# Patient Record
Sex: MALE | Race: WHITE | NOT HISPANIC OR LATINO | Employment: OTHER | URBAN - METROPOLITAN AREA
[De-identification: names, ages, dates, MRNs, and addresses within clinical notes are randomized per-mention and may not be internally consistent; named-entity substitution may affect disease eponyms.]

---

## 2018-05-03 ENCOUNTER — TRANSFERRED RECORDS (OUTPATIENT)
Dept: HEALTH INFORMATION MANAGEMENT | Facility: CLINIC | Age: 68
End: 2018-05-03

## 2018-05-04 ENCOUNTER — TRANSFERRED RECORDS (OUTPATIENT)
Dept: HEALTH INFORMATION MANAGEMENT | Facility: CLINIC | Age: 68
End: 2018-05-04

## 2018-05-16 ENCOUNTER — TRANSFERRED RECORDS (OUTPATIENT)
Dept: HEALTH INFORMATION MANAGEMENT | Facility: CLINIC | Age: 68
End: 2018-05-16

## 2018-08-13 ENCOUNTER — TRANSFERRED RECORDS (OUTPATIENT)
Dept: HEALTH INFORMATION MANAGEMENT | Facility: CLINIC | Age: 68
End: 2018-08-13

## 2018-08-16 ENCOUNTER — APPOINTMENT (RX ONLY)
Dept: URBAN - METROPOLITAN AREA CLINIC 126 | Facility: CLINIC | Age: 68
Setting detail: DERMATOLOGY
End: 2018-08-16

## 2018-08-16 DIAGNOSIS — L21.8 OTHER SEBORRHEIC DERMATITIS: ICD-10-CM

## 2018-08-16 DIAGNOSIS — K13.0 DISEASES OF LIPS: ICD-10-CM

## 2018-08-16 DIAGNOSIS — L80 VITILIGO: ICD-10-CM

## 2018-08-16 PROCEDURE — 99202 OFFICE O/P NEW SF 15 MIN: CPT

## 2018-08-16 PROCEDURE — ? TREATMENT REGIMEN

## 2018-08-16 PROCEDURE — ? PRODUCT LINE (OFFICE PRODUCTS)

## 2018-08-16 PROCEDURE — ? RECOMMENDATIONS

## 2018-08-16 PROCEDURE — ? COUNSELING

## 2018-08-16 PROCEDURE — ? PRESCRIPTION

## 2018-08-16 RX ORDER — KETOCONAZOLE 20 MG/G
CREAM TOPICAL
Qty: 1 | Refills: 1 | Status: ERX | COMMUNITY
Start: 2018-08-16

## 2018-08-16 RX ADMIN — KETOCONAZOLE: 20 CREAM TOPICAL at 00:00

## 2018-08-16 ASSESSMENT — LOCATION ZONE DERM
LOCATION ZONE: LIP
LOCATION ZONE: HAND
LOCATION ZONE: FACE
LOCATION ZONE: NECK

## 2018-08-16 ASSESSMENT — LOCATION SIMPLE DESCRIPTION DERM
LOCATION SIMPLE: LEFT HAND
LOCATION SIMPLE: LEFT ANTERIOR NECK
LOCATION SIMPLE: RIGHT CHEEK
LOCATION SIMPLE: LEFT CHEEK
LOCATION SIMPLE: RIGHT LIP
LOCATION SIMPLE: LEFT LIP
LOCATION SIMPLE: RIGHT HAND

## 2018-08-16 ASSESSMENT — LOCATION DETAILED DESCRIPTION DERM
LOCATION DETAILED: RIGHT ORAL COMMISSURE
LOCATION DETAILED: RIGHT INFERIOR MEDIAL BUCCAL CHEEK
LOCATION DETAILED: RIGHT INFERIOR LATERAL MALAR CHEEK
LOCATION DETAILED: LEFT INFERIOR VERMILION LIP
LOCATION DETAILED: LEFT MEDIAL MANDIBULAR CHEEK
LOCATION DETAILED: LEFT SUPERIOR ANTERIOR NECK
LOCATION DETAILED: LEFT ULNAR DORSAL HAND
LOCATION DETAILED: LEFT INFERIOR CENTRAL MALAR CHEEK
LOCATION DETAILED: RIGHT RADIAL DORSAL HAND

## 2018-08-16 NOTE — PROCEDURE: PRODUCT LINE (OFFICE PRODUCTS)
Product 35 Units: 0
Product 29 Price (In Dollars - Numeric Only, No Special Characters Or $): 0.00
Detail Level: Zone
Product 3 Units: 1
Name Of Product 1: Jerl Salt
Product 2 Application Directions: Apply to scar QD
Render Product Pricing In Note: No
Allow Plan To Count Towards E/M Coding: Yes
Name Of Product 3: EpiCeram L
Name Of Product 2: Silagen with SPF

## 2018-08-16 NOTE — HPI: RASH
How Severe Is Your Rash?: mild
Is This A New Presentation, Or A Follow-Up?: Rash
Additional History: Patient using Benadryl cream

## 2018-08-16 NOTE — PROCEDURE: RECOMMENDATIONS
Detail Level: Zone
Recommendation Preamble: The following recommendations were made during the visit: Extract Laser and Phototherapy

## 2018-08-16 NOTE — PROCEDURE: TREATMENT REGIMEN
Detail Level: Simple
Otc Regimen: Probiotic 25-50 billion ct.
Continue Regimen: Ketoconazole BID x 2 weeks PRN Flare

## 2018-08-30 ENCOUNTER — APPOINTMENT (RX ONLY)
Dept: URBAN - METROPOLITAN AREA CLINIC 126 | Facility: CLINIC | Age: 68
Setting detail: DERMATOLOGY
End: 2018-08-30

## 2018-08-30 DIAGNOSIS — L23.3 ALLERGIC CONTACT DERMATITIS DUE TO DRUGS IN CONTACT WITH SKIN: ICD-10-CM

## 2018-08-30 PROCEDURE — ? PRESCRIPTION

## 2018-08-30 PROCEDURE — ? DIAGNOSIS COMMENT

## 2018-08-30 PROCEDURE — 99213 OFFICE O/P EST LOW 20 MIN: CPT

## 2018-08-30 PROCEDURE — ? COUNSELING

## 2018-08-30 PROCEDURE — ? TREATMENT REGIMEN

## 2018-08-30 RX ORDER — ALCLOMETASONE DIPROPIONATE 0.5 MG/G
OINTMENT TOPICAL
Qty: 1 | Refills: 2 | Status: ERX | COMMUNITY
Start: 2018-08-30

## 2018-08-30 RX ADMIN — ALCLOMETASONE DIPROPIONATE: 0.5 OINTMENT TOPICAL at 00:00

## 2018-08-30 ASSESSMENT — LOCATION ZONE DERM: LOCATION ZONE: LIP

## 2018-08-30 ASSESSMENT — LOCATION DETAILED DESCRIPTION DERM
LOCATION DETAILED: LEFT SUPERIOR VERMILION LIP
LOCATION DETAILED: LEFT INFERIOR VERMILION LIP

## 2018-08-30 ASSESSMENT — LOCATION SIMPLE DESCRIPTION DERM: LOCATION SIMPLE: LEFT LIP

## 2018-10-03 ENCOUNTER — TRANSFERRED RECORDS (OUTPATIENT)
Dept: HEALTH INFORMATION MANAGEMENT | Facility: CLINIC | Age: 68
End: 2018-10-03

## 2018-10-26 ENCOUNTER — RX ONLY (OUTPATIENT)
Age: 68
Setting detail: RX ONLY
End: 2018-10-26

## 2018-10-26 RX ORDER — MUPIROCIN 20 MG/G
OINTMENT TOPICAL
Qty: 1 | Refills: 2 | Status: ERX | COMMUNITY
Start: 2018-10-26

## 2018-12-06 ENCOUNTER — APPOINTMENT (RX ONLY)
Dept: URBAN - METROPOLITAN AREA CLINIC 128 | Facility: CLINIC | Age: 68
Setting detail: DERMATOLOGY
End: 2018-12-06

## 2018-12-06 DIAGNOSIS — B02.9 ZOSTER WITHOUT COMPLICATIONS: ICD-10-CM

## 2018-12-06 PROCEDURE — 99213 OFFICE O/P EST LOW 20 MIN: CPT

## 2018-12-06 PROCEDURE — ? COUNSELING

## 2018-12-06 PROCEDURE — ? TREATMENT REGIMEN

## 2018-12-06 PROCEDURE — ? PRESCRIPTION

## 2018-12-06 RX ORDER — VALACYCLOVIR HYDROCHLORIDE 1 G/1
TABLET, FILM COATED ORAL
Qty: 21 | Refills: 0 | Status: ERX | COMMUNITY
Start: 2018-12-06

## 2018-12-06 RX ORDER — TRIAMCINOLONE ACETONIDE 0.1 %
OINTMENT (GRAM) TOPICAL
Qty: 1 | Refills: 0 | Status: ERX | COMMUNITY
Start: 2018-12-06

## 2018-12-06 RX ADMIN — VALACYCLOVIR HYDROCHLORIDE: 1 TABLET, FILM COATED ORAL at 16:15

## 2018-12-06 RX ADMIN — Medication: at 16:17

## 2018-12-06 ASSESSMENT — LOCATION ZONE DERM: LOCATION ZONE: ARM

## 2018-12-06 ASSESSMENT — LOCATION DETAILED DESCRIPTION DERM: LOCATION DETAILED: RIGHT PROXIMAL POSTERIOR UPPER ARM

## 2018-12-06 ASSESSMENT — LOCATION SIMPLE DESCRIPTION DERM: LOCATION SIMPLE: RIGHT UPPER ARM

## 2018-12-06 NOTE — PROCEDURE: TREATMENT REGIMEN
Initiate Treatment: Valtrex 1g TID x 7 days\\nTAC 0.1% ointment BID x 2 weeks PRN itch\\nAleve PRN pain (may call for gabapentin if necessary)
Plan: Recommended patient to have shingrix immunization completed after current zoster flare is healed
Detail Level: Simple

## 2019-01-02 ENCOUNTER — TRANSFERRED RECORDS (OUTPATIENT)
Dept: HEALTH INFORMATION MANAGEMENT | Facility: CLINIC | Age: 69
End: 2019-01-02

## 2019-04-16 ENCOUNTER — TRANSFERRED RECORDS (OUTPATIENT)
Dept: HEALTH INFORMATION MANAGEMENT | Facility: CLINIC | Age: 69
End: 2019-04-16

## 2019-09-20 ENCOUNTER — TRANSFERRED RECORDS (OUTPATIENT)
Dept: HEALTH INFORMATION MANAGEMENT | Facility: CLINIC | Age: 69
End: 2019-09-20

## 2019-10-03 ENCOUNTER — TRANSFERRED RECORDS (OUTPATIENT)
Dept: HEALTH INFORMATION MANAGEMENT | Facility: CLINIC | Age: 69
End: 2019-10-03

## 2019-10-21 ENCOUNTER — TRANSFERRED RECORDS (OUTPATIENT)
Dept: HEALTH INFORMATION MANAGEMENT | Facility: CLINIC | Age: 69
End: 2019-10-21

## 2019-11-12 ENCOUNTER — TRANSFERRED RECORDS (OUTPATIENT)
Dept: HEALTH INFORMATION MANAGEMENT | Facility: CLINIC | Age: 69
End: 2019-11-12

## 2019-11-20 ENCOUNTER — TRANSFERRED RECORDS (OUTPATIENT)
Dept: HEALTH INFORMATION MANAGEMENT | Facility: CLINIC | Age: 69
End: 2019-11-20

## 2020-08-10 ENCOUNTER — HOSPITAL ENCOUNTER (OUTPATIENT)
Facility: CLINIC | Age: 70
End: 2020-08-10
Attending: ORTHOPAEDIC SURGERY | Admitting: ORTHOPAEDIC SURGERY
Payer: MEDICARE

## 2020-08-10 DIAGNOSIS — Z11.59 ENCOUNTER FOR SCREENING FOR OTHER VIRAL DISEASES: Primary | ICD-10-CM

## 2020-08-12 RX ORDER — TRANEXAMIC ACID 650 MG/1
1950 TABLET ORAL ONCE
Status: CANCELLED | OUTPATIENT
Start: 2020-08-12 | End: 2020-08-12

## 2020-08-12 RX ORDER — CEFAZOLIN SODIUM 1 G/3ML
1 INJECTION, POWDER, FOR SOLUTION INTRAMUSCULAR; INTRAVENOUS SEE ADMIN INSTRUCTIONS
Status: CANCELLED | OUTPATIENT
Start: 2020-08-12

## 2020-08-12 RX ORDER — ACETAMINOPHEN 325 MG/1
975 TABLET ORAL ONCE
Status: CANCELLED | OUTPATIENT
Start: 2020-08-12 | End: 2020-08-12

## 2020-08-12 RX ORDER — CEFAZOLIN SODIUM 2 G/100ML
2 INJECTION, SOLUTION INTRAVENOUS
Status: CANCELLED | OUTPATIENT
Start: 2020-08-12

## 2020-10-23 ENCOUNTER — TRANSCRIBE ORDERS (OUTPATIENT)
Dept: OTHER | Age: 70
End: 2020-10-23

## 2020-10-23 DIAGNOSIS — C09.9 MALIGNANT NEOPLASM OF TONSIL (H): Primary | ICD-10-CM

## 2020-11-04 NOTE — TELEPHONE ENCOUNTER
FUTURE VISIT INFORMATION      FUTURE VISIT INFORMATION:    Date: 2020    Time: 1PM    Location: [facility]  REFERRAL INFORMATION:    Referring provider:  Kip Flores MD    Referring providers clinic:  [facility]     Reason for visit/diagnosis  Malignant neoplasm of tonsil     RECORDS REQUESTED FROM:       Clinic name Comments Records Status Imaging Status   [facility]   [address] [place] [address]    [phone]   10/23/2020 note from Kip Flores MD Care Everywhere    [facility] Urgent [facility]   2020 note from Xochitl Restrepo PA-C   Care Everywhere     [facility]    [address] [place] [address]    [phone]   10/16/2020 note from Live Treviño MD Care Everywhere    [facility]  10/14/2020 MR Neck   2020 MR Neck   2020 PET CT  Care Everywhere PACS   [facility]  [address] [place] [address]  Medical Records  Ph. [phone]  Fx. [phone]  Film room fx. [phone] 10/3/2018 PET CT   2018 CT Chest   2018 CT Neck   2019 MR Neck   2019 PET CT  2019 CT Chest     Imaging trackin   emailed LINCOLN 11/10    req  req     Received    Dr Live Humphries's office  [facility] [place], [place]. Phone- [phone]   Fax: [phone]    2020 summary   10/3/2019 - 2018 notes from Dr Live Humphries  emailed LINCOLN 11/10    req     Sent to scan     [facility]  [address], [address]  [place] [address]   Phone: [phone]  Fax: [phone]   *notes are hand written     2019, 10/30/2018, 2018  note  emailed LINCOLN 11/10    req 11/17    Received     [facility]  [address] [place] [address]  Ph. [phone]  Fx. [phone] 5/3/2018 tonsil biopsy (Path#:  [id number])    *trackin Report sent to scan 2020 10:25AM left patient a message to see if we can get ROIs to him via email to obtain his outside [place] records (ENT, pathology/biopsy report, images). Left patient my direct number for call back [phone] - Amay  11/10/2020 9:39AM emailed ROIs to patient - Amay   2020 10:09AM received signed ROIs, notified patient via email. SEnt a fax to facilities for recs - amay   2020 8:29AM received recs from [facility], [facility] reports [facility]   2020 3:18PM received [facility] recs, sent to scan. Sent a fax for path send out from [facility] - Amay     *waiting for path, all images and notes have been received - amay

## 2020-11-04 NOTE — TELEPHONE ENCOUNTER
ONCOLOGY INTAKE: Records Information      APPT INFORMATION:  Referring provider:  Dr. Flores   Referring provider s clinic:  [facility]  Reason for visit/diagnosis:  Malignant neoplasm of tonsil  Has patient been notified of appointment date and time?: Yes    RECORDS INFORMATION:  Were the records received with the referral (via [employer])? Yes    Has patient been seen for any external appt for this diagnosis? Yes    If yes, where? [facility]    Has patient had any imaging or procedures outside of [facility] for this condition? Yes      If Yes, where? [facility]    ADDITIONAL INFORMATION:  None

## 2020-11-08 ENCOUNTER — DOCUMENTATION ONLY (OUTPATIENT)
Dept: CARE COORDINATION | Facility: CLINIC | Age: 70
End: 2020-11-08

## 2020-11-10 NOTE — TELEPHONE ENCOUNTER
Called patient to notify that he will need to sign a few ROIs for his [place] records. Patient is ok with emailing forms. He does have a printer at home, and is aware that I will send him instructions on how to send the forms back to me. Verified email: [email or web address]. Notified patient that I have had issues with patient's receiving my email in the past, he will call me tomorrow if he does not see my email. Patent also wanted to relay to nurse, he has been experiencing left ear discomfort and jaw pain from after his dental surgery. He does wear hearing aids, but is not experiencing any hearing loss. Notified patient that I will relay his concerns to the RN.

## 2020-11-16 NOTE — TELEPHONE ENCOUNTER
Patient sent an email to me stating he dropped off his signed Release forms with the check in staff on the 4th floor Friday 11/13 around 3pm. Patient unsure who he dropped it off with. A message has been sent to the check in staff to see if they received it. Patient is aware that I will notify him once I have located the forms, in case the forms have been misplaced.     Amay

## 2020-11-18 ENCOUNTER — TRANSFERRED RECORDS (OUTPATIENT)
Dept: HEALTH INFORMATION MANAGEMENT | Facility: CLINIC | Age: 70
End: 2020-11-18

## 2020-11-18 NOTE — TELEPHONE ENCOUNTER
Imaging disc received from [facility] with MRI soft tissue neck done 11/20/19, PET-CT skull base through mid- thighs done 11/12/19, ct chest done 4/16/19, PET-CT skull base through mid-thighs done 10/3/18, ct chest done 8/13/18, PET-CT skull base to mid thigh done 5/17/18

## 2020-11-23 NOTE — TELEPHONE ENCOUNTER
Action 11/23/2020 3:15pm -neetu   Action Taken Path slides have been received from [facility].     Path #: [id number] Tonsil, Left.    Pathology Form has been filled out and dropped off to Path Lab along with Path slides (reports, etc).

## 2020-11-25 ENCOUNTER — OFFICE VISIT (OUTPATIENT)
Dept: OTOLARYNGOLOGY | Facility: CLINIC | Age: 70
End: 2020-11-25
Attending: OTOLARYNGOLOGY
Payer: MEDICARE

## 2020-11-25 ENCOUNTER — PRE VISIT (OUTPATIENT)
Dept: OTOLARYNGOLOGY | Facility: CLINIC | Age: 70
End: 2020-11-25

## 2020-11-25 VITALS
DIASTOLIC BLOOD PRESSURE: 87 MMHG | OXYGEN SATURATION: 100 % | TEMPERATURE: 98.3 F | WEIGHT: 185 LBS | HEIGHT: 70 IN | RESPIRATION RATE: 15 BRPM | SYSTOLIC BLOOD PRESSURE: 151 MMHG | HEART RATE: 67 BPM | BODY MASS INDEX: 26.48 KG/M2

## 2020-11-25 DIAGNOSIS — C09.9 MALIGNANT NEOPLASM OF TONSIL (H): ICD-10-CM

## 2020-11-25 LAB — COPATH REPORT: NORMAL

## 2020-11-25 PROCEDURE — 99203 OFFICE O/P NEW LOW 30 MIN: CPT | Performed by: OTOLARYNGOLOGY

## 2020-11-25 PROCEDURE — 88321 CONSLTJ&REPRT SLD PREP ELSWR: CPT | Performed by: PATHOLOGY

## 2020-11-25 PROCEDURE — 999N001032 HC STATISTIC REVIEW OUTSIDE SLIDES TC 88321: Performed by: OTOLARYNGOLOGY

## 2020-11-25 RX ORDER — LISINOPRIL 20 MG/1
20 TABLET ORAL 2 TIMES DAILY
COMMUNITY
Start: 2019-03-16 | End: 2021-04-19

## 2020-11-25 RX ORDER — CEVIMELINE HYDROCHLORIDE 30 MG/1
CAPSULE ORAL PRN
COMMUNITY
Start: 2020-11-19 | End: 2021-06-04

## 2020-11-25 RX ORDER — SILDENAFIL 100 MG/1
50-100 TABLET, FILM COATED ORAL
COMMUNITY
Start: 2020-05-01 | End: 2021-03-16

## 2020-11-25 RX ORDER — AMOXICILLIN 500 MG/1
CAPSULE ORAL
COMMUNITY
Start: 2020-09-01 | End: 2021-01-20

## 2020-11-25 RX ORDER — CARVEDILOL 12.5 MG/1
1 TABLET ORAL 2 TIMES DAILY WITH MEALS
COMMUNITY
Start: 2019-07-10

## 2020-11-25 RX ORDER — TRAZODONE HYDROCHLORIDE 50 MG/1
TABLET, FILM COATED ORAL AT BEDTIME
COMMUNITY
Start: 2020-09-13 | End: 2021-04-22

## 2020-11-25 RX ORDER — ATORVASTATIN CALCIUM 10 MG/1
1 TABLET, FILM COATED ORAL EVERY EVENING
COMMUNITY
Start: 2019-05-13

## 2020-11-25 RX ORDER — IBUPROFEN 600 MG/1
TABLET, FILM COATED ORAL
COMMUNITY
Start: 2020-02-17 | End: 2021-03-16

## 2020-11-25 SDOH — HEALTH STABILITY: MENTAL HEALTH: HOW MANY STANDARD DRINKS CONTAINING ALCOHOL DO YOU HAVE ON A TYPICAL DAY?: NOT ASKED

## 2020-11-25 SDOH — HEALTH STABILITY: MENTAL HEALTH: HOW OFTEN DO YOU HAVE A DRINK CONTAINING ALCOHOL?: 2-3 TIMES A WEEK

## 2020-11-25 SDOH — HEALTH STABILITY: MENTAL HEALTH: HOW OFTEN DO YOU HAVE 6 OR MORE DRINKS ON ONE OCCASION?: NOT ASKED

## 2020-11-25 ASSESSMENT — MIFFLIN-ST. JEOR: SCORE: 1610.4

## 2020-11-25 ASSESSMENT — PAIN SCALES - GENERAL: PAINLEVEL: NO PAIN (0)

## 2020-11-25 NOTE — NURSING NOTE
"Chief Complaint   Patient presents with     Consult     malgnant neoplasm of tonsil     Blood pressure (!) 151/87, pulse 67, temperature 98.3  F (36.8  C), resp. rate 15, height 1.778 m (5' 10\"), weight 83.9 kg (185 lb), SpO2 100 %.    Frank Moreau LPN    "

## 2020-11-30 NOTE — PROGRESS NOTES
Head & Neck Tumor Conference Note        Status: New  Staff: Dr. 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  He has been followed there was some followup imaging.  He has seen Dr. Flores as well.  Recently in seeing Dr. Flores, he had noted some numbness that followed a dental procedure on the left side.  He describes numbness as being on the left cheek and over the mandible and going to the midline in the distribution of the inferior alveolar and mental nerves.  He also has had a PET finding in his pterygoid muscle that was chalked up to inflammation.  He does have some otalgia in addition to the numbness.  He denies odynophagia.  He has not noticed any lumps or bumps in his neck.  He recently had an MRI that showed some enhancement on the trigeminal nerve and was sent here for further workup of this.     Pertinent PMH: No past medical history on file.     Smoking Hx:   Social History     Tobacco Use     Smoking status: Never Smoker     Smokeless tobacco: Never Used   Substance Use Topics     Alcohol use: Yes     Frequency: 2-3 times a week     Drug use: Not on file       Imaging:   MR Soft Tissue Neck w/wo contrast (11/22/19):  1. Linear signal abnormality and enhancement along the left pterygoid musculature correspenidng to area on recent PET scan dated 11/12/19. Findings nonspecific but may relate to myositis or inflammatory process given the location of findings. Follow up exmainiation may be helpful to document resolution of findings.  2. Nonspecific soft tissue signal intensity within the left lateral pharyngeal region contiguous with the medial margin of the left pterygoid musculature with effacement of normal  fat space. Findings may relate to post radiation changes. Cannot exclude subte residual or recurrent neoplasm/ Continued follow-up examination is recommending.   3. No evidence of cervical lymphadenopathy  4. Evidence of radiation changes of the neck as noted above  5. Mild heterogenous enhancement of the submandibular glands which may relate to sequela of radiation changes versus sialoadenitis     PET/CT: (11/12/19):  1. There has been interval development of a small focus of moderately intense activity within the area of soft tissue fullness in the left oropharynx. This is suspicious for locally recurrent malignancy. Correlation with MRI could be pursued if indicated.  2. Interval decreased in size of a small non-FDG-avid left cervical lymph node consistent with treated metastasis.  3. Interval resolution of mild uptake within an opacity in the left lung apex favoring a benign inflammatory process  4. Elsewhere, there is no definitive FDG PET evidence of malignancy from the skull base through mid-thighs with otherwise stable examination in comparison to the prior FDG PET CT scan dated 10/3/2018.      Tumor Board Recommendation:   Discussion: Reviewed imaging, imaging shows evidence of perineural spread with asymmetric enhancement in the foramen ovale with increase in size compared to previous, concerning for recurrence. Tumor board recommending IR-guided biopsy in the  space.    Plan:   - referral for IR-guided biopsy of concerning lesion    Mark Cook MD PGY-3  Otolaryngology- Head and Neck Surgery    Documentation / Disclaimer Cancer Tumor Board Note  Cancer tumor board recommendations do not override what is determined to be reasonable care and treatment, which is dependent on the circumstances of a patient's case; the patient's medical, social, and personal concerns; and the clinical judgment of the oncologist [physician].

## 2020-12-04 ENCOUNTER — TUMOR CONFERENCE (OUTPATIENT)
Dept: ONCOLOGY | Facility: CLINIC | Age: 70
End: 2020-12-04
Payer: MEDICARE

## 2020-12-04 DIAGNOSIS — C09.9 SQUAMOUS CELL CARCINOMA OF TONSIL (H): Primary | ICD-10-CM

## 2020-12-05 NOTE — PROGRESS NOTES
Called patient to review tumor board discussion and recommendations. Reviewed with patient that there is some concern at the left left  space and we would recommended proceeding with an image guided biopsy to further evaluate. Patient in agreement with this plan. We will review with IR and contact patient as soon as this is approved to schedule.     Called and left message for IR to review for approval.     Patient was encouraged to call with further questions or concerns.     Almaz Sorto, RN, BSN

## 2020-12-07 ENCOUNTER — PATIENT OUTREACH (OUTPATIENT)
Dept: OTOLARYNGOLOGY | Facility: CLINIC | Age: 70
End: 2020-12-07

## 2020-12-07 DIAGNOSIS — R22.0 HEAD MASS: Primary | ICD-10-CM

## 2020-12-07 DIAGNOSIS — C09.9 MALIGNANT NEOPLASM OF TONSIL (H): Primary | ICD-10-CM

## 2020-12-07 NOTE — PROGRESS NOTES
Called and left message for patient indicating that biopsy with IR has been approved. IR is requesting CT with contrast to evaluate prior to biopsy. Patient is scheduled for CT scan tomorrow 12/8 at 3:40pm. He will then come to clinic for Covid testing after his CT.     IR biopsy scheduled for Thursday 12/10 at 8:00am.     Left direct line for patient to return call.     Almaz Sorto, RN, BSN

## 2020-12-07 NOTE — PROGRESS NOTES
"Outpatient imaging procedure order placed:     IR Lymph Node Biopsy [[id number]]    Awaiting signature from: David Russell MD Status: Active   Mode: Ordering in Verbal with readback mode Communicated by: Almaz Sorto RN   Ordering user: Almaz Sorto RN 12/04/20 1906           Exam reason Please complete IR guided biopsy of left  space enhancement seen on recent PET and MRI scan.     Associated Diagnoses:  Squamous cell carcinoma of tonsil (H) [C09.9]  - Primary         Patient case discussed at Head & Neck Tumor Conference     Target lesion imaging: See imaging PET CT 9/8/20    Case request and imaging reviewed by neuroradiology, Dr. Bijan Santos. Approved for CT guided biopsy. Need CT with contrast prior to biopsy attempt.        Review of Epic entered chart data shows the patient is on no anticoagulation    Platelets = none available  INR = none available    COVID-19 PCR results = none recent    ===    I will place the IR procedure order and any pre-procedure orders necessary to prepare for patient visit.  My procedure order will trigger an Epic generated COVID-19 screening test pre-procedure if more than 5 days out, or else I will manually enter pre-procedure COVID-19 screening.  This test must be completed and resulted within 4 days of the procedure date.    *VERY IMPORTANT*  * Please note-very important *   The procedure requesting provider is asked to place desired specimen testing orders into the chart in a \"Signed & Held\" status.  Please follow the directions and the tip sheet link provided below.  These orders need to be entered and signed by a medical provider, not nurse, before the procedure can be scheduled.  Prompt attention to this matter will prevent unnecessary patient scheduling delays.    Referring provider must enter desired specimen testing orders prior to IR procedure scheduling.  Medical provider, not RN, please enter the orders by Orders Only Encounter, Orders for " "Admission, utilizing \"IR RAD Biopsy or Fluid Aspirate Specimens\" Order Set, Sign and Hold for Admission, Reason- Pre Procedure.    [email or web address]    Thank you,  MAGDIEL Mendez, PA-C  "

## 2020-12-08 ENCOUNTER — PATIENT OUTREACH (OUTPATIENT)
Dept: OTOLARYNGOLOGY | Facility: CLINIC | Age: 70
End: 2020-12-08

## 2020-12-08 ENCOUNTER — TELEPHONE (OUTPATIENT)
Dept: INTERVENTIONAL RADIOLOGY/VASCULAR | Facility: CLINIC | Age: 70
End: 2020-12-08

## 2020-12-08 RX ORDER — DEXTROSE MONOHYDRATE 25 G/50ML
25-50 INJECTION, SOLUTION INTRAVENOUS
Status: CANCELLED | OUTPATIENT
Start: 2020-12-08

## 2020-12-08 RX ORDER — NICOTINE POLACRILEX 4 MG
15-30 LOZENGE BUCCAL
Status: CANCELLED | OUTPATIENT
Start: 2020-12-08

## 2020-12-08 RX ORDER — LIDOCAINE 40 MG/G
CREAM TOPICAL
Status: CANCELLED | OUTPATIENT
Start: 2020-12-08

## 2020-12-08 RX ORDER — SODIUM CHLORIDE 9 MG/ML
INJECTION, SOLUTION INTRAVENOUS CONTINUOUS
Status: CANCELLED | OUTPATIENT
Start: 2020-12-08

## 2020-12-08 NOTE — PROGRESS NOTES
Received a call from patient indicating that he cannot plan for biopsy on Thursday as he has to work and he cannot get off work. He would also like to speak with his oncologist prior to moving forward. Patient requested that we hold on scheduling until he returns call with update that he would like to proceed. Reviewed with patient that there is some concern on scan and we would recommend biopsy as soon as he feels comfortable proceeding. Patient in agreement and he will return call to writer as soon as he has been able to speak with his oncologist and can plan to be out of work.     Writer will cancel CT scan and covid testing for Tuesday and will cancel IR biopsy for Thursday. We will await his return call to reschedule.     Almaz Sorto, RN, BSN

## 2020-12-09 ENCOUNTER — TELEPHONE (OUTPATIENT)
Dept: OTOLARYNGOLOGY | Facility: CLINIC | Age: 70
End: 2020-12-09

## 2020-12-09 NOTE — TELEPHONE ENCOUNTER
Health Call Center    Phone Message    May a detailed message be left on voicemail: yes     Reason for Call: Other: Jayda from [facility] called regarding the Pts results. She stated they referred the Pt over and were looking for the results from the tumor board to be sent to Dr. Flores. Please call them back at [phone] which is the nurse  line and you can leave a VM as well. Please advise, thank you!     Action Taken: Message routed to:  [facility] ENT    Travel Screening: Not Applicable

## 2020-12-10 NOTE — TELEPHONE ENCOUNTER
Returned call and left message for nurse indicating that tumor board notes will be faxed over. Left direct line and encouraged her to return call with any further questions or concerns.     Almaz Sorto, RN, BSN

## 2020-12-11 ENCOUNTER — PATIENT OUTREACH (OUTPATIENT)
Dept: OTOLARYNGOLOGY | Facility: CLINIC | Age: 70
End: 2020-12-11

## 2020-12-11 NOTE — PROGRESS NOTES
Received a call back from patient indicating that he would now like to proceed with IR guided biopsy after talking with Dr. Flores.    Reviewed the need for CT scan with contrast to evaluate prior to biopsy. Patient in agreement and is scheduled for CT neck with contrast on Tuesday at 4:45pm.     Writer will work with IR to arrange biopsy as soon as possible. We will call patient with date and time.     Patient encouraged to call with further questions or concerns.     Almaz Sorto, RN, BSN

## 2020-12-14 ENCOUNTER — TELEPHONE (OUTPATIENT)
Dept: OTOLARYNGOLOGY | Facility: CLINIC | Age: 70
End: 2020-12-14

## 2020-12-14 NOTE — TELEPHONE ENCOUNTER
Returned call to patient and left message with review that CT neck is scheduled tomorrow 12/15 at 4:45pm and we will continue to work with IR to schedule biopsy asap. We will return call with date and time for IR biopsy.   Left direct line for return call with further questions or concerns.     Almaz Sorto, RN, BSN

## 2020-12-14 NOTE — TELEPHONE ENCOUNTER
[facility] Call Center    Phone Message    May a detailed message be left on voicemail: yes     Reason for Call: Other:   Pt calling to speak to Almaz.      Pt will like to discuss the CT scan and biopsy.     Pt forgot his phone at home today and won't be done until 630pm. Ok to leave a message on his phone.     Please follow-up.     Action Taken: Other:  ent    Travel Screening: Not Applicable

## 2020-12-15 ENCOUNTER — ANCILLARY PROCEDURE (OUTPATIENT)
Dept: CT IMAGING | Facility: CLINIC | Age: 70
End: 2020-12-15
Attending: OTOLARYNGOLOGY
Payer: MEDICARE

## 2020-12-15 ENCOUNTER — PATIENT OUTREACH (OUTPATIENT)
Dept: OTOLARYNGOLOGY | Facility: CLINIC | Age: 70
End: 2020-12-15

## 2020-12-15 DIAGNOSIS — Z11.59 ENCOUNTER FOR SCREENING FOR OTHER VIRAL DISEASES: Primary | ICD-10-CM

## 2020-12-15 DIAGNOSIS — C09.9 MALIGNANT NEOPLASM OF TONSIL (H): ICD-10-CM

## 2020-12-15 LAB
CREAT BLD-MCNC: 0.8 MG/DL (ref 0.66–1.25)
GFR SERPL CREATININE-BSD FRML MDRD: >90 ML/MIN/{1.73_M2}

## 2020-12-15 PROCEDURE — 70491 CT SOFT TISSUE NECK W/DYE: CPT | Performed by: RADIOLOGY

## 2020-12-15 PROCEDURE — 36415 COLL VENOUS BLD VENIPUNCTURE: CPT | Performed by: PATHOLOGY

## 2020-12-15 PROCEDURE — 82565 ASSAY OF CREATININE: CPT | Performed by: PATHOLOGY

## 2020-12-15 RX ORDER — IOPAMIDOL 755 MG/ML
100 INJECTION, SOLUTION INTRAVASCULAR ONCE
Status: COMPLETED | OUTPATIENT
Start: 2020-12-15 | End: 2020-12-15

## 2020-12-15 RX ADMIN — IOPAMIDOL 100 ML: 755 INJECTION, SOLUTION INTRAVASCULAR at 17:03

## 2020-12-15 NOTE — PROGRESS NOTES
Called and spoke with patient regarding IR guided biopsy schedule. Patient will complete required CT scan today.     Patient is now scheduled for soonest IR guided biopsy which is on 12/28 at 8:00am. Reviewed prep instructions and the need for . Patient verbalized understanding.     Discussed the need for covid testing prior to biopsy. Patient wishes to do this with local [facility] clinic. Writer called and left message with oncology RN at [facility] to have her assist with arranging covid testing. Left direct line for return call.     Patient encouraged to call with further questions or concerns.     Almaz Sorto, RN, BSN

## 2020-12-15 NOTE — PROGRESS NOTES
Received return call from oncology nurse at Dr. Flores's office indicating that they will arrange for covid testing on the morning of 12/24 for patient's IR biopsy scheduled for 12/28.    Almaz Sorto, RN, BSN

## 2020-12-18 ENCOUNTER — PATIENT OUTREACH (OUTPATIENT)
Dept: OTOLARYNGOLOGY | Facility: CLINIC | Age: 70
End: 2020-12-18

## 2020-12-19 NOTE — PROGRESS NOTES
Called patient with the following CT scan results:    Impression:  Non-well marginated, soft tissue density centered in the left   space with involvement of the left cavernous sinus,  expansion of the foramen ovale and effacement of left pharyngeal fat  inferiorly, matching with the areas of FDG uptake on 9/8/2020 and  contrast enhancement on 10/14/2020.       Reviewed with patient that he will proceed with IR guided biopsy of this area seen as scheduled on 12/28. Patient was in agreement with plan. We will follow-up with results once completed. Patient encouraged to call with further questions or concerns.     Almaz Sorto, RN, BSN

## 2020-12-22 ENCOUNTER — TELEPHONE (OUTPATIENT)
Dept: INTERVENTIONAL RADIOLOGY/VASCULAR | Facility: CLINIC | Age: 70
End: 2020-12-22

## 2020-12-22 RX ORDER — LIDOCAINE 40 MG/G
CREAM TOPICAL
Status: CANCELLED | OUTPATIENT
Start: 2020-12-22

## 2020-12-22 RX ORDER — NICOTINE POLACRILEX 4 MG
15-30 LOZENGE BUCCAL
Status: CANCELLED | OUTPATIENT
Start: 2020-12-22

## 2020-12-22 RX ORDER — DEXTROSE MONOHYDRATE 25 G/50ML
25-50 INJECTION, SOLUTION INTRAVENOUS
Status: CANCELLED | OUTPATIENT
Start: 2020-12-22

## 2020-12-22 RX ORDER — SODIUM CHLORIDE 9 MG/ML
INJECTION, SOLUTION INTRAVENOUS CONTINUOUS
Status: CANCELLED | OUTPATIENT
Start: 2020-12-22

## 2020-12-31 ENCOUNTER — TELEPHONE (OUTPATIENT)
Dept: INTERVENTIONAL RADIOLOGY/VASCULAR | Facility: CLINIC | Age: 70
End: 2020-12-31

## 2021-01-07 ENCOUNTER — TELEPHONE (OUTPATIENT)
Dept: OTOLARYNGOLOGY | Facility: CLINIC | Age: 71
End: 2021-01-07

## 2021-01-07 NOTE — TELEPHONE ENCOUNTER
Returned call to patient to discuss his concerns regarding tomorrow's biopsy. Reviewed procedure with patient and answered all questions. Patient agreeable to proceed with biopsy as scheduled. Discussed with patient that writer will call with pathology results once completed which will take a few days. Patient verbalized understanding and was encouraged to call sooner with any further questions or concerns.     Almaz Sorto, HERLINDA, BSN

## 2021-01-07 NOTE — TELEPHONE ENCOUNTER
[facility] Call Center    Phone Message    May a detailed message be left on voicemail: yes     Reason for Call: Other: The pt is having a procedure 1.8.21. The procedure was explained to him, but that differs from another description of the procedure that was given him. He needs to clarify what is happening as he might cancel without this. Please call the pt today to discuss procedure. Thanks.     Action Taken: Message routed to:  [facility] ENT    Travel Screening: Not Applicable

## 2021-01-08 ENCOUNTER — HOSPITAL ENCOUNTER (OUTPATIENT)
Facility: CLINIC | Age: 71
Discharge: HOME | End: 2021-01-08
Attending: OTOLARYNGOLOGY | Admitting: OTOLARYNGOLOGY
Payer: MEDICARE

## 2021-01-08 ENCOUNTER — APPOINTMENT (OUTPATIENT)
Dept: INTERVENTIONAL RADIOLOGY/VASCULAR | Facility: CLINIC | Age: 71
End: 2021-01-08
Attending: PHYSICIAN ASSISTANT
Payer: MEDICARE

## 2021-01-08 ENCOUNTER — APPOINTMENT (OUTPATIENT)
Dept: MEDSURG UNIT | Facility: CLINIC | Age: 71
End: 2021-01-08
Attending: OTOLARYNGOLOGY
Payer: MEDICARE

## 2021-01-08 VITALS
SYSTOLIC BLOOD PRESSURE: 137 MMHG | TEMPERATURE: 98.5 F | HEART RATE: 65 BPM | OXYGEN SATURATION: 97 % | RESPIRATION RATE: 16 BRPM | BODY MASS INDEX: 25.83 KG/M2 | WEIGHT: 180 LBS | DIASTOLIC BLOOD PRESSURE: 83 MMHG

## 2021-01-08 DIAGNOSIS — R22.0 HEAD MASS: ICD-10-CM

## 2021-01-08 LAB
ERYTHROCYTE [DISTWIDTH] IN BLOOD BY AUTOMATED COUNT: 12.2 % (ref 10–15)
HCT VFR BLD AUTO: 38.9 % (ref 40–53)
HGB BLD-MCNC: 13.3 G/DL (ref 13.3–17.7)
INR PPP: 1.16 (ref 0.86–1.14)
MCH RBC QN AUTO: 33.1 PG (ref 26.5–33)
MCHC RBC AUTO-ENTMCNC: 34.2 G/DL (ref 31.5–36.5)
MCV RBC AUTO: 97 FL (ref 78–100)
PLATELET # BLD AUTO: 212 10E9/L (ref 150–450)
RBC # BLD AUTO: 4.02 10E12/L (ref 4.4–5.9)
WBC # BLD AUTO: 5.3 10E9/L (ref 4–11)

## 2021-01-08 PROCEDURE — 250N000009 HC RX 250

## 2021-01-08 PROCEDURE — 272N000505 HC NEEDLE CR5

## 2021-01-08 PROCEDURE — 88333 PATH CONSLTJ SURG CYTO XM 1: CPT | Mod: 26 | Performed by: PATHOLOGY

## 2021-01-08 PROCEDURE — 85610 PROTHROMBIN TIME: CPT | Mod: GZ | Performed by: RADIOLOGY

## 2021-01-08 PROCEDURE — 88360 TUMOR IMMUNOHISTOCHEM/MANUAL: CPT | Mod: TC | Performed by: OTOLARYNGOLOGY

## 2021-01-08 PROCEDURE — 88360 TUMOR IMMUNOHISTOCHEM/MANUAL: CPT | Mod: 26 | Performed by: PATHOLOGY

## 2021-01-08 PROCEDURE — 20206 BIOPSY MUSCLE PERQ NEEDLE: CPT

## 2021-01-08 PROCEDURE — 272N000155 HC KIT CR15

## 2021-01-08 PROCEDURE — 93010 ELECTROCARDIOGRAM REPORT: CPT | Mod: 59 | Performed by: INTERNAL MEDICINE

## 2021-01-08 PROCEDURE — 88307 TISSUE EXAM BY PATHOLOGIST: CPT | Mod: TC | Performed by: OTOLARYNGOLOGY

## 2021-01-08 PROCEDURE — 88342 IMHCHEM/IMCYTCHM 1ST ANTB: CPT | Mod: 26 | Performed by: PATHOLOGY

## 2021-01-08 PROCEDURE — 93005 ELECTROCARDIOGRAM TRACING: CPT

## 2021-01-08 PROCEDURE — 20206 BIOPSY MUSCLE PERQ NEEDLE: CPT | Mod: GC | Performed by: STUDENT IN AN ORGANIZED HEALTH CARE EDUCATION/TRAINING PROGRAM

## 2021-01-08 PROCEDURE — 85027 COMPLETE CBC AUTOMATED: CPT | Performed by: RADIOLOGY

## 2021-01-08 PROCEDURE — 20225 BONE BIOPSY TROCAR/NDL DEEP: CPT

## 2021-01-08 PROCEDURE — 99152 MOD SED SAME PHYS/QHP 5/>YRS: CPT | Mod: GC | Performed by: STUDENT IN AN ORGANIZED HEALTH CARE EDUCATION/TRAINING PROGRAM

## 2021-01-08 PROCEDURE — 88307 TISSUE EXAM BY PATHOLOGIST: CPT | Mod: 26 | Performed by: PATHOLOGY

## 2021-01-08 PROCEDURE — 88333 PATH CONSLTJ SURG CYTO XM 1: CPT | Mod: TC | Performed by: OTOLARYNGOLOGY

## 2021-01-08 PROCEDURE — 77012 CT SCAN FOR NEEDLE BIOPSY: CPT | Mod: 26 | Performed by: STUDENT IN AN ORGANIZED HEALTH CARE EDUCATION/TRAINING PROGRAM

## 2021-01-08 PROCEDURE — 999N000133 HC STATISTIC PP CARE STAGE 2

## 2021-01-08 PROCEDURE — 88342 IMHCHEM/IMCYTCHM 1ST ANTB: CPT | Mod: TC | Performed by: OTOLARYNGOLOGY

## 2021-01-08 PROCEDURE — 250N000011 HC RX IP 250 OP 636

## 2021-01-08 PROCEDURE — 99153 MOD SED SAME PHYS/QHP EA: CPT

## 2021-01-08 PROCEDURE — 99152 MOD SED SAME PHYS/QHP 5/>YRS: CPT

## 2021-01-08 RX ORDER — FLUMAZENIL 0.1 MG/ML
0.2 INJECTION, SOLUTION INTRAVENOUS
Status: DISCONTINUED | OUTPATIENT
Start: 2021-01-08 | End: 2021-01-08 | Stop reason: HOSPADM

## 2021-01-08 RX ORDER — NICOTINE POLACRILEX 4 MG
15-30 LOZENGE BUCCAL
Status: DISCONTINUED | OUTPATIENT
Start: 2021-01-08 | End: 2021-01-08 | Stop reason: HOSPADM

## 2021-01-08 RX ORDER — FLECAINIDE ACETATE 150 MG/1
150 TABLET ORAL 2 TIMES DAILY
COMMUNITY
End: 2021-06-21

## 2021-01-08 RX ORDER — NALOXONE HYDROCHLORIDE 0.4 MG/ML
0.2 INJECTION, SOLUTION INTRAMUSCULAR; INTRAVENOUS; SUBCUTANEOUS
Status: DISCONTINUED | OUTPATIENT
Start: 2021-01-08 | End: 2021-01-08 | Stop reason: HOSPADM

## 2021-01-08 RX ORDER — DEXTROSE MONOHYDRATE 25 G/50ML
25-50 INJECTION, SOLUTION INTRAVENOUS
Status: DISCONTINUED | OUTPATIENT
Start: 2021-01-08 | End: 2021-01-08 | Stop reason: HOSPADM

## 2021-01-08 RX ORDER — NALOXONE HYDROCHLORIDE 0.4 MG/ML
0.4 INJECTION, SOLUTION INTRAMUSCULAR; INTRAVENOUS; SUBCUTANEOUS
Status: DISCONTINUED | OUTPATIENT
Start: 2021-01-08 | End: 2021-01-08 | Stop reason: HOSPADM

## 2021-01-08 RX ORDER — SODIUM CHLORIDE 9 MG/ML
INJECTION, SOLUTION INTRAVENOUS CONTINUOUS
Status: DISCONTINUED | OUTPATIENT
Start: 2021-01-08 | End: 2021-01-08 | Stop reason: HOSPADM

## 2021-01-08 RX ORDER — LIDOCAINE 40 MG/G
CREAM TOPICAL
Status: DISCONTINUED | OUTPATIENT
Start: 2021-01-08 | End: 2021-01-08 | Stop reason: HOSPADM

## 2021-01-08 RX ORDER — FENTANYL CITRATE 50 UG/ML
25-50 INJECTION, SOLUTION INTRAMUSCULAR; INTRAVENOUS EVERY 5 MIN PRN
Status: DISCONTINUED | OUTPATIENT
Start: 2021-01-08 | End: 2021-01-08 | Stop reason: HOSPADM

## 2021-01-08 RX ADMIN — FENTANYL CITRATE 275 MCG: 50 INJECTION, SOLUTION INTRAMUSCULAR; INTRAVENOUS at 12:08

## 2021-01-08 RX ADMIN — MIDAZOLAM 5.5 MG: 1 INJECTION INTRAMUSCULAR; INTRAVENOUS at 12:08

## 2021-01-08 RX ADMIN — LIDOCAINE HYDROCHLORIDE 10 ML: 10 INJECTION, SOLUTION EPIDURAL; INFILTRATION; INTRACAUDAL; PERINEURAL at 12:08

## 2021-01-08 NOTE — PROGRESS NOTES
Patient Name: Fortino Moya  Medical Record Number: [id number]  Today's Date: 1/8/2021    Procedure: Image guided left mandibular space mass biopsy with sedation.  Proceduralist: MD Gus., MD Ap.    Procedure Start: 1020  Procedure end: 1205  Sedation medications administered: Fentanyl:275 mcg  Versed: 5.5mg    Report given to: 2A, RN  : n/a    Other Notes: Pt arrived to IR room CT 2 from . Consent reviewed. Pt denies any questions or concerns regarding procedure. Pt positioned supine and monitored per protocol. Pathology present and  7 cores total obtained. 4 from the first site and 3 from the second. Pt tolerated procedure without any noted complications. Pt transferred back to .    Guillermina Moscoso, HERLINDA

## 2021-01-08 NOTE — IP AVS SNAPSHOT
[facility] [address] [address]                                    After Visit Summary   1/8/2021    Fortino Moya    MRN: [id number]           After Visit Summary Signature Page    I have received my discharge instructions, and my questions have been answered. I have discussed any challenges I see with this plan with the nurse or doctor.    ..........................................................................................................................................  Patient/Patient Representative Signature      ..........................................................................................................................................  Patient Representative Print Name and Relationship to Patient    ..................................................               ................................................  Date                                   Time    ..........................................................................................................................................  Reviewed by Signature/Title    ...................................................              ..............................................  Date                                               Time          22EPIC Rev 08/18

## 2021-01-08 NOTE — IP AVS SNAPSHOT
MRN:[id number]                      After Visit Summary   1/8/2021    Fortino Moya    MRN: [id number]           Visit Information        Department      1/8/2021  7:23 AM [facility] [address] [place]          Review of your medicines      UNREVIEWED medicines. Ask your doctor about these medicines       Dose / Directions   amoxicillin 500 MG capsule  Commonly known as: AMOXIL      TAKE FOUR CAPSULES BY MOUTH ONE HOUR BEFORE APPOINTMENT  Refills: 0     Aspirin Buf(CaCarb-MgCarb-MgO) 81 MG Tabs      Dose: 81 mg  Take 81 mg by mouth  Refills: 0     atorvastatin 10 MG tablet  Commonly known as: LIPITOR      Dose: 10 mg  Take 10 mg by mouth  Refills: 0     carvedilol 12.5 MG tablet  Commonly known as: COREG      TAKE TWO TABLETS BY MOUTH EVERY MORNING AND ONE TABLET EVERY EVENING  Refills: 0     cevimeline 30 MG capsule  Commonly known as: EVOXAC      TAKE ONE CAPSULE BY MOUTH THREE TIMES DAILY  Refills: 0     cholecalciferol 25 MCG (1000 UT) Tabs      Dose: 1,000 Units  Take 1,000 Units by mouth  Refills: 0     flecainide 150 MG tablet  Commonly known as: TAMBOCOR      Dose: 150 mg  Take 150 mg by mouth every 12 hours as needed  Refills: 0     ibuprofen 600 MG tablet  Commonly known as: ADVIL/MOTRIN      TAKE 1 TABLET BY MOUTH THREE TIMES DAILY AS NEEDED FOR PAIN  Refills: 0     lisinopril 20 MG tablet  Commonly known as: ZESTRIL      Dose: 20 mg  Take 20 mg by mouth  Refills: 0     MULTIPLE VITAMIN PO      Dose: 1 tablet  Take 1 tablet by mouth  Refills: 0     sildenafil 100 MG tablet  Commonly known as: VIAGRA      Dose:  mg  Take  mg by mouth  Refills: 0     traZODone 50 MG tablet  Commonly known as: DESYREL      TAKE 1 TO 2 TABLETS BY MOUTH ONCE DAILY AT BEDTIME  Refills: 0              Protect others around you: Learn how to safely use, store and throw away your medicines at www.disposemymeds.org.       Follow-ups after your visit       Care Instructions       Further  instructions from your care team       [facility]    Interventional Radiology  Patient Instructions Following Biopsy    AFTER YOU GO HOME  ? If you were given sedation DO NOT drive or operate machinery at home or at work for at least 24 hours  ? DO relax and take it easy for 48 hours, no strenuous activity for 24 hours  ? DO drink plenty of fluids  ? DO resume your regular diet, unless otherwise instructed by your Primary Physician  ? Keep the dressing dry and in place for 24 hours.  ? DO NOT SMOKE FOR AT LEAST 24 HOURS, if you have been given any medications that were to help you relax or sedate you during your procedure  ? DO NOT drink alcoholic beverages the day of your procedure  ? DO NOT do any strenuous exercise or lifting (> 10 lbs) for at least 7 days following your procedure  ? DO NOT take a bath or shower for at least 12 hours following your procedure  ? Remove dressing after shower the next day. Replace with Band aid for 2 days.  Never leave a wet dressing in place.  ? DO NOT make any important or legal decisions for 24 hours following your procedure  ? There should be minimum drainage from the biopsy site    CALL THE PHYSICIAN IF:  ? You start bleeding from the procedure site.  If you do start to bleed from that site, lie down flat and hold pressure on the site for a minimum of 10 minutes.  Your physician will tell you if you need to return to the hospital  ? You develop nausea or vomiting  ? You have excessive swelling, redness, or tenderness at the site  ? You have drainage that looks like it is infected.  ? You experience severe pain  ? You develop hives or a rash or unexplained itching  ? You develop shortness of breath  ? You develop a temperature of 101 degrees F or greater      [facility] INTERVENTIONAL RADIOLOGY DEPARTMENT  Procedure Physician:         Dr. De Souza                            Date of procedure: January 8, 2021  Telephone Numbers: [phone] Monday-Friday 8:00 am  to 4:30 pm  [phone] After 4:30 pm Monday-Friday, Weekends & Holidays.   Ask for the Interventional Radiologist on call.  Someone is on call 24 hrs/day  [facility] toll free number: [phone] Monday-Friday 8:00 am to 4:30 pm  [facility] Emergency Dept: [phone]          Additional Information About Your Visit       [facility] Information    [employer] gives you secure access to your electronic health record. If you see a primary care provider, you can also send messages to your care team and make appointments. If you have questions, please call your primary care clinic.  If you do not have a primary care provider, please call [phone] and they will assist you.       Care EveryWhere ID    This is your Care EveryWhere ID. This could be used by other organizations to access your [facility] medical records  [id number]       Your Vitals Were  Most recent update: 1/8/2021  1:18 PM    Blood Pressure   136/83          Pulse   62          Temperature   98.5  F (36.9  C) (Oral)          Respirations   16          Weight   81.6 kg (180 lb)             Pulse Oximetry   96%    BMI (Body Mass Index)   25.83 kg/m           Primary Care Provider    None Specified      Equal Access to Services    ANASTASIA BOYLE AH: Hadii mague jimo Sokeyonnaali, waaxda luqadaha, qaybta kaalmada adeegyada, teo giron. So [facility] [phone].    ATENCIÓN: Si habla español, tiene a carrera disposición servicios gratuitos de asistencia lingüística. Llame al [phone].    We comply with applicable federal and state civil rights laws, including the [place] Human Rights Act. We do not discriminate on the basis of race, color, creed, Buddhism, national origin, marital status, age, disability, sex, sexual orientation, or gender identity.    If you would like an itemization of your charges they will now be available in [employer] 30 days after discharge. To access the itemized statements in [facility] go to billing/billing summary. From  there select view account. There will be multiple tabs showing an overview of your account, detail, payments, and communications. From the communications tab you can see your monthly statements, your itemized statements, and any billing letters generated for your account. If you do not have a [employer] account and need help getting access please contact [employer] support at [phone].  If you would prefer to have your itemized statements mailed please contact our automated itemized bill request line at [phone] option  2.       Thank you!    Thank you for choosing [facility] for your care. Our goal is always to provide you with excellent care. Hearing back from our patients is one way we can continue to improve our services. Please take a few minutes to complete the written survey that you may receive in the mail after you visit with us. Thank you!            Medication List      ASK your doctor about these medications          Morning Afternoon Evening Bedtime As Needed    amoxicillin 500 MG capsule  Also known as: AMOXIL  INSTRUCTIONS: TAKE FOUR CAPSULES BY MOUTH ONE HOUR BEFORE APPOINTMENT                     Aspirin Buf(CaCarb-MgCarb-MgO) 81 MG Tabs  INSTRUCTIONS: Take 81 mg by mouth                     atorvastatin 10 MG tablet  Also known as: LIPITOR  INSTRUCTIONS: Take 10 mg by mouth                     carvedilol 12.5 MG tablet  Also known as: COREG  INSTRUCTIONS: TAKE TWO TABLETS BY MOUTH EVERY MORNING AND ONE TABLET EVERY EVENING                     cevimeline 30 MG capsule  Also known as: EVOXAC  INSTRUCTIONS: TAKE ONE CAPSULE BY MOUTH THREE TIMES DAILY                     cholecalciferol 25 MCG (1000 UT) Tabs  INSTRUCTIONS: Take 1,000 Units by mouth                     flecainide 150 MG tablet  Also known as: TAMBOCOR  INSTRUCTIONS: Take 150 mg by mouth every 12 hours as needed                     ibuprofen 600 MG tablet  Also known as: ADVIL/MOTRIN  INSTRUCTIONS: TAKE 1 TABLET BY MOUTH THREE TIMES  DAILY AS NEEDED FOR PAIN                     lisinopril 20 MG tablet  Also known as: ZESTRIL  INSTRUCTIONS: Take 20 mg by mouth                     MULTIPLE VITAMIN PO  INSTRUCTIONS: Take 1 tablet by mouth                     sildenafil 100 MG tablet  Also known as: VIAGRA  INSTRUCTIONS: Take  mg by mouth                     traZODone 50 MG tablet  Also known as: DESYREL  INSTRUCTIONS: TAKE 1 TO 2 TABLETS BY MOUTH ONCE DAILY AT BEDTIME

## 2021-01-08 NOTE — PROGRESS NOTES
Pt discharged at this time post left  mass biopsy. Ambulates steady on feet, denies pain or dizziness when standing. Tolerates PO intake (food and fluids) well. PIV removed with catheter intact. Discharge teaching completed with all questions answered and copy to pt. Sites continue to appear clean, dry, intact, and soft.

## 2021-01-08 NOTE — PROGRESS NOTES
Pt to unit 2a for left  space mass biopsy. PIV in place, IVF infusing, labs pending, consent completed with all questions answered.

## 2021-01-08 NOTE — DISCHARGE INSTRUCTIONS
[facility]    Interventional Radiology  Patient Instructions Following Biopsy    AFTER YOU GO HOME  ? If you were given sedation DO NOT drive or operate machinery at home or at work for at least 24 hours  ? DO relax and take it easy for 48 hours, no strenuous activity for 24 hours  ? DO drink plenty of fluids  ? DO resume your regular diet, unless otherwise instructed by your Primary Physician  ? Keep the dressing dry and in place for 24 hours.  ? DO NOT SMOKE FOR AT LEAST 24 HOURS, if you have been given any medications that were to help you relax or sedate you during your procedure  ? DO NOT drink alcoholic beverages the day of your procedure  ? DO NOT do any strenuous exercise or lifting (> 10 lbs) for at least 7 days following your procedure  ? DO NOT take a bath or shower for at least 12 hours following your procedure  ? Remove dressing after shower the next day. Replace with Band aid for 2 days.  Never leave a wet dressing in place.  ? DO NOT make any important or legal decisions for 24 hours following your procedure  ? There should be minimum drainage from the biopsy site    CALL THE PHYSICIAN IF:  ? You start bleeding from the procedure site.  If you do start to bleed from that site, lie down flat and hold pressure on the site for a minimum of 10 minutes.  Your physician will tell you if you need to return to the hospital  ? You develop nausea or vomiting  ? You have excessive swelling, redness, or tenderness at the site  ? You have drainage that looks like it is infected.  ? You experience severe pain  ? You develop hives or a rash or unexplained itching  ? You develop shortness of breath  ? You develop a temperature of 101 degrees F or greater      [facility] INTERVENTIONAL RADIOLOGY DEPARTMENT  Procedure Physician:         Dr. De Souza                            Date of procedure: January 8, 2021  Telephone Numbers: [phone] Monday-Friday 8:00 am to 4:30 pm  [phone] After 4:30 pm  Monday-Friday, Weekends & Holidays.   Ask for the Interventional Radiologist on call.  Someone is on call 24 hrs/day  [facility] toll free number: [phone] Monday-Friday 8:00 am to 4:30 pm  [facility] Emergency Dept: [phone]

## 2021-01-08 NOTE — PRE-PROCEDURE
GENERAL PRE-PROCEDURE:   Procedure:  Image Guided Left  Space Mass Biopsy  Date/Time:  1/8/2021 8:32 AM    Written consent obtained?: Yes    Risks and benefits: Risks, benefits and alternatives were discussed    DC Plan: Appropriate discharge home plan in place for patients who are going home after procedure   Consent given by:  Patient  Patient states understanding of procedure being performed: Yes    Patient's understanding of procedure matches consent: Yes    Procedure consent matches procedure scheduled: Yes    Expected level of sedation:  Moderate  Appropriately NPO:  Yes  ASA Class:  Class 2- mild systemic disease, no acute problems, no functional limitations  Mallampati  :  Grade 2- soft palate, base of uvula, tonsillar pillars, and portion of posterior pharyngeal wall visible  Lungs:  Lungs clear with good breath sounds bilaterally  Heart:  Normal heart sounds and rate  History & Physical reviewed:  History and physical reviewed and no updates needed  Statement of review:  I have reviewed the lab findings, diagnostic data, medications, and the plan for sedation

## 2021-01-08 NOTE — PROCEDURES
[facility]     Procedure: CT Guided Left  Space Mass Biopsy    Date/Time: 1/8/2021 12:23 PM  Performed by: Rosalio De Souza MD  Authorized by: Rosalio De Souza MD     UNIVERSAL PROTOCOL   Site Marked: Yes  Prior Images Obtained and Reviewed:  Yes  Required items: Required blood products, implants, devices and special equipment available    Patient identity confirmed:  Verbally with patient, arm band, provided demographic data and hospital-assigned identification number  Patient was reevaluated immediately before administering moderate or deep sedation or anesthesia  Confirmation Checklist:  Patient's identity using two indicators, relevant allergies, procedure was appropriate and matched the consent or emergent situation and correct equipment/implants were available  Time out: Immediately prior to the procedure a time out was called    Universal Protocol: the Joint Commission Universal Protocol was followed    Preparation: Patient was prepped and draped in usual sterile fashion           ANESTHESIA    Anesthesia: Local infiltration  Local Anesthetic:  Lidocaine 1% without epinephrine  Anesthetic Total (mL):  5      SEDATION    Patient Sedated: Yes    Sedation Type:  Moderate (conscious) sedation  Sedation:  Fentanyl and midazolam  Vital signs: Vital signs monitored during sedation    See dictated procedure note for full details.  Findings: 4x 18 gauge Biopsy of left  space mass area near the pterygoid muscle. Additional 3x core 18 gauge biopsy of mass near foramen ovale    Specimens: core needle biopsy specimens sent for pathological analysis    Complications: None    Condition: Stable    PROCEDURE   Patient Tolerance:  Patient tolerated the procedure well with no immediate complications    Length of time physician/provider present for 1:1 monitoring during sedation: 105

## 2021-01-08 NOTE — PROGRESS NOTES
Returned from IR to 2A post procedure.  Denies pain.  Two puncture sites left cheek both C/D/I.  Provided with po food & fluids.

## 2021-01-11 ENCOUNTER — TELEPHONE (OUTPATIENT)
Dept: OTOLARYNGOLOGY | Facility: CLINIC | Age: 71
End: 2021-01-11

## 2021-01-11 NOTE — TELEPHONE ENCOUNTER
[facility] Call Center    Phone Message    May a detailed message be left on voicemail: yes     Reason for Call: Requesting Results   Name/type of test: Bx  Date of test: 01/08/21  Was test done at a location other than [facility]?: No    Pt has questions and wants call back from Almaz to go over the Bx results from Friday 01/08/21 that Dr Russell ordered - please return his call - Thanks      Action Taken: Message routed to:  [facility] ENT    Travel Screening: Not Applicable

## 2021-01-12 LAB — INTERPRETATION ECG - MUSE: NORMAL

## 2021-01-12 NOTE — TELEPHONE ENCOUNTER
Returned call to patient to review the following pathology results:    SPECIMEN(S):   Left  space     FINAL DIAGNOSIS:   LEFT  SPACE, BIOPSY:   - INVASIVE SQUAMOUS CELL CARCINOMA, minimally keratinizing; depth of   invasion cannot be assessed.   - Perineural invasion is observed.   - P16 immunostain is in progress and results will be provided separately.       Discussed with patient that the biopsy did show squamous cell carcinoma. Dr. Russell would like to review at tumor board on Friday to determine treatment recommendations and if radiation can be offered. Writer sent message to Dr. Rodgers to see if he could obtain prior radiation records for review. We will contact patient on Friday with recommendations and next steps. Patient verbalized understanding and was encouraged to call with further questions or concerns.       Almaz Sorto, RN, BSN

## 2021-01-15 ENCOUNTER — HEALTH MAINTENANCE LETTER (OUTPATIENT)
Age: 71
End: 2021-01-15

## 2021-01-15 ENCOUNTER — PRE VISIT (OUTPATIENT)
Dept: ONCOLOGY | Facility: CLINIC | Age: 71
End: 2021-01-15

## 2021-01-15 ENCOUNTER — TUMOR CONFERENCE (OUTPATIENT)
Dept: ONCOLOGY | Facility: CLINIC | Age: 71
End: 2021-01-15
Payer: COMMERCIAL

## 2021-01-15 DIAGNOSIS — C09.9 SQUAMOUS CELL CARCINOMA OF TONSIL (H): Primary | ICD-10-CM

## 2021-01-15 NOTE — PROGRESS NOTES
Called and spoke with patient regarding tumor board discussion and recommendations. Reviewed that recommendation is for him to meet with Dr. Rodgers to discuss possibility of radiation as well as medical oncology to discuss possibility of chemo. Patient in agreement with plan. Discussed the need for updated PET scan. Patient in agreement.     PET scan scheduled on Monday 1/18 at 12:30pm. Reviewed location, date, time and prep information with patient. He verbalized understanding.     He will meet with Dr. Rodgers on Wednesday 1/20 and with Dr. Shah on 2/2.     Patient was encouraged to call with further questions or concerns    Almaz Sorto, RN, BSN

## 2021-01-15 NOTE — TELEPHONE ENCOUNTER
ONCOLOGY INTAKE: Records Information      APPT INFORMATION:  Referring provider:  Dr. 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 no    Has patient been seen for any external appt for this diagnosis? no    If yes, where? n/a    Has patient had any imaging or procedures outside of [place] for this condition? no      If Yes, where? n/a    ADDITIONAL INFORMATION:  none

## 2021-01-15 NOTE — PROGRESS NOTES
Head & Neck Tumor Conference Note     Status: Established (last presented 12/4/2020)  Staff: Dr. 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  He has been followed there.  Recently in seeing Dr. Flores, he had noted some numbness that followed a dental procedure on the left side.  He describes numbness as being on the left cheek and over the mandible and going to the midline in the distribution of the inferior alveolar and mental nerves.  He also has had a PET finding in his pterygoid muscle that was chalked up to inflammation.  He does have some otalgia in addition to the numbness.  He denies odynophagia.  He has not noticed any lumps or bumps in his neck.  He recently had an MRI that showed some enhancement on the trigeminal nerve and was sent here for further workup of this.  Imaging was concerning for perineural spread with asymmetric enhancement in the foramen ovale with increase in size compared to prior.  He subsequently underwent an IR guided biopsy which confirmed invasive squamous cell carcinoma.    Pertinent PMH:   Past Medical History:   Diagnosis Date     Atrial fibrillation (H)      Hypercholesterolemia      Hypertension      Primary squamous cell carcinoma of tonsil (H)       Smoking Hx:   Social History     Tobacco Use     Smoking status: Never Smoker     Smokeless tobacco: Never Used   Substance Use Topics     Alcohol use: Yes     Frequency: 2-3 times a week     Drug use: Not on file     Imaging:   CT neck with contrast (12/15/2020):  Findings:   There is subtle, not-well marginated hyperdense soft tissue density,  in the region of previously visualized FDG avid and enhancing area  within the  left  space. There is partial effacement of the  left parapharyngeal fat by this soft tissue density. Expansion of the  left foramen ovale with adjacent bony erosion which appears to extend  to the left cavernous sinus.      Postradiation changes, including mild thickening of the aryepiglottic  folds and atrophy of the bilateral parotid and submandibular glands.     Evaluation of the mucosal space demonstrates no evident abnormality in  the nasopharynx, oropharynx, hypopharynx or the glottis. The tongue  base appears normal. The thyroid gland appears normal.     There is no evident cervical lymphadenopathy. The fascial spaces in  the neck are intact bilaterally. The major vascular structures in the  neck appear unremarkable.     Evaluation of the osseous structures demonstrate no worrisome lytic or  sclerotic lesion. Multilevel cervical spondylosis with multilevel  severe neural foraminal stenosis. No significant spinal canal stenosis  at any level. Mucosal thickening of the left maxillary sinus. Left  mastoid effusion.      The visualized lung apices are clear.                                                                Impression:  Non-well marginated, soft tissue density centered in the left   space with involvement of the left cavernous sinus,  expansion of the foramen ovale and effacement of left pharyngeal fat  inferiorly, matching with the areas of FDG uptake on 9/8/2020 and  contrast enhancement on 10/14/2020.    Pathology:   FINAL DIAGNOSIS (1/8/2021):   LEFT  SPACE, BIOPSY:   - INVASIVE SQUAMOUS CELL CARCINOMA, minimally keratinizing; depth of   invasion cannot be assessed.   - Perineural invasion is observed.   - P16 immunostain is in progress and results will be provided separately.     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 He  recently had an MRI that showed some enhancement on the trigeminal nerve and was sent here for further workup of this. IR-guided biopsy of the area on 1/8/2021 was consistent with invasive squamous cell carcinoma. On imaging review, there is focal disease along the nerve.    Plan: Will require updated staging PET/CT.  Recommend referral to Radiation Oncology for radiosurgery and Medical Oncology for possible chemotherapy.    Alida Davis MD PGY-3  Otolaryngology - Head and Neck Surgery  Please contact ENT with questions by dialing [phone] and entering job [phone] when prompted.    Documentation / Disclaimer Cancer Tumor Board Note  Cancer tumor board recommendations do not override what is determined to be reasonable care and treatment, which is dependent on the circumstances of a patient's case; the patient's medical, social, and personal concerns; and the clinical judgment of the oncologist [physician].

## 2021-01-18 ENCOUNTER — HOSPITAL ENCOUNTER (OUTPATIENT)
Dept: PET IMAGING | Facility: CLINIC | Age: 71
End: 2021-01-18
Attending: OTOLARYNGOLOGY
Payer: MEDICARE

## 2021-01-18 DIAGNOSIS — C09.9 SQUAMOUS CELL CARCINOMA OF TONSIL (H): ICD-10-CM

## 2021-01-18 PROCEDURE — 71260 CT THORAX DX C+: CPT | Mod: 26 | Performed by: RADIOLOGY

## 2021-01-18 PROCEDURE — 250N000011 HC RX IP 250 OP 636: Performed by: OTOLARYNGOLOGY

## 2021-01-18 PROCEDURE — 78813 PET IMAGE FULL BODY: CPT | Mod: 26 | Performed by: RADIOLOGY

## 2021-01-18 PROCEDURE — 70491 CT SOFT TISSUE NECK W/DYE: CPT

## 2021-01-18 PROCEDURE — 343N000001 HC RX 343: Performed by: OTOLARYNGOLOGY

## 2021-01-18 PROCEDURE — G1004 CDSM NDSC: HCPCS | Mod: GC | Performed by: RADIOLOGY

## 2021-01-18 PROCEDURE — 74177 CT ABD & PELVIS W/CONTRAST: CPT | Mod: 26 | Performed by: RADIOLOGY

## 2021-01-18 PROCEDURE — A9552 F18 FDG: HCPCS | Performed by: OTOLARYNGOLOGY

## 2021-01-18 PROCEDURE — 71260 CT THORAX DX C+: CPT | Mod: MG

## 2021-01-18 RX ORDER — IOPAMIDOL 755 MG/ML
50-135 INJECTION, SOLUTION INTRAVASCULAR ONCE
Status: COMPLETED | OUTPATIENT
Start: 2021-01-18 | End: 2021-01-18

## 2021-01-18 RX ADMIN — FLUDEOXYGLUCOSE F-18 13.82 MCI.: 500 INJECTION, SOLUTION INTRAVENOUS at 12:19

## 2021-01-18 RX ADMIN — IOPAMIDOL 111 ML: 755 INJECTION, SOLUTION INTRAVENOUS at 13:45

## 2021-01-18 NOTE — TELEPHONE ENCOUNTER
RECORDS STATUS - ALL OTHER DIAGNOSIS      RECORDS RECEIVED FROM: [facility]   DATE RECEIVED: 1/18/21   NOTES STATUS DETAILS   OFFICE NOTE from referring provider David Rollins MD in [facility] TUMOR CONF:  Via Tumor Conference: 1/15/21   OFFICE NOTE from medical oncologist [facility] 11/18/20   DISCHARGE SUMMARY from hospital [facility] 1/8/21   DISCHARGE REPORT from the ER NA    OPERATIVE REPORT     MEDICATION LIST     CLINICAL TRIAL TREATMENTS TO DATE     LABS     PATHOLOGY REPORTS [facility] 1/8/21: Surg Path  11/25/20: Path Consult    ANYTHING RELATED TO DIAGNOSIS     GENONOMIC TESTING     TYPE:     IMAGING (NEED IMAGES & REPORT)     CT SCANS PACS [facility]   MRI PACS [facility]   MAMMO     ULTRASOUND     PET PACS Epic

## 2021-01-19 NOTE — PROGRESS NOTES
Attending addendum:   I saw and examined the patient with the resident and agree with the documented plan of care.    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 He received a total dose of 70 Gy/35 fractions with his fields encompassing the left oropharynx and bilateral neck. Radiation was administered with concurrent weekly carboplatin which was reportedly selected over a cisplatin regimen due to the patient's concern for ototoxicity on this regimen.    Mr. Moya now presents with a local recurrence which is likely residual tumor located at the superior edge of his prior radiation field.  His repeat imaging performed earlier this week unfortunately demonstrates substantial disease progression with tumor involving the left infratemporal fossa extending to the cavernous sinus and tracking proximally along CN V back to the brainstem.    Remarkably, Mr. Moya is nearly asymptomatic from this fairly extensive tumor burden with the exception of a left CN V deficit as well as occasional left-sided lateral face/jaw pain. Given the extensive amount of disease progression between scans, I do not feel that he would be a suitable candidate for an ablative radiation therapy technique. I instead discussed 2 potential treatment options. The first option is treatment with concurrent chemoradiation therapy targeting the recurrent tumor and surrounding at-risk tissues. The second involves the use of induction chemotherapy followed by chemoradiation therapy. The goal of starting with up-front chemotherapy would be to potentially cytoreduce his tumor burden in the hopes of subsequently reducing the risk of severe radiation-induced toxicity with treatment of this region.    Mr. Moya is scheduled to see Dr. Shah in the near future for further evaluation and a discussion on  systemic therapy options. Once Dr. Shah has had an opportunity to evaluate Mr. Moya, he and I will arrive at a consensus opinion regarding proceeding with definitive treatment. In the interim, I will obtain a skull base MRI for radiotherapy planning purposes as well as a baseline audiogram since Mr. Moya remains very concerned about the impact of treatment on his hearing.    Sidnye Rodgers MD/PhD    Dept of Radiation Oncology  [facility]               Department of Radiation Oncology  [address] [address]  [phone]       Consultation Note    Name: Fortino Moya MRN: [id number]   : 1950   Date of Service: 2021 Referring: Dr. 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 Of note, he received concurrent weekly carboplatin with radiotherapy due to concern for ototoxicity with concurrent cisplatin. He then moved to the [facility] and established a care with Dr. Flores in otolaryngology at [facility] for longitudinal disease surveillance.     Mr. Moya's initial post-treatment PET/CT in 10/2018 revealed a complete response to therapy. Subsequent imaging in 2019 demonstrated new focal uptake within the left pterygoids which was initially felt to be inflammatory in nature. More recently, he presented in 2020 with the subacute onset of left jaw pain occurring approximately 3 weeks after he underwent dental extractions. A repeat PET and MRI demonstrated  increased uptake and enhancement involving V3 at the foramen ovale. He was treated conservatively with physical therapy and steroids. After his symptoms did not improve, however, he sought a second opinion from the [facility] and was seen by Dr. David Russell on 11/25/2020.    Mr. Moya's case was reviewed at our interdisciplinary tumor board on 12/4/2020 where review of his prior sequential imaging was concerning for progressive contrast enhancement and FDG uptake within the left pterygoids which was concerning for possible disease recurrence. He was referred to our interventional radiology service and underwent CT-guided biopsy on 1/8/2021. Pathology from this procedure (specimen: ) was positive for recurrent squamous cell carcinoma.    Mr. Moya's case was discussed again at our interdisciplinary tumor board on 1/15/2021 and he was subsequently referred to radiation oncology for consideration of reirradiation utilizing a stereotactic ablative radiation therapy technique.    On interview today, Mr. Moya reports ongoing left facial numbness over the last few months as well as occasional mild pain that is well controlled with OTC analgesics. He states that he is fairly active and works part-time. He is completely independent and lives by himself. He also endorsed residual mild dry mouth from his prior radiation treatment. He otherwise denies any recent fever, chills, headaches, weakness, urinary or bowel incontinence, weight or appetite changes.    Mr. Moya had a repeat PET/CT performed earlier this week in 1/18/2021. This unfortunately demonstrates substantial disease progression with tumor filling the infratemporal fossa on the left, extending into the left cavernous sinus and tracking along CN V back to the brainstem.    Past Medical History:  Past Medical History:   Diagnosis Date     Atrial fibrillation (H)      Hypercholesterolemia      Hypertension      Primary squamous cell  carcinoma of tonsil (H)        Past Surgical History:  Past Surgical History:   Procedure Laterality Date     JOINT REPLACEMENT       JOINT REPLACEMENT         Chemotherapy History:  Weekly carboplatin with radiotherapy    Radiation History:  70 Gy/35 fractions, completed 7/11/2018    Implanted Cardiac Devices: No    Medications:  Current Outpatient Medications   Medication     amoxicillin (AMOXIL) 500 MG capsule     Aspirin Buf,CaCarb-MgCarb-MgO, 81 MG TABS     atorvastatin (LIPITOR) 10 MG tablet     carvedilol (COREG) 12.5 MG tablet     cevimeline (EVOXAC) 30 MG capsule     cholecalciferol 25 MCG (1000 UT) TABS     flecainide (TAMBOCOR) 150 MG tablet     ibuprofen (ADVIL/MOTRIN) 600 MG tablet     lisinopril (ZESTRIL) 20 MG tablet     MULTIPLE VITAMIN PO     sildenafil (VIAGRA) 100 MG tablet     traZODone (DESYREL) 50 MG tablet     No current facility-administered medications for this visit.      Allergies:  NKDA    Social History:  Tobacco: Never  Alcohol: Yes  Single    Family History:  No known family history of cancer    Review of Systems   A 10-point review of systems was performed. Pertinent findings are noted in the HPI.    Physical Exam   ECOG Status: 0    Weight: 82.1 kg  BP: 163/92  P: 85    General: Healthy-appearing 70 year old gentleman seated comfortably in an examination chair in [facility]  HEENT: NC/AT. EOMI. No rhinorrhea or epistaxis. Moderately dry mucus membranes with tacky oral cavity secretions. Scattered telangiectasias of the oral cavity most prominently involving the left tonsillar fossa extending onto the soft palate superomedially and the left RMT and oral tongue anteromedially. Palpation of the FOM, gingiva, oral tongue and bilateral tongue base reveals no induration, nodularity or masses.  Neck: Moderate fibrosis of the bilateral neck (left > right). No palpable cervical adenopathy.  Pulmonary: No wheezing, stridor or respiratory distress.  Skin: Scattered telangiectasias of the left neck. No  cutaneous lesions of the head and neck.  Neuro: A/Ox3  Cranial Nerve Exam  I: Not tested  II: Not formally tested  III/IV/VI: PERRL. EOMI.   V: Diminished sensation to light touch in the left V1-V3 distributions  VII: No facial weakness or asymmetry.   VIII: Hearing is grossly intact and symmetric.  IX/X: Palate elevates symmetrically. Normal phonation.  XI: Strength is 5/5 in bilateral trapezius and SCM musculature.  XII: Tongue protrudes in the midline. No atrophy or fasciculations.     Flexible Fiberoptic Nasopharyngoscopy:  Consent for fiberoptic laryngoscopy was obtained and I confirmed correctness of procedure and identity of patient. Fiberoptic laryngoscopy was indicated due to pre-radiotherapy disease evaluation. The fiberoptic laryngoscope was passed through the left naris under endoscopic vision. The turbinates were normal. The inferior and middle meati were clear without purulence, masses, or polyps. The nasopharynx was clear. The Eustachian tubes were clear. The soft palate appeared normal with good mobility. Passage of the scope into the oropharynx revealed bland-appearing mucosa with no visible lesions. The vallecula was clear and the supraglottic structures were also normal in appearance. The cords were mobile bilaterally. The arytenoids were clear and there was no pooling of secretions within the hypopharynx. The scope was then removed and the procedure was terminated without incident.    Imaging/Path/Labs   Imaging: Reviewed    Path: Reviewed    Labs: Reviewed    Assessment/Plan    Mr. 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 We discussed with the patient that given his disease progression within a short period of time in addition to the loss proximity to surrounding critical structures particularly the brainstem, we prefer proceeding with 1 of 2 management options. The first option  is concurrent chemoradiation delivered over 6.5 weeks directed to the area of recurrence and surrounding high risk area of tumor spread.  We explained that SBRT is not an optimal option at this situation given to the close location to the brainstem with associated higher morbidity. The second option as starting with induction chemotherapy followed by consolidation radiation therapy based on the response. We prefer proceeding with the second option hoping to get some tumor shrinkage and thus lowering radiation dose goes to the sensitive structures. The patient is going to see Dr. Shah in medical oncology tentatively on 2/2/2021 for determination of systemic management. We will connect with Dr. Shah for determination of the final management plan. In addition we recommend a baseline audiology evaluation before starting has chemotherapy or radiation therapy given the concern of hearing impairment.    At end of discussion, the patient agreed with the plan and verbalized understanding.  We will communicate with Dr. Shah for determination of the final management plan.The patient had many questions during our conversation that were answered to his satisfaction and verbalized understanding.     The patient was seen and discussed with staff, Dr. Rodgers. Thank you for involving us in the care of this patient. Please feel free to contact us with questions or concerns at any time.    Rob Restrepo MD  PGY-4 Resident, Radiation Oncology  [facility]

## 2021-01-20 ENCOUNTER — OFFICE VISIT (OUTPATIENT)
Dept: RADIATION ONCOLOGY | Facility: CLINIC | Age: 71
End: 2021-01-20
Attending: OTOLARYNGOLOGY
Payer: MEDICARE

## 2021-01-20 VITALS
DIASTOLIC BLOOD PRESSURE: 92 MMHG | BODY MASS INDEX: 25.97 KG/M2 | WEIGHT: 181 LBS | SYSTOLIC BLOOD PRESSURE: 163 MMHG | HEART RATE: 85 BPM

## 2021-01-20 DIAGNOSIS — C09.9 TONSIL CANCER (H): Primary | ICD-10-CM

## 2021-01-20 PROCEDURE — 31231 NASAL ENDOSCOPY DX: CPT | Performed by: RADIOLOGY

## 2021-01-20 PROCEDURE — 99204 OFFICE O/P NEW MOD 45 MIN: CPT | Mod: 25 | Performed by: RADIOLOGY

## 2021-01-20 PROCEDURE — G0463 HOSPITAL OUTPT CLINIC VISIT: HCPCS | Mod: 25 | Performed by: RADIOLOGY

## 2021-01-20 PROCEDURE — 31575 DIAGNOSTIC LARYNGOSCOPY: CPT | Performed by: RADIOLOGY

## 2021-01-20 ASSESSMENT — ENCOUNTER SYMPTOMS
ABDOMINAL PAIN: 0
NAUSEA: 0
COUGH: 0
LOSS OF CONSCIOUSNESS: 0
FEVER: 0
EYE DISCHARGE: 0
DYSURIA: 0
ORTHOPNEA: 0
SENSORY CHANGE: 0
FLANK PAIN: 0
TREMORS: 0
EYE REDNESS: 0
MEMORY LOSS: 0
HEMATURIA: 0
NECK PAIN: 0
HALLUCINATIONS: 0
HEADACHES: 0
BACK PAIN: 0
SORE THROAT: 0
SHORTNESS OF BREATH: 0
DIARRHEA: 0
PND: 0
POLYDIPSIA: 0
BLOOD IN STOOL: 0
SPEECH CHANGE: 0
VOMITING: 0
BLURRED VISION: 0
FOCAL WEAKNESS: 0
WEIGHT LOSS: 0
DIAPHORESIS: 0
HEARTBURN: 0
STRIDOR: 0
PALPITATIONS: 0
WHEEZING: 0
DOUBLE VISION: 0
FREQUENCY: 0
FALLS: 0
WEAKNESS: 0
EYE PAIN: 0
MYALGIAS: 0
CLAUDICATION: 0
BRUISES/BLEEDS EASILY: 0
CONSTIPATION: 0
SEIZURES: 0
CHILLS: 0
TINGLING: 0
DIZZINESS: 0
SPUTUM PRODUCTION: 0
HEMOPTYSIS: 0
DEPRESSION: 0
INSOMNIA: 0
NERVOUS/ANXIOUS: 0
PHOTOPHOBIA: 0
SINUS PAIN: 0

## 2021-01-20 ASSESSMENT — LIFESTYLE VARIABLES: SUBSTANCE_ABUSE: 0

## 2021-01-20 NOTE — PROGRESS NOTES
HPI    INITIAL PATIENT ASSESSMENT    Diagnosis: Cancer    Prior radiation therapy: June 2018    Prior chemotherapy: See records    Prior hormonal therapy:No    Pain Eval:  Denies    Psychosocial  Living arrangements: Lives alone  Fall Risk: independent   referral needs: Not needed    Advanced Directive: No  Implantable Cardiac Device? No    Nurse face-to-face time: Level 5:  over 15 min face to face time  Review of Systems   Constitutional: Negative for chills, diaphoresis, fever, malaise/fatigue and weight loss.   HENT: Positive for hearing loss. Negative for congestion, ear discharge, ear pain, nosebleeds, sinus pain, sore throat and tinnitus.         Left eye   Eyes: Negative for blurred vision, double vision, photophobia, pain, discharge and redness.   Respiratory: Negative for cough, hemoptysis, sputum production, shortness of breath, wheezing and stridor.    Cardiovascular: Negative for chest pain, palpitations, orthopnea, claudication, leg swelling and PND.   Gastrointestinal: Negative for abdominal pain, blood in stool, constipation, diarrhea, heartburn, melena, nausea and vomiting.   Genitourinary: Negative for dysuria, flank pain, frequency, hematuria and urgency.   Musculoskeletal: Negative for back pain, falls, joint pain, myalgias and neck pain.   Skin: Negative for itching and rash.   Neurological: Negative for dizziness, tingling, tremors, sensory change, speech change, focal weakness, seizures, loss of consciousness, weakness and headaches.   Endo/Heme/Allergies: Negative for environmental allergies and polydipsia. Does not bruise/bleed easily.   Psychiatric/Behavioral: Negative for depression, hallucinations, memory loss, substance abuse and suicidal ideas. The patient is not nervous/anxious and does not have insomnia.

## 2021-01-20 NOTE — LETTER
1/20/2021         RE: Fortino Moya  [address]  [facility] [id number]      Attending addendum:   I saw and examined the patient with the resident and agree with the documented plan of care.    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 He received a total dose of 70 Gy/35 fractions with his fields encompassing the left oropharynx and bilateral neck. Radiation was administered with concurrent weekly carboplatin which was reportedly selected over a cisplatin regimen due to the patient's concern for ototoxicity on this regimen.    Mr. Moya now presents with a local recurrence which is likely residual tumor located at the superior edge of his prior radiation field.  His repeat imaging performed earlier this week unfortunately demonstrates substantial disease progression with tumor involving the left infratemporal fossa extending to the cavernous sinus and tracking proximally along CN V back to the brainstem.    Remarkably, Mr. Moya is nearly asymptomatic from this fairly extensive tumor burden with the exception of a left CN V deficit as well as occasional left-sided lateral face/jaw pain. Given the extensive amount of disease progression between scans, I do not feel that he would be a suitable candidate for an ablative radiation therapy technique. I instead discussed 2 potential treatment options. The first option is treatment with concurrent chemoradiation therapy targeting the recurrent tumor and surrounding at-risk tissues. The second involves the use of induction chemotherapy followed by chemoradiation therapy. The goal of starting with up-front chemotherapy would be to potentially cytoreduce his tumor burden in the hopes of subsequently reducing the risk of severe radiation-induced toxicity with treatment of this region.    Mr. Moya is  scheduled to see Dr. Shah in the near future for further evaluation and a discussion on systemic therapy options. Once Dr. Shah has had an opportunity to evaluate Mr. Moya, he and I will arrive at a consensus opinion regarding proceeding with definitive treatment. In the interim, I will obtain a skull base MRI for radiotherapy planning purposes as well as a baseline audiogram since Mr. Moya remains very concerned about the impact of treatment on his hearing.    Sidney Rodgers MD/PhD    Dept of Radiation Oncology  [facility]               Department of Radiation Oncology  [address] [address]  [phone]       Consultation Note    Name: Fortino Moya MRN: [id number]   : 1950   Date of Service: 2021 Referring: Dr. 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 Of note, he received concurrent weekly carboplatin with radiotherapy due to concern for ototoxicity with concurrent cisplatin. He then moved to the [facility] and established a care with Dr. Flores in otolaryngology at [facility] for longitudinal disease surveillance.     Mr. Moya's initial post-treatment PET/CT in 10/2018 revealed a complete response to therapy. Subsequent imaging in 2019 demonstrated new focal uptake within the left pterygoids which was initially felt to be inflammatory in nature. More recently, he presented in 2020 with the subacute onset of left jaw pain occurring  approximately 3 weeks after he underwent dental extractions. A repeat PET and MRI demonstrated increased uptake and enhancement involving V3 at the foramen ovale. He was treated conservatively with physical therapy and steroids. After his symptoms did not improve, however, he sought a second opinion from the [facility] and was seen by Dr. David Russell on 11/25/2020.    Mr. Moya's case was reviewed at our interdisciplinary tumor board on 12/4/2020 where review of his prior sequential imaging was concerning for progressive contrast enhancement and FDG uptake within the left pterygoids which was concerning for possible disease recurrence. He was referred to our interventional radiology service and underwent CT-guided biopsy on 1/8/2021. Pathology from this procedure (specimen: ) was positive for recurrent squamous cell carcinoma.    Mr. Moya's case was discussed again at our interdisciplinary tumor board on 1/15/2021 and he was subsequently referred to radiation oncology for consideration of reirradiation utilizing a stereotactic ablative radiation therapy technique.    On interview today, Mr. Moya reports ongoing left facial numbness over the last few months as well as occasional mild pain that is well controlled with OTC analgesics. He states that he is fairly active and works part-time. He is completely independent and lives by himself. He also endorsed residual mild dry mouth from his prior radiation treatment. He otherwise denies any recent fever, chills, headaches, weakness, urinary or bowel incontinence, weight or appetite changes.    Mr. Moya had a repeat PET/CT performed earlier this week in 1/18/2021. This unfortunately demonstrates substantial disease progression with tumor filling the infratemporal fossa on the left, extending into the left cavernous sinus and tracking along CN V back to the brainstem.    Past Medical History:  Past Medical History:   Diagnosis Date     Atrial  fibrillation (H)      Hypercholesterolemia      Hypertension      Primary squamous cell carcinoma of tonsil (H)        Past Surgical History:  Past Surgical History:   Procedure Laterality Date     JOINT REPLACEMENT       JOINT REPLACEMENT         Chemotherapy History:  Weekly carboplatin with radiotherapy    Radiation History:  70 Gy/35 fractions, completed 7/11/2018    Implanted Cardiac Devices: No    Medications:  Current Outpatient Medications   Medication     amoxicillin (AMOXIL) 500 MG capsule     Aspirin Buf,CaCarb-MgCarb-MgO, 81 MG TABS     atorvastatin (LIPITOR) 10 MG tablet     carvedilol (COREG) 12.5 MG tablet     cevimeline (EVOXAC) 30 MG capsule     cholecalciferol 25 MCG (1000 UT) TABS     flecainide (TAMBOCOR) 150 MG tablet     ibuprofen (ADVIL/MOTRIN) 600 MG tablet     lisinopril (ZESTRIL) 20 MG tablet     MULTIPLE VITAMIN PO     sildenafil (VIAGRA) 100 MG tablet     traZODone (DESYREL) 50 MG tablet     No current facility-administered medications for this visit.      Allergies:  NKDA    Social History:  Tobacco: Never  Alcohol: Yes  Single    Family History:  No known family history of cancer    Review of Systems   A 10-point review of systems was performed. Pertinent findings are noted in the HPI.    Physical Exam   ECOG Status: 0    Weight: 82.1 kg  BP: 163/92  P: 85    General: Healthy-appearing 70 year old gentleman seated comfortably in an examination chair in NAD  HEENT: NC/AT. EOMI. No rhinorrhea or epistaxis. Moderately dry mucus membranes with tacky oral cavity secretions. Scattered telangiectasias of the oral cavity most prominently involving the left tonsillar fossa extending onto the soft palate superomedially and the left RMT and oral tongue anteromedially. Palpation of the FOM, gingiva, oral tongue and bilateral tongue base reveals no induration, nodularity or masses.  Neck: Moderate fibrosis of the bilateral neck (left > right). No palpable cervical adenopathy.  Pulmonary: No  wheezing, stridor or respiratory distress.  Skin: Scattered telangiectasias of the left neck. No cutaneous lesions of the head and neck.  Neuro: A/Ox3  Cranial Nerve Exam  I: Not tested  II: Not formally tested  III/IV/VI: PERRL. EOMI.   V: Diminished sensation to light touch in the left V1-V3 distributions  VII: No facial weakness or asymmetry.   VIII: Hearing is grossly intact and symmetric.  IX/X: Palate elevates symmetrically. Normal phonation.  XI: Strength is 5/5 in bilateral trapezius and SCM musculature.  XII: Tongue protrudes in the midline. No atrophy or fasciculations.     Flexible Fiberoptic Nasopharyngoscopy:  Consent for fiberoptic laryngoscopy was obtained and I confirmed correctness of procedure and identity of patient. Fiberoptic laryngoscopy was indicated due to pre-radiotherapy disease evaluation. The fiberoptic laryngoscope was passed through the left naris under endoscopic vision. The turbinates were normal. The inferior and middle meati were clear without purulence, masses, or polyps. The nasopharynx was clear. The Eustachian tubes were clear. The soft palate appeared normal with good mobility. Passage of the scope into the oropharynx revealed bland-appearing mucosa with no visible lesions. The vallecula was clear and the supraglottic structures were also normal in appearance. The cords were mobile bilaterally. The arytenoids were clear and there was no pooling of secretions within the hypopharynx. The scope was then removed and the procedure was terminated without incident.    Imaging/Path/Labs   Imaging: Reviewed    Path: Reviewed    Labs: Reviewed    Assessment/Plan    Mr. 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 We discussed with the patient that given his disease progression within a short period of time in addition to the loss proximity to surrounding critical structures  particularly the brainstem, we prefer proceeding with 1 of 2 management options. The first option is concurrent chemoradiation delivered over 6.5 weeks directed to the area of recurrence and surrounding high risk area of tumor spread.  We explained that SBRT is not an optimal option at this situation given to the close location to the brainstem with associated higher morbidity. The second option as starting with induction chemotherapy followed by consolidation radiation therapy based on the response. We prefer proceeding with the second option hoping to get some tumor shrinkage and thus lowering radiation dose goes to the sensitive structures. The patient is going to see Dr. Shah in medical oncology tentatively on 2/2/2021 for determination of systemic management. We will connect with Dr. Shah for determination of the final management plan. In addition we recommend a baseline audiology evaluation before starting has chemotherapy or radiation therapy given the concern of hearing impairment.    At end of discussion, the patient agreed with the plan and verbalized understanding.  We will communicate with Dr. Shah for determination of the final management plan.The patient had many questions during our conversation that were answered to his satisfaction and verbalized understanding.     The patient was seen and discussed with staff, Dr. Rodgers. Thank you for involving us in the care of this patient. Please feel free to contact us with questions or concerns at any time.    Rob Restrepo MD  PGY-4 Resident, Radiation Oncology  [facility]            HPI    INITIAL PATIENT ASSESSMENT    Diagnosis: Cancer    Prior radiation therapy: June 2018    Prior chemotherapy: See records    Prior hormonal therapy:No    Pain Eval:  Denies    Psychosocial  Living arrangements: Lives alone  Fall Risk: independent   referral needs: Not needed    Advanced Directive: No  Implantable Cardiac  Device? No    Nurse face-to-face time: Level 5:  over 15 min face to face time  Review of Systems   Constitutional: Negative for chills, diaphoresis, fever, malaise/fatigue and weight loss.   HENT: Positive for hearing loss. Negative for congestion, ear discharge, ear pain, nosebleeds, sinus pain, sore throat and tinnitus.         Left eye   Eyes: Negative for blurred vision, double vision, photophobia, pain, discharge and redness.   Respiratory: Negative for cough, hemoptysis, sputum production, shortness of breath, wheezing and stridor.    Cardiovascular: Negative for chest pain, palpitations, orthopnea, claudication, leg swelling and PND.   Gastrointestinal: Negative for abdominal pain, blood in stool, constipation, diarrhea, heartburn, melena, nausea and vomiting.   Genitourinary: Negative for dysuria, flank pain, frequency, hematuria and urgency.   Musculoskeletal: Negative for back pain, falls, joint pain, myalgias and neck pain.   Skin: Negative for itching and rash.   Neurological: Negative for dizziness, tingling, tremors, sensory change, speech change, focal weakness, seizures, loss of consciousness, weakness and headaches.   Endo/Heme/Allergies: Negative for environmental allergies and polydipsia. Does not bruise/bleed easily.   Psychiatric/Behavioral: Negative for depression, hallucinations, memory loss, substance abuse and suicidal ideas. The patient is not nervous/anxious and does not have insomnia.          Sidney Rodgers MD

## 2021-01-21 NOTE — TELEPHONE ENCOUNTER
I spoke with Fortino & rescheduled his video visit with Dr Shah from 2/2/21 at 3pm to 1/22/21 at 1pm as requested via IB from [place].

## 2021-01-22 ENCOUNTER — VIRTUAL VISIT (OUTPATIENT)
Dept: ONCOLOGY | Facility: CLINIC | Age: 71
End: 2021-01-22
Attending: OTOLARYNGOLOGY
Payer: MEDICARE

## 2021-01-22 DIAGNOSIS — K12.31 MUCOSITIS DUE TO CHEMOTHERAPY: ICD-10-CM

## 2021-01-22 DIAGNOSIS — Z51.89 ENCOUNTER FOR OTHER SPECIFIED AFTERCARE: ICD-10-CM

## 2021-01-22 DIAGNOSIS — C09.9 SQUAMOUS CELL CARCINOMA OF TONSIL (H): Primary | ICD-10-CM

## 2021-01-22 PROCEDURE — 999N001193 HC VIDEO/TELEPHONE VISIT; NO CHARGE

## 2021-01-22 PROCEDURE — 99205 OFFICE O/P NEW HI 60 MIN: CPT | Mod: 95 | Performed by: INTERNAL MEDICINE

## 2021-01-22 PROCEDURE — 99417 PROLNG OP E/M EACH 15 MIN: CPT | Performed by: INTERNAL MEDICINE

## 2021-01-22 RX ORDER — HEPARIN SODIUM (PORCINE) LOCK FLUSH IV SOLN 100 UNIT/ML 100 UNIT/ML
5 SOLUTION INTRAVENOUS
Status: CANCELLED
Start: 2021-01-28

## 2021-01-22 RX ORDER — PALONOSETRON 0.05 MG/ML
0.25 INJECTION, SOLUTION INTRAVENOUS ONCE
Status: CANCELLED
Start: 2021-01-28

## 2021-01-22 RX ORDER — DIPHENHYDRAMINE HYDROCHLORIDE 50 MG/ML
50 INJECTION INTRAMUSCULAR; INTRAVENOUS
Status: CANCELLED
Start: 2021-01-28

## 2021-01-22 RX ORDER — MEPERIDINE HYDROCHLORIDE 25 MG/ML
25 INJECTION INTRAMUSCULAR; INTRAVENOUS; SUBCUTANEOUS EVERY 30 MIN PRN
Status: CANCELLED | OUTPATIENT
Start: 2021-01-28

## 2021-01-22 RX ORDER — METHYLPREDNISOLONE SODIUM SUCCINATE 125 MG/2ML
125 INJECTION, POWDER, LYOPHILIZED, FOR SOLUTION INTRAMUSCULAR; INTRAVENOUS
Status: CANCELLED
Start: 2021-01-28

## 2021-01-22 RX ORDER — LORAZEPAM 2 MG/ML
0.5 INJECTION INTRAMUSCULAR EVERY 4 HOURS PRN
Status: CANCELLED
Start: 2021-01-28

## 2021-01-22 RX ORDER — HEPARIN SODIUM,PORCINE 10 UNIT/ML
5 VIAL (ML) INTRAVENOUS
Status: CANCELLED
Start: 2021-01-28

## 2021-01-22 RX ORDER — EPINEPHRINE 1 MG/ML
0.3 INJECTION, SOLUTION INTRAMUSCULAR; SUBCUTANEOUS EVERY 5 MIN PRN
Status: CANCELLED | OUTPATIENT
Start: 2021-01-28

## 2021-01-22 RX ORDER — NALOXONE HYDROCHLORIDE 0.4 MG/ML
.1-.4 INJECTION, SOLUTION INTRAMUSCULAR; INTRAVENOUS; SUBCUTANEOUS
Status: CANCELLED | OUTPATIENT
Start: 2021-01-28

## 2021-01-22 RX ORDER — SODIUM CHLORIDE 9 MG/ML
1000 INJECTION, SOLUTION INTRAVENOUS CONTINUOUS PRN
Status: CANCELLED
Start: 2021-01-28

## 2021-01-22 RX ORDER — ALBUTEROL SULFATE 0.83 MG/ML
2.5 SOLUTION RESPIRATORY (INHALATION)
Status: CANCELLED | OUTPATIENT
Start: 2021-01-28

## 2021-01-22 RX ORDER — ALBUTEROL SULFATE 90 UG/1
1-2 AEROSOL, METERED RESPIRATORY (INHALATION)
Status: CANCELLED
Start: 2021-01-28

## 2021-01-22 NOTE — LETTER
"    1/22/2021         RE: Fortino Moya  [address]  [facility] [address]        Dear Colleague,    Thank you for referring your patient, Fortino Moya, to the [facility]. Please see a copy of my visit note below.    Fortino is a 70 year old who is being evaluated via a billable video visit.      How would you like to obtain your AVS? MyChart  If the video visit is dropped, the invitation should be resent by: Text to cell phone: [phone]  Will anyone else be joining your video visit? No      Vitals - Patient Reported  Weight (Patient Reported): 82.1 kg (181 lb)  Height (Patient Reported): 177.8 cm (5' 10\")  BMI (Based on Pt Reported Ht/Wt): 25.97  Pain Score: No Pain (0)    Lindsay TOBIN    Video Start Time: 105  Video-Visit Details    Type of service:  Video Visit    Video End Time: 2:20pm  Originating Location (pt. Location): Home    Distant Location (provider location):  [facility]     Platform used for Video Visit: [employer] / [employer]    REASON FOR VISIT:    CANCER STAGE: Cancer Staging  No matching staging information was found for the patient.      HISTORY OF PRESENT ILLNESS:    Fortino Moya is a 69 yo man with a history of L oropharynx P16+ squamous cell cancer that was first diagnosed in 2018 in [place], [place].  He initially was offered chemoradiation, but due to a prior history of L sided hearing loss, he was given low dose weekly carboplatin 1.5 AUC weekly with radiation.  His 3 month PET/CT after initiation was negative and he underwent surveillance after that.  He had a tough time with chemoradiation - he tolerated it with expected toxicity, however, there was a prolonged recovery and during that time, he got a divorce and he really feels that mentally, he was not right during the treatment and that it led to some poor decisions on his own part.  He relocated to [place] which is where he is from in Feb 2020 and has been followed since by  " Kamila and Dr. Flores at [facility].  In August of 2020, he  developed numbenss in chin and then moved up to his forehead.  He had a Pet/CT done in Sept 2020 that showed a hypermetabolic area near his L foramen ovale - he had had recent dental work in that area and it was thought that this might be scar or inflammation related to that procedure.  However, he was offered a 2nd opinion and came to [facility] and saw Dr. Russell in Nov 2020.  Images were reviewed at tumor boardon 12/4/20 - and it was felt that this was concerning enough to merit biopsy - so he was referred for IR-guided biopsy.   IR guided biopsy happened on 1/8/2021 and came back as invasive squamous cell carcinoma.      In spite of these developments, he has mostly felt well.  The only symptom he has is numbness in his chin that has spread up to his forehead.  Because of the numbness he finds it difficult to chew on the L side.  He is otherwise eating and drinking well. He has no pain.  He is fully active.  He has not lost any weight.  He has no trismus, no other constitutional symptoms.  He has longstanding hearing loss on the Left side, but normal hearing on the Right.  He also has some lingering tingling in his fingertips on the left - which he says is residual from carpal tunnel that he had about a year ago.  Otherwise he has normal bowel movement, no n/v.   He is very fit as he has been doing weight training regularly and exercising.      Prior treatment -   Concurrent chemoradiation with carboplatin 1.5 AUC weekly with 7000 cGy of radiation to the left neck.- completed  July 2018  MRI in [place] late 2019 -     [facility]med[facility]  A.fib - happened years ago - has not recurred - is currently on b-blocker and flecainide  HTN  Carpal Sara Gallagher  Lives alone in [place], [place] - sister and daughter live nearby.  He has a girlfriend who is a pharmacist.   He is a never smoker and occasionally drinks now 1-2 beers/wk.  Has used cocaine before distant history.   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     Allergies - nkda    FamHx - Brother had prostate cancer, Sister had a blood disorder        Review Of Systems  10-point review of systems were negative except as noted in HPI.        EXAM:  There were no vitals taken for this visit.  GEN: alert and oriented x 3, nad  Has a slight facial droop on the left when speaking, but very mild.  Speech is fluent and not slurred.         Current Outpatient Medications   Medication Sig Dispense Refill     Aspirin Buf,CaCarb-MgCarb-MgO, 81 MG TABS Take 81 mg by mouth       atorvastatin (LIPITOR) 10 MG tablet Take 10 mg by mouth       carvedilol (COREG) 12.5 MG tablet TAKE TWO TABLETS BY MOUTH EVERY MORNING AND ONE TABLET EVERY EVENING       cevimeline (EVOXAC) 30 MG capsule TAKE ONE CAPSULE BY MOUTH THREE TIMES DAILY       cholecalciferol 25 MCG (1000 UT) TABS Take 1,000 Units by mouth       flecainide (TAMBOCOR) 150 MG tablet Take 150 mg by mouth every 12 hours as needed       ibuprofen (ADVIL/MOTRIN) 600 MG tablet TAKE 1 TABLET BY MOUTH THREE TIMES DAILY AS NEEDED FOR PAIN       lisinopril (ZESTRIL) 20 MG tablet Take 20 mg by mouth       MULTIPLE VITAMIN PO Take 1 tablet by mouth       sildenafil (VIAGRA) 100 MG tablet Take  mg by mouth       traZODone (DESYREL) 50 MG tablet TAKE 1 TO 2 TABLETS BY MOUTH ONCE DAILY AT BEDTIME             Recent Labs   Lab Test 01/08/21  0830   WBC 5.3   HGB 13.3        @labrcent[na,potassium,chloride,co2,bun,cr@  No results for input(s): PROTTOTAL, ALBUMIN, BILITOTAL, AST, ALT, ALKPHOS in the last 98440 hours.      Recent Results (from the past 744 hour(s))   CT Bone Biopsy Deep    Narrative    Procedure: CT-guided Biopsy of left  space mass.    History: Progressively enlarging left  space mass, with 2  contiguous locations of FDG avidity along the left pterygoid muscle  and left foramen ovale. Prior history of tonsillar squamous cell  carcinoma. Progressive  paresthesia.    Radiologist(s): Maribell Weber and Ap.    Medications: Fentanyl 275 mcg IV, Versed 5.5 mg IV, 1% lidocaine 4 ml  local.    Conscious sedation was provided by a trained IR nurse under my direct  supervision.    Sedation time: 1 hour and 45 minutes.    Contrast: 75 cc of Isovue 300.    Dose: Total  mGycm.    COMPARISON: Prior MRI 10/14/2020, neck CT 12/15/2020 and PET/CT  9/8/2020    Nursing: Throughout the case the patient's vital signs were monitored  by nursing staff and remained stable.    Consent: Informed written consent was obtained from the patient prior  to performing the procedure. Risks, benefits, and alternatives were  discussed.    Procedure/Findings:  A contrast CT scan was performed for procedural planning purposes.  This was obtained in the arterial and venous phases in order to  identify the vascular structures and to plan the safest course to the  lesion.    CT was used to identify the left  space lesion using  anatomic landmarks and with comparison to the prior MRI dated  10/14/2020 and PET/CT 9/8/2020. Of note, there are 2 areas which  appear FDG avid on the prior PET 9/8/2020. The first area is located  within the pterygoid muscle adjacent to the pterygoid plates and there  is a secondary area near the foramen ovale.    The patient's left cheek area was prepped and draped in a usual  sterile fashion. Initially, an anterior approach was selected for the  left pterygoid component of the lesion. Approximately 2 cc of 1%  lidocaine was used for local anesthesia. An 18-gauge Temno coaxial  biopsy system was used to access the left pterygoid lesion. A total of  4 core specimens were obtained, which were submitted to pathology for  histopathologic evaluation. Pathology was on hand, however the  specimen did not touch off.    It was decided to take additional samples from the secondary component  near the foramen ovale. A more lateral approach was taken to this  part  of the lesion. Initially 1% lidocaine was used to anesthetize the  local area overlying the lesion. Under CT guidance. An 18-gauge Temno  coaxial biopsy system was used to access the lesion near foramen  ovale. A total of 3 core specimens were obtained, which were submitted  to pathology for histology evaluation. Samples obtained from this  region yielded some diagnostic material.    Hemostasis was achieved. The site was cleansed and dressed. Pressure  was held over the biopsy sites for approximately 30 seconds. The  procedure was well tolerated. A postprocedural CT demonstrated no  hemorrhage at the biopsy site. The patient was transferred back to the  observation unit in good condition.    Complications: None.      Impression    Impression: Technically successful CT-guided biopsy of the left   space lesion including a component within the left  pterygoid muscles and immediately inferior to left foramen ovale.  [Samples were taken from regions of interest which demonstrated  increased FDG activity near the left pterygoid muscle and left foramen  ovale.)    I, DWAYNE ELAM MD, attest that I was present in the procedure room  for the entire procedure.    I have personally reviewed the examination and initial interpretation  and I agree with the findings.    DWAYNE ELAM MD   PET Oncology Whole Body    Narrative    Combined Report of:    PET and CT on  1/18/2021 2:36 PM :    1. PET of the neck, chest, abdomen, and pelvis.  2. PET CT Fusion for Attenuation Correction and Anatomical  Localization:    3. Diagnostic CT scan of the chest, abdomen, and pelvis with  intravenous contrast for interpretation.  3. CT of the chest, abdomen and pelvis obtained for diagnostic  interpretation.  4. 3D MIP and PET-CT fused images were processed on an independent  workstation and archived to PACS and reviewed by a radiologist.    Technique:    1. PET: The patient received 13.82 mCi of F-18-FDG; the serum glucose  was  115 prior to administration, body weight was 81.6 kg. Images were  evaluated in the axial, sagittal, and coronal planes as well as the  rotational whole body MIP. Images were acquired from the Vertex to the  Feet.    UPTAKE WAS MEASURED AT 60 MINUTES.     BACKGROUND:  Liver SUV max= 3.40,   Aorta Blood SUV Max: 2.13.     2. CT: Volumetric acquisition for clinical interpretation of the  chest, abdomen, and pelvis acquired at 3 mm sections . The chest,  abdomen, and pelvis were evaluated at 5 mm sections in bone, soft  tissue, and lung windows.      The patient received 111 cc of Isovue 370 intravenously for the  examination.    --    3. 3D MIP and PET-CT fused images were processed on an independent  workstation and archived to PACS and reviewed by a radiologist.    INDICATION: history of tonsil cancer s/p chemo/rads now with new  disease at left  space with involvement of the left  cavernous sinus,. Please complete PET scan to evaluate; Squamous cell  carcinoma of tonsil (H); Metastatic squamous cell carcinoma (H)    ADDITIONAL INFORMATION OBTAINED FROM EMR: 70-year-old?man?status post  chemoradiotherapy for a T3N2b squamous cell carcinoma of the left  tonsil that was treated in 2018.    COMPARISON: Outside PET/CT 9/8/2020, 11/12/2019. CT neck 12/15/2020.    FINDINGS:     HEAD/NECK:  See dedicated neuroradiology report for the results of the high  resolution PET CT of the neck.     CHEST:  There is no suspicious FDG uptake in the chest.     Heart size is normal. No pericardial effusion. Moderate coronary  artery calcifications of the left anterior descending artery. Thoracic  aorta is normal in caliber and patent without significant  atherosclerotic calcifications. Conventional branching pattern of the  great vessels.    No mediastinal, hilar, or axillary lymphadenopathy. Esophagus is  normal.    Trachea and central airways are clear. Bibasilar dependent  subsegmental atelectasis. No focal airspace  opacity, pleural effusion,  pneumothorax. Scattered calcified granulomas. No suspicious or  enlarged pulmonary nodules.    ABDOMEN AND PELVIS:  There is no suspicious FDG uptake in the abdomen or pelvis.    No focal hepatic lesion. No intra or extrahepatic biliary duct  dilation. Gallbladder is normal. No calcified stone. Spleen size is  within normal limits. Normal homogenous enhancement of the pancreas.  The main pancreatic duct is not dilated.    Adrenal glands are normal. Symmetric renal enhancement. No  hydronephrosis, calcified stone, or contour deforming mass. Scattered  bilateral subcentimeter cortical hypodensities are too small to  characterize by CT. Bladder is decompressed. Prostate is enlarged.    No small or large bowel wall thickening or dilation. The appendix is  not well appreciated, however there are no significant inflammatory  changes in the right lower quadrant. No free intraperitoneal air. No  intra-abdominal pelvic free fluid. No portal venous gas.    Abdominal aorta and major branches are patent and normal in caliber  with moderate predominately aortoiliac atherosclerotic calcifications.  The main portal vein is patent.    No mesenteric, retroperitoneal, or pelvic lymphadenopathy.    LOWER EXTREMITIES:   No abnormal masses or hypermetabolic lesions.    BONES:   There are no suspicious lytic or blastic osseous lesions. Postsurgical  changes of right total hip and right knee arthroplasty. Mild FDG  uptake along the left hip likely secondary to degenerative changes.  Multilevel degenerative changes of the thoracolumbar spine including  moderate to severe disc space narrowing distal cervical and proximal  thoracic spine. Stable, stepwise grade 1 anterolistheses of L4 on L5  and L5 on S1. Lumbarization of S1 lumbar predominant facet  arthropathy. There is no abnormal FDG uptake in the skeleton. Soft  tissue calcification along the proximal anterior medial thigh.      Impression    IMPRESSION:   In  this patient with tonsillar cancer status post chemotherapy and  radiation with new disease in the left  space, not currently  on chemotherapy:  1.  No evidence for metastatic disease in the chest, abdomen, pelvis.  2.  See dedicated neuroradiology report for the results of the high  resolution PET CT of the neck.     I have personally reviewed the examination and initial interpretation  and I agree with the findings.    COLTEN AGGARWAL MD   CT Soft Tissue Neck w Contrast    Narrative    CT OF THE NECK WITH CONTRAST January 18, 2021 2:36 PM     HISTORY: Squamous cell carcinoma of tonsil (H). Metastatic squamous  cell carcinoma (H). Squamous cell carcinoma recurrence infratemporal  fossa with probable perineural spread along the left 5th cranial  nerve.    TECHNIQUE: Axial CT images of the neck were acquired after the  intravenous administration. Oral contrast:500ml Breeza, 111ml IV  contrast: Isovue 370, glucose 115, dose 13.82mCi FDG, inj RAC, Wt  81.6kg, Ht 177.8cm, uptake 60 mins nonionic iodinated contrast  material. Coronal reconstructions were created. Radiation dose for  this scan was reduced using automated exposure control, adjustment of  the mA and/or kV according to patient size, or iterative  reconstruction technique.    COMPARISON: Soft tissue neck CT 12/15/2020.    FINDINGS: There is abnormal hypermetabolic activity in the left  infratemporal fossa involving the medial and lateral pterygoid  musculature tracking cephalad along the expected course of the  mandibular division of the left 5th cranial nerve and extending  intracranially to involve the medial aspect of the left middle cranial  fossa and left side of the brainstem near the expected root entry zone  of the left 5th cranial nerve. There is no other hypermetabolic  activity in the neck.    There is no cervical lymphadenopathy. There are no fluid collections  or abscesses in the neck. The salivary glands are unremarkable. There  is no  evidence for mucosal space tumor. The thyroid gland is  unremarkable. There is no sinusitis or mastoiditis. The lung apices  are clear.      Impression    IMPRESSION:  1. Abnormal hypermetabolic activity in the left infratemporal fossa  involving the medial and lateral pterygoid musculature and the  mandibular branch of the left 5th cranial nerve. Abnormal  hypermetabolic activity tracks through the left foramen ovale into the  medial aspect of the left middle cranial fossa and posteriorly to the  root entry zone of the left 5th cranial nerve.  2. Otherwise, normal head and neck PET/CT.      MOISE LOGAN MD           Assessment/Plan  Recurrent head and neck squamous cell cancer - likely p16+ - 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  At tumor board we had previously discussed doing SBRT to the recurrent lesion, ohwever, on his most recent PET scan, there was concern that it had gotten too large to consider SBRT.  Thus, we discussed the possibility of using induction chemotherapy with the goal of shrinking it enough that we can consider some form of local therapy.  We did discuss that the goal of chemotherapy in this setting was not curative, but if is meant to make it possible to try curative intent treatment either with chemoradiation or SBRT or (hypothetically, even surgery).      We discussed using TPF chemotherapy.  This is a fairly intense chemotherapy regimen and we spent some time discussing toxicity.  In particular, ototoxicity, renal toxicity, neurotoxicity, and n/v with cisplatin, neuropathy with docetaxel, and mouth sores with 5FU.  Of course the combination is certain to cause myelosuppression.  We will use neulasta support to limit the olaf of neutrophils.  Moreover I explained to him that I typically use prophylactic antibiotics to prevent infection. We particularly discussed the potential for hearing loss - and though this still exists, we will have to monitor it closely -  we discussed the alternative of using carboplatin if needed, but given his good performance status, I would prefer to try to use cisplatin to potentially maximize response.      We discussed the need for a central line for 5FU infusions and given the timing of things, it will be faster for him to start with a PICC.  However, we discussed that we might place a PORT when able. He is going to hold his asa for now until we know when we can place a line.     I proposed that we do 2 cycles of TPF and then reimage. Then we can decide after this whether to go to chemoradiation, SBRT+ cetuximab or other depending on response.     I have also asked pathology to run a PDL-1 test on his most recent pathology in case we might need to utilize immunotherapy.        Plan  1. PICC line ASAP  2. JADYN visit on day 1 of TPF with weekly jadyn visits to monitor toxicity  3. Chemo teach from Jayda Schwartz  4. Follow-up in 6 weeks with repeat PET/CT    I spent 90 minutes on the day of the visit in reviewing the chart, outside records, reviewing films, the visit with the patient and documentation    Yohan Shah   of Medicine  Division of Hematology, Oncology, and Transplantation

## 2021-01-22 NOTE — PROGRESS NOTES
"Fortino is a 70 year old who is being evaluated via a billable video visit.      How would you like to obtain your AVS? MyChart  If the video visit is dropped, the invitation should be resent by: Text to cell phone: [phone]  Will anyone else be joining your video visit? No      Vitals - Patient Reported  Weight (Patient Reported): 82.1 kg (181 lb)  Height (Patient Reported): 177.8 cm (5' 10\")  BMI (Based on Pt Reported Ht/Wt): 25.97  Pain Score: No Pain (0)    Lindsay TOBIN    Video Start Time: 105  Video-Visit Details    Type of service:  Video Visit    Video End Time: 2:20pm  Originating Location (pt. Location): Home    Distant Location (provider location):  [facility]     Platform used for Video Visit: AmWell / Alexander    REASON FOR VISIT:    CANCER STAGE: Cancer Staging  No matching staging information was found for the patient.      HISTORY OF PRESENT ILLNESS:    Fortino Moya is a 71 yo man with a history of L oropharynx P16+ squamous cell cancer that was first diagnosed in 2018 in [place], [place].  He initially was offered chemoradiation, but due to a prior history of L sided hearing loss, he was given low dose weekly carboplatin 1.5 AUC weekly with radiation.  His 3 month PET/CT after initiation was negative and he underwent surveillance after that.  He had a tough time with chemoradiation - he tolerated it with expected toxicity, however, there was a prolonged recovery and during that time, he got a divorce and he really feels that mentally, he was not right during the treatment and that it led to some poor decisions on his own part.  He relocated to [place] which is where he is from in Feb 2020 and has been followed since by Dr. Treviño and Dr. Flores at [facility].  In August of 2020, he  developed numbenss in chin and then moved up to his forehead.  He had a Pet/CT done in Sept 2020 that showed a hypermetabolic area near his L foramen ovale - he had had recent dental " work in that area and it was thought that this might be scar or inflammation related to that procedure.  However, he was offered a 2nd opinion and came to [facility] and saw Dr. Russell in Nov 2020.  Images were reviewed at tumor boardon 12/4/20 - and it was felt that this was concerning enough to merit biopsy - so he was referred for IR-guided biopsy.   IR guided biopsy happened on 1/8/2021 and came back as invasive squamous cell carcinoma.      In spite of these developments, he has mostly felt well.  The only symptom he has is numbness in his chin that has spread up to his forehead.  Because of the numbness he finds it difficult to chew on the L side.  He is otherwise eating and drinking well. He has no pain.  He is fully active.  He has not lost any weight.  He has no trismus, no other constitutional symptoms.  He has longstanding hearing loss on the Left side, but normal hearing on the Right.  He also has some lingering tingling in his fingertips on the left - which he says is residual from carpal tunnel that he had about a year ago.  Otherwise he has normal bowel movement, no n/v.   He is very fit as he has been doing weight training regularly and exercising.      Prior treatment -   Concurrent chemoradiation with carboplatin 1.5 AUC weekly with 7000 cGy of radiation to the left neck.- completed  July 2018  MRI in [place] late 2019 -     [facility]  A.fib - happened years ago - has not recurred - is currently on b-blocker and flecainide  HTN  [facility]arpal Sara Gallagher  Lives alone in [place], [place] - sister and daughter live nearby.  He has a girlfriend who is a pharmacist.   He is a never smoker and occasionally drinks now 1-2 beers/wk.  Has used cocaine before distant history.  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     Allergies - nkda    FamHx - Brother had prostate cancer, Sister had a blood disorder        Review Of Systems  10-point review of systems were negative except as noted in HPI.        EXAM:  There were no vitals taken for  this visit.  GEN: alert and oriented x 3, nad  Has a slight facial droop on the left when speaking, but very mild.  Speech is fluent and not slurred.         Current Outpatient Medications   Medication Sig Dispense Refill     Aspirin Buf,CaCarb-MgCarb-MgO, 81 MG TABS Take 81 mg by mouth       atorvastatin (LIPITOR) 10 MG tablet Take 10 mg by mouth       carvedilol (COREG) 12.5 MG tablet TAKE TWO TABLETS BY MOUTH EVERY MORNING AND ONE TABLET EVERY EVENING       cevimeline (EVOXAC) 30 MG capsule TAKE ONE CAPSULE BY MOUTH THREE TIMES DAILY       cholecalciferol 25 MCG (1000 UT) TABS Take 1,000 Units by mouth       flecainide (TAMBOCOR) 150 MG tablet Take 150 mg by mouth every 12 hours as needed       ibuprofen (ADVIL/MOTRIN) 600 MG tablet TAKE 1 TABLET BY MOUTH THREE TIMES DAILY AS NEEDED FOR PAIN       lisinopril (ZESTRIL) 20 MG tablet Take 20 mg by mouth       MULTIPLE VITAMIN PO Take 1 tablet by mouth       sildenafil (VIAGRA) 100 MG tablet Take  mg by mouth       traZODone (DESYREL) 50 MG tablet TAKE 1 TO 2 TABLETS BY MOUTH ONCE DAILY AT BEDTIME             Recent Labs   Lab Test 01/08/21  0830   WBC 5.3   HGB 13.3        @labrcent[na,potassium,chloride,co2,bun,cr@  No results for input(s): PROTTOTAL, ALBUMIN, BILITOTAL, AST, ALT, ALKPHOS in the last 73068 hours.      Recent Results (from the past 744 hour(s))   CT Bone Biopsy Deep    Narrative    Procedure: CT-guided Biopsy of left  space mass.    History: Progressively enlarging left  space mass, with 2  contiguous locations of FDG avidity along the left pterygoid muscle  and left foramen ovale. Prior history of tonsillar squamous cell  carcinoma. Progressive paresthesia.    Radiologist(s): Maribell Weber and Ap.    Medications: Fentanyl 275 mcg IV, Versed 5.5 mg IV, 1% lidocaine 4 ml  local.    Conscious sedation was provided by a trained IR nurse under my direct  supervision.    Sedation time: 1 hour and 45  minutes.    Contrast: 75 cc of Isovue 300.    Dose: Total  mGycm.    COMPARISON: Prior MRI 10/14/2020, neck CT 12/15/2020 and PET/CT  9/8/2020    Nursing: Throughout the case the patient's vital signs were monitored  by nursing staff and remained stable.    Consent: Informed written consent was obtained from the patient prior  to performing the procedure. Risks, benefits, and alternatives were  discussed.    Procedure/Findings:  A contrast CT scan was performed for procedural planning purposes.  This was obtained in the arterial and venous phases in order to  identify the vascular structures and to plan the safest course to the  lesion.    CT was used to identify the left  space lesion using  anatomic landmarks and with comparison to the prior MRI dated  10/14/2020 and PET/CT 9/8/2020. Of note, there are 2 areas which  appear FDG avid on the prior PET 9/8/2020. The first area is located  within the pterygoid muscle adjacent to the pterygoid plates and there  is a secondary area near the foramen ovale.    The patient's left cheek area was prepped and draped in a usual  sterile fashion. Initially, an anterior approach was selected for the  left pterygoid component of the lesion. Approximately 2 cc of 1%  lidocaine was used for local anesthesia. An 18-gauge Temno coaxial  biopsy system was used to access the left pterygoid lesion. A total of  4 core specimens were obtained, which were submitted to pathology for  histopathologic evaluation. Pathology was on hand, however the  specimen did not touch off.    It was decided to take additional samples from the secondary component  near the foramen ovale. A more lateral approach was taken to this part  of the lesion. Initially 1% lidocaine was used to anesthetize the  local area overlying the lesion. Under CT guidance. An 18-gauge Temno  coaxial biopsy system was used to access the lesion near foramen  ovale. A total of 3 core specimens were obtained, which  were submitted  to pathology for histology evaluation. Samples obtained from this  region yielded some diagnostic material.    Hemostasis was achieved. The site was cleansed and dressed. Pressure  was held over the biopsy sites for approximately 30 seconds. The  procedure was well tolerated. A postprocedural CT demonstrated no  hemorrhage at the biopsy site. The patient was transferred back to the  observation unit in good condition.    Complications: None.      Impression    Impression: Technically successful CT-guided biopsy of the left   space lesion including a component within the left  pterygoid muscles and immediately inferior to left foramen ovale.  [Samples were taken from regions of interest which demonstrated  increased FDG activity near the left pterygoid muscle and left foramen  ovale.)    I, DWAYNE ELAM MD, attest that I was present in the procedure room  for the entire procedure.    I have personally reviewed the examination and initial interpretation  and I agree with the findings.    DWAYNE ELAM MD   PET Oncology Whole Body    Narrative    Combined Report of:    PET and CT on  1/18/2021 2:36 PM :    1. PET of the neck, chest, abdomen, and pelvis.  2. PET CT Fusion for Attenuation Correction and Anatomical  Localization:    3. Diagnostic CT scan of the chest, abdomen, and pelvis with  intravenous contrast for interpretation.  3. CT of the chest, abdomen and pelvis obtained for diagnostic  interpretation.  4. 3D MIP and PET-CT fused images were processed on an independent  workstation and archived to PACS and reviewed by a radiologist.    Technique:    1. PET: The patient received 13.82 mCi of F-18-FDG; the serum glucose  was 115 prior to administration, body weight was 81.6 kg. Images were  evaluated in the axial, sagittal, and coronal planes as well as the  rotational whole body MIP. Images were acquired from the Vertex to the  Feet.    UPTAKE WAS MEASURED AT 60 MINUTES.      BACKGROUND:  Liver SUV max= 3.40,   Aorta Blood SUV Max: 2.13.     2. CT: Volumetric acquisition for clinical interpretation of the  chest, abdomen, and pelvis acquired at 3 mm sections . The chest,  abdomen, and pelvis were evaluated at 5 mm sections in bone, soft  tissue, and lung windows.      The patient received 111 cc of Isovue 370 intravenously for the  examination.    --    3. 3D MIP and PET-CT fused images were processed on an independent  workstation and archived to PACS and reviewed by a radiologist.    INDICATION: history of tonsil cancer s/p chemo/rads now with new  disease at left  space with involvement of the left  cavernous sinus,. Please complete PET scan to evaluate; Squamous cell  carcinoma of tonsil (H); Metastatic squamous cell carcinoma (H)    ADDITIONAL INFORMATION OBTAINED FROM EMR: 70-year-old?man?status post  chemoradiotherapy for a T3N2b squamous cell carcinoma of the left  tonsil that was treated in 2018.    COMPARISON: Outside PET/CT 9/8/2020, 11/12/2019. CT neck 12/15/2020.    FINDINGS:     HEAD/NECK:  See dedicated neuroradiology report for the results of the high  resolution PET CT of the neck.     CHEST:  There is no suspicious FDG uptake in the chest.     Heart size is normal. No pericardial effusion. Moderate coronary  artery calcifications of the left anterior descending artery. Thoracic  aorta is normal in caliber and patent without significant  atherosclerotic calcifications. Conventional branching pattern of the  great vessels.    No mediastinal, hilar, or axillary lymphadenopathy. Esophagus is  normal.    Trachea and central airways are clear. Bibasilar dependent  subsegmental atelectasis. No focal airspace opacity, pleural effusion,  pneumothorax. Scattered calcified granulomas. No suspicious or  enlarged pulmonary nodules.    ABDOMEN AND PELVIS:  There is no suspicious FDG uptake in the abdomen or pelvis.    No focal hepatic lesion. No intra or extrahepatic  biliary duct  dilation. Gallbladder is normal. No calcified stone. Spleen size is  within normal limits. Normal homogenous enhancement of the pancreas.  The main pancreatic duct is not dilated.    Adrenal glands are normal. Symmetric renal enhancement. No  hydronephrosis, calcified stone, or contour deforming mass. Scattered  bilateral subcentimeter cortical hypodensities are too small to  characterize by CT. Bladder is decompressed. Prostate is enlarged.    No small or large bowel wall thickening or dilation. The appendix is  not well appreciated, however there are no significant inflammatory  changes in the right lower quadrant. No free intraperitoneal air. No  intra-abdominal pelvic free fluid. No portal venous gas.    Abdominal aorta and major branches are patent and normal in caliber  with moderate predominately aortoiliac atherosclerotic calcifications.  The main portal vein is patent.    No mesenteric, retroperitoneal, or pelvic lymphadenopathy.    LOWER EXTREMITIES:   No abnormal masses or hypermetabolic lesions.    BONES:   There are no suspicious lytic or blastic osseous lesions. Postsurgical  changes of right total hip and right knee arthroplasty. Mild FDG  uptake along the left hip likely secondary to degenerative changes.  Multilevel degenerative changes of the thoracolumbar spine including  moderate to severe disc space narrowing distal cervical and proximal  thoracic spine. Stable, stepwise grade 1 anterolistheses of L4 on L5  and L5 on S1. Lumbarization of S1 lumbar predominant facet  arthropathy. There is no abnormal FDG uptake in the skeleton. Soft  tissue calcification along the proximal anterior medial thigh.      Impression    IMPRESSION:   In this patient with tonsillar cancer status post chemotherapy and  radiation with new disease in the left  space, not currently  on chemotherapy:  1.  No evidence for metastatic disease in the chest, abdomen, pelvis.  2.  See dedicated  neuroradiology report for the results of the high  resolution PET CT of the neck.     I have personally reviewed the examination and initial interpretation  and I agree with the findings.    COLTEN AGGARWAL MD   CT Soft Tissue Neck w Contrast    Narrative    CT OF THE NECK WITH CONTRAST January 18, 2021 2:36 PM     HISTORY: Squamous cell carcinoma of tonsil (H). Metastatic squamous  cell carcinoma (H). Squamous cell carcinoma recurrence infratemporal  fossa with probable perineural spread along the left 5th cranial  nerve.    TECHNIQUE: Axial CT images of the neck were acquired after the  intravenous administration. Oral contrast:500ml Breeza, 111ml IV  contrast: Isovue 370, glucose 115, dose 13.82mCi FDG, inj RAC, Wt  81.6kg, Ht 177.8cm, uptake 60 mins nonionic iodinated contrast  material. Coronal reconstructions were created. Radiation dose for  this scan was reduced using automated exposure control, adjustment of  the mA and/or kV according to patient size, or iterative  reconstruction technique.    COMPARISON: Soft tissue neck CT 12/15/2020.    FINDINGS: There is abnormal hypermetabolic activity in the left  infratemporal fossa involving the medial and lateral pterygoid  musculature tracking cephalad along the expected course of the  mandibular division of the left 5th cranial nerve and extending  intracranially to involve the medial aspect of the left middle cranial  fossa and left side of the brainstem near the expected root entry zone  of the left 5th cranial nerve. There is no other hypermetabolic  activity in the neck.    There is no cervical lymphadenopathy. There are no fluid collections  or abscesses in the neck. The salivary glands are unremarkable. There  is no evidence for mucosal space tumor. The thyroid gland is  unremarkable. There is no sinusitis or mastoiditis. The lung apices  are clear.      Impression    IMPRESSION:  1. Abnormal hypermetabolic activity in the left infratemporal fossa  involving  the medial and lateral pterygoid musculature and the  mandibular branch of the left 5th cranial nerve. Abnormal  hypermetabolic activity tracks through the left foramen ovale into the  medial aspect of the left middle cranial fossa and posteriorly to the  root entry zone of the left 5th cranial nerve.  2. Otherwise, normal head and neck PET/CT.      MOISE LOGAN MD           Assessment/Plan  Recurrent head and neck squamous cell cancer - likely p16+ - 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  At tumor board we had previously discussed doing SBRT to the recurrent lesion, jimi, on his most recent PET scan, there was concern that it had gotten too large to consider SBRT.  Thus, we discussed the possibility of using induction chemotherapy with the goal of shrinking it enough that we can consider some form of local therapy.  We did discuss that the goal of chemotherapy in this setting was not curative, but if is meant to make it possible to try curative intent treatment either with chemoradiation or SBRT or (hypothetically, even surgery).      We discussed using TPF chemotherapy.  This is a fairly intense chemotherapy regimen and we spent some time discussing toxicity.  In particular, ototoxicity, renal toxicity, neurotoxicity, and n/v with cisplatin, neuropathy with docetaxel, and mouth sores with 5FU.  Of course the combination is certain to cause myelosuppression.  We will use neulasta support to limit the olaf of neutrophils.  Moreover I explained to him that I typically use prophylactic antibiotics to prevent infection. We particularly discussed the potential for hearing loss - and though this still exists, we will have to monitor it closely - we discussed the alternative of using carboplatin if needed, but given his good performance status, I would prefer to try to use cisplatin to potentially maximize response.      We discussed the need for a central line for 5FU infusions and given  the timing of things, it will be faster for him to start with a PICC.  However, we discussed that we might place a PORT when able. He is going to hold his asa for now until we know when we can place a line.     I proposed that we do 2 cycles of TPF and then reimage. Then we can decide after this whether to go to chemoradiation, SBRT+ cetuximab or other depending on response.     I have also asked pathology to run a PDL-1 test on his most recent pathology in case we might need to utilize immunotherapy.        Plan  1. PICC line ASAP  2. JADYN visit on day 1 of TPF with weekly jadyn visits to monitor toxicity  3. Chemo teach from Jayda Schwartz  4. Follow-up in 6 weeks with repeat PET/CT    I spent 90 minutes on the day of the visit in reviewing the chart, outside records, reviewing films, the visit with the patient and documentation    Yohan Shah   of Medicine  Division of Hematology, Oncology, and Transplantation

## 2021-01-25 ENCOUNTER — PREP FOR PROCEDURE (OUTPATIENT)
Dept: SURGERY | Facility: CLINIC | Age: 71
End: 2021-01-25

## 2021-01-25 DIAGNOSIS — C09.9 TONSILLAR CANCER (H): Primary | ICD-10-CM

## 2021-01-25 RX ORDER — CEFAZOLIN SODIUM 1 G/50ML
1 INJECTION, SOLUTION INTRAVENOUS SEE ADMIN INSTRUCTIONS
Status: CANCELLED | OUTPATIENT
Start: 2021-01-25

## 2021-01-25 RX ORDER — CEFAZOLIN SODIUM 2 G/50ML
2 SOLUTION INTRAVENOUS
Status: CANCELLED | OUTPATIENT
Start: 2021-01-25

## 2021-01-26 ENCOUNTER — HOSPITAL ENCOUNTER (OUTPATIENT)
Dept: VASCULAR ULTRASOUND | Facility: CLINIC | Age: 71
End: 2021-01-26
Attending: INTERNAL MEDICINE
Payer: MEDICARE

## 2021-01-26 ENCOUNTER — HOSPITAL ENCOUNTER (OUTPATIENT)
Dept: MRI IMAGING | Facility: CLINIC | Age: 71
End: 2021-01-26
Attending: RADIOLOGY
Payer: MEDICARE

## 2021-01-26 ENCOUNTER — HOSPITAL ENCOUNTER (OUTPATIENT)
Dept: GENERAL RADIOLOGY | Facility: CLINIC | Age: 71
End: 2021-01-26
Attending: INTERNAL MEDICINE
Payer: MEDICARE

## 2021-01-26 DIAGNOSIS — C09.9 TONSIL CANCER (H): ICD-10-CM

## 2021-01-26 DIAGNOSIS — C09.9 SQUAMOUS CELL CARCINOMA OF TONSIL (H): ICD-10-CM

## 2021-01-26 DIAGNOSIS — C09.9 SQUAMOUS CELL CARCINOMA OF TONSIL (H): Primary | ICD-10-CM

## 2021-01-26 DIAGNOSIS — Z51.89 ENCOUNTER FOR OTHER SPECIFIED AFTERCARE: ICD-10-CM

## 2021-01-26 PROCEDURE — 71045 X-RAY EXAM CHEST 1 VIEW: CPT | Mod: 26 | Performed by: RADIOLOGY

## 2021-01-26 PROCEDURE — 999N000065 XR CHEST 1 VW

## 2021-01-26 PROCEDURE — 70553 MRI BRAIN STEM W/O & W/DYE: CPT | Mod: 26 | Performed by: STUDENT IN AN ORGANIZED HEALTH CARE EDUCATION/TRAINING PROGRAM

## 2021-01-26 PROCEDURE — A9585 GADOBUTROL INJECTION: HCPCS | Performed by: RADIOLOGY

## 2021-01-26 PROCEDURE — G1004 CDSM NDSC: HCPCS | Performed by: STUDENT IN AN ORGANIZED HEALTH CARE EDUCATION/TRAINING PROGRAM

## 2021-01-26 PROCEDURE — 272N000473 HC KIT, VPS RHYTHM STYLET

## 2021-01-26 PROCEDURE — 255N000002 HC RX 255 OP 636: Performed by: RADIOLOGY

## 2021-01-26 PROCEDURE — 36569 INSJ PICC 5 YR+ W/O IMAGING: CPT

## 2021-01-26 PROCEDURE — 70553 MRI BRAIN STEM W/O & W/DYE: CPT | Mod: MG

## 2021-01-26 PROCEDURE — 272N000458 ZZ HC KIT, 5 FR DL BIOFLO OPEN ENDED PICC

## 2021-01-26 RX ORDER — NALOXONE HYDROCHLORIDE 0.4 MG/ML
.1-.4 INJECTION, SOLUTION INTRAMUSCULAR; INTRAVENOUS; SUBCUTANEOUS
Status: CANCELLED | OUTPATIENT
Start: 2021-02-18

## 2021-01-26 RX ORDER — MEPERIDINE HYDROCHLORIDE 25 MG/ML
25 INJECTION INTRAMUSCULAR; INTRAVENOUS; SUBCUTANEOUS EVERY 30 MIN PRN
Status: CANCELLED | OUTPATIENT
Start: 2021-02-18

## 2021-01-26 RX ORDER — HEPARIN SODIUM,PORCINE 10 UNIT/ML
5-10 VIAL (ML) INTRAVENOUS EVERY 24 HOURS
Status: DISCONTINUED | OUTPATIENT
Start: 2021-01-26 | End: 2021-01-27 | Stop reason: HOSPADM

## 2021-01-26 RX ORDER — HEPARIN SODIUM,PORCINE 10 UNIT/ML
5-10 VIAL (ML) INTRAVENOUS
Status: DISCONTINUED | OUTPATIENT
Start: 2021-01-26 | End: 2021-01-27 | Stop reason: HOSPADM

## 2021-01-26 RX ORDER — METHYLPREDNISOLONE SODIUM SUCCINATE 125 MG/2ML
125 INJECTION, POWDER, LYOPHILIZED, FOR SOLUTION INTRAMUSCULAR; INTRAVENOUS
Status: CANCELLED
Start: 2021-02-18

## 2021-01-26 RX ORDER — HEPARIN SODIUM,PORCINE 10 UNIT/ML
5 VIAL (ML) INTRAVENOUS
Status: CANCELLED
Start: 2021-02-18

## 2021-01-26 RX ORDER — HEPARIN SODIUM (PORCINE) LOCK FLUSH IV SOLN 100 UNIT/ML 100 UNIT/ML
5 SOLUTION INTRAVENOUS
Status: CANCELLED
Start: 2021-02-18

## 2021-01-26 RX ORDER — ALBUTEROL SULFATE 90 UG/1
1-2 AEROSOL, METERED RESPIRATORY (INHALATION)
Status: CANCELLED
Start: 2021-02-18

## 2021-01-26 RX ORDER — SODIUM CHLORIDE 9 MG/ML
1000 INJECTION, SOLUTION INTRAVENOUS CONTINUOUS PRN
Status: CANCELLED
Start: 2021-02-18

## 2021-01-26 RX ORDER — LORAZEPAM 2 MG/ML
0.5 INJECTION INTRAMUSCULAR EVERY 4 HOURS PRN
Status: CANCELLED
Start: 2021-02-18

## 2021-01-26 RX ORDER — ALBUTEROL SULFATE 0.83 MG/ML
2.5 SOLUTION RESPIRATORY (INHALATION)
Status: CANCELLED | OUTPATIENT
Start: 2021-02-18

## 2021-01-26 RX ORDER — LIDOCAINE 40 MG/G
CREAM TOPICAL
Status: DISCONTINUED | OUTPATIENT
Start: 2021-01-26 | End: 2021-01-27 | Stop reason: HOSPADM

## 2021-01-26 RX ORDER — DIPHENHYDRAMINE HYDROCHLORIDE 50 MG/ML
50 INJECTION INTRAMUSCULAR; INTRAVENOUS
Status: CANCELLED
Start: 2021-02-18

## 2021-01-26 RX ORDER — EPINEPHRINE 1 MG/ML
0.3 INJECTION, SOLUTION INTRAMUSCULAR; SUBCUTANEOUS EVERY 5 MIN PRN
Status: CANCELLED | OUTPATIENT
Start: 2021-02-18

## 2021-01-26 RX ORDER — GADOBUTROL 604.72 MG/ML
8 INJECTION INTRAVENOUS ONCE
Status: COMPLETED | OUTPATIENT
Start: 2021-01-26 | End: 2021-01-26

## 2021-01-26 RX ORDER — PALONOSETRON 0.05 MG/ML
0.25 INJECTION, SOLUTION INTRAVENOUS ONCE
Status: CANCELLED
Start: 2021-02-18

## 2021-01-26 RX ADMIN — GADOBUTROL 8 ML: 604.72 INJECTION INTRAVENOUS at 09:14

## 2021-01-26 NOTE — PROCEDURES
[facility]     Double Lumen PICC Placement    Date/Time: 1/26/2021 10:07 AM  Performed by: Crissy Quintana RN  Authorized by: Yohan Shah DO   Indications: vascular access    UNIVERSAL PROTOCOL   Site Marked: Yes  Prior Images Obtained and Reviewed:  Yes  Required items: Required blood products, implants, devices and special equipment available    Patient identity confirmed:  Verbally with patient and arm band  NA - No sedation, light sedation, or local anesthesia  Confirmation Checklist:  Patient's identity using two indicators, relevant allergies, procedure was appropriate and matched the consent or emergent situation and correct equipment/implants were available  Time out: Immediately prior to the procedure a time out was called    Universal Protocol: the Joint Commission Universal Protocol was followed    Preparation: Patient was prepped and draped in usual sterile fashion           ANESTHESIA    Anesthesia: Local infiltration  Local Anesthetic:  Lidocaine 1% without epinephrine  Anesthetic Total (mL):  3.5      SEDATION    Patient Sedated: No        Preparation: skin prepped with ChloraPrep  Skin prep agent: skin prep agent completely dried prior to procedure  Sterile barriers: maximum sterile barriers were used: cap, mask, sterile gown, sterile gloves, and large sterile sheet  Hand hygiene: hand hygiene performed prior to central venous catheter insertion  Type of line used: PICC and Power PICC  Catheter type: double lumen  Lumen type: non-valved  Catheter size: 5 Fr  Brand: Bard  Lot number: [id number]  Placement method: venipuncture, MST, ultrasound and tip confirmation system  Number of attempts: 1  Successful placement: yes  Orientation: left  Location: basilic vein (0.59 cm vein diameter)  Arm circumference: adults 10 cm  Extremity circumference: 33  Visible catheter length: 1  Total catheter length: 47  Dressing and securement: blood cleaned  with CHG, chlorhexidine disc applied, sterile dressing applied, statlock and site cleaned  Post procedure assessment: blood return through all ports, free fluid flow and placement verified by x-ray  PROCEDURE   Patient Tolerance:  Patient tolerated the procedure well with no immediate complications

## 2021-01-27 DIAGNOSIS — Z51.89 ENCOUNTER FOR OTHER SPECIFIED AFTERCARE: ICD-10-CM

## 2021-01-28 ENCOUNTER — INFUSION THERAPY VISIT (OUTPATIENT)
Dept: ONCOLOGY | Facility: CLINIC | Age: 71
End: 2021-01-28
Attending: INTERNAL MEDICINE
Payer: MEDICARE

## 2021-01-28 ENCOUNTER — APPOINTMENT (OUTPATIENT)
Dept: LAB | Facility: CLINIC | Age: 71
End: 2021-01-28
Attending: INTERNAL MEDICINE
Payer: MEDICARE

## 2021-01-28 ENCOUNTER — HOME INFUSION (PRE-WILLOW HOME INFUSION) (OUTPATIENT)
Dept: PHARMACY | Facility: CLINIC | Age: 71
End: 2021-01-28

## 2021-01-28 ENCOUNTER — MYC MEDICAL ADVICE (OUTPATIENT)
Dept: ONCOLOGY | Facility: CLINIC | Age: 71
End: 2021-01-28

## 2021-01-28 ENCOUNTER — DOCUMENTATION ONLY (OUTPATIENT)
Dept: ONCOLOGY | Facility: CLINIC | Age: 71
End: 2021-01-28

## 2021-01-28 VITALS
BODY MASS INDEX: 26.34 KG/M2 | HEART RATE: 70 BPM | HEIGHT: 70 IN | SYSTOLIC BLOOD PRESSURE: 151 MMHG | TEMPERATURE: 96.7 F | OXYGEN SATURATION: 99 % | RESPIRATION RATE: 18 BRPM | WEIGHT: 184 LBS | DIASTOLIC BLOOD PRESSURE: 88 MMHG

## 2021-01-28 DIAGNOSIS — Z51.89 ENCOUNTER FOR OTHER SPECIFIED AFTERCARE: ICD-10-CM

## 2021-01-28 DIAGNOSIS — C09.9 SQUAMOUS CELL CARCINOMA OF TONSIL (H): Primary | ICD-10-CM

## 2021-01-28 DIAGNOSIS — Z11.59 ENCOUNTER FOR SCREENING FOR OTHER VIRAL DISEASES: Primary | ICD-10-CM

## 2021-01-28 LAB
ALBUMIN SERPL-MCNC: 3.8 G/DL (ref 3.4–5)
ALP SERPL-CCNC: 64 U/L (ref 40–150)
ALT SERPL W P-5'-P-CCNC: 20 U/L (ref 0–70)
ANION GAP SERPL CALCULATED.3IONS-SCNC: 4 MMOL/L (ref 3–14)
AST SERPL W P-5'-P-CCNC: 11 U/L (ref 0–45)
BASOPHILS # BLD AUTO: 0 10E9/L (ref 0–0.2)
BASOPHILS NFR BLD AUTO: 0.3 %
BILIRUB SERPL-MCNC: 0.5 MG/DL (ref 0.2–1.3)
BUN SERPL-MCNC: 18 MG/DL (ref 7–30)
CALCIUM SERPL-MCNC: 8.8 MG/DL (ref 8.5–10.1)
CHLORIDE SERPL-SCNC: 104 MMOL/L (ref 94–109)
CO2 SERPL-SCNC: 30 MMOL/L (ref 20–32)
CREAT SERPL-MCNC: 0.76 MG/DL (ref 0.66–1.25)
DIFFERENTIAL METHOD BLD: ABNORMAL
EOSINOPHIL # BLD AUTO: 0.2 10E9/L (ref 0–0.7)
EOSINOPHIL NFR BLD AUTO: 2.9 %
ERYTHROCYTE [DISTWIDTH] IN BLOOD BY AUTOMATED COUNT: 12.2 % (ref 10–15)
GFR SERPL CREATININE-BSD FRML MDRD: >90 ML/MIN/{1.73_M2}
GLUCOSE SERPL-MCNC: 122 MG/DL (ref 70–99)
HCT VFR BLD AUTO: 39.8 % (ref 40–53)
HGB BLD-MCNC: 13.5 G/DL (ref 13.3–17.7)
IMM GRANULOCYTES # BLD: 0 10E9/L (ref 0–0.4)
IMM GRANULOCYTES NFR BLD: 0.3 %
LYMPHOCYTES # BLD AUTO: 0.4 10E9/L (ref 0.8–5.3)
LYMPHOCYTES NFR BLD AUTO: 5.6 %
MAGNESIUM SERPL-MCNC: 2.1 MG/DL (ref 1.6–2.3)
MCH RBC QN AUTO: 31.9 PG (ref 26.5–33)
MCHC RBC AUTO-ENTMCNC: 33.9 G/DL (ref 31.5–36.5)
MCV RBC AUTO: 94 FL (ref 78–100)
MONOCYTES # BLD AUTO: 0.8 10E9/L (ref 0–1.3)
MONOCYTES NFR BLD AUTO: 11 %
NEUTROPHILS # BLD AUTO: 5.4 10E9/L (ref 1.6–8.3)
NEUTROPHILS NFR BLD AUTO: 79.9 %
NRBC # BLD AUTO: 0 10*3/UL
NRBC BLD AUTO-RTO: 0 /100
PLATELET # BLD AUTO: 229 10E9/L (ref 150–450)
POTASSIUM SERPL-SCNC: 3.9 MMOL/L (ref 3.4–5.3)
PROT SERPL-MCNC: 6.6 G/DL (ref 6.8–8.8)
RBC # BLD AUTO: 4.23 10E12/L (ref 4.4–5.9)
SODIUM SERPL-SCNC: 138 MMOL/L (ref 133–144)
WBC # BLD AUTO: 6.8 10E9/L (ref 4–11)

## 2021-01-28 PROCEDURE — 258N000003 HC RX IP 258 OP 636: Performed by: INTERNAL MEDICINE

## 2021-01-28 PROCEDURE — 250N000012 HC RX MED GY IP 250 OP 636 PS 637: Performed by: NURSE PRACTITIONER

## 2021-01-28 PROCEDURE — 80053 COMPREHEN METABOLIC PANEL: CPT | Performed by: INTERNAL MEDICINE

## 2021-01-28 PROCEDURE — 83735 ASSAY OF MAGNESIUM: CPT | Performed by: INTERNAL MEDICINE

## 2021-01-28 PROCEDURE — 250N000011 HC RX IP 250 OP 636: Performed by: INTERNAL MEDICINE

## 2021-01-28 PROCEDURE — 96417 CHEMO IV INFUS EACH ADDL SEQ: CPT

## 2021-01-28 PROCEDURE — 99215 OFFICE O/P EST HI 40 MIN: CPT | Performed by: NURSE PRACTITIONER

## 2021-01-28 PROCEDURE — G0498 CHEMO EXTEND IV INFUS W/PUMP: HCPCS

## 2021-01-28 PROCEDURE — 96413 CHEMO IV INFUSION 1 HR: CPT

## 2021-01-28 PROCEDURE — 250N000011 HC RX IP 250 OP 636: Performed by: NURSE PRACTITIONER

## 2021-01-28 PROCEDURE — 96367 TX/PROPH/DG ADDL SEQ IV INF: CPT

## 2021-01-28 PROCEDURE — 85025 COMPLETE CBC W/AUTO DIFF WBC: CPT | Performed by: INTERNAL MEDICINE

## 2021-01-28 PROCEDURE — 96375 TX/PRO/DX INJ NEW DRUG ADDON: CPT

## 2021-01-28 PROCEDURE — 96361 HYDRATE IV INFUSION ADD-ON: CPT

## 2021-01-28 PROCEDURE — 96415 CHEMO IV INFUSION ADDL HR: CPT

## 2021-01-28 RX ORDER — DEXAMETHASONE 4 MG/1
8 TABLET ORAL ONCE
Status: COMPLETED | OUTPATIENT
Start: 2021-01-28 | End: 2021-01-28

## 2021-01-28 RX ORDER — DEXAMETHASONE 4 MG/1
8 TABLET ORAL 2 TIMES DAILY WITH MEALS
Qty: 12 TABLET | Refills: 3 | Status: SHIPPED | OUTPATIENT
Start: 2021-01-28 | End: 2021-02-17

## 2021-01-28 RX ORDER — LORAZEPAM 0.5 MG/1
0.5 TABLET ORAL EVERY 4 HOURS PRN
Qty: 30 TABLET | Refills: 3 | Status: SHIPPED | OUTPATIENT
Start: 2021-01-28 | End: 2021-03-15

## 2021-01-28 RX ORDER — PROCHLORPERAZINE MALEATE 10 MG
10 TABLET ORAL EVERY 6 HOURS PRN
Qty: 30 TABLET | Refills: 3 | Status: SHIPPED | OUTPATIENT
Start: 2021-01-28 | End: 2021-03-15

## 2021-01-28 RX ORDER — PALONOSETRON 0.05 MG/ML
0.25 INJECTION, SOLUTION INTRAVENOUS ONCE
Status: COMPLETED | OUTPATIENT
Start: 2021-01-28 | End: 2021-01-28

## 2021-01-28 RX ORDER — LEVOFLOXACIN 250 MG/1
250 TABLET, FILM COATED ORAL DAILY
Qty: 10 TABLET | Refills: 0 | Status: SHIPPED | OUTPATIENT
Start: 2021-02-02 | End: 2021-02-12

## 2021-01-28 RX ORDER — HEPARIN SODIUM,PORCINE 10 UNIT/ML
5 VIAL (ML) INTRAVENOUS ONCE
Status: COMPLETED | OUTPATIENT
Start: 2021-01-28 | End: 2021-01-28

## 2021-01-28 RX ADMIN — DOCETAXEL 150 MG: 20 INJECTION, SOLUTION, CONCENTRATE INTRAVENOUS at 09:39

## 2021-01-28 RX ADMIN — PALONOSETRON 0.25 MG: 0.05 INJECTION, SOLUTION INTRAVENOUS at 08:37

## 2021-01-28 RX ADMIN — SODIUM CHLORIDE, PRESERVATIVE FREE 5 ML: 5 INJECTION INTRAVENOUS at 07:14

## 2021-01-28 RX ADMIN — CISPLATIN 150 MG: 1 INJECTION, SOLUTION INTRAVENOUS at 10:59

## 2021-01-28 RX ADMIN — SODIUM CHLORIDE 1000 ML: 9 INJECTION, SOLUTION INTRAVENOUS at 08:00

## 2021-01-28 RX ADMIN — MAGNESIUM SULFATE HEPTAHYDRATE: 500 INJECTION, SOLUTION INTRAMUSCULAR; INTRAVENOUS at 10:59

## 2021-01-28 RX ADMIN — DEXAMETHASONE 8 MG: 4 TABLET ORAL at 09:14

## 2021-01-28 RX ADMIN — SODIUM CHLORIDE, PRESERVATIVE FREE 5 ML: 5 INJECTION INTRAVENOUS at 07:15

## 2021-01-28 RX ADMIN — DEXAMETHASONE SODIUM PHOSPHATE: 10 INJECTION, SOLUTION INTRAMUSCULAR; INTRAVENOUS at 08:42

## 2021-01-28 ASSESSMENT — PAIN SCALES - GENERAL: PAINLEVEL: MILD PAIN (3)

## 2021-01-28 ASSESSMENT — MIFFLIN-ST. JEOR: SCORE: 1605.87

## 2021-01-28 NOTE — PROGRESS NOTES
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  He initially was offered chemoradiation, but due to a prior history of L sided hearing loss, he was given low dose weekly carboplatin 1.5 AUC weekly with radiation.  His 3 month PET/CT after initiation was negative and he underwent surveillance after that.  He had a tough time with chemoradiation - he tolerated it with expected toxicity, however, there was a prolonged recovery and during that time, he got a divorce and he really feels that mentally, he was not right during the treatment and that it led to some poor decisions on his own part.  He relocated to [place] which is where he is from in Feb 2020 and has been followed since by Dr. Treviño and Dr. Flores at [facility].  In August of 2020, he  developed numbenss in chin and then moved up to his forehead.  He had a Pet/CT done in Sept 2020 that showed a hypermetabolic area near his L foramen ovale - he had had recent dental work in that area and it was thought that this might be scar or inflammation related to that procedure.  However, he was offered a 2nd opinion and came to [facility] and saw Dr. Russell in Nov 2020.  Images were reviewed at tumor boardon 12/4/20 - and it was felt that this was concerning enough to merit biopsy - so he was referred for IR-guided biopsy.   IR guided biopsy happened on 1/8/2021 and came back as invasive squamous cell carcinoma.      I am seeing him prior to initiation of TPF chemotherapy.     Interval history:   Numbness on face is the same but yesterday he notices a left facial droop. His left eye is watering as a result. He is still able to chew on the right side but no longer on the left--still eating and drinking normal amounts though. He denies trouble swallowing. No pain. No shortness of  "breath, cough, fever or chills. No chest pain or racing heart. He has baseline hearing loss on the L side. Bowel movements are normal. Has some residual tingling in left hand from some carpal tunnel, otherwise no neuropathy. He is wondering about exercising with his PICC line. Has some questions about nausea management and losing weight with treatment.    10 point review of systems otherwise negative    Current Outpatient Medications   Medication Sig Dispense Refill     Aspirin Buf,CaCarb-MgCarb-MgO, 81 MG TABS Take 81 mg by mouth       atorvastatin (LIPITOR) 10 MG tablet Take 10 mg by mouth       carvedilol (COREG) 12.5 MG tablet TAKE TWO TABLETS BY MOUTH EVERY MORNING AND ONE TABLET EVERY EVENING       cevimeline (EVOXAC) 30 MG capsule TAKE ONE CAPSULE BY MOUTH THREE TIMES DAILY       cholecalciferol 25 MCG (1000 UT) TABS Take 1,000 Units by mouth       flecainide (TAMBOCOR) 150 MG tablet Take 150 mg by mouth every 12 hours as needed       ibuprofen (ADVIL/MOTRIN) 600 MG tablet TAKE 1 TABLET BY MOUTH THREE TIMES DAILY AS NEEDED FOR PAIN       lisinopril (ZESTRIL) 20 MG tablet Take 20 mg by mouth       MULTIPLE VITAMIN PO Take 1 tablet by mouth       sildenafil (VIAGRA) 100 MG tablet Take  mg by mouth       traZODone (DESYREL) 50 MG tablet TAKE 1 TO 2 TABLETS BY MOUTH ONCE DAILY AT BEDTIME        No Known Allergies    Exam:    Blood pressure (!) 151/88, pulse 70, temperature 96.7  F (35.9  C), temperature source Tympanic, resp. rate 18, height 1.778 m (5' 10\"), weight 83.5 kg (184 lb), SpO2 99 %.  Wt Readings from Last 4 Encounters:   01/20/21 82.1 kg (181 lb)   01/08/21 81.6 kg (180 lb)   11/25/20 83.9 kg (185 lb)     General: No acute distress  HEENT: Sclera anicteric. Oral mucosa pink and moist.  No mucositis or thrush. Complete left sided facial paralysis noted. Left eye with increased tears and some erythema, no drainage or crusting.   Lymph: No lymphadenopathy in neck  Heart: Regular, rate, and " rhythm  Lungs: Clear to ascultation bilaterally  Abdomen: Positive bowel sounds. Soft, non-distended, non-tender. No organomegaly or mass.   Extremities: no lower extremity edema  Neuro: cranial 5 left paralysis as above.   Rash: none  Vascular access: PICC    Labs:    1/28/2021 07:04   Sodium 138   Potassium 3.9   Chloride 104   Carbon Dioxide 30   Urea Nitrogen 18   Creatinine 0.76   GFR Estimate >90   GFR Estimate If Black >90   Calcium 8.8   Anion Gap 4   Magnesium 2.1   Albumin 3.8   Protein Total 6.6 (L)   Bilirubin Total 0.5   Alkaline Phosphatase 64   ALT 20   AST 11   Glucose 122 (H)   WBC 6.8   Hemoglobin 13.5   Hematocrit 39.8 (L)   Platelet Count 229   RBC Count 4.23 (L)   MCV 94   MCH 31.9   MCHC 33.9   RDW 12.2   Diff Method Automated Method   % Neutrophils 79.9   % Lymphocytes 5.6   % Monocytes 11.0   % Eosinophils 2.9   % Basophils 0.3   % Immature Granulocytes 0.3   Nucleated RBCs 0   Absolute Neutrophil 5.4   Absolute Lymphocytes 0.4 (L)   Absolute Monocytes 0.8   Absolute Eosinophils 0.2   Absolute Basophils 0.0   Abs Immature Granulocytes 0.0   Absolute Nucleated RBC 0.0       Imaging:   MR SKULL BASE WO & W CONTRAST 1/26/2021 9:17 AM     History:  Patient with prior left tonsillar cancer, now recurrent  involving left infratemporal fossa, cavernous sinus and brainstem.  Scan for radiotherapy planning purposes. Please also obtain thin cut  T1+C and T2 FS images through the tumor.; Tonsil cancer (H).     Per EPIC: Previous history of a cT3 N1 M0 p16 positive squamous cell  carcinoma?of the?left tonsil treated with chemoradiation. Now presents  with a local recurrence which?is?likely residual tumor located at the  superior edge of his prior radiation field.      ICD-10: Tonsil cancer (H)     Comparison: PET CT from 1/18/2021 and neck CT from 12/15/2020, Neck  MRI from 10/14/2020.     Technique: Sagittal and axial T1-weighted and axial T2-weighted images  with fat saturation of the neck from the  skull base through the upper  mediastinum were obtained without intravenous contrast. Following  intravenous administration of gadolinium, axial and coronal  T1-weighted images with fat saturation of the neck were also obtained.  Additional 3-D T1 weighted postcontrast series were obtained for  radiation planning     Contrast: 8 mls Gadavist     Findings:   Enhancing mass, involving the left cavernous sinus, left medial and  lateral pterygoid muscles, upper aspect of the left nasopharynx, left  aspect of the clivus, protruding through the foramen ovale into the  Meckel's cave, extending all the way through the left cisternal 5th  cranial nerve into the root entry zone. The tumor also infiltrates the  left temporal lobe and there is associated mass effect and vasogenic  edema in the subcortical white matter.   T2 hyperintense signal and enhancement within the left  and  left temporal muscular without associated FDG activity on prior PET  CT, likely treatment-related or inflammatory secondary to underlying  tumor.      Postradiation changes of the neck, including the atrophy of the  bilateral submandibular glands, filling of the epiglottis and area  epiglottic folds.     Evaluation of the mucosal space demonstrates no evident abnormality in  the oropharynx, hypopharynx or the glottis. The tongue base appears  normal. The thyroid gland appears normal. Regarding the cervical lymph  nodes, no adenopathy by size criteria.     There is a right mandibular dental prosthesis which causes  heterogeneity in the magnetic field in the adjacent structures and  there is associated inhomogeneous fat suppression in the mandibular  bone. No corresponding T1 hypointense signal in the mandible.  Therefore finding is likely artifactual.     The major vascular structures in the neck appear unremarkable. Left  greater than right mastoid effusion. Mucosal thickening of the ethmoid  air cells and bilateral maxillary sinuses.                                                                       Impression: In this patient with history of recurrent left tonsillar  cancer status post chemoradiation;     there is extensive recurrent mass within the distribution of  extracranial left V3 mandibular nerve. Perineural invasion confirmed  with biopsy, has progressed, now infiltrates the Gasserian ganglion,  infiltrates the intracranial cisternal 5th cranial nerve all the way  back to nerve root entry zone. There is infiltration of the left  cavernous sinus, adjacent meninges and infiltration of the left  ventral temporal brain parenchyma. The left aspect of the clivus is  also infiltrated by the mass. T2 hyperintense heterogeneous areas in  the left  space is likely from a combination of  inflammation, treatment related change and denervation atrophy.    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 Dr. Shah previously discussed at tumor board doing SBRT to the recurrent lesion, however, on most recent PET scan, there was concern that it had gotten too large to consider SBRT.  Thus, it was discussed the possibility of using induction chemotherapy with the goal of shrinking it enough that we can consider some form of local therapy.  We did discuss that the goal of chemotherapy in this setting was not curative, but it is meant to make it possible to try curative intent treatment either with chemoradiation or SBRT or (hypothetically, even surgery).  -Plan for TPF chemotherapy. Again reviewed some anticipated side effects. He has yet to connect with Jayda Schwartz so she can reinforce things. Did not take PO dex yesterday so will give AM oral dose here, along with 12 mg I--d/w pharmacy. We will see him for weekly follow up--asked him to call us sooner with uncontrolled symptoms, questions, or concerns.   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  Reviewed recent MRI with Fortino. Discussed with Dr. Shah and he is aware of this acute change/worsening since his last visit, reviewed MRI results with him as well. Agrees prompt initiation of treatment is warranted. I am hopeful this will be reversible given its onset just yesterday but cautioned Fortino the timeline for improvement and resolution is unclear.   -L eye patch and lubricating drops to assist with eye dryness and irritation    Nutrition: he lost 25 lbs when he last had chemo rads so is eager to be proactive. He is agreeable to a meeting with michelle stoner RD. I asked he monitor his weight at home periodically    QT risk: levaquin prophy and flecainide have mod risk of QT prolongation. EKG pre-preatment earlier this month 438. Given levaquin is short term therapy will monitor with EKG prior to cycle 2.    Prep: 15 minutes  Visit: 30 minutes  Care Coordination, conversation with Dr. Shah and infusion nursing, and documentation: 30 minutes      Diana King CNP on 1/28/2021 at 12:34 PM

## 2021-01-28 NOTE — PROGRESS NOTES
Infusion Nursing Note:  Fortino Moya presents today for Cycle 1 Day 1 Taxotere, Cisplatin, and Fluorouracil 120 hour home pump.        Note:     Pt seen by Diana King NP while in infusion, see her note for assessment details. Fortino was previously treated in [place] with Carbo/radiation and has concerns about nausea/vomiting and keep his nutrition up. Pt feels his questions were answered and no further questions.     Patient new to oncology infusion room and is receiving Taxotere, Cisplatin and Fluorouracil home pump for the first time. Pt oriented to infusion room and call light. Chemotherapy teaching done by this RN, highlighting side effects of each and schedule. Discussed role of [facility] as patient will be going home with a continuous pump and that he will have to come back to [facility] for his disconnect (although covered) and Neulasta on-body because it is NOT covered by [facility] [place] [facility].        Per William Schwartz RN care coordinator; pt does not have home coverage for PICC line supplies or nursing care. The following was discussed with Dr. Shah and RN requested that Jayda enter a note with the order or to put directly into Day 6 treatment plan.     1. Discontinue PICC on 2/2/2021 when patient comes in for pump disconnect (and Neulasta on-body).   2. Pt will have port placed prior to cycle 2; Jayda arrange and follow up with Fortino for date.     Pt verbalized understanding of above information of plan. He also understands his take home medications- that Decadron is SCHEDULED. And PRN anti-emetics. Encouraged patient to call with any uncontrolled symptoms. Pt has triage number and Jayda Schwartz.     Delay in connect pump due to leaking fluid in chemo bag. Pump connected without issue.     Intravenous Access:  PICC.    Treatment Conditions:  Lab Results   Component Value Date    HGB 13.5 01/28/2021     Lab Results   Component Value Date    WBC 6.8 01/28/2021      Lab Results   Component Value Date    ANEU 5.4  "01/28/2021     Lab Results   Component Value Date     01/28/2021      Lab Results   Component Value Date     01/28/2021                   Lab Results   Component Value Date    POTASSIUM 3.9 01/28/2021           Lab Results   Component Value Date    MAG 2.1 01/28/2021            Lab Results   Component Value Date    CR 0.76 01/28/2021                   Lab Results   Component Value Date    RIYA 8.8 01/28/2021                Lab Results   Component Value Date    BILITOTAL 0.5 01/28/2021           Lab Results   Component Value Date    ALBUMIN 3.8 01/28/2021                    Lab Results   Component Value Date    ALT 20 01/28/2021           Lab Results   Component Value Date    AST 11 01/28/2021       Results reviewed, labs MET treatment parameters, ok to proceed with treatment.      Post Infusion Assessment:  Patient tolerated infusion without incident.  Blood return noted pre and post infusion.     Prior to discharge: Port is secured in place with tegaderm and flushed with 10cc NS with positive blood return noted.  Continuous home infusion CADD pump connected.    All connectors secured in place and clamps taped open.    Pump started, \"running\" noted on display (CADD): YES.  Pump Connection double checked with Tavia STOCK RN.  Patient instructed to call our clinic or [facility] with any questions or concerns at home.  Patient verbalized understanding.    Patient set up for pump disconnect at our clinic on 2/2 @ 1430.        Discharge Plan:   Copy of AVS reviewed with patient. Pt instructed to call care coordinator, triage (or MD on call if after hours/weekends) with chills/temp >=100.4, questions/concerns. Pt stated understanding of plan.   Prescription refills given for Decadron, Levaquin, compazine, and Lorazepam.   Discharge instructions reviewed with: Patient.  Copy of AVS reviewed with patient and/or family.  Patient will return 2/2 for next appointment.    Departure Mode: Ambulatory.    Elsy" JAYESH Spain RN

## 2021-01-28 NOTE — NURSING NOTE
"Chief Complaint   Patient presents with     Blood Draw     labs drawn via PICC line by RN in lab     BP (!) 151/88   Pulse 70   Temp 96.7  F (35.9  C) (Tympanic)   Resp 18   Ht 1.778 m (5' 10\")   Wt 83.5 kg (184 lb)   SpO2 99%   BMI 26.40 kg/m      Lines accessed. Labs drawn via red lumen. Red line flushed with normal saline and heparin, blue line heparin locked. Pt tolerated well. Checked into next appointment.    Sonia Richardson RN on 1/28/2021 at 7:13 AM  "

## 2021-01-28 NOTE — PATIENT INSTRUCTIONS
Please send a MY CHART message with the Fax number to [employer] that your paper work can be sent directly to. Thanks!    [facility] Triage and after hours / weekends / holidays:  [phone]    Please call the triage or after hours line if you experience a temperature greater than or equal to 100.5, shaking chills, have uncontrolled nausea, vomiting and/or diarrhea, dizziness, shortness of breath, chest pain, bleeding, unexplained bruising, or if you have any other new/concerning symptoms, questions or concerns.      If you are having any concerning symptoms or wish to speak to a provider before your next infusion visit, please call your care coordinator or triage to notify them so we can adequately serve you.     If you need a refill on a narcotic prescription or other medication, please call before your infusion appointment.

## 2021-01-28 NOTE — PROGRESS NOTES
FMLA paperwork received via patient dropoff from [employer]. Will be placed in provider folder for signature upon completion.     No fax number provided, [employer] message sent to follow up on fax number and what kind of leave is needed.      Simin Rankin [facility]

## 2021-01-28 NOTE — LETTER
1/28/2021         RE: Fortino Moya  [address]  [facility] [address]        Dear Colleague,    Thank you for referring your patient, Fortino Moya, to the [facility]. Please see a copy of my visit note below.    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  He initially was offered chemoradiation, but due to a prior history of L sided hearing loss, he was given low dose weekly carboplatin 1.5 AUC weekly with radiation.  His 3 month PET/CT after initiation was negative and he underwent surveillance after that.  He had a tough time with chemoradiation - he tolerated it with expected toxicity, however, there was a prolonged recovery and during that time, he got a divorce and he really feels that mentally, he was not right during the treatment and that it led to some poor decisions on his own part.  He relocated to [place] which is where he is from in Feb 2020 and has been followed since by Dr. Treviño and Dr. Flores at [facility].  In August of 2020, he  developed numbenss in chin and then moved up to his forehead.  He had a Pet/CT done in Sept 2020 that showed a hypermetabolic area near his L foramen ovale - he had had recent dental work in that area and it was thought that this might be scar or inflammation related to that procedure.  However, he was offered a 2nd opinion and came to [facility] and saw Dr. Russell in Nov 2020.  Images were reviewed at tumor boardon 12/4/20 - and it was felt that this was concerning enough to merit biopsy - so he was referred for IR-guided biopsy.   IR guided biopsy happened on 1/8/2021 and came back as invasive squamous cell carcinoma.      I am seeing him prior to initiation of TPF chemotherapy.     Interval history:   Numbness on face is the same but  "yesterday he notices a left facial droop. His left eye is watering as a result. He is still able to chew on the right side but no longer on the left--still eating and drinking normal amounts though. He denies trouble swallowing. No pain. No shortness of breath, cough, fever or chills. No chest pain or racing heart. He has baseline hearing loss on the L side. Bowel movements are normal. Has some residual tingling in left hand from some carpal tunnel, otherwise no neuropathy. He is wondering about exercising with his PICC line. Has some questions about nausea management and losing weight with treatment.    10 point review of systems otherwise negative    Current Outpatient Medications   Medication Sig Dispense Refill     Aspirin Buf,CaCarb-MgCarb-MgO, 81 MG TABS Take 81 mg by mouth       atorvastatin (LIPITOR) 10 MG tablet Take 10 mg by mouth       carvedilol (COREG) 12.5 MG tablet TAKE TWO TABLETS BY MOUTH EVERY MORNING AND ONE TABLET EVERY EVENING       cevimeline (EVOXAC) 30 MG capsule TAKE ONE CAPSULE BY MOUTH THREE TIMES DAILY       cholecalciferol 25 MCG (1000 UT) TABS Take 1,000 Units by mouth       flecainide (TAMBOCOR) 150 MG tablet Take 150 mg by mouth every 12 hours as needed       ibuprofen (ADVIL/MOTRIN) 600 MG tablet TAKE 1 TABLET BY MOUTH THREE TIMES DAILY AS NEEDED FOR PAIN       lisinopril (ZESTRIL) 20 MG tablet Take 20 mg by mouth       MULTIPLE VITAMIN PO Take 1 tablet by mouth       sildenafil (VIAGRA) 100 MG tablet Take  mg by mouth       traZODone (DESYREL) 50 MG tablet TAKE 1 TO 2 TABLETS BY MOUTH ONCE DAILY AT BEDTIME        No Known Allergies    Exam:    Blood pressure (!) 151/88, pulse 70, temperature 96.7  F (35.9  C), temperature source Tympanic, resp. rate 18, height 1.778 m (5' 10\"), weight 83.5 kg (184 lb), SpO2 99 %.  Wt Readings from Last 4 Encounters:   01/20/21 82.1 kg (181 lb)   01/08/21 81.6 kg (180 lb)   11/25/20 83.9 kg (185 lb)     General: No acute distress  HEENT: Sclera " anicteric. Oral mucosa pink and moist.  No mucositis or thrush. Complete left sided facial paralysis noted. Left eye with increased tears and some erythema, no drainage or crusting.   Lymph: No lymphadenopathy in neck  Heart: Regular, rate, and rhythm  Lungs: Clear to ascultation bilaterally  Abdomen: Positive bowel sounds. Soft, non-distended, non-tender. No organomegaly or mass.   Extremities: no lower extremity edema  Neuro: cranial 5 left paralysis as above.   Rash: none  Vascular access: PICC    Labs:    1/28/2021 07:04   Sodium 138   Potassium 3.9   Chloride 104   Carbon Dioxide 30   Urea Nitrogen 18   Creatinine 0.76   GFR Estimate >90   GFR Estimate If Black >90   Calcium 8.8   Anion Gap 4   Magnesium 2.1   Albumin 3.8   Protein Total 6.6 (L)   Bilirubin Total 0.5   Alkaline Phosphatase 64   ALT 20   AST 11   Glucose 122 (H)   WBC 6.8   Hemoglobin 13.5   Hematocrit 39.8 (L)   Platelet Count 229   RBC Count 4.23 (L)   MCV 94   MCH 31.9   MCHC 33.9   RDW 12.2   Diff Method Automated Method   % Neutrophils 79.9   % Lymphocytes 5.6   % Monocytes 11.0   % Eosinophils 2.9   % Basophils 0.3   % Immature Granulocytes 0.3   Nucleated RBCs 0   Absolute Neutrophil 5.4   Absolute Lymphocytes 0.4 (L)   Absolute Monocytes 0.8   Absolute Eosinophils 0.2   Absolute Basophils 0.0   Abs Immature Granulocytes 0.0   Absolute Nucleated RBC 0.0       Imaging:   MR SKULL BASE WO & W CONTRAST 1/26/2021 9:17 AM     History:  Patient with prior left tonsillar cancer, now recurrent  involving left infratemporal fossa, cavernous sinus and brainstem.  Scan for radiotherapy planning purposes. Please also obtain thin cut  T1+C and T2 FS images through the tumor.; Tonsil cancer (H).     Per EPIC: Previous history of a cT3 N1 M0 p16 positive squamous cell  carcinoma?of the?left tonsil treated with chemoradiation. Now presents  with a local recurrence which?is?likely residual tumor located at the  superior edge of his prior radiation field.       ICD-10: Tonsil cancer (H)     Comparison: PET CT from 1/18/2021 and neck CT from 12/15/2020, Neck  MRI from 10/14/2020.     Technique: Sagittal and axial T1-weighted and axial T2-weighted images  with fat saturation of the neck from the skull base through the upper  mediastinum were obtained without intravenous contrast. Following  intravenous administration of gadolinium, axial and coronal  T1-weighted images with fat saturation of the neck were also obtained.  Additional 3-D T1 weighted postcontrast series were obtained for  radiation planning     Contrast: 8 mls Gadavist     Findings:   Enhancing mass, involving the left cavernous sinus, left medial and  lateral pterygoid muscles, upper aspect of the left nasopharynx, left  aspect of the clivus, protruding through the foramen ovale into the  Meckel's cave, extending all the way through the left cisternal 5th  cranial nerve into the root entry zone. The tumor also infiltrates the  left temporal lobe and there is associated mass effect and vasogenic  edema in the subcortical white matter.   T2 hyperintense signal and enhancement within the left  and  left temporal muscular without associated FDG activity on prior PET  CT, likely treatment-related or inflammatory secondary to underlying  tumor.      Postradiation changes of the neck, including the atrophy of the  bilateral submandibular glands, filling of the epiglottis and area  epiglottic folds.     Evaluation of the mucosal space demonstrates no evident abnormality in  the oropharynx, hypopharynx or the glottis. The tongue base appears  normal. The thyroid gland appears normal. Regarding the cervical lymph  nodes, no adenopathy by size criteria.     There is a right mandibular dental prosthesis which causes  heterogeneity in the magnetic field in the adjacent structures and  there is associated inhomogeneous fat suppression in the mandibular  bone. No corresponding T1 hypointense signal in the  mandible.  Therefore finding is likely artifactual.     The major vascular structures in the neck appear unremarkable. Left  greater than right mastoid effusion. Mucosal thickening of the ethmoid  air cells and bilateral maxillary sinuses.                                                                      Impression: In this patient with history of recurrent left tonsillar  cancer status post chemoradiation;     there is extensive recurrent mass within the distribution of  extracranial left V3 mandibular nerve. Perineural invasion confirmed  with biopsy, has progressed, now infiltrates the Gasserian ganglion,  infiltrates the intracranial cisternal 5th cranial nerve all the way  back to nerve root entry zone. There is infiltration of the left  cavernous sinus, adjacent meninges and infiltration of the left  ventral temporal brain parenchyma. The left aspect of the clivus is  also infiltrated by the mass. T2 hyperintense heterogeneous areas in  the left  space is likely from a combination of  inflammation, treatment related change and denervation atrophy.    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 Dr. Shah previously discussed at tumor board doing SBRT to the recurrent lesion, however, on most recent PET scan, there was concern that it had gotten too large to consider SBRT.  Thus, it was discussed the possibility of using induction chemotherapy with the goal of shrinking it enough that we can consider some form of local therapy.  We did discuss that the goal of chemotherapy in this setting was not curative, but it is meant to make it possible to try curative intent treatment either with chemoradiation or SBRT or (hypothetically, even surgery).  -Plan for TPF chemotherapy. Again reviewed some anticipated side effects. He has yet to connect with Jayda Schwartz so she can reinforce things. Did not take PO dex yesterday so will give AM oral dose here, along with 12  mg I--d/w pharmacy. We will see him for weekly follow up--asked him to call us sooner with uncontrolled symptoms, questions, or concerns.   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 Reviewed recent MRI with Fortino. Discussed with Dr. Shah and he is aware of this acute change/worsening since his last visit, reviewed MRI results with him as well. Agrees prompt initiation of treatment is warranted. I am hopeful this will be reversible given its onset just yesterday but cautioned Fortino the timeline for improvement and resolution is unclear.   -L eye patch and lubricating drops to assist with eye dryness and irritation    Nutrition: he lost 25 lbs when he last had chemo rads so is eager to be proactive. He is agreeable to a meeting with michelle stoner RD. I asked he monitor his weight at home periodically    QT risk: levaquin prophy and flecainide have mod risk of QT prolongation. EKG pre-preatment earlier this month 438. Given levaquin is short term therapy will monitor with EKG prior to cycle 2.    Prep: 15 minutes  Visit: 30 minutes  Care Coordination, conversation with Dr. Shah and infusion nursing, and documentation: 30 minutes    Again, thank you for allowing me to participate in the care of your patient.        Sincerely,        Diana King, CNP

## 2021-01-29 ENCOUNTER — HOSPITAL ENCOUNTER (OUTPATIENT)
Facility: CLINIC | Age: 71
End: 2021-01-29
Attending: THORACIC SURGERY (CARDIOTHORACIC VASCULAR SURGERY) | Admitting: THORACIC SURGERY (CARDIOTHORACIC VASCULAR SURGERY)
Payer: MEDICARE

## 2021-01-29 DIAGNOSIS — C09.9 TONSILLAR CANCER (H): ICD-10-CM

## 2021-01-29 NOTE — PROGRESS NOTES
This is a recent snapshot of the patient's [facility] medical record.  For current drug dose and complete information and questions, call [phone] or In Basket pool, [facility] home infusion ([id number])  CSN Number:  [id number]

## 2021-02-01 DIAGNOSIS — C09.9 TONSILLAR CANCER (H): Primary | ICD-10-CM

## 2021-02-01 DIAGNOSIS — Z11.59 ENCOUNTER FOR SCREENING FOR OTHER VIRAL DISEASES: Primary | ICD-10-CM

## 2021-02-01 RX ORDER — FLUMAZENIL 0.1 MG/ML
0.2 INJECTION, SOLUTION INTRAVENOUS
Status: CANCELLED | OUTPATIENT
Start: 2021-02-01

## 2021-02-01 NOTE — PROGRESS NOTES
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  He initially was offered chemoradiation, but due to a prior history of L sided hearing loss, he was given low dose weekly carboplatin 1.5 AUC weekly with radiation.  His 3 month PET/CT after initiation was negative and he underwent surveillance after that.  He had a tough time with chemoradiation - he tolerated it with expected toxicity, however, there was a prolonged recovery and during that time, he got a divorce and he really feels that mentally, he was not right during the treatment and that it led to some poor decisions on his own part.  He relocated to [place] which is where he is from in Feb 2020 and has been followed since by Dr. Treviño and Dr. Flores at [facility].  In August of 2020, he  developed numbenss in chin and then moved up to his forehead.  He had a Pet/CT done in Sept 2020 that showed a hypermetabolic area near his L foramen ovale - he had had recent dental work in that area and it was thought that this might be scar or inflammation related to that procedure.  However, he was offered a 2nd opinion and came to [facility] and saw Dr. Russell in Nov 2020.  Images were reviewed at tumor boardon 12/4/20 - and it was felt that this was concerning enough to merit biopsy - so he was referred for IR-guided biopsy.   IR guided biopsy happened on 1/8/2021 and came back as invasive squamous cell carcinoma.      Started TPF chemotherapy on 1/28/21.     Interval history: Fortino is coming in for evaluation of a Left buttock/upper posterior thigh abscess. He first noticed it about a month ago. Was not bothersome until it started to become a little tender a few days ago. No fevers/chills. There has been no drainage.     Feeling okay so far on TPF. Had neulasta  on-pro placed today. Felt really good on steroids and then crashed for a few days. Energy is slowly coming back.     Facial droop is the same. He is drinking more smoothies as eating is becoming more difficult from numbness and mouth droop on L. Swallowing is 3/10 for difficulty. He is having a rare intermittent cough with swallowing.     Current Outpatient Medications   Medication Sig Dispense Refill     Aspirin Buf,CaCarb-MgCarb-MgO, 81 MG TABS Take 81 mg by mouth       atorvastatin (LIPITOR) 10 MG tablet Take 10 mg by mouth       carvedilol (COREG) 12.5 MG tablet TAKE TWO TABLETS BY MOUTH EVERY MORNING AND ONE TABLET EVERY EVENING       cevimeline (EVOXAC) 30 MG capsule TAKE ONE CAPSULE BY MOUTH THREE TIMES DAILY       cholecalciferol 25 MCG (1000 UT) TABS Take 1,000 Units by mouth       dexamethasone (DECADRON) 4 MG tablet Take 2 tablets (8 mg) by mouth 2 times daily (with meals) Start the evening prior to Docetaxel, continue morning of Docetaxel, and continue for 4 additional doses. 12 tablet 3     flecainide (TAMBOCOR) 150 MG tablet Take 150 mg by mouth every 12 hours as needed       ibuprofen (ADVIL/MOTRIN) 600 MG tablet TAKE 1 TABLET BY MOUTH THREE TIMES DAILY AS NEEDED FOR PAIN       levofloxacin (LEVAQUIN) 250 MG tablet Take 1 tablet (250 mg) by mouth daily for 10 days 10 tablet 0     lisinopril (ZESTRIL) 20 MG tablet Take 20 mg by mouth       LORazepam (ATIVAN) 0.5 MG tablet Take 1 tablet (0.5 mg) by mouth every 4 hours as needed (Anxiety, Nausea/Vomiting or Sleep) 30 tablet 3     MULTIPLE VITAMIN PO Take 1 tablet by mouth       prochlorperazine (COMPAZINE) 10 MG tablet Take 1 tablet (10 mg) by mouth every 6 hours as needed (Nausea/Vomiting) 30 tablet 3     sildenafil (VIAGRA) 100 MG tablet Take  mg by mouth       traZODone (DESYREL) 50 MG tablet TAKE 1 TO 2 TABLETS BY MOUTH ONCE DAILY AT BEDTIME       magic mouthwash (ENTER INGREDIENTS IN COMMENTS) suspension Swish and spit every 4 hours as needed  "(Patient not taking: Reported on 2/2/2021) 240 mL 0      No Known Allergies    Exam:    Blood pressure 133/89, pulse 101, temperature 98.7  F (37.1  C), temperature source Oral, resp. rate 16, height 1.778 m (5' 10\"), SpO2 96 %.  Wt Readings from Last 4 Encounters:   01/28/21 83.5 kg (184 lb)   01/20/21 82.1 kg (181 lb)   01/08/21 81.6 kg (180 lb)   11/25/20 83.9 kg (185 lb)     General: No acute distress  HEENT: Sclera anicteric. Oral mucosa pink and moist.  No mucositis or thrush. Complete left sided facial paralysis noted. Left eye with increased tears and some marked erythema with significant L inferior eyelid drooping.   Skin: Violaceous boil with central fluctuance without warmth or extending erythema or induration about quarter sized. No expressible drainage.         Labs:    2/2/2021 15:10   WBC 4.3   Hemoglobin 14.5   Hematocrit 41.5   Platelet Count 150   RBC Count 4.49   MCV 92   MCH 32.3   MCHC 34.9   RDW 11.8   Diff Method Automated Method   % Neutrophils 86.7   % Lymphocytes 4.7   % Monocytes 1.6   % Eosinophils 1.4   % Basophils 0.7   % Immature Granulocytes 4.9   Nucleated RBCs 0   Absolute Neutrophil 3.7   Absolute Lymphocytes 0.2 (L)   Absolute Monocytes 0.1   Absolute Eosinophils 0.1   Absolute Basophils 0.0   Abs Immature Granulocytes 0.2   Absolute Nucleated RBC 0.0       Impression/plan:     Inflamed cyst: No concerns for systemic illness or spreading skin infection but with his upcoming pancytopenia with TPF will start him on Augmentin BID x 7 days and have him use warm compresses a couple of times a day to see if this will open. Call if develops fevers, worsening tenderness, or spreading erythema. Will put referral in for Derm Surgery for an excision in the future. May need a steroid injection to calm down inflammation. He can hold Levaquin while on Augmentin. Then start levaquin 250 mg for 3 days to complete 10 day course total.     CBC okay today. Okay for port placement tomorrow.     Left " CNIV paralysis: He is having more difficulty with eating and swallowing. Will set up a swallow study to evaluate for aspiration.     Unless there are interval concerns, he will follow-up with Diana King on Friday.     22 minutes spent on the date of the encounter doing chart review, review of test results, patient visit, documentation and discussion with other provider(s)       Nia Bautista PA-C

## 2021-02-02 ENCOUNTER — INFUSION THERAPY VISIT (OUTPATIENT)
Dept: ONCOLOGY | Facility: CLINIC | Age: 71
End: 2021-02-02
Attending: INTERNAL MEDICINE
Payer: MEDICARE

## 2021-02-02 ENCOUNTER — TELEPHONE (OUTPATIENT)
Dept: INTERVENTIONAL RADIOLOGY/VASCULAR | Facility: CLINIC | Age: 71
End: 2021-02-02

## 2021-02-02 ENCOUNTER — ONCOLOGY VISIT (OUTPATIENT)
Dept: ONCOLOGY | Facility: CLINIC | Age: 71
End: 2021-02-02
Attending: PHYSICIAN ASSISTANT
Payer: MEDICARE

## 2021-02-02 VITALS
RESPIRATION RATE: 16 BRPM | TEMPERATURE: 98.7 F | OXYGEN SATURATION: 96 % | BODY MASS INDEX: 26.4 KG/M2 | HEART RATE: 101 BPM | SYSTOLIC BLOOD PRESSURE: 133 MMHG | DIASTOLIC BLOOD PRESSURE: 89 MMHG | HEIGHT: 70 IN

## 2021-02-02 VITALS
SYSTOLIC BLOOD PRESSURE: 133 MMHG | TEMPERATURE: 98.7 F | OXYGEN SATURATION: 96 % | HEART RATE: 101 BPM | RESPIRATION RATE: 16 BRPM | DIASTOLIC BLOOD PRESSURE: 89 MMHG

## 2021-02-02 DIAGNOSIS — C09.9 SQUAMOUS CELL CARCINOMA OF TONSIL (H): ICD-10-CM

## 2021-02-02 DIAGNOSIS — C09.9 TONSILLAR CANCER (H): ICD-10-CM

## 2021-02-02 DIAGNOSIS — L72.3 SEBACEOUS CYST: ICD-10-CM

## 2021-02-02 DIAGNOSIS — G50.9: ICD-10-CM

## 2021-02-02 DIAGNOSIS — L02.31 LEFT BUTTOCK ABSCESS: Primary | ICD-10-CM

## 2021-02-02 DIAGNOSIS — Z51.89 ENCOUNTER FOR OTHER SPECIFIED AFTERCARE: Primary | ICD-10-CM

## 2021-02-02 LAB
BASOPHILS # BLD AUTO: 0 10E9/L (ref 0–0.2)
BASOPHILS NFR BLD AUTO: 0.7 %
DIFFERENTIAL METHOD BLD: ABNORMAL
EOSINOPHIL # BLD AUTO: 0.1 10E9/L (ref 0–0.7)
EOSINOPHIL NFR BLD AUTO: 1.4 %
ERYTHROCYTE [DISTWIDTH] IN BLOOD BY AUTOMATED COUNT: 11.8 % (ref 10–15)
HCT VFR BLD AUTO: 41.5 % (ref 40–53)
HGB BLD-MCNC: 14.5 G/DL (ref 13.3–17.7)
IMM GRANULOCYTES # BLD: 0.2 10E9/L (ref 0–0.4)
IMM GRANULOCYTES NFR BLD: 4.9 %
LYMPHOCYTES # BLD AUTO: 0.2 10E9/L (ref 0.8–5.3)
LYMPHOCYTES NFR BLD AUTO: 4.7 %
MCH RBC QN AUTO: 32.3 PG (ref 26.5–33)
MCHC RBC AUTO-ENTMCNC: 34.9 G/DL (ref 31.5–36.5)
MCV RBC AUTO: 92 FL (ref 78–100)
MONOCYTES # BLD AUTO: 0.1 10E9/L (ref 0–1.3)
MONOCYTES NFR BLD AUTO: 1.6 %
NEUTROPHILS # BLD AUTO: 3.7 10E9/L (ref 1.6–8.3)
NEUTROPHILS NFR BLD AUTO: 86.7 %
NRBC # BLD AUTO: 0 10*3/UL
NRBC BLD AUTO-RTO: 0 /100
PLATELET # BLD AUTO: 150 10E9/L (ref 150–450)
RBC # BLD AUTO: 4.49 10E12/L (ref 4.4–5.9)
WBC # BLD AUTO: 4.3 10E9/L (ref 4–11)

## 2021-02-02 PROCEDURE — G0463 HOSPITAL OUTPT CLINIC VISIT: HCPCS | Mod: 25

## 2021-02-02 PROCEDURE — G0463 HOSPITAL OUTPT CLINIC VISIT: HCPCS

## 2021-02-02 PROCEDURE — 85025 COMPLETE CBC W/AUTO DIFF WBC: CPT | Performed by: INTERNAL MEDICINE

## 2021-02-02 PROCEDURE — 250N000011 HC RX IP 250 OP 636: Performed by: INTERNAL MEDICINE

## 2021-02-02 PROCEDURE — 99214 OFFICE O/P EST MOD 30 MIN: CPT | Performed by: PHYSICIAN ASSISTANT

## 2021-02-02 PROCEDURE — 96372 THER/PROPH/DIAG INJ SC/IM: CPT | Performed by: INTERNAL MEDICINE

## 2021-02-02 PROCEDURE — 96377 APPLICATON ON-BODY INJECTOR: CPT | Performed by: INTERNAL MEDICINE

## 2021-02-02 RX ADMIN — PEGFILGRASTIM 6 MG: KIT SUBCUTANEOUS at 15:21

## 2021-02-02 ASSESSMENT — PAIN SCALES - GENERAL
PAINLEVEL: MILD PAIN (3)
PAINLEVEL: MILD PAIN (3)

## 2021-02-02 NOTE — TELEPHONE ENCOUNTER
Pt was called for pre-procedure/covid. Pt had covid 12/24/20 and is within the 90 day of no retest.  Pt had a message left stating to arrive at 0630 for a 9 am port placement.  Pt is to be NPO 4 hrs prior to arrival and to have a ride lined up to either drop off and  or they can now stay with pt till done.  If any questions please call [phone].

## 2021-02-02 NOTE — PATIENT INSTRUCTIONS
We applied the Neulasta Onpro On-body injector today at about 3:30pm. The injection will start 27 hours after application, at 6:30pm tomorrow. The Neulasta Onpro On-body injection takes 45 minutes once it begins, and should have a green flashing light throughout. If it flashes red, call triage or the on-call number on your discharge paperwork. The injection will be done at about 7:30pm tomorrow. You can then remove the device and dispose of it. Keep electronics 4 inches away from Onpro injector. Avoid showering 4 hours prior to injection. Refer to the written instructions given to you if you have any questions or call triage/on-call number.    Contact Numbers    [facility] [place]: [phone]  [facility] [place] and after hours / weekends / holidays: [phone]      Please call the triage or after hours line if you experience a temperature greater than or equal to 100.4, shaking chills, have uncontrolled nausea, vomiting and/or diarrhea, dizziness, shortness of breath, chest pain, bleeding, unexplained bruising, or if you have any other new/concerning symptoms, questions or concerns.      If you are having any concerning symptoms or wish to speak to a provider before your next infusion visit, please call your care coordinator or triage to notify them so we can adequately serve you.     If you need a refill on a narcotic prescription or other medication, please call before your infusion appointment.       February 2021 Sunday Monday Tuesday Wednesday Thursday Friday Saturday        1     2    P ONC INFUSION 60   2:30 PM   (60 min.)   [facility] ON[facility]OGY INFUSION   [facility]    [facility] RETURN   3:15 PM   (50 min.)   Nia Bautista PA-C   [facility] 3    IR CHEST PORT PLACEMENT >5 YRS   8:00 AM   (90 min.)   SHIR2   [facility] Interventional Radiology 4    LAB CENTRAL   8:30 AM   (15 min.)   [facility] FAST TRACK LAB   [facility]  5    TELEPHONE VISIT NEW  10:00 AM   (60 min.)   Ramila Pennington RD   [facility]    VIDEO VISIT RETURN  11:25 AM   (50 min.)   Diana King CNP   [facility] 6       7     8     9     10    LAB CENTRAL   2:15 PM   (15 min.)    FAST TRACK LAB   [facility] 11     12    INSERTION, VASCULAR ACCESS PORT  12:50 PM   Marco Lipscomb MD   [facility] OR    VIDEO VISIT RETURN   1:05 PM   (50 min.)   Diana King CNP   [facility] 13       14     15     16    LAB CENTRAL   1:15 PM   (15 min.)    FAST TRACK LAB   [facility] 17    VIDEO VISIT RETURN  12:55 PM   (50 min.)   Diana King CNP   [facility] 18    LEVEL 4   9:00 AM   (240 min.)    INFUSION CHAIR 14   [facility] 19     20  Happy Birthday!    LEVEL 1  11:00 AM   (60 min.)    INFUSION CHAIR 8   [facility]   21     22     23     24     25     26     27       28                                               March 2021 Sunday Monday Tuesday Wednesday Thursday Friday Saturday        1     2     3     4     5     6       7     8     9     10     11     12     13       14     15     16     17     18     19     20       21     22     23     24     25     26     27       28     29     30     31                                 No results found for this or any previous visit (from the past 24 hour(s)).

## 2021-02-02 NOTE — NURSING NOTE
"Oncology Rooming Note    February 2, 2021 3:38 PM   Fortino Moya is a 70 year old male who presents for:    Chief Complaint   Patient presents with     Oncology Clinic Visit     TONSILLAR CANCER      Initial Vitals: /89   Pulse 101   Temp 98.7  F (37.1  C) (Oral)   Resp 16   Ht 1.778 m (5' 10\")   SpO2 96%   BMI 26.40 kg/m   Estimated body mass index is 26.4 kg/m  as calculated from the following:    Height as of this encounter: 1.778 m (5' 10\").    Weight as of 1/28/21: 83.5 kg (184 lb). Body surface area is 2.03 meters squared.  Mild Pain (3) Comment: Data Unavailable   No LMP for male patient.  Allergies reviewed: Yes  Medications reviewed: Yes    Medications: Medication refills not needed today.  Pharmacy name entered into [employer]:    [id number] - [place], [place] - [id number] [place] [address] PHARMAC[address] [place] [address]    Clinical concerns: Patient would like you to look at his abscess on his left buttox. He noticed it about a month ago however it has been getting worse in the last week.   Nia Bautista was notified.      Guillermina Obrien            "

## 2021-02-02 NOTE — PROGRESS NOTES
FMLA forms filled out and put in providers folder for review and signature.      Lamar Rodriguez CMA ([facility])

## 2021-02-02 NOTE — LETTER
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  He initially was offered chemoradiation, but due to a prior history of L sided hearing loss, he was given low dose weekly carboplatin 1.5 AUC weekly with radiation.  His 3 month PET/CT after initiation was negative and he underwent surveillance after that.  He had a tough time with chemoradiation - he tolerated it with expected toxicity, however, there was a prolonged recovery and during that time, he got a divorce and he really feels that mentally, he was not right during the treatment and that it led to some poor decisions on his own part.  He relocated to [place] which is where he is from in Feb 2020 and has been followed since by Dr. Treviño and Dr. Flores at [facility].  In August of 2020, he  developed numbenss in chin and then moved up to his forehead.  He had a Pet/CT done in Sept 2020 that showed a hypermetabolic area near his L foramen ovale - he had had recent dental work in that area and it was thought that this might be scar or inflammation related to that procedure.  However, he was offered a 2nd opinion and came to [facility] and saw Dr. Russell in Nov 2020.  Images were reviewed at tumor boardon 12/4/20 - and it was felt that this was concerning enough to merit biopsy - so he was referred for IR-guided biopsy.   IR guided biopsy happened on 1/8/2021 and came back as invasive squamous cell carcinoma.      Started TPF chemotherapy on 1/28/21.     Interval history: Fortino is coming in for evaluation of a Left buttock/upper posterior thigh abscess. He first noticed it about a month ago. Was not bothersome until it started to become a little tender a few days ago. No  fevers/chills. There has been no drainage.     Feeling okay so far on TPF. Had neulasta on-pro placed today. Felt really good on steroids and then crashed for a few days. Energy is slowly coming back.     Facial droop is the same. He is drinking more smoothies as eating is becoming more difficult from numbness and mouth droop on L. Swallowing is 3/10 for difficulty. He is having a rare intermittent cough with swallowing.     Current Outpatient Medications   Medication Sig Dispense Refill     Aspirin Buf,CaCarb-MgCarb-MgO, 81 MG TABS Take 81 mg by mouth       atorvastatin (LIPITOR) 10 MG tablet Take 10 mg by mouth       carvedilol (COREG) 12.5 MG tablet TAKE TWO TABLETS BY MOUTH EVERY MORNING AND ONE TABLET EVERY EVENING       cevimeline (EVOXAC) 30 MG capsule TAKE ONE CAPSULE BY MOUTH THREE TIMES DAILY       cholecalciferol 25 MCG (1000 UT) TABS Take 1,000 Units by mouth       dexamethasone (DECADRON) 4 MG tablet Take 2 tablets (8 mg) by mouth 2 times daily (with meals) Start the evening prior to Docetaxel, continue morning of Docetaxel, and continue for 4 additional doses. 12 tablet 3     flecainide (TAMBOCOR) 150 MG tablet Take 150 mg by mouth every 12 hours as needed       ibuprofen (ADVIL/MOTRIN) 600 MG tablet TAKE 1 TABLET BY MOUTH THREE TIMES DAILY AS NEEDED FOR PAIN       levofloxacin (LEVAQUIN) 250 MG tablet Take 1 tablet (250 mg) by mouth daily for 10 days 10 tablet 0     lisinopril (ZESTRIL) 20 MG tablet Take 20 mg by mouth       LORazepam (ATIVAN) 0.5 MG tablet Take 1 tablet (0.5 mg) by mouth every 4 hours as needed (Anxiety, Nausea/Vomiting or Sleep) 30 tablet 3     MULTIPLE VITAMIN PO Take 1 tablet by mouth       prochlorperazine (COMPAZINE) 10 MG tablet Take 1 tablet (10 mg) by mouth every 6 hours as needed (Nausea/Vomiting) 30 tablet 3     sildenafil (VIAGRA) 100 MG tablet Take  mg by mouth       traZODone (DESYREL) 50 MG tablet TAKE 1 TO 2 TABLETS BY MOUTH ONCE DAILY AT BEDTIME       magic  "mouthwash (ENTER INGREDIENTS IN COMMENTS) suspension Swish and spit every 4 hours as needed (Patient not taking: Reported on 2/2/2021) 240 mL 0      No Known Allergies    Exam:    Blood pressure 133/89, pulse 101, temperature 98.7  F (37.1  C), temperature source Oral, resp. rate 16, height 1.778 m (5' 10\"), SpO2 96 %.  Wt Readings from Last 4 Encounters:   01/28/21 83.5 kg (184 lb)   01/20/21 82.1 kg (181 lb)   01/08/21 81.6 kg (180 lb)   11/25/20 83.9 kg (185 lb)     General: No acute distress  HEENT: Sclera anicteric. Oral mucosa pink and moist.  No mucositis or thrush. Complete left sided facial paralysis noted. Left eye with increased tears and some marked erythema with significant L inferior eyelid drooping.   Skin: Violaceous boil with central fluctuance without warmth or extending erythema or induration about quarter sized. No expressible drainage.         Labs:    2/2/2021 15:10   WBC 4.3   Hemoglobin 14.5   Hematocrit 41.5   Platelet Count 150   RBC Count 4.49   MCV 92   MCH 32.3   MCHC 34.9   RDW 11.8   Diff Method Automated Method   % Neutrophils 86.7   % Lymphocytes 4.7   % Monocytes 1.6   % Eosinophils 1.4   % Basophils 0.7   % Immature Granulocytes 4.9   Nucleated RBCs 0   Absolute Neutrophil 3.7   Absolute Lymphocytes 0.2 (L)   Absolute Monocytes 0.1   Absolute Eosinophils 0.1   Absolute Basophils 0.0   Abs Immature Granulocytes 0.2   Absolute Nucleated RBC 0.0       Impression/plan:     Inflamed cyst: No concerns for systemic illness or spreading skin infection but with his upcoming pancytopenia with TPF will start him on Augmentin BID x 7 days and have him use warm compresses a couple of times a day to see if this will open. Call if develops fevers, worsening tenderness, or spreading erythema. Will put referral in for Derm Surgery for an excision in the future. May need a steroid injection to calm down inflammation. He can hold Levaquin while on Augmentin. Then start levaquin 250 mg for 3 days to " complete 10 day course total.     CBC okay today. Okay for port placement tomorrow.     Left CNIV paralysis: He is having more difficulty with eating and swallowing. Will set up a swallow study to evaluate for aspiration.     Unless there are interval concerns, he will follow-up with Diana King on Friday.     22 minutes spent on the date of the encounter doing chart review, review of test results, patient visit, documentation and discussion with other provider(s)       MYRNA Wong PA-C

## 2021-02-03 ENCOUNTER — HOSPITAL ENCOUNTER (OUTPATIENT)
Facility: CLINIC | Age: 71
Discharge: HOME | End: 2021-02-03
Attending: RADIOLOGY | Admitting: RADIOLOGY
Payer: MEDICARE

## 2021-02-03 ENCOUNTER — APPOINTMENT (OUTPATIENT)
Dept: INTERVENTIONAL RADIOLOGY/VASCULAR | Facility: CLINIC | Age: 71
End: 2021-02-03
Attending: NURSE PRACTITIONER
Payer: MEDICARE

## 2021-02-03 VITALS
HEART RATE: 79 BPM | DIASTOLIC BLOOD PRESSURE: 80 MMHG | OXYGEN SATURATION: 95 % | TEMPERATURE: 97.9 F | RESPIRATION RATE: 16 BRPM | SYSTOLIC BLOOD PRESSURE: 127 MMHG

## 2021-02-03 DIAGNOSIS — C09.9 SQUAMOUS CELL CARCINOMA OF TONSIL (H): ICD-10-CM

## 2021-02-03 PROCEDURE — 36561 INSERT TUNNELED CV CATH: CPT

## 2021-02-03 PROCEDURE — 999N000163 HC STATISTIC SIMPLE TUBE INSERTION/CHARGE, PORT, CATH, FISTULOGRAM

## 2021-02-03 PROCEDURE — 250N000011 HC RX IP 250 OP 636: Performed by: PHYSICIAN ASSISTANT

## 2021-02-03 PROCEDURE — 250N000011 HC RX IP 250 OP 636: Performed by: RADIOLOGY

## 2021-02-03 PROCEDURE — C1769 GUIDE WIRE: HCPCS

## 2021-02-03 PROCEDURE — 272N000196 HC ACCESSORY CR5

## 2021-02-03 PROCEDURE — 250N000009 HC RX 250: Performed by: PHYSICIAN ASSISTANT

## 2021-02-03 PROCEDURE — 99152 MOD SED SAME PHYS/QHP 5/>YRS: CPT

## 2021-02-03 PROCEDURE — 99153 MOD SED SAME PHYS/QHP EA: CPT

## 2021-02-03 PROCEDURE — 272N000602 HC WOUND GLUE CR1

## 2021-02-03 PROCEDURE — C1788 PORT, INDWELLING, IMP: HCPCS

## 2021-02-03 RX ORDER — DEXTROSE MONOHYDRATE 25 G/50ML
25-50 INJECTION, SOLUTION INTRAVENOUS
Status: DISCONTINUED | OUTPATIENT
Start: 2021-02-03 | End: 2021-02-03

## 2021-02-03 RX ORDER — LIDOCAINE 40 MG/G
CREAM TOPICAL
Status: DISCONTINUED | OUTPATIENT
Start: 2021-02-03 | End: 2021-02-03 | Stop reason: HOSPADM

## 2021-02-03 RX ORDER — HEPARIN SODIUM (PORCINE) LOCK FLUSH IV SOLN 100 UNIT/ML 100 UNIT/ML
500 SOLUTION INTRAVENOUS ONCE
Status: COMPLETED | OUTPATIENT
Start: 2021-02-03 | End: 2021-02-03

## 2021-02-03 RX ORDER — NICOTINE POLACRILEX 4 MG
15-30 LOZENGE BUCCAL
Status: DISCONTINUED | OUTPATIENT
Start: 2021-02-03 | End: 2021-02-03 | Stop reason: HOSPADM

## 2021-02-03 RX ORDER — NICOTINE POLACRILEX 4 MG
15-30 LOZENGE BUCCAL
Status: DISCONTINUED | OUTPATIENT
Start: 2021-02-03 | End: 2021-02-03

## 2021-02-03 RX ORDER — FENTANYL CITRATE 50 UG/ML
25-50 INJECTION, SOLUTION INTRAMUSCULAR; INTRAVENOUS EVERY 5 MIN PRN
Status: DISCONTINUED | OUTPATIENT
Start: 2021-02-03 | End: 2021-02-03 | Stop reason: HOSPADM

## 2021-02-03 RX ORDER — NALOXONE HYDROCHLORIDE 0.4 MG/ML
0.4 INJECTION, SOLUTION INTRAMUSCULAR; INTRAVENOUS; SUBCUTANEOUS
Status: DISCONTINUED | OUTPATIENT
Start: 2021-02-03 | End: 2021-02-03 | Stop reason: HOSPADM

## 2021-02-03 RX ORDER — CEFAZOLIN SODIUM 2 G/100ML
2 INJECTION, SOLUTION INTRAVENOUS
Status: COMPLETED | OUTPATIENT
Start: 2021-02-03 | End: 2021-02-03

## 2021-02-03 RX ORDER — DEXTROSE MONOHYDRATE 25 G/50ML
25-50 INJECTION, SOLUTION INTRAVENOUS
Status: DISCONTINUED | OUTPATIENT
Start: 2021-02-03 | End: 2021-02-03 | Stop reason: HOSPADM

## 2021-02-03 RX ORDER — NALOXONE HYDROCHLORIDE 0.4 MG/ML
0.2 INJECTION, SOLUTION INTRAMUSCULAR; INTRAVENOUS; SUBCUTANEOUS
Status: DISCONTINUED | OUTPATIENT
Start: 2021-02-03 | End: 2021-02-03 | Stop reason: HOSPADM

## 2021-02-03 RX ADMIN — HEPARIN SODIUM (PORCINE) LOCK FLUSH IV SOLN 100 UNIT/ML 500 UNITS: 100 SOLUTION at 10:01

## 2021-02-03 RX ADMIN — CEFAZOLIN SODIUM 2 G: 2 INJECTION, SOLUTION INTRAVENOUS at 09:19

## 2021-02-03 RX ADMIN — FENTANYL CITRATE 50 MCG: 50 INJECTION, SOLUTION INTRAMUSCULAR; INTRAVENOUS at 09:53

## 2021-02-03 RX ADMIN — FENTANYL CITRATE 50 MCG: 50 INJECTION, SOLUTION INTRAMUSCULAR; INTRAVENOUS at 09:47

## 2021-02-03 RX ADMIN — LIDOCAINE HYDROCHLORIDE 20 ML: 10 INJECTION, SOLUTION INFILTRATION; PERINEURAL at 10:00

## 2021-02-03 NOTE — PROGRESS NOTES
Fortino is a 70 year old who is being evaluated via a billable video visit.      How would you like to obtain your AVS? MyChart  If the video visit is dropped, the invitation should be resent by: Send to e-mail at: [email or web address]  Will anyone else be joining your video visit? No        Video-Visit Details    Type of service:  Video Visit    Video Start Time: 11:45 AM    Video End Time:12:38 PM    Originating Location (pt. 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  He initially was offered chemoradiation, but due to a prior history of L sided hearing loss, he was given low dose weekly carboplatin 1.5 AUC weekly with radiation.  His 3 month PET/CT after initiation was negative and he underwent surveillance after that.  He had a tough time with chemoradiation - he tolerated it with expected toxicity, however, there was a prolonged recovery and during that time, he got a divorce and he really feels that mentally, he was not right during the treatment and that it led to some poor decisions on his own part.  He relocated to [place] which is where he is from in Feb 2020 and has been followed since by Dr. Treviño and Dr. Flores at [facility].  In August of 2020, he  developed numbenss in chin and then moved up to his forehead.  He had a Pet/CT done in Sept 2020 that showed a hypermetabolic area near his L foramen ovale - he had had recent dental work in that area and it was thought that this might be scar or inflammation related to that procedure.  However, he was offered a 2nd opinion and came to [facility] and saw Dr. Russell in Nov 2020.   Images were reviewed at tumor boardon 12/4/20 - and it was felt that this was concerning enough to merit biopsy - so he was referred for IR-guided biopsy.   IR guided biopsy happened on 1/8/2021 and came back as invasive squamous cell carcinoma.      I am seeing him one week after initiation of TPF chemotherapy for routine follow up    Interval history:   The mucositis and pain in throat started about 4-5 days ago, so much so that he is having trouble eating. His home scale says he is down 10 pounds but he does not think this is accurate. He is able to make high calorie protein shakes and drink them. He met with dietician michelle prior to my visit. He has not been doing the salt and soda swishes much. The MMW helps a bit for the discomfort. He has some difficulty with swallowing, in that it is hard to pass some things. He denies coughing after. Confirms the main thing limiting PO solid intake is discomfort. No nausea or vomiting. No shortness of breath. Energy level actually remains good. No fever or chills. The cyst we looked at earlier this week is no worse. Still struggling with secretions/eye watering on left side of face. Port placement went fine. He is happy to have the PICC out. No reports of change in hearing or neuropathy    10 point review of systems otherwise negative    No current outpatient medications on file.      No Known Allergies    Exam:    There were no vitals taken for this visit.  Wt Readings from Last 4 Encounters:   01/28/21 83.5 kg (184 lb)   01/20/21 82.1 kg (181 lb)   01/08/21 81.6 kg (180 lb)   11/25/20 83.9 kg (185 lb)     Video physical exam  General: Patient appears well in no acute distress. L facial droop, pre-existing  Skin: No visualized rash or lesions on visualized skin  Eyes: EOMI, no erythema, sclera icterus or discharge noted  Resp: Appears to be breathing comfortably without accessory muscle usage, speaking in full sentences, no cough  MSK: Appears to have normal range of  motion based on visualized movements  Neurologic: No apparent tremors, facial movements symmetric  Psych: affect good, alert and oriented    The rest of a comprehensive physical examination is deferred due to PHE (public health emergency) video restrictions      Labs:    2/4/2021 08:37   Sodium 134   Potassium 3.9   Chloride 101   Carbon Dioxide 30   Urea Nitrogen 20   Creatinine 0.74   GFR Estimate >90   GFR Estimate If Black >90   Calcium 8.8   Anion Gap 3   Albumin 3.1 (L)   Protein Total 6.3 (L)   Bilirubin Total 0.8   Alkaline Phosphatase 59   ALT 25   AST 15   Glucose 131 (H)   WBC 2.0 (L)   Hemoglobin 14.1   Hematocrit 40.9   Platelet Count 134 (L)   RBC Count 4.46   MCV 92   MCH 31.6   MCHC 34.5   RDW 11.9   Diff Method Manual Differential   % Neutrophils 73.0   % Lymphocytes 18.0   % Monocytes 6.0   % Eosinophils 2.0   % Basophils 1.0   Absolute Neutrophil 1.5 (L)   Absolute Lymphocytes 0.4 (L)   Absolute Monocytes 0.1   Absolute Eosinophils 0.0   Absolute Basophils 0.0   RBC Morphology Consistent with reported results   Platelet Estimate Automated count confirmed.  Platelet morphology is normal.       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 Dr. Shah previously discussed at tumor board doing SBRT to the recurrent lesion, however, on most recent PET scan, there was concern that it had gotten too large to consider SBRT.  Thus, it was discussed the possibility of using induction chemotherapy with the goal of shrinking it enough that we can consider some form of local therapy.  We did discuss that the goal of chemotherapy in this setting was not curative, but it is meant to make it possible to try curative intent treatment either with chemoradiation or SBRT or (hypothetically, even surgery).  -Began induction with TPF chemotherapy 1/28. Tolerating ex mucositis, see below. No fever/chills.   -IVF this weekend and f/u with me Monday to see how weekend  went     Mucositis: onset 4-5 days ago. Continue MMW and start s/s swishes 4-6x per day. He will give healios a try too.   -oxycodone liquid 5 mg q4 hours prn, time 30 min prior to anticipated PO intake  -tylenol 1000 mg q6 hours prn, recommended he schedule this if tolerated until mucositis improves.     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 Previously discussed with Dr. Shah reviewed MRI results with him. Fortino wonders the chance the paralysis will improve, I again reiterated that while we are hopeful this will be reversible to some degree given its onset just prior to treatment initiation and 16+shanti path increasing likelihood of response, we ultimately do not know the timeline or extent it will improve.   -encouraged him to tape eye shut around the house and at night to avoid irritation. Have previously recommended eye patch too.     Nutrition: challenging with mucositis. Recently met with michelle. Monitor weight    Dysphagia: swallow study scheduled for later this month, will see about moving up.     QT risk: levaquin prophy and flecainide have mod risk of QT prolongation. EKG pre-preatment earlier this month 438. Given levaquin is short term therapy will monitor with EKG prior to cycle 2.    Inflamed cyst: evaluated earlier this week by Nia Bautista and started on Augmentin BID x7 days in anticipation of olaf. No worse today per his report. Derm surgery referral placed for future excision.     70 minutes spent on the date of the encounter doing chart review, interpretation of tests, patient visit, documentation and discussion with other provider(s)

## 2021-02-03 NOTE — DISCHARGE INSTRUCTIONS
Port Insertion Discharge Instructions     After you go home:      Have an adult stay with you for the first 6 hours    You may resume your normal diet       For 24 hours - due to the sedation you received:    Relax and take it easy    Do NOT make any important or legal decisions    Do NOT drive or operate machines at home or at work    Do NOT drink alcohol    Care of Puncture Sites:      Keep the dressings on your sites clean & dry for 3 days. Change it only if it gets wet or dirty.    You may shower after the dressing comes off in 3 days    Do not remove the small white strips of tape, if present. Allow them to fall off on their own.     You may cover the wound with a bandaid after the dressing is removed if needed for comfort      Activity       Avoid heavy lifting (greater than 10 pounds) or the overuse of your shoulder for 3 days    Bleeding:      If you start bleeding from the incision sites in your chest or neck - or have swelling in your neck, sit down and press on the site for 5-10 minutes.     If bleeding has not stopped after 10 minutes, call your provider.        Call 911 right away if you have heavy bleeding or bleeding that does not stop.      Medicines:      You may resume all medications    Resume your Platelet Inhibitors and Aspirin tomorrow at your regular dose    For minor pain, you may take Acetaminophen (Tylenol) or Ibuprofen (Advil)    Call the provider who ordered this procedure if:      You have swelling in your neck or over your port site    The incision area is red, swollen, hot or tender    You have chills or a fever greater than 101 F (38 C)    Any questions or concerns    Call  911 or go to the Emergency Room if you have:      Severe chest pain or trouble breathing    Bleeding that you cannot control    Additional Information:      Your port may be accessed right away.     You will need to have your port flushed every 30 days or after each use.      If you have questions call:           [facility] Radiology Dept @ [phone]      The provider who performed your procedure was _________________.

## 2021-02-03 NOTE — IR NOTE
Interventional Radiology Intra-procedural Nursing Note    Patient Name: Fortino Moya  Medical Record Number: [id number]  Today's Date: February 3, 2021    Start Time: 0950  End of procedure time: 1009  Procedure: Port Placement  Report given to: BHARATHI Montelongo RN  Time pt departs:  1014    Other Notes: Pt came to IR suite 2 on a cart.  Pt was moved over to the table and was placed in supine position.  Pt was draped and prepped with chlorhexidine soap.  Pt was given fentanyl for comfort.      MEDICATIONS: Last Dose 0953    Fentanyl 100 mcg  Lidocaine 20 ml's  Heparin 500 units    Placido Erickson RN

## 2021-02-03 NOTE — PROCEDURES
[facility]    Procedure: Port and catheter placement    Date/Time: 2/3/2021 10:33 AM  Performed by: Reza Gao MD  Authorized by: Reza Gao MD     UNIVERSAL PROTOCOL   Site Marked: NA  Prior Images Obtained and Reviewed:  Yes  Required items: Required blood products, implants, devices and special equipment available    Patient identity confirmed:  Verbally with patient, arm band, provided demographic data and hospital-assigned identification number  Patient was reevaluated immediately before administering moderate or deep sedation or anesthesia  Confirmation Checklist:  Patient's identity using two indicators, relevant allergies, procedure was appropriate and matched the consent or emergent situation and correct equipment/implants were available  Time out: Immediately prior to the procedure a time out was called    Universal Protocol: the Joint Commission Universal Protocol was followed    Preparation: Patient was prepped and draped in usual sterile fashion    ESBL (mL):  7         ANESTHESIA    Anesthesia: Local infiltration  Local Anesthetic:  Lidocaine 1% without epinephrine      SEDATION    Patient Sedated: Yes    Sedation Type:  Moderate (conscious) sedation  Vital signs: Vital signs monitored during sedation    See dictated procedure note for full details.  Findings: Right Internal Jugular port and catheter placement with tip at the RA/SVC junction.  Heparinized and ready for use.  No complications.  Tolerated well.    Specimens: none    Complications: None    Condition: Stable    PROCEDURE   Patient Tolerance:  Patient tolerated the procedure well with no immediate complications    Length of time physician/provider present for 1:1 monitoring during sedation: 25

## 2021-02-03 NOTE — TELEPHONE ENCOUNTER
[facility] paperwork completed, checked for accuracy, signed and faxed to [employer] @ [phone]. A copy was made, sent to scanning and original mailed to patient at home address.    Successful transmission verified in Right Fax.      Lamar Rodriguez CMA

## 2021-02-03 NOTE — PRE-PROCEDURE
GENERAL PRE-PROCEDURE:   Procedure:  Port a catheter placement with intravenous Fentanyl  Date/Time:  2/3/2021 8:50 AM    Written consent obtained?: Yes    Risks and benefits: Risks, benefits and alternatives were discussed    Consent given by:  Patient  Patient states understanding of procedure being performed: Yes    Patient's understanding of procedure matches consent: Yes    Procedure consent matches procedure scheduled: Yes    Expected level of sedation:  Moderate  Appropriately NPO:  Yes  ASA Class:  Class 3- Severe systemic disease, definite functional limitations  Mallampati  :  Grade 4- soft palate obscured by base of tongue (Left face droop )  Lungs:  Lungs clear with good breath sounds bilaterally  Heart:  Normal heart sounds and rate  History & Physical reviewed:  History and physical reviewed and no updates needed  Statement of review:  I have reviewed the lab findings, diagnostic data, medications, and the plan for sedation

## 2021-02-03 NOTE — PROGRESS NOTES
Care Suites Discharge Nursing Note    Patient Information  Name: Fortino Moya  Age: 70 year old    Discharge Education:  Discharge instructions reviewed: Yes  Additional education/resources provided: power port card and booklet  Patient/patient representative verbalizes understanding: Yes  Patient discharging on new medications: No  Medication education completed: N/A    Discharge Plans:   Discharge location: home  Discharge ride contacted: Yes  Approximate discharge time: 1120      Discharge Criteria:  Discharge criteria met and vital signs stable: Yes    Patient Belongs:  Patient belongings returned to patient: Yes    William Ryan RN

## 2021-02-03 NOTE — PROGRESS NOTES
Care Suites Admission Nursing Note    Patient Information  Name: Fortino Moya  Age: 70 year old  Reason for admission: port insertion  Care Suites arrival time: 0800    Visitor Information  Name:   Informed of visitor restrictions: N/A  1 visitor allowed per patient   Visitor must screen negative for COVID symptoms   Visitor must wear a mask  Waiting rooms closed to visitors    Patient Admission/Assessment   Pre-procedure assessment complete: Yes  If abnormal assessment/labs, provider notified: N/A  NPO: Yes  Medications held per instructions/orders: Yes  Consent: obtained  If applicable, pregnancy test status: deferred  Patient oriented to room: Yes  Education/questions answered: Yes  Plan/other: proceed as planned, discharge instructions reviewed    Discharge Planning  Discharge name/phone number: sundeep --  Overnight post sedation caregiver: sister  Discharge location: home    William Ryan RN

## 2021-02-03 NOTE — PROGRESS NOTES
Care Suites Post Procedure Note    Patient Information  Name: Fortino Moya  Age: 70 year old    Post Procedure  Time patient returned to Care Suites: 1015  Concerns/abnormal assessment: none at this time  If abnormal assessment, provider notified: N/A  Plan/Other: post care as ordered pt given applesauce and apple juice christophe Ryan RN

## 2021-02-03 NOTE — PROGRESS NOTES
PATIENT/VISITOR WELLNESS SCREENING    Step 1 Patient Screening    1. In the last month, have you been in contact with someone who was confirmed or suspected to have Coronavirus/COVID-19? No    2. Do you have the following symptoms?  Fever/Chills? No   Cough? No   Shortness of breath? No   New loss of taste or smell? No  Sore throat? No  Muscle or body aches? No  Headaches? No  Fatigue? No  Vomiting or diarrhea? No    Step 2 Visitor Screening    1. Name of Visitor (1 visitor per patient):     2. In the last month, have you been in contact with someone who was confirmed or suspected to have Coronavirus/COVID-19?     3. Do you have the following symptoms?  Fever/Chills?    Cough?    Shortness of breath?    Skin rash?    Loss of taste or smell?   Sore throat?   Runny or stuffy nose?   Muscle or body aches?   Headaches?   Fatigue?   Vomiting or diarrhea?     If the visitor has positive symptoms, notify supervisor/manger  Per policy, the visitor will need to leave the facility     Step 3 Refer to logic grid below for actions    NO SYMPTOM(S)    ACTIONS:  1. Standard rooming process  2. Provider to assess per normal protocol  3. Implement precautions as needed and per guidelines     POSITIVE SYMPTOM(S)  If positive for ANY of the following symptoms: fever, cough, shortness of breath, rash    ACTION:  1. Continue to have the patient wear a mask   2. Room patient as soon as possible  3. Don appropriate PPE when entering room  4. Provider evaluation

## 2021-02-04 ENCOUNTER — HOSPITAL ENCOUNTER (OUTPATIENT)
Facility: CLINIC | Age: 71
Setting detail: SPECIMEN
Discharge: HOME | End: 2021-02-04
Attending: INTERNAL MEDICINE | Admitting: INTERNAL MEDICINE
Payer: MEDICARE

## 2021-02-04 ENCOUNTER — OFFICE VISIT (OUTPATIENT)
Dept: INFUSION THERAPY | Facility: CLINIC | Age: 71
End: 2021-02-04
Attending: INTERNAL MEDICINE
Payer: MEDICARE

## 2021-02-04 DIAGNOSIS — C09.9 SQUAMOUS CELL CARCINOMA OF TONSIL (H): ICD-10-CM

## 2021-02-04 DIAGNOSIS — Z51.89 ENCOUNTER FOR OTHER SPECIFIED AFTERCARE: ICD-10-CM

## 2021-02-04 DIAGNOSIS — C09.9 TONSILLAR CANCER (H): Primary | ICD-10-CM

## 2021-02-04 LAB
ALBUMIN SERPL-MCNC: 3.1 G/DL (ref 3.4–5)
ALP SERPL-CCNC: 59 U/L (ref 40–150)
ALT SERPL W P-5'-P-CCNC: 25 U/L (ref 0–70)
ANION GAP SERPL CALCULATED.3IONS-SCNC: 3 MMOL/L (ref 3–14)
AST SERPL W P-5'-P-CCNC: 15 U/L (ref 0–45)
BASOPHILS # BLD AUTO: 0 10E9/L (ref 0–0.2)
BASOPHILS NFR BLD AUTO: 1 %
BILIRUB SERPL-MCNC: 0.8 MG/DL (ref 0.2–1.3)
BUN SERPL-MCNC: 20 MG/DL (ref 7–30)
CALCIUM SERPL-MCNC: 8.8 MG/DL (ref 8.5–10.1)
CHLORIDE SERPL-SCNC: 101 MMOL/L (ref 94–109)
CO2 SERPL-SCNC: 30 MMOL/L (ref 20–32)
CREAT SERPL-MCNC: 0.74 MG/DL (ref 0.66–1.25)
DIFFERENTIAL METHOD BLD: ABNORMAL
EOSINOPHIL # BLD AUTO: 0 10E9/L (ref 0–0.7)
EOSINOPHIL NFR BLD AUTO: 2 %
ERYTHROCYTE [DISTWIDTH] IN BLOOD BY AUTOMATED COUNT: 11.9 % (ref 10–15)
GFR SERPL CREATININE-BSD FRML MDRD: >90 ML/MIN/{1.73_M2}
GLUCOSE SERPL-MCNC: 131 MG/DL (ref 70–99)
HCT VFR BLD AUTO: 40.9 % (ref 40–53)
HGB BLD-MCNC: 14.1 G/DL (ref 13.3–17.7)
LYMPHOCYTES # BLD AUTO: 0.4 10E9/L (ref 0.8–5.3)
LYMPHOCYTES NFR BLD AUTO: 18 %
MCH RBC QN AUTO: 31.6 PG (ref 26.5–33)
MCHC RBC AUTO-ENTMCNC: 34.5 G/DL (ref 31.5–36.5)
MCV RBC AUTO: 92 FL (ref 78–100)
MONOCYTES # BLD AUTO: 0.1 10E9/L (ref 0–1.3)
MONOCYTES NFR BLD AUTO: 6 %
NEUTROPHILS # BLD AUTO: 1.5 10E9/L (ref 1.6–8.3)
NEUTROPHILS NFR BLD AUTO: 73 %
PLATELET # BLD AUTO: 134 10E9/L (ref 150–450)
PLATELET # BLD EST: ABNORMAL 10*3/UL
POTASSIUM SERPL-SCNC: 3.9 MMOL/L (ref 3.4–5.3)
PROT SERPL-MCNC: 6.3 G/DL (ref 6.8–8.8)
RBC # BLD AUTO: 4.46 10E12/L (ref 4.4–5.9)
RBC MORPH BLD: ABNORMAL
SODIUM SERPL-SCNC: 134 MMOL/L (ref 133–144)
WBC # BLD AUTO: 2 10E9/L (ref 4–11)

## 2021-02-04 PROCEDURE — 36415 COLL VENOUS BLD VENIPUNCTURE: CPT

## 2021-02-04 PROCEDURE — 85025 COMPLETE CBC W/AUTO DIFF WBC: CPT | Performed by: INTERNAL MEDICINE

## 2021-02-04 PROCEDURE — 80053 COMPREHEN METABOLIC PANEL: CPT | Performed by: INTERNAL MEDICINE

## 2021-02-04 RX ORDER — HEPARIN SODIUM (PORCINE) LOCK FLUSH IV SOLN 100 UNIT/ML 100 UNIT/ML
5 SOLUTION INTRAVENOUS
Status: DISCONTINUED | OUTPATIENT
Start: 2021-02-04 | End: 2021-02-04 | Stop reason: HOSPADM

## 2021-02-04 RX ORDER — HEPARIN SODIUM (PORCINE) LOCK FLUSH IV SOLN 100 UNIT/ML 100 UNIT/ML
5 SOLUTION INTRAVENOUS
Status: CANCELLED | OUTPATIENT
Start: 2021-02-04

## 2021-02-04 RX ORDER — HEPARIN SODIUM,PORCINE 10 UNIT/ML
5 VIAL (ML) INTRAVENOUS
Status: CANCELLED | OUTPATIENT
Start: 2021-02-04

## 2021-02-04 NOTE — LETTER
2/4/2021         RE: Fortino Moya  [address]  [facility] [address]        Dear Colleague,    Thank you for referring your patient, Fortino Moya, to the [facility]. Please see a copy of my visit note below.    Nursing Note:  Fortino Moya presents today for lab draw.    Patient seen by provider today: No   present during visit today: Not Applicable.    Note: Patient c/o of mouth sores-using swish and s'sllow-he will call Dr. Shah's office today to report that these sores are limiting his fluid and food intake..    Intravenous Access: Peripheral lab draw today to allow port site to heal. Port site intact/covered with tegaderm and gauze from port placement yesterday.  Lab draw site right AC, Needle type BF, Gauge 21.  Labs drawn without difficulty.    Discharge Plan:   Patient was sent to home.     Jesus Alex RN              Again, thank you for allowing me to participate in the care of your patient.        Sincerely,         [facility]

## 2021-02-04 NOTE — PROGRESS NOTES
Nursing Note:  Fortino Moya presents today for lab draw.    Patient seen by provider today: No   present during visit today: Not Applicable.    Note: Patient c/o of mouth sores-using swish and s'sllow-he will call Dr. Shah's office today to report that these sores are limiting his fluid and food intake..    Intravenous Access: Peripheral lab draw today to allow port site to heal. Port site intact/covered with tegaderm and gauze from port placement yesterday.  Lab draw site right AC, Needle type BF, Gauge 21.  Labs drawn without difficulty.    Discharge Plan:   Patient was sent to home.     Jesus Alex RN

## 2021-02-05 ENCOUNTER — VIRTUAL VISIT (OUTPATIENT)
Dept: ONCOLOGY | Facility: CLINIC | Age: 71
End: 2021-02-05
Attending: INTERNAL MEDICINE
Payer: MEDICARE

## 2021-02-05 ENCOUNTER — VIRTUAL VISIT (OUTPATIENT)
Dept: ONCOLOGY | Facility: CLINIC | Age: 71
End: 2021-02-05
Attending: NURSE PRACTITIONER
Payer: MEDICARE

## 2021-02-05 DIAGNOSIS — C09.9 SQUAMOUS CELL CARCINOMA OF TONSIL (H): Primary | ICD-10-CM

## 2021-02-05 DIAGNOSIS — L02.31 LEFT BUTTOCK ABSCESS: ICD-10-CM

## 2021-02-05 PROCEDURE — 99215 OFFICE O/P EST HI 40 MIN: CPT | Mod: 95 | Performed by: NURSE PRACTITIONER

## 2021-02-05 PROCEDURE — 999N001193 HC VIDEO/TELEPHONE VISIT; NO CHARGE

## 2021-02-05 PROCEDURE — 99417 PROLNG OP E/M EACH 15 MIN: CPT | Mod: 95 | Performed by: NURSE PRACTITIONER

## 2021-02-05 PROCEDURE — 97802 MEDICAL NUTRITION INDIV IN: CPT | Mod: TEL | Performed by: DIETITIAN, REGISTERED

## 2021-02-05 RX ORDER — OXYCODONE HCL 5 MG/5 ML
5 SOLUTION, ORAL ORAL EVERY 4 HOURS PRN
Qty: 500 ML | Refills: 0 | Status: SHIPPED | OUTPATIENT
Start: 2021-02-05 | End: 2021-02-25

## 2021-02-05 NOTE — LETTER
2/5/2021         RE: Fortino Moya  [address]  [facility] [address]        Dear Colleague,    Thank you for referring your patient, Fortino Moya, to the [facility]. Please see a copy of my visit note below.    Fortino is a 70 year old who is being evaluated via a billable video visit.      How would you like to obtain your AVS? MyChart  If the video visit is dropped, the invitation should be resent by: Send to e-mail at: [email or web address]  Will anyone else be joining your video visit? No        Video-Visit Details    Type of service:  Video Visit    Video Start Time: 11:45 AM    Video End Time:12:38 PM    Originating Location (pt. 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  He initially was offered chemoradiation, but due to a prior history of L sided hearing loss, he was given low dose weekly carboplatin 1.5 AUC weekly with radiation.  His 3 month PET/CT after initiation was negative and he underwent surveillance after that.  He had a tough time with chemoradiation - he tolerated it with expected toxicity, however, there was a prolonged recovery and during that time, he got a divorce and he really feels that mentally, he was not right during the treatment and that it led to some poor decisions on his own part.  He relocated to [place] which is where he is from in Feb 2020 and has been followed since by Dr. Treviño and Dr. Flores at [facility].  In August of 2020, he  developed numbenss in chin and then moved up to his forehead.  He had a Pet/CT done in Sept 2020 that showed a hypermetabolic  area near his L foramen ovale - he had had recent dental work in that area and it was thought that this might be scar or inflammation related to that procedure.  However, he was offered a 2nd opinion and came to [facility] and saw Dr. Russell in Nov 2020.  Images were reviewed at tumor boardon 12/4/20 - and it was felt that this was concerning enough to merit biopsy - so he was referred for IR-guided biopsy.   IR guided biopsy happened on 1/8/2021 and came back as invasive squamous cell carcinoma.      I am seeing him one week after initiation of TPF chemotherapy for routine follow up    Interval history:   The mucositis and pain in throat started about 4-5 days ago, so much so that he is having trouble eating. His home scale says he is down 10 pounds but he does not think this is accurate. He is able to make high calorie protein shakes and drink them. He met with dietician michelle prior to my visit. He has not been doing the salt and soda swishes much. The MMW helps a bit for the discomfort. He has some difficulty with swallowing, in that it is hard to pass some things. He denies coughing after. Confirms the main thing limiting PO solid intake is discomfort. No nausea or vomiting. No shortness of breath. Energy level actually remains good. No fever or chills. The cyst we looked at earlier this week is no worse. Still struggling with secretions/eye watering on left side of face. Port placement went fine. He is happy to have the PICC out. No reports of change in hearing or neuropathy    10 point review of systems otherwise negative    No current outpatient medications on file.      No Known Allergies    Exam:    There were no vitals taken for this visit.  Wt Readings from Last 4 Encounters:   01/28/21 83.5 kg (184 lb)   01/20/21 82.1 kg (181 lb)   01/08/21 81.6 kg (180 lb)   11/25/20 83.9 kg (185 lb)     Video physical exam  General: Patient appears well in no acute distress. L facial droop, pre-existing  Skin: No  visualized rash or lesions on visualized skin  Eyes: EOMI, no erythema, sclera icterus or discharge noted  Resp: Appears to be breathing comfortably without accessory muscle usage, speaking in full sentences, no cough  MSK: Appears to have normal range of motion based on visualized movements  Neurologic: No apparent tremors, facial movements symmetric  Psych: affect good, alert and oriented    The rest of a comprehensive physical examination is deferred due to PHE (public health emergency) video restrictions      Labs:    2/4/2021 08:37   Sodium 134   Potassium 3.9   Chloride 101   Carbon Dioxide 30   Urea Nitrogen 20   Creatinine 0.74   GFR Estimate >90   GFR Estimate If Black >90   Calcium 8.8   Anion Gap 3   Albumin 3.1 (L)   Protein Total 6.3 (L)   Bilirubin Total 0.8   Alkaline Phosphatase 59   ALT 25   AST 15   Glucose 131 (H)   WBC 2.0 (L)   Hemoglobin 14.1   Hematocrit 40.9   Platelet Count 134 (L)   RBC Count 4.46   MCV 92   MCH 31.6   MCHC 34.5   RDW 11.9   Diff Method Manual Differential   % Neutrophils 73.0   % Lymphocytes 18.0   % Monocytes 6.0   % Eosinophils 2.0   % Basophils 1.0   Absolute Neutrophil 1.5 (L)   Absolute Lymphocytes 0.4 (L)   Absolute Monocytes 0.1   Absolute Eosinophils 0.0   Absolute Basophils 0.0   RBC Morphology Consistent with reported results   Platelet Estimate Automated count confirmed.  Platelet morphology is normal.       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 Dr. Shah previously discussed at tumor board doing SBRT to the recurrent lesion, however, on most recent PET scan, there was concern that it had gotten too large to consider SBRT.  Thus, it was discussed the possibility of using induction chemotherapy with the goal of shrinking it enough that we can consider some form of local therapy.  We did discuss that the goal of chemotherapy in this setting was not curative, but it is meant to make it possible to try  curative intent treatment either with chemoradiation or SBRT or (hypothetically, even surgery).  -Began induction with TPF chemotherapy 1/28. Tolerating ex mucositis, see below. No fever/chills.   -IVF this weekend and f/u with me Monday to see how weekend went     Mucositis: onset 4-5 days ago. Continue MMW and start s/s swishes 4-6x per day. He will give healios a try too.   -oxycodone liquid 5 mg q4 hours prn, time 30 min prior to anticipated PO intake  -tylenol 1000 mg q6 hours prn, recommended he schedule this if tolerated until mucositis improves.     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 Previously discussed with Dr. Shah reviewed MRI results with him. Fortino wonders the chance the paralysis will improve, I again reiterated that while we are hopeful this will be reversible to some degree given its onset just prior to treatment initiation and 16+shanti path increasing likelihood of response, we ultimately do not know the timeline or extent it will improve.   -encouraged him to tape eye shut around the house and at night to avoid irritation. Have previously recommended eye patch too.     Nutrition: challenging with mucositis. Recently met with michelle. Monitor weight    Dysphagia: swallow study scheduled for later this month, will see about moving up.     QT risk: levaquin prophy and flecainide have mod risk of QT prolongation. EKG pre-preatment earlier this month 438. Given levaquin is short term therapy will monitor with EKG prior to cycle 2.    Inflamed cyst: evaluated earlier this week by Nia Bautista and started on Augmentin BID x7 days in anticipation of olaf. No worse today per his report. Derm surgery referral placed for future excision.     70 minutes spent on the date of the encounter doing chart review, interpretation of tests, patient visit, documentation and discussion with other provider(s)       Again, thank you for allowing me to participate in the care of your patient.         Sincerely,        Diana King, CNP

## 2021-02-05 NOTE — LETTER
"    2/5/2021         RE: Fortino Moya  [address]  [facility] [address]        Dear Colleague,    Thank you for referring your patient, Fortino Moya, to the [facility]. Please see a copy of my visit note below.    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 >2500kcal, >100g protein/day   2. >8 cups non-caffeine containing beverages/day   Future/Additional Recommendations: EN via G tube if PO intake is sustainable     NUTRITION HISTORY  Factors affecting nutrition intake include: mucositis, swallowing difficulties and difficulty chewing  Current diet: pureed, shakes  Current appetite/intake: marie Freitas has been relying mostly on nutrition shakes to meet 100% of his nutrition needs.    -He eats pureed foods such as yogurt, pudding and some soups.    -He has increasing mouth sores which has made drinking shakes more difficult, recently.   -He is concerned about his mouth getting worse and not being able to take his shakes.   -Each shake consists of almost 1700kcal which he takes 2/day.   (Prerna protein powder, 3 scoops at 1650 anastacia, plus whole milk, fruit, ice cream and veggies)  -He drinks water but is also concerned about not getting enough water as swallowing becomes more challenging.    -Per discussion, he appears receptive to a feeding tube if needed.       ANTHROPOMETRICS  Height: 70\"  Weight: 184 lbs/83kg  BMI: 26  Weight Status:  Overweight BMI 25-29.9  IBW: 166 lb (110%)  Weight History: stable between 180-185 lbs  Wt Readings from Last 7 Encounters:   01/28/21 83.5 kg (184 lb)   01/20/21 82.1 kg (181 lb)   01/08/21 81.6 kg (180 lb)   11/25/20 83.9 kg (185 lb)     Dosing Weight: " 88kg    Medications/vitamins/minerals/herbals:   Reviewed    Labs:   Labs reviewed    NUTRITION FOCUSED PHYSICAL ASSESSMENT FOR DIAGNOSING MALNUTRITION:  Not observed  Consult for education only      ASSESSED NUTRITION NEEDS:  Estimated Energy Needs: 0509-3235 kcals (30-35 Kcal/Kg)  Justification: increased needs with cancer treatment  Estimated Protein Needs: 100 grams protein (1.2 g pro/Kg)  Justification: maintenance  Estimated Fluid Needs: 8483-5189  mL   Justification: 1ml/kcal    NUTRITION DIAGNOSIS:  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     INTERVENTIONS  Provided written & verbal education:   - Advised pt to aim for at least >2500kcal and >100g protein daily.   - Reviewed common barriers to eating with cancer treatment.  Discussed ways to cope with swallowing difficulties. Discussed potential need for feeding tube if PO intake becomes more difficult and weight trends downward.  - Encouraged to continue taking high anastacia/high protein mckinley shakes.     Implementation  Collaboration and Referral of Nutrition care with CNP regarding increased difficulty chewing/swallowing.    Goals  1.  Aim for 5-6 small frequent meals  2.  Aim for >2500kcal and 100g protein/day  3. Weight maintenance     Follow-Up Plans: Pt has RD contact information for questions.      MONITORING AND EVALUATION:  -Food/beverage intake  -Weight trends    Ramila Worrell, CHANDU, LD

## 2021-02-05 NOTE — PROGRESS NOTES
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 >2500kcal, >100g protein/day   2. >8 cups non-caffeine containing beverages/day   Future/Additional Recommendations: EN via G tube if PO intake is sustainable     NUTRITION HISTORY  Factors affecting nutrition intake include: mucositis, swallowing difficulties and difficulty chewing  Current diet: pureed, shakes  Current appetite/intake: fair      Fortino has been relying mostly on nutrition shakes to meet 100% of his nutrition needs.    -He eats pureed foods such as yogurt, pudding and some soups.    -He has increasing mouth sores which has made drinking shakes more difficult, recently.   -He is concerned about his mouth getting worse and not being able to take his shakes.   -Each shake consists of almost 1700kcal which he takes 2/day.   (Prerna protein powder, 3 scoops at 1650 anastacia, plus whole milk, fruit, ice cream and veggies)  -He drinks water but is also concerned about not getting enough water as swallowing becomes more challenging.    -Per discussion, he appears receptive to a feeding tube if needed.       ANTHROPOMETRICS  Height: 70\"  Weight: 184 lbs/83kg  BMI: 26  Weight Status:  Overweight BMI 25-29.9  IBW: 166 lb (110%)  Weight History: stable between 180-185 lbs  Wt Readings from Last 7 Encounters:   01/28/21 83.5 kg (184 lb)   01/20/21 82.1 kg (181 lb)   01/08/21 81.6 kg (180 lb)   11/25/20 83.9 kg (185 lb)     Dosing Weight: 88kg    Medications/vitamins/minerals/herbals:   Reviewed    Labs:   Labs reviewed    NUTRITION FOCUSED PHYSICAL ASSESSMENT FOR DIAGNOSING MALNUTRITION:  Not observed  Consult for education only      ASSESSED NUTRITION NEEDS:  Estimated Energy Needs: 8769-5942 kcals " (30-35 Kcal/Kg)  Justification: increased needs with cancer treatment  Estimated Protein Needs: 100 grams protein (1.2 g pro/Kg)  Justification: maintenance  Estimated Fluid Needs: 0098-0882  mL   Justification: 1ml/kcal    NUTRITION DIAGNOSIS:  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     INTERVENTIONS  Provided written & verbal education:   - Advised pt to aim for at least >2500kcal and >100g protein daily.   - Reviewed common barriers to eating with cancer treatment.  Discussed ways to cope with swallowing difficulties. Discussed potential need for feeding tube if PO intake becomes more difficult and weight trends downward.  - Encouraged to continue taking high anastacia/high protein mckinley shakes.     Implementation  Collaboration and Referral of Nutrition care with CNP regarding increased difficulty chewing/swallowing.    Goals  1.  Aim for 5-6 small frequent meals  2.  Aim for >2500kcal and 100g protein/day  3. Weight maintenance     Follow-Up Plans: Pt has RD contact information for questions.      MONITORING AND EVALUATION:  -Food/beverage intake  -Weight trends    Ramila Worrell RD, LD

## 2021-02-06 ENCOUNTER — INFUSION THERAPY VISIT (OUTPATIENT)
Dept: ONCOLOGY | Facility: CLINIC | Age: 71
End: 2021-02-06
Attending: NURSE PRACTITIONER
Payer: MEDICARE

## 2021-02-06 VITALS
BODY MASS INDEX: 25.11 KG/M2 | SYSTOLIC BLOOD PRESSURE: 109 MMHG | DIASTOLIC BLOOD PRESSURE: 68 MMHG | OXYGEN SATURATION: 95 % | WEIGHT: 175 LBS | HEART RATE: 84 BPM | TEMPERATURE: 98.1 F | RESPIRATION RATE: 16 BRPM

## 2021-02-06 DIAGNOSIS — C09.9 TONSILLAR CANCER (H): Primary | ICD-10-CM

## 2021-02-06 DIAGNOSIS — C09.9 SQUAMOUS CELL CARCINOMA OF TONSIL (H): ICD-10-CM

## 2021-02-06 PROCEDURE — 250N000011 HC RX IP 250 OP 636: Performed by: NURSE PRACTITIONER

## 2021-02-06 PROCEDURE — 258N000003 HC RX IP 258 OP 636: Performed by: NURSE PRACTITIONER

## 2021-02-06 PROCEDURE — 96360 HYDRATION IV INFUSION INIT: CPT

## 2021-02-06 RX ORDER — HEPARIN SODIUM (PORCINE) LOCK FLUSH IV SOLN 100 UNIT/ML 100 UNIT/ML
500 SOLUTION INTRAVENOUS ONCE
Status: COMPLETED | OUTPATIENT
Start: 2021-02-06 | End: 2021-02-06

## 2021-02-06 RX ADMIN — Medication 500 UNITS: at 09:35

## 2021-02-06 RX ADMIN — SODIUM CHLORIDE 1000 ML: 9 INJECTION, SOLUTION INTRAVENOUS at 08:18

## 2021-02-06 NOTE — PATIENT INSTRUCTIONS
[facility] Triage and after hours / weekends / holidays:  [phone]    Please call the triage or after hours line if you experience a temperature greater than or equal to 100.5, shaking chills, have uncontrolled nausea, vomiting and/or diarrhea, dizziness, shortness of breath, chest pain, bleeding, unexplained bruising, or if you have any other new/concerning symptoms, questions or concerns.      If you are having any concerning symptoms or wish to speak to a provider before your next infusion visit, please call your care coordinator or triage to notify them so we can adequately serve you.     If you need a refill on a narcotic prescription or other medication, please call before your infusion appointment.                 February 2021 Sunday Monday Tuesday Wednesday Thursday Friday Saturday        1     2    [facility] ONC INFUSION 60   2:30 PM   (60 min.)   [facility] ONCOL[facility]Y INFUSION   [facility]    [facility] RETURN   3:15 PM   (50 min.)   Nia Bautista PA-C   [facility] 3    Admission   7:43 AM   Reza Gao MD   [facility]   (Discharge: 2/3/2021)    IR CHEST PORT PLACEMENT >5 YRS   8:00 AM   (90 min.)   [id number]   [facility] Interventional Radiology 4    LAB CENTRAL   8:30 AM   (15 min.)   [facility] TRACK LAB   [facility]    Outpatient Visit  10:00 AM   [facility] Laboratory 5    TELEPHONE VISIT NEW  10:00 AM   (60 min.)   Ramila Pennington RD   [facility]    VIDEO VISIT RETURN  11:25 AM   (50 min.)   Diana King CNP   [facility] 6    [facility] ONC INFUSION 120   8:00 AM   (120 min.)   [facility] ONCOLOGY INFUSION   [facility]   7    [facility] ONC INFUSION 120   9:00 AM   (120 min.)   [facility] ONCOLOGY INFUSION   [facility] 8    VIDEO VISIT RETURN   1:25 PM    (50 min.)   Diana King CNP   [facility] 9     10    LAB CENTRAL   2:15 PM   (15 min.)    FAST TRACK LAB   [facility] [place] 11    VIDEO SWALLOW STUDY   1:00 PM   (60 min.)   Heydi Christina SLP   [facility]    XR VIDEO SWALLOW W SLP OR OT   1:00 PM   (30 min.)   [id number]   [facility] [place] 12    VIDEO VISIT RETURN   1:05 PM   (50 min.)   Diana King CNP   [facility] 13       14     15     16    LAB CENTRAL   1:15 PM   (15 min.)    FAST TRACK LAB   [facility] 17    VIDEO VISIT RETURN  12:55 PM   (50 min.)   Diana King CNP   [facility] 18    LEVEL 4   9:00 AM   (240 min.)    INFUSION CHAIR 14   [facility] 19     20  Happy Birthday!    LEVEL 1  11:00 AM   (60 min.)    INFUSION CHAIR 8   [facility]   21     22     23     24     25     26     27       28                                               March 2021 Sunday Monday Tuesday Wednesday Thursday Friday Saturday        1     2     3     4     5     6       7     8     9     10     11     12     13       14     15     16     17     18     19     20       21     22     23     24     25     26     27       28     29     30     31                                    Lab Results:  No results found for this or any previous visit (from the past 12 hour(s)).

## 2021-02-06 NOTE — PROGRESS NOTES
Infusion Nursing Note:  Fortino Moya presents today for IVFs.    Patient seen by provider today: No   present during visit today: Not Applicable.    Note: Patient presents to infusion feeling ok. Patient states throat discomfort 3/10 with hx of decreased oral/nutritional intake. Patient states he maybe drinks 2-3 cups of water a day with attempts to maintain adequate nutrition. No nausea/vomiting. bp upon arrival was 97/62 with patient stating he took all bp meds per med list this morning and denies s/s of low bp such as dizziness/lightheadedness. Post IVF's, bp 109/68. Give patients history of weight loss/dehydration, education given for patient to touch base with provider whom ordered bp medications to evaluate dosage based upon variable bp trends. Patient is aware to seek medical attention of dizziness/lightheadedness develop at home. Patient states left eye (see photo) has been pink/red for the last couple weeks with no vision changes. Patients tip of the tongue has white coating without discomfort (see photo). Patient unaware of when it started. Patient states hes started using Magic Mouth Wash. IB message sent to Dr. Shah and Diana King CNP to look a photos to determine as needed interventions. Overall, patient denies acute complaints or concerns not addressed during visit with Diana King CNP yesterday 2/5/2021. Patient denies s/s of infection such as fever, increased sore throat from baseline, chest pain, cough, shortness of breath, or changes in taste/smell.    Patient did meet criteria for an asymptomatic covid-19 PCR test in infusion today. Patient declined the covid-19 test.    Intravenous Access:  Implanted Port.    Treatment Conditions:  Not Applicable.      Post Infusion Assessment:  Patient tolerated infusion without incident.  Blood return noted pre and post infusion.  Site patent and intact, free from redness, edema or discomfort.  No evidence of extravasations.  Access discontinued  per protocol.       Discharge Plan:   Patient declined prescription refills.  Discharge instructions reviewed with: Patient.  Patient and/or family verbalized understanding of discharge instructions and all questions answered.  AVS to patient via [employer].  Patient will return 2/7 for next appointment.   Patient discharged in stable condition accompanied by: self.  Departure Mode: Ambulatory.  Face to Face time: 0 minutes.    Blaze Bran RN

## 2021-02-07 ENCOUNTER — INFUSION THERAPY VISIT (OUTPATIENT)
Dept: ONCOLOGY | Facility: CLINIC | Age: 71
End: 2021-02-07
Attending: NURSE PRACTITIONER
Payer: MEDICARE

## 2021-02-07 VITALS
OXYGEN SATURATION: 98 % | HEART RATE: 80 BPM | SYSTOLIC BLOOD PRESSURE: 134 MMHG | RESPIRATION RATE: 16 BRPM | DIASTOLIC BLOOD PRESSURE: 76 MMHG | TEMPERATURE: 98.1 F

## 2021-02-07 DIAGNOSIS — C09.9 SQUAMOUS CELL CARCINOMA OF TONSIL (H): ICD-10-CM

## 2021-02-07 DIAGNOSIS — K12.31 MUCOSITIS DUE TO CHEMOTHERAPY: ICD-10-CM

## 2021-02-07 DIAGNOSIS — C09.9 TONSILLAR CANCER (H): Primary | ICD-10-CM

## 2021-02-07 LAB
ANION GAP SERPL CALCULATED.3IONS-SCNC: 5 MMOL/L (ref 3–14)
BUN SERPL-MCNC: 25 MG/DL (ref 7–30)
CALCIUM SERPL-MCNC: 8.7 MG/DL (ref 8.5–10.1)
CHLORIDE SERPL-SCNC: 105 MMOL/L (ref 94–109)
CO2 SERPL-SCNC: 29 MMOL/L (ref 20–32)
CREAT SERPL-MCNC: 0.75 MG/DL (ref 0.66–1.25)
GFR SERPL CREATININE-BSD FRML MDRD: >90 ML/MIN/{1.73_M2}
GLUCOSE SERPL-MCNC: 186 MG/DL (ref 70–99)
POTASSIUM SERPL-SCNC: 4.2 MMOL/L (ref 3.4–5.3)
SODIUM SERPL-SCNC: 139 MMOL/L (ref 133–144)

## 2021-02-07 PROCEDURE — 250N000011 HC RX IP 250 OP 636: Performed by: INTERNAL MEDICINE

## 2021-02-07 PROCEDURE — 80048 BASIC METABOLIC PNL TOTAL CA: CPT | Performed by: INTERNAL MEDICINE

## 2021-02-07 PROCEDURE — 258N000003 HC RX IP 258 OP 636: Performed by: INTERNAL MEDICINE

## 2021-02-07 PROCEDURE — 96360 HYDRATION IV INFUSION INIT: CPT

## 2021-02-07 PROCEDURE — 258N000003 HC RX IP 258 OP 636: Performed by: NURSE PRACTITIONER

## 2021-02-07 RX ORDER — HEPARIN SODIUM (PORCINE) LOCK FLUSH IV SOLN 100 UNIT/ML 100 UNIT/ML
5 SOLUTION INTRAVENOUS
Status: CANCELLED | OUTPATIENT
Start: 2021-02-07

## 2021-02-07 RX ORDER — HEPARIN SODIUM,PORCINE 10 UNIT/ML
5 VIAL (ML) INTRAVENOUS
Status: CANCELLED | OUTPATIENT
Start: 2021-02-07

## 2021-02-07 RX ORDER — NYSTATIN 100000/ML
500000 SUSPENSION, ORAL (FINAL DOSE FORM) ORAL 4 TIMES DAILY
Qty: 400 ML | Refills: 1 | Status: SHIPPED | OUTPATIENT
Start: 2021-02-07 | End: 2021-02-07

## 2021-02-07 RX ORDER — NYSTATIN 100000/ML
500000 SUSPENSION, ORAL (FINAL DOSE FORM) ORAL 4 TIMES DAILY
Qty: 400 ML | Refills: 1 | Status: SHIPPED | OUTPATIENT
Start: 2021-02-07 | End: 2021-05-20

## 2021-02-07 RX ORDER — HEPARIN SODIUM (PORCINE) LOCK FLUSH IV SOLN 100 UNIT/ML 100 UNIT/ML
5 SOLUTION INTRAVENOUS
Status: DISCONTINUED | OUTPATIENT
Start: 2021-02-07 | End: 2021-02-07 | Stop reason: HOSPADM

## 2021-02-07 RX ADMIN — SODIUM CHLORIDE 1000 ML: 9 INJECTION, SOLUTION INTRAVENOUS at 09:34

## 2021-02-07 RX ADMIN — SODIUM CHLORIDE 1000 ML: 9 INJECTION, SOLUTION INTRAVENOUS at 10:47

## 2021-02-07 RX ADMIN — Medication 5 ML: at 12:04

## 2021-02-07 NOTE — PATIENT INSTRUCTIONS
[facility] Triage and after hours / weekends / holidays:  [phone]    Please call the triage or after hours line if you experience a temperature greater than or equal to 100.5, shaking chills, have uncontrolled nausea, vomiting and/or diarrhea, dizziness, shortness of breath, chest pain, bleeding, unexplained bruising, or if you have any other new/concerning symptoms, questions or concerns.      If you are having any concerning symptoms or wish to speak to a provider before your next infusion visit, please call your care coordinator or triage to notify them so we can adequately serve you.     If you need a refill on a narcotic prescription or other medication, please call before your infusion appointment.                 February 2021 Sunday Monday Tuesday Wednesday Thursday Friday Saturday        1     2    UMP ONC INFUSION 60   2:30 PM   (60 min.)   [facility] ONCOLOGY INFUSION   [facility]    UMP RETURN   3:15 PM   (50 min.)   Nia Bautista PA-C   [facility] 3    Admission   7:43 AM   Reza Gao MD   [facility]   (Discharge: 2/3/2021)    IR CHEST PORT PLACEMENT >5 YRS   8:00 AM   (90 min.)   SHIR2   [facility] Interventional Radiology 4    LAB CENTRAL   8:30 AM   (15 min.)   SH FAST TRACK LAB   [facility]    Outpatient Visit  10:00 AM   [facility] Laboratory 5    TELEPHONE VISIT NEW  10:00 AM   (60 min.)   Ramila Pennington RD   [facility]    VIDEO VISIT RETURN  11:25 AM   (50 min.)   Diana King CNP   [facility] 6    UMP ONC INFUSION 120   8:00 AM   (120 min.)   UC ONCOLOGY INFUSION   [facility]   7    UMP ONC INFUSION 120   9:00 AM   (120 min.)   UC ONCOLOGY INFUSION   [facility] 8    VIDEO VISIT RETURN   1:25 PM    (50 min.)   Diana King CNP   [facility] 9     10    LAB CENTRAL   2:15 PM   (15 min.)   SH FAST TRACK LAB   [facility] 11    VIDEO SWALLOW STUDY   1:00 PM   (60 min.)   Heydi Christina SLP   [facility] [place]    XR VIDEO SWALLOW W SLP OR OT   1:00 PM   (30 min.)   [id number]   [facility] [place] 12    VIDEO VISIT RETURN   1:05 PM   (50 min.)   Diana King CNP   [facility] 13       14     15     16    LAB CENTRAL   1:15 PM   (15 min.)   SH FAST TRACK LAB   [facility] 17    VIDEO VISIT RETURN  12:55 PM   (50 min.)   Diana King CNP   [facility] 18    LEVEL 4   9:00 AM   (240 min.)    INFUSION CHAIR 14   [facility] 19     20  Happy Birthday!    LEVEL 1  11:00 AM   (60 min.)    INFUSION CHAIR 8   [facility]   21     22     23     24     25     26     27       28                                               March 2021 Sunday Monday Tuesday Wednesday Thursday Friday Saturday        1     2     3     4     5     6       7     8     9     10     11     12     13       14     15     16     17     18     19     20       21     22     23     24     25     26     27       28     29     30     31                                    Lab Results:  Recent Results (from the past 12 hour(s))   Basic metabolic panel    Collection Time: 02/07/21  9:37 AM   Result Value Ref Range    Sodium 139 133 - 144 mmol/L    Potassium 4.2 3.4 - 5.3 mmol/L    Chloride 105 94 - 109 mmol/L    Carbon Dioxide 29 20 - 32 mmol/L    Anion Gap 5 3 - 14 mmol/L    Glucose 186 (H) 70 - 99 mg/dL    Urea Nitrogen 25 7 - 30 mg/dL    Creatinine 0.75 0.66 - 1.25 mg/dL    GFR Estimate >90 >60 mL/min/[1.73_m2]    GFR Estimate If Black >90 >60 mL/min/[1.73_m2]    Calcium 8.7 8.5 - 10.1 mg/dL

## 2021-02-07 NOTE — PROGRESS NOTES
Infusion Nursing Note:  Fortino Moya presents today for IVFs.    Patient seen by provider today: No   present during visit today: Not Applicable.    Note: Patient presents to infusion with worsening throat discomfort (no coughing or choking noted) that has caused minimal oral intake since yesterday. Patient stated yesterday that he was able to consume 2-3 cups of water/day, however today he hasn't consumed any food or water do to discomfort. Tip of tongue continues to have white patches (see photos from note on 2/6/2020) and Magic Mouth Wash not effective. PRN Oxycodone taken with some relief. Patient denies dizziness/lightheadedness with decreased oral intake. Patient otherwise denies acute complaints or concerns. Specifically, patient denies s/s of infection such as fever, shortness of breath, cough, chest pain, or changes in taste/smell. Patient  did meet criteria for an asymptomatic covid-19 PCR test in infusion today. Patient declined the covid-19 test. Patient verbalizes understanding to review blood pressure trends, hx of dehydration, and hx of weight loss with provider whom prescribes bp meds to see if adjustments need to be made.    Dr. Francisco notified of increased throat discomfort/poor oral/nutritional intake   TORB. 2/7/2021. 0948. Dr. Francisco. Blaze Bran RN. Ok to give additional 1L of NS. Nystatin ordered for home. Draw BMP.    Intravenous Access:  Implanted Port.    Treatment Conditions:    Results for FORTINO MOYA (MRN [id number]) as of 2/7/2021 11:01   Ref. Range 2/7/2021 09:37   Sodium Latest Ref Range: 133 - 144 mmol/L 139   Potassium Latest Ref Range: 3.4 - 5.3 mmol/L 4.2   Chloride Latest Ref Range: 94 - 109 mmol/L 105   Carbon Dioxide Latest Ref Range: 20 - 32 mmol/L 29   Urea Nitrogen Latest Ref Range: 7 - 30 mg/dL 25   Creatinine Latest Ref Range: 0.66 - 1.25 mg/dL 0.75   GFR Estimate Latest Ref Range: >60 mL/min/1.73_m2 >90   GFR Estimate If Black Latest Ref Range: >60  mL/min/1.73_m2 >90   Calcium Latest Ref Range: 8.5 - 10.1 mg/dL 8.7   Anion Gap Latest Ref Range: 3 - 14 mmol/L 5   Glucose Latest Ref Range: 70 - 99 mg/dL 186 (H)       Results reviewed, labs MET treatment parameters, ok to proceed with treatment.      Post Infusion Assessment:  Patient tolerated infusion without incident.  Blood return noted pre and post infusion.  Site patent and intact, free from redness, edema or discomfort.  No evidence of extravasations.  Access discontinued per protocol.       Discharge Plan:   Patient declined prescription refills.  Discharge instructions reviewed with: Patient.  Patient and/or family verbalized understanding of discharge instructions and all questions answered.  AVS to patient via [employer].  Patient will return 2/8 for next appointment.   Patient discharged in stable condition accompanied by: self.  Departure Mode: Ambulatory.  Face to Face time: 0 minutes.    Blaze Bran RN

## 2021-02-08 ENCOUNTER — VIRTUAL VISIT (OUTPATIENT)
Dept: ONCOLOGY | Facility: CLINIC | Age: 71
End: 2021-02-08
Attending: INTERNAL MEDICINE
Payer: MEDICARE

## 2021-02-08 DIAGNOSIS — C09.9 SQUAMOUS CELL CARCINOMA OF TONSIL (H): Primary | ICD-10-CM

## 2021-02-08 PROCEDURE — 999N001193 HC VIDEO/TELEPHONE VISIT; NO CHARGE

## 2021-02-08 PROCEDURE — 99215 OFFICE O/P EST HI 40 MIN: CPT | Mod: 95 | Performed by: NURSE PRACTITIONER

## 2021-02-08 RX ORDER — LIDOCAINE HYDROCHLORIDE 20 MG/ML
SOLUTION OROPHARYNGEAL PRN
COMMUNITY
Start: 2021-02-03 | End: 2021-05-20

## 2021-02-08 RX ORDER — ERYTHROMYCIN 5 MG/G
0.5 OINTMENT OPHTHALMIC AT BEDTIME
Qty: 1 G | Refills: 0 | Status: SHIPPED | OUTPATIENT
Start: 2021-02-08 | End: 2021-05-25

## 2021-02-08 NOTE — LETTER
"2/8/2021      RE: Fortino Moya  [address]  [facility] [address]       Fortino is a 70 year old who is being evaluated via a billable video visit.      How would you like to obtain your AVS? MyChart  If the video visit is dropped, the invitation should be resent by: Text to cell phone: [phone]  Will anyone else be joining your video visit? No    Vitals - Patient Reported  Weight (Patient Reported): 78.9 kg (174 lb)  Height (Patient Reported): 177.8 cm (5' 10\")  BMI (Based on Pt Reported Ht/Wt): 24.97  Pain Score: Moderate Pain (4)  Pain Loc: Other - see comment(LEFT SIDE OF FACE)    Video-Visit Details    Type of service:  Video Visit    Video Start Time: 1:37 PM    Video End Time:2:30 PM    Originating Location (pt. 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  He initially was offered chemoradiation, but due to a prior history of L sided hearing loss, he was given low dose weekly carboplatin 1.5 AUC weekly with radiation.  His 3 month PET/CT after initiation was negative and he underwent surveillance after that.  He had a tough time with chemoradiation - he tolerated it with expected toxicity, however, there was a prolonged recovery and during that time, he got a divorce and he really feels that mentally, he was not right during the treatment and that it led to some poor decisions on his own part.  He relocated to [place] which is where he is from in Feb 2020 and has been followed since by Dr. Treviño and Dr. Flores at [facility].  In August of 2020, he  developed numbenss in chin and then moved up to his " forehead.  He had a Pet/CT done in Sept 2020 that showed a hypermetabolic area near his L foramen ovale - he had had recent dental work in that area and it was thought that this might be scar or inflammation related to that procedure.  However, he was offered a 2nd opinion and came to [facility] and saw Dr. Russell in Nov 2020.  Images were reviewed at tumor boardon 12/4/20 - and it was felt that this was concerning enough to merit biopsy - so he was referred for IR-guided biopsy.   IR guided biopsy happened on 1/8/2021 and came back as invasive squamous cell carcinoma.      I am seeing him in follow up after initiation of TPF chemotherapy     Interval history:   His mouth discomfort is about the same as when we chatted Friday but oxycodone has helped with intake. He finds himself grazing more than eating meals, so takes the oxycodone regularly when due to help with this. He has been able to eat some soft foods like eggs. Weight at home is 173. Has not been consistently doing s/s swishes. Using MMW prn. He does not feel like swallowing itself is challenging. No cough after eating. Denies sob/cp/f/c. He does not think the white area noted in infusion Saturday is thrush so he did not  nystatin.     Bowels are moving despite oxycodone. No nausea or vomiting.     Does have some left eye irritation and redness, notes some drainage. He has not tried taping his eye shut yet or an eye patch. No change in hearing or neuropathy. Did not have any bone discomfort after neulasta    No dizziness or LH at home. Did not necessarily feel like the IVF were super helpful over the weekend, but is in favor of getting them while he has appointments here.     10 point review of systems otherwise negative    Current Outpatient Medications   Medication Sig Dispense Refill     acetaminophen (TYLENOL) 32 mg/mL liquid Take 31.25 mLs (1,000 mg) by mouth every 6 hours as needed for pain 473 mL 0     amoxicillin-clavulanate (AUGMENTIN) 875-125  MG tablet Take 1 tablet by mouth 2 times daily for 7 days 14 tablet 0     Aspirin Buf,CaCarb-MgCarb-MgO, 81 MG TABS Take 81 mg by mouth       atorvastatin (LIPITOR) 10 MG tablet Take 10 mg by mouth       carvedilol (COREG) 12.5 MG tablet TAKE TWO TABLETS BY MOUTH EVERY MORNING AND ONE TABLET EVERY EVENING       cevimeline (EVOXAC) 30 MG capsule TAKE ONE CAPSULE BY MOUTH THREE TIMES DAILY       cholecalciferol 25 MCG (1000 UT) TABS Take 1,000 Units by mouth       dexamethasone (DECADRON) 4 MG tablet Take 2 tablets (8 mg) by mouth 2 times daily (with meals) Start the evening prior to Docetaxel, continue morning of Docetaxel, and continue for 4 additional doses. 12 tablet 3     flecainide (TAMBOCOR) 150 MG tablet Take 150 mg by mouth every 12 hours as needed       ibuprofen (ADVIL/MOTRIN) 600 MG tablet TAKE 1 TABLET BY MOUTH THREE TIMES DAILY AS NEEDED FOR PAIN       levofloxacin (LEVAQUIN) 250 MG tablet Take 1 tablet (250 mg) by mouth daily for 10 days 10 tablet 0     lisinopril (ZESTRIL) 20 MG tablet Take 20 mg by mouth       LORazepam (ATIVAN) 0.5 MG tablet Take 1 tablet (0.5 mg) by mouth every 4 hours as needed (Anxiety, Nausea/Vomiting or Sleep) 30 tablet 3     magic mouthwash (ENTER INGREDIENTS IN COMMENTS) suspension Swish and spit every 4 hours as needed 240 mL 0     MULTIPLE VITAMIN PO Take 1 tablet by mouth       nystatin (MYCOSTATIN) 531748 UNIT/ML suspension Take 5 mLs (500,000 Units) by mouth 4 times daily Swish and swallow 4 times a day 400 mL 1     oxyCODONE (ROXICODONE) 5 MG/5ML solution Take 5 mLs (5 mg) by mouth every 4 hours as needed for severe pain 500 mL 0     prochlorperazine (COMPAZINE) 10 MG tablet Take 1 tablet (10 mg) by mouth every 6 hours as needed (Nausea/Vomiting) 30 tablet 3     sildenafil (VIAGRA) 100 MG tablet Take  mg by mouth       traZODone (DESYREL) 50 MG tablet TAKE 1 TO 2 TABLETS BY MOUTH ONCE DAILY AT BEDTIME        No Known Allergies    Exam:    There were no vitals taken  for this visit.  Wt Readings from Last 4 Encounters:   02/06/21 79.4 kg (175 lb)   01/28/21 83.5 kg (184 lb)   01/20/21 82.1 kg (181 lb)   01/08/21 81.6 kg (180 lb)     Video physical exam  General: Patient appears well in no acute distress. L facial droop, pre-existing  Skin: No visualized rash or lesions on visualized skin  Eyes: EOMI, no erythema, sclera icterus or discharge noted  Resp: Appears to be breathing comfortably without accessory muscle usage, speaking in full sentences, no cough  MSK: Appears to have normal range of motion based on visualized movements  Neurologic: No apparent tremors, facial movements symmetric  Psych: affect good, alert and oriented    The rest of a comprehensive physical examination is deferred due to PHE (public health emergency) video restrictions    Labs:    2/7/2021 09:37   Sodium 139   Potassium 4.2   Chloride 105   Carbon Dioxide 29   Urea Nitrogen 25   Creatinine 0.75   GFR Estimate >90   GFR Estimate If Black >90   Calcium 8.7   Anion Gap 5   Glucose 186 (H)       Imaging: n/a    Impression/plan:   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 Dr. Shah previously discussed at tumor board doing SBRT to the recurrent lesion, however, on most recent PET scan, there was concern that it had gotten too large to consider SBRT.  Thus, it was discussed the possibility of using induction chemotherapy with the goal of shrinking it enough that we can consider some form of local therapy.  We did discuss that the goal of chemotherapy in this setting was not curative, but it is meant to make it possible to try curative intent treatment either with chemoradiation or SBRT or (hypothetically, even surgery).  -Began induction with TPF chemotherapy 1/28. Tolerating ex mucositis, see below. No fever/chills.   -IVF this week while he has other appts  In clinic and f/u with me Friday    Mucositis: 2/2 5FU. Continue MMW prn and s/s swishes 4-6x per day. Again discussed  importance of this. He will look into healios as he did not do this over the weekend. Will talk with Dr. Shah about 5FU dose reduction with c2.  -Continue oxycodone liquid 5 mg q4 hours prn   -tylenol 1000 mg q6 hours prn, recommended he schedule this if tolerated until mucositis improves.   -I believe the white area on his tongue noted in infusion this weekend is healing oral mucosa, okay for him to not  nystatin.     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 Previously discussed with Dr. Shah reviewed MRI results with him. timeline or extent of improvement unclear.  -Again encouraged him to tape eye shut around the house and at night to avoid irritation. Have previously recommended eye patch too. Continue artificial tears frequently as well.   -given conjunctiva is red and he reports some drainage, will pro phylactically give him some erythromycin ointment and I have requested an opthalmology appt in the next week.     Nutrition: challenging with mucositis. oxycodone helping with intake. Recently met with michelle. Monitor weight    Dysphagia: swallow study scheduled for Thursday    QT risk: levaquin prophy and flecainide have mod risk of QT prolongation. EKG pre-preatment earlier this month 438. Given levaquin is short term therapy will monitor with EKG prior to cycle 2.    Inflamed cyst: evaluated earlier this week by Nia Bautista and started on Augmentin BID x7 days in anticipation of olaf. Derm surgery referral placed for future excision.     60 minutes spent on the date of the encounter doing chart review, review of test results, interpretation of tests, documentation and discussion with other provider(s)       Diana King CNP on 2/8/2021 at 3:07 PM          Diana King CNP

## 2021-02-08 NOTE — PROGRESS NOTES
"Fortino is a 70 year old who is being evaluated via a billable video visit.      How would you like to obtain your AVS? MyChart  If the video visit is dropped, the invitation should be resent by: Text to cell phone: [phone]  Will anyone else be joining your video visit? No    Vitals - Patient Reported  Weight (Patient Reported): 78.9 kg (174 lb)  Height (Patient Reported): 177.8 cm (5' 10\")  BMI (Based on Pt Reported Ht/Wt): 24.97  Pain Score: Moderate Pain (4)  Pain Loc: Other - see comment(LEFT SIDE OF FACE)    Video-Visit Details    Type of service:  Video Visit    Video Start Time: 1:37 PM    Video End Time:2:30 PM    Originating Location (pt. 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  He initially was offered chemoradiation, but due to a prior history of L sided hearing loss, he was given low dose weekly carboplatin 1.5 AUC weekly with radiation.  His 3 month PET/CT after initiation was negative and he underwent surveillance after that.  He had a tough time with chemoradiation - he tolerated it with expected toxicity, however, there was a prolonged recovery and during that time, he got a divorce and he really feels that mentally, he was not right during the treatment and that it led to some poor decisions on his own part.  He relocated to [place] which is where he is from in Feb 2020 and has been followed since by Dr. Treviño and Dr. Flores at [facility].  In August of 2020, he  developed numbenss in chin and then moved up to his forehead.  He had a Pet/CT done in Sept 2020 that showed a hypermetabolic area " near his L foramen ovale - he had had recent dental work in that area and it was thought that this might be scar or inflammation related to that procedure.  However, he was offered a 2nd opinion and came to [facility] and saw Dr. Russell in Nov 2020.  Images were reviewed at tumor boardon 12/4/20 - and it was felt that this was concerning enough to merit biopsy - so he was referred for IR-guided biopsy.   IR guided biopsy happened on 1/8/2021 and came back as invasive squamous cell carcinoma.      I am seeing him in follow up after initiation of TPF chemotherapy     Interval history:   His mouth discomfort is about the same as when we chatted Friday but oxycodone has helped with intake. He finds himself grazing more than eating meals, so takes the oxycodone regularly when due to help with this. He has been able to eat some soft foods like eggs. Weight at home is 173. Has not been consistently doing s/s swishes. Using MMW prn. He does not feel like swallowing itself is challenging. No cough after eating. Denies sob/cp/f/c. He does not think the white area noted in infusion Saturday is thrush so he did not  nystatin.     Bowels are moving despite oxycodone. No nausea or vomiting.     Does have some left eye irritation and redness, notes some drainage. He has not tried taping his eye shut yet or an eye patch. No change in hearing or neuropathy. Did not have any bone discomfort after neulasta    No dizziness or LH at home. Did not necessarily feel like the IVF were super helpful over the weekend, but is in favor of getting them while he has appointments here.     10 point review of systems otherwise negative    Current Outpatient Medications   Medication Sig Dispense Refill     acetaminophen (TYLENOL) 32 mg/mL liquid Take 31.25 mLs (1,000 mg) by mouth every 6 hours as needed for pain 473 mL 0     amoxicillin-clavulanate (AUGMENTIN) 875-125 MG tablet Take 1 tablet by mouth 2 times daily for 7 days 14 tablet 0      Aspirin Buf,CaCarb-MgCarb-MgO, 81 MG TABS Take 81 mg by mouth       atorvastatin (LIPITOR) 10 MG tablet Take 10 mg by mouth       carvedilol (COREG) 12.5 MG tablet TAKE TWO TABLETS BY MOUTH EVERY MORNING AND ONE TABLET EVERY EVENING       cevimeline (EVOXAC) 30 MG capsule TAKE ONE CAPSULE BY MOUTH THREE TIMES DAILY       cholecalciferol 25 MCG (1000 UT) TABS Take 1,000 Units by mouth       dexamethasone (DECADRON) 4 MG tablet Take 2 tablets (8 mg) by mouth 2 times daily (with meals) Start the evening prior to Docetaxel, continue morning of Docetaxel, and continue for 4 additional doses. 12 tablet 3     flecainide (TAMBOCOR) 150 MG tablet Take 150 mg by mouth every 12 hours as needed       ibuprofen (ADVIL/MOTRIN) 600 MG tablet TAKE 1 TABLET BY MOUTH THREE TIMES DAILY AS NEEDED FOR PAIN       levofloxacin (LEVAQUIN) 250 MG tablet Take 1 tablet (250 mg) by mouth daily for 10 days 10 tablet 0     lisinopril (ZESTRIL) 20 MG tablet Take 20 mg by mouth       LORazepam (ATIVAN) 0.5 MG tablet Take 1 tablet (0.5 mg) by mouth every 4 hours as needed (Anxiety, Nausea/Vomiting or Sleep) 30 tablet 3     magic mouthwash (ENTER INGREDIENTS IN COMMENTS) suspension Swish and spit every 4 hours as needed 240 mL 0     MULTIPLE VITAMIN PO Take 1 tablet by mouth       nystatin (MYCOSTATIN) 675601 UNIT/ML suspension Take 5 mLs (500,000 Units) by mouth 4 times daily Swish and swallow 4 times a day 400 mL 1     oxyCODONE (ROXICODONE) 5 MG/5ML solution Take 5 mLs (5 mg) by mouth every 4 hours as needed for severe pain 500 mL 0     prochlorperazine (COMPAZINE) 10 MG tablet Take 1 tablet (10 mg) by mouth every 6 hours as needed (Nausea/Vomiting) 30 tablet 3     sildenafil (VIAGRA) 100 MG tablet Take  mg by mouth       traZODone (DESYREL) 50 MG tablet TAKE 1 TO 2 TABLETS BY MOUTH ONCE DAILY AT BEDTIME        No Known Allergies    Exam:    There were no vitals taken for this visit.  Wt Readings from Last 4 Encounters:   02/06/21 79.4 kg  (175 lb)   01/28/21 83.5 kg (184 lb)   01/20/21 82.1 kg (181 lb)   01/08/21 81.6 kg (180 lb)     Video physical exam  General: Patient appears well in no acute distress. L facial droop, pre-existing  Skin: No visualized rash or lesions on visualized skin  Eyes: EOMI, no erythema, sclera icterus or discharge noted  Resp: Appears to be breathing comfortably without accessory muscle usage, speaking in full sentences, no cough  MSK: Appears to have normal range of motion based on visualized movements  Neurologic: No apparent tremors, facial movements symmetric  Psych: affect good, alert and oriented    The rest of a comprehensive physical examination is deferred due to PHE (public health emergency) video restrictions    Labs:    2/7/2021 09:37   Sodium 139   Potassium 4.2   Chloride 105   Carbon Dioxide 29   Urea Nitrogen 25   Creatinine 0.75   GFR Estimate >90   GFR Estimate If Black >90   Calcium 8.7   Anion Gap 5   Glucose 186 (H)       Imaging: n/a    Impression/plan:   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 Dr. Shah previously discussed at tumor board doing SBRT to the recurrent lesion, however, on most recent PET scan, there was concern that it had gotten too large to consider SBRT.  Thus, it was discussed the possibility of using induction chemotherapy with the goal of shrinking it enough that we can consider some form of local therapy.  We did discuss that the goal of chemotherapy in this setting was not curative, but it is meant to make it possible to try curative intent treatment either with chemoradiation or SBRT or (hypothetically, even surgery).  -Began induction with TPF chemotherapy 1/28. Tolerating ex mucositis, see below. No fever/chills.   -IVF this week while he has other appts  In clinic and f/u with me Friday    Mucositis: 2/2 5FU. Continue MMW prn and s/s swishes 4-6x per day. Again discussed importance of this. He will look into healios as he did not do this over the  weekend. Will talk with Dr. Shah about 5FU dose reduction with c2.  -Continue oxycodone liquid 5 mg q4 hours prn   -tylenol 1000 mg q6 hours prn, recommended he schedule this if tolerated until mucositis improves.   -I believe the white area on his tongue noted in infusion this weekend is healing oral mucosa, okay for him to not  nystatin.     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 Previously discussed with Dr. Shah reviewed MRI results with him. timeline or extent of improvement unclear.  -Again encouraged him to tape eye shut around the house and at night to avoid irritation. Have previously recommended eye patch too. Continue artificial tears frequently as well.   -given conjunctiva is red and he reports some drainage, will pro phylactically give him some erythromycin ointment and I have requested an opthalmology appt in the next week.     Nutrition: challenging with mucositis. oxycodone helping with intake. Recently met with michelle. Monitor weight    Dysphagia: swallow study scheduled for Thursday    QT risk: levaquin prophy and flecainide have mod risk of QT prolongation. EKG pre-preatment earlier this month 438. Given levaquin is short term therapy will monitor with EKG prior to cycle 2.    Inflamed cyst: evaluated earlier this week by Nia Bautista and started on Augmentin BID x7 days in anticipation of olaf. Derm surgery referral placed for future excision.     60 minutes spent on the date of the encounter doing chart review, review of test results, interpretation of tests, documentation and discussion with other provider(s)       Diana King CNP on 2/8/2021 at 3:07 PM

## 2021-02-09 ENCOUNTER — TELEPHONE (OUTPATIENT)
Dept: INFUSION THERAPY | Facility: CLINIC | Age: 71
End: 2021-02-09

## 2021-02-09 ENCOUNTER — TELEPHONE (OUTPATIENT)
Dept: CALL CENTER | Age: 71
End: 2021-02-09

## 2021-02-09 NOTE — TELEPHONE ENCOUNTER
Spoke to pt at 1700    Lid droop with new redness  Does not feel eye closing all the way    Scheduled exam with Dr. patiño tomorrow AM at 0900    Pt aware of date/time/location at [facility]    Reviewed may use frequent preservative free artificial tears during day and lubricating eye ointment at night until seen    Live Arthur RN 5:13 PM 02/09/21          [facility] Call Center    Phone Message    May a detailed message be left on voicemail: yes     Reason for Call: Appointment Intake    Referring Provider Name: Diana King, TONI in  ONCOLOGY ADULT  Diagnosis and/or Symptoms: Squamous cell carcinoma of tonsil    cranial nerve 5 tumor involvement. drooping eye managment.  Please call Pt to schedule with correct Physician  Thank you,      Action Taken: Message routed to:  [facility] ([facility] eye    Travel Screening: Not Applicable

## 2021-02-09 NOTE — TELEPHONE ENCOUNTER
Spoke to patient and he is aware of port labs and IVF at [facility] on 2/10 at 2:30pm.  He is also scheduled for IVF on Thursday 2/11 at [facility]. All per staff message from [facility].

## 2021-02-10 ENCOUNTER — OFFICE VISIT (OUTPATIENT)
Dept: OPHTHALMOLOGY | Facility: CLINIC | Age: 71
End: 2021-02-10
Payer: MEDICARE

## 2021-02-10 ENCOUNTER — PRE VISIT (OUTPATIENT)
Dept: OPHTHALMOLOGY | Facility: CLINIC | Age: 71
End: 2021-02-10

## 2021-02-10 ENCOUNTER — INFUSION THERAPY VISIT (OUTPATIENT)
Dept: INFUSION THERAPY | Facility: CLINIC | Age: 71
End: 2021-02-10
Attending: NURSE PRACTITIONER
Payer: MEDICARE

## 2021-02-10 ENCOUNTER — OFFICE VISIT (OUTPATIENT)
Dept: OPHTHALMOLOGY | Facility: CLINIC | Age: 71
End: 2021-02-10
Attending: OPHTHALMOLOGY
Payer: MEDICARE

## 2021-02-10 ENCOUNTER — HOSPITAL ENCOUNTER (OUTPATIENT)
Facility: CLINIC | Age: 71
Setting detail: SPECIMEN
Discharge: HOME | End: 2021-02-10
Attending: NURSE PRACTITIONER | Admitting: INTERNAL MEDICINE
Payer: MEDICARE

## 2021-02-10 VITALS
SYSTOLIC BLOOD PRESSURE: 158 MMHG | TEMPERATURE: 98.1 F | HEART RATE: 80 BPM | WEIGHT: 177 LBS | DIASTOLIC BLOOD PRESSURE: 86 MMHG | RESPIRATION RATE: 16 BRPM | BODY MASS INDEX: 25.4 KG/M2 | OXYGEN SATURATION: 94 %

## 2021-02-10 DIAGNOSIS — H02.235 PARALYTIC LAGOPHTHALMOS OF LEFT LOWER EYELID: ICD-10-CM

## 2021-02-10 DIAGNOSIS — C09.9 TONSILLAR CANCER (H): ICD-10-CM

## 2021-02-10 DIAGNOSIS — C09.9 SQUAMOUS CELL CARCINOMA OF TONSIL (H): ICD-10-CM

## 2021-02-10 DIAGNOSIS — G50.9: Primary | ICD-10-CM

## 2021-02-10 DIAGNOSIS — H02.239 PARALYTIC LAGOPHTHALMOS: Primary | ICD-10-CM

## 2021-02-10 DIAGNOSIS — G51.0 FACIAL PALSY: ICD-10-CM

## 2021-02-10 DIAGNOSIS — H16.232 NEUROTROPHIC KERATOPATHY OF LEFT EYE: ICD-10-CM

## 2021-02-10 DIAGNOSIS — H16.212 EXPOSURE KERATOPATHY, LEFT: Primary | ICD-10-CM

## 2021-02-10 LAB
ALBUMIN SERPL-MCNC: 3 G/DL (ref 3.4–5)
ALP SERPL-CCNC: 117 U/L (ref 40–150)
ALT SERPL W P-5'-P-CCNC: 28 U/L (ref 0–70)
ANION GAP SERPL CALCULATED.3IONS-SCNC: 5 MMOL/L (ref 3–14)
AST SERPL W P-5'-P-CCNC: 23 U/L (ref 0–45)
BASOPHILS # BLD AUTO: 0 10E9/L (ref 0–0.2)
BASOPHILS NFR BLD AUTO: 0 %
BILIRUB SERPL-MCNC: 0.4 MG/DL (ref 0.2–1.3)
BUN SERPL-MCNC: 16 MG/DL (ref 7–30)
CALCIUM SERPL-MCNC: 8.6 MG/DL (ref 8.5–10.1)
CHLORIDE SERPL-SCNC: 105 MMOL/L (ref 94–109)
CO2 SERPL-SCNC: 28 MMOL/L (ref 20–32)
CREAT SERPL-MCNC: 0.68 MG/DL (ref 0.66–1.25)
DIFFERENTIAL METHOD BLD: ABNORMAL
EOSINOPHIL # BLD AUTO: 0.3 10E9/L (ref 0–0.7)
EOSINOPHIL NFR BLD AUTO: 2 %
ERYTHROCYTE [DISTWIDTH] IN BLOOD BY AUTOMATED COUNT: 12.1 % (ref 10–15)
GFR SERPL CREATININE-BSD FRML MDRD: >90 ML/MIN/{1.73_M2}
GLUCOSE SERPL-MCNC: 120 MG/DL (ref 70–99)
HCT VFR BLD AUTO: 35.7 % (ref 40–53)
HGB BLD-MCNC: 12.4 G/DL (ref 13.3–17.7)
LYMPHOCYTES # BLD AUTO: 0.9 10E9/L (ref 0.8–5.3)
LYMPHOCYTES NFR BLD AUTO: 5 %
MCH RBC QN AUTO: 32 PG (ref 26.5–33)
MCHC RBC AUTO-ENTMCNC: 34.7 G/DL (ref 31.5–36.5)
MCV RBC AUTO: 92 FL (ref 78–100)
METAMYELOCYTES # BLD: 0.5 10E9/L
METAMYELOCYTES NFR BLD MANUAL: 3 %
MONOCYTES # BLD AUTO: 1 10E9/L (ref 0–1.3)
MONOCYTES NFR BLD AUTO: 6 %
NEUTROPHILS # BLD AUTO: 14.6 10E9/L (ref 1.6–8.3)
NEUTROPHILS NFR BLD AUTO: 84 %
NRBC # BLD AUTO: 0.2 10*3/UL
NRBC BLD AUTO-RTO: 1 /100
PLATELET # BLD AUTO: 204 10E9/L (ref 150–450)
PLATELET # BLD EST: ABNORMAL 10*3/UL
POTASSIUM SERPL-SCNC: 3.8 MMOL/L (ref 3.4–5.3)
PROT SERPL-MCNC: 6.1 G/DL (ref 6.8–8.8)
RBC # BLD AUTO: 3.87 10E12/L (ref 4.4–5.9)
RBC MORPH BLD: ABNORMAL
SODIUM SERPL-SCNC: 138 MMOL/L (ref 133–144)
WBC # BLD AUTO: 17.4 10E9/L (ref 4–11)

## 2021-02-10 PROCEDURE — 99214 OFFICE O/P EST MOD 30 MIN: CPT | Mod: 57 | Performed by: OPHTHALMOLOGY

## 2021-02-10 PROCEDURE — 80053 COMPREHEN METABOLIC PANEL: CPT | Performed by: INTERNAL MEDICINE

## 2021-02-10 PROCEDURE — 258N000003 HC RX IP 258 OP 636: Performed by: NURSE PRACTITIONER

## 2021-02-10 PROCEDURE — 99207 PR DROP WITH A PROCEDURE: CPT | Performed by: OPHTHALMOLOGY

## 2021-02-10 PROCEDURE — G0463 HOSPITAL OUTPT CLINIC VISIT: HCPCS

## 2021-02-10 PROCEDURE — 92285 EXTERNAL OCULAR PHOTOGRAPHY: CPT | Performed by: OPHTHALMOLOGY

## 2021-02-10 PROCEDURE — 67882 REVISION OF EYELID: CPT | Mod: LT | Performed by: OPHTHALMOLOGY

## 2021-02-10 PROCEDURE — 85025 COMPLETE CBC W/AUTO DIFF WBC: CPT | Performed by: INTERNAL MEDICINE

## 2021-02-10 PROCEDURE — 250N000011 HC RX IP 250 OP 636: Performed by: INTERNAL MEDICINE

## 2021-02-10 RX ORDER — HEPARIN SODIUM,PORCINE 10 UNIT/ML
5 VIAL (ML) INTRAVENOUS
Status: CANCELLED | OUTPATIENT
Start: 2021-02-10

## 2021-02-10 RX ORDER — ERYTHROMYCIN 5 MG/G
OINTMENT OPHTHALMIC ONCE
Status: COMPLETED | OUTPATIENT
Start: 2021-02-10 | End: 2021-02-10

## 2021-02-10 RX ORDER — OFLOXACIN 3 MG/ML
1 SOLUTION/ DROPS OPHTHALMIC 2 TIMES DAILY
Qty: 5 ML | Refills: 0 | Status: SHIPPED | OUTPATIENT
Start: 2021-02-10 | End: 2021-04-01

## 2021-02-10 RX ORDER — CARBOXYMETHYLCELLULOSE SODIUM 10 MG/ML
1 GEL OPHTHALMIC 4 TIMES DAILY
Qty: 30 EACH | Refills: 11 | Status: SHIPPED | OUTPATIENT
Start: 2021-02-10 | End: 2021-08-18

## 2021-02-10 RX ORDER — HEPARIN SODIUM (PORCINE) LOCK FLUSH IV SOLN 100 UNIT/ML 100 UNIT/ML
5 SOLUTION INTRAVENOUS
Status: DISCONTINUED | OUTPATIENT
Start: 2021-02-10 | End: 2021-02-10 | Stop reason: HOSPADM

## 2021-02-10 RX ORDER — HEPARIN SODIUM (PORCINE) LOCK FLUSH IV SOLN 100 UNIT/ML 100 UNIT/ML
5 SOLUTION INTRAVENOUS
Status: CANCELLED | OUTPATIENT
Start: 2021-02-10

## 2021-02-10 RX ADMIN — SODIUM CHLORIDE 1000 ML: 9 INJECTION, SOLUTION INTRAVENOUS at 14:55

## 2021-02-10 RX ADMIN — ERYTHROMYCIN: 5 OINTMENT OPHTHALMIC at 12:42

## 2021-02-10 RX ADMIN — Medication 5 ML: at 15:59

## 2021-02-10 ASSESSMENT — SLIT LAMP EXAM - LIDS: COMMENTS: LID RETRACTION: LOWER LID

## 2021-02-10 ASSESSMENT — VISUAL ACUITY
METHOD: SNELLEN - LINEAR
OS_SC: 20/300
OS_PH_SC: 20/250
OD_SC: 20/25

## 2021-02-10 ASSESSMENT — CONF VISUAL FIELD
OS_NORMAL: 1
OD_NORMAL: 1
METHOD: COUNTING FINGERS

## 2021-02-10 ASSESSMENT — EXTERNAL EXAM - LEFT EYE: OS_EXAM: LEFT FACIAL PALSY

## 2021-02-10 ASSESSMENT — EXTERNAL EXAM - RIGHT EYE
OD_EXAM: NORMAL
OD_EXAM: NORMAL

## 2021-02-10 ASSESSMENT — CUP TO DISC RATIO: OD_RATIO: 0.65

## 2021-02-10 NOTE — PROGRESS NOTES
Infusion Nursing Note:  Fortino Moya presents today for labs and IVF.    Patient seen by provider today: No   present during visit today: Not Applicable.    Note: N/A.  Patient did not meet criteria for an asymptomatic covid-19 PCR test in infusion today.     Intravenous Access:  Labs drawn without difficulty.  Implanted Port.    Treatment Conditions:  Not Applicable.      Post Infusion Assessment:  Patient tolerated infusion without incident.  Blood return noted pre and post infusion.  Site patent and intact, free from redness, edema or discomfort.  No evidence of extravasations.  Access discontinued per protocol.       Discharge Plan:   AVS to patient via MYCHART.  Patient will return 2/11 ([facility]) for next appointment.   Patient discharged in stable condition accompanied by: self.  Departure Mode: Ambulatory.    Buck Ho RN

## 2021-02-10 NOTE — PROGRESS NOTES
Referred by Dr. Bonilla for permanent tarsorrhaphy left eye.    PMH: FLOWER drooping and LE redness for about last two to three weeks. Pt referred to determine if condition is possibly related to Squamous cell carcinoma of tonsil. Pt states is not receiving radiation in the area, but is going through chemo treatment Pt states an aching pain associated with current eye symptoms. Pt concerned about infection.          Assessment & Plan     Fortino Moya is a 70 year old male with the following diagnoses:   1. Paralytic lagophthalmos    2. Neurotrophic keratopathy of left eye    3. Squamous cell carcinoma of tonsil (H)    4. Facial palsy       PLAN:  Left lateral tarsorrhaphy - 50% performed in clinic without complication  Patient will follow up with Dr. Bonilla for cornea  Return to clinic - oculoplastics as needed           Attending Physician Attestation:  Complete documentation of historical and exam elements from today's encounter can be found in the full encounter summary report (not reduplicated in this progress note).  I personally obtained the chief complaint(s) and history of present illness.  I confirmed and edited as necessary the review of systems, past medical/surgical history, family history, social history, and examination findings as documented by others; and I examined the patient myself.  I personally reviewed the relevant tests, images, and reports as documented above.  I formulated and edited as necessary the assessment and plan and discussed the findings and management plan with the patient and family.      -Hira Pendleton MD  1:05 PM 2/10/2021    Today with Fortino Moya, I reviewed the indications, risks, benefits, and alternatives of the proposed surgical procedure including, but not limited to, failure obtain the desired result  and need for additional surgery, bleeding, infection, loss of vision, loss of the eye, and the remote possibility of permanent damage to any organ system or death with the  use of anesthesia.  I provided multiple opportunities for the questions, answered all questions to the best of my ability, and confirmed that my answers and my discussion were understood.     - Hira Pendleton MD 1:06 PM 2/10/2021

## 2021-02-10 NOTE — NURSING NOTE
Chief Complaints and History of Present Illnesses   Patient presents with     Red Eye Left Eye     Chief Complaint(s) and History of Present Illness(es)     Red Eye Left Eye     Characteristics: blood shot    Associated symptoms: eye pain, discharge, tearing, irritation, blurred vision, lid swelling and photophobia.  Negative for itching, foreign body sensation, burning, rash and dryness    Pain scale: 2/10    Frequency: constantly    Timing: throughout the day    Duration: 2 weeks    Course: gradually worsening              Comments     Chief Complaints and History of Present Illnesses   Patient presents with     Red Eye Left Eye     Chief Complaint(s) and History of Present Illness(es)     Red Eye Left Eye     Characteristics: blood shot    Associated symptoms: eye pain, discharge, tearing, irritation, blurred vision, lid swelling and photophobia.  Negative for itching, foreign body sensation, burning, rash and dryness    Pain scale: 2/10    Frequency: constantly    Timing: throughout the day    Duration: 2 weeks    Course: gradually worsening              Comments     FLOWER drooping and LE redness for about last two to three weeks. Pt referred to determine if condition is possibly related to Squamous cell carcinoma of tonsil. Pt states is not receiving radiation in the area, but is going through chemo treatment Pt states an aching pain associated with current eye symptoms. Pt concerned about infection. Two days ago was prescribed EES ilya to use four times a day to LE, [facility]  Takes one 81 mg aspirin daily; no other blood thinners.  Rare use of a bifocal. States can at times read without glasses. Pt was a CTL wearer before LASIK 20 years ago.  Last eye exam about a year ago. States negative for cataracts, glaucoma, trauma, infections. Denies amblyopia, strabismus. States as far as he knows, he's correctable to 20/20 BE.   States no known remarkable family ocular diseases.  Brother with  DM.  Mahsa York, COT COT 9:15 AM 02/10/2021

## 2021-02-10 NOTE — LETTER
2/10/2021       RE: Fortino Moya  [address]  [place] [address]     Dear Colleague,    Thank you for referring your patient, Fortino Moya, to the [facility] at [facility]. Please see a copy of my visit note below.    - reviewed notes and images from outside provider and the rest of the care team.    CC: exposure keratopathy OS    HPI:  Fortino Moya is a 70 year old male with history of L oropharyngeal squamous cell cancer (first diagnosed 2018) and recent biopsy-diagnosed invasive squamous cell carcinoma (began induction chemotherapy 1/28/21) who presents as a referral for     Of note, patient has a history of L oropharynx P16+ squamous cell cancer first diagnosed in 2018. The patient underwent low dose weekly carboplatin 1.5 AUC with radiation. His PET/CT 2 months after initiation and underwent surveillance after that. In 08/2020, he developed numbness in chin and then forehead. He underwent PET/CT and IR-guided biopsy (1/2021), which demonstrated invasive squamous cell carcinoma.    Per oncology, patient has left-sided paralysis. The patient first noticed left eye redness and lower lid drooping three weeks ago. He notes that these symptoms have progressed during this time. He notes associated watery discharge. He was seen by oncology and started on erythromycin ilya yesterday then recommended to follow up with ophthalmology for further management. He has also been using AT BID during this time. He denies eye irritation, pain, purulent discharge, and flashes/floaters. He notes baseline left eye vision when not using erythromycin ointment.    POH: refractive error (intermittent glasses use)  Surgery: LASIK (25-30 years ago)  GTTS: AT, erythromycin ilya    PMH:  -L oropharyngeal squamous cell cancer (first diagnosed 2018), recently diagnosed invasive squamous cell cancer (on chemotherapy)  -HTN    FH:  -No AMD, glaucoma    CT soft tissue neck  (1/18/21):  1. Abnormal hypermetabolic activity in the left infratemporal fossa  involving the medial and lateral pterygoid musculature and the  mandibular branch of the left 5th cranial nerve. Abnormal  hypermetabolic activity tracks through the left foramen ovale into the  medial aspect of the left middle cranial fossa and posteriorly to the  root entry zone of the left 5th cranial nerve.  2. Otherwise, normal head and neck PET/CT.    PET oncology  (1/18/21):  In this patient with tonsillar cancer status post chemotherapy and  radiation with new disease in the left  space, not currently  on chemotherapy:  1.  No evidence for metastatic disease in the chest, abdomen, pelvis.  2.  See dedicated neuroradiology report for the results of the high  resolution PET CT of the neck.     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 New disease in the left  space s/p PET/CT and IR-guided biopsy consistent with invasive squamous cell carcinoma s/p induction chemotherapy (1/28/21).  - Exam notable for significant paralytic lagophthalmos with complete scleral show, exposure keratopathy, and large neurotrophic corneal ulcer  - Patient will require aggressive lubrication with antibiotic coverage and permanent tarsorrhaphy lateral 50% for improved corneal protection  - Obtain slit lamp photos OS to monitor  - Start ofloxacin BID, left eye  - Start celluvisc 6x daily, left eye  - Arranged follow up in oculoplastics clinic at the [facility] today at 12 pm for permanent tarso, lateral 50%    RTC: follow up in cornea clinic in 3-4 weeks.    Narda Mancini MD  Ophthalmology Resident, PGY-2    Attending Physician Attestation:   Complete documentation of historical and exam elements from today's encounter can be found in the full encounter summary report (not reduplicated in this progress note).  I personally obtained the chief complaint(s) and history of present illness.  I confirmed and edited as necessary the review of systems, past medical/surgical history, family history, social history, and examination findings as documented by others; and I examined the patient myself.  I personally reviewed the relevant tests, images, and reports as documented above.  I formulated and edited as necessary the assessment and plan and discussed the findings and management plan with the patient and family. I personally reviewed the ophthalmic test(s) associated with this encounter, agree with the interpretation(s) as documented by the resident/fellow, and have edited the corresponding report(s) as necessary. - Ranjit Bonilla MD    I personally spent great than 40min with the patient, of which >50% of the time was spent face to face with the patient, counseling and coordinating care with the patient. We discussed the complexity of his diagnosis, the need for further information prior to proceeding with yet another surgery, and the unknown prognosis for the patient at this time. Coordinated care with oculoplastics.    Ranjit Bonilla MD

## 2021-02-10 NOTE — TELEPHONE ENCOUNTER
FUTURE VISIT INFORMATION      FUTURE VISIT INFORMATION:    Date: 2.10.21    Time: 12:30 PM     [place] [facility]  REFERRAL INFORMATION:    Referring provider:  Dr Ranjit Bonilla    Referring providers clinic:  [facility]    Reason for visit/diagnosis: 50% permanent tarso    RECORDS REQUESTED FROM:       Clinic name Comments Records Status Imaging Status   [facility] 2.10.21 Dr Ranjit Bonilla Internal

## 2021-02-10 NOTE — LETTER
2/10/2021         RE:  :  MRN: Fortino Moya  1950  [id number]     Dear Dr. Bonilla,    Thank you for asking me to see your patient, Fortino Moya, for an oculoplastic   consultation.  My assessment and plan are below.  For further details, please see my attached clinic note.      Assessment & Plan     Fortino Moya is a 70 year old male with the following diagnoses:   1. Paralytic lagophthalmos    2. Neurotrophic keratopathy of left eye    3. Squamous cell carcinoma of tonsil (H)    4. Facial palsy       PLAN:  Left lateral tarsorrhaphy - 50% performed in clinic without complication  Patient will follow up with Dr. Bonilla for cornea  Return to clinic - oculoplastics as needed         Again, thank you for allowing me to participate in the care of your patient.      Sincerely,    Hira Pendleton MD  Department of Ophthalmology and Visual Neurosciences  [facility]    CC: Ranjit Bonilla MD  [address]  [place] [id number]  Via In Basket     [facility] [place] [facility]  [address] [facility] [address] [address] [place] [address]  Via Fax: [phone]     Diana King, CNP  [address]  [facility] [address]  Via In Basket

## 2021-02-10 NOTE — PROGRESS NOTES
- reviewed notes and images from outside provider and the rest of the care team.    CC: exposure keratopathy OS    HPI:  Fortino Moya is a 70 year old male with history of L oropharyngeal squamous cell cancer (first diagnosed 2018) and recent biopsy-diagnosed invasive squamous cell carcinoma (began induction chemotherapy 1/28/21) who presents as a referral for     Of note, patient has a history of L oropharynx P16+ squamous cell cancer first diagnosed in 2018. The patient underwent low dose weekly carboplatin 1.5 AUC with radiation. His PET/CT 2 months after initiation and underwent surveillance after that. In 08/2020, he developed numbness in chin and then forehead. He underwent PET/CT and IR-guided biopsy (1/2021), which demonstrated invasive squamous cell carcinoma.    Per oncology, patient has left-sided paralysis. The patient first noticed left eye redness and lower lid drooping three weeks ago. He notes that these symptoms have progressed during this time. He notes associated watery discharge. He was seen by oncology and started on erythromycin ilya yesterday then recommended to follow up with ophthalmology for further management. He has also been using AT BID during this time. He denies eye irritation, pain, purulent discharge, and flashes/floaters. He notes baseline left eye vision when not using erythromycin ointment.    POH: refractive error (intermittent glasses use)  Surgery: LASIK (25-30 years ago)  GTTS: AT, erythromycin ilya    PMH:  -L oropharyngeal squamous cell cancer (first diagnosed 2018), recently diagnosed invasive squamous cell cancer (on chemotherapy)  -HTN    FH:  -No AMD, glaucoma    CT soft tissue neck (1/18/21):  1. Abnormal hypermetabolic activity in the left infratemporal fossa  involving the medial and lateral pterygoid musculature and the  mandibular branch of the left 5th cranial nerve. Abnormal  hypermetabolic activity tracks through the left foramen ovale into the  medial aspect of  the left middle cranial fossa and posteriorly to the  root entry zone of the left 5th cranial nerve.  2. Otherwise, normal head and neck PET/CT.    PET oncology  (1/18/21):  In this patient with tonsillar cancer status post chemotherapy and  radiation with new disease in the left  space, not currently  on chemotherapy:  1.  No evidence for metastatic disease in the chest, abdomen, pelvis.  2.  See dedicated neuroradiology report for the results of the high  resolution PET CT of the neck.     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 New disease in the left  space s/p PET/CT and IR-guided biopsy consistent with invasive squamous cell carcinoma s/p induction chemotherapy (1/28/21).  - Exam notable for significant paralytic lagophthalmos with complete scleral show, exposure keratopathy, and large neurotrophic corneal ulcer  - Patient will require aggressive lubrication with antibiotic coverage and permanent tarsorrhaphy lateral 50% for improved corneal protection  - Obtain slit lamp photos OS to monitor  - Start ofloxacin BID, left eye  - Start celluvisc 6x daily, left eye  - [place] [facility] [place] permanent tarso, lateral 50%    RTC: follow up in cornea clinic in 3-4 weeks.    Narda Mancini MD  Ophthalmology Resident, PGY-2    Attending Physician Attestation:  Complete documentation of historical and exam elements from today's encounter can be found in the full encounter summary report (not reduplicated in this progress note).  I personally obtained the chief complaint(s) and history of present illness.  I confirmed and edited as necessary the review  of systems, past medical/surgical history, family history, social history, and examination findings as documented by others; and I examined the patient myself.  I personally reviewed the relevant tests, images, and reports as documented above.  I formulated and edited as necessary the assessment and plan and discussed the findings and management plan with the patient and family. I personally reviewed the ophthalmic test(s) associated with this encounter, agree with the interpretation(s) as documented by the resident/fellow, and have edited the corresponding report(s) as necessary. - Ranjit Bonilla MD    I personally spent great than 40min with the patient, of which >50% of the time was spent face to face with the patient, counseling and coordinating care with the patient. We discussed the complexity of his diagnosis, the need for further information prior to proceeding with yet another surgery, and the unknown prognosis for the patient at this time. Coordinated care with oculoplastics.    Ranjit Bonilla MD     Mother

## 2021-02-11 ENCOUNTER — THERAPY VISIT (OUTPATIENT)
Dept: SPEECH THERAPY | Facility: CLINIC | Age: 71
End: 2021-02-11
Payer: MEDICARE

## 2021-02-11 ENCOUNTER — INFUSION THERAPY VISIT (OUTPATIENT)
Dept: ONCOLOGY | Facility: CLINIC | Age: 71
End: 2021-02-11
Attending: INTERNAL MEDICINE
Payer: MEDICARE

## 2021-02-11 ENCOUNTER — ANCILLARY PROCEDURE (OUTPATIENT)
Dept: GENERAL RADIOLOGY | Facility: CLINIC | Age: 71
End: 2021-02-11
Attending: PHYSICIAN ASSISTANT
Payer: MEDICARE

## 2021-02-11 VITALS — DIASTOLIC BLOOD PRESSURE: 87 MMHG | SYSTOLIC BLOOD PRESSURE: 150 MMHG

## 2021-02-11 DIAGNOSIS — C09.9 TONSILLAR CANCER (H): Primary | ICD-10-CM

## 2021-02-11 DIAGNOSIS — G50.9: ICD-10-CM

## 2021-02-11 DIAGNOSIS — C09.9 SQUAMOUS CELL CARCINOMA OF TONSIL (H): ICD-10-CM

## 2021-02-11 PROCEDURE — 258N000003 HC RX IP 258 OP 636: Performed by: NURSE PRACTITIONER

## 2021-02-11 PROCEDURE — 92526 ORAL FUNCTION THERAPY: CPT | Mod: GN | Performed by: SPEECH-LANGUAGE PATHOLOGIST

## 2021-02-11 PROCEDURE — 92611 MOTION FLUOROSCOPY/SWALLOW: CPT | Mod: GN | Performed by: SPEECH-LANGUAGE PATHOLOGIST

## 2021-02-11 PROCEDURE — 96360 HYDRATION IV INFUSION INIT: CPT

## 2021-02-11 PROCEDURE — 92610 EVALUATE SWALLOWING FUNCTION: CPT | Mod: GN | Performed by: SPEECH-LANGUAGE PATHOLOGIST

## 2021-02-11 PROCEDURE — 74230 X-RAY XM SWLNG FUNCJ C+: CPT | Mod: GC | Performed by: RADIOLOGY

## 2021-02-11 PROCEDURE — 250N000011 HC RX IP 250 OP 636: Performed by: INTERNAL MEDICINE

## 2021-02-11 RX ORDER — HEPARIN SODIUM (PORCINE) LOCK FLUSH IV SOLN 100 UNIT/ML 100 UNIT/ML
5 SOLUTION INTRAVENOUS
Status: CANCELLED | OUTPATIENT
Start: 2021-02-11

## 2021-02-11 RX ORDER — AMOXICILLIN AND CLAVULANATE POTASSIUM 600; 42.9 MG/5ML; MG/5ML
875 POWDER, FOR SUSPENSION ORAL 2 TIMES DAILY
Qty: 102.2 ML | Refills: 0 | Status: SHIPPED | OUTPATIENT
Start: 2021-02-11 | End: 2021-02-12 | Stop reason: DRUGHIGH

## 2021-02-11 RX ORDER — BARIUM SULFATE 400 MG/ML
25 SUSPENSION ORAL ONCE
Status: COMPLETED | OUTPATIENT
Start: 2021-02-11 | End: 2021-02-11

## 2021-02-11 RX ORDER — HEPARIN SODIUM,PORCINE 10 UNIT/ML
5 VIAL (ML) INTRAVENOUS
Status: CANCELLED | OUTPATIENT
Start: 2021-02-11

## 2021-02-11 RX ORDER — HEPARIN SODIUM (PORCINE) LOCK FLUSH IV SOLN 100 UNIT/ML 100 UNIT/ML
5 SOLUTION INTRAVENOUS
Status: DISCONTINUED | OUTPATIENT
Start: 2021-02-11 | End: 2021-02-11 | Stop reason: HOSPADM

## 2021-02-11 RX ADMIN — SODIUM CHLORIDE 2000 ML: 9 INJECTION, SOLUTION INTRAVENOUS at 15:00

## 2021-02-11 RX ADMIN — BARIUM SULFATE 25 ML: 400 SUSPENSION ORAL at 13:26

## 2021-02-11 RX ADMIN — SODIUM CHLORIDE, PRESERVATIVE FREE 5 ML: 5 INJECTION INTRAVENOUS at 16:35

## 2021-02-11 ASSESSMENT — PAIN SCALES - GENERAL: PAINLEVEL: MODERATE PAIN (5)

## 2021-02-11 NOTE — PROGRESS NOTES
Infusion Nursing Note:  Fortino Moya presents today for IV fluids.        Note: Fortino says he is feeling well today.  He continues have decreased intake.  Denies lightheadedness or dizziness  Throat pain is rated at 5/10 today and says he gets relief from his oxycodone.  Blood Pressure slightly elevated 150/80.  Only 1L of NS was given and Diana King aware via inbasket.      Was seen by ophthalmology yesterday.  Referred there by our team due to swelling and eye irritation.  A procedure was performed on the L eye and he will follow up with cornea clinic in 3-4 weeks.  A picture was taken of eye at today's visit so Diana can refer to it tomorrow when she sees pt.      Intravenous Access:  Implanted Port.        Post Infusion Assessment:  Patient tolerated infusion without incident.       Discharge Plan:   Copy of AVS reviewed with patient and/or family.  Patient has a virtual visit with Diana King tomorrow  Face to Face time: 0.    Valerie Drummond RN

## 2021-02-11 NOTE — DISCHARGE INSTRUCTIONS
Videofluoroscopic Swallow Study Results    Problem Identified: Aspiration noted on all consistencies which was minimal to mild and reduced with cued cough. There is residual throughout the pharynx (throat) which moves into the airway between swallows. Reflexive throat clearing noted throughout the swallow study however this was not effective in clearing all the of penetration/aspiration.     Diet Recommended: Nectar thick liquids, smooth purees (Dysphagia diet level 2). You will benefit from enteral support via either NG or PEG tube. We discussed the difference between the nasogastric tube and the PEG (through the skin in the belly) tube. You have an appointment tomorrow to have the NG placed. Please attend this appointment.     Swallowing Suggestions:  Sit upright at 90 degrees  Eat slowly  Chew carefully  Do not talk while chewing or swallowing  Small bites/sips  Smaller, more frequent meals  Alternate liquids and solids  Remain upright for 3 hours after intake    Additional information provided:    Pharyngeal Exercises  List of foods appropriate for Level 1 Dysphagia Diet    Follow up: Follow up in 2-3 weeks for ongoing swallowing therapy and evaluation.  will contact you to get this scheduled. We will try to get it schduled in conjunction with another appointment here at the [facility] and surgery center. Please contact me with any questions or concerns following this swallow study.     Heydi Christina MS, CCC-SLP  Speech-Language Pathology  [facility]  Department of Otolaryngology/D&T - 4th floor  Phone: [phone]  Email: [email or web address]

## 2021-02-12 ENCOUNTER — HOSPITAL ENCOUNTER (OUTPATIENT)
Dept: GENERAL RADIOLOGY | Facility: CLINIC | Age: 71
End: 2021-02-12
Attending: NURSE PRACTITIONER
Payer: MEDICARE

## 2021-02-12 ENCOUNTER — VIRTUAL VISIT (OUTPATIENT)
Dept: ONCOLOGY | Facility: CLINIC | Age: 71
End: 2021-02-12
Attending: INTERNAL MEDICINE
Payer: MEDICARE

## 2021-02-12 ENCOUNTER — HOME INFUSION (PRE-WILLOW HOME INFUSION) (OUTPATIENT)
Dept: PHARMACY | Facility: CLINIC | Age: 71
End: 2021-02-12

## 2021-02-12 ENCOUNTER — TELEPHONE (OUTPATIENT)
Dept: ONCOLOGY | Facility: CLINIC | Age: 71
End: 2021-02-12

## 2021-02-12 ENCOUNTER — PATIENT OUTREACH (OUTPATIENT)
Dept: ONCOLOGY | Facility: CLINIC | Age: 71
End: 2021-02-12

## 2021-02-12 DIAGNOSIS — C09.9 SQUAMOUS CELL CARCINOMA OF TONSIL (H): Primary | ICD-10-CM

## 2021-02-12 DIAGNOSIS — C09.9 TONSILLAR CANCER (H): ICD-10-CM

## 2021-02-12 PROCEDURE — 99207 XR FEEDING TUBE PLACEMENT: CPT | Mod: GC | Performed by: RADIOLOGY

## 2021-02-12 PROCEDURE — 99215 OFFICE O/P EST HI 40 MIN: CPT | Mod: 95 | Performed by: NURSE PRACTITIONER

## 2021-02-12 PROCEDURE — 44500 INTRO GASTROINTESTINAL TUBE: CPT

## 2021-02-12 PROCEDURE — 250N000009 HC RX 250: Performed by: RADIOLOGY

## 2021-02-12 PROCEDURE — 999N001193 HC VIDEO/TELEPHONE VISIT; NO CHARGE

## 2021-02-12 PROCEDURE — 74340 X-RAY GUIDE FOR GI TUBE: CPT

## 2021-02-12 RX ORDER — LACTOSE-REDUCED FOOD/FIBER 0.07 G-1.5
1 LIQUID (ML) ORAL
Qty: 100 CAN | Refills: 11 | COMMUNITY
Start: 2021-02-12 | End: 2021-06-30

## 2021-02-12 RX ORDER — LIDOCAINE HYDROCHLORIDE 20 MG/ML
15 SOLUTION OROPHARYNGEAL ONCE
Status: COMPLETED | OUTPATIENT
Start: 2021-02-12 | End: 2021-02-12

## 2021-02-12 RX ORDER — AMOXICILLIN AND CLAVULANATE POTASSIUM 400; 57 MG/5ML; MG/5ML
875 POWDER, FOR SUSPENSION ORAL 2 TIMES DAILY
Qty: 152.6 ML | Refills: 0 | Status: SHIPPED | OUTPATIENT
Start: 2021-02-12 | End: 2021-02-19

## 2021-02-12 RX ADMIN — LIDOCAINE HYDROCHLORIDE 15 ML: 20 SOLUTION ORAL; TOPICAL at 10:10

## 2021-02-12 NOTE — LETTER
"    2/12/2021         RE: Fortino Moya  [address]  [place] [facility] [address]        Dear Colleague,    Thank you for referring your patient, Fortino Moya, to the [facility]. Please see a copy of my visit note below.      Fortino is a 70 year old who is being evaluated via a billable video visit.      How would you like to obtain your AVS? MyChart  If the video visit is dropped, the invitation should be resent by: Send to e-mail at: [email or web address]  Will anyone else be joining your video visit? No      Vitals - Patient Reported  Weight (Patient Reported): 78.5 kg (173 lb)  Height (Patient Reported): 177.8 cm (5' 10\")  BMI (Based on Pt Reported Ht/Wt): 24.82  Pain Score: Moderate Pain (4)  Pain Loc: (PATIENT REPORTS PAIN IN THROAT)      I have reviewed and updated patient's allergy and medication list.    Concerns: NONE  Refills: NONE      Gauri Lui CMA      Video-Visit Details    Type of service:  Video Visit    Video Start Time: 1:20 PM    Video End Time:1:50 PM    Originating Location (pt. Location): Home    Distant Location (provider location):  [facility]     Platform used for Video Visit: [employer]      February 12, 2021    Reason for Visit: follow up recurrent L oropharynx 16+shanti SCC    Oncology HPI:   Fortino Moya is a 71 yo man with a history of L oropharynx P16+ squamous cell cancer that was first diagnosed in 2018 in [place], [place].  He initially was offered chemoradiation, but due to a prior history of L sided hearing loss, he was given low dose weekly carboplatin 1.5 AUC weekly with radiation.  His 3 month PET/CT after initiation was negative and he underwent surveillance after that.  He had a tough time with chemoradiation - he tolerated it with expected toxicity, however, there was a prolonged recovery and during that time, he got a divorce and he really feels that mentally, he was not right during the treatment and that it led to some poor " "decisions on his own part.  He relocated to [place] which is where he is from in Feb 2020 and has been followed since by Dr. Treviño and Dr. Flores at [facility].  In August of 2020, he  developed numbenss in chin and then moved up to his forehead.  He had a Pet/CT done in Sept 2020 that showed a hypermetabolic area near his L foramen ovale - he had had recent dental work in that area and it was thought that this might be scar or inflammation related to that procedure.  However, he was offered a 2nd opinion and came to [facility] and saw Dr. Russell in Nov 2020.  Images were reviewed at tumor boardon 12/4/20 - and it was felt that this was concerning enough to merit biopsy - so he was referred for IR-guided biopsy.   IR guided biopsy happened on 1/8/2021 and came back as invasive squamous cell carcinoma.      I am seeing him in follow up after initiation of TPF chemotherapy 1/28    Interval history:   Fortino has had a busy week. He got his NG feeding tube placed this morning but they had some trouble during the procedure with placement. He had an eye procedure done earlier this week, he is putting the eye drops in as they recommended. It is inflamed but no drainage. Has some pain in throat but more worried about the challenges with swallowing. Taking oxycodone sparingly, every other day or so. Lips are dry and cracking for which he is applying moisturizer. Overall his mouth is a bit better than last week. Weighs 173 lbs. Denies dizziness or s/s dehydration    Has some congestion that has been present for 1-2 weeks. Denies shortness of breath and fatigue. Has occasional cough when his mouth is dry like a \"tickle\" but denies coughing after he swallows. No change in hearing or neuropathy. No fever or chills at home.     10 point review of systems otherwise negative    Current Outpatient Medications   Medication Sig Dispense Refill     amoxicillin-clavulanate (AUGMENTIN-ES) 600-42.9 MG/5ML suspension Take 7.3 mLs (875 " mg) by mouth 2 times daily for 14 doses 102.2 mL 0     acetaminophen (TYLENOL) 32 mg/mL liquid Take 31.25 mLs (1,000 mg) by mouth every 6 hours as needed for pain 473 mL 0     Aspirin Buf,CaCarb-MgCarb-MgO, 81 MG TABS Take 81 mg by mouth       atorvastatin (LIPITOR) 10 MG tablet Take 10 mg by mouth       carboxymethylcellulose PF (REFRESH LIQUIGEL) 1 % ophthalmic gel Place 1 drop Into the left eye 4 times daily 30 each 11     carvedilol (COREG) 12.5 MG tablet TAKE TWO TABLETS BY MOUTH EVERY MORNING AND ONE TABLET EVERY EVENING       cevimeline (EVOXAC) 30 MG capsule TAKE ONE CAPSULE BY MOUTH THREE TIMES DAILY       cholecalciferol 25 MCG (1000 UT) TABS Take 1,000 Units by mouth       dexamethasone (DECADRON) 4 MG tablet Take 2 tablets (8 mg) by mouth 2 times daily (with meals) Start the evening prior to Docetaxel, continue morning of Docetaxel, and continue for 4 additional doses. 12 tablet 3     erythromycin (ROMYCIN) 5 MG/GM ophthalmic ointment Place 0.5 inches Into the left eye At Bedtime Instill ~1 cm ribbon into affected eye(s) 4 times daily for 7 days 1 g 0     flecainide (TAMBOCOR) 150 MG tablet Take 150 mg by mouth every 12 hours as needed       ibuprofen (ADVIL/MOTRIN) 600 MG tablet TAKE 1 TABLET BY MOUTH THREE TIMES DAILY AS NEEDED FOR PAIN       levofloxacin (LEVAQUIN) 250 MG tablet Take 1 tablet (250 mg) by mouth daily for 10 days 10 tablet 0     lidocaine (XYLOCAINE) 2 % solution        lisinopril (ZESTRIL) 20 MG tablet Take 20 mg by mouth       LORazepam (ATIVAN) 0.5 MG tablet Take 1 tablet (0.5 mg) by mouth every 4 hours as needed (Anxiety, Nausea/Vomiting or Sleep) 30 tablet 3     magic mouthwash (ENTER INGREDIENTS IN COMMENTS) suspension Swish and spit every 4 hours as needed 240 mL 0     MULTIPLE VITAMIN PO Take 1 tablet by mouth       nystatin (MYCOSTATIN) 692500 UNIT/ML suspension Take 5 mLs (500,000 Units) by mouth 4 times daily Swish and swallow 4 times a day 400 mL 1     ofloxacin (OCUFLOX)  0.3 % ophthalmic solution Place 1 drop Into the left eye 2 times daily 5 mL 0     oxyCODONE (ROXICODONE) 5 MG/5ML solution Take 5 mLs (5 mg) by mouth every 4 hours as needed for severe pain 500 mL 0     prochlorperazine (COMPAZINE) 10 MG tablet Take 1 tablet (10 mg) by mouth every 6 hours as needed (Nausea/Vomiting) 30 tablet 3     sildenafil (VIAGRA) 100 MG tablet Take  mg by mouth       traZODone (DESYREL) 50 MG tablet TAKE 1 TO 2 TABLETS BY MOUTH ONCE DAILY AT BEDTIME        No Known Allergies    Exam:    There were no vitals taken for this visit.  Wt Readings from Last 4 Encounters:   02/10/21 80.3 kg (177 lb)   02/06/21 79.4 kg (175 lb)   01/28/21 83.5 kg (184 lb)   01/20/21 82.1 kg (181 lb)     Video physical exam  General: Patient appears well in no acute distress. L facial droop, pre-existing  Skin: No visualized rash or lesions on visualized skin.   Eyes: EOMI, L eye edematous, but no clear drainage from my video assessment  Resp: Appears to be breathing comfortably without accessory muscle usage, speaking in full sentences, no cough  MSK: Appears to have normal range of motion based on visualized movements  Neurologic: No apparent tremors, facial movements symmetric  Psych: affect good, alert and oriented    The rest of a comprehensive physical examination is deferred due to PHE (public health emergency) video restrictions.     Labs:    2/10/2021 14:45   Sodium 138   Potassium 3.8   Chloride 105   Carbon Dioxide 28   Urea Nitrogen 16   Creatinine 0.68   GFR Estimate >90   GFR Estimate If Black >90   Calcium 8.6   Anion Gap 5   Albumin 3.0 (L)   Protein Total 6.1 (L)   Bilirubin Total 0.4   Alkaline Phosphatase 117   ALT 28   AST 23   Glucose 120 (H)   WBC 17.4 (H)   Hemoglobin 12.4 (L)   Hematocrit 35.7 (L)   Platelet Count 204   RBC Count 3.87 (L)   MCV 92   MCH 32.0   MCHC 34.7   RDW 12.1   Diff Method Manual Differential   % Neutrophils 84.0   % Lymphocytes 5.0   % Monocytes 6.0   % Eosinophils  2.0   % Basophils 0.0   % Metamyelocytes 3.0   Nucleated RBCs 1 (H)   Absolute Neutrophil 14.6 (H)   Absolute Lymphocytes 0.9   Absolute Monocytes 1.0   Absolute Eosinophils 0.3   Absolute Basophils 0.0   Absolute Metamyelocytes 0.5 (H)   Absolute Nucleated RBC 0.2   RBC Morphology Consistent with reported results   Platelet Estimate Automated count confirmed.  Platelet morphology is normal.       Imaging:   FINDINGS: The feeding tube was advanced under fluoroscopic guidance  with final position of tip in the stomach. Despite multiple attempts,  the feeding tube was unable to be advanced to postpyloric position. A  small amount of barium was injected to demonstrate placement within  the stomach. The tube was flushed with sterile water and secured via  bandage dressing. There were no complications of the procedure.                                                                      IMPRESSION: Difficult placement of feeding tube with tip terminating  in the stomach.     ----------------------------------------------------------------------------------------------    Examination:  Modified Barium Swallow Study with Speech Pathology,  2/11/2021     Comparison: PET CT 1/18/2020     History: Squamous cell carcinoma, difficulty swallowing with concern  for aspiration.     Fluoroscopy time: 2.1 minutes.     Findings: Under fluoroscopic guidance, the patient was given orally  administered barium of varying consistencies in the presence of the  speech pathology service.      Residual penetration with eventual aspiration seen with all  consistencies of barium. Large post swallow residual is noted with  barium coated cracker and pudding. AP images demonstrate asymmetric  swallow with left-sided dysfunction.                                                                       Impression:   Laryngeal penetration with eventual aspiration is seen with all  consistencies of barium. Please see the speech pathologist report  for  further details.       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 Dr. Shah previously discussed at tumor board doing SBRT to the recurrent lesion, however, on most recent PET scan, there was concern that it had gotten too large to consider SBRT.  Thus, it was discussed the possibility of using induction chemotherapy with the goal of shrinking it enough that we can consider some form of local therapy.  We did discuss that the goal of chemotherapy in this setting was not curative, but it is meant to make it possible to try curative intent treatment either with chemoradiation or SBRT or (hypothetically, even surgery).  -Began induction with TPF chemotherapy 1/28. Tolerating poorly with mucositis and dysphasia, see below.   -has follow up with me next week for evaluation for cycle 2    Dysphagia:  Evaluated by speech 2/11 with clear aspiration. NG feeding tube urgently placed this morning. Working with jodie [employer] to get supplies delivered and Ramila SCHOFIELD to start some education. Speech recommending PEG but jaron is not interested right now, would rather see how things go with NG. I explained this is not a long term solution and we likely will need to revisit this.     Leukocytosis: could be coming down from neulasta, also could be infectious source with sinus congestion and possible asp pna. Monitor for fever. Augmentin BID x7 days.     Sinus congestion: started 1-2 weeks ago per jaron. Given compromised state, treating with Augmentin BID x7 days as above.     Nutrition: challenging with mucositis, now with severe dysphagia. initating TF asap as above.     Mucositis: 2/2 5FU. Continue MMW and oxycodone prn and s/s swishes 4-6x per day. Again discussed importance of this to keep mouth clean. Getting a bit better per his report.     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 Previously discussed with Dr. Shah reviewed MRI results with him. timeline or extent of improvement  unclear.    Paralytic lagophthalmos with complete scleral show  -recently evaluated by optho 2/10, Left lateral tarsorrhaphy. Given eye drops which I recommended he continue. Monitor for improvement of edema.     QT risk: levaquin prophy and flecainide have mod risk of QT prolongation. EKG pre-preatment earlier this month 438. Given levaquin is short term therapy will monitor with EKG prior to cycle 2.    Inflamed cyst: evaluated last week by Nia Bautista. Derm surgery referral placed for future excision.       60 minutes spent on the date of the encounter doing chart review, review of outside records, review of test results, interpretation of tests, patient visit, documentation and discussion with other provider(s)       Diana King CNP on 2/12/2021 at 2:55 PM        Again, thank you for allowing me to participate in the care of your patient.        Sincerely,        Diana King CNP

## 2021-02-12 NOTE — PROGRESS NOTES
"  Fortino is a 70 year old who is being evaluated via a billable video visit.      How would you like to obtain your AVS? MyChart  If the video visit is dropped, the invitation should be resent by: Send to e-mail at: [email or web address]  Will anyone else be joining your video visit? No      Vitals - Patient Reported  Weight (Patient Reported): 78.5 kg (173 lb)  Height (Patient Reported): 177.8 cm (5' 10\")  BMI (Based on Pt Reported Ht/Wt): 24.82  Pain Score: Moderate Pain (4)  Pain Loc: (PATIENT REPORTS PAIN IN THROAT)      I have reviewed and updated patient's allergy and medication list.    Concerns: NONE  Refills: NONE      Gauri Lui CMA      Video-Visit Details    Type of service:  Video Visit    Video Start Time: 1:20 PM    Video End Time:1:50 PM    Originating Location (pt. 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  He initially was offered chemoradiation, but due to a prior history of L sided hearing loss, he was given low dose weekly carboplatin 1.5 AUC weekly with radiation.  His 3 month PET/CT after initiation was negative and he underwent surveillance after that.  He had a tough time with chemoradiation - he tolerated it with expected toxicity, however, there was a prolonged recovery and during that time, he got a divorce and he really feels that mentally, he was not right during the treatment and that it led to some poor decisions on his own part.  He relocated to [place] which is where he is from in Feb 2020 and has been followed since by Dr. Treviño and Dr. Flores at [facility].  In August of 2020, he  developed numbenss in chin and then moved up to his forehead. " " He had a Pet/CT done in Sept 2020 that showed a hypermetabolic area near his L foramen ovale - he had had recent dental work in that area and it was thought that this might be scar or inflammation related to that procedure.  However, he was offered a 2nd opinion and came to [facility] and saw Dr. Russell in Nov 2020.  Images were reviewed at tumor boardon 12/4/20 - and it was felt that this was concerning enough to merit biopsy - so he was referred for IR-guided biopsy.   IR guided biopsy happened on 1/8/2021 and came back as invasive squamous cell carcinoma.      I am seeing him in follow up after initiation of TPF chemotherapy 1/28    Interval history:   Fortino has had a busy week. He got his NG feeding tube placed this morning but they had some trouble during the procedure with placement. He had an eye procedure done earlier this week, he is putting the eye drops in as they recommended. It is inflamed but no drainage. Has some pain in throat but more worried about the challenges with swallowing. Taking oxycodone sparingly, every other day or so. Lips are dry and cracking for which he is applying moisturizer. Overall his mouth is a bit better than last week. Weighs 173 lbs. Denies dizziness or s/s dehydration    Has some congestion that has been present for 1-2 weeks. Denies shortness of breath and fatigue. Has occasional cough when his mouth is dry like a \"tickle\" but denies coughing after he swallows. No change in hearing or neuropathy. No fever or chills at home.     10 point review of systems otherwise negative    Current Outpatient Medications   Medication Sig Dispense Refill     amoxicillin-clavulanate (AUGMENTIN-ES) 600-42.9 MG/5ML suspension Take 7.3 mLs (875 mg) by mouth 2 times daily for 14 doses 102.2 mL 0     acetaminophen (TYLENOL) 32 mg/mL liquid Take 31.25 mLs (1,000 mg) by mouth every 6 hours as needed for pain 473 mL 0     Aspirin Buf,CaCarb-MgCarb-MgO, 81 MG TABS Take 81 mg by mouth       atorvastatin " (LIPITOR) 10 MG tablet Take 10 mg by mouth       carboxymethylcellulose PF (REFRESH LIQUIGEL) 1 % ophthalmic gel Place 1 drop Into the left eye 4 times daily 30 each 11     carvedilol (COREG) 12.5 MG tablet TAKE TWO TABLETS BY MOUTH EVERY MORNING AND ONE TABLET EVERY EVENING       cevimeline (EVOXAC) 30 MG capsule TAKE ONE CAPSULE BY MOUTH THREE TIMES DAILY       cholecalciferol 25 MCG (1000 UT) TABS Take 1,000 Units by mouth       dexamethasone (DECADRON) 4 MG tablet Take 2 tablets (8 mg) by mouth 2 times daily (with meals) Start the evening prior to Docetaxel, continue morning of Docetaxel, and continue for 4 additional doses. 12 tablet 3     erythromycin (ROMYCIN) 5 MG/GM ophthalmic ointment Place 0.5 inches Into the left eye At Bedtime Instill ~1 cm ribbon into affected eye(s) 4 times daily for 7 days 1 g 0     flecainide (TAMBOCOR) 150 MG tablet Take 150 mg by mouth every 12 hours as needed       ibuprofen (ADVIL/MOTRIN) 600 MG tablet TAKE 1 TABLET BY MOUTH THREE TIMES DAILY AS NEEDED FOR PAIN       levofloxacin (LEVAQUIN) 250 MG tablet Take 1 tablet (250 mg) by mouth daily for 10 days 10 tablet 0     lidocaine (XYLOCAINE) 2 % solution        lisinopril (ZESTRIL) 20 MG tablet Take 20 mg by mouth       LORazepam (ATIVAN) 0.5 MG tablet Take 1 tablet (0.5 mg) by mouth every 4 hours as needed (Anxiety, Nausea/Vomiting or Sleep) 30 tablet 3     magic mouthwash (ENTER INGREDIENTS IN COMMENTS) suspension Swish and spit every 4 hours as needed 240 mL 0     MULTIPLE VITAMIN PO Take 1 tablet by mouth       nystatin (MYCOSTATIN) 734979 UNIT/ML suspension Take 5 mLs (500,000 Units) by mouth 4 times daily Swish and swallow 4 times a day 400 mL 1     ofloxacin (OCUFLOX) 0.3 % ophthalmic solution Place 1 drop Into the left eye 2 times daily 5 mL 0     oxyCODONE (ROXICODONE) 5 MG/5ML solution Take 5 mLs (5 mg) by mouth every 4 hours as needed for severe pain 500 mL 0     prochlorperazine (COMPAZINE) 10 MG tablet Take 1 tablet  (10 mg) by mouth every 6 hours as needed (Nausea/Vomiting) 30 tablet 3     sildenafil (VIAGRA) 100 MG tablet Take  mg by mouth       traZODone (DESYREL) 50 MG tablet TAKE 1 TO 2 TABLETS BY MOUTH ONCE DAILY AT BEDTIME        No Known Allergies    Exam:    There were no vitals taken for this visit.  Wt Readings from Last 4 Encounters:   02/10/21 80.3 kg (177 lb)   02/06/21 79.4 kg (175 lb)   01/28/21 83.5 kg (184 lb)   01/20/21 82.1 kg (181 lb)     Video physical exam  General: Patient appears well in no acute distress. L facial droop, pre-existing  Skin: No visualized rash or lesions on visualized skin.   Eyes: EOMI, L eye edematous, but no clear drainage from my video assessment  Resp: Appears to be breathing comfortably without accessory muscle usage, speaking in full sentences, no cough  MSK: Appears to have normal range of motion based on visualized movements  Neurologic: No apparent tremors, facial movements symmetric  Psych: affect good, alert and oriented    The rest of a comprehensive physical examination is deferred due to PHE (public health emergency) video restrictions.     Labs:    2/10/2021 14:45   Sodium 138   Potassium 3.8   Chloride 105   Carbon Dioxide 28   Urea Nitrogen 16   Creatinine 0.68   GFR Estimate >90   GFR Estimate If Black >90   Calcium 8.6   Anion Gap 5   Albumin 3.0 (L)   Protein Total 6.1 (L)   Bilirubin Total 0.4   Alkaline Phosphatase 117   ALT 28   AST 23   Glucose 120 (H)   WBC 17.4 (H)   Hemoglobin 12.4 (L)   Hematocrit 35.7 (L)   Platelet Count 204   RBC Count 3.87 (L)   MCV 92   MCH 32.0   MCHC 34.7   RDW 12.1   Diff Method Manual Differential   % Neutrophils 84.0   % Lymphocytes 5.0   % Monocytes 6.0   % Eosinophils 2.0   % Basophils 0.0   % Metamyelocytes 3.0   Nucleated RBCs 1 (H)   Absolute Neutrophil 14.6 (H)   Absolute Lymphocytes 0.9   Absolute Monocytes 1.0   Absolute Eosinophils 0.3   Absolute Basophils 0.0   Absolute Metamyelocytes 0.5 (H)   Absolute Nucleated RBC  0.2   RBC Morphology Consistent with reported results   Platelet Estimate Automated count confirmed.  Platelet morphology is normal.       Imaging:   FINDINGS: The feeding tube was advanced under fluoroscopic guidance  with final position of tip in the stomach. Despite multiple attempts,  the feeding tube was unable to be advanced to postpyloric position. A  small amount of barium was injected to demonstrate placement within  the stomach. The tube was flushed with sterile water and secured via  bandage dressing. There were no complications of the procedure.                                                                      IMPRESSION: Difficult placement of feeding tube with tip terminating  in the stomach.     ----------------------------------------------------------------------------------------------    Examination:  Modified Barium Swallow Study with Speech Pathology,  2/11/2021     Comparison: PET CT 1/18/2020     History: Squamous cell carcinoma, difficulty swallowing with concern  for aspiration.     Fluoroscopy time: 2.1 minutes.     Findings: Under fluoroscopic guidance, the patient was given orally  administered barium of varying consistencies in the presence of the  speech pathology service.      Residual penetration with eventual aspiration seen with all  consistencies of barium. Large post swallow residual is noted with  barium coated cracker and pudding. AP images demonstrate asymmetric  swallow with left-sided dysfunction.                                                                       Impression:   Laryngeal penetration with eventual aspiration is seen with all  consistencies of barium. Please see the speech pathologist report for  further details.       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 Dr. Shah previously discussed at tumor board doing SBRT to the recurrent lesion, however, on most recent PET scan, there was concern that it had gotten too  large to consider SBRT.  Thus, it was discussed the possibility of using induction chemotherapy with the goal of shrinking it enough that we can consider some form of local therapy.  We did discuss that the goal of chemotherapy in this setting was not curative, but it is meant to make it possible to try curative intent treatment either with chemoradiation or SBRT or (hypothetically, even surgery).  -Began induction with TPF chemotherapy 1/28. Tolerating poorly with mucositis and dysphasia, see below.   -has follow up with me next week for evaluation for cycle 2    Dysphagia:  Evaluated by speech 2/11 with clear aspiration. NG feeding tube urgently placed this morning. Working with jodie [employer] to get supplies delivered and Ramila SCHOFIELD to start some education. Speech recommending PEG but jaron is not interested right now, would rather see how things go with NG. I explained this is not a long term solution and we likely will need to revisit this.     Leukocytosis: could be coming down from neulasta, also could be infectious source with sinus congestion and possible asp pna. Monitor for fever. Augmentin BID x7 days.     Sinus congestion: started 1-2 weeks ago per jaron. Given compromised state, treating with Augmentin BID x7 days as above.     Nutrition: challenging with mucositis, now with severe dysphagia. initating TF asap as above.     Mucositis: 2/2 5FU. Continue MMW and oxycodone prn and s/s swishes 4-6x per day. Again discussed importance of this to keep mouth clean. Getting a bit better per his report.     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 Previously discussed with Dr. Shah reviewed MRI results with him. timeline or extent of improvement unclear.    Paralytic lagophthalmos with complete scleral show  -recently evaluated by optho 2/10, Left lateral tarsorrhaphy. Given eye drops which I recommended he continue. Monitor for improvement of edema.     QT risk: levaquin prophy and flecainide have mod risk  of QT prolongation. EKG pre-preatment earlier this month 438. Given levaquin is short term therapy will monitor with EKG prior to cycle 2.    Inflamed cyst: evaluated last week by Nia Bautista. Derm surgery referral placed for future excision.       60 minutes spent on the date of the encounter doing chart review, review of outside records, review of test results, interpretation of tests, patient visit, documentation and discussion with other provider(s)       Diana King CNP on 2/12/2021 at 2:55 PM

## 2021-02-12 NOTE — Clinical Note
February 12, 2021       TO: Fortino Moya  [address]  [facility] [address]       DearMr.Griffin,    We are writing to inform you of your test results.    {p results letter list:[id number]}    No results found from the In Basket message.    ***

## 2021-02-12 NOTE — TELEPHONE ENCOUNTER
Nutrition services:     Received message from Diana HAY CNP regarding Fortino's nutrition and urgent placement of feeding tube.      RD called Fortino today.  He tells me that eating has become much more difficult and he fears he won't be able to drink his nutrition shakes anymore.  He shake intake has declined ~50 %, taking only 25% of his estimated nutrition needs at this time.      Due to inability to meet nutrition needs via PO intake, recommend the following EN support:      RECOMMENDATIONS FOR MD/PROVIDER TO ORDER (already ordered by RNCC)  Enteral Nutrition - referral sent to [facility]  Formula: Isosource 1.5 anastacia  Volume: 7 cartons/day (1750mL, 1330ml free water)  Provisions:  2625kcal (33kcal/kg), 119g protein (1.4g/kg), 308g CHO, 26g fiber      Suggested Tube feeding schedule via gravity feedings  Day 1: 1 carton formula TID spread 3-4 hours apart   Day 2: 1 1/2 cartons formula TID spread 3-4 hours apart   Day 3: 2 cartons formula TID spread 3-4 hours apart    Day 4: 2 cartons formula TID spread 3-4 hours apart; plus 1 carton add'l prn    Flush with 30-60mL water before and after each feeding  Flush with additional 120 mL water QID to meet 100% hydration      Fortino has not received FT education.  RD will call Fortino once he receives his EN supplies to help walk him through the feeding process.  RD sent tube feeding transition insturciton sheet to Fortino's phone and secure email.     Follow-up/monitoring:   --EN intake/tolerance  --Hydration/food intake    Ramila Worrell RD, [facility] [place]  [phone]

## 2021-02-13 ENCOUNTER — HOME INFUSION (PRE-WILLOW HOME INFUSION) (OUTPATIENT)
Dept: PHARMACY | Facility: CLINIC | Age: 71
End: 2021-02-13

## 2021-02-14 ENCOUNTER — HOME INFUSION (PRE-WILLOW HOME INFUSION) (OUTPATIENT)
Dept: PHARMACY | Facility: CLINIC | Age: 71
End: 2021-02-14

## 2021-02-15 NOTE — PROGRESS NOTES
This is a recent snapshot of the patient's [facility] medical record.  For current drug dose and complete information and questions, call [phone] or In Basket pool, [facility] home infusion ([id number])  CSN Number:  [id number]

## 2021-02-15 NOTE — PROGRESS NOTES
OUTPATIENT SWALLOW  EVALUATION  PLAN OF TREATMENT FOR OUTPATIENT REHABILITATION  (COMPLETE FOR INITIAL CLAIMS ONLY)  Patient's Last Name, First Name, M.I.  YOB: 1950  Fortino Moya     Provider's Name   MIGUEL Rodney   Medical Record No.  [id number]     Start of Care Date:  02/11/21   Onset Date:  01/08/21   Type:     ___PT   ____OT  ___X_SLP Medical Diagnosis:   SCC      Treatment Diagnosis:  Moderately severe oropharyngeal dysphagia Visits from SOC:  1     _________________________________________________________________________________  Plan of Treatment/Functional Goals:  Planned Therapy Interventions: Dysphagia Treatment  Dysphagia treatment: Oropharyngeal exercise training, Modified diet education, Instruction of safe swallow strategies, Compensatory strategies for swallowing            Goals   1. Goal Identifier: Diet       Goal Description: 1. Pt will tolerate Dysphagia diet level 1 with nectar thickened liquids while adhering to safe swallow precautions independently 100% of the time.        Target Date: 05/13/21           2. Goal Identifier: Exericses       Goal Description: 2. Pt will improve ROM and strength of oral mechanism by completing 10 repetitions of 5/5 exercises 3 times daily with minimal written or verbal cues.        Target Date: 05/13/21               Predicted Duration of Therapy Intervention (days/wks): 2-3x/month    MIGUEL Rodney       I CERTIFY THE NEED FOR THESE SERVICES FURNISHED UNDER        THIS PLAN OF TREATMENT AND WHILE UNDER MY CARE     (Physician attestation of this document indicates review and certification of the therapy plan).                               Referring Physician: Nia Bautista PA-C    Initial Assessment        See Epic Evaluation Start Of Care Date: 02/11/21

## 2021-02-15 NOTE — PROGRESS NOTES
This is a recent snapshot of the patient's [facility] medical record.  For current drug dose and complete information and questions, call [phone] or In Basket pool, fv home infusion ([id number])  CSN Number:  [id number]

## 2021-02-15 NOTE — PROGRESS NOTES
Speech-Language Pathology Department   EVALUATION  [facility]  Video Swallow Study      02/11/21 1300   General Information   Type Of Visit Initial   Start Of Care Date 02/11/21   Referring Physician Nia Bautista PA-C   Orders Evaluate And Treat   Orders Comment Videofluoroscopic Swallow Study   Medical Diagnosis SCC oropharynx   Onset Of Illness/injury Or Date Of Surgery 01/08/21   Precautions/limitations No Known Precautions/limitations   Hearing WFL   Pertinent History of Current Problem/OT: Additional Occupational Profile Info Fortino Moya is a 70-year-old male with PMH significant for a 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 After biopsy, Dr Russell diagnosed Pt with a 1/11/21 with a recurrence at the margin of his prior radiation field in the oropharynx. The growth of this recurrent tumor involves CN-V, paralyzing L side of Pt's face. Effects include a watery L eyelid, decreased sensation of chin, and drooping at L side of mouth. Induction chemo is current course of treatment. He presents today for a baseline swallow evaluation. Endorses difficulty eating thin liquids and solids but compensates by making shakes. Endorses weight loss. Denies pneumonia.   Respiratory Status Room air   Prior Level Of Function Swallowing   Prior Level Of Function Comment Modified diet - puree   Patient Role/employment History Retired   General Observations Alert, conversive, friendly   Patient/family Goals Maintain swallow during Tx   Clinical Swallow Evaluation   Oral Musculature anomalies present   Dentition present and adequate   Mucosal Quality dry   Mandibular Strength and Mobility impaired   Oral Labial Strength and Mobility impaired retraction;impaired pursing;impaired seal;impaired coordination  (Left facial nerve paralysis)   Lingual Strength and Mobility impaired coordination   Buccal Strength and  Mobility impaired   Laryngeal Function Cough;Swallow;Voicing initiated   Additional evaluation(s) completed today Yes   Rationale for completing additional evaluation Video Swallow to further assess pharyngeal phase   VFSS Eval: Radiology   Radiologist Resident   Views Taken left lateral;A/P   Physical Location of Procedure [facility] Imaging room 2   VFSS Eval: Thin Liquid Texture Trial   Mode of Presentation, Thin Liquid cup;self-fed   Order of Presentation Series 2, 5 (after cookie presentation same loop)    Preparatory Phase WFL   Oral Phase, Thin Liquid WFL   Pharyngeal Phase, Thin Liquid Residue in valleculae;Residue in pyriform sinus   Rosenbek's Penetration Aspiration Scale: Thin Liquid Trial Results 7 - contrast passes glottis, visible subglottic residue remains despite patient's response (aspiration)   Diagnostic Statement Pt demonstrates deep penetration to the level of the vocal cords during the swallow with pharyngeal residue noted after the completed swallow. Multiple swallows needed to clear the majority of the bolus from the pharynx. Trace aspiration noted after the swallow from residue which was mostly cleared with a spontaneous throat clear.   VFSS Eval: Nectar Thick Liquid Texture Trial   Mode of Presentation, Nectar cup;self-fed   Order of Presentation Series 1, 6, 8 (A/P)   Preparatory Phase WFL   Oral Phase, Nectar WFL   Pharyngeal Phase, Nectar Pharyngeal wall coating;Residue in pyriform sinus;Residue in valleculae   Rosenbek's Penetration Aspiration Scale: Nectar-Thick Liquid Trial Results 8 - contrast passes glottis, visible subglottic residue remains, absent patient response (aspiration)   Diagnostic Statement Pt demonstrates decreased tongue base retraction, residue noted throughout the pharynx. There is deep penetration into the laryngeal vestibule which clears somewhat during the swallow but the residue in the pharynx with subsequent penetration. He demonstrates throat clear  in response to aspiration/penetration.    VFSS Eval: Honey Thick Liquid Texture Trial   Mode of Presentation, Honey self-fed;spoon   Order of Presentation Series 3   Preparatory Phase WFL   Oral Phase, Honey WFL   Pharyngeal Phase, Honey Pharyngeal wall coating;Residue in valleculae;Residue in pyriform sinus   Rosenbek's Penetration Aspiration Scale: Honey Trial Results 8 - contrast passes glottis, visible subglottic residue remains, absent patient response (aspiration)   Diagnostic Statement Trace aspiration noted on honey thickened liquid trial due to residual int eh pharynx along with deep penetration of material between swallows. He demonstrates throat clear in response to trace aspiration whic his successful in clearing however further evidence of penetration and risk for aspiration evident.    VFSS Eval: Puree Solid Texture Trial   Mode of Presentation, Puree self-fed;spoon   Order of Presentation Series 4, 7 (A/P)   Preparatory Phase WFL   Oral Phase, Puree WFL   Pharyngeal Phase, Puree Residue in valleculae;Residue in pyriform sinus   Rosenbek's Penetration Aspiration Scale: Puree Food Trial Results 6 - contrast passes glottis, no subglottic residue remains (aspiration)   Diagnostic Statement Deep penetration noted on puree consistency trials. Pt demonstrates residual thourhgout the pharynx and is noted to have penetration between images from residual. He is at high risk for aspiration on pudding consistency.    VFSS Eval: Solid Food Texture Trial   Mode of Presentation, Solid self-fed   Order of Presentation Series 5 (with liquid wash)    Preparatory Phase WFL   Oral Phase, Solid WFL   Pharyngeal Phase, Solid Residue in valleculae;Residue in pyriform sinus   Rosenbek's Penetration Aspiration Scale: Solid Food Trial Results 1 - no aspiration, contrast does not enter airway   Diagnostic Statement Pt does not demonstrate aspiration or penetration of solid consistency however he is at high risk for aspiration  due to pharyngeal residue after the swallow and deep penetration of material noted upon initiation of the imagina series.    Swallow Compensations   Swallow Compensations Alternate viscosity of consistencies;Reduce amounts;Multiple swallow   Results Aspiration   Educational Assessment   Barriers to Learning No barriers   Esophageal Phase of Swallow   Patient reports or presents with symptoms of esophageal dysphagia No   General Therapy Interventions   Planned Therapy Interventions Dysphagia Treatment   Dysphagia treatment Oropharyngeal exercise training;Modified diet education;Instruction of safe swallow strategies;Compensatory strategies for swallowing   Swallow Eval: Clinical Impressions   Skilled Criteria for Therapy Intervention Skilled criteria met.  Treatment indicated.   Dysphagia Outcome Severity Scale (LEROY) Level 2 - LEROY   Treatment Diagnosis Moderately severe oropharyngeal dysphagia   Diet texture recommendations Dysphagia diet level 1;Nectar thick liquids   Recommended Feeding/Eating Techniques alternate between small bites and sips of food/liquid;hard swallow w/ each bite or sip;maintain upright posture during/after eating for 30 mins;small sips/bites  (enteral support to boost nutrition/hydration)   Rehab Potential fair, will monitor progress closely   Predicted Duration of Therapy Intervention (days/wks) 2-3x/month   Anticipated Discharge Disposition home w/ outpatient services   Risks and Benefits of Treatment have been explained. Yes   Patient, family and/or staff in agreement with Plan of Care Yes   Clinical Impression Comments Fortino Moya demonstrates moderately severe oropharyngeal dysphagia as characterized by trace aspiration noted throughout study after the swallow from residual in the valleculae, pyriform sinus and along the posterior pharyngeal wall. Penetration into the laryngeal vestibule noted during various consistencies which sometimes partially clears with the force of the swallow. Pt  does also throat clear intermittently due to penetration and/or aspiration. He can clear some of the aspirate with cued cough. Pt noted to have truncated epiglottis which does not invert and decreased hyolaryngeal excursion demonstrated. Decreased tongue base retraction noted, it does not approximate with the posterior pharyngeal wall on any swallows during the evaluation. Adequate velar elevation without nasal reflux noted on any consistency presented. Recommend he continue Dysphagia diet level 1 with nectar thickened liquids. Sit upright for all po intake and encourage small bites/sips. He will alternate bites and sips to minimize pharyngeal residue. He also will benefit from enteral support as his dysphagia is increasing and he is beginning chemotherapy. Encouraged Fortino to continue swallowing some foods and liquids to minimize long term impact of radiation fibrosis on his swallow musculature during his treatment. He is also is at risk for pneumonia due to aspiration which was discussed with pt. He will benefit from speech pathology services to maximize safe swallow function.    Total Session Time   SLP Eval: oral/pharyngeal swallow function, clinical minutes (93434) 10   SLP Eval: VideoFluoroscopic Swallow function Minutes (10436) 25   Total Evaluation Time 35     Direct supervision provided during all patient contact and documentation process by Heydi Christina MS, CCC-SLP for Robert ALANIZ       Thank you for the referral of Fortino Moya. If you have any questions about this report, please contact me using the information below.      Heydi Christina MS, CCC-SLP  Speech-Language Pathology  [facility]  Department of Otolaryngology/D&T - 4th floor  Pager: [phone]  Phone: [phone]  Email: [email or web address]

## 2021-02-16 ENCOUNTER — OFFICE VISIT (OUTPATIENT)
Dept: INFUSION THERAPY | Facility: CLINIC | Age: 71
End: 2021-02-16
Attending: NURSE PRACTITIONER
Payer: MEDICARE

## 2021-02-16 ENCOUNTER — TELEPHONE (OUTPATIENT)
Dept: ONCOLOGY | Facility: CLINIC | Age: 71
End: 2021-02-16

## 2021-02-16 ENCOUNTER — HOSPITAL ENCOUNTER (OUTPATIENT)
Facility: CLINIC | Age: 71
Setting detail: SPECIMEN
Discharge: HOME | End: 2021-02-16
Attending: NURSE PRACTITIONER | Admitting: INTERNAL MEDICINE
Payer: MEDICARE

## 2021-02-16 DIAGNOSIS — Z51.89 ENCOUNTER FOR OTHER SPECIFIED AFTERCARE: Primary | ICD-10-CM

## 2021-02-16 DIAGNOSIS — C09.9 SQUAMOUS CELL CARCINOMA OF TONSIL (H): ICD-10-CM

## 2021-02-16 LAB
ALBUMIN SERPL-MCNC: 3.2 G/DL (ref 3.4–5)
ALP SERPL-CCNC: 92 U/L (ref 40–150)
ALT SERPL W P-5'-P-CCNC: 33 U/L (ref 0–70)
ANION GAP SERPL CALCULATED.3IONS-SCNC: 2 MMOL/L (ref 3–14)
AST SERPL W P-5'-P-CCNC: 18 U/L (ref 0–45)
BASOPHILS # BLD AUTO: 0.1 10E9/L (ref 0–0.2)
BASOPHILS NFR BLD AUTO: 1.1 %
BILIRUB SERPL-MCNC: 0.2 MG/DL (ref 0.2–1.3)
BUN SERPL-MCNC: 18 MG/DL (ref 7–30)
CALCIUM SERPL-MCNC: 8.9 MG/DL (ref 8.5–10.1)
CHLORIDE SERPL-SCNC: 102 MMOL/L (ref 94–109)
CO2 SERPL-SCNC: 34 MMOL/L (ref 20–32)
CREAT SERPL-MCNC: 0.77 MG/DL (ref 0.66–1.25)
DIFFERENTIAL METHOD BLD: ABNORMAL
EOSINOPHIL # BLD AUTO: 0.2 10E9/L (ref 0–0.7)
EOSINOPHIL NFR BLD AUTO: 2.1 %
ERYTHROCYTE [DISTWIDTH] IN BLOOD BY AUTOMATED COUNT: 11.9 % (ref 10–15)
GFR SERPL CREATININE-BSD FRML MDRD: >90 ML/MIN/{1.73_M2}
GLUCOSE SERPL-MCNC: 98 MG/DL (ref 70–99)
HCT VFR BLD AUTO: 38.2 % (ref 40–53)
HGB BLD-MCNC: 13 G/DL (ref 13.3–17.7)
IMM GRANULOCYTES # BLD: 0.2 10E9/L (ref 0–0.4)
IMM GRANULOCYTES NFR BLD: 1.6 %
LYMPHOCYTES # BLD AUTO: 0.7 10E9/L (ref 0.8–5.3)
LYMPHOCYTES NFR BLD AUTO: 7.3 %
MAGNESIUM SERPL-MCNC: 2.3 MG/DL (ref 1.6–2.3)
MCH RBC QN AUTO: 31.6 PG (ref 26.5–33)
MCHC RBC AUTO-ENTMCNC: 34 G/DL (ref 31.5–36.5)
MCV RBC AUTO: 93 FL (ref 78–100)
MONOCYTES # BLD AUTO: 0.7 10E9/L (ref 0–1.3)
MONOCYTES NFR BLD AUTO: 6.9 %
NEUTROPHILS # BLD AUTO: 8.2 10E9/L (ref 1.6–8.3)
NEUTROPHILS NFR BLD AUTO: 81 %
NRBC # BLD AUTO: 0 10*3/UL
NRBC BLD AUTO-RTO: 0 /100
PLATELET # BLD AUTO: 410 10E9/L (ref 150–450)
POTASSIUM SERPL-SCNC: 4.9 MMOL/L (ref 3.4–5.3)
PROT SERPL-MCNC: 6.5 G/DL (ref 6.8–8.8)
RBC # BLD AUTO: 4.12 10E12/L (ref 4.4–5.9)
SODIUM SERPL-SCNC: 138 MMOL/L (ref 133–144)
WBC # BLD AUTO: 10.1 10E9/L (ref 4–11)

## 2021-02-16 PROCEDURE — 80053 COMPREHEN METABOLIC PANEL: CPT | Performed by: INTERNAL MEDICINE

## 2021-02-16 PROCEDURE — 83735 ASSAY OF MAGNESIUM: CPT | Performed by: INTERNAL MEDICINE

## 2021-02-16 PROCEDURE — 36415 COLL VENOUS BLD VENIPUNCTURE: CPT

## 2021-02-16 PROCEDURE — 85025 COMPLETE CBC W/AUTO DIFF WBC: CPT | Performed by: INTERNAL MEDICINE

## 2021-02-16 NOTE — TELEPHONE ENCOUNTER
Nutrition Services:     Called Fortino today to follow-up on EN intake and tolerance.   He did not answer and did not have VM available.    RD sent text message to check in following phone call.   Fortino responded back the following information:   I am ingesting 7 cartons of formula daily, very well.   I am taking 20 + 2 ounce syringes (1200mL water).   I am also eating  eating bites of soft foods, yogurt and egg salad.        EN provisions:   Formula: Isosource 1.5 anastacia  Volume: 7 cartons/day (1750mL, 1330ml free water)  Provisions:  2625kcal (32kcal/kg), 119g protein (1.4g/kg), 308g CHO, 26g fiber  Free water: 2530ml/day (via PEG tube, water flushes and free water in formula).     This regimen meets 100% of nutrition and hydration needs.   Advised Fortino to reach out to RD with further nutrition questions or concerns.     Ramila Worrell RD, [facility]  [phone]
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
## 2021-02-16 NOTE — LETTER
2/16/2021         RE: Fortino Moya  [address]  [place] [address]        Dear Colleague,    Thank you for referring your patient, Fortino Moya, to the [facility]. Please see a copy of my visit note below.    Infusion Nursing Note:  Fortino Moya presents today for port labs (drawn peripherally per patient request).    Patient seen by provider today: No   present during visit today: Not Applicable.    Note: N/A.    Intravenous Access:  Lab draw site right AC, Needle type butterfly, Gauge 23.  Labs drawn without difficulty.    Treatment Conditions:  Not Applicable. Labs pending at time of discharge.      Post Infusion Assessment:  Site patent and intact, free from redness, edema or discomfort.  No evidence of extravasations.  Access discontinued per protocol.       Discharge Plan:   Patient discharged in stable condition accompanied by: self.  Departure Mode: Ambulatory.    Darlyn Shore RN            Again, thank you for allowing me to participate in the care of your patient.        Sincerely,         [facility]

## 2021-02-17 ENCOUNTER — VIRTUAL VISIT (OUTPATIENT)
Dept: ONCOLOGY | Facility: CLINIC | Age: 71
End: 2021-02-17
Attending: INTERNAL MEDICINE
Payer: MEDICARE

## 2021-02-17 DIAGNOSIS — Z51.89 ENCOUNTER FOR OTHER SPECIFIED AFTERCARE: ICD-10-CM

## 2021-02-17 DIAGNOSIS — C09.9 SQUAMOUS CELL CARCINOMA OF TONSIL (H): ICD-10-CM

## 2021-02-17 PROCEDURE — 99215 OFFICE O/P EST HI 40 MIN: CPT | Mod: 95 | Performed by: NURSE PRACTITIONER

## 2021-02-17 PROCEDURE — 999N001193 HC VIDEO/TELEPHONE VISIT; NO CHARGE

## 2021-02-17 RX ORDER — DEXAMETHASONE 4 MG/1
8 TABLET ORAL 2 TIMES DAILY WITH MEALS
Qty: 12 TABLET | Refills: 3 | Status: SHIPPED | OUTPATIENT
Start: 2021-02-17 | End: 2021-02-18

## 2021-02-17 NOTE — LETTER
"    2/17/2021         RE: Fortino Moya  [address]  [place] [address]        Dear Colleague,    Thank you for referring your patient, Fortino Moya, to the [facility]. Please see a copy of my visit note below.    Fortino is a 70 year old who is being evaluated via a billable video visit.      How would you like to obtain your AVS? MyChart  If the video visit is dropped, the invitation should be resent by: Send to e-mail at: [email or web address]  Will anyone else be joining your video visit? No     Patient needs refills for Amoxicillin.    Vitals - Patient Reported  Weight (Patient Reported): 78 kg (172 lb)  Height (Patient Reported): 177.8 cm (5' 10\")  BMI (Based on Pt Reported Ht/Wt): 24.68  Pain Score: No Pain (0)    Lindsay TOBIN         Video-Visit Details    Type of service:  Video Visit    Video Start Time: 1:15      Video End Time:1:40    Originating Location (pt. Location): Home    Distant Location (provider location):  [facility]     Platform used for Video Visit: [employer]      February 17, 2021    Reason for Visit: follow up recurrent L oropharynx 16+shanti SCC    Oncology HPI:   Fortino Moya is a 71 yo man with a history of L oropharynx P16+ squamous cell cancer that was first diagnosed in 2018 in [place], [place].  He initially was offered chemoradiation, but due to a prior history of L sided hearing loss, he was given low dose weekly carboplatin 1.5 AUC weekly with radiation.  His 3 month PET/CT after initiation was negative and he underwent surveillance after that.  He had a tough time with chemoradiation - he tolerated it with expected toxicity, however, there was a prolonged recovery and during that time, he got a divorce and he really feels that mentally, he was not right during the treatment and that it led to some poor decisions on his own part.  He relocated to [place] which is where he is from in Feb 2020 and has been followed since by  " Kamila and Dr. Flores at [facility].  In August of 2020, he  developed numbenss in chin and then moved up to his forehead.  He had a Pet/CT done in Sept 2020 that showed a hypermetabolic area near his L foramen ovale - he had had recent dental work in that area and it was thought that this might be scar or inflammation related to that procedure.  However, he was offered a 2nd opinion and came to [facility] and saw Dr. Russell in Nov 2020.  Images were reviewed at tumor boardon 12/4/20 - and it was felt that this was concerning enough to merit biopsy - so he was referred for IR-guided biopsy.   IR guided biopsy happened on 1/8/2021 and came back as invasive squamous cell carcinoma.      I am seeing him in follow up after initiation of TPF chemotherapy 1/28, for evaluation of cycle 2    Interval history:   -Eye is less swollen  -Feels like he is able to move his mouth more   -Still not eating a lot PO, taking 7 cartons daily through his tube and tolerating well. It is easy for him to administer and he is not feeling full or nauseas. No reflux either. Weight has stabilized now  -Swallowing is overall easier/more comfortable with the oxycodone  -No fever or chills at home. The congestion has improved since last week.  -Absolutely no shortness of breath or dyspnea when walking around or at rest, but harder to take deep breath with tube in his nose.   -Lips are healed. He is doing an alcohol free mouth rinse    10 point review of systems otherwise negative    Current Outpatient Medications   Medication Sig Dispense Refill     acetaminophen (TYLENOL) 32 mg/mL liquid Take 31.25 mLs (1,000 mg) by mouth every 6 hours as needed for pain 473 mL 0     amoxicillin-clavulanate (AUGMENTIN) 400-57 MG/5ML suspension Take 10.9 mLs (875 mg) by mouth 2 times daily for 7 days 152.6 mL 0     Aspirin Buf,CaCarb-MgCarb-MgO, 81 MG TABS Take 81 mg by mouth       atorvastatin (LIPITOR) 10 MG tablet Take 10 mg by mouth        carboxymethylcellulose PF (REFRESH LIQUIGEL) 1 % ophthalmic gel Place 1 drop Into the left eye 4 times daily 30 each 11     carvedilol (COREG) 12.5 MG tablet TAKE TWO TABLETS BY MOUTH EVERY MORNING AND ONE TABLET EVERY EVENING       cevimeline (EVOXAC) 30 MG capsule TAKE ONE CAPSULE BY MOUTH THREE TIMES DAILY       cholecalciferol 25 MCG (1000 UT) TABS Take 1,000 Units by mouth       dexamethasone (DECADRON) 4 MG tablet Take 2 tablets (8 mg) by mouth 2 times daily (with meals) Start the evening prior to Docetaxel, continue morning of Docetaxel, and continue for 4 additional doses. 12 tablet 3     erythromycin (ROMYCIN) 5 MG/GM ophthalmic ointment Place 0.5 inches Into the left eye At Bedtime Instill ~1 cm ribbon into affected eye(s) 4 times daily for 7 days 1 g 0     flecainide (TAMBOCOR) 150 MG tablet Take 150 mg by mouth every 12 hours as needed       ibuprofen (ADVIL/MOTRIN) 600 MG tablet TAKE 1 TABLET BY MOUTH THREE TIMES DAILY AS NEEDED FOR PAIN       lidocaine (XYLOCAINE) 2 % solution        lisinopril (ZESTRIL) 20 MG tablet Take 20 mg by mouth       LORazepam (ATIVAN) 0.5 MG tablet Take 1 tablet (0.5 mg) by mouth every 4 hours as needed (Anxiety, Nausea/Vomiting or Sleep) 30 tablet 3     magic mouthwash (ENTER INGREDIENTS IN COMMENTS) suspension Swish and spit every 4 hours as needed 240 mL 0     MULTIPLE VITAMIN PO Take 1 tablet by mouth       Nutritional Supplements (ISOSOURCE 1.5 RIYA) LIQD Take 1 Can by mouth 7 times daily 100 Can 11     nystatin (MYCOSTATIN) 159819 UNIT/ML suspension Take 5 mLs (500,000 Units) by mouth 4 times daily Swish and swallow 4 times a day 400 mL 1     ofloxacin (OCUFLOX) 0.3 % ophthalmic solution Place 1 drop Into the left eye 2 times daily 5 mL 0     oxyCODONE (ROXICODONE) 5 MG/5ML solution Take 5 mLs (5 mg) by mouth every 4 hours as needed for severe pain 500 mL 0     prochlorperazine (COMPAZINE) 10 MG tablet Take 1 tablet (10 mg) by mouth every 6 hours as needed  (Nausea/Vomiting) 30 tablet 3     sildenafil (VIAGRA) 100 MG tablet Take  mg by mouth       traZODone (DESYREL) 50 MG tablet TAKE 1 TO 2 TABLETS BY MOUTH ONCE DAILY AT BEDTIME        No Known Allergies    Exam:    There were no vitals taken for this visit.  Wt Readings from Last 4 Encounters:   02/10/21 80.3 kg (177 lb)   02/06/21 79.4 kg (175 lb)   01/28/21 83.5 kg (184 lb)   01/20/21 82.1 kg (181 lb)     Video physical exam  General: Patient appears well in no acute distress. L facial droop, pre-existing  Skin: No visualized rash or lesions on visualized skin.   Eyes: EOMI, L eye edematous, but improved from last visit  Resp: Appears to be breathing comfortably without accessory muscle usage, speaking in full sentences, no cough  MSK: Appears to have normal range of motion based on visualized movements  Neurologic: No apparent tremors, facial movements symmetric  Psych: affect good, alert and oriented    The rest of a comprehensive physical examination is deferred due to PHE (public health emergency) video restrictions.     Labs:    2/16/2021 13:23   Sodium 138   Potassium 4.9   Chloride 102   Carbon Dioxide 34 (H)   Urea Nitrogen 18   Creatinine 0.77   GFR Estimate >90   GFR Estimate If Black >90   Calcium 8.9   Anion Gap 2 (L)   Magnesium 2.3   Albumin 3.2 (L)   Protein Total 6.5 (L)   Bilirubin Total 0.2   Alkaline Phosphatase 92   ALT 33   AST 18   Glucose 98   WBC 10.1   Hemoglobin 13.0 (L)   Hematocrit 38.2 (L)   Platelet Count 410   RBC Count 4.12 (L)   MCV 93   MCH 31.6   MCHC 34.0   RDW 11.9   Diff Method Automated Method   % Neutrophils 81.0   % Lymphocytes 7.3   % Monocytes 6.9   % Eosinophils 2.1   % Basophils 1.1   % Immature Granulocytes 1.6   Nucleated RBCs 0   Absolute Neutrophil 8.2   Absolute Lymphocytes 0.7 (L)   Absolute Monocytes 0.7   Absolute Eosinophils 0.2   Absolute Basophils 0.1   Abs Immature Granulocytes 0.2   Absolute Nucleated RBC 0.0       Imaging: n/a    Impression/plan:   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 Dr. Shah previously discussed at tumor board doing SBRT to the recurrent lesion, however, on most recent PET scan, there was concern that it had gotten too large to consider SBRT.  Thus, it was discussed the possibility of using induction chemotherapy with the goal of shrinking it enough that we can consider some form of local therapy.  We did discuss that the goal of chemotherapy in this setting was not curative, but it is meant to make it possible to try curative intent treatment either with chemoradiation or SBRT or (hypothetically, even surgery).  -Began induction with TPF chemotherapy 1/28. Tolerated with mucositis, dysphasia, and possible sinus infection treated with augmentin x7 days. Given he has substantially recovered since I saw him just last week and his labs are acceptable, it is reasonable to proceed with cycle 2 with full dose. Main toxicity of cycle 1 was mucositis thus we discussed anticipation and optimal management for this.   -I will check in with him again next week and then he has follow up with Dr. Shah the following week, PET prior to asssess response.     Dysphagia:  Evaluated by speech 2/11 with clear aspiration. Has some coughing after PO intake. NG feeding tube urgently placed last week. He is taking 7 cans daily, getting ~2500 cals this way. Speech recommending PEG but jaron is not interested right now, would rather see how things go with NG. I explained this is not a long term solution and we likely will need to revisit this.     Leukocytosis: normalized. No f/c.    Sinus congestion: Improved with Augmentin BID x7 days as above.     Nutrition: now with NG tube for support.      Mucositis: 2/2 5FU. Discussed with Dr. Shah re: dose reduction, but concluded we would prefer to stay at full dose, especially now that he has nutritional support going into C2. Asked jaron be diligent in s/s rinses, MMW, and tylenol + oxycodone.      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 Previously discussed with Dr. Shah reviewed MRI results with him. timeline or extent of improvement unclear.     Paralytic lagophthalmos with complete scleral show  -recently evaluated by optho 2/10, Left lateral tarsorrhaphy. Given eye drops which I recommended he continue. Edema a bit better today.     QT risk: levaquin prophy and flecainide have mod risk of QT prolongation. EKG pre-preatment earlier this month 438. Did not end up being on levaquin after cycle 1 as he was promptly switched over to Augmentin, thus can forgo EKG prior to cycle 2. Supported by absence of cardiac symptoms or concerns.     Inflamed cyst: evaluated previously by Nia Bautista. Derm surgery referral placed for future excision.       60 minutes spent on the date of the encounter doing chart review, review of test results, interpretation of tests, patient visit, documentation and discussion with other provider(s)        10 point review of systems otherwise negative           Again, thank you for allowing me to participate in the care of your patient.        Sincerely,        Diana King, CNP

## 2021-02-17 NOTE — PROGRESS NOTES
"Fortino is a 70 year old who is being evaluated via a billable video visit.      How would you like to obtain your AVS? MyChart  If the video visit is dropped, the invitation should be resent by: Send to e-mail at: [email or web address]  Will anyone else be joining your video visit? No     Patient needs refills for Amoxicillin.    Vitals - Patient Reported  Weight (Patient Reported): 78 kg (172 lb)  Height (Patient Reported): 177.8 cm (5' 10\")  BMI (Based on Pt Reported Ht/Wt): 24.68  Pain Score: No Pain (0)    Lindsay TOBIN         Video-Visit Details    Type of service:  Video Visit    Video Start Time: 1:15      Video End Time:1:40    Originating Location (pt. 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  He initially was offered chemoradiation, but due to a prior history of L sided hearing loss, he was given low dose weekly carboplatin 1.5 AUC weekly with radiation.  His 3 month PET/CT after initiation was negative and he underwent surveillance after that.  He had a tough time with chemoradiation - he tolerated it with expected toxicity, however, there was a prolonged recovery and during that time, he got a divorce and he really feels that mentally, he was not right during the treatment and that it led to some poor decisions on his own part.  He relocated to [place] which is where he is from in Feb 2020 and has been followed since by Dr. Treviño and Dr. Flores at [facility].  In August of 2020, he  developed numbenss in chin and then moved up to his forehead.  He had a Pet/CT done in Sept 2020 that showed a hypermetabolic area near his L foramen ovale - he had had recent dental " work in that area and it was thought that this might be scar or inflammation related to that procedure.  However, he was offered a 2nd opinion and came to [facility] and saw Dr. Russell in Nov 2020.  Images were reviewed at tumor boardon 12/4/20 - and it was felt that this was concerning enough to merit biopsy - so he was referred for IR-guided biopsy.   IR guided biopsy happened on 1/8/2021 and came back as invasive squamous cell carcinoma.      I am seeing him in follow up after initiation of TPF chemotherapy 1/28, for evaluation of cycle 2    Interval history:   -Eye is less swollen  -Feels like he is able to move his mouth more   -Still not eating a lot PO, taking 7 cartons daily through his tube and tolerating well. It is easy for him to administer and he is not feeling full or nauseas. No reflux either. Weight has stabilized now  -Swallowing is overall easier/more comfortable with the oxycodone  -No fever or chills at home. The congestion has improved since last week.  -Absolutely no shortness of breath or dyspnea when walking around or at rest, but harder to take deep breath with tube in his nose.   -Lips are healed. He is doing an alcohol free mouth rinse    10 point review of systems otherwise negative    Current Outpatient Medications   Medication Sig Dispense Refill     acetaminophen (TYLENOL) 32 mg/mL liquid Take 31.25 mLs (1,000 mg) by mouth every 6 hours as needed for pain 473 mL 0     amoxicillin-clavulanate (AUGMENTIN) 400-57 MG/5ML suspension Take 10.9 mLs (875 mg) by mouth 2 times daily for 7 days 152.6 mL 0     Aspirin Buf,CaCarb-MgCarb-MgO, 81 MG TABS Take 81 mg by mouth       atorvastatin (LIPITOR) 10 MG tablet Take 10 mg by mouth       carboxymethylcellulose PF (REFRESH LIQUIGEL) 1 % ophthalmic gel Place 1 drop Into the left eye 4 times daily 30 each 11     carvedilol (COREG) 12.5 MG tablet TAKE TWO TABLETS BY MOUTH EVERY MORNING AND ONE TABLET EVERY EVENING       cevimeline (EVOXAC) 30 MG capsule  TAKE ONE CAPSULE BY MOUTH THREE TIMES DAILY       cholecalciferol 25 MCG (1000 UT) TABS Take 1,000 Units by mouth       dexamethasone (DECADRON) 4 MG tablet Take 2 tablets (8 mg) by mouth 2 times daily (with meals) Start the evening prior to Docetaxel, continue morning of Docetaxel, and continue for 4 additional doses. 12 tablet 3     erythromycin (ROMYCIN) 5 MG/GM ophthalmic ointment Place 0.5 inches Into the left eye At Bedtime Instill ~1 cm ribbon into affected eye(s) 4 times daily for 7 days 1 g 0     flecainide (TAMBOCOR) 150 MG tablet Take 150 mg by mouth every 12 hours as needed       ibuprofen (ADVIL/MOTRIN) 600 MG tablet TAKE 1 TABLET BY MOUTH THREE TIMES DAILY AS NEEDED FOR PAIN       lidocaine (XYLOCAINE) 2 % solution        lisinopril (ZESTRIL) 20 MG tablet Take 20 mg by mouth       LORazepam (ATIVAN) 0.5 MG tablet Take 1 tablet (0.5 mg) by mouth every 4 hours as needed (Anxiety, Nausea/Vomiting or Sleep) 30 tablet 3     magic mouthwash (ENTER INGREDIENTS IN COMMENTS) suspension Swish and spit every 4 hours as needed 240 mL 0     MULTIPLE VITAMIN PO Take 1 tablet by mouth       Nutritional Supplements (ISOSOURCE 1.5 RIYA) LIQD Take 1 Can by mouth 7 times daily 100 Can 11     nystatin (MYCOSTATIN) 008904 UNIT/ML suspension Take 5 mLs (500,000 Units) by mouth 4 times daily Swish and swallow 4 times a day 400 mL 1     ofloxacin (OCUFLOX) 0.3 % ophthalmic solution Place 1 drop Into the left eye 2 times daily 5 mL 0     oxyCODONE (ROXICODONE) 5 MG/5ML solution Take 5 mLs (5 mg) by mouth every 4 hours as needed for severe pain 500 mL 0     prochlorperazine (COMPAZINE) 10 MG tablet Take 1 tablet (10 mg) by mouth every 6 hours as needed (Nausea/Vomiting) 30 tablet 3     sildenafil (VIAGRA) 100 MG tablet Take  mg by mouth       traZODone (DESYREL) 50 MG tablet TAKE 1 TO 2 TABLETS BY MOUTH ONCE DAILY AT BEDTIME        No Known Allergies    Exam:    There were no vitals taken for this visit.  Wt Readings from  Last 4 Encounters:   02/10/21 80.3 kg (177 lb)   02/06/21 79.4 kg (175 lb)   01/28/21 83.5 kg (184 lb)   01/20/21 82.1 kg (181 lb)     Video physical exam  General: Patient appears well in no acute distress. L facial droop, pre-existing  Skin: No visualized rash or lesions on visualized skin.   Eyes: EOMI, L eye edematous, but improved from last visit  Resp: Appears to be breathing comfortably without accessory muscle usage, speaking in full sentences, no cough  MSK: Appears to have normal range of motion based on visualized movements  Neurologic: No apparent tremors, facial movements symmetric  Psych: affect good, alert and oriented    The rest of a comprehensive physical examination is deferred due to PHE (public health emergency) video restrictions.     Labs:    2/16/2021 13:23   Sodium 138   Potassium 4.9   Chloride 102   Carbon Dioxide 34 (H)   Urea Nitrogen 18   Creatinine 0.77   GFR Estimate >90   GFR Estimate If Black >90   Calcium 8.9   Anion Gap 2 (L)   Magnesium 2.3   Albumin 3.2 (L)   Protein Total 6.5 (L)   Bilirubin Total 0.2   Alkaline Phosphatase 92   ALT 33   AST 18   Glucose 98   WBC 10.1   Hemoglobin 13.0 (L)   Hematocrit 38.2 (L)   Platelet Count 410   RBC Count 4.12 (L)   MCV 93   MCH 31.6   MCHC 34.0   RDW 11.9   Diff Method Automated Method   % Neutrophils 81.0   % Lymphocytes 7.3   % Monocytes 6.9   % Eosinophils 2.1   % Basophils 1.1   % Immature Granulocytes 1.6   Nucleated RBCs 0   Absolute Neutrophil 8.2   Absolute Lymphocytes 0.7 (L)   Absolute Monocytes 0.7   Absolute Eosinophils 0.2   Absolute Basophils 0.1   Abs Immature Granulocytes 0.2   Absolute Nucleated RBC 0.0       Imaging: n/a    Impression/plan:   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 Dr. Shah previously discussed at tumor board doing SBRT to the recurrent lesion, however, on most recent PET scan, there was concern that it had gotten too large to consider SBRT.  Thus, it was discussed the  possibility of using induction chemotherapy with the goal of shrinking it enough that we can consider some form of local therapy.  We did discuss that the goal of chemotherapy in this setting was not curative, but it is meant to make it possible to try curative intent treatment either with chemoradiation or SBRT or (hypothetically, even surgery).  -Began induction with TPF chemotherapy 1/28. Tolerated with mucositis, dysphasia, and possible sinus infection treated with augmentin x7 days. Given he has substantially recovered since I saw him just last week and his labs are acceptable, it is reasonable to proceed with cycle 2 with full dose. Main toxicity of cycle 1 was mucositis thus we discussed anticipation and optimal management for this.   -I will check in with him again next week and then he has follow up with Dr. Shah the following week, PET prior to asssess response.     Dysphagia:  Evaluated by speech 2/11 with clear aspiration. Has some coughing after PO intake. NG feeding tube urgently placed last week. He is taking 7 cans daily, getting ~2500 cals this way. Speech recommending PEG but jaron is not interested right now, would rather see how things go with NG. I explained this is not a long term solution and we likely will need to revisit this.     Leukocytosis: normalized. No f/c.    Sinus congestion: Improved with Augmentin BID x7 days as above.     Nutrition: now with NG tube for support.      Mucositis: 2/2 5FU. Discussed with Dr. Shah re: dose reduction, but concluded we would prefer to stay at full dose, especially now that he has nutritional support going into C2. Asked jaron be diligent in s/s rinses, MMW, and tylenol + oxycodone.     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 Previously discussed with Dr. Shah reviewed MRI results with him. timeline or extent of improvement unclear.     Paralytic lagophthalmos with complete scleral show  -recently evaluated by optho 2/10, Left lateral  tarsorrhaphy. Given eye drops which I recommended he continue. Edema a bit better today.     QT risk: levaquin prophy and flecainide have mod risk of QT prolongation. EKG pre-preatment earlier this month 438. Did not end up being on levaquin after cycle 1 as he was promptly switched over to Augmentin, thus can forgo EKG prior to cycle 2. Supported by absence of cardiac symptoms or concerns.     Inflamed cyst: evaluated previously by Nia Bautista. Derm surgery referral placed for future excision.       60 minutes spent on the date of the encounter doing chart review, review of test results, interpretation of tests, patient visit, documentation and discussion with other provider(s)        10 point review of systems otherwise negative

## 2021-02-18 ENCOUNTER — NURSE TRIAGE (OUTPATIENT)
Dept: NURSING | Facility: CLINIC | Age: 71
End: 2021-02-18

## 2021-02-18 ENCOUNTER — INFUSION THERAPY VISIT (OUTPATIENT)
Dept: INFUSION THERAPY | Facility: CLINIC | Age: 71
End: 2021-02-18
Attending: INTERNAL MEDICINE
Payer: MEDICARE

## 2021-02-18 VITALS
HEART RATE: 78 BPM | SYSTOLIC BLOOD PRESSURE: 128 MMHG | RESPIRATION RATE: 16 BRPM | DIASTOLIC BLOOD PRESSURE: 72 MMHG | OXYGEN SATURATION: 94 % | TEMPERATURE: 97.8 F | WEIGHT: 172 LBS | BODY MASS INDEX: 24.68 KG/M2

## 2021-02-18 DIAGNOSIS — Z51.89 ENCOUNTER FOR OTHER SPECIFIED AFTERCARE: Primary | ICD-10-CM

## 2021-02-18 DIAGNOSIS — C09.9 SQUAMOUS CELL CARCINOMA OF TONSIL (H): ICD-10-CM

## 2021-02-18 PROCEDURE — G0498 CHEMO EXTEND IV INFUS W/PUMP: HCPCS

## 2021-02-18 PROCEDURE — 96375 TX/PRO/DX INJ NEW DRUG ADDON: CPT

## 2021-02-18 PROCEDURE — 96366 THER/PROPH/DIAG IV INF ADDON: CPT

## 2021-02-18 PROCEDURE — 96413 CHEMO IV INFUSION 1 HR: CPT

## 2021-02-18 PROCEDURE — 96367 TX/PROPH/DG ADDL SEQ IV INF: CPT

## 2021-02-18 PROCEDURE — 96417 CHEMO IV INFUS EACH ADDL SEQ: CPT

## 2021-02-18 PROCEDURE — 250N000011 HC RX IP 250 OP 636: Performed by: INTERNAL MEDICINE

## 2021-02-18 PROCEDURE — 258N000003 HC RX IP 258 OP 636: Performed by: INTERNAL MEDICINE

## 2021-02-18 PROCEDURE — 96415 CHEMO IV INFUSION ADDL HR: CPT

## 2021-02-18 RX ORDER — DEXAMETHASONE 4 MG/1
8 TABLET ORAL 2 TIMES DAILY WITH MEALS
Qty: 12 TABLET | Refills: 3 | Status: SHIPPED | OUTPATIENT
Start: 2021-02-18 | End: 2021-03-15

## 2021-02-18 RX ORDER — PALONOSETRON 0.05 MG/ML
0.25 INJECTION, SOLUTION INTRAVENOUS ONCE
Status: COMPLETED | OUTPATIENT
Start: 2021-02-18 | End: 2021-02-18

## 2021-02-18 RX ORDER — LEVOFLOXACIN 25 MG/ML
250 SOLUTION ORAL DAILY
Qty: 100 ML | Refills: 0 | Status: SHIPPED | OUTPATIENT
Start: 2021-02-23 | End: 2021-03-05

## 2021-02-18 RX ORDER — DEXAMETHASONE 4 MG/1
8 TABLET ORAL ONCE
Status: DISCONTINUED | OUTPATIENT
Start: 2021-02-18 | End: 2021-02-18 | Stop reason: HOSPADM

## 2021-02-18 RX ADMIN — SODIUM CHLORIDE 1000 ML: 9 INJECTION, SOLUTION INTRAVENOUS at 10:45

## 2021-02-18 RX ADMIN — PALONOSETRON 0.25 MG: 0.05 INJECTION, SOLUTION INTRAVENOUS at 11:39

## 2021-02-18 RX ADMIN — FOSAPREPITANT: 150 INJECTION, POWDER, LYOPHILIZED, FOR SOLUTION INTRAVENOUS at 11:42

## 2021-02-18 RX ADMIN — POTASSIUM CHLORIDE: 2 INJECTION, SOLUTION, CONCENTRATE INTRAVENOUS at 13:24

## 2021-02-18 RX ADMIN — CISPLATIN 150 MG: 1 INJECTION INTRAVENOUS at 13:13

## 2021-02-18 RX ADMIN — DOCETAXEL 160 MG: 20 INJECTION, SOLUTION, CONCENTRATE INTRAVENOUS at 12:03

## 2021-02-18 ASSESSMENT — PAIN SCALES - GENERAL: PAINLEVEL: NO PAIN (0)

## 2021-02-18 NOTE — PROGRESS NOTES
"Infusion Nursing Note:  Fortino Moya presents today for Taxotere, cisplatin, 5 fu.    Patient seen by provider today: No   present during visit today: Not Applicable.    Note: N/A.  Patient did meet criteria for an asymptomatic covid-19 PCR test in infusion today. Patient declined the covid-19 test.    Intravenous Access:  Implanted Port.    Treatment Conditions:  Lab Results   Component Value Date    HGB 13.0 02/16/2021     Lab Results   Component Value Date    WBC 10.1 02/16/2021      Lab Results   Component Value Date    ANEU 8.2 02/16/2021     Lab Results   Component Value Date     02/16/2021      Results reviewed, labs MET treatment parameters, ok to proceed with treatment.    Prior to discharge: Port is secured in place with tegaderm and flushed with 10cc NS with positive blood return noted.  Continuous home infusion CADD pump connected.    All connectors secured in place and clamps taped open.    Pump started, \"running\" noted on display (CADD): YES.  Pump Connection double checked with HERLINDA Arredondo.  Patient instructed to call our clinic or [facility] with any questions or concerns at home.  Patient verbalized understanding.    Patient set up for pump disconnect at our clinic on 2/23/21 @ 1600 .              Post Infusion Assessment:  Patient tolerated infusion without incident.  Blood return noted pre and post infusion.       Discharge Plan:   Prescription refills given for levaquin and dexamethasone with .  Patient and/or family verbalized understanding of discharge instructions and all questions answered.  AVS to patient via LevlrT.  Patient will return 2/23/21 for next appointment.   Patient discharged in stable condition accompanied by: self.  Departure Mode: Ambulatory.    Alberta Lewis RN                        "

## 2021-02-18 NOTE — PATIENT INSTRUCTIONS
Take dexamethasone 8mg (two 4mg tabs) starting this evening, 2/18, and 8mg tomorrow morning. Continue for 5 doses total.       Take Levaquin 10mls (250mg),  by mouth or tube, for 10 days starting on 2/23/21.    Call clinic @ [phone] if any difficulties in taking either of these medications.

## 2021-02-19 NOTE — TELEPHONE ENCOUNTER
"\"I just left the infusion clinic at [facility] and this pump isn't working. I can't hear it but the light is green. I had this pump a few weeks ago and I could hear it going off.\"  Denies triage  Denies going in  Requesting on call MD be aged and be page directly to him  Paged on call Dr. Hannah through page op at 7 pm to call pt  Alvina Velazquez RN [facility]      "

## 2021-02-19 NOTE — TELEPHONE ENCOUNTER
"Per Dr. Hannah I am not the correct MD.\"  Paged again through page op at 7:29 pm to call pt back at [phone]  Per page op, Dr. Wall ([facility] ) is on call  Alvina Velazquez RN [facility]      "

## 2021-02-22 NOTE — PROGRESS NOTES
Fortino is a 71 year old who is being evaluated via a billable video visit.      How would you like to obtain your AVS? MyChart  If the video visit is dropped, the invitation should be resent by: Text to cell phone: [phone]  Will anyone else be joining your video visit? No        Video-Visit Details    Type of service:  Video Visit    Video Start Time: 9:10 AM    Video End Time:9:30 AM    Originating Location (pt. Location): Home    Distant Location (provider location):  [facility]     Platform used for Video Visit: [employer]     *Pt needs a refill on Oxycodone*    Amy Cespedes MA      February 25, 2021    Reason for Visit: follow up recurrent L oropharynx 16+shanti SCC    Oncology HPI:   Fortino Moya is a 69 yo man with a history of L oropharynx P16+ squamous cell cancer that was first diagnosed in 2018 in [place], [place].  He initially was offered chemoradiation, but due to a prior history of L sided hearing loss, he was given low dose weekly carboplatin 1.5 AUC weekly with radiation.  His 3 month PET/CT after initiation was negative and he underwent surveillance after that.  He had a tough time with chemoradiation - he tolerated it with expected toxicity, however, there was a prolonged recovery and during that time, he got a divorce and he really feels that mentally, he was not right during the treatment and that it led to some poor decisions on his own part.  He relocated to [place] which is where he is from in Feb 2020 and has been followed since by Dr. Treviño and Dr. Flores at [facility].  In August of 2020, he  developed numbenss in chin and then moved up to his forehead.  He had a Pet/CT done in Sept 2020 that showed a hypermetabolic area near his L foramen ovale - he had had recent dental work in that area and it was thought that this might be scar or inflammation related to that procedure.  However, he was offered a 2nd opinion and came to [facility] and saw Dr. Russell in Nov  2020.  Images were reviewed at tumor boardon 12/4/20 - and it was felt that this was concerning enough to merit biopsy - so he was referred for IR-guided biopsy.   IR guided biopsy happened on 1/8/2021 and came back as invasive squamous cell carcinoma.      I am seeing him in follow up after initiation of TPF chemotherapy he is s/p cycle 2    Interval history:   -taking oxycodone twice per day. Having trouble swallowing meds, putting it with yogurt.   -took MOM last night and was able to have a BM this morning. Does not like that oxy makes him constipated  -using MMW a couple times per day but ran out   -doing salt rinses 3-4x per day  -spits out secretions as needed, they feel thicker right when he wakes up in the morning  -breathing is okay. Denies dyspnea or chest pain  -has a cough while he is talking but otherwise denies throughout the day  -no fever or chills  -mouth is sensitive, he is careful with oral care as it bleeds  -tube feedings are going--7 cartons per day plus water for hydration  -not nauseas or having reflux.   -energy level is okay     10 point review of systems otherwise negative    Current Outpatient Medications   Medication Sig Dispense Refill     acetaminophen (TYLENOL) 32 mg/mL liquid Take 31.25 mLs (1,000 mg) by mouth every 6 hours as needed for pain 473 mL 0     Aspirin Buf,CaCarb-MgCarb-MgO, 81 MG TABS Take 81 mg by mouth       atorvastatin (LIPITOR) 10 MG tablet Take 10 mg by mouth       carboxymethylcellulose PF (REFRESH LIQUIGEL) 1 % ophthalmic gel Place 1 drop Into the left eye 4 times daily 30 each 11     carvedilol (COREG) 12.5 MG tablet TAKE TWO TABLETS BY MOUTH EVERY MORNING AND ONE TABLET EVERY EVENING       cevimeline (EVOXAC) 30 MG capsule TAKE ONE CAPSULE BY MOUTH THREE TIMES DAILY       cholecalciferol 25 MCG (1000 UT) TABS Take 1,000 Units by mouth       dexamethasone (DECADRON) 4 MG tablet Take 2 tablets (8 mg) by mouth 2 times daily (with meals) Start the evening prior to  Docetaxel, continue morning of Docetaxel, and continue for 4 additional doses. 12 tablet 3     erythromycin (ROMYCIN) 5 MG/GM ophthalmic ointment Place 0.5 inches Into the left eye At Bedtime Instill ~1 cm ribbon into affected eye(s) 4 times daily for 7 days 1 g 0     flecainide (TAMBOCOR) 150 MG tablet Take 150 mg by mouth every 12 hours as needed       ibuprofen (ADVIL/MOTRIN) 600 MG tablet TAKE 1 TABLET BY MOUTH THREE TIMES DAILY AS NEEDED FOR PAIN       [START ON 2/23/2021] levofloxacin (LEVAQUIN) 25 MG/ML solution Take 10 mLs (250 mg) by mouth daily for 10 days 100 mL 0     lidocaine (XYLOCAINE) 2 % solution        lisinopril (ZESTRIL) 20 MG tablet Take 20 mg by mouth       LORazepam (ATIVAN) 0.5 MG tablet Take 1 tablet (0.5 mg) by mouth every 4 hours as needed (Anxiety, Nausea/Vomiting or Sleep) 30 tablet 3     magic mouthwash (ENTER INGREDIENTS IN COMMENTS) suspension Swish and spit every 4 hours as needed 240 mL 0     MULTIPLE VITAMIN PO Take 1 tablet by mouth       Nutritional Supplements (ISOSOURCE 1.5 RIYA) LIQD Take 1 Can by mouth 7 times daily 100 Can 11     nystatin (MYCOSTATIN) 057377 UNIT/ML suspension Take 5 mLs (500,000 Units) by mouth 4 times daily Swish and swallow 4 times a day 400 mL 1     ofloxacin (OCUFLOX) 0.3 % ophthalmic solution Place 1 drop Into the left eye 2 times daily 5 mL 0     oxyCODONE (ROXICODONE) 5 MG/5ML solution Take 5 mLs (5 mg) by mouth every 4 hours as needed for severe pain 500 mL 0     prochlorperazine (COMPAZINE) 10 MG tablet Take 1 tablet (10 mg) by mouth every 6 hours as needed (Nausea/Vomiting) 30 tablet 3     sildenafil (VIAGRA) 100 MG tablet Take  mg by mouth       traZODone (DESYREL) 50 MG tablet TAKE 1 TO 2 TABLETS BY MOUTH ONCE DAILY AT BEDTIME        No Known Allergies    Exam:    There were no vitals taken for this visit.  Wt Readings from Last 4 Encounters:   02/18/21 78 kg (172 lb)   02/10/21 80.3 kg (177 lb)   02/06/21 79.4 kg (175 lb)   01/28/21 83.5  kg (184 lb)     Video physical exam  General: Patient appears well in no acute distress. L facial droop, pre-existing. Seems less today   Skin: No visualized rash or lesions on visualized skin.   Eyes: EOMI, L eye edematous, but improved from last visit  Resp: Appears to be breathing comfortably without accessory muscle usage, speaking in full sentences, no cough  MSK: Appears to have normal range of motion based on visualized movements  Neurologic: No apparent tremors, facial movements symmetric  Psych: affect good, alert and oriented    The rest of a comprehensive physical examination is deferred due to PHE (public health emergency) video restrictions.     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 Dr. Shah previously discussed at tumor board doing SBRT to the recurrent lesion, however, on most recent PET scan, there was concern that it had gotten too large to consider SBRT.  Thus, it was discussed the possibility of using induction chemotherapy with the goal of shrinking it enough that we can consider some form of local therapy.  We did discuss that the goal of chemotherapy in this setting was not curative, but it is meant to make it possible to try curative intent treatment either with chemoradiation or SBRT or (hypothetically, even surgery).  -Began induction with TPF chemotherapy 1/28. Tolerated with mucositis, dysphasia, and possible sinus infection treated with augmentin x7 days. Now s/p cycle 2 2/18 with full dosing.   -follow up with Dr. Shah the following week, PET prior to asssess response.     Mucositis:  Unable to assess on video but sounds like grade 2-3. Taking oxycodone BID, discussed he can increase this to every 4 hour as needed, or alternatively increase dose from 5 to 7.5 or 10 with each administration. He will need to watch his bowels in response.   --continue MMW, oxycodone, tylenol, and s/s rinses prn.  Anticipate this will improve in next week now that 5FU is disconnected    Dysphagia:  Evaluated by speech 2/11 with clear aspiration. Has some coughing after PO intake. NG feeding tube urgently after cycle 1. He is taking 7 cans daily, getting ~2500 cals this way. Speech recommending PEG but jaron was not interested initially, wanting to see how things go with NG. We revisited this again today when he asked me when the NG will come out. I offered to get the ball rolling with a PEG but he would like to wait until restaging next week.     Nutrition: now with NG tube for support.      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 Previously discussed with Dr. Shah reviewed MRI results with him. timeline or extent of improvement unclear.     Paralytic lagophthalmos with complete scleral show  -recently evaluated by optho 2/10, Left lateral tarsorrhaphy. Given eye drops which I recommended he continue. Edema a bit better today.     QT risk: levaquin prophy and flecainide have mod risk of QT prolongation. EKG pre-preatment earlier this month 438. Did not end up being on levaquin after cycle 1 as he was promptly switched over to Augmentin, thus can forgo EKG prior to cycle 2. Supported by absence of cardiac symptoms or concerns.     Inflamed cyst: evaluated previously by Nia Bautista. Derm surgery referral placed for future excision.     50 minutes spent on the date of the encounter doing chart review, interpretation of tests, patient visit, documentation and discussion with other provider(s)

## 2021-02-23 ENCOUNTER — INFUSION THERAPY VISIT (OUTPATIENT)
Dept: INFUSION THERAPY | Facility: CLINIC | Age: 71
End: 2021-02-23
Attending: INTERNAL MEDICINE
Payer: MEDICARE

## 2021-02-23 VITALS
TEMPERATURE: 97.9 F | HEART RATE: 80 BPM | RESPIRATION RATE: 18 BRPM | DIASTOLIC BLOOD PRESSURE: 73 MMHG | OXYGEN SATURATION: 96 % | SYSTOLIC BLOOD PRESSURE: 120 MMHG

## 2021-02-23 DIAGNOSIS — C09.9 SQUAMOUS CELL CARCINOMA OF TONSIL (H): ICD-10-CM

## 2021-02-23 DIAGNOSIS — C09.9 TONSILLAR CANCER (H): ICD-10-CM

## 2021-02-23 DIAGNOSIS — Z51.89 ENCOUNTER FOR OTHER SPECIFIED AFTERCARE: Primary | ICD-10-CM

## 2021-02-23 PROCEDURE — 250N000011 HC RX IP 250 OP 636: Performed by: INTERNAL MEDICINE

## 2021-02-23 PROCEDURE — 96372 THER/PROPH/DIAG INJ SC/IM: CPT | Performed by: INTERNAL MEDICINE

## 2021-02-23 RX ORDER — HEPARIN SODIUM,PORCINE 10 UNIT/ML
5 VIAL (ML) INTRAVENOUS
Status: CANCELLED | OUTPATIENT
Start: 2021-02-23

## 2021-02-23 RX ORDER — HEPARIN SODIUM (PORCINE) LOCK FLUSH IV SOLN 100 UNIT/ML 100 UNIT/ML
5 SOLUTION INTRAVENOUS
Status: DISCONTINUED | OUTPATIENT
Start: 2021-02-23 | End: 2021-02-23 | Stop reason: HOSPADM

## 2021-02-23 RX ORDER — HEPARIN SODIUM (PORCINE) LOCK FLUSH IV SOLN 100 UNIT/ML 100 UNIT/ML
5 SOLUTION INTRAVENOUS
Status: CANCELLED | OUTPATIENT
Start: 2021-02-23

## 2021-02-23 RX ADMIN — PEGFILGRASTIM 6 MG: KIT SUBCUTANEOUS at 16:02

## 2021-02-23 RX ADMIN — Medication 5 ML: at 15:58

## 2021-02-23 NOTE — PROGRESS NOTES
Infusion Nursing Note:  Fortino Moya presents today for pump d/c, OnPro Neulasta.    Patient seen by provider today: No   present during visit today: Not Applicable.    Note: Patient arrived to clinic with 0.2 ml of his Fluorouracil pump remaning.  Positive blood return obtained from port at time of disconnect. Patient reports fatigue and increased sore mouth and lips.  Currently rates oral pain at a 6/10.  This RN encouraged patient to use magic mouthwash as needed along with Oxycodone for pain control.     ONPRO  Was placed on patient's: right side of abdomen.    Was placed at 1600 PM    ONPRO injector device Lot number: [id number]    Patient education included: what patient can expect after application, what colored lights mean on the device, when to remove device, when and where to call with questions or issues, all patients questions answered and that Neulasta administration will occur at 1900 on 2/24/21.    Patient tolerated administration well.       Intravenous Access:  Implanted Port.    Treatment Conditions:  Not Applicable.      Post Infusion Assessment:  Patient tolerated infusion without incident.  Blood return noted pre and post infusion.  Site patent and intact, free from redness, edema or discomfort.  No evidence of extravasations.  Access discontinued per protocol.       Discharge Plan:   Discharge instructions reviewed with: Patient.  Patient and/or family verbalized understanding of discharge instructions and all questions answered.  AVS to patient via [place].  Patient will return for a virtual visit with mame King on 2/25/21 for next appointment.   Patient discharged in stable condition accompanied by: self.  Departure Mode: Ambulatory.    Shasha Bernardo RN

## 2021-02-24 ENCOUNTER — HOME INFUSION (PRE-WILLOW HOME INFUSION) (OUTPATIENT)
Dept: PHARMACY | Facility: CLINIC | Age: 71
End: 2021-02-24

## 2021-02-25 ENCOUNTER — VIRTUAL VISIT (OUTPATIENT)
Dept: ONCOLOGY | Facility: CLINIC | Age: 71
End: 2021-02-25
Attending: NURSE PRACTITIONER
Payer: MEDICARE

## 2021-02-25 DIAGNOSIS — C09.9 SQUAMOUS CELL CARCINOMA OF TONSIL (H): ICD-10-CM

## 2021-02-25 PROCEDURE — 999N001193 HC VIDEO/TELEPHONE VISIT; NO CHARGE

## 2021-02-25 PROCEDURE — 99215 OFFICE O/P EST HI 40 MIN: CPT | Mod: 95 | Performed by: NURSE PRACTITIONER

## 2021-02-25 RX ORDER — OXYCODONE HCL 5 MG/5 ML
5-10 SOLUTION, ORAL ORAL EVERY 4 HOURS PRN
Qty: 500 ML | Refills: 0 | Status: SHIPPED | OUTPATIENT
Start: 2021-02-25 | End: 2021-03-22

## 2021-02-25 NOTE — LETTER
2/25/2021         RE: Fortino Moya  [address]  [facility] [address]        Dear Colleague,    Thank you for referring your patient, Fortino Moya, to the [facility]. Please see a copy of my visit note below.    Fortino is a 71 year old who is being evaluated via a billable video visit.      How would you like to obtain your AVS? MyChart  If the video visit is dropped, the invitation should be resent by: Text to cell phone: [phone]  Will anyone else be joining your video visit? No        Video-Visit Details    Type of service:  Video Visit    Video Start Time: 9:10 AM    Video End Time:9:30 AM    Originating Location (pt. 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  He initially was offered chemoradiation, but due to a prior history of L sided hearing loss, he was given low dose weekly carboplatin 1.5 AUC weekly with radiation.  His 3 month PET/CT after initiation was negative and he underwent surveillance after that.  He had a tough time with chemoradiation - he tolerated it with expected toxicity, however, there was a prolonged recovery and during that time, he got a divorce and he really feels that mentally, he was not right during the treatment and that it led to some poor decisions on his own part.  He relocated to [place] which is where he is from in Feb 2020 and has been followed since by Dr. Treviño and Dr. Flores at [facility].  In August of 2020, he  developed numbenss in chin and then moved up to his forehead.  He had a Pet/CT done in Sept 2020 that showed a  hypermetabolic area near his L foramen ovale - he had had recent dental work in that area and it was thought that this might be scar or inflammation related to that procedure.  However, he was offered a 2nd opinion and came to [facility] and saw Dr. Russell in Nov 2020.  Images were reviewed at tumor boardon 12/4/20 - and it was felt that this was concerning enough to merit biopsy - so he was referred for IR-guided biopsy.   IR guided biopsy happened on 1/8/2021 and came back as invasive squamous cell carcinoma.      I am seeing him in follow up after initiation of TPF chemotherapy he is s/p cycle 2    Interval history:   -taking oxycodone twice per day. Having trouble swallowing meds, putting it with yogurt.   -took MOM last night and was able to have a BM this morning. Does not like that oxy makes him constipated  -using MMW a couple times per day but ran out   -doing salt rinses 3-4x per day  -spits out secretions as needed, they feel thicker right when he wakes up in the morning  -breathing is okay. Denies dyspnea or chest pain  -has a cough while he is talking but otherwise denies throughout the day  -no fever or chills  -mouth is sensitive, he is careful with oral care as it bleeds  -tube feedings are going--7 cartons per day plus water for hydration  -not nauseas or having reflux.   -energy level is okay     10 point review of systems otherwise negative    Current Outpatient Medications   Medication Sig Dispense Refill     acetaminophen (TYLENOL) 32 mg/mL liquid Take 31.25 mLs (1,000 mg) by mouth every 6 hours as needed for pain 473 mL 0     Aspirin Buf,CaCarb-MgCarb-MgO, 81 MG TABS Take 81 mg by mouth       atorvastatin (LIPITOR) 10 MG tablet Take 10 mg by mouth       carboxymethylcellulose PF (REFRESH LIQUIGEL) 1 % ophthalmic gel Place 1 drop Into the left eye 4 times daily 30 each 11     carvedilol (COREG) 12.5 MG tablet TAKE TWO TABLETS BY MOUTH EVERY MORNING AND ONE TABLET EVERY EVENING       cevimeline  (EVOXAC) 30 MG capsule TAKE ONE CAPSULE BY MOUTH THREE TIMES DAILY       cholecalciferol 25 MCG (1000 UT) TABS Take 1,000 Units by mouth       dexamethasone (DECADRON) 4 MG tablet Take 2 tablets (8 mg) by mouth 2 times daily (with meals) Start the evening prior to Docetaxel, continue morning of Docetaxel, and continue for 4 additional doses. 12 tablet 3     erythromycin (ROMYCIN) 5 MG/GM ophthalmic ointment Place 0.5 inches Into the left eye At Bedtime Instill ~1 cm ribbon into affected eye(s) 4 times daily for 7 days 1 g 0     flecainide (TAMBOCOR) 150 MG tablet Take 150 mg by mouth every 12 hours as needed       ibuprofen (ADVIL/MOTRIN) 600 MG tablet TAKE 1 TABLET BY MOUTH THREE TIMES DAILY AS NEEDED FOR PAIN       [START ON 2/23/2021] levofloxacin (LEVAQUIN) 25 MG/ML solution Take 10 mLs (250 mg) by mouth daily for 10 days 100 mL 0     lidocaine (XYLOCAINE) 2 % solution        lisinopril (ZESTRIL) 20 MG tablet Take 20 mg by mouth       LORazepam (ATIVAN) 0.5 MG tablet Take 1 tablet (0.5 mg) by mouth every 4 hours as needed (Anxiety, Nausea/Vomiting or Sleep) 30 tablet 3     magic mouthwash (ENTER INGREDIENTS IN COMMENTS) suspension Swish and spit every 4 hours as needed 240 mL 0     MULTIPLE VITAMIN PO Take 1 tablet by mouth       Nutritional Supplements (ISOSOURCE 1.5 RIYA) LIQD Take 1 Can by mouth 7 times daily 100 Can 11     nystatin (MYCOSTATIN) 671410 UNIT/ML suspension Take 5 mLs (500,000 Units) by mouth 4 times daily Swish and swallow 4 times a day 400 mL 1     ofloxacin (OCUFLOX) 0.3 % ophthalmic solution Place 1 drop Into the left eye 2 times daily 5 mL 0     oxyCODONE (ROXICODONE) 5 MG/5ML solution Take 5 mLs (5 mg) by mouth every 4 hours as needed for severe pain 500 mL 0     prochlorperazine (COMPAZINE) 10 MG tablet Take 1 tablet (10 mg) by mouth every 6 hours as needed (Nausea/Vomiting) 30 tablet 3     sildenafil (VIAGRA) 100 MG tablet Take  mg by mouth       traZODone (DESYREL) 50 MG tablet  TAKE 1 TO 2 TABLETS BY MOUTH ONCE DAILY AT BEDTIME        No Known Allergies    Exam:    There were no vitals taken for this visit.  Wt Readings from Last 4 Encounters:   02/18/21 78 kg (172 lb)   02/10/21 80.3 kg (177 lb)   02/06/21 79.4 kg (175 lb)   01/28/21 83.5 kg (184 lb)     Video physical exam  General: Patient appears well in no acute distress. L facial droop, pre-existing. Seems less today   Skin: No visualized rash or lesions on visualized skin.   Eyes: EOMI, L eye edematous, but improved from last visit  Resp: Appears to be breathing comfortably without accessory muscle usage, speaking in full sentences, no cough  MSK: Appears to have normal range of motion based on visualized movements  Neurologic: No apparent tremors, facial movements symmetric  Psych: affect good, alert and oriented    The rest of a comprehensive physical examination is deferred due to PHE (public health emergency) video restrictions.     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 Dr. Shah previously discussed at tumor board doing SBRT to the recurrent lesion, however, on most recent PET scan, there was concern that it had gotten too large to consider SBRT.  Thus, it was discussed the possibility of using induction chemotherapy with the goal of shrinking it enough that we can consider some form of local therapy.  We did discuss that the goal of chemotherapy in this setting was not curative, but it is meant to make it possible to try curative intent treatment either with chemoradiation or SBRT or (hypothetically, even surgery).  -Began induction with TPF chemotherapy 1/28. Tolerated with mucositis, dysphasia, and possible sinus infection treated with augmentin x7 days. Now s/p cycle 2 2/18 with full dosing.   -follow up with Dr. Shah the following week, PET prior to asssess response.     Mucositis:  Unable to assess on video but sounds like grade  2-3. Taking oxycodone BID, discussed he can increase this to every 4 hour as needed, or alternatively increase dose from 5 to 7.5 or 10 with each administration. He will need to watch his bowels in response.   --continue MMW, oxycodone, tylenol, and s/s rinses prn. Anticipate this will improve in next week now that 5FU is disconnected    Dysphagia:  Evaluated by speech 2/11 with clear aspiration. Has some coughing after PO intake. NG feeding tube urgently after cycle 1. He is taking 7 cans daily, getting ~2500 cals this way. Speech recommending PEG but jaron was not interested initially, wanting to see how things go with NG. We revisited this again today when he asked me when the NG will come out. I offered to get the ball rolling with a PEG but he would like to wait until restaging next week.     Nutrition: now with NG tube for support.      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 Previously discussed with Dr. Shah reviewed MRI results with him. timeline or extent of improvement unclear.     Paralytic lagophthalmos with complete scleral show  -recently evaluated by optho 2/10, Left lateral tarsorrhaphy. Given eye drops which I recommended he continue. Edema a bit better today.     QT risk: levaquin prophy and flecainide have mod risk of QT prolongation. EKG pre-preatment earlier this month 438. Did not end up being on levaquin after cycle 1 as he was promptly switched over to Augmentin, thus can forgo EKG prior to cycle 2. Supported by absence of cardiac symptoms or concerns.     Inflamed cyst: evaluated previously by Nia Bautista. Derm surgery referral placed for future excision.     50 minutes spent on the date of the encounter doing chart review, interpretation of tests, patient visit, documentation and discussion with other provider(s)       Again, thank you for allowing me to participate in the care of your patient.        Sincerely,        Diana King, CNP

## 2021-02-25 NOTE — PROGRESS NOTES
Worked with [facility] to establish tube feeding services for patient.  Patient is set up, orders placed and patient will receive shipment of supplies today, 2/12.  Patient very pleased.    Jayda Schwartz MBA, MSN, RN, ONC  RN Care Coordinator  [facility]

## 2021-02-25 NOTE — PROGRESS NOTES
This is a recent snapshot of the patient's [facility] medical record.  For current drug dose and complete information and questions, call [phone] or In [facility] pool, fv home infusion ([id number])  CSN Number:  [id number]

## 2021-02-26 ENCOUNTER — HOME INFUSION (PRE-WILLOW HOME INFUSION) (OUTPATIENT)
Dept: PHARMACY | Facility: CLINIC | Age: 71
End: 2021-02-26

## 2021-02-26 ENCOUNTER — NURSE TRIAGE (OUTPATIENT)
Dept: NURSING | Facility: CLINIC | Age: 71
End: 2021-02-26

## 2021-02-27 ENCOUNTER — HOSPITAL ENCOUNTER (EMERGENCY)
Facility: CLINIC | Age: 71
Discharge: HOME | End: 2021-02-27
Attending: EMERGENCY MEDICINE | Admitting: EMERGENCY MEDICINE
Payer: MEDICARE

## 2021-02-27 ENCOUNTER — APPOINTMENT (OUTPATIENT)
Dept: GENERAL RADIOLOGY | Facility: CLINIC | Age: 71
End: 2021-02-27
Attending: EMERGENCY MEDICINE
Payer: MEDICARE

## 2021-02-27 ENCOUNTER — TELEPHONE (OUTPATIENT)
Dept: ONCOLOGY | Facility: CLINIC | Age: 71
End: 2021-02-27

## 2021-02-27 ENCOUNTER — HOME INFUSION (PRE-WILLOW HOME INFUSION) (OUTPATIENT)
Dept: PHARMACY | Facility: CLINIC | Age: 71
End: 2021-02-27

## 2021-02-27 VITALS
HEART RATE: 70 BPM | WEIGHT: 168 LBS | HEIGHT: 70 IN | DIASTOLIC BLOOD PRESSURE: 77 MMHG | TEMPERATURE: 98.8 F | BODY MASS INDEX: 24.05 KG/M2 | RESPIRATION RATE: 16 BRPM | OXYGEN SATURATION: 98 % | SYSTOLIC BLOOD PRESSURE: 115 MMHG

## 2021-02-27 DIAGNOSIS — Z46.59 ENCOUNTER FOR FEEDING TUBE PLACEMENT: ICD-10-CM

## 2021-02-27 PROCEDURE — 999N000065 XR ABDOMEN 1 VW

## 2021-02-27 PROCEDURE — 99284 EMERGENCY DEPT VISIT MOD MDM: CPT

## 2021-02-27 ASSESSMENT — MIFFLIN-ST. JEOR: SCORE: 1528.29

## 2021-02-27 NOTE — TELEPHONE ENCOUNTER
"Patient calling reporting the end of his NG tube is broken. States he gets feeds, fluids, and his medication through the NG tube. Denies any symptoms. Patient does not want to go to the emergency department due to the cost of hospital bill. Asking if he can go to the infusion center tomorrow to get the NG placed. Per guideline, informed patient RN will page on call provider for second level triage.     Paged on call  Provider Dr. Josiah Wall for [facility] via answering service to call RN directly at [phone].    Dr. Wall called RN back and advised patient to be seen at the emergency department to have the NG replaced.     RN called patient and advised patient to be seen to have the NG placed. Patient states he will go the emergency department tomorrow morning.     Harshad Israel RN  [facility] Nurse Advisors       Additional Information    Negative: Shock suspected (e.g., cold/pale/clammy skin, too weak to stand, low BP, rapid pulse)    Negative: [1] Shortness of breath AND [2] new onset    Negative: Bluish (or gray) lips or face now    Negative: Sounds like a life-threatening emergency to the triager    Negative: SEVERE abdominal pain    Negative: [1] Vomiting AND [2] contains red blood or black (\"coffee ground\") material  (Exception: few red streaks in vomit that only happened once)    Negative: [1] Vomiting AND [2] contains bile (green color)    Negative: Tube contains red blood or black (\"coffee ground\") material  (Exception: pink tinge of tube fluid occurs once and clears immediately with irrigation.)    Negative: G-tube, J-tube, or GJ-tube came completely out of site in abdominal wall    Negative: Difficulty breathing    Negative: Dehydration suspected (e.g., no urine > 12 hours, very dry mouth, lightheaded)    Negative: Patient sounds very sick or weak to the triager    Negative: Vomiting > 4 times in the past 4 hours    Negative: Diarrhea > 4 times in the past 4 hours    Negative: " [1] G-tube is clogged AND [2] irrigation has been attempted without success    [1] G-tube is broken or cracked AND [2] is not usable    Protocols used: FEEDING TUBE SYMPTOMS AND [id number]

## 2021-02-27 NOTE — CONFIDENTIAL NOTE
I received a page from the nurse regarding this patient. His NJ tube came out and per nurse he is having oral nutrition. He refused nurse advice to go to ED.   Called to patient personally. Advised to get NG tube placed back and discussed the risks of not having the procedure - like aspiration, dehydration, etc.   Patient changed his mind and agreed to go to [facility] ED for NG tube placement.     Kavita Morin MD  Hem/Onc fellow

## 2021-02-28 ENCOUNTER — HOME INFUSION (PRE-WILLOW HOME INFUSION) (OUTPATIENT)
Dept: PHARMACY | Facility: CLINIC | Age: 71
End: 2021-02-28

## 2021-02-28 ASSESSMENT — ENCOUNTER SYMPTOMS
FATIGUE: 0
FEVER: 0
TROUBLE SWALLOWING: 0
VOMITING: 0
NAUSEA: 0
SHORTNESS OF BREATH: 0

## 2021-02-28 NOTE — ED TRIAGE NOTES
Pt had NG tube in place for feedings d/t tonsilar cancer. Tube broke approx. 24 hours ago. Advised by oncology to come to ED to have to replaced.

## 2021-02-28 NOTE — ED PROVIDER NOTES
History   Chief Complaint:  Feeding Tube Problem     The history is provided by the patient.      Fortino Moya is a 71 year old male with history of tonsil cancer who presents with feeding tube problem.  The patient recently had an NG tube placed as he is no longer able to effectively eat and drink by mouth due to his cancer.  He is undergoing work-up and evaluation to determine the next course of treatment and to see whether or not he needs a permanent G-tube.  However he reports that last night the hub of his prior NG tube cracked and broke and he was no longer able to use it therefore they pulled it out at home yesterday.  He called his clinic and they recommended he present to the emergency department for replacement of his NG tube.  He states that he otherwise feels well and denies any other changes in his underlying state of health.  He denies any fevers, abdominal pain, nausea, vomiting or any bleeding.  He states that the NG tube was initially placed approximately 1 week ago.  The patient denies vomiting, blood in stool, or any changes since it was placed. He does explain that he is occasionally constipated due to his oxycodone use.     Review of Systems   Constitutional: Negative for fatigue and fever.   HENT: Negative for trouble swallowing.    Respiratory: Negative for shortness of breath.    Gastrointestinal: Negative for nausea and vomiting.   Skin: Negative for rash.   All other systems reviewed and are negative.    Allergies:  No Known Allergies    Medications:  Aspirin 81 MG   Lipitor  Coreg  Evoxac  Decadron  Romycin  Tambocor  Levaquin  Zestril  Ativan  Mycostatin  Ocuflox  Roxicodone  Compazine  Viagra  Desyrel    Past Medical History:    Atrial fibrillation  Hypercholesteremia  Hypertension  Primary squamous cell carcinoma of tonsil  Tonsillar cancer    Past Surgical History:    IR chest port placement >6 yrs of age  Joint replacement (x2)     Social History:  PCP: [facility], [facility]  "[place]  Presents alone  Currently undergoing chemotherapy     Physical Exam     Patient Vitals for the past 24 hrs:   BP Temp Temp src Pulse Resp SpO2 Height Weight   02/27/21 2324 115/77 98.8  F (37.1  C) Oral 70 16 98 % -- --   02/27/21 2032 -- -- -- -- -- -- 1.778 m (5' 10\") 76.2 kg (168 lb)   02/27/21 2030 133/82 98.7  F (37.1  C) Oral 64 16 97 % -- --   02/27/21 1815 114/67 98.1  F (36.7  C) Temporal 82 14 96 % -- --       Physical Exam  General: Well appearing, nontoxic.  Resting comfortably  Head:  Scalp, and head appear normal  Eyes:  Pupils are equal, round    Conjunctivae non-injected and sclerae white  ENT:    The external nose is normal    Pinnae are normal  Neck:  Normal range of motion    There is no rigidity noted    Trachea is in the midline  CV:  Regular rate and rhythm     Normal S1/S2, no S3/S4    No murmur or rub. Radial pulses 2+ bilaterally.  Resp:  Lungs are clear and equal bilaterally  There is no tachypnea    No increased work of breathing    No rales, wheezing, or rhonchi  GI:  Abdomen is soft, no rigidity or guarding    No distension, or mass    No tenderness or rebound tenderness   MS:  Normal muscular tone  Skin:  No rash or acute skin lesions noted  Neuro: Awake and alert  Speech is normal and fluent  Moves all extremities spontaneously  Psych:  Normal affect. Appropriate interactions.     Emergency Department Course     Imaging:  Xray Abdomen, 1 View:  An enteric tube has been placed which is looped over the mid epigastrium. Question small amount of kinking of the tube at the rightward aspect of the L1 inferior endplate. Recommend correlation with tube function. Gas-filled loops of   predominantly large bowel. Severe arthritic change of the lumbar spine.   As per radiology.    Emergency Department Course:    Reviewed:  I reviewed nursing notes, vitals, past medical history and care everywhere    Assessments:  2027 I obtained history and examined the patient as noted above.   2210 I " rechecked the patient and explained findings.     Disposition:  The patient was discharged to home.     Impression & Plan     Medical Decision Making:  Fortino Moya is a 71 year old male with tonsillar cancer who presents for replacement of his NG tube after his tube broke at home yesterday.  On my evaluation he is well-appearing, hemodynamically stable and afebrile.  His abdominal exam is benign without evidence of peritonitis or acute surgical emergency.  He denies any other concerns or complaints at this time.  An NG tube was obtained and able to be placed uneventfully in the emergency department.  Follow-up abdominal radiographs revealed the tube looping in the stomach with a small amount of kinking of the tube.  Following the radiograph the tube was withdrawn approximately 5 cm and the tube was able to be easily freely flushed without difficulty.  The tube was secured at the nose.  The patient was provided with syringe to be able to use the tube at home.  The patient reports that he understands how to care for and use the tube and denies any further questions or concerns at this time.  I stressed the importance of close outpatient follow-up with his oncologist, ENT physician and primary care physician.  The patient was agreeable to plan of care.  Close return precautions were provided and he was discharged in stable and improved condition.    Diagnosis:    ICD-10-CM    1. Encounter for feeding tube placement  Z46.59      Scribe Disclosure:  Aleshia WHITMORE, am serving as a scribe at 10:10 PM on 2/27/2021 to document services personally performed by Felix Bonilla MD based on my observations and the provider's statements to me.            Felix Bonilla MD  02/28/21 [id number]

## 2021-03-01 ENCOUNTER — HOSPITAL ENCOUNTER (OUTPATIENT)
Dept: PET IMAGING | Facility: CLINIC | Age: 71
End: 2021-03-01
Attending: INTERNAL MEDICINE
Payer: MEDICARE

## 2021-03-01 DIAGNOSIS — C09.9 SQUAMOUS CELL CARCINOMA OF TONSIL (H): ICD-10-CM

## 2021-03-01 PROCEDURE — 250N000011 HC RX IP 250 OP 636: Performed by: INTERNAL MEDICINE

## 2021-03-01 PROCEDURE — 78816 PET IMAGE W/CT FULL BODY: CPT | Mod: 26

## 2021-03-01 PROCEDURE — 343N000001 HC RX 343: Performed by: INTERNAL MEDICINE

## 2021-03-01 PROCEDURE — 71260 CT THORAX DX C+: CPT | Mod: TC

## 2021-03-01 PROCEDURE — 70491 CT SOFT TISSUE NECK W/DYE: CPT

## 2021-03-01 PROCEDURE — A9552 F18 FDG: HCPCS | Performed by: INTERNAL MEDICINE

## 2021-03-01 PROCEDURE — G1004 CDSM NDSC: HCPCS | Mod: GC

## 2021-03-01 PROCEDURE — 70491 CT SOFT TISSUE NECK W/DYE: CPT | Mod: 26 | Performed by: RADIOLOGY

## 2021-03-01 PROCEDURE — 78816 PET IMAGE W/CT FULL BODY: CPT | Mod: PS,MG

## 2021-03-01 RX ORDER — HEPARIN SODIUM (PORCINE) LOCK FLUSH IV SOLN 100 UNIT/ML 100 UNIT/ML
500 SOLUTION INTRAVENOUS ONCE
Status: COMPLETED | OUTPATIENT
Start: 2021-03-01 | End: 2021-03-01

## 2021-03-01 RX ORDER — IOPAMIDOL 755 MG/ML
10-135 INJECTION, SOLUTION INTRAVASCULAR ONCE
Status: COMPLETED | OUTPATIENT
Start: 2021-03-01 | End: 2021-03-01

## 2021-03-01 RX ADMIN — IOPAMIDOL 105 ML: 755 INJECTION, SOLUTION INTRAVENOUS at 08:59

## 2021-03-01 RX ADMIN — Medication 500 UNITS: at 09:00

## 2021-03-01 RX ADMIN — FLUDEOXYGLUCOSE F-18 14.4 MCI.: 500 INJECTION, SOLUTION INTRAVENOUS at 08:07

## 2021-03-01 NOTE — PROGRESS NOTES
This is a recent snapshot of the patient's [facility] medical record.  For current drug dose and complete information and questions, call [phone] or In [facility] pool, fv home infusion ([id number])  CSN Number:  [id number]

## 2021-03-01 NOTE — PROGRESS NOTES
This is a recent snapshot of the patient's [facility] medical record.  For current drug dose and complete information and questions, call [phone] or In [facility] pool, fv home infusion ([id number])  CSN Number:  [id number]

## 2021-03-01 NOTE — PROGRESS NOTES
This is a recent snapshot of the patient's [facility] medical record.  For current drug dose and complete information and questions, call [phone] or In Basket pool, fv home infusion ([id number])  CSN Number:  [id number]

## 2021-03-02 ENCOUNTER — VIRTUAL VISIT (OUTPATIENT)
Dept: ONCOLOGY | Facility: CLINIC | Age: 71
End: 2021-03-02
Attending: INTERNAL MEDICINE
Payer: MEDICARE

## 2021-03-02 DIAGNOSIS — C09.9 SQUAMOUS CELL CARCINOMA OF TONSIL (H): Primary | ICD-10-CM

## 2021-03-02 PROCEDURE — 99214 OFFICE O/P EST MOD 30 MIN: CPT | Mod: 95 | Performed by: INTERNAL MEDICINE

## 2021-03-02 NOTE — LETTER
"    3/2/2021         RE: Fortino Moya  [address]  [facility] [address]        Dear Colleague,    Thank you for referring your patient, Fortino Moya, to the [facility]. Please see a copy of my visit note below.    Fortino is a 71 year old who is being evaluated via a billable video visit.      How would you like to obtain your AVS? MyChart     If the video visit is dropped, the invitation should be resent by: Send to e-mail at: [email or web address]     Will anyone else be joining your video visit? No          Vitals - Patient Reported  Weight (Patient Reported): 76.2 kg (168 lb)  Height (Patient Reported): 177.8 cm (5' 10\")  BMI (Based on Pt Reported Ht/Wt): 24.11  Pain Score: Severe Pain (6)  Pain Loc: (mouth)    Adair Harris LPN    Video Start Time: 3:53 PM  Video-Visit Details    Type of service:  Video Visit    Video End Time:4:13 PM    Originating Location (pt. Location): Home    Distant Location (provider location):  [facility]     Platform used for Video Visit: Webs     REASON FOR VISIT:    CANCER STAGE: Cancer Staging  No matching staging information was found for the patient.      HISTORY OF PRESENT ILLNESS:  Fortino Moya is a 69 yo man with a history of L oropharynx P16+ squamous cell cancer that was first diagnosed in 2018 in [place], [place].  He initially was offered chemoradiation, but due to a prior history of L sided hearing loss, he was given low dose weekly carboplatin 1.5 AUC weekly with radiation.  His 3 month PET/CT after initiation was negative and he underwent surveillance after that.  He had a tough time with chemoradiation - he tolerated it with expected toxicity, however, there was a prolonged recovery and during that time, he got a divorce and he really feels that mentally, he was not right during the treatment and that it led to some poor decisions on his own part.  He relocated to [place] which is where he is from in Feb 2020 " and has been followed since by Dr. Treviño and Dr. Flores at [facility].  In August of 2020, he  developed numbenss in chin and then moved up to his forehead.  He had a Pet/CT done in Sept 2020 that showed a hypermetabolic area near his L foramen ovale - he had had recent dental work in that area and it was thought that this might be scar or inflammation related to that procedure.  However, he was offered a 2nd opinion and came to [facility] and saw Dr. Russell in Nov 2020.  Images were reviewed at tumor boardon 12/4/20 - and it was felt that this was concerning enough to merit biopsy - so he was referred for IR-guided biopsy.   IR guided biopsy happened on 1/8/2021 and came back as invasive squamous cell carcinoma.    He was going to have SBRT, however, repeat imaging showed marked disease progression in the L  space and we decided to initiate induction chemotherapy to try to shrink the lesion to get to SBRT.     He initiated TPF on 1/28/21 and cycle 2 on 2/18/21     I am seeing Fortino by video visit.  He has had a rough time of the chemotherapy with oral mucositis.  He has lost about 12 lbs weight since we started chemotehrapy.  He is currently using the tube for 100% of his nutrition.  He had a swallow eval in early part of Feb that showed evidence of aspiration.  Outside of this, he is having pain with the mucositis and is taking oxycodone 2-3 times per day.  This is probably his worst problem with the chemotherapy.  He has noted that it is worst with the 2nd cycle of chemo than with the first and has not abated since the 2nd cycle started.      He does feel the pain medicine is helping.  He is able to still be active and independent with his activities. He did get outside today as the weather is nice and that is going well. He is not having any shortness of breath.  He sometimes has some constipation, but is able to manage with MOM. He otherwise is not reatlly having a lot of complaints.     Review Of  Systems  10-point review of systems were negative except as noted in HPI.        EXAM:  There were no vitals taken for this visit.  GEN: alert and oriented x 3, nad, appears thinner than my prior visit with him  HEENT: perrla, eomi, sclera anicteric, scleral redness on his L eye, facial droop present but appears perhaps less pronounced than prior.   NEURO: CN 2-12 intact, MS 5/5 b/l    Current Outpatient Medications   Medication Sig Dispense Refill     acetaminophen (TYLENOL) 32 mg/mL liquid Take 31.25 mLs (1,000 mg) by mouth every 6 hours as needed for pain 473 mL 0     Aspirin Buf,CaCarb-MgCarb-MgO, 81 MG TABS Take 81 mg by mouth       atorvastatin (LIPITOR) 10 MG tablet Take 10 mg by mouth       carboxymethylcellulose PF (REFRESH LIQUIGEL) 1 % ophthalmic gel Place 1 drop Into the left eye 4 times daily 30 each 11     carvedilol (COREG) 12.5 MG tablet TAKE TWO TABLETS BY MOUTH EVERY MORNING AND ONE TABLET EVERY EVENING       cevimeline (EVOXAC) 30 MG capsule TAKE ONE CAPSULE BY MOUTH THREE TIMES DAILY       cholecalciferol 25 MCG (1000 UT) TABS Take 1,000 Units by mouth       dexamethasone (DECADRON) 4 MG tablet Take 2 tablets (8 mg) by mouth 2 times daily (with meals) Start the evening prior to Docetaxel, continue morning of Docetaxel, and continue for 4 additional doses. 12 tablet 3     erythromycin (ROMYCIN) 5 MG/GM ophthalmic ointment Place 0.5 inches Into the left eye At Bedtime Instill ~1 cm ribbon into affected eye(s) 4 times daily for 7 days 1 g 0     flecainide (TAMBOCOR) 150 MG tablet Take 150 mg by mouth every 12 hours as needed       ibuprofen (ADVIL/MOTRIN) 600 MG tablet TAKE 1 TABLET BY MOUTH THREE TIMES DAILY AS NEEDED FOR PAIN       levofloxacin (LEVAQUIN) 25 MG/ML solution Take 10 mLs (250 mg) by mouth daily for 10 days 100 mL 0     lidocaine (XYLOCAINE) 2 % solution        lisinopril (ZESTRIL) 20 MG tablet Take 20 mg by mouth       LORazepam (ATIVAN) 0.5 MG tablet Take 1 tablet (0.5 mg) by mouth  every 4 hours as needed (Anxiety, Nausea/Vomiting or Sleep) 30 tablet 3     magic mouthwash (ENTER INGREDIENTS IN COMMENTS) suspension Swish and spit every 4 hours as needed 240 mL 0     magic mouthwash (ENTER INGREDIENTS IN COMMENTS) suspension Take q 4 hours as needed for mouth pain 240 mL 1     MULTIPLE VITAMIN PO Take 1 tablet by mouth       Nutritional Supplements (ISOSOURCE 1.5 RIYA) LIQD Take 1 Can by mouth 7 times daily 100 Can 11     nystatin (MYCOSTATIN) 162487 UNIT/ML suspension Take 5 mLs (500,000 Units) by mouth 4 times daily Swish and swallow 4 times a day 400 mL 1     ofloxacin (OCUFLOX) 0.3 % ophthalmic solution Place 1 drop Into the left eye 2 times daily 5 mL 0     oxyCODONE (ROXICODONE) 5 MG/5ML solution Take 5-10 mLs (5-10 mg) by mouth every 4 hours as needed for severe pain 500 mL 0     prochlorperazine (COMPAZINE) 10 MG tablet Take 1 tablet (10 mg) by mouth every 6 hours as needed (Nausea/Vomiting) 30 tablet 3     sildenafil (VIAGRA) 100 MG tablet Take  mg by mouth       traZODone (DESYREL) 50 MG tablet TAKE 1 TO 2 TABLETS BY MOUTH ONCE DAILY AT BEDTIME             Recent Labs   Lab Test 02/16/21  1323   WBC 10.1   HGB 13.0*        @labrcent[na,potassium,chloride,co2,bun,cr@  Recent Labs   Lab Test 02/16/21  1323   PROTTOTAL 6.5*   ALBUMIN 3.2*   BILITOTAL 0.2   AST 18   ALT 33   ALKPHOS 92         Recent Results (from the past 744 hour(s))   IR Chest Port Placement > 5 Yrs of Age    Narrative    PORT PLACEMENT 2/3/2021 10:12 AM    INDICATION:  Chronic intravenous access required for drug infusion.  70-year-old patient with squamous cell carcinoma of the tonsil.    LOCATION:  Right internal jugular vein    PROCEDURE: Ultrasound was used to localize and document patency of the  internal jugular vein.  A permanent image of the vein was recorded.   Local anesthesia was administered to the skin over the vein and a 4 mm  incision was made.  Ultrasound was used to place a 6F peel-away  sheath  in the vein using standard technique.      Maximal Sterile Barrier Technique Utilized: Cap AND mask AND sterile  gown AND sterile gloves AND sterile full body drape AND hand hygiene  AND skin preparation 2% chlorhexidine for cutaneous antisepsis (or  acceptable alternative antiseptics).  Sterile Ultrasound Technique  Utilized ?Sterile gel AND sterile probe covers.    Lidocaine was then administered in the subcutaneous tissue over the  clavicle to a point about 5 cm below the midclavicle.  A transverse 2  cm long incision was made at this point.  Blunt dissection was carried  out to create a small subcutaneous pocket cephalad to the incision.   The 6F port catheter was brought through the tract between the two  incisions and trimmed to appropriate length.  The catheter was  attached to the port and the port was brought into the pocket.  The  pocket was flushed with antibiotic solution.      The catheter was then inserted into the superior vena cava through the  jugular sheath.  It was adjusted so that it lay well with no kinking.  The port was flushed with heparinized solution.  The port incision was  closed with interrupted 3-0 Vicryl and Dermabond adhesive.  The neck  incision was closed with adhesive.  Dressings were applied.      Complications:  None    Sedation:       A moderate level of sedation was achieved with 100 mcg  IV fentanyl.  Vital signs and sedation monitored by our nursing staff under my  supervision.      Sedation time:  25 minutes    Fluoroscopy time:  .3 minutes   Air Kerma: 0.83 mGy.  Contrast given:  None  Local anesthetic:  20 mL of 1% lidocaine    FINDINGS:  Fluoroscopic guidance with a permanent image confirmed the  port catheter tip location is at the right atrial/SVC junction,  confirmed with single spot fluoroscopic image.      Impression    IMPRESSION: Successful placement of right internal jugular Bard power  port and catheter. The port is ready for immediate use.    JIMENA  MD ANTHONY   XR Video Swallow with SLP or OT - Order with Speech Therapy Referral    Narrative    Examination:  Modified Barium Swallow Study with Speech Pathology,  2/11/2021    Comparison: PET CT 1/18/2020    History: Squamous cell carcinoma, difficulty swallowing with concern  for aspiration.    Fluoroscopy time: 2.1 minutes.    Findings: Under fluoroscopic guidance, the patient was given orally  administered barium of varying consistencies in the presence of the  speech pathology service.     Residual penetration with eventual aspiration seen with all  consistencies of barium. Large post swallow residual is noted with  barium coated cracker and pudding. AP images demonstrate asymmetric  swallow with left-sided dysfunction.       Impression    Impression:   Laryngeal penetration with eventual aspiration is seen with all  consistencies of barium. Please see the speech pathologist report for  further details.      I have personally reviewed the examination and initial interpretation  and I agree with the findings.    ENMANUEL GARRETT MD   XR Feeding Tube Placement    Addendum: 2/12/2021    I, ENMANUEL GARRETT MD, attest that I was immediately available to  provide guidance and assistance during the entirety of the procedure.    ENMANUEL GARRETT MD      Narrative    FEEDING TUBE PLACEMENT 2/12/2021 11:09 AM    INDICATION: Nutritional needs    COMPARISON: None.    FLUOROSCOPY TIME: 15.7.    FINDINGS: The feeding tube was advanced under fluoroscopic guidance  with final position of tip in the stomach. Despite multiple attempts,  the feeding tube was unable to be advanced to postpyloric position. A  small amount of barium was injected to demonstrate placement within  the stomach. The tube was flushed with sterile water and secured via  bandage dressing. There were no complications of the procedure.      Impression    IMPRESSION: Difficult placement of feeding tube with tip terminating  in the  stomach.    Findings were communicated with the referring provider, Diana King CNP at 11:40 AM 2/12/2021. Consideration of Nutrition consultation for  tube feeds and possible GI consult for percutaneous gastrostomy tube  placement given patient's need for long-term nutritional support were  discussed.    I have personally reviewed the examination and initial interpretation  and I agree with the findings.    ENMANUEL GARRETT MD   Abdomen XR 1 vw    Narrative    EXAM: XR ABDOMEN 1 VW  LOCATION: [facility]  DATE/TIME: 2/27/2021 10:37 PM    INDICATION: NG tube placement  COMPARISON: Fluoroscopic images from 02/12/2021      Impression    IMPRESSION: An enteric tube has been placed which is looped over the mid epigastrium. Question small amount of kinking of the tube at the rightward aspect of the L1 inferior endplate. Recommend correlation with tube function. Gas-filled loops of   predominantly large bowel. Severe arthritic change of the lumbar spine.   PET Oncology Whole Body    Narrative    Combined Report of:    PET and CT on  3/1/2021 10:19 AM :    1. PET of the neck, chest, abdomen, and pelvis.  2. PET CT Fusion for Attenuation Correction and Anatomical  Localization:    3. Diagnostic CT scan of the chest, abdomen, and pelvis with  intravenous contrast for interpretation.  3. CT of the chest, abdomen and pelvis obtained for diagnostic  interpretation.  4. 3D MIP and PET-CT fused images were processed on an independent  workstation and archived to PACS and reviewed by a radiologist.    Technique:    1. PET: The patient received 14.4 mCi of F-18-FDG; the serum glucose  was 125 prior to administration, body weight was 78 kg. Images were  evaluated in the axial, sagittal, and coronal planes as well as the  rotational whole body MIP. Images were acquired from the Vertex to the  Feet.    UPTAKE WAS MEASURED AT 60 MINUTES.     BACKGROUND:  Liver SUV max= 3.54,   Aorta Blood SUV Max: 2.07.     2. CT:  Volumetric acquisition for clinical interpretation of the  chest, abdomen, and pelvis acquired at 3 mm sections . The chest,  abdomen, and pelvis were evaluated at 5 mm sections in bone, soft  tissue, and lung windows.      The patient received 105 cc of Isovue 370 intravenously for the  examination.    --    3. 3D MIP and PET-CT fused images were processed on an independent  workstation and archived to PACS and reviewed by a radiologist.    INDICATION: Squamous cell carcinoma of tonsil (H)    ADDITIONAL INFORMATION OBTAINED FROM EMR: none    COMPARISON: 1/18/2021, outside PET/CT 9/8/2020.    FINDINGS:     HEAD/NECK:  See dedicated neuroradiology report for the results of the high  resolution PET CT of the neck.      CHEST:  Right Port-A-Cath terminating in the low SVC. Feeding tube coiled  within the stomach.4 There is no suspicious FDG uptake in the chest.     There are no pathologically enlarged mediastinal, hilar or axillary  lymph nodes.  Scattered sub-6 mm pulmonary nodules, not significant changed in size  or distribution from previous, for example of the lateral right upper  lobe (series 11 image 67).   Moderate to severe calcification of the  coronary arteries.    There is no significant pericardial or pleural effusions.    ABDOMEN AND PELVIS:  There is no suspicious FDG uptake in the abdomen or pelvis.    There are no suspicious hepatic lesions. There is no splenomegaly or  evidence for splenic or pancreatic mass lesion.  There are no suspicious adrenal mass lesions or opaque gallbladder  calculi. Scattered subcentimeter renal cortical fluid attenuation foci  too small to characterize but statistically representing renal  cortical cysts. No hydronephrosis..    There is no evidence for diverticulitis, bowel obstruction or free  fluid.  Atheromatous changes of the infrarenal aorta and iliac  arteries.    LOWER EXTREMITIES:   No abnormal masses or hypermetabolic lesions.    BONES:   Diffuse increased bony FDG  uptake predominantly at the axial skeleton  and proximal long bones consistent with Neulasta. There is no abnormal  FDG uptake in the skeleton. Surgical changes of right total hip  arthroplasty. Diffuse degenerative changes lower lumbar spine.  Unchanged anterolisthesis of L4 on L5 and L5 on S1. Soft tissue fluid  collection of the left gluteal subcutaneous tissues with max SUV of  4.6, measuring 1.9 x 2.1 cm (series 5 image 457).        Impression    IMPRESSION: In this patient with known squamous cell carcinoma of the  tonsil status post chemotherapy and radiation:    1. No evidence of metastatic disease of the chest, abdomen, and lower  pelvis.  2. Diffuse increased FDG uptake of the axial skeleton and proximal  long bones, most consistent with Neulasta use.  3. Subcutaneous fluid collection with focal FDG uptake of the left  gluteal soft tissues, recommend direct visualization as this could be  iatrogenic or represent sebaceous cysts, and/or abscess.   4.See dedicated neuroradiology report for the results of the high  resolution PET CT of the neck.           I have personally reviewed the examination and initial interpretation  and I agree with the findings.    JON UGARTE MD   CT Chest/Abdomen/Pelvis w Contrast    Narrative    Combined Report of:    PET and CT on  3/1/2021 10:19 AM :    1. PET of the neck, chest, abdomen, and pelvis.  2. PET CT Fusion for Attenuation Correction and Anatomical  Localization:    3. Diagnostic CT scan of the chest, abdomen, and pelvis with  intravenous contrast for interpretation.  3. CT of the chest, abdomen and pelvis obtained for diagnostic  interpretation.  4. 3D MIP and PET-CT fused images were processed on an independent  workstation and archived to PACS and reviewed by a radiologist.    Technique:    1. PET: The patient received 14.4 mCi of F-18-FDG; the serum glucose  was 125 prior to administration, body weight was 78 kg. Images were  evaluated in the axial, sagittal,  and coronal planes as well as the  rotational whole body MIP. Images were acquired from the Vertex to the  Feet.    UPTAKE WAS MEASURED AT 60 MINUTES.     BACKGROUND:  Liver SUV max= 3.54,   Aorta Blood SUV Max: 2.07.     2. CT: Volumetric acquisition for clinical interpretation of the  chest, abdomen, and pelvis acquired at 3 mm sections . The chest,  abdomen, and pelvis were evaluated at 5 mm sections in bone, soft  tissue, and lung windows.      The patient received 105 cc of Isovue 370 intravenously for the  examination.    --    3. 3D MIP and PET-CT fused images were processed on an independent  workstation and archived to PACS and reviewed by a radiologist.    INDICATION: Squamous cell carcinoma of tonsil (H)    ADDITIONAL INFORMATION OBTAINED FROM EMR: none    COMPARISON: 1/18/2021, outside PET/CT 9/8/2020.    FINDINGS:     HEAD/NECK:  See dedicated neuroradiology report for the results of the high  resolution PET CT of the neck.      CHEST:  Right Port-A-Cath terminating in the low SVC. Feeding tube coiled  within the stomach.4 There is no suspicious FDG uptake in the chest.     There are no pathologically enlarged mediastinal, hilar or axillary  lymph nodes.  Scattered sub-6 mm pulmonary nodules, not significant changed in size  or distribution from previous, for example of the lateral right upper  lobe (series 11 image 67).   Moderate to severe calcification of the  coronary arteries.    There is no significant pericardial or pleural effusions.    ABDOMEN AND PELVIS:  There is no suspicious FDG uptake in the abdomen or pelvis.    There are no suspicious hepatic lesions. There is no splenomegaly or  evidence for splenic or pancreatic mass lesion.  There are no suspicious adrenal mass lesions or opaque gallbladder  calculi. Scattered subcentimeter renal cortical fluid attenuation foci  too small to characterize but statistically representing renal  cortical cysts. No hydronephrosis..    There is no evidence  for diverticulitis, bowel obstruction or free  fluid.  Atheromatous changes of the infrarenal aorta and iliac  arteries.    LOWER EXTREMITIES:   No abnormal masses or hypermetabolic lesions.    BONES:   Diffuse increased bony FDG uptake predominantly at the axial skeleton  and proximal long bones consistent with Neulasta. There is no abnormal  FDG uptake in the skeleton. Surgical changes of right total hip  arthroplasty. Diffuse degenerative changes lower lumbar spine.  Unchanged anterolisthesis of L4 on L5 and L5 on S1. Soft tissue fluid  collection of the left gluteal subcutaneous tissues with max SUV of  4.6, measuring 1.9 x 2.1 cm (series 5 image 457).        Impression    IMPRESSION: In this patient with known squamous cell carcinoma of the  tonsil status post chemotherapy and radiation:    1. No evidence of metastatic disease of the chest, abdomen, and lower  pelvis.  2. Diffuse increased FDG uptake of the axial skeleton and proximal  long bones, most consistent with Neulasta use.  3. Subcutaneous fluid collection with focal FDG uptake of the left  gluteal soft tissues, recommend direct visualization as this could be  iatrogenic or represent sebaceous cysts, and/or abscess.   4.See dedicated neuroradiology report for the results of the high  resolution PET CT of the neck.           I have personally reviewed the examination and initial interpretation  and I agree with the findings.    JON UGARTE MD   CT Soft Tissue Neck w Contrast    Narrative    PET CT fusion examination 3/1/2021 10:20 AM  1. Neck CT with contrast  2. PET study of the neck  3. PET CT fusion study of the neck    History:  Squamous cell carcinoma of the tonsils..    Additional information obtained from EMR: 69 yo man with a history of  L oropharynx P16+ squamous cell cancer that was first diagnosed in  2018. He was given low dose weekly carboplatin 1.5 AUC weekly with  radiation. ?His 3 month PET/CT after initiation was negative and  he  underwent surveillance after that. In August of 2020, he ?developed  numbness in chin and then moved up to his forehead.??He had a Pet/CT  done in Sept 2020 that showed a hypermetabolic area near his L foramen  ovale suspicious for perineural spread.?He was referred for IR-guided  biopsy. ??IR guided biopsy happened on 1/8/2021 and came back as  invasive squamous cell carcinoma.?Started TPF chemotherapy on  1/28/21.?    Comparison: 1/18/2021, CT soft tissue neck 12/15/2020, outside PET/CT  5/17/2018.    Technique: Please refer to the accompanying whole body PET-CT for  report of the dose and whole body PET-CT findings.  Regarding the neck, axial images were obtained after nonionic  intravenous contrast administration, with sagittal and coronal  reconstructions performed. Neck CT images were reviewed in bone, soft  tissue, and lung windows, with review of the fused PET-CT images as  well in multiple planes.    Findings:     There is interval resolution of previously seen FDG avid mass in the  left  space and extending along the V3 into the middle  cranial fossa and cerebellopontine angle cistern. There is no residual  suspicious FDG avidity in this area.    There is new mild mucosal FDG uptake (max SUV 4.3) along the right  tongue base, right lateral oropharyngeal wall and extending to the  right pyriform sinus. On CT there is very thin mucosal enhancement.    There is new diffuse FDG metabolism of the marrow due to ongoing  chemotherapy treatment. No destructive lytic or erosive bone lesions  are seen on CT.     Vascular structures are patent. Thyroid gland is normal. Submandibular  glands are atrophic. Parotid glands are atrophic. There is thickening  of the epiglottis likely from prior radiation treatment.     On CT there is residual non FDG avid fullness along the pterygoid  muscles and there is persistent erosion of the skull base along the  foramen ovale there was perineural tumor spread was  however no  associated hypermetabolism.     No FDG avid lymphadenopathy.      Impression    Impression:   1. Interval resolution of hypermetabolism seen along the perineural  spread in the left  space extending to middle cranial fossa  representing positive response to chemotherapy. Residual soft tissue  fullness in this area without associated FDG uptake.     3. New diffuse FDG metabolism along the marrow including calvarium,  skull base and visualized upper cervical spine representing benign  postchemotherapy related changes.    3. Mild FDG metabolism along the right tongue base, right lateral  oropharyngeal wall and extending to right pyriform sinus with  associated mild thin mucosal enhancement. Findings can seen with  mucositis. Correlation with direct physical examination is  recommended.     4. No cervical lymphadenopathy.     5. Please refer to the whole body PET CT performed as a separate  report, for the findings of the remainder of the body.    I have personally reviewed the examination and initial interpretation  and I agree with the findings.    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  He had a local recurrence in his L  space.  He has now had 2 cycles of TPF chemotherapy.  PET scan done yesterday shows resolution of the hypermetabolic activity suggesting excellent response to chemotehrapy.  The plan going forward is to proceed with hypofractionated radiation therapy to the involved area.  I did discuss with him that I think the radiotherapy there will be better tolerated than his prior radiation therapy, but I did not discuss details with him.  He will need to discuss with Dr. Rodgers.    We will discuss his case at ENT tumor board and go from there.  Depending on the radiation plan I may or may not do more chemotherapy.      Mucositis - this is likely related to 5FU - he is managing with salt and  soda washes/MMW and oxycodone 2-3 times per day.  This helps with the pain. However, he is currently feeding tube dependent for nutrition - all meds and food are going through the tube.  I suspect in the coming week this will improve substantially.      Dysphagia - He did have evidence of deep penetration of liquids on VFSS a few weeks ago.  I suspect that the mucositis is making this worse and it may improve upon healing of this.  However, given that he has lost 12 lbs since intiation of treatment, I think he would benefit from a PEG tube especially given that he will still be getting ongoing treatment. He will need to continue to work on dysphagia with speech pathology.     I would like him to follow up with Diana Anaya in 2 weeks time to monitor his recovery from chemotherapy toxicity.   I spent 30 minutes with the patient.  >50% of the time was spent in counseling and coordination of care.    Yohan Shah   of Medicine  Division of Hematology, Oncology, and Transplantation

## 2021-03-02 NOTE — PROGRESS NOTES
"Fortino is a 71 year old who is being evaluated via a billable video visit.      How would you like to obtain your AVS? MyChart     If the video visit is dropped, the invitation should be resent by: Send to e-mail at: [email or web address]     Will anyone else be joining your video visit? No          Vitals - Patient Reported  Weight (Patient Reported): 76.2 kg (168 lb)  Height (Patient Reported): 177.8 cm (5' 10\")  BMI (Based on Pt Reported Ht/Wt): 24.11  Pain Score: Severe Pain (6)  Pain Loc: (mouth)    Adair Montana Sullivan LPN    Video Start Time: 3:53 PM  Video-Visit Details    Type of service:  Video Visit    Video End Time:4:13 PM    Originating Location (pt. Location): Home    Distant Location (provider location):  [facility]     Platform used for Video Visit: Apptimate     REASON FOR VISIT:    CANCER STAGE: Cancer Staging  No matching staging information was found for the patient.      HISTORY OF PRESENT ILLNESS:  Fortino Moya is a 69 yo man with a history of L oropharynx P16+ squamous cell cancer that was first diagnosed in 2018 in [place], [place].  He initially was offered chemoradiation, but due to a prior history of L sided hearing loss, he was given low dose weekly carboplatin 1.5 AUC weekly with radiation.  His 3 month PET/CT after initiation was negative and he underwent surveillance after that.  He had a tough time with chemoradiation - he tolerated it with expected toxicity, however, there was a prolonged recovery and during that time, he got a divorce and he really feels that mentally, he was not right during the treatment and that it led to some poor decisions on his own part.  He relocated to [place] which is where he is from in Feb 2020 and has been followed since by Dr. Treviño and Dr. Flores at [facility].  In August of 2020, he  developed numbenss in chin and then moved up to his forehead.  He had a Pet/CT done in Sept 2020 that showed a hypermetabolic area near his L foramen " jonah - he had had recent dental work in that area and it was thought that this might be scar or inflammation related to that procedure.  However, he was offered a 2nd opinion and came to [facility] and saw Dr. Russell in Nov 2020.  Images were reviewed at tumor boardon 12/4/20 - and it was felt that this was concerning enough to merit biopsy - so he was referred for IR-guided biopsy.   IR guided biopsy happened on 1/8/2021 and came back as invasive squamous cell carcinoma.    He was going to have SBRT, however, repeat imaging showed marked disease progression in the L  space and we decided to initiate induction chemotherapy to try to shrink the lesion to get to SBRT.     He initiated TPF on 1/28/21 and cycle 2 on 2/18/21     I am seeing Fortino by video visit.  He has had a rough time of the chemotherapy with oral mucositis.  He has lost about 12 lbs weight since we started chemotehrapy.  He is currently using the tube for 100% of his nutrition.  He had a swallow eval in early part of Feb that showed evidence of aspiration.  Outside of this, he is having pain with the mucositis and is taking oxycodone 2-3 times per day.  This is probably his worst problem with the chemotherapy.  He has noted that it is worst with the 2nd cycle of chemo than with the first and has not abated since the 2nd cycle started.      He does feel the pain medicine is helping.  He is able to still be active and independent with his activities. He did get outside today as the weather is nice and that is going well. He is not having any shortness of breath.  He sometimes has some constipation, but is able to manage with MOM. He otherwise is not reatlly having a lot of complaints.     Review Of Systems  10-point review of systems were negative except as noted in HPI.        EXAM:  There were no vitals taken for this visit.  GEN: alert and oriented x 3, nad, appears thinner than my prior visit with him  HEENT: perrla, eomi, sclera anicteric,  scleral redness on his L eye, facial droop present but appears perhaps less pronounced than prior.   NEURO: CN 2-12 intact, MS 5/5 b/l    Current Outpatient Medications   Medication Sig Dispense Refill     acetaminophen (TYLENOL) 32 mg/mL liquid Take 31.25 mLs (1,000 mg) by mouth every 6 hours as needed for pain 473 mL 0     Aspirin Buf,CaCarb-MgCarb-MgO, 81 MG TABS Take 81 mg by mouth       atorvastatin (LIPITOR) 10 MG tablet Take 10 mg by mouth       carboxymethylcellulose PF (REFRESH LIQUIGEL) 1 % ophthalmic gel Place 1 drop Into the left eye 4 times daily 30 each 11     carvedilol (COREG) 12.5 MG tablet TAKE TWO TABLETS BY MOUTH EVERY MORNING AND ONE TABLET EVERY EVENING       cevimeline (EVOXAC) 30 MG capsule TAKE ONE CAPSULE BY MOUTH THREE TIMES DAILY       cholecalciferol 25 MCG (1000 UT) TABS Take 1,000 Units by mouth       dexamethasone (DECADRON) 4 MG tablet Take 2 tablets (8 mg) by mouth 2 times daily (with meals) Start the evening prior to Docetaxel, continue morning of Docetaxel, and continue for 4 additional doses. 12 tablet 3     erythromycin (ROMYCIN) 5 MG/GM ophthalmic ointment Place 0.5 inches Into the left eye At Bedtime Instill ~1 cm ribbon into affected eye(s) 4 times daily for 7 days 1 g 0     flecainide (TAMBOCOR) 150 MG tablet Take 150 mg by mouth every 12 hours as needed       ibuprofen (ADVIL/MOTRIN) 600 MG tablet TAKE 1 TABLET BY MOUTH THREE TIMES DAILY AS NEEDED FOR PAIN       levofloxacin (LEVAQUIN) 25 MG/ML solution Take 10 mLs (250 mg) by mouth daily for 10 days 100 mL 0     lidocaine (XYLOCAINE) 2 % solution        lisinopril (ZESTRIL) 20 MG tablet Take 20 mg by mouth       LORazepam (ATIVAN) 0.5 MG tablet Take 1 tablet (0.5 mg) by mouth every 4 hours as needed (Anxiety, Nausea/Vomiting or Sleep) 30 tablet 3     magic mouthwash (ENTER INGREDIENTS IN COMMENTS) suspension Swish and spit every 4 hours as needed 240 mL 0     magic mouthwash (ENTER INGREDIENTS IN COMMENTS) suspension Take  q 4 hours as needed for mouth pain 240 mL 1     MULTIPLE VITAMIN PO Take 1 tablet by mouth       Nutritional Supplements (ISOSOURCE 1.5 RIYA) LIQD Take 1 Can by mouth 7 times daily 100 Can 11     nystatin (MYCOSTATIN) 509248 UNIT/ML suspension Take 5 mLs (500,000 Units) by mouth 4 times daily Swish and swallow 4 times a day 400 mL 1     ofloxacin (OCUFLOX) 0.3 % ophthalmic solution Place 1 drop Into the left eye 2 times daily 5 mL 0     oxyCODONE (ROXICODONE) 5 MG/5ML solution Take 5-10 mLs (5-10 mg) by mouth every 4 hours as needed for severe pain 500 mL 0     prochlorperazine (COMPAZINE) 10 MG tablet Take 1 tablet (10 mg) by mouth every 6 hours as needed (Nausea/Vomiting) 30 tablet 3     sildenafil (VIAGRA) 100 MG tablet Take  mg by mouth       traZODone (DESYREL) 50 MG tablet TAKE 1 TO 2 TABLETS BY MOUTH ONCE DAILY AT BEDTIME             Recent Labs   Lab Test 02/16/21  1323   WBC 10.1   HGB 13.0*        @labrcent[na,potassium,chloride,co2,bun,cr@  Recent Labs   Lab Test 02/16/21  1323   PROTTOTAL 6.5*   ALBUMIN 3.2*   BILITOTAL 0.2   AST 18   ALT 33   ALKPHOS 92         Recent Results (from the past 744 hour(s))   IR Chest Port Placement > 5 Yrs of Age    Narrative    PORT PLACEMENT 2/3/2021 10:12 AM    INDICATION:  Chronic intravenous access required for drug infusion.  70-year-old patient with squamous cell carcinoma of the tonsil.    LOCATION:  Right internal jugular vein    PROCEDURE: Ultrasound was used to localize and document patency of the  internal jugular vein.  A permanent image of the vein was recorded.   Local anesthesia was administered to the skin over the vein and a 4 mm  incision was made.  Ultrasound was used to place a 6F peel-away sheath  in the vein using standard technique.      Maximal Sterile Barrier Technique Utilized: Cap AND mask AND sterile  gown AND sterile gloves AND sterile full body drape AND hand hygiene  AND skin preparation 2% chlorhexidine for cutaneous antisepsis  (or  acceptable alternative antiseptics).  Sterile Ultrasound Technique  Utilized ?Sterile gel AND sterile probe covers.    Lidocaine was then administered in the subcutaneous tissue over the  clavicle to a point about 5 cm below the midclavicle.  A transverse 2  cm long incision was made at this point.  Blunt dissection was carried  out to create a small subcutaneous pocket cephalad to the incision.   The 6F port catheter was brought through the tract between the two  incisions and trimmed to appropriate length.  The catheter was  attached to the port and the port was brought into the pocket.  The  pocket was flushed with antibiotic solution.      The catheter was then inserted into the superior vena cava through the  jugular sheath.  It was adjusted so that it lay well with no kinking.  The port was flushed with heparinized solution.  The port incision was  closed with interrupted 3-0 Vicryl and Dermabond adhesive.  The neck  incision was closed with adhesive.  Dressings were applied.      Complications:  None    Sedation:       A moderate level of sedation was achieved with 100 mcg  IV fentanyl.  Vital signs and sedation monitored by our nursing staff under my  supervision.      Sedation time:  25 minutes    Fluoroscopy time:  .3 minutes   Air Kerma: 0.83 mGy.  Contrast given:  None  Local anesthetic:  20 mL of 1% lidocaine    FINDINGS:  Fluoroscopic guidance with a permanent image confirmed the  port catheter tip location is at the right atrial/SVC junction,  confirmed with single spot fluoroscopic image.      Impression    IMPRESSION: Successful placement of right internal jugular Bard power  port and catheter. The port is ready for immediate use.    JIMENA CRUZ MD   XR Video Swallow with SLP or OT - Order with Speech Therapy Referral    Narrative    Examination:  Modified Barium Swallow Study with Speech Pathology,  2/11/2021    Comparison: PET CT 1/18/2020    History: Squamous cell carcinoma, difficulty  swallowing with concern  for aspiration.    Fluoroscopy time: 2.1 minutes.    Findings: Under fluoroscopic guidance, the patient was given orally  administered barium of varying consistencies in the presence of the  speech pathology service.     Residual penetration with eventual aspiration seen with all  consistencies of barium. Large post swallow residual is noted with  barium coated cracker and pudding. AP images demonstrate asymmetric  swallow with left-sided dysfunction.       Impression    Impression:   Laryngeal penetration with eventual aspiration is seen with all  consistencies of barium. Please see the speech pathologist report for  further details.      I have personally reviewed the examination and initial interpretation  and I agree with the findings.    ENMANUEL GARRETT MD   XR Feeding Tube Placement    Addendum: 2/12/2021    I, ENMANUEL GARRETT MD, attest that I was immediately available to  provide guidance and assistance during the entirety of the procedure.    ENMANUEL GARRETT MD      Narrative    FEEDING TUBE PLACEMENT 2/12/2021 11:09 AM    INDICATION: Nutritional needs    COMPARISON: None.    FLUOROSCOPY TIME: 15.7.    FINDINGS: The feeding tube was advanced under fluoroscopic guidance  with final position of tip in the stomach. Despite multiple attempts,  the feeding tube was unable to be advanced to postpyloric position. A  small amount of barium was injected to demonstrate placement within  the stomach. The tube was flushed with sterile water and secured via  bandage dressing. There were no complications of the procedure.      Impression    IMPRESSION: Difficult placement of feeding tube with tip terminating  in the stomach.    Findings were communicated with the referring provider, Diana King CNP at 11:40 AM 2/12/2021. Consideration of Nutrition consultation for  tube feeds and possible GI consult for percutaneous gastrostomy tube  placement given patient's need for long-term  nutritional support were  discussed.    I have personally reviewed the examination and initial interpretation  and I agree with the findings.    ENMANUEL GARRETT MD   Abdomen XR 1 vw    Narrative    EXAM: XR ABDOMEN 1 VW  LOCATION: [facility]  DATE/TIME: 2/27/2021 10:37 PM    INDICATION: NG tube placement  COMPARISON: Fluoroscopic images from 02/12/2021      Impression    IMPRESSION: An enteric tube has been placed which is looped over the mid epigastrium. Question small amount of kinking of the tube at the rightward aspect of the L1 inferior endplate. Recommend correlation with tube function. Gas-filled loops of   predominantly large bowel. Severe arthritic change of the lumbar spine.   PET Oncology Whole Body    Narrative    Combined Report of:    PET and CT on  3/1/2021 10:19 AM :    1. PET of the neck, chest, abdomen, and pelvis.  2. PET CT Fusion for Attenuation Correction and Anatomical  Localization:    3. Diagnostic CT scan of the chest, abdomen, and pelvis with  intravenous contrast for interpretation.  3. CT of the chest, abdomen and pelvis obtained for diagnostic  interpretation.  4. 3D MIP and PET-CT fused images were processed on an independent  workstation and archived to PACS and reviewed by a radiologist.    Technique:    1. PET: The patient received 14.4 mCi of F-18-FDG; the serum glucose  was 125 prior to administration, body weight was 78 kg. Images were  evaluated in the axial, sagittal, and coronal planes as well as the  rotational whole body MIP. Images were acquired from the Vertex to the  Feet.    UPTAKE WAS MEASURED AT 60 MINUTES.     BACKGROUND:  Liver SUV max= 3.54,   Aorta Blood SUV Max: 2.07.     2. CT: Volumetric acquisition for clinical interpretation of the  chest, abdomen, and pelvis acquired at 3 mm sections . The chest,  abdomen, and pelvis were evaluated at 5 mm sections in bone, soft  tissue, and lung windows.      The patient received 105 cc of Isovue 370  intravenously for the  examination.    --    3. 3D MIP and PET-CT fused images were processed on an independent  workstation and archived to PACS and reviewed by a radiologist.    INDICATION: Squamous cell carcinoma of tonsil (H)    ADDITIONAL INFORMATION OBTAINED FROM EMR: none    COMPARISON: 1/18/2021, outside PET/CT 9/8/2020.    FINDINGS:     HEAD/NECK:  See dedicated neuroradiology report for the results of the high  resolution PET CT of the neck.      CHEST:  Right Port-A-Cath terminating in the low SVC. Feeding tube coiled  within the stomach.4 There is no suspicious FDG uptake in the chest.     There are no pathologically enlarged mediastinal, hilar or axillary  lymph nodes.  Scattered sub-6 mm pulmonary nodules, not significant changed in size  or distribution from previous, for example of the lateral right upper  lobe (series 11 image 67).   Moderate to severe calcification of the  coronary arteries.    There is no significant pericardial or pleural effusions.    ABDOMEN AND PELVIS:  There is no suspicious FDG uptake in the abdomen or pelvis.    There are no suspicious hepatic lesions. There is no splenomegaly or  evidence for splenic or pancreatic mass lesion.  There are no suspicious adrenal mass lesions or opaque gallbladder  calculi. Scattered subcentimeter renal cortical fluid attenuation foci  too small to characterize but statistically representing renal  cortical cysts. No hydronephrosis..    There is no evidence for diverticulitis, bowel obstruction or free  fluid.  Atheromatous changes of the infrarenal aorta and iliac  arteries.    LOWER EXTREMITIES:   No abnormal masses or hypermetabolic lesions.    BONES:   Diffuse increased bony FDG uptake predominantly at the axial skeleton  and proximal long bones consistent with Neulasta. There is no abnormal  FDG uptake in the skeleton. Surgical changes of right total hip  arthroplasty. Diffuse degenerative changes lower lumbar spine.  Unchanged  anterolisthesis of L4 on L5 and L5 on S1. Soft tissue fluid  collection of the left gluteal subcutaneous tissues with max SUV of  4.6, measuring 1.9 x 2.1 cm (series 5 image 457).        Impression    IMPRESSION: In this patient with known squamous cell carcinoma of the  tonsil status post chemotherapy and radiation:    1. No evidence of metastatic disease of the chest, abdomen, and lower  pelvis.  2. Diffuse increased FDG uptake of the axial skeleton and proximal  long bones, most consistent with Neulasta use.  3. Subcutaneous fluid collection with focal FDG uptake of the left  gluteal soft tissues, recommend direct visualization as this could be  iatrogenic or represent sebaceous cysts, and/or abscess.   4.See dedicated neuroradiology report for the results of the high  resolution PET CT of the neck.           I have personally reviewed the examination and initial interpretation  and I agree with the findings.    JON UGARTE MD   CT Chest/Abdomen/Pelvis w Contrast    Narrative    Combined Report of:    PET and CT on  3/1/2021 10:19 AM :    1. PET of the neck, chest, abdomen, and pelvis.  2. PET CT Fusion for Attenuation Correction and Anatomical  Localization:    3. Diagnostic CT scan of the chest, abdomen, and pelvis with  intravenous contrast for interpretation.  3. CT of the chest, abdomen and pelvis obtained for diagnostic  interpretation.  4. 3D MIP and PET-CT fused images were processed on an independent  workstation and archived to PACS and reviewed by a radiologist.    Technique:    1. PET: The patient received 14.4 mCi of F-18-FDG; the serum glucose  was 125 prior to administration, body weight was 78 kg. Images were  evaluated in the axial, sagittal, and coronal planes as well as the  rotational whole body MIP. Images were acquired from the Vertex to the  Feet.    UPTAKE WAS MEASURED AT 60 MINUTES.     BACKGROUND:  Liver SUV max= 3.54,   Aorta Blood SUV Max: 2.07.     2. CT: Volumetric acquisition  for clinical interpretation of the  chest, abdomen, and pelvis acquired at 3 mm sections . The chest,  abdomen, and pelvis were evaluated at 5 mm sections in bone, soft  tissue, and lung windows.      The patient received 105 cc of Isovue 370 intravenously for the  examination.    --    3. 3D MIP and PET-CT fused images were processed on an independent  workstation and archived to PACS and reviewed by a radiologist.    INDICATION: Squamous cell carcinoma of tonsil (H)    ADDITIONAL INFORMATION OBTAINED FROM EMR: none    COMPARISON: 1/18/2021, outside PET/CT 9/8/2020.    FINDINGS:     HEAD/NECK:  See dedicated neuroradiology report for the results of the high  resolution PET CT of the neck.      CHEST:  Right Port-A-Cath terminating in the low SVC. Feeding tube coiled  within the stomach.4 There is no suspicious FDG uptake in the chest.     There are no pathologically enlarged mediastinal, hilar or axillary  lymph nodes.  Scattered sub-6 mm pulmonary nodules, not significant changed in size  or distribution from previous, for example of the lateral right upper  lobe (series 11 image 67).   Moderate to severe calcification of the  coronary arteries.    There is no significant pericardial or pleural effusions.    ABDOMEN AND PELVIS:  There is no suspicious FDG uptake in the abdomen or pelvis.    There are no suspicious hepatic lesions. There is no splenomegaly or  evidence for splenic or pancreatic mass lesion.  There are no suspicious adrenal mass lesions or opaque gallbladder  calculi. Scattered subcentimeter renal cortical fluid attenuation foci  too small to characterize but statistically representing renal  cortical cysts. No hydronephrosis..    There is no evidence for diverticulitis, bowel obstruction or free  fluid.  Atheromatous changes of the infrarenal aorta and iliac  arteries.    LOWER EXTREMITIES:   No abnormal masses or hypermetabolic lesions.    BONES:   Diffuse increased bony FDG uptake predominantly  at the axial skeleton  and proximal long bones consistent with Neulasta. There is no abnormal  FDG uptake in the skeleton. Surgical changes of right total hip  arthroplasty. Diffuse degenerative changes lower lumbar spine.  Unchanged anterolisthesis of L4 on L5 and L5 on S1. Soft tissue fluid  collection of the left gluteal subcutaneous tissues with max SUV of  4.6, measuring 1.9 x 2.1 cm (series 5 image 457).        Impression    IMPRESSION: In this patient with known squamous cell carcinoma of the  tonsil status post chemotherapy and radiation:    1. No evidence of metastatic disease of the chest, abdomen, and lower  pelvis.  2. Diffuse increased FDG uptake of the axial skeleton and proximal  long bones, most consistent with Neulasta use.  3. Subcutaneous fluid collection with focal FDG uptake of the left  gluteal soft tissues, recommend direct visualization as this could be  iatrogenic or represent sebaceous cysts, and/or abscess.   4.See dedicated neuroradiology report for the results of the high  resolution PET CT of the neck.           I have personally reviewed the examination and initial interpretation  and I agree with the findings.    JON UGARTE MD   CT Soft Tissue Neck w Contrast    Narrative    PET CT fusion examination 3/1/2021 10:20 AM  1. Neck CT with contrast  2. PET study of the neck  3. PET CT fusion study of the neck    History:  Squamous cell carcinoma of the tonsils..    Additional information obtained from EMR: 71 yo man with a history of  L oropharynx P16+ squamous cell cancer that was first diagnosed in  2018. He was given low dose weekly carboplatin 1.5 AUC weekly with  radiation. ?His 3 month PET/CT after initiation was negative and he  underwent surveillance after that. In August of 2020, he ?developed  numbness in chin and then moved up to his forehead.??He had a Pet/CT  done in Sept 2020 that showed a hypermetabolic area near his L foramen  ovale suspicious for perineural spread.?He  was referred for IR-guided  biopsy. ??IR guided biopsy happened on 1/8/2021 and came back as  invasive squamous cell carcinoma.?Started TPF chemotherapy on  1/28/21.?    Comparison: 1/18/2021, CT soft tissue neck 12/15/2020, outside PET/CT  5/17/2018.    Technique: Please refer to the accompanying whole body PET-CT for  report of the dose and whole body PET-CT findings.  Regarding the neck, axial images were obtained after nonionic  intravenous contrast administration, with sagittal and coronal  reconstructions performed. Neck CT images were reviewed in bone, soft  tissue, and lung windows, with review of the fused PET-CT images as  well in multiple planes.    Findings:     There is interval resolution of previously seen FDG avid mass in the  left  space and extending along the V3 into the middle  cranial fossa and cerebellopontine angle cistern. There is no residual  suspicious FDG avidity in this area.    There is new mild mucosal FDG uptake (max SUV 4.3) along the right  tongue base, right lateral oropharyngeal wall and extending to the  right pyriform sinus. On CT there is very thin mucosal enhancement.    There is new diffuse FDG metabolism of the marrow due to ongoing  chemotherapy treatment. No destructive lytic or erosive bone lesions  are seen on CT.     Vascular structures are patent. Thyroid gland is normal. Submandibular  glands are atrophic. Parotid glands are atrophic. There is thickening  of the epiglottis likely from prior radiation treatment.     On CT there is residual non FDG avid fullness along the pterygoid  muscles and there is persistent erosion of the skull base along the  foramen ovale there was perineural tumor spread was however no  associated hypermetabolism.     No FDG avid lymphadenopathy.      Impression    Impression:   1. Interval resolution of hypermetabolism seen along the perineural  spread in the left  space extending to middle cranial fossa  representing  positive response to chemotherapy. Residual soft tissue  fullness in this area without associated FDG uptake.     3. New diffuse FDG metabolism along the marrow including calvarium,  skull base and visualized upper cervical spine representing benign  postchemotherapy related changes.    3. Mild FDG metabolism along the right tongue base, right lateral  oropharyngeal wall and extending to right pyriform sinus with  associated mild thin mucosal enhancement. Findings can seen with  mucositis. Correlation with direct physical examination is  recommended.     4. No cervical lymphadenopathy.     5. Please refer to the whole body PET CT performed as a separate  report, for the findings of the remainder of the body.    I have personally reviewed the examination and initial interpretation  and I agree with the findings.    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  He had a local recurrence in his L  space.  He has now had 2 cycles of TPF chemotherapy.  PET scan done yesterday shows resolution of the hypermetabolic activity suggesting excellent response to chemotehrapy.  The plan going forward is to proceed with hypofractionated radiation therapy to the involved area.  I did discuss with him that I think the radiotherapy there will be better tolerated than his prior radiation therapy, but I did not discuss details with him.  He will need to discuss with Dr. Rodgers.    We will discuss his case at ENT tumor board and go from there.  Depending on the radiation plan I may or may not do more chemotherapy.      Mucositis - this is likely related to 5FU - he is managing with salt and soda washes/MMW and oxycodone 2-3 times per day.  This helps with the pain. However, he is currently feeding tube dependent for nutrition - all meds and food are going through the tube.  I suspect in the coming week this will improve substantially.       Dysphagia - He did have evidence of deep penetration of liquids on VFSS a few weeks ago.  I suspect that the mucositis is making this worse and it may improve upon healing of this.  However, given that he has lost 12 lbs since intiation of treatment, I think he would benefit from a PEG tube especially given that he will still be getting ongoing treatment. He will need to continue to work on dysphagia with speech pathology.     I would like him to follow up with Diana Anaya in 2 weeks time to monitor his recovery from chemotherapy toxicity.   I spent 30 minutes with the patient.  >50% of the time was spent in counseling and coordination of care.    Yohan Shah   of Medicine  Division of Hematology, Oncology, and Transplantation

## 2021-03-03 DIAGNOSIS — C09.9 TONSILLAR CANCER (H): Primary | ICD-10-CM

## 2021-03-04 DIAGNOSIS — Z11.59 ENCOUNTER FOR SCREENING FOR OTHER VIRAL DISEASES: ICD-10-CM

## 2021-03-05 ENCOUNTER — TUMOR CONFERENCE (OUTPATIENT)
Dept: ONCOLOGY | Facility: CLINIC | Age: 71
End: 2021-03-05
Payer: MEDICARE

## 2021-03-05 ENCOUNTER — TELEPHONE (OUTPATIENT)
Dept: INTERVENTIONAL RADIOLOGY/VASCULAR | Facility: CLINIC | Age: 71
End: 2021-03-05

## 2021-03-05 DIAGNOSIS — R13.10 DYSPHAGIA, UNSPECIFIED TYPE: ICD-10-CM

## 2021-03-05 DIAGNOSIS — C09.9 MALIGNANT NEOPLASM OF TONSIL (H): Primary | ICD-10-CM

## 2021-03-05 LAB — COPATH REPORT: NORMAL

## 2021-03-05 NOTE — PROGRESS NOTES
Head & Neck Tumor Conference Note        Status: Established   Staff: Dr. Russell     Tumor Site: Left tonsil   Tumor Pathology: SCCa  Tumor Stage: T3N2b   Tumor Treatment:   - Definitive chemoradiation (completed 2019)   - Induction chemo for residual disease (1/28-2/18)     Reason for Review: Review imaging, path, and POC    Brief History: Fortino Moya is a 69 yo man with a history of L oropharynx P16+ squamous cell cancer that was first diagnosed in 2018 in [place], [place].  He initially was offered chemoradiation, but due to a prior history of L sided hearing loss, he was given low dose weekly carboplatin 1.5 AUC weekly with radiation.  His 3 month PET/CT after initiation was negative and he underwent surveillance after that.  He had a tough time with chemoradiation - he tolerated it with expected toxicity, however, there was a prolonged recovery and during that time, he got a divorce and he really feels that mentally, he was not right during the treatment and that it led to some poor decisions on his own part.  He relocated to [place] which is where he is from in Feb 2020 and has been followed since by Dr. Treviño and Dr. Flores at [facility].  In August of 2020, he  developed numbenss in chin and then moved up to his forehead.  He had a Pet/CT done in Sept 2020 that showed a hypermetabolic area near his L foramen ovale - he had had recent dental work in that area and it was thought that this might be scar or inflammation related to that procedure.  However, he was offered a 2nd opinion and came to [facility] and saw Dr. Russell in Nov 2020.  Images were reviewed at tumor boardon 12/4/20 - and it was felt that this was concerning enough to merit biopsy - so he was referred for IR-guided biopsy.   IR guided biopsy happened on 1/8/2021 and came back as invasive squamous cell carcinoma. Repeat imaging demonstrated marked disease progression in left  space and was initiated on induction chemotherapy prior  to SBRT. He is being presented today for review of restaging imaging following induction chemotherapy.        Pertinent PMH:   Past Medical History:   Diagnosis Date     Atrial fibrillation (H)      Hypercholesterolemia      Hypertension      Primary squamous cell carcinoma of tonsil (H)         Smoking Hx:   Social History     Tobacco Use     Smoking status: Never Smoker     Smokeless tobacco: Never Used   Substance Use Topics     Alcohol use: Never     Frequency: 2-3 times a week     Drug use: Never     Imaging:   3/1/21 PET/CT   Impression:   1. Interval resolution of hypermetabolism seen along the perineural  spread in the left  space extending to middle cranial fossa  representing positive response to chemotherapy. Residual soft tissue  fullness in this area without associated FDG uptake.      3. New diffuse FDG metabolism along the marrow including calvarium,  skull base and visualized upper cervical spine representing benign  postchemotherapy related changes.     3. Mild FDG metabolism along the right tongue base, right lateral  oropharyngeal wall and extending to right pyriform sinus with  associated mild thin mucosal enhancement. Findings can seen with  mucositis. Correlation with direct physical examination is  recommended.      4. No cervical lymphadenopathy.      5. Please refer to the whole body PET CT performed as a separate  report, for the findings of the remainder of the body.    IMPRESSION: In this patient with known squamous cell carcinoma of the  tonsil status post chemotherapy and radiation:     1. No evidence of metastatic disease of the chest, abdomen, and lower  pelvis.  2. Diffuse increased FDG uptake of the axial skeleton and proximal  long bones, most consistent with Neulasta use.  3. Subcutaneous fluid collection with focal FDG uptake of the left  gluteal soft tissues, recommend direct visualization as this could be  iatrogenic or represent sebaceous cysts, and/or abscess.    4.See dedicated neuroradiology report for the results of the high  resolution PET CT of the neck.     Pathology:   Pathology 1/8/21  ORIGINAL REPORT:     SPECIMEN(S):   Left  space     FINAL DIAGNOSIS:   LEFT  SPACE, BIOPSY:   - INVASIVE SQUAMOUS CELL CARCINOMA, minimally keratinizing; depth of   invasion cannot be assessed.   - Perineural invasion is observed.   - P16 immunostain is in progress and results will be provided separately.    Tumor Board Recommendation:   Discussion: The patients recent imaging was reviewed at tumor conference today. Earlier imaging demonstrates tumor extending through the foramen ovale to Meckels cave and along the 5th cranial nerve with uptake along the nerve. New imaging following induction chemotherapy with much less uptake indicating a very favorable response. After review a recommendation was made to move forward with chemorads. The patient will follow up with with Radiation oncology and Oncology.     Jostin Hester MD PGY-3  Otolaryngology- Head and Neck Surgery    Documentation / Disclaimer Cancer Tumor Board Note  Cancer tumor board recommendations do not override what is determined to be reasonable care and treatment, which is dependent on the circumstances of a patient's case; the patient's medical, social, and personal concerns; and the clinical judgment of the oncologist [physician].

## 2021-03-07 ENCOUNTER — APPOINTMENT (OUTPATIENT)
Dept: GENERAL RADIOLOGY | Facility: CLINIC | Age: 71
End: 2021-03-07
Attending: EMERGENCY MEDICINE
Payer: MEDICARE

## 2021-03-07 ENCOUNTER — HOSPITAL ENCOUNTER (EMERGENCY)
Facility: CLINIC | Age: 71
Discharge: HOME | End: 2021-03-07
Attending: EMERGENCY MEDICINE | Admitting: EMERGENCY MEDICINE
Payer: MEDICARE

## 2021-03-07 VITALS
OXYGEN SATURATION: 98 % | DIASTOLIC BLOOD PRESSURE: 86 MMHG | WEIGHT: 170 LBS | RESPIRATION RATE: 16 BRPM | BODY MASS INDEX: 24.34 KG/M2 | HEART RATE: 68 BPM | HEIGHT: 70 IN | SYSTOLIC BLOOD PRESSURE: 133 MMHG | TEMPERATURE: 98.4 F

## 2021-03-07 DIAGNOSIS — Z01.89 ENCOUNTER FOR IMAGING STUDY TO CONFIRM NASOGASTRIC (NG) TUBE PLACEMENT: ICD-10-CM

## 2021-03-07 PROCEDURE — 999N000065 XR ABDOMEN 1 VW

## 2021-03-07 PROCEDURE — 99283 EMERGENCY DEPT VISIT LOW MDM: CPT

## 2021-03-07 ASSESSMENT — MIFFLIN-ST. JEOR: SCORE: 1537.36

## 2021-03-07 NOTE — ED PROVIDER NOTES
"  History   Chief Complaint:  Feeding tube placement       The history is provided by the patient.      Fortino Moya is a 71 year old male with history of atrial fibrillation, tonsillar cancer, and hypertension who presents for evaluation of his feeding tube placement. The patients feeding tube slipped out last night so he put it back in. He presents today for imaging to ensure the feeding tube is back in the right place. He has surgery scheduled for g-tube placement on 3/10/21.     Review of Systems   All other systems reviewed and are negative.      Allergies:  No Known Allergies    Medications:  Acetaminophen   Aspirin 81 MG    Atorvastatin   Carvedilol   Cevimeline   Cholecalciferol   Dexamethasone   Flecainide   Ibuprofen   Lisinopril   Lorazepam   Nystatin   Oxycodone   Prochlorperazine   Sildenafil   Trazodone     Past Medical History:     Atrial fibrillation  Hypercholesterolemia  Hypertension  Primary squamous cell carcinoma of tonsil     Past Surgical History:    IR chest port placement  Joint replacement, right hip  Total knee arthoplasty     Family History:    Diabetes  Heart disease   Hyperlipidemia  Hypertension  Lung cancer  Prostate cancer    Social History:  Presents unaccompanied to the ED  PCP: [facility], [facility] [place]     Physical Exam     Patient Vitals for the past 24 hrs:   BP Temp Temp src Pulse Resp SpO2 Height Weight   03/07/21 0748 133/86 98.4  F (36.9  C) Oral 68 16 98 % 1.778 m (5' 10\") 77.1 kg (170 lb)       Physical Exam    Eye:  Pupils are equal, round, and reactive.  Extraocular movements intact.    ENT:  No rhinorrhea.  Moist mucus membranes.  Normal tongue and tonsil.  NG tube noted.     Cardiac:  Regular rate and rhythm.  No murmurs, gallops, or rubs.    Pulmonary:  Clear to auscultation bilaterally.  No wheezes, rales, or rhonchi.    Abdomen:  Positive bowel sounds.  Abdomen is soft and non-distended, without focal tenderness.    Musculoskeletal:  Normal movement of " all extremities without evidence for deficit.    Skin:  Warm and dry without rashes.    Neurologic:  Non-focal exam without asymmetric weakness or numbness.     Psychiatric:  Normal affect with appropriate interaction with examiner.     Emergency Department Course     Imaging:  XR Abdomen 1 view:  Endotracheal tube has been pulled back slightly, with tip   in the proximal stomach. There is a moderate amount of stool   throughout the colon. No convincing radiographic evidence for   small-bowel obstruction. A central venous catheter tip is noted within   the right atrium. Degenerative changes in the lumbar spine, right   sacroiliac joint, and left hip. Right hip arthroplasty, as per radiology.      Procedures  None    Emergency Department Course:    Reviewed:  I reviewed nursing notes, vitals, past medical history and care everywhere    Assessments:  0754 I obtained history and examined the patient as noted above.   0843 I rechecked the patient and explained findings.     Disposition:  The patient was discharged to home.       Impression & Plan     Medical Decision Making:  This unfortunate 71-year-old man presents to us requesting an x-ray for confirmed placement of his NG tube.  The patient notes that he caught it on his hand last night and believes that several inches of it came out.  He simply pushed the tube back down into place and feels that the length is back to normal.  However, he was afraid to use it, concerned that the tip could now be in his long or not be in the appropriate placement.  He otherwise denies any other complaints or pain at this juncture.    On my exam, he appears clinically well.  His vital signs are reassuring.  His abdomen is soft and benign.  X-ray shows appropriate placement of the NG tube in the proximal stomach.  He is scheduled to undergo G-tube placement in 3 days.  I see no limitations for him to use his NG tube at this time.  He was reassured by this and does not he can return to  us for any other emergent concerns.      Covid-19  Fortino Moya was evaluated during a global COVID-19 pandemic, which necessitated consideration that the patient might be at risk for infection with the SARS-CoV-2 virus that causes COVID-19.   Applicable protocols for evaluation were followed during the patient's care.   COVID-19 was considered as part of the patient's evaluation. The plan for testing is:  the patient was referred for outpatient testing.    Diagnosis:    ICD-10-CM    1. Encounter for imaging study to confirm nasogastric (NG) tube placement  Z01.89        Scribe Disclosure:  Lesly WHITMORE, am serving as a scribe at 7:50 AM on 3/7/2021 to document services personally performed by Trierweiler, Chad A, MD based on my observations and the provider's statements to me.      Trierweiler, Chad A, MD  03/07/21 0922

## 2021-03-07 NOTE — ED TRIAGE NOTES
Pt wants x-ray - pt concern with placement of his feeding tube - denies any SOB or any pain - denies any N/V

## 2021-03-08 ENCOUNTER — HOME INFUSION (PRE-WILLOW HOME INFUSION) (OUTPATIENT)
Dept: PHARMACY | Facility: CLINIC | Age: 71
End: 2021-03-08

## 2021-03-08 ENCOUNTER — DOCUMENTATION ONLY (OUTPATIENT)
Dept: ONCOLOGY | Facility: CLINIC | Age: 71
End: 2021-03-08

## 2021-03-08 NOTE — PROGRESS NOTES
Nutrition Services:     Received message from [facility] CHANDU, Cali Shoen: 'Fortino Moya [id number] spoke with our coordinator this morning c/o still feeling hungry with 7 cartons of Isosource 1.5 + his protein drink. Wt is 169 lbs. He was ?ing a higher calorie/protein formula. I m going to send him Nutren 2.0 to try, 6 cartons per day. He asked that I let you know'      Writer scheduled to follow-up with Fortino on 3/22.   --formula tolerance  --weight trends    Ramila Worrell RD, LD  [facility]  [phone]

## 2021-03-09 ENCOUNTER — HOSPITAL ENCOUNTER (OUTPATIENT)
Dept: EDUCATION SERVICES | Facility: CLINIC | Age: 71
End: 2021-03-09
Payer: MEDICARE

## 2021-03-09 NOTE — PROGRESS NOTES
This is a recent snapshot of the patient's [facility] medical record.  For current drug dose and complete information and questions, call [phone] or In Basket pool, [facility] ([id number])  CSN Number:  [id number]

## 2021-03-09 NOTE — CONSULTS
Met with Fortino for G Tube education. Fortino currently has a NG tube and has been doing bolus TF at home. We was able to teach back and demonstrate how to clean around his tube and flush the tube. He has been giving himself medications and bolus TF down his NG tube and feels comfortable being able to do that with his g tube now.      Literature given: Handwashing and Skin Care, Caring for Your Tube at Home.

## 2021-03-10 ENCOUNTER — APPOINTMENT (OUTPATIENT)
Dept: INTERVENTIONAL RADIOLOGY/VASCULAR | Facility: CLINIC | Age: 71
End: 2021-03-10
Attending: INTERNAL MEDICINE
Payer: MEDICARE

## 2021-03-10 ENCOUNTER — HOSPITAL ENCOUNTER (OUTPATIENT)
Facility: CLINIC | Age: 71
Discharge: HOME | End: 2021-03-10
Attending: INTERNAL MEDICINE | Admitting: RADIOLOGY
Payer: MEDICARE

## 2021-03-10 ENCOUNTER — APPOINTMENT (OUTPATIENT)
Dept: MEDSURG UNIT | Facility: CLINIC | Age: 71
End: 2021-03-10
Attending: INTERNAL MEDICINE
Payer: MEDICARE

## 2021-03-10 VITALS
DIASTOLIC BLOOD PRESSURE: 67 MMHG | HEIGHT: 70 IN | BODY MASS INDEX: 24.34 KG/M2 | HEART RATE: 63 BPM | WEIGHT: 170 LBS | TEMPERATURE: 97.8 F | OXYGEN SATURATION: 97 % | SYSTOLIC BLOOD PRESSURE: 104 MMHG | RESPIRATION RATE: 16 BRPM

## 2021-03-10 DIAGNOSIS — C09.9 TONSILLAR CANCER (H): ICD-10-CM

## 2021-03-10 LAB — B-HCG FREE SERPL-ACNC: 1.2 [IU]/L (ref 0.86–1.14)

## 2021-03-10 PROCEDURE — 999N000134 HC STATISTIC PP CARE STAGE 3

## 2021-03-10 PROCEDURE — 258N000003 HC RX IP 258 OP 636: Performed by: PHYSICIAN ASSISTANT

## 2021-03-10 PROCEDURE — 99152 MOD SED SAME PHYS/QHP 5/>YRS: CPT

## 2021-03-10 PROCEDURE — 250N000011 HC RX IP 250 OP 636: Performed by: STUDENT IN AN ORGANIZED HEALTH CARE EDUCATION/TRAINING PROGRAM

## 2021-03-10 PROCEDURE — 99152 MOD SED SAME PHYS/QHP 5/>YRS: CPT | Mod: GC | Performed by: RADIOLOGY

## 2021-03-10 PROCEDURE — 255N000002 HC RX 255 OP 636: Performed by: INTERNAL MEDICINE

## 2021-03-10 PROCEDURE — 85610 PROTHROMBIN TIME: CPT

## 2021-03-10 PROCEDURE — 272N000151 HC KIT CR11

## 2021-03-10 PROCEDURE — 49440 PLACE GASTROSTOMY TUBE PERC: CPT

## 2021-03-10 PROCEDURE — 250N000011 HC RX IP 250 OP 636: Performed by: RADIOLOGY

## 2021-03-10 PROCEDURE — 250N000011 HC RX IP 250 OP 636: Performed by: PHYSICIAN ASSISTANT

## 2021-03-10 PROCEDURE — C1769 GUIDE WIRE: HCPCS

## 2021-03-10 PROCEDURE — 272N000588 ZZ HC TUBE GASTRO CR5

## 2021-03-10 PROCEDURE — 49440 PLACE GASTROSTOMY TUBE PERC: CPT | Mod: GC | Performed by: RADIOLOGY

## 2021-03-10 PROCEDURE — 250N000013 HC RX MED GY IP 250 OP 250 PS 637: Performed by: STUDENT IN AN ORGANIZED HEALTH CARE EDUCATION/TRAINING PROGRAM

## 2021-03-10 PROCEDURE — 250N000009 HC RX 250: Performed by: STUDENT IN AN ORGANIZED HEALTH CARE EDUCATION/TRAINING PROGRAM

## 2021-03-10 RX ORDER — IODIXANOL 320 MG/ML
100 INJECTION, SOLUTION INTRAVASCULAR ONCE
Status: COMPLETED | OUTPATIENT
Start: 2021-03-10 | End: 2021-03-10

## 2021-03-10 RX ORDER — FENTANYL CITRATE 50 UG/ML
25-50 INJECTION, SOLUTION INTRAMUSCULAR; INTRAVENOUS EVERY 5 MIN PRN
Status: DISCONTINUED | OUTPATIENT
Start: 2021-03-10 | End: 2021-03-10 | Stop reason: HOSPADM

## 2021-03-10 RX ORDER — CEFAZOLIN SODIUM 2 G/100ML
2 INJECTION, SOLUTION INTRAVENOUS
Status: COMPLETED | OUTPATIENT
Start: 2021-03-10 | End: 2021-03-10

## 2021-03-10 RX ORDER — NALOXONE HYDROCHLORIDE 0.4 MG/ML
0.4 INJECTION, SOLUTION INTRAMUSCULAR; INTRAVENOUS; SUBCUTANEOUS
Status: DISCONTINUED | OUTPATIENT
Start: 2021-03-10 | End: 2021-03-10 | Stop reason: HOSPADM

## 2021-03-10 RX ORDER — SODIUM CHLORIDE 9 MG/ML
INJECTION, SOLUTION INTRAVENOUS CONTINUOUS
Status: DISCONTINUED | OUTPATIENT
Start: 2021-03-10 | End: 2021-03-10 | Stop reason: HOSPADM

## 2021-03-10 RX ORDER — HEPARIN SODIUM (PORCINE) LOCK FLUSH IV SOLN 100 UNIT/ML 100 UNIT/ML
500 SOLUTION INTRAVENOUS ONCE
Status: COMPLETED | OUTPATIENT
Start: 2021-03-10 | End: 2021-03-10

## 2021-03-10 RX ORDER — NALOXONE HYDROCHLORIDE 0.4 MG/ML
0.2 INJECTION, SOLUTION INTRAMUSCULAR; INTRAVENOUS; SUBCUTANEOUS
Status: DISCONTINUED | OUTPATIENT
Start: 2021-03-10 | End: 2021-03-10 | Stop reason: HOSPADM

## 2021-03-10 RX ORDER — FLUMAZENIL 0.1 MG/ML
0.2 INJECTION, SOLUTION INTRAVENOUS
Status: DISCONTINUED | OUTPATIENT
Start: 2021-03-10 | End: 2021-03-10 | Stop reason: HOSPADM

## 2021-03-10 RX ORDER — LIDOCAINE HYDROCHLORIDE 20 MG/ML
JELLY TOPICAL ONCE
Status: COMPLETED | OUTPATIENT
Start: 2021-03-10 | End: 2021-03-10

## 2021-03-10 RX ORDER — LIDOCAINE 40 MG/G
CREAM TOPICAL
Status: DISCONTINUED | OUTPATIENT
Start: 2021-03-10 | End: 2021-03-10 | Stop reason: HOSPADM

## 2021-03-10 RX ADMIN — FENTANYL CITRATE 25 MCG: 50 INJECTION, SOLUTION INTRAMUSCULAR; INTRAVENOUS at 09:30

## 2021-03-10 RX ADMIN — LIDOCAINE HYDROCHLORIDE: 20 JELLY TOPICAL at 09:22

## 2021-03-10 RX ADMIN — GLUCAGON HYDROCHLORIDE 1 MG: KIT at 09:16

## 2021-03-10 RX ADMIN — Medication 500 UNITS: at 13:49

## 2021-03-10 RX ADMIN — MIDAZOLAM 0.5 MG: 1 INJECTION INTRAMUSCULAR; INTRAVENOUS at 09:19

## 2021-03-10 RX ADMIN — MIDAZOLAM 0.5 MG: 1 INJECTION INTRAMUSCULAR; INTRAVENOUS at 09:30

## 2021-03-10 RX ADMIN — FENTANYL CITRATE 25 MCG: 50 INJECTION, SOLUTION INTRAMUSCULAR; INTRAVENOUS at 09:25

## 2021-03-10 RX ADMIN — IODIXANOL 20 ML: 320 INJECTION, SOLUTION INTRAVASCULAR at 09:36

## 2021-03-10 RX ADMIN — SODIUM CHLORIDE: 9 INJECTION, SOLUTION INTRAVENOUS at 07:32

## 2021-03-10 RX ADMIN — ACETAMINOPHEN 650 MG: 325 SOLUTION ORAL at 12:17

## 2021-03-10 RX ADMIN — MIDAZOLAM 0.5 MG: 1 INJECTION INTRAMUSCULAR; INTRAVENOUS at 09:11

## 2021-03-10 RX ADMIN — FENTANYL CITRATE 25 MCG: 50 INJECTION, SOLUTION INTRAMUSCULAR; INTRAVENOUS at 09:11

## 2021-03-10 RX ADMIN — MIDAZOLAM 0.5 MG: 1 INJECTION INTRAMUSCULAR; INTRAVENOUS at 09:25

## 2021-03-10 RX ADMIN — LIDOCAINE HYDROCHLORIDE 10 ML: 10 INJECTION, SOLUTION EPIDURAL; INFILTRATION; INTRACAUDAL; PERINEURAL at 09:23

## 2021-03-10 RX ADMIN — FENTANYL CITRATE 25 MCG: 50 INJECTION, SOLUTION INTRAMUSCULAR; INTRAVENOUS at 09:19

## 2021-03-10 RX ADMIN — CEFAZOLIN SODIUM 2 G: 2 INJECTION, SOLUTION INTRAVENOUS at 07:32

## 2021-03-10 ASSESSMENT — MIFFLIN-ST. JEOR: SCORE: 1537.36

## 2021-03-10 NOTE — IP AVS SNAPSHOT
MRN:[id number]                      After Visit Summary   3/10/2021    Fortino Moya    MRN: [id number]           Visit Information        Department      3/10/2021  6:33 AM [facility] [address] [place]          Review of your medicines      UNREVIEWED medicines. 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, left      Dose: 1 drop  Place 1 drop Into the left eye 4 times daily  Quantity: 30 each  Refills: 11     carvedilol 12.5 MG tablet  Commonly known as: COREG      TAKE TWO TABLETS BY MOUTH EVERY MORNING AND ONE TABLET EVERY EVENING  Refills: 0     cevimeline 30 MG capsule  Commonly known as: EVOXAC      TAKE ONE CAPSULE BY MOUTH THREE TIMES DAILY  Refills: 0     cholecalciferol 25 MCG (1000 UT) Tabs      Dose: 1,000 Units  Take 1,000 Units by mouth  Refills: 0     dexamethasone 4 MG tablet  Commonly known as: DECADRON  Used for: Encounter for other specified aftercare, Squamous cell carcinoma of tonsil (H)      Dose: 8 mg  Take 2 tablets (8 mg) by mouth 2 times daily (with meals) Start the evening prior to Docetaxel, continue morning of Docetaxel, and continue for 4 additional doses.  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)      Dose: 0.5 inch  Place 0.5 inches Into the left eye At Bedtime Instill ~1 cm ribbon into  affected eye(s) 4 times daily for 7 days  Quantity: 1 g  Refills: 0     flecainide 150 MG tablet  Commonly known as: TAMBOCOR      Dose: 150 mg  Take 150 mg by mouth every 12 hours as needed  Refills: 0     ibuprofen 600 MG tablet  Commonly known as: ADVIL/MOTRIN      TAKE 1 TABLET BY MOUTH THREE TIMES DAILY AS NEEDED FOR PAIN  Refills: 0     lidocaine 2 % solution  Commonly known as: XYLOCAINE      Refills: 0     lisinopril 20 MG tablet  Commonly known as: ZESTRIL      Dose: 20 mg  Take 20 mg by mouth  Refills: 0     LORazepam 0.5 MG tablet  Commonly known as: ATIVAN  Used for: Encounter for other specified aftercare, Squamous cell carcinoma of tonsil (H)      Dose: 0.5 mg  Take 1 tablet (0.5 mg) by mouth every 4 hours as needed (Anxiety, Nausea/Vomiting or Sleep)  Quantity: 30 tablet  Refills: 3     magic mouthwash suspension  Commonly known as: ENTER INGREDIENTS IN COMMENTS  Used for: Squamous cell carcinoma of tonsil (H)      Swish and spit every 4 hours as needed  Quantity: 240 mL  Refills: 0     magic mouthwash suspension  Commonly known as: ENTER INGREDIENTS IN COMMENTS  Used for: Squamous cell carcinoma of tonsil (H)      Take q 4 hours as needed for mouth pain  Quantity: 240 mL  Refills: 1     MULTIPLE VITAMIN PO      Dose: 1 tablet  Take 1 tablet by mouth  Refills: 0     nystatin 128588 UNIT/ML suspension  Commonly known as: MYCOSTATIN  Used for: Squamous cell carcinoma of tonsil (H), Metastatic squamous cell carcinoma (H), Mucositis due to chemotherapy      Dose: 500,000 Units  Take 5 mLs (500,000 Units) by mouth 4 times daily Swish and swallow 4 times a day  Quantity: 400 mL  Refills: 1     ofloxacin 0.3 % ophthalmic solution  Commonly known as: OCUFLOX  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, left      Dose: 1 drop  Place 1 drop Into the left eye 2 times daily  Quantity: 5 mL  Refills: 0     oxyCODONE 5 MG/5ML solution  Commonly known as: ROXICODONE  Used for: Squamous cell carcinoma of tonsil (H)      Dose:  5-10 mg  Take 5-10 mLs (5-10 mg) by mouth every 4 hours as needed for severe pain  Quantity: 500 mL  Refills: 0     prochlorperazine 10 MG tablet  Commonly known as: COMPAZINE  Used for: Encounter for other specified aftercare, Squamous cell carcinoma of tonsil (H)      Dose: 10 mg  Take 1 tablet (10 mg) by mouth every 6 hours as needed (Nausea/Vomiting)  Quantity: 30 tablet  Refills: 3     sildenafil 100 MG tablet  Commonly known as: VIAGRA      Dose:  mg  Take  mg by mouth  Refills: 0     traZODone 50 MG tablet  Commonly known as: DESYREL      TAKE 1 TO 2 TABLETS BY MOUTH ONCE DAILY AT BEDTIME  Refills: 0        CONTINUE these medicines which have NOT CHANGED       Dose / Directions   Isosource 1.5 Griffin Liqd      Dose: 1 Can  Take 1 Can by mouth 7 times daily  Quantity: 100 Can  Refills: 11              Protect others around you: Learn how to safely use, store and throw away your medicines at www.disposemymeds.org.       Follow-ups after your visit       Your next 10 appointments already scheduled    Mar 11, 2021  8:30 AM  Return Visit with Sidney Rodgers MD  [facility] ([facility] ) [address] [facility] [address] [phone]      Mar 11, 2021  9:00 AM  Ct Sim with Sidney Rodgers MD  [facility] ([facility] ) [address]  [facility] [address] [phone]      Mar 17, 2021  1:10 PM  (Arrive by 12:55 PM)  Video Visit with Diana King CNP  [facility] ([facility] ) [address] [id number] [phone]   Please Note: This is a virtual visit; there is no need to come to the facility.       Mar 22, 2021  8:00 AM  Telephone Visit with Ramila Machado  CHANDU Worrell  [facility] ([facility] ) [address] [address] [phone]   Please Note: This is a virtual visit; there is no need to come to the facility.       May 11, 2021  1:30 PM  (Arrive by 1:15 PM)  Video Visit with Yohan Shah DO  [facility] ([facility] ) [address] [place] [address] [phone]   Please Note: This is a virtual visit; there is no need to come to the facility.          Care Instructions       After Care Instructions     Discharge Instructions      If questions or problems arise regarding tube function (e.g. leaking, dislodges, etc.) Contact Interventional Radiology department 24 hours a day.     For procedures that were done at the [facility]   8:00-4:30 PM Monday through Friday    Contact:[phone]    For afterhours and weekends call the [facility] main phone line   [phone] and ask for the [facility] IR on call physician number.    If DIRECTED by the RADIOLOGIST, related to specific problems with the tube functioning,  go to the Emergency Department.         Discharge Instructions      Patient to make a follow up appointment in IR clinic in 10-14 days for the removal of the retention sutures at the G or GJ tube site. Phone number is [phone] if needed.           Further instructions from your care team       Interventional Radiology  Patient Discharge Instructions  Post Tube Placement:  Gastrostomy    For some patients, the tube puts food and medicine into the body. For others, it drains fluid from the stomach. Your doctor will decide how long you will have the tube.    A disc or balloon inside the body will hold the tube in place. The balloon is filled with water.  You may notice one or two  anchors  (buttons, stitches, T-tacks, or T-fasteners) around your tube/site. These hold  the stomach to the inside wall of the belly.    Self-care the first few days  Do not eat or drink for the first four hours. (You may take medicine with small sips of water.)  Stick to liquids for the first day and then advance your diet as tolerated, if you are able to take in oral foods.  If you are starting tube feedings, this should have been prearranged with your clinic.    Check your tube site often. Call your doctor if your side effects of pain, redness or bleeding get worse. It is normal to have some drainage (fluid leaking) around the tube.    Limit heavy activity (lifting, straining or exercise).     If you received IV medicine to make you sleepy (sedation): You may feel drowsy, forgetful and unsteady.     No driving until the day after surgery. No alcohol for 24 hours.    Activity   You may resume your normal activities as tolerated, however, most patients have site pain for about a week following placement.  Minimal use of your abdominal muscles will decrease the pain.  Keep the tube secure at all times (you may tape it to your skin or use the Flexi-Trak device) and avoid tugging on it.  Bathing  You may shower 24 hours after the tube is placed.  Remove the dressing before your shower, reapply afterwards.  DO NOT submerge in water of any kind for 3 weeks after placement.  Once the site is healed, which is around 3 weeks, for most patients, you may go in bathtubs, spas and swimming pools where the water is clean or chlorinated.  Be sure the caps are tight.  Your doctor may ask you to cover the tube site with a plastic bandage when swimming.    Do not swim in lakes, oceans or non-chlorinated public charles for the duration of your tube being in place.  When swimming or any activity where the tube could be inadvertently pulled, wearing a t-shirt or one piece bathing suit (for women) decreases the risk.  Basic tube care  Always wash your hands before touching the tube.    Do not use ointment or hydrogen  peroxide near the tube. You may use a thin layer of skin balm, like Desitin around the site after it is clean and dry.     After 3 weeks:  If you have a G-tube:  - Once each day, pull gently on the tube.  It should move in and out slightly (about   to    inch). You may see a slight rounding of the skin as you pull up. If the tube is too tight to move in and out, call your nurse or doctor.  - We may ask you to gently rotate the tube. If so, roll it between your thumb and forefinger.  This breaks up any flaps of tissue that have formed around the tube inside the stomach.    Changing the bandage  Wear a bandage until the site has healed and there is no leaking fluid. Change the bandage at least once a day and each time it gets wet or soiled.  1. Gather these supplies:  - Plastic bag  - Gauze or split gauze (4×4 or 2×2)  - Paper tape (1 or 2 inches wide)  2. Clean your work area with household  and water (or rubbing alcohol). Wash your hands.  3. Remove the old bandage. Place it in the bag.  4. Wash your hands again. Clean the tube site.  5. Replace the bandage.   If you have a disk: Slide split gauze under and around the tube (or use two pieces of gauze).  Tape the gauze in place.   If you don t have a disk: Fold one piece of gauze in half, placing it below the tube site.  Fold another piece of gauze in half, placing it over the tube site. Tape the gauze in place.  6. Secure the tube to the skin. If you don t have a disk, be sure the tube is at a 90-degree angle.   First, place a piece of a tape across the skin. Then, use a second piece of tape to secure the tube over the first piece.  7 Do not attach the tube to clothing, except during feedings.    Flushing Your Tube  Flush the tube with water (clean, drinkable tap water is fine) before and after each feeding, as well as between medications if you choose to use your tube to instill medications.    If you are not using your tube, it needs to be flushed with  20-30 cc of clean tap water twice daily.    Follow-up  Contact the Interventional Radiology Clinic to schedule your suture removal two weeks after tube placement at [phone]  Routine tube changes are recommended every 6-9 months.  After 6 weeks most tubes can be changed to a skin level,  button  type device.  Tube removals are performed in our clinic    The following questions should be first directed to the doctor that referred you for the feeding tube  Which feeding formula to use?  How long will the tube be needed?  Calorie requirement for your specific problem?  How much water you need to give through your tube daily?  Nausea, vomiting, diarrhea, or weight loss problems.    When to call for help  Call your doctor if you have    A fever that is:  - over 101 F (38.3 C) if taken under the tongue  - over 100 F (37.8 C) if taken under your arm.    Vomiting (throwing up)    Diarrhea (loose, watery stools)    Constipation (hard, dry stools) for more than 24 hours  Call your care coordinator or Interventional Radiology if you have:    Bleeding, drainage or swelling at the tube site.    A painful, bright red rash    Severe pain or pain that does not improve with medicine.    A plugged tube and you cannot flush it.    A tube that has come out.    Fluid leaking around the tube.    Any questions or problems with your tube.  If you need help after business hours or on a holiday, go to Emergency unless you can reach home care or your care coordinator.  Phone numbers  ? [facility]:    [place]: [phone]  * After 4pm and on weekends, call the hospital  at [phone] and ask to have the on call Interventional Radiologist paged.      Additional Information About Your Visit       [employer] Information    [employer] gives you secure access to your electronic health record. If you see a primary care provider, you can also send messages to your care team and make appointments. If you have  "questions, please call your primary care clinic.  If you do not have a primary care provider, please call [phone] and they will assist you.       Care EveryWhere ID    This is your Care EveryWhere ID. This could be used by other organizations to access your [place] medical records  [id number]       Your Vitals Were  Most recent update: 3/10/2021  9:59 AM    Blood Pressure   90/68 (BP Location: Right arm)          Pulse   63          Temperature   97.5  F (36.4  C) (Oral)          Respirations   16          Height   1.778 m (5' 10\")             Weight   77.1 kg (170 lb)    Pulse Oximetry   95%    BMI (Body Mass Index)   24.39 kg/m           Primary Care Provider Office Phone # Fax #    [facility] [phone]      Equal Access to Services    ANASTASIA BOYLE : Hadii aad ku hadasho Soomaali, waaxda luqadaha, qaybta kaalmada adeegyada, waxay helenain hayjuno bond . So [facility] [phone].    ATENCIÓN: Si habla español, tiene a carrera disposición servicios gratuitos de asistencia lingüística. Wagner al [phone].    We comply with applicable federal and state civil rights laws, including the [place] Human Rights Act. We do not discriminate on the basis of race, color, creed, Sikh, national origin, marital status, age, disability, sex, sexual orientation, or gender identity.    If you would like an itemization of your charges they will now be available in [address] 30 days after discharge. To access the itemized statements in [address] go to billing/billing summary. From there select view account. There will be multiple tabs showing an overview of your account, detail, payments, and communications. From the communications tab you can see your monthly statements, your itemized statements, and any billing letters generated for your account. If you do not have a [address] account and need help getting access please contact [address] support at [phone].  If you would " prefer to have your itemized statements mailed please contact our automated itemized bill request line at [phone] option  2.       Thank you!    Thank you for choosing [facility] for your care. Our goal is always to provide you with excellent care. Hearing back from our patients is one way we can continue to improve our services. Please take a few minutes to complete the written survey that you may receive in the mail after you visit with us. Thank you!            Medication List      Medications          Morning Afternoon Evening Bedtime As Needed    Isosource 1.5 Griffin Liqd  INSTRUCTIONS: Take 1 Can by mouth 7 times daily                       ASK your doctor about these medications          Morning Afternoon Evening Bedtime As Needed    acetaminophen 32 mg/mL liquid  Also known as: TYLENOL  INSTRUCTIONS: Take 31.25 mLs (1,000 mg) by mouth every 6 hours as needed for pain                     Aspirin Buf(CaCarb-MgCarb-MgO) 81 MG Tabs  INSTRUCTIONS: Take 81 mg by mouth                     atorvastatin 10 MG tablet  Also known as: LIPITOR  INSTRUCTIONS: Take 10 mg by mouth                     carboxymethylcellulose PF 1 % ophthalmic gel  Also known as: REFRESH LIQUIGEL  INSTRUCTIONS: Place 1 drop Into the left eye 4 times daily                     carvedilol 12.5 MG tablet  Also known as: COREG  INSTRUCTIONS: TAKE TWO TABLETS BY MOUTH EVERY MORNING AND ONE TABLET EVERY EVENING                     cevimeline 30 MG capsule  Also known as: EVOXAC  INSTRUCTIONS: TAKE ONE CAPSULE BY MOUTH THREE TIMES DAILY                     cholecalciferol 25 MCG (1000 UT) Tabs  INSTRUCTIONS: Take 1,000 Units by mouth                     dexamethasone 4 MG tablet  Also known as: DECADRON  INSTRUCTIONS: Take 2 tablets (8 mg) by mouth 2 times daily (with meals) Start the evening prior to Docetaxel, continue morning of Docetaxel, and continue for 4 additional doses.                     erythromycin 5 MG/GM ophthalmic ointment  Also known  as: ROMYCIN  INSTRUCTIONS: Place 0.5 inches Into the left eye At Bedtime Instill ~1 cm ribbon into affected eye(s) 4 times daily for 7 days                     flecainide 150 MG tablet  Also known as: TAMBOCOR  INSTRUCTIONS: Take 150 mg by mouth every 12 hours as needed                     ibuprofen 600 MG tablet  Also known as: ADVIL/MOTRIN  INSTRUCTIONS: TAKE 1 TABLET BY MOUTH THREE TIMES DAILY AS NEEDED FOR PAIN                     lidocaine 2 % solution  Also known as: XYLOCAINE  LAST TAKEN: Ask your nurse or doctor                     lisinopril 20 MG tablet  Also known as: ZESTRIL  INSTRUCTIONS: Take 20 mg by mouth                     LORazepam 0.5 MG tablet  Also known as: ATIVAN  INSTRUCTIONS: Take 1 tablet (0.5 mg) by mouth every 4 hours as needed (Anxiety, Nausea/Vomiting or Sleep)                     magic mouthwash suspension  Also known as: ENTER INGREDIENTS IN COMMENTS  INSTRUCTIONS: Swish and spit every 4 hours as needed                     magic mouthwash suspension  Also known as: ENTER INGREDIENTS IN COMMENTS  INSTRUCTIONS: Take q 4 hours as needed for mouth pain                     MULTIPLE VITAMIN PO  INSTRUCTIONS: Take 1 tablet by mouth                     nystatin 237620 UNIT/ML suspension  Also known as: MYCOSTATIN  INSTRUCTIONS: Take 5 mLs (500,000 Units) by mouth 4 times daily Swish and swallow 4 times a day                     ofloxacin 0.3 % ophthalmic solution  Also known as: OCUFLOX  INSTRUCTIONS: Place 1 drop Into the left eye 2 times daily                     oxyCODONE 5 MG/5ML solution  Also known as: ROXICODONE  INSTRUCTIONS: Take 5-10 mLs (5-10 mg) by mouth every 4 hours as needed for severe pain                     prochlorperazine 10 MG tablet  Also known as: COMPAZINE  INSTRUCTIONS: Take 1 tablet (10 mg) by mouth every 6 hours as needed (Nausea/Vomiting)                     sildenafil 100 MG tablet  Also known as: VIAGRA  INSTRUCTIONS: Take  mg by mouth                      traZODone 50 MG tablet  Also known as: DESYREL  INSTRUCTIONS: TAKE 1 TO 2 TABLETS BY MOUTH ONCE DAILY AT BEDTIME

## 2021-03-10 NOTE — IP AVS SNAPSHOT
[facility] [address] [address]                                    After Visit Summary   3/10/2021    Fortino Moya    MRN: [id number]           After Visit Summary Signature Page    I have received my discharge instructions, and my questions have been answered. I have discussed any challenges I see with this plan with the nurse or doctor.    ..........................................................................................................................................  Patient/Patient Representative Signature      ..........................................................................................................................................  Patient Representative Print Name and Relationship to Patient    ..................................................               ................................................  Date                                   Time    ..........................................................................................................................................  Reviewed by Signature/Title    ...................................................              ..............................................  Date                                               Time          22EPIC Rev 08/18

## 2021-03-10 NOTE — DISCHARGE INSTRUCTIONS
Interventional Radiology  Patient Discharge Instructions  Post Tube Placement:  Gastrostomy    For some patients, the tube puts food and medicine into the body. For others, it drains fluid from the stomach. Your doctor will decide how long you will have the tube.    A disc or balloon inside the body will hold the tube in place. The balloon is filled with water.  You may notice one or two  anchors  (buttons, stitches, T-tacks, or T-fasteners) around your tube/site. These hold the stomach to the inside wall of the belly.    Self-care the first few days  Do not eat or drink for the first four hours. (You may take medicine with small sips of water.)  Stick to liquids for the first day and then advance your diet as tolerated, if you are able to take in oral foods.  If you are starting tube feedings, this should have been prearranged with your clinic.    Check your tube site often. Call your doctor if your side effects of pain, redness or bleeding get worse. It is normal to have some drainage (fluid leaking) around the tube.    Limit heavy activity (lifting, straining or exercise).     If you received IV medicine to make you sleepy (sedation): You may feel drowsy, forgetful and unsteady.     No driving until the day after surgery. No alcohol for 24 hours.    Activity   You may resume your normal activities as tolerated, however, most patients have site pain for about a week following placement.  Minimal use of your abdominal muscles will decrease the pain.  Keep the tube secure at all times (you may tape it to your skin or use the Flexi-Trak device) and avoid tugging on it.  Bathing  You may shower 24 hours after the tube is placed.  Remove the dressing before your shower, reapply afterwards.  DO NOT submerge in water of any kind for 3 weeks after placement.  Once the site is healed, which is around 3 weeks, for most patients, you may go in bathtubs, spas and swimming pools where the water is clean or chlorinated.  Be  sure the caps are tight.  Your doctor may ask you to cover the tube site with a plastic bandage when swimming.    Do not swim in lakes, oceans or non-chlorinated public charles for the duration of your tube being in place.  When swimming or any activity where the tube could be inadvertently pulled, wearing a t-shirt or one piece bathing suit (for women) decreases the risk.  Basic tube care  Always wash your hands before touching the tube.    Do not use ointment or hydrogen peroxide near the tube. You may use a thin layer of skin balm, like Desitin around the site after it is clean and dry.     After 3 weeks:  If you have a G-tube:  - Once each day, pull gently on the tube.  It should move in and out slightly (about   to    inch). You may see a slight rounding of the skin as you pull up. If the tube is too tight to move in and out, call your nurse or doctor.  - We may ask you to gently rotate the tube. If so, roll it between your thumb and forefinger.  This breaks up any flaps of tissue that have formed around the tube inside the stomach.    Changing the bandage  Wear a bandage until the site has healed and there is no leaking fluid. Change the bandage at least once a day and each time it gets wet or soiled.  1. Gather these supplies:  - Plastic bag  - Gauze or split gauze (4×4 or 2×2)  - Paper tape (1 or 2 inches wide)  2. Clean your work area with household  and water (or rubbing alcohol). Wash your hands.  3. Remove the old bandage. Place it in the bag.  4. Wash your hands again. Clean the tube site.  5. Replace the bandage.   If you have a disk: Slide split gauze under and around the tube (or use two pieces of gauze).  Tape the gauze in place.   If you don t have a disk: Fold one piece of gauze in half, placing it below the tube site.  Fold another piece of gauze in half, placing it over the tube site. Tape the gauze in place.  6. Secure the tube to the skin. If you don t have a disk, be sure the tube is at  a 90-degree angle.   First, place a piece of a tape across the skin. Then, use a second piece of tape to secure the tube over the first piece.  7 Do not attach the tube to clothing, except during feedings.    Flushing Your Tube  Flush the tube with water (clean, drinkable tap water is fine) before and after each feeding, as well as between medications if you choose to use your tube to instill medications.    If you are not using your tube, it needs to be flushed with 20-30 cc of clean tap water twice daily.    Follow-up  Contact the Interventional Radiology Clinic to schedule your suture removal two weeks after tube placement at [phone]  Routine tube changes are recommended every 6-9 months.  After 6 weeks most tubes can be changed to a skin level,  button  type device.  Tube removals are performed in our clinic    The following questions should be first directed to the doctor that referred you for the feeding tube  Which feeding formula to use?  How long will the tube be needed?  Calorie requirement for your specific problem?  How much water you need to give through your tube daily?  Nausea, vomiting, diarrhea, or weight loss problems.    When to call for help  Call your doctor if you have    A fever that is:  - over 101 F (38.3 C) if taken under the tongue  - over 100 F (37.8 C) if taken under your arm.    Vomiting (throwing up)    Diarrhea (loose, watery stools)    Constipation (hard, dry stools) for more than 24 hours  Call your care coordinator or Interventional Radiology if you have:    Bleeding, drainage or swelling at the tube site.    A painful, bright red rash    Severe pain or pain that does not improve with medicine.    A plugged tube and you cannot flush it.    A tube that has come out.    Fluid leaking around the tube.    Any questions or problems with your tube.  If you need help after business hours or on a holiday, go to Emergency unless you can reach home care or your care coordinator.  Phone  numbers  ? [facility]:    [place]: [phone]  * After 4pm and on weekends, call the hospital  at [phone] and ask to have the on call Interventional Radiologist paged.

## 2021-03-10 NOTE — PROGRESS NOTES
Pt has returned to unit 2a s/p G tube insertion. Pt alert, denies pain. LUQ site remains CDI, soft, flat, non tender. Tube to gravity drainage bag, with scant amount of clear, brownish output. Pt to remain NPO for 4 hours, pt aware.

## 2021-03-10 NOTE — IR NOTE
Patient Name: Fortino Moya  Medical Record Number: [id number]  Today's Date: 3/10/2021    Procedure: Gastrostomy tube placement  Proceduralist: Dr. Dilip Guevara    Procedure Start: 0912  Procedure end: 0933  Sedation medications administered: 100 mcg fentanyl and 2 mg versed    Report given to: Lizzy PATE 2A  : MARCO    Other Notes: Pt arrived to IR room 4 from . Consent reviewed. Pt denies any questions or concerns regarding procedure. Pt positioned supine and monitored per protocol. Pt tolerated procedure without any noted complications. Pt transferred back to .

## 2021-03-10 NOTE — PROGRESS NOTES
Pt has tolerated recovery. LUQ site remains CDI, soft, flat. Pt c/o soreness at the site which was decreased with warm pack and tylenol. Gtube was to gravity for 4 hours (clamped for approx 40 minutes after tylenol administration, warm water flush given before and after med, pt tolerated well). Discharge instructions reviewed with pt, pt verbalizes understanding. Pt also went to a PLC class yesterday. A few days worth of supplies given to pt until he gets his on Friday. Pt ambulated in noguera with steady gait-denies dizziness/lightheadedness. Pt urinated without complaints of difficulty. Port de accessed.   1357 Pt's sister has arrived to provide ride, pt transported to vehicle via wheelchair by NST.

## 2021-03-10 NOTE — PROGRESS NOTES
Pt arrives to 2a for G tube placement. H&P is up to date. Consent needs to be signed. Port accessed. Pt reports his sister, Pat, will be coming post procedure to provide ride. Pt reports he lives alone, but can arrange for an adult to stay with him tonight.

## 2021-03-10 NOTE — PRE-PROCEDURE
GENERAL PRE-PROCEDURE:   Procedure:  G Tube placement  Date/Time:  3/10/2021 8:43 AM    Written consent obtained?: Yes    Risks and benefits: Risks, benefits and alternatives were discussed    Consent given by:  Patient  Patient states understanding of procedure being performed: Yes    Patient's understanding of procedure matches consent: Yes    Procedure consent matches procedure scheduled: Yes    Expected level of sedation:  Moderate  Appropriately NPO:  Yes  ASA Class:  Class 2- mild systemic disease, no acute problems, no functional limitations  Mallampati  :  Grade 2- soft palate, base of uvula, tonsillar pillars, and portion of posterior pharyngeal wall visible  Lungs:  Lungs clear with good breath sounds bilaterally  Heart:  Normal heart sounds and rate  History & Physical reviewed:  History and physical reviewed and no updates needed  Statement of review:  I have reviewed the lab findings, diagnostic data, medications, and the plan for sedation

## 2021-03-10 NOTE — PROCEDURES
[facility]    Procedure: G Tube placement    Date/Time: 3/10/2021 9:50 AM  Performed by: Alphonse Cherry MD  Authorized by: Alphonse Cherry MD   IR Fellow Physician: Alphonse Cherry    UNIVERSAL PROTOCOL   Site Marked: NA  Prior Images Obtained and Reviewed:  Yes  Required items: Required blood products, implants, devices and special equipment available    Patient identity confirmed:  Verbally with patient, arm band, provided demographic data and hospital-assigned identification number  Patient was reevaluated immediately before administering moderate or deep sedation or anesthesia  Confirmation Checklist:  Patient's identity using two indicators, relevant allergies, procedure was appropriate and matched the consent or emergent situation and correct equipment/implants were available  Time out: Immediately prior to the procedure a time out was called    Universal Protocol: the Joint Commission Universal Protocol was followed    Preparation: Patient was prepped and draped in usual sterile fashion           ANESTHESIA    Anesthesia: Local infiltration  Local Anesthetic:  Lidocaine 1% without epinephrine  Anesthetic Total (mL):  10      SEDATION    Patient Sedated: Yes    Sedation Type:  Moderate (conscious) sedation  Vital signs: Vital signs monitored during sedation    See dictated procedure note for full details.  Findings: 18F G tube placed.     Tube in good position.       Specimens: none    Complications: None    Condition: Stable    Plan: - Bed rest for 2 hrs    PROCEDURE   Patient Tolerance:  Patient tolerated the procedure well with no immediate complications    Length of time physician/provider present for 1:1 monitoring during sedation: 25

## 2021-03-11 ENCOUNTER — APPOINTMENT (OUTPATIENT)
Dept: RADIATION ONCOLOGY | Facility: CLINIC | Age: 71
End: 2021-03-11
Attending: RADIOLOGY
Payer: MEDICARE

## 2021-03-11 DIAGNOSIS — C09.9 SQUAMOUS CELL CARCINOMA OF TONSIL (H): Primary | ICD-10-CM

## 2021-03-11 PROCEDURE — 77334 RADIATION TREATMENT AID(S): CPT | Mod: 26 | Performed by: RADIOLOGY

## 2021-03-11 PROCEDURE — 99213 OFFICE O/P EST LOW 20 MIN: CPT | Mod: 25 | Performed by: RADIOLOGY

## 2021-03-11 PROCEDURE — 77334 RADIATION TREATMENT AID(S): CPT | Performed by: RADIOLOGY

## 2021-03-11 NOTE — PROGRESS NOTES
Radiation Therapy Patient Education    Person involved with teaching: Patient    Patient educational needs for self management of treatment-related side effects assessment completed.  [facility] Patient Ed tab contains Patient Learning Assessment    Education Materials Given  Radiation Therapy for Head and Neck    Educational Topics Discussed  Side effects expected, Pain management, Skin care, Nutrition and weight loss and When to call MD/RN    Response To Teaching  Verbalizes understanding    GYN Only  Vaginal Dilator-given and educated: N/A    Referrals sent: Dental, Speech and Swallowing and Nutrition    Chemotherapy?  No

## 2021-03-11 NOTE — PROGRESS NOTES
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 He presented with recurrent versus residual disease located at the superior aspect of his prior radiation treatment field with involvement of the pterygoids, skull base, cavernous sinus and cranial nerve V tract extending to the brainstem.    Mr. Moya underwent 2 cycles of induction chemotherapy with TPF from 2021 - 2021.  He had a fair amount of deconditioning with induction therapy however restaging imaging demonstrated a near-CR with some residual non-FDG avid fullness along the left pterygoid musculature. He returns to radiation oncology clinic today for a further discussion regarding the utility of salvage head and neck radiotherapy in the treatment of his disease.    PHYSICAL EXAM:  General: Moderately fatigued-appearing 71-year-old gentleman seated comfortably in an examination chair in no acute distress  HEENT: NC/AT.  Significant injection of the left conjunctiva and sclera.  Moderately dry mucous membranes.  Sequelae from previous radiation therapy with pale mucosa and scattered telangiectasias involving the left tonsillar fossa, left soft palate, left RMT and left posterior buccal mucosa.  No visible evidence of tumor within the oral cavity or visualized oropharynx.  Neck: No palpable cervical adenopathy bilaterally  Pulmonary: No wheezing, stridor or respiratory distress  Skin: Normal color and turgor  Neuro: A/O x3.  Normal gait.  Cranial Nerve Exam  I:  Not tested  II: Not formally tested  III/IV/VI: PERRL. EOMI.   V: Absent sensation to light touch in the left V1 distribution and significantly decreased sensation within the left V2 and V3 distributions.  VII: Moderate weakness with asymmetry at rest and incomplete closure of the left eye (House-Brackman IV)  VIII: Hearing is subjectively reduced on the left  IX/X: Palate elevates symmetrically. Normal phonation.  XI: Strength is 5/5 in bilateral trapezius and SCM musculature.  XII: Tongue protrudes in the midline. No atrophy or fasciculations.     LABS AND IMAGING:  3/1/2021 PET/CT:  Resolution of previous FDG-uptake within the left skull base with some residual soft tissue fullness involving the superior aspect of the pterygoids. No cervical adenopathy or distant metastatic disease.    IMPRESSION:   Mr. 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 He developed recurrent disease at the superior aspect of his prior radiation treatment field and has undergone 2 cycles of induction TPF with an excellent response on imaging.    PLAN:   I had a discussion with Mr. Moya in which I reviewed the potential treatment options at our disposal. In short, given he has had an excellent tumor response to induction chemotherapy, I discussed now proceeding with skull base re-irradiation for local disease control purposes.    My tentative plan is to treat the initial tumor extent to a dose of 66 Gy in 33 fractions using a highly conformal VMAT-based approach. I have also discussed the use of concurrent chemotherapy with Dr. Shah for radiosensitization purposes and will defer to him for further recommendations on this matter. I once again reviewed with Mr. Moya the risk of potentially severe treatment-related toxicity due to overlap from his prior radiation treatment fields. We will attempt to mitigate this by limiting the  amount of overlap outside of the target tissues to as much degree as much as possible.    At the conclusion of our visit, Mr. Moya indicated that he was amenable to proceeding with therapy and signed informed consent to that effect.  He then underwent a CT-simulation for radiotherapy planning purposes and I will have him return to clinic on Monday, 3/22/2021, for initiation of therapy.    In the interim, I will place referrals to our SLP team and oncology nutritionist for cares during treatment. He has previously seen the dentist prior to therapy although I do not anticipate a large dose to the oral mucosa or salivary structures with this current radiation treatment plan. 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    Sidney Rodgers MD/PhD    Department of Radiation Oncology  [facility]

## 2021-03-11 NOTE — LETTER
3/11/2021         RE: Fortino Moya  [address]  [facility] [address]      Radiation Therapy Patient Education    Person involved with teaching: Patient    Patient educational needs for self management of treatment-related side effects assessment completed.  [facility] Patient Ed tab contains Patient Learning Assessment    Education Materials Given  Radiation Therapy for Head and Neck    Educational Topics Discussed  Side effects expected, Pain management, Skin care, Nutrition and weight loss and When to call MD/RN    Response To Teaching  Verbalizes understanding    GYN Only  Vaginal Dilator-given and educated: N/A    Referrals sent: Dental, Speech and Swallowing and Nutrition    Chemotherapy?  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 He presented with recurrent versus residual disease located at the superior aspect of his prior radiation treatment field with involvement of the pterygoids, skull base, cavernous sinus and cranial nerve V tract extending to the brainstem.    Mr. Moya underwent 2 cycles of induction chemotherapy with TPF from 2021 - 2021.  He had a fair amount of deconditioning with induction therapy however restaging imaging demonstrated a near-CR with some residual non-FDG avid fullness along the left pterygoid musculature. He returns to radiation oncology clinic today for a further discussion regarding the utility of salvage head and neck radiotherapy in the treatment of his  disease.    PHYSICAL EXAM:  General: Moderately fatigued-appearing 71-year-old gentleman seated comfortably in an examination chair in no acute distress  HEENT: NC/AT.  Significant injection of the left conjunctiva and sclera.  Moderately dry mucous membranes.  Sequelae from previous radiation therapy with pale mucosa and scattered telangiectasias involving the left tonsillar fossa, left soft palate, left RMT and left posterior buccal mucosa.  No visible evidence of tumor within the oral cavity or visualized oropharynx.  Neck: No palpable cervical adenopathy bilaterally  Pulmonary: No wheezing, stridor or respiratory distress  Skin: Normal color and turgor  Neuro: A/O x3.  Normal gait.  Cranial Nerve Exam  I: Not tested  II: Not formally tested  III/IV/VI: PERRL. EOMI.   V: Absent sensation to light touch in the left V1 distribution and significantly decreased sensation within the left V2 and V3 distributions.  VII: Moderate weakness with asymmetry at rest and incomplete closure of the left eye (House-Brackman IV)  VIII: Hearing is subjectively reduced on the left  IX/X: Palate elevates symmetrically. Normal phonation.  XI: Strength is 5/5 in bilateral trapezius and SCM musculature.  XII: Tongue protrudes in the midline. No atrophy or fasciculations.     LABS AND IMAGING:  3/1/2021 PET/CT:  Resolution of previous FDG-uptake within the left skull base with some residual soft tissue fullness involving the superior aspect of the pterygoids. No cervical adenopathy or distant metastatic disease.    IMPRESSION:   Mr. 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 He developed recurrent disease at the superior aspect of his prior radiation treatment field and has undergone 2 cycles of induction TPF with an excellent response on imaging.    PLAN:   I had a discussion with Mr. Moya in which I reviewed the potential  treatment options at our disposal. In short, given he has had an excellent tumor response to induction chemotherapy, I discussed now proceeding with skull base re-irradiation for local disease control purposes.    My tentative plan is to treat the initial tumor extent to a dose of 66 Gy in 33 fractions using a highly conformal VMAT-based approach. I have also discussed the use of concurrent chemotherapy with Dr. Shah for radiosensitization purposes and will defer to him for further recommendations on this matter. I once again reviewed with Mr. Moya the risk of potentially severe treatment-related toxicity due to overlap from his prior radiation treatment fields. We will attempt to mitigate this by limiting the amount of overlap outside of the target tissues to as much degree as much as possible.    At the conclusion of our visit, Mr. Moya indicated that he was amenable to proceeding with therapy and signed informed consent to that effect.  He then underwent a CT-simulation for radiotherapy planning purposes and I will have him return to clinic on Monday, 3/22/2021, for initiation of therapy.    In the interim, I will place referrals to our SLP team and oncology nutritionist for cares during treatment. He has previously seen the dentist prior to therapy although I do not anticipate a large dose to the oral mucosa or salivary structures with this current radiation treatment plan. 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    Sidney Rodgers MD/PhD    Department of Radiation Oncology  [facility]

## 2021-03-12 ENCOUNTER — TELEPHONE (OUTPATIENT)
Dept: OPHTHALMOLOGY | Facility: CLINIC | Age: 71
End: 2021-03-12

## 2021-03-12 NOTE — TELEPHONE ENCOUNTER
[facility] Call Center    Phone Message    May a detailed message be left on voicemail: yes     Reason for Call: Symptoms or Concerns     If patient has red-flag symptoms, warm transfer to triage line    Current symptom or concern: Pt states Left eye is still red and bloodshot.    Symptoms have been present for:  4 week(s)    Has patient previously been seen for this? Yes    By: Dr. Bonilla    Date: 2/10/21    Are there any new or worsening symptoms? No      Action Taken: Message routed to:  [facility]    Travel Screening: Not Applicable

## 2021-03-15 DIAGNOSIS — C09.9 SQUAMOUS CELL CARCINOMA OF TONSIL (H): ICD-10-CM

## 2021-03-15 DIAGNOSIS — Z51.89 ENCOUNTER FOR OTHER SPECIFIED AFTERCARE: Primary | ICD-10-CM

## 2021-03-15 RX ORDER — PALONOSETRON 0.05 MG/ML
0.25 INJECTION, SOLUTION INTRAVENOUS ONCE
Status: CANCELLED
Start: 2021-04-26

## 2021-03-15 RX ORDER — EPINEPHRINE 1 MG/ML
0.3 INJECTION, SOLUTION INTRAMUSCULAR; SUBCUTANEOUS EVERY 5 MIN PRN
Status: CANCELLED | OUTPATIENT
Start: 2021-03-24

## 2021-03-15 RX ORDER — HEPARIN SODIUM (PORCINE) LOCK FLUSH IV SOLN 100 UNIT/ML 100 UNIT/ML
5 SOLUTION INTRAVENOUS ONCE
Status: CANCELLED | OUTPATIENT
Start: 2021-04-06 | End: 2021-04-05

## 2021-03-15 RX ORDER — METHYLPREDNISOLONE SODIUM SUCCINATE 125 MG/2ML
125 INJECTION, POWDER, LYOPHILIZED, FOR SOLUTION INTRAMUSCULAR; INTRAVENOUS
Status: CANCELLED
Start: 2021-04-26

## 2021-03-15 RX ORDER — ALBUTEROL SULFATE 0.83 MG/ML
2.5 SOLUTION RESPIRATORY (INHALATION)
Status: CANCELLED | OUTPATIENT
Start: 2021-04-06

## 2021-03-15 RX ORDER — NALOXONE HYDROCHLORIDE 0.4 MG/ML
.1-.4 INJECTION, SOLUTION INTRAMUSCULAR; INTRAVENOUS; SUBCUTANEOUS
Status: CANCELLED | OUTPATIENT
Start: 2021-04-26

## 2021-03-15 RX ORDER — SODIUM CHLORIDE 9 MG/ML
1000 INJECTION, SOLUTION INTRAVENOUS CONTINUOUS PRN
Status: CANCELLED
Start: 2021-04-06

## 2021-03-15 RX ORDER — EPINEPHRINE 1 MG/ML
0.3 INJECTION, SOLUTION INTRAMUSCULAR; SUBCUTANEOUS EVERY 5 MIN PRN
Status: CANCELLED | OUTPATIENT
Start: 2021-04-20

## 2021-03-15 RX ORDER — HEPARIN SODIUM,PORCINE 10 UNIT/ML
5 VIAL (ML) INTRAVENOUS
Status: CANCELLED | OUTPATIENT
Start: 2021-03-24

## 2021-03-15 RX ORDER — SODIUM CHLORIDE 9 MG/ML
1000 INJECTION, SOLUTION INTRAVENOUS CONTINUOUS PRN
Status: CANCELLED
Start: 2021-04-20

## 2021-03-15 RX ORDER — HEPARIN SODIUM,PORCINE 10 UNIT/ML
5 VIAL (ML) INTRAVENOUS
Status: CANCELLED | OUTPATIENT
Start: 2021-04-26

## 2021-03-15 RX ORDER — DIPHENHYDRAMINE HYDROCHLORIDE 50 MG/ML
50 INJECTION INTRAMUSCULAR; INTRAVENOUS
Status: CANCELLED
Start: 2021-04-06

## 2021-03-15 RX ORDER — HEPARIN SODIUM,PORCINE 10 UNIT/ML
5 VIAL (ML) INTRAVENOUS
Status: CANCELLED | OUTPATIENT
Start: 2021-04-13

## 2021-03-15 RX ORDER — SODIUM CHLORIDE 9 MG/ML
1000 INJECTION, SOLUTION INTRAVENOUS CONTINUOUS PRN
Status: CANCELLED
Start: 2021-04-13

## 2021-03-15 RX ORDER — LORAZEPAM 2 MG/ML
0.5 INJECTION INTRAMUSCULAR EVERY 4 HOURS PRN
Status: CANCELLED
Start: 2021-04-26

## 2021-03-15 RX ORDER — SODIUM CHLORIDE 9 MG/ML
1000 INJECTION, SOLUTION INTRAVENOUS CONTINUOUS PRN
Status: CANCELLED
Start: 2021-04-26

## 2021-03-15 RX ORDER — ALBUTEROL SULFATE 90 UG/1
1-2 AEROSOL, METERED RESPIRATORY (INHALATION)
Status: CANCELLED
Start: 2021-04-06

## 2021-03-15 RX ORDER — ALBUTEROL SULFATE 0.83 MG/ML
2.5 SOLUTION RESPIRATORY (INHALATION)
Status: CANCELLED | OUTPATIENT
Start: 2021-03-24

## 2021-03-15 RX ORDER — EPINEPHRINE 1 MG/ML
0.3 INJECTION, SOLUTION INTRAMUSCULAR; SUBCUTANEOUS EVERY 5 MIN PRN
Status: CANCELLED | OUTPATIENT
Start: 2021-04-13

## 2021-03-15 RX ORDER — SODIUM CHLORIDE 9 MG/ML
1000 INJECTION, SOLUTION INTRAVENOUS CONTINUOUS PRN
Status: CANCELLED
Start: 2021-03-30

## 2021-03-15 RX ORDER — DIPHENHYDRAMINE HYDROCHLORIDE 50 MG/ML
50 INJECTION INTRAMUSCULAR; INTRAVENOUS
Status: CANCELLED
Start: 2021-04-13

## 2021-03-15 RX ORDER — NALOXONE HYDROCHLORIDE 0.4 MG/ML
.1-.4 INJECTION, SOLUTION INTRAMUSCULAR; INTRAVENOUS; SUBCUTANEOUS
Status: CANCELLED | OUTPATIENT
Start: 2021-04-20

## 2021-03-15 RX ORDER — NALOXONE HYDROCHLORIDE 0.4 MG/ML
.1-.4 INJECTION, SOLUTION INTRAMUSCULAR; INTRAVENOUS; SUBCUTANEOUS
Status: CANCELLED | OUTPATIENT
Start: 2021-04-13

## 2021-03-15 RX ORDER — DIPHENHYDRAMINE HYDROCHLORIDE 50 MG/ML
50 INJECTION INTRAMUSCULAR; INTRAVENOUS
Status: CANCELLED
Start: 2021-04-26

## 2021-03-15 RX ORDER — HEPARIN SODIUM,PORCINE 10 UNIT/ML
5 VIAL (ML) INTRAVENOUS
Status: CANCELLED | OUTPATIENT
Start: 2021-04-20

## 2021-03-15 RX ORDER — DIPHENHYDRAMINE HYDROCHLORIDE 50 MG/ML
50 INJECTION INTRAMUSCULAR; INTRAVENOUS
Status: CANCELLED
Start: 2021-04-20

## 2021-03-15 RX ORDER — MEPERIDINE HYDROCHLORIDE 25 MG/ML
25 INJECTION INTRAMUSCULAR; INTRAVENOUS; SUBCUTANEOUS EVERY 30 MIN PRN
Status: CANCELLED | OUTPATIENT
Start: 2021-03-24

## 2021-03-15 RX ORDER — MEPERIDINE HYDROCHLORIDE 25 MG/ML
25 INJECTION INTRAMUSCULAR; INTRAVENOUS; SUBCUTANEOUS EVERY 30 MIN PRN
Status: CANCELLED | OUTPATIENT
Start: 2021-03-30

## 2021-03-15 RX ORDER — HEPARIN SODIUM (PORCINE) LOCK FLUSH IV SOLN 100 UNIT/ML 100 UNIT/ML
5 SOLUTION INTRAVENOUS ONCE
Status: CANCELLED | OUTPATIENT
Start: 2021-04-13 | End: 2021-04-12

## 2021-03-15 RX ORDER — EPINEPHRINE 1 MG/ML
0.3 INJECTION, SOLUTION INTRAMUSCULAR; SUBCUTANEOUS EVERY 5 MIN PRN
Status: CANCELLED | OUTPATIENT
Start: 2021-04-06

## 2021-03-15 RX ORDER — MEPERIDINE HYDROCHLORIDE 25 MG/ML
25 INJECTION INTRAMUSCULAR; INTRAVENOUS; SUBCUTANEOUS EVERY 30 MIN PRN
Status: CANCELLED | OUTPATIENT
Start: 2021-04-13

## 2021-03-15 RX ORDER — MEPERIDINE HYDROCHLORIDE 25 MG/ML
25 INJECTION INTRAMUSCULAR; INTRAVENOUS; SUBCUTANEOUS EVERY 30 MIN PRN
Status: CANCELLED | OUTPATIENT
Start: 2021-04-06

## 2021-03-15 RX ORDER — NALOXONE HYDROCHLORIDE 0.4 MG/ML
.1-.4 INJECTION, SOLUTION INTRAMUSCULAR; INTRAVENOUS; SUBCUTANEOUS
Status: CANCELLED | OUTPATIENT
Start: 2021-03-24

## 2021-03-15 RX ORDER — ALBUTEROL SULFATE 0.83 MG/ML
2.5 SOLUTION RESPIRATORY (INHALATION)
Status: CANCELLED | OUTPATIENT
Start: 2021-04-20

## 2021-03-15 RX ORDER — PALONOSETRON 0.05 MG/ML
0.25 INJECTION, SOLUTION INTRAVENOUS ONCE
Status: CANCELLED
Start: 2021-04-13

## 2021-03-15 RX ORDER — HEPARIN SODIUM (PORCINE) LOCK FLUSH IV SOLN 100 UNIT/ML 100 UNIT/ML
5 SOLUTION INTRAVENOUS ONCE
Status: CANCELLED | OUTPATIENT
Start: 2021-04-26 | End: 2021-04-26

## 2021-03-15 RX ORDER — HEPARIN SODIUM (PORCINE) LOCK FLUSH IV SOLN 100 UNIT/ML 100 UNIT/ML
5 SOLUTION INTRAVENOUS ONCE
Status: CANCELLED | OUTPATIENT
Start: 2021-04-20 | End: 2021-04-19

## 2021-03-15 RX ORDER — ALBUTEROL SULFATE 0.83 MG/ML
2.5 SOLUTION RESPIRATORY (INHALATION)
Status: CANCELLED | OUTPATIENT
Start: 2021-04-13

## 2021-03-15 RX ORDER — LORAZEPAM 2 MG/ML
0.5 INJECTION INTRAMUSCULAR EVERY 4 HOURS PRN
Status: CANCELLED
Start: 2021-04-13

## 2021-03-15 RX ORDER — HEPARIN SODIUM,PORCINE 10 UNIT/ML
5 VIAL (ML) INTRAVENOUS
Status: CANCELLED | OUTPATIENT
Start: 2021-03-30

## 2021-03-15 RX ORDER — DIPHENHYDRAMINE HYDROCHLORIDE 50 MG/ML
50 INJECTION INTRAMUSCULAR; INTRAVENOUS
Status: CANCELLED
Start: 2021-03-24

## 2021-03-15 RX ORDER — DIPHENHYDRAMINE HYDROCHLORIDE 50 MG/ML
50 INJECTION INTRAMUSCULAR; INTRAVENOUS
Status: CANCELLED
Start: 2021-03-30

## 2021-03-15 RX ORDER — ALBUTEROL SULFATE 90 UG/1
1-2 AEROSOL, METERED RESPIRATORY (INHALATION)
Status: CANCELLED
Start: 2021-04-20

## 2021-03-15 RX ORDER — ALBUTEROL SULFATE 90 UG/1
1-2 AEROSOL, METERED RESPIRATORY (INHALATION)
Status: CANCELLED
Start: 2021-04-13

## 2021-03-15 RX ORDER — SODIUM CHLORIDE 9 MG/ML
1000 INJECTION, SOLUTION INTRAVENOUS CONTINUOUS PRN
Status: CANCELLED
Start: 2021-03-24

## 2021-03-15 RX ORDER — ALBUTEROL SULFATE 90 UG/1
1-2 AEROSOL, METERED RESPIRATORY (INHALATION)
Status: CANCELLED
Start: 2021-04-26

## 2021-03-15 RX ORDER — LORAZEPAM 2 MG/ML
0.5 INJECTION INTRAMUSCULAR EVERY 4 HOURS PRN
Status: CANCELLED
Start: 2021-04-20

## 2021-03-15 RX ORDER — HEPARIN SODIUM (PORCINE) LOCK FLUSH IV SOLN 100 UNIT/ML 100 UNIT/ML
5 SOLUTION INTRAVENOUS ONCE
Status: CANCELLED | OUTPATIENT
Start: 2021-03-24 | End: 2021-03-22

## 2021-03-15 RX ORDER — EPINEPHRINE 1 MG/ML
0.3 INJECTION, SOLUTION INTRAMUSCULAR; SUBCUTANEOUS EVERY 5 MIN PRN
Status: CANCELLED | OUTPATIENT
Start: 2021-04-26

## 2021-03-15 RX ORDER — PALONOSETRON 0.05 MG/ML
0.25 INJECTION, SOLUTION INTRAVENOUS ONCE
Status: CANCELLED
Start: 2021-03-24

## 2021-03-15 RX ORDER — NALOXONE HYDROCHLORIDE 0.4 MG/ML
.1-.4 INJECTION, SOLUTION INTRAMUSCULAR; INTRAVENOUS; SUBCUTANEOUS
Status: CANCELLED | OUTPATIENT
Start: 2021-03-30

## 2021-03-15 RX ORDER — LORAZEPAM 2 MG/ML
0.5 INJECTION INTRAMUSCULAR EVERY 4 HOURS PRN
Status: CANCELLED
Start: 2021-03-24

## 2021-03-15 RX ORDER — METHYLPREDNISOLONE SODIUM SUCCINATE 125 MG/2ML
125 INJECTION, POWDER, LYOPHILIZED, FOR SOLUTION INTRAMUSCULAR; INTRAVENOUS
Status: CANCELLED
Start: 2021-03-24

## 2021-03-15 RX ORDER — METHYLPREDNISOLONE SODIUM SUCCINATE 125 MG/2ML
125 INJECTION, POWDER, LYOPHILIZED, FOR SOLUTION INTRAMUSCULAR; INTRAVENOUS
Status: CANCELLED
Start: 2021-04-13

## 2021-03-15 RX ORDER — LORAZEPAM 2 MG/ML
0.5 INJECTION INTRAMUSCULAR EVERY 4 HOURS PRN
Status: CANCELLED
Start: 2021-03-30

## 2021-03-15 RX ORDER — LORAZEPAM 2 MG/ML
0.5 INJECTION INTRAMUSCULAR EVERY 4 HOURS PRN
Status: CANCELLED
Start: 2021-04-06

## 2021-03-15 RX ORDER — EPINEPHRINE 1 MG/ML
0.3 INJECTION, SOLUTION INTRAMUSCULAR; SUBCUTANEOUS EVERY 5 MIN PRN
Status: CANCELLED | OUTPATIENT
Start: 2021-03-30

## 2021-03-15 RX ORDER — HEPARIN SODIUM (PORCINE) LOCK FLUSH IV SOLN 100 UNIT/ML 100 UNIT/ML
5 SOLUTION INTRAVENOUS ONCE
Status: CANCELLED | OUTPATIENT
Start: 2021-03-30 | End: 2021-03-29

## 2021-03-15 RX ORDER — ALBUTEROL SULFATE 0.83 MG/ML
2.5 SOLUTION RESPIRATORY (INHALATION)
Status: CANCELLED | OUTPATIENT
Start: 2021-03-30

## 2021-03-15 RX ORDER — PALONOSETRON 0.05 MG/ML
0.25 INJECTION, SOLUTION INTRAVENOUS ONCE
Status: CANCELLED
Start: 2021-04-06

## 2021-03-15 RX ORDER — PALONOSETRON 0.05 MG/ML
0.25 INJECTION, SOLUTION INTRAVENOUS ONCE
Status: CANCELLED
Start: 2021-04-20

## 2021-03-15 RX ORDER — HEPARIN SODIUM,PORCINE 10 UNIT/ML
5 VIAL (ML) INTRAVENOUS
Status: CANCELLED | OUTPATIENT
Start: 2021-04-06

## 2021-03-15 RX ORDER — METHYLPREDNISOLONE SODIUM SUCCINATE 125 MG/2ML
125 INJECTION, POWDER, LYOPHILIZED, FOR SOLUTION INTRAMUSCULAR; INTRAVENOUS
Status: CANCELLED
Start: 2021-03-30

## 2021-03-15 RX ORDER — PALONOSETRON 0.05 MG/ML
0.25 INJECTION, SOLUTION INTRAVENOUS ONCE
Status: CANCELLED
Start: 2021-03-30

## 2021-03-15 RX ORDER — MEPERIDINE HYDROCHLORIDE 25 MG/ML
25 INJECTION INTRAMUSCULAR; INTRAVENOUS; SUBCUTANEOUS EVERY 30 MIN PRN
Status: CANCELLED | OUTPATIENT
Start: 2021-04-20

## 2021-03-15 RX ORDER — ALBUTEROL SULFATE 90 UG/1
1-2 AEROSOL, METERED RESPIRATORY (INHALATION)
Status: CANCELLED
Start: 2021-03-30

## 2021-03-15 RX ORDER — NALOXONE HYDROCHLORIDE 0.4 MG/ML
.1-.4 INJECTION, SOLUTION INTRAMUSCULAR; INTRAVENOUS; SUBCUTANEOUS
Status: CANCELLED | OUTPATIENT
Start: 2021-04-06

## 2021-03-15 RX ORDER — MEPERIDINE HYDROCHLORIDE 25 MG/ML
25 INJECTION INTRAMUSCULAR; INTRAVENOUS; SUBCUTANEOUS EVERY 30 MIN PRN
Status: CANCELLED | OUTPATIENT
Start: 2021-04-26

## 2021-03-15 RX ORDER — METHYLPREDNISOLONE SODIUM SUCCINATE 125 MG/2ML
125 INJECTION, POWDER, LYOPHILIZED, FOR SOLUTION INTRAMUSCULAR; INTRAVENOUS
Status: CANCELLED
Start: 2021-04-06

## 2021-03-15 RX ORDER — METHYLPREDNISOLONE SODIUM SUCCINATE 125 MG/2ML
125 INJECTION, POWDER, LYOPHILIZED, FOR SOLUTION INTRAMUSCULAR; INTRAVENOUS
Status: CANCELLED
Start: 2021-04-20

## 2021-03-15 RX ORDER — ALBUTEROL SULFATE 90 UG/1
1-2 AEROSOL, METERED RESPIRATORY (INHALATION)
Status: CANCELLED
Start: 2021-03-24

## 2021-03-15 RX ORDER — ALBUTEROL SULFATE 0.83 MG/ML
2.5 SOLUTION RESPIRATORY (INHALATION)
Status: CANCELLED | OUTPATIENT
Start: 2021-04-26

## 2021-03-15 NOTE — TELEPHONE ENCOUNTER
[facility] Call Center    Phone Message    May a detailed message be left on voicemail: yes     Reason for Call: Symptoms or Concerns     If patient has red-flag symptoms, warm transfer to triage line    Current symptom or concern: Pt states his L eye is bloodshot- says he was told to be seen if he had problems with this eye. Pt called last week, states he has not been called back. Pt wanted to talk with someone before scheduling. Please call Pt to discuss. Thanks!    Symptoms have been present for:  1 week(s)    Has patient previously been seen for this? No    By : N/A    Date: N/A    Are there any new or worsening symptoms? Yes: New bloodshot symptom      Action Taken: Message routed to:  [facility]    Travel Screening: Not Applicable

## 2021-03-15 NOTE — TELEPHONE ENCOUNTER
I spoke to the patient and the facilitator had called him and scheduled for this week.  The patient is happy with this plan.

## 2021-03-16 ENCOUNTER — VIRTUAL VISIT (OUTPATIENT)
Dept: ONCOLOGY | Facility: CLINIC | Age: 71
End: 2021-03-16
Attending: INTERNAL MEDICINE
Payer: MEDICARE

## 2021-03-16 DIAGNOSIS — C09.9 TONSILLAR CANCER (H): Primary | ICD-10-CM

## 2021-03-16 PROCEDURE — 99214 OFFICE O/P EST MOD 30 MIN: CPT | Mod: 95 | Performed by: INTERNAL MEDICINE

## 2021-03-16 NOTE — LETTER
"    3/16/2021         RE: Fortino Moya  [address]  [facility] [address]        Dear Colleague,    Thank you for referring your patient, Fortino Moya, to the [facility]. Please see a copy of my visit note below.    Fortino is a 71 year old who is being evaluated via a billable video visit.      How would you like to obtain your AVS? MyChart  If the video visit is dropped, the invitation should be resent by: Text to cell phone: [phone]  Will anyone else be joining your video visit? No     Vitals - Patient Reported  Weight (Patient Reported): 77.1 kg (170 lb)  Height (Patient Reported): 177.8 cm (5' 10\")  BMI (Based on Pt Reported Ht/Wt): 24.39  Pain Score: No Pain (0)    Guillermina Obrien CMA March 16, 2021  1:14 PM         Video-Visit Details    Type of service:  Video Visit    Video Call Duration:30 minutes    Originating Location (pt. Location): Home    Distant Location (provider location):  [facility]     Platform used for Video Visit: Dajie      3/16/21    REASON FOR VISIT: Recurrent H&N SCC      HISTORY OF PRESENT ILLNESS:  Fortino Moya is a 69 yo man with a history of L oropharynx P16+ squamous cell cancer that was first diagnosed in 2018 in [place], [place].  He initially was offered chemoradiation, but due to a prior history of L sided hearing loss, he was given low dose weekly carboplatin 1.5 AUC weekly with radiation.  His 3 month PET/CT after initiation was negative and he underwent surveillance after that.  He had a tough time with chemoradiation - he tolerated it with expected toxicity, however, there was a prolonged recovery and during that time, he got a divorce and he really feels that mentally, he was not right during the treatment and that it led to some poor decisions on his own part.  He relocated to [place] which is where he is from in Feb 2020 and has been followed since by Dr. Treviño and Dr. Flores at [facility].  In " August of 2020, he  developed numbenss in chin and then moved up to his forehead.  He had a 0Pet/CT done in Sep000t 2020 that showed a hypermetabolic area near his L foramen ovale - he had had recent dental work in that area and it was thought that this might be scar or inflammation related to that procedure.  However, he was offered a 2nd opinion and came to [facility] and saw Dr. Russell in Nov 2020.  Images were reviewed at tumor boardon 12/4/20 - and it was felt that this was concerning enough to merit biopsy - so he was referred for IR-guided biopsy.   IR guided biopsy happened on 1/8/2021 and came back as invasive squamous cell carcinoma.    He was going to have SBRT, however, repeat imaging showed marked disease progression in the L  space and we decided to initiate induction chemotherapy to try to shrink the lesion to get to SBRT.     He initiated TPF on 1/28/21 and cycle 2 on 2/18/21. He had a fair amount of deconditioning with induction therapy however restaging imaging (PET/CT CAP/neck 3/1/21) demonstrated a resolution of previous FDG-uptake within the left skull base with some residual soft tissue fullness involving the superior aspect of the pterygoids. No cervical adenopathy or distant metastatic disease.   He has seen Dr. Rodgers on 3/11/21 and plan was to proceed with the XRT(66 Gy in 33 fractions using a highly conformal VMAT-based approach).     Interval follow up:  He is evaluated by video visit. He was seen by IR anf gastrostomy tube was placed recently. He still endorses residual effects from the induction chemotherapy, like hair and weight loss.  On the other side he endorses that he started to eat better, for example ate a hamburger couple of days ago.  He denies any shortness of breath, chest pain, change in abdominal or bowel habits.  He admits that he is a little bit out of shape and working on that.      Review Of Systems  10-point review of systems were negative except as noted in  HPI.      EXAM:  Physical exam is not performed due to COVID pandemic.  GEN: alert and oriented x 3, NAD  HEENT: perrla, eomi, sclera anicteric, scleral redness on his L eye, chronic facial droop  NEURO: no other gross neurologic damages  EXT: Full ROM is preserved in all 4 extremities.    Current Outpatient Medications   Medication Sig Dispense Refill     acetaminophen (TYLENOL) 32 mg/mL liquid Take 31.25 mLs (1,000 mg) by mouth every 6 hours as needed for pain 473 mL 0     Aspirin Buf,CaCarb-MgCarb-MgO, 81 MG TABS Take 81 mg by mouth       atorvastatin (LIPITOR) 10 MG tablet Take 10 mg by mouth       carboxymethylcellulose PF (REFRESH LIQUIGEL) 1 % ophthalmic gel Place 1 drop Into the left eye 4 times daily 30 each 11     carvedilol (COREG) 12.5 MG tablet TAKE TWO TABLETS BY MOUTH EVERY MORNING AND ONE TABLET EVERY EVENING       cevimeline (EVOXAC) 30 MG capsule TAKE ONE CAPSULE BY MOUTH THREE TIMES DAILY       cholecalciferol 25 MCG (1000 UT) TABS Take 1,000 Units by mouth       erythromycin (ROMYCIN) 5 MG/GM ophthalmic ointment Place 0.5 inches Into the left eye At Bedtime Instill ~1 cm ribbon into affected eye(s) 4 times daily for 7 days 1 g 0     flecainide (TAMBOCOR) 150 MG tablet Take 150 mg by mouth every 12 hours as needed       ibuprofen (ADVIL/MOTRIN) 600 MG tablet TAKE 1 TABLET BY MOUTH THREE TIMES DAILY AS NEEDED FOR PAIN       lidocaine (XYLOCAINE) 2 % solution        lisinopril (ZESTRIL) 20 MG tablet Take 20 mg by mouth       magic mouthwash (ENTER INGREDIENTS IN COMMENTS) suspension Swish and spit every 4 hours as needed 240 mL 0     magic mouthwash (ENTER INGREDIENTS IN COMMENTS) suspension Take q 4 hours as needed for mouth pain 240 mL 1     MULTIPLE VITAMIN PO Take 1 tablet by mouth       Nutritional Supplements (ISOSOURCE 1.5 RIYA) LIQD Take 1 Can by mouth 7 times daily 100 Can 11     nystatin (MYCOSTATIN) 245733 UNIT/ML suspension Take 5 mLs (500,000 Units) by mouth 4 times daily Swish and  swallow 4 times a day 400 mL 1     ofloxacin (OCUFLOX) 0.3 % ophthalmic solution Place 1 drop Into the left eye 2 times daily 5 mL 0     oxyCODONE (ROXICODONE) 5 MG/5ML solution Take 5-10 mLs (5-10 mg) by mouth every 4 hours as needed for severe pain 500 mL 0     sildenafil (VIAGRA) 100 MG tablet Take  mg by mouth       traZODone (DESYREL) 50 MG tablet TAKE 1 TO 2 TABLETS BY MOUTH ONCE DAILY AT BEDTIME       LABS/IMAGING:  Blood work performed in February has been reviewed  3/1/2021 PET/CT:  Resolution of previous FDG-uptake within the left skull base with some residual soft tissue fullness involving the superior aspect of the pterygoids. No cervical adenopathy or distant metastatic disease.    Assessment/Plan  Recurrent squamous cell ca of the head and neck - he has had prior chemoradiation.  He had a local recurrence in his L  space.  He has now had 2 cycles of TPF chemotherapy.  PET scan showed near CR with resolution of  the hypermetabolic activity suggesting excellent response to chemotehrapy.  He was discussed at ENT tumor board and plan was to proceed with CRT. He was seen by Dr. Rodgers already and scheduled to start XRT with 66 Gy in 33 fractions on 3/22/2021. Simulation has been already done.     Since he has had recurrent disease and tolerated induction chemotherapy with TPF well, we will plan to add chemotherapy concurrently to his radiation.  Similar approach was discussed in  study, however we will use low-dose cisplatin instead of weekly carboplatin.  He has tolerated TPF  well despite of the left ear hearing device, and recurrence place him on higher risk, that is why more aggressive approach is warranted.  We will start weekly low-dose cisplatin 40 mg/metered square with the first dose given on the day of initiation of XRT.  We will plan for 7 doses total followed by restaging scan 12 weeks after completion of concurrent chemotherapy with radiation.    Dysphagia/mucositis  Had  lost around 12 lbs. Now is post gastrostomy. Continue to work with SLP, next appointment on 3/22/21.       Plan:  1. Concurrent CRT with weekly cisplatin low-dose starting 3/22  2. Mid level provider visit prior to each cycle   3. RTC to Dr. Shah after completion of CRT    Discussed with Dr. Pablo Morin MD  Hem/Onc fellow    I participated in virtual visit with Dr. Morin and agree with his note.  Plan for concurrent chemotherapy with re-irradiation. We will use weekly cisplatin with RT.  Will closely monitor electrolytes and labs and symptoms.  Will prioritize radiation treatments over chemotherapy.  Discussed potential toxicity and strategies for mitigating it. Pt is agreeable. Plan to initiate on 3/22    Yohan Shah  Associate Professor of Medicine  Division of Hematology, Oncology, and Transplantation

## 2021-03-16 NOTE — PROGRESS NOTES
"Fortino is a 71 year old who is being evaluated via a billable video visit.      How would you like to obtain your AVS? MyChart  If the video visit is dropped, the invitation should be resent by: Text to cell phone: [phone]  Will anyone else be joining your video visit? No     Vitals - Patient Reported  Weight (Patient Reported): 77.1 kg (170 lb)  Height (Patient Reported): 177.8 cm (5' 10\")  BMI (Based on Pt Reported Ht/Wt): 24.39  Pain Score: No Pain (0)    Guillermina Obrien CMA March 16, 2021  1:14 PM         Video-Visit Details    Type of service:  Video Visit    Video Call Duration:30 minutes    Originating Location (pt. Location): Home    Distant Location (provider location):  [facility]     Platform used for Video Visit: MommyCoach      3/16/21    REASON FOR VISIT: Recurrent H&N SCC      HISTORY OF PRESENT ILLNESS:  Fortino Moya is a 69 yo man with a history of L oropharynx P16+ squamous cell cancer that was first diagnosed in 2018 in [place], [place].  He initially was offered chemoradiation, but due to a prior history of L sided hearing loss, he was given low dose weekly carboplatin 1.5 AUC weekly with radiation.  His 3 month PET/CT after initiation was negative and he underwent surveillance after that.  He had a tough time with chemoradiation - he tolerated it with expected toxicity, however, there was a prolonged recovery and during that time, he got a divorce and he really feels that mentally, he was not right during the treatment and that it led to some poor decisions on his own part.  He relocated to [place] which is where he is from in Feb 2020 and has been followed since by Dr. Treviño and Dr. Flores at [facility].  In August of 2020, he  developed numbenss in chin and then moved up to his forehead.  He had a 0Pet/CT done in Sep000t 2020 that showed a hypermetabolic area near his L foramen ovale - he had had recent dental work in that area and it was thought that this " might be scar or inflammation related to that procedure.  However, he was offered a 2nd opinion and came to [facility] and saw Dr. Russell in Nov 2020.  Images were reviewed at tumor boardon 12/4/20 - and it was felt that this was concerning enough to merit biopsy - so he was referred for IR-guided biopsy.   IR guided biopsy happened on 1/8/2021 and came back as invasive squamous cell carcinoma.    He was going to have SBRT, however, repeat imaging showed marked disease progression in the L  space and we decided to initiate induction chemotherapy to try to shrink the lesion to get to SBRT.     He initiated TPF on 1/28/21 and cycle 2 on 2/18/21. He had a fair amount of deconditioning with induction therapy however restaging imaging (PET/CT CAP/neck 3/1/21) demonstrated a resolution of previous FDG-uptake within the left skull base with some residual soft tissue fullness involving the superior aspect of the pterygoids. No cervical adenopathy or distant metastatic disease.   He has seen Dr. Rodgers on 3/11/21 and plan was to proceed with the XRT(66 Gy in 33 fractions using a highly conformal VMAT-based approach).     Interval follow up:  He is evaluated by video visit. He was seen by IR anf gastrostomy tube was placed recently. He still endorses residual effects from the induction chemotherapy, like hair and weight loss.  On the other side he endorses that he started to eat better, for example ate a hamburger couple of days ago.  He denies any shortness of breath, chest pain, change in abdominal or bowel habits.  He admits that he is a little bit out of shape and working on that.      Review Of Systems  10-point review of systems were negative except as noted in HPI.      EXAM:  Physical exam is not performed due to COVID pandemic.  GEN: alert and oriented x 3, NAD  HEENT: perrla, eomi, sclera anicteric, scleral redness on his L eye, chronic facial droop  NEURO: no other gross neurologic damages  EXT: Full ROM is  preserved in all 4 extremities.    Current Outpatient Medications   Medication Sig Dispense Refill     acetaminophen (TYLENOL) 32 mg/mL liquid Take 31.25 mLs (1,000 mg) by mouth every 6 hours as needed for pain 473 mL 0     Aspirin Buf,CaCarb-MgCarb-MgO, 81 MG TABS Take 81 mg by mouth       atorvastatin (LIPITOR) 10 MG tablet Take 10 mg by mouth       carboxymethylcellulose PF (REFRESH LIQUIGEL) 1 % ophthalmic gel Place 1 drop Into the left eye 4 times daily 30 each 11     carvedilol (COREG) 12.5 MG tablet TAKE TWO TABLETS BY MOUTH EVERY MORNING AND ONE TABLET EVERY EVENING       cevimeline (EVOXAC) 30 MG capsule TAKE ONE CAPSULE BY MOUTH THREE TIMES DAILY       cholecalciferol 25 MCG (1000 UT) TABS Take 1,000 Units by mouth       erythromycin (ROMYCIN) 5 MG/GM ophthalmic ointment Place 0.5 inches Into the left eye At Bedtime Instill ~1 cm ribbon into affected eye(s) 4 times daily for 7 days 1 g 0     flecainide (TAMBOCOR) 150 MG tablet Take 150 mg by mouth every 12 hours as needed       ibuprofen (ADVIL/MOTRIN) 600 MG tablet TAKE 1 TABLET BY MOUTH THREE TIMES DAILY AS NEEDED FOR PAIN       lidocaine (XYLOCAINE) 2 % solution        lisinopril (ZESTRIL) 20 MG tablet Take 20 mg by mouth       magic mouthwash (ENTER INGREDIENTS IN COMMENTS) suspension Swish and spit every 4 hours as needed 240 mL 0     magic mouthwash (ENTER INGREDIENTS IN COMMENTS) suspension Take q 4 hours as needed for mouth pain 240 mL 1     MULTIPLE VITAMIN PO Take 1 tablet by mouth       Nutritional Supplements (ISOSOURCE 1.5 RIYA) LIQD Take 1 Can by mouth 7 times daily 100 Can 11     nystatin (MYCOSTATIN) 504099 UNIT/ML suspension Take 5 mLs (500,000 Units) by mouth 4 times daily Swish and swallow 4 times a day 400 mL 1     ofloxacin (OCUFLOX) 0.3 % ophthalmic solution Place 1 drop Into the left eye 2 times daily 5 mL 0     oxyCODONE (ROXICODONE) 5 MG/5ML solution Take 5-10 mLs (5-10 mg) by mouth every 4 hours as needed for severe pain 500 mL 0      sildenafil (VIAGRA) 100 MG tablet Take  mg by mouth       traZODone (DESYREL) 50 MG tablet TAKE 1 TO 2 TABLETS BY MOUTH ONCE DAILY AT BEDTIME       LABS/IMAGING:  Blood work performed in February has been reviewed  3/1/2021 PET/CT:  Resolution of previous FDG-uptake within the left skull base with some residual soft tissue fullness involving the superior aspect of the pterygoids. No cervical adenopathy or distant metastatic disease.    Assessment/Plan  Recurrent squamous cell ca of the head and neck - he has had prior chemoradiation.  He had a local recurrence in his L  space.  He has now had 2 cycles of TPF chemotherapy.  PET scan showed near CR with resolution of  the hypermetabolic activity suggesting excellent response to chemotehrapy.  He was discussed at ENT tumor board and plan was to proceed with CRT. He was seen by Dr. Rodgers already and scheduled to start XRT with 66 Gy in 33 fractions on 3/22/2021. Simulation has been already done.     Since he has had recurrent disease and tolerated induction chemotherapy with TPF well, we will plan to add chemotherapy concurrently to his radiation.  Similar approach was discussed in  study, however we will use low-dose cisplatin instead of weekly carboplatin.  He has tolerated TPF  well despite of the left ear hearing device, and recurrence place him on higher risk, that is why more aggressive approach is warranted.  We will start weekly low-dose cisplatin 40 mg/metered square with the first dose given on the day of initiation of XRT.  We will plan for 7 doses total followed by restaging scan 12 weeks after completion of concurrent chemotherapy with radiation.    Dysphagia/mucositis  Had lost around 12 lbs. Now is post gastrostomy. Continue to work with SLP, next appointment on 3/22/21.       Plan:  1. Concurrent CRT with weekly cisplatin low-dose starting 3/22  2. Mid level provider visit prior to each cycle   3. RTC to Dr. Shah after  completion of CRT    Discussed with Dr. Pablo Morin MD  Hem/Onc fellow    I participated in virtual visit with Dr. Morin and agree with his note.  Plan for concurrent chemotherapy with re-irradiation. We will use weekly cisplatin with RT.  Will closely monitor electrolytes and labs and symptoms.  Will prioritize radiation treatments over chemotherapy.  Discussed potential toxicity and strategies for mitigating it. Pt is agreeable. Plan to initiate on 3/22    Yohan Shah  Associate Professor of Medicine  Division of Hematology, Oncology, and Transplantation

## 2021-03-19 ENCOUNTER — OFFICE VISIT (OUTPATIENT)
Dept: OPHTHALMOLOGY | Facility: CLINIC | Age: 71
End: 2021-03-19
Attending: OPHTHALMOLOGY
Payer: MEDICARE

## 2021-03-19 ENCOUNTER — TELEPHONE (OUTPATIENT)
Dept: RADIATION ONCOLOGY | Facility: CLINIC | Age: 71
End: 2021-03-19

## 2021-03-19 DIAGNOSIS — H02.239 PARALYTIC LAGOPHTHALMOS, UNSPECIFIED LATERALITY: Primary | ICD-10-CM

## 2021-03-19 DIAGNOSIS — C09.9 SQUAMOUS CELL CARCINOMA OF TONSIL (H): ICD-10-CM

## 2021-03-19 DIAGNOSIS — H16.232 NEUROTROPHIC KERATOPATHY OF LEFT EYE: ICD-10-CM

## 2021-03-19 PROCEDURE — 99214 OFFICE O/P EST MOD 30 MIN: CPT | Mod: GC | Performed by: OPHTHALMOLOGY

## 2021-03-19 PROCEDURE — G0463 HOSPITAL OUTPT CLINIC VISIT: HCPCS

## 2021-03-19 RX ORDER — ERYTHROMYCIN 5 MG/G
OINTMENT OPHTHALMIC
Qty: 3.5 G | Refills: 6 | Status: SHIPPED | OUTPATIENT
Start: 2021-03-19 | End: 2021-04-01

## 2021-03-19 ASSESSMENT — TONOMETRY
OD_IOP_MMHG: 10
OS_IOP_MMHG: 14
IOP_METHOD: ICARE

## 2021-03-19 ASSESSMENT — EXTERNAL EXAM - RIGHT EYE: OD_EXAM: NORMAL

## 2021-03-19 ASSESSMENT — VISUAL ACUITY
METHOD: SNELLEN - LINEAR
OS_PH_SC: 20/500
OS_SC: 20/800
OD_SC: 20/20
OD_SC+: -1

## 2021-03-19 ASSESSMENT — CONF VISUAL FIELD
OD_NORMAL: 1
OS_INFERIOR_NASAL_RESTRICTION: 3
METHOD: COUNTING FINGERS

## 2021-03-19 ASSESSMENT — EXTERNAL EXAM - LEFT EYE: OS_EXAM: LEFT FACIAL PALSY

## 2021-03-19 NOTE — PROGRESS NOTES
- reviewed notes and images from outside provider and the rest of the care team.    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 The patient underwent low dose weekly carboplatin 1.5 AUC with radiation. His PET/CT 2 months after initiation and underwent surveillance after that. In 08/2020, he developed numbness in chin and then forehead. He underwent PET/CT and IR-guided biopsy (1/2021), which demonstrated invasive squamous cell carcinoma.    Per oncology, patient has left-sided paralysis. The patient first noticed left eye redness and lower lid drooping three weeks ago. He notes that these symptoms have progressed during this time. He notes associated watery discharge. He was seen by oncology and started on erythromycin ilya yesterday then recommended to follow up with ophthalmology for further management. He has also been using AT BID during this time. He denies eye irritation, pain, purulent discharge, and flashes/floaters. He notes baseline left eye vision when not using erythromycin ointment.    Interval HPI: Undergoing chemo and the cancer is visibly gone. He will start radiation and additional low dose chemo in coming weeks. He is unsure how the eye is doing. He has been using PFAT's. Vision stable.    POH: refractive error (intermittent glasses use)  Surgery: LASIK (25-30 years ago)- monovision  GTTS: AT, erythromycin ilya    PMH:  -L oropharyngeal squamous cell cancer (first diagnosed 2018), recently diagnosed invasive squamous cell cancer (on chemotherapy)  -HTN    FH:  -No AMD, glaucoma    CT soft tissue neck (1/18/21):  1. Abnormal hypermetabolic activity in the left  infratemporal fossa  involving the medial and lateral pterygoid musculature and the  mandibular branch of the left 5th cranial nerve. Abnormal  hypermetabolic activity tracks through the left foramen ovale into the  medial aspect of the left middle cranial fossa and posteriorly to the  root entry zone of the left 5th cranial nerve.  2. Otherwise, normal head and neck PET/CT.    PET oncology  (1/18/21):  In this patient with tonsillar cancer status post chemotherapy and  radiation with new disease in the left  space, not currently  on chemotherapy:  1.  No evidence for metastatic disease in the chest, abdomen, pelvis.  2.  See dedicated neuroradiology report for the results of the high  resolution PET CT of the neck.     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 New disease in the left  space s/p PET/CT and IR-guided biopsy consistent with invasive squamous cell carcinoma s/p induction chemotherapy (1/28/21).  - Exam notable for significant paralytic lagophthalmos with complete scleral show, exposure keratopathy, and large neurotrophic corneal ulcer  - s/p permanent tarsorrhaphy lateral 50% (2/10/21)- Dr. Pendleton    Plan:  - Begin Emycin q3h and at bedtime  - Refer back to oculoplastics to consider medial permanent tarsorhaphy OS, 3 mm   - Cont ofloxacin BID, left eye  - Option  PFAT's 4-6x/day prn  - Begin shield at bedtime    RTC: 4 weeks, sooner prn.  See plastics 1st available (within 1 to 2 weeks) for left eye nasal tarso.    Elisabeth Moraes MD  Cornea & External Disease  Fellow    Attending Physician Attestation:  Complete documentation of historical and exam elements from today's encounter can be found in the full encounter summary report (not reduplicated in this progress note).  I personally obtained the chief complaint(s) and history of present illness.  I confirmed and edited as necessary the review of systems, past medical/surgical history, family history, social history, and examination findings as documented by others; and I examined the patient myself.  I personally reviewed the relevant ophthalmic tests, images, and reports as documented above (if applicable). I agree with the interpretation(s) as documented by the resident and have edited the corresponding report(s) as necessary. I formulated and edited as necessary the assessment and plan and discussed the findings and management plan with the patient and family. Mert Barney MD

## 2021-03-19 NOTE — NURSING NOTE
"Chief Complaints and History of Present Illnesses   Patient presents with     Follow Up     Chief Complaint(s) and History of Present Illness(es)     Follow Up     Laterality: left eye    Course: stable    Associated symptoms: redness, dryness and tearing.  Negative for eye pain, photophobia, flashes, floaters and foreign body sensation    Treatments tried: artificial tears and ointment    Response to treatment: mild improvement    Pain scale: 0/10              Comments     Pt states vision is stable since last visit.  FLOWER is drooping.      PFAT's PRN  Ocuflox LE 2x day  Erythromycin ilya \"sometimes\"    ANA Stuart March 19, 2021 10:29 AM                    "

## 2021-03-19 NOTE — TELEPHONE ENCOUNTER
Left message for patient to cancel chemotherapy and radiation appointments on Monday and he will instead begin on Wednesday per Dr Rodgers.

## 2021-03-19 NOTE — TELEPHONE ENCOUNTER
FUTURE VISIT INFORMATION      FUTURE VISIT INFORMATION:    Date: 3.23.21    Time: 11:30 AM    Location: [facility]  REFERRAL INFORMATION:    Referring provider:  Dr Mert Barney    Referring providers clinic:  [facility]    Reason for visit/diagnosis: Nasal Tarso LE    RECORDS REQUESTED FROM:       Clinic name Comments Records Status Imaging Status   [facility] 3.19.21 Dr Barney  2.10.21 Dr Hira Pendleton  2.10.21 Dr Ranjit Bonilla Internal    [facility] 3.16.21 Dr Yohan Shah  More records in Epic and scanned if needed Internal

## 2021-03-21 NOTE — PROGRESS NOTES
Oncology/Hematology Visit Note  Mar 22, 2021    Reason for Visit: Follow up of recurrent head and neck SCC     History of Present Illness: Fortino Moya is a 71 year old male with PMH atrial fibrillation, hypercholesterolemia, HTN with recurrent head and neck cancer. He has a history of L oropharynx P16+ squamous cell cancer that was first diagnosed in 2018 in [place], [place].  He initially was offered chemoradiation, but due to a prior history of L sided hearing loss, he was given low dose weekly carboplatin 1.5 AUC weekly with radiation.  His 3 month PET/CT after initiation was negative and he underwent surveillance after that. He relocated to [place] which is where he is from in Feb 2020 and has been followed since by Dr. Treviño and Dr. Flores at [facility].      In August of 2020, he  developed numbenss in chin and then moved up to his forehead.  He had a Pet/CT done in Sept 2020 that showed a hypermetabolic area near his L foramen ovale - he had had recent dental work in that area and it was thought that this might be scar or inflammation related to that procedure.      However, he was offered a 2nd opinion and came to [facility] and saw Dr. Russell in Nov 2020.  Images were reviewed at tumor boardon 12/4/20 - and it was felt that this was concerning enough to merit biopsy - so he was referred for IR-guided biopsy.   IR guided biopsy happened on 1/8/2021 and came back as invasive squamous cell carcinoma.    He was going to have SBRT, however, repeat imaging showed marked disease progression in the L  space and we decided to initiate induction chemotherapy to try to shrink the lesion to get to SBRT.      He initiated TPF on 1/28/21 and cycle 2 on 2/18/21. He had a fair amount of deconditioning with induction therapy however restaging imaging (PET/CT CAP/neck 3/1/21) demonstrated a resolution of previous FDG-uptake within the left skull base with some residual soft tissue fullness involving the superior  aspect of the pterygoids. No cervical adenopathy or distant metastatic disease.     He was recommend chemotherapy with concurrent re-irradiation with weekly cisplatin. He was seen today prior to starting.     Interval History:  Mr. Moya was seen today for oncology follow-up. He is overall feeling well. He still has slight pain and trouble swallowing, taking Oxycodone once per day and has swallow study today. He is doing most of his nutrition and hydration via PEG and has been gaining weight. He has erythema and drainage with his left eye but is otherwise not bothered by it. He denies fevers, headaches, dizziness, chest pain, SOB, cough, nausea, vomiting, abdominal pain, bowel concerns other than mild constipation, urinary issues, edema, bleeding issues, neuropathy, rashes. He had no changes to his left hearing issues with prior cisplatin. He is in good spirits today.    Current Outpatient Medications   Medication Sig Dispense Refill     Aspirin Buf,CaCarb-MgCarb-MgO, 81 MG TABS Take 81 mg by mouth       atorvastatin (LIPITOR) 10 MG tablet Take 10 mg by mouth       carboxymethylcellulose PF (REFRESH LIQUIGEL) 1 % ophthalmic gel Place 1 drop Into the left eye 4 times daily 30 each 11     carvedilol (COREG) 12.5 MG tablet TAKE TWO TABLETS BY MOUTH EVERY MORNING AND ONE TABLET EVERY EVENING       cevimeline (EVOXAC) 30 MG capsule TAKE ONE CAPSULE BY MOUTH THREE TIMES DAILY       cholecalciferol 25 MCG (1000 UT) TABS Take 1,000 Units by mouth       dexamethasone (DECADRON) 4 MG tablet Take 2 tablets (8 mg) by mouth daily for 3 days. Start day after cisplatin (Day 2 and 9). If no N/V with first cisplatin doses, may stop dex. 12 tablet 1     erythromycin (ROMYCIN) 5 MG/GM ophthalmic ointment Apply every 3 hours while awake and at bedtime to the left eye. 3.5 g 6     erythromycin (ROMYCIN) 5 MG/GM ophthalmic ointment Place 0.5 inches Into the left eye At Bedtime Instill ~1 cm ribbon into affected eye(s) 4 times daily for  "7 days 1 g 0     flecainide (TAMBOCOR) 150 MG tablet Take 150 mg by mouth every 12 hours as needed       lidocaine (XYLOCAINE) 2 % solution        lisinopril (ZESTRIL) 20 MG tablet Take 20 mg by mouth       LORazepam (ATIVAN) 0.5 MG tablet Take 1 tablet (0.5 mg) by mouth every 4 hours as needed (Anxiety, Nausea/Vomiting or Sleep) 30 tablet 1     magic mouthwash (ENTER INGREDIENTS IN COMMENTS) suspension Swish and spit every 4 hours as needed 240 mL 0     magic mouthwash (ENTER INGREDIENTS IN COMMENTS) suspension Take q 4 hours as needed for mouth pain (Patient not taking: Reported on 3/16/2021) 240 mL 1     Nutritional Supplements (ISOSOURCE 1.5 RIYA) LIQD Take 1 Can by mouth 7 times daily 100 Can 11     nystatin (MYCOSTATIN) 588117 UNIT/ML suspension Take 5 mLs (500,000 Units) by mouth 4 times daily Swish and swallow 4 times a day 400 mL 1     ofloxacin (OCUFLOX) 0.3 % ophthalmic solution Place 1 drop Into the left eye 2 times daily 5 mL 0     oxyCODONE (ROXICODONE) 5 MG/5ML solution Take 5-10 mLs (5-10 mg) by mouth every 4 hours as needed for severe pain 500 mL 0     prochlorperazine (COMPAZINE) 10 MG tablet Take 1 tablet (10 mg) by mouth every 6 hours as needed (Nausea/Vomiting) 30 tablet 1     traZODone (DESYREL) 50 MG tablet TAKE 1 TO 2 TABLETS BY MOUTH ONCE DAILY AT BEDTIME       Physical Examination:  /73   Pulse 64   Temp 96.4  F (35.8  C) (Oral)   Resp 16   Ht 1.778 m (5' 10\")   Wt 79.7 kg (175 lb 12.8 oz)   SpO2 97%   BMI 25.22 kg/m    Wt Readings from Last 10 Encounters:   03/10/21 77.1 kg (170 lb)   03/07/21 77.1 kg (170 lb)   02/27/21 76.2 kg (168 lb)   02/18/21 78 kg (172 lb)   02/10/21 80.3 kg (177 lb)   02/06/21 79.4 kg (175 lb)   01/28/21 83.5 kg (184 lb)   01/20/21 82.1 kg (181 lb)   01/08/21 81.6 kg (180 lb)   11/25/20 83.9 kg (185 lb)     Constitutional: Well-appearing male in no acute distress.  Eyes: Erythema and drainage to left eye.   ENT: Oral mucosa is moist without lesions or " thrush.   Lymphatic: Neck is supple without cervical or supraclavicular lymphadenopathy, firmness throughout left neck.   Cardiovascular: Regular rate and rhythm. No murmurs, gallops, or rubs. No peripheral edema.  Respiratory: Clear to auscultation bilaterally. No wheezes or crackles.  Gastrointestinal: Bowel sounds present. Abdomen soft, non-tender. No palpable hepatosplenomegaly or masses. PEG in place with no surrounding erythema or tenderness.   Neurologic: Cranial nerves II through XII are grossly intact.  Skin: No rashes, petechiae, or bruising noted on exposed skin.    Laboratory Data:  Results for ELSI IQBAL (MRN [id number]) as of 3/22/2021 09:19   3/22/2021 07:38   Sodium 142   Potassium 4.0   Chloride 107   Carbon Dioxide 30   Urea Nitrogen 19   Creatinine 0.63 (L)   GFR Estimate >90   GFR Estimate If Black >90   Calcium 8.5   Anion Gap 4   Magnesium 2.2   Albumin 3.3 (L)   Protein Total 6.2 (L)   Bilirubin Total 0.3   Alkaline Phosphatase 68   ALT 18   AST 12   Glucose 72   WBC 4.4   Hemoglobin 11.5 (L)   Hematocrit 34.8 (L)   Platelet Count 216   RBC Count 3.50 (L)   MCV 99   MCH 32.9   MCHC 33.0   RDW 15.6 (H)   Diff Method Automated Method   % Neutrophils 70.8   % Lymphocytes 7.8   % Monocytes 16.6   % Eosinophils 3.9   % Basophils 0.7   % Immature Granulocytes 0.2   Nucleated RBCs 0   Absolute Neutrophil 3.1   Absolute Lymphocytes 0.3 (L)   Absolute Monocytes 0.7   Absolute Eosinophils 0.2   Absolute Basophils 0.0   Abs Immature Granulocytes 0.0   Absolute Nucleated RBC 0.0       Assessment and Plan:  1. Onc  Recurrent SCC of head and neck, prior chemoradiation, now with local recurrence in L  space. S/p 2 cycles of TPF induction chemotherapy with near CR on PET. Discussion at ENT tumor board with plan to do radiation with weekly cisplatin, starting Wednesday this week.     Discussed toxicities of weekly cisplatin including nausea/vomiting, renal issues, ototoxicity, myelosuppression,  neuropathy. He had no nausea with induction chemo so low concern with nausea and does not need oral dex, has PRN compazine at home. He understands to closely monitor hearing, reassured no issues with induction chemo. Baseline labs WNL other than slight anemia likely 2/2 prior treatment.     Will see him weekly on treatment.     2. ENT  Dysphagia: Seeing speech with video swallow today, continue swallowing exercises.    Cancer related pain: Continue Oxycodone PRN, refilled. Discussed purpose of oxycodone should be for pain only as he mentions it helps him relax.    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 FEN  Nutrition: Continue PEG, 8 cartons per day, tolerating well and weight improving. Continue oral intake as tolerated    Hydration: Doing well, creat WNL. Discussed importance of hydration during treatment     40 minutes spent on the date of the encounter doing chart review, review of test results, interpretation of tests, patient visit and documentation     Torsten Polanco PA-C  [facility]  [address] [place] [address] [phone]

## 2021-03-22 ENCOUNTER — HOSPITAL ENCOUNTER (OUTPATIENT)
Dept: SPEECH THERAPY | Facility: CLINIC | Age: 71
End: 2021-03-22
Attending: RADIOLOGY
Payer: MEDICARE

## 2021-03-22 ENCOUNTER — APPOINTMENT (OUTPATIENT)
Dept: LAB | Facility: CLINIC | Age: 71
End: 2021-03-22
Attending: INTERNAL MEDICINE
Payer: MEDICARE

## 2021-03-22 ENCOUNTER — HOSPITAL ENCOUNTER (OUTPATIENT)
Dept: GENERAL RADIOLOGY | Facility: CLINIC | Age: 71
End: 2021-03-22
Attending: RADIOLOGY
Payer: MEDICARE

## 2021-03-22 ENCOUNTER — ONCOLOGY VISIT (OUTPATIENT)
Dept: ONCOLOGY | Facility: CLINIC | Age: 71
End: 2021-03-22
Attending: INTERNAL MEDICINE
Payer: MEDICARE

## 2021-03-22 VITALS
WEIGHT: 175.8 LBS | TEMPERATURE: 96.4 F | OXYGEN SATURATION: 97 % | HEIGHT: 70 IN | HEART RATE: 64 BPM | BODY MASS INDEX: 25.17 KG/M2 | SYSTOLIC BLOOD PRESSURE: 127 MMHG | DIASTOLIC BLOOD PRESSURE: 73 MMHG | RESPIRATION RATE: 16 BRPM

## 2021-03-22 DIAGNOSIS — C09.9 TONSILLAR CANCER (H): ICD-10-CM

## 2021-03-22 DIAGNOSIS — C09.9 SQUAMOUS CELL CARCINOMA OF TONSIL (H): Primary | ICD-10-CM

## 2021-03-22 DIAGNOSIS — R13.10 DYSPHAGIA: ICD-10-CM

## 2021-03-22 DIAGNOSIS — C09.9 SQUAMOUS CELL CARCINOMA OF TONSIL (H): ICD-10-CM

## 2021-03-22 PROCEDURE — 99215 OFFICE O/P EST HI 40 MIN: CPT | Performed by: PHYSICIAN ASSISTANT

## 2021-03-22 PROCEDURE — G0463 HOSPITAL OUTPT CLINIC VISIT: HCPCS

## 2021-03-22 PROCEDURE — 92611 MOTION FLUOROSCOPY/SWALLOW: CPT | Mod: GN | Performed by: SPEECH-LANGUAGE PATHOLOGIST

## 2021-03-22 PROCEDURE — 92610 EVALUATE SWALLOWING FUNCTION: CPT | Mod: GN | Performed by: SPEECH-LANGUAGE PATHOLOGIST

## 2021-03-22 PROCEDURE — 92526 ORAL FUNCTION THERAPY: CPT | Mod: GN | Performed by: SPEECH-LANGUAGE PATHOLOGIST

## 2021-03-22 PROCEDURE — 36591 DRAW BLOOD OFF VENOUS DEVICE: CPT

## 2021-03-22 PROCEDURE — 250N000011 HC RX IP 250 OP 636: Performed by: PHYSICIAN ASSISTANT

## 2021-03-22 PROCEDURE — 74230 X-RAY XM SWLNG FUNCJ C+: CPT

## 2021-03-22 RX ORDER — OXYCODONE HCL 5 MG/5 ML
5 SOLUTION, ORAL ORAL EVERY 4 HOURS PRN
Qty: 500 ML | Refills: 0 | Status: SHIPPED | OUTPATIENT
Start: 2021-03-22 | End: 2021-04-05

## 2021-03-22 RX ORDER — HEPARIN SODIUM (PORCINE) LOCK FLUSH IV SOLN 100 UNIT/ML 100 UNIT/ML
5 SOLUTION INTRAVENOUS DAILY PRN
Status: DISCONTINUED | OUTPATIENT
Start: 2021-03-22 | End: 2021-03-22 | Stop reason: HOSPADM

## 2021-03-22 RX ORDER — BARIUM SULFATE 400 MG/ML
SUSPENSION ORAL ONCE
Status: COMPLETED | OUTPATIENT
Start: 2021-03-22 | End: 2021-03-22

## 2021-03-22 RX ADMIN — BARIUM SULFATE 20 ML: 400 SUSPENSION ORAL at 11:50

## 2021-03-22 RX ADMIN — Medication 5 ML: at 07:34

## 2021-03-22 ASSESSMENT — PAIN SCALES - GENERAL: PAINLEVEL: NO PAIN (0)

## 2021-03-22 ASSESSMENT — MIFFLIN-ST. JEOR: SCORE: 1563.67

## 2021-03-22 NOTE — NURSING NOTE
Chief Complaint   Patient presents with     Oncology Clinic Visit     SQUAMOUS CELL CARCINOMA OF TONSIL      Port Draw     Labs drawn via port by RN in lab. VS taken.      Labs drawn via Port accessed using 20g gripper needle. Line flushed and Heparin locked. Vital signs taken. Checked into next appointment.       Diana Moise RN

## 2021-03-22 NOTE — PROGRESS NOTES
03/22/21 1224   General Information   Type Of Visit Initial  (Re-Evaluation; New Episode of Care )   Start Of Care Date 03/22/21   Referring Physician Dr. Rodgers   Medical Diagnosis Dysphagia   Onset Of Illness/injury Or Date Of Surgery 02/11/21   Respiratory Status Room air   Patient/family Goals To decrease difficulty swallowing and coughing with po intake.   General Information Comments  Fortino Moya is a 71 year old male with PMH atrial fibrillation, hypercholesterolemia, HTN with recurrent head and neck cancer. He has a history of L oropharynx P16+ squamous cell cancer that was first diagnosed in 2018 in [place], [place].  He initially was offered chemoradiation, but due to a prior history of L sided hearing loss, he was given low dose weekly carboplatin 1.5 AUC weekly with radiation.  His 3 month PET/CT after initiation was negative and he underwent surveillance after that. He relocated to [place] which is where he is from in Feb 2020 and has been followed since by Dr. Treviño and Dr. Flores at [facility].    IR guided biopsy happened on 1/8/2021 and came back as invasive squamous cell carcinoma.      Pt states further chemo and RT to start this week.    Pt reports taking G tube feedings and oral intake with solids and thin liquids as well as some thickened shakes/smoothies.  No respiratory difficulty reported, but pt does cough at times.  VFSS completed 2/11/21 with penetration/trace aspiration on residue, short epiglottis with decreased inversion, DDL1 and nectar recommended.  Pt has no saliva due to CA Tx and uses liquids often to clear solids.  Pt's face is numb on left, some numbness of tongue.  Pt chews on the right side.  Pt reports po comes out his nose at times.   Fall Risk Screen   Fall screen comments See radiology staff documentation.   Abuse Screen (yes response referral indicated)   Feels Unsafe at Home or Work/School no   Feels Threatened by Someone no   Does Anyone Try to Keep You From  Having Contact with Others or Doing Things Outside Your Home? no   Physical Signs of Abuse Present no   Clinical Swallow Evaluation   Oral Musculature   (Decreased labial ROM on L, pull of tongue to L, min velar elevation)   Dentition present and adequate   Velar Elevation impaired   Laryngeal Function   (Intact)   Additional evaluation(s) completed today   (Clinical: interview, oral motor exam, thin water trials)   Rationale for completing additional evaluation VFSS to assess current pharyngeal phase deficits, aspiration risk   Clinical Swallow Eval: Thin Liquid Texture Trial   Mode of Presentation, Thin Liquids cup   Volume of Liquid or Food Presented sips x 2   Oral Phase of Swallow WFL   Pharyngeal Phase of Swallow reduction in laryngeal movement;repeated swallows   Diagnostic Statement slight throat clear x 1, nasal/velar sound   VFSS Eval: Radiology   Radiologist Dr. Gudino   Views Taken left lateral, AP   Physical Location of Procedure [facility]   VFSS Eval: Thin Liquid Texture Trial   Mode of Presentation, Thin Liquid cup   Order of Presentation 3, 4, 5, 8, 9, 11   Preparatory Phase WFL   Oral Phase, Thin Liquid WFL   Pharyngeal Phase, Thin Liquid   (see below)   Rosenbek's Penetration Aspiration Scale: Thin Liquid Trial Results 3 - contrast remains above the vocal cords, visible residue remains (penetration)   Diagnostic Statement Decreased epiglottic inversion, short epiglottis, pharyngeal swelling, decreased base of tongue movement and pharyngeal peristalsis, decreased velar seal/closure, tight UES, mild-mod residue at times, mild to min residue after multiple swallows, min to mod depth slight penetration with residual at times, cued swallow-cough increased slight penetration x 1 trial, bolus up to posterior nasal cavity at times   VFSS Eval: Nectar Thick Liquid Texture Trial   Mode of Presentation, Nectar cup   Order of Presentation 1, 2    Preparatory Phase WFL   Oral Phase, Nectar WFL   Pharyngeal Phase,  Nectar   (see below)   Rosenbek's Penetration Aspiration Scale: Nectar-Thick Liquid Trial Results 1 - no aspiration, contrast does not enter airway  (? min flash penetration vs under epiglottis)   Diagnostic Statement Decreased epiglottic inversion, short epiglottis, pharyngeal swelling, decreased base of tongue movement and pharyngeal peristalsis, decreased velar seal/closure, tight UES, mild residue after multiple swallows   VFSS Eval: Puree Solid Texture Trial   Mode of Presentation, Puree spoon   Order of Presentation 6, 12, 13   Preparatory Phase WFL   Oral Phase, Puree WFL   Pharyngeal Phase, Puree   (see below)   Rosenbek's Penetration Aspiration Scale: Puree Food Trial Results 1 - no aspiration, contrast does not enter airway   Diagnostic Statement Decreased epiglottic inversion, short epiglottis, pharyngeal swelling, decreased base of tongue movement and pharyngeal peristalsis, decreased velar seal/closure, tight UES, mod-severe residue, 2nd swallow trial 6 cleared residue with increased epiglottic inversion, residue remained after trials 12 and 13 in AP view; Residue lower on right, head turn left did not affect amount of residue after swallow   VFSS Eval: Semisolid Texture Trial   Mode of Presentation, Semisolid spoon   Order of Presentation 7   Preparatory Phase WFL   Oral Phase, Semisolid WFL   Pharyngeal Phase, Semisolid   (see below)   Rosenbek's Penetration Aspiration Scale: Semisolid Food Trial Results 1 - no aspiration, contrast does not enter airway   Diagnostic Statement Decreased epiglottic inversion, short epiglottis, pharyngeal swelling, decreased base of tongue movement and pharyngeal peristalsis, decreased velar seal/closure, tight UES, mod-severe residue, min past UES after 4-5 swallows, alternating to thin x 2 with multiple swallows clears to mild-mod residue, min-mod penetration with thin trials   VFSS Eval: Solid Food Texture Trial   Mode of Presentation, Solid spoon   Order of  Presentation 10   Preparatory Phase WFL   Oral Phase, Solid WFL   Pharyngeal Phase, Solid   (see below)   Rosenbek's Penetration Aspiration Scale: Solid Food Trial Results 1 - no aspiration, contrast does not enter airway   Diagnostic Statement Decreased epiglottic inversion, short epiglottis, pharyngeal swelling, decreased base of tongue movement and pharyngeal peristalsis, decreased velar seal/closure, tight UES, mod-severe residue, min past UES after 4 swallows, alternating to thin x 1 with multiple swallows clears to mild-mod residue, min penetration with thin trials   Swallow Compensations   Swallow Compensations Reduce amounts;Multiple swallow;Alternate viscosity of consistencies   Educational Assessment   Barriers to Learning No barriers   Preferred Learning Style Listening;Pictures/video;Reading   Swallow Eval: Clinical Impressions   Skilled Criteria for Therapy Intervention Skilled criteria met.  Treatment indicated.   Functional Assessment Scale (FAS) 2   Dysphagia Outcome Severity Scale (LEROY) Level 2 - LEROY   Treatment Diagnosis Moderate-severe oral-pharyngeal dysphagia   Diet texture recommendations Thin liquids  (Regular to DDL3 textures)   Recommended Feeding/Eating Techniques small sips/bites;alternate between small bites and sips of food/liquid;hard swallow w/ each bite or sip  (multiple swallows, throat clear/cough to clear penetration as needed)   Rehab Potential good, to achieve stated therapy goals   Therapy Frequency   (5 sessions/8 weeks)   Predicted Duration of Therapy Intervention (days/wks) 8 weeks   Risks and Benefits of Treatment have been explained. Yes   Patient, family and/or staff in agreement with Plan of Care Yes   Clinical Impression Comments Moderate-severe oral-pharyngeal dysphagia was found per today's study.  Current swallow function and airway protection are slightly improved from level found during VFSS on 2/11/21.  Current deficits include decreased epiglottic inversion,  short epiglottis, pharyngeal swelling, decreased base of tongue movement and pharyngeal peristalsis, decreased velar seal/closure, and  tight UES.  Deficits result in mild-mod to mod-severe residue, min to mod depth slight penetration with residual at times with thin liquids, and bolus up to posterior nasal cavity at times with thin liquids.  Multple swallows and alternating to thin liquids were needed to clear pharyngeal residue.  Piecemeal swallows were noted.  Recommend soft regular to DDL3 textures, thin liquids, and careful use of safe swallow strategies as listed above.  Recommend smaller meals at a sitting, use of smoothies/shakes for ease of swallowing, and continued use of G tube feedings to insure nutrition/hydration.  Educated pt on diet textures, swallow strategies, po modifications if increased dysphagia noted as RT continues, and exercises to complete.  Recommend OP clinical swallow Tx as well to continue education and montior po tolerance.  Will notify OP clinic of recommendations.   Swallow Goal 1   Goal Identifier STG 1   Goal Description 1.  Pt will tolerate soft solids and thin liquids without increased signs of aspiration and complete 10 reps x 5 oral-pharyngeal exericses given min-no cues.   Target Date 05/20/21   Total Session Time   SLP Eval: oral/pharyngeal swallow function, clinical minutes (13908) 15   SLP Eval: VideoFluoroscopic Swallow function Minutes (89779) 15   Total Evaluation Time 30

## 2021-03-22 NOTE — LETTER
3/22/2021         RE: Fortino Moya  [address]  [facility] [address]      Oncology/Hematology Visit Note  Mar 22, 2021    Reason for Visit: Follow up of recurrent head and neck SCC     History of Present Illness: Fortino Moya is a 71 year old male with PMH atrial fibrillation, hypercholesterolemia, HTN with recurrent head and neck cancer. He has a history of L oropharynx P16+ squamous cell cancer that was first diagnosed in 2018 in [place], [place].  He initially was offered chemoradiation, but due to a prior history of L sided hearing loss, he was given low dose weekly carboplatin 1.5 AUC weekly with radiation.  His 3 month PET/CT after initiation was negative and he underwent surveillance after that. He relocated to [place] which is where he is from in Feb 2020 and has been followed since by Dr. Treviño and Dr. Flores at [facility].      In August of 2020, he  developed numbenss in chin and then moved up to his forehead.  He had a Pet/CT done in Sept 2020 that showed a hypermetabolic area near his L foramen ovale - he had had recent dental work in that area and it was thought that this might be scar or inflammation related to that procedure.      However, he was offered a 2nd opinion and came to [facility] and saw Dr. Russlel in Nov 2020.  Images were reviewed at tumor boardon 12/4/20 - and it was felt that this was concerning enough to merit biopsy - so he was referred for IR-guided biopsy.   IR guided biopsy happened on 1/8/2021 and came back as invasive squamous cell carcinoma.    He was going to have SBRT, however, repeat imaging showed marked disease progression in the L  space and we decided to initiate induction chemotherapy to try to shrink the lesion to get to SBRT.      He initiated TPF on 1/28/21 and cycle 2 on 2/18/21. He had a fair amount of deconditioning with induction therapy however restaging imaging (PET/CT CAP/neck 3/1/21) demonstrated a resolution of previous FDG-uptake  within the left skull base with some residual soft tissue fullness involving the superior aspect of the pterygoids. No cervical adenopathy or distant metastatic disease.     He was recommend chemotherapy with concurrent re-irradiation with weekly cisplatin. He was seen today prior to starting.     Interval History:  Mr. Moya was seen today for oncology follow-up. He is overall feeling well. He still has slight pain and trouble swallowing, taking Oxycodone once per day and has swallow study today. He is doing most of his nutrition and hydration via PEG and has been gaining weight. He has erythema and drainage with his left eye but is otherwise not bothered by it. He denies fevers, headaches, dizziness, chest pain, SOB, cough, nausea, vomiting, abdominal pain, bowel concerns other than mild constipation, urinary issues, edema, bleeding issues, neuropathy, rashes. He had no changes to his left hearing issues with prior cisplatin. He is in good spirits today.    Current Outpatient Medications   Medication Sig Dispense Refill     Aspirin Buf,CaCarb-MgCarb-MgO, 81 MG TABS Take 81 mg by mouth       atorvastatin (LIPITOR) 10 MG tablet Take 10 mg by mouth       carboxymethylcellulose PF (REFRESH LIQUIGEL) 1 % ophthalmic gel Place 1 drop Into the left eye 4 times daily 30 each 11     carvedilol (COREG) 12.5 MG tablet TAKE TWO TABLETS BY MOUTH EVERY MORNING AND ONE TABLET EVERY EVENING       cevimeline (EVOXAC) 30 MG capsule TAKE ONE CAPSULE BY MOUTH THREE TIMES DAILY       cholecalciferol 25 MCG (1000 UT) TABS Take 1,000 Units by mouth       dexamethasone (DECADRON) 4 MG tablet Take 2 tablets (8 mg) by mouth daily for 3 days. Start day after cisplatin (Day 2 and 9). If no N/V with first cisplatin doses, may stop dex. 12 tablet 1     erythromycin (ROMYCIN) 5 MG/GM ophthalmic ointment Apply every 3 hours while awake and at bedtime to the left eye. 3.5 g 6     erythromycin (ROMYCIN) 5 MG/GM ophthalmic ointment Place 0.5 inches  "Into the left eye At Bedtime Instill ~1 cm ribbon into affected eye(s) 4 times daily for 7 days 1 g 0     flecainide (TAMBOCOR) 150 MG tablet Take 150 mg by mouth every 12 hours as needed       lidocaine (XYLOCAINE) 2 % solution        lisinopril (ZESTRIL) 20 MG tablet Take 20 mg by mouth       LORazepam (ATIVAN) 0.5 MG tablet Take 1 tablet (0.5 mg) by mouth every 4 hours as needed (Anxiety, Nausea/Vomiting or Sleep) 30 tablet 1     magic mouthwash (ENTER INGREDIENTS IN COMMENTS) suspension Swish and spit every 4 hours as needed 240 mL 0     magic mouthwash (ENTER INGREDIENTS IN COMMENTS) suspension Take q 4 hours as needed for mouth pain (Patient not taking: Reported on 3/16/2021) 240 mL 1     Nutritional Supplements (ISOSOURCE 1.5 RIYA) LIQD Take 1 Can by mouth 7 times daily 100 Can 11     nystatin (MYCOSTATIN) 799939 UNIT/ML suspension Take 5 mLs (500,000 Units) by mouth 4 times daily Swish and swallow 4 times a day 400 mL 1     ofloxacin (OCUFLOX) 0.3 % ophthalmic solution Place 1 drop Into the left eye 2 times daily 5 mL 0     oxyCODONE (ROXICODONE) 5 MG/5ML solution Take 5-10 mLs (5-10 mg) by mouth every 4 hours as needed for severe pain 500 mL 0     prochlorperazine (COMPAZINE) 10 MG tablet Take 1 tablet (10 mg) by mouth every 6 hours as needed (Nausea/Vomiting) 30 tablet 1     traZODone (DESYREL) 50 MG tablet TAKE 1 TO 2 TABLETS BY MOUTH ONCE DAILY AT BEDTIME       Physical Examination:  /73   Pulse 64   Temp 96.4  F (35.8  C) (Oral)   Resp 16   Ht 1.778 m (5' 10\")   Wt 79.7 kg (175 lb 12.8 oz)   SpO2 97%   BMI 25.22 kg/m    Wt Readings from Last 10 Encounters:   03/10/21 77.1 kg (170 lb)   03/07/21 77.1 kg (170 lb)   02/27/21 76.2 kg (168 lb)   02/18/21 78 kg (172 lb)   02/10/21 80.3 kg (177 lb)   02/06/21 79.4 kg (175 lb)   01/28/21 83.5 kg (184 lb)   01/20/21 82.1 kg (181 lb)   01/08/21 81.6 kg (180 lb)   11/25/20 83.9 kg (185 lb)     Constitutional: Well-appearing male in no acute " distress.  Eyes: Erythema and drainage to left eye.   ENT: Oral mucosa is moist without lesions or thrush.   Lymphatic: Neck is supple without cervical or supraclavicular lymphadenopathy, firmness throughout left neck.   Cardiovascular: Regular rate and rhythm. No murmurs, gallops, or rubs. No peripheral edema.  Respiratory: Clear to auscultation bilaterally. No wheezes or crackles.  Gastrointestinal: Bowel sounds present. Abdomen soft, non-tender. No palpable hepatosplenomegaly or masses. PEG in place with no surrounding erythema or tenderness.   Neurologic: Cranial nerves II through XII are grossly intact.  Skin: No rashes, petechiae, or bruising noted on exposed skin.    Laboratory Data:  Results for ELSI IQBAL (MRN [id number]) as of 3/22/2021 09:19   3/22/2021 07:38   Sodium 142   Potassium 4.0   Chloride 107   Carbon Dioxide 30   Urea Nitrogen 19   Creatinine 0.63 (L)   GFR Estimate >90   GFR Estimate If Black >90   Calcium 8.5   Anion Gap 4   Magnesium 2.2   Albumin 3.3 (L)   Protein Total 6.2 (L)   Bilirubin Total 0.3   Alkaline Phosphatase 68   ALT 18   AST 12   Glucose 72   WBC 4.4   Hemoglobin 11.5 (L)   Hematocrit 34.8 (L)   Platelet Count 216   RBC Count 3.50 (L)   MCV 99   MCH 32.9   MCHC 33.0   RDW 15.6 (H)   Diff Method Automated Method   % Neutrophils 70.8   % Lymphocytes 7.8   % Monocytes 16.6   % Eosinophils 3.9   % Basophils 0.7   % Immature Granulocytes 0.2   Nucleated RBCs 0   Absolute Neutrophil 3.1   Absolute Lymphocytes 0.3 (L)   Absolute Monocytes 0.7   Absolute Eosinophils 0.2   Absolute Basophils 0.0   Abs Immature Granulocytes 0.0   Absolute Nucleated RBC 0.0       Assessment and Plan:  1. Onc  Recurrent SCC of head and neck, prior chemoradiation, now with local recurrence in L  space. S/p 2 cycles of TPF induction chemotherapy with near CR on PET. Discussion at ENT tumor board with plan to do radiation with weekly cisplatin, starting Wednesday this week.     Discussed  toxicities of weekly cisplatin including nausea/vomiting, renal issues, ototoxicity, myelosuppression, neuropathy. He had no nausea with induction chemo so low concern with nausea and does not need oral dex, has PRN compazine at home. He understands to closely monitor hearing, reassured no issues with induction chemo. Baseline labs WNL other than slight anemia likely 2/2 prior treatment.     Will see him weekly on treatment.     2. ENT  Dysphagia: Seeing speech with video swallow today, continue swallowing exercises.    Cancer related pain: Continue Oxycodone PRN, refilled. Discussed purpose of oxycodone should be for pain only as he mentions it helps him relax.    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 FEN  Nutrition: Continue PEG, 8 cartons per day, tolerating well and weight improving. Continue oral intake as tolerated    Hydration: Doing well, creat WNL. Discussed importance of hydration during treatment     40 minutes spent on the date of the encounter doing chart review, review of test results, interpretation of tests, patient visit and documentation     Torsten Polanco PA-C  [facility]  [address], [place] [id number] [phone]          MENDY Chowdhury

## 2021-03-22 NOTE — PROGRESS NOTES
[facility]          OUTPATIENT SWALLOW  EVALUATION  PLAN OF TREATMENT FOR OUTPATIENT REHABILITATION  (COMPLETE FOR INITIAL CLAIMS ONLY)  Patient's Last Name, First Name, M.I.  YOB: 1950  Fortino Moya     Provider's Name   [facility]   Medical Record No.  [id number]     Start of Care Date:  03/22/21  End of Care Date: 5/20/21   Onset Date:  02/11/21   Type:     ___PT   ____OT  ___X_SLP Medical Diagnosis:   Dysphagia     Treatment Diagnosis:  Moderate-severe oral-pharyngeal dysphagia Visits from SOC:  1     _________________________________________________________________________________  Plan of Treatment/Functional Goals:                           Goals   1. Goal Identifier: STG 1       Goal Description: 1.  Pt will tolerate soft solids and thin liquids without increased signs of aspiration and complete 10 reps x 5 oral-pharyngeal exericses given min-no cues.       Target Date: 05/20/21                       Therapy Frequency: (5 sessions/8 weeks)  Predicted Duration of Therapy Intervention (days/wks): 8 weeks    Snehal Granados, SLP       I CERTIFY THE NEED FOR THESE SERVICES FURNISHED UNDER        THIS PLAN OF TREATMENT AND WHILE UNDER MY CARE     (Physician co-signature of this document indicates review and certification of the therapy plan).                               Referring Physician: Dr. Rodgers    Initial Assessment        See Epic Evaluation Start Of Care Date: 03/22/21

## 2021-03-22 NOTE — LETTER
Date:October 22, 2021      Provider requested that no letter be sent. Do not send.       [facility]

## 2021-03-22 NOTE — NURSING NOTE
"Oncology Rooming Note    March 22, 2021 7:44 AM   Fortino Moya is a 71 year old male who presents for:    Chief Complaint   Patient presents with     Oncology Clinic Visit     SQUAMOUS CELL CARCINOMA OF TONSIL      Port Draw     Labs drawn via port by RN in lab. VS taken.      Initial Vitals: /73   Pulse 64   Temp 96.4  F (35.8  C) (Oral)   Resp 16   Ht 1.778 m (5' 10\")   Wt 79.7 kg (175 lb 12.8 oz)   SpO2 97%   BMI 25.22 kg/m   Estimated body mass index is 25.22 kg/m  as calculated from the following:    Height as of this encounter: 1.778 m (5' 10\").    Weight as of this encounter: 79.7 kg (175 lb 12.8 oz). Body surface area is 1.98 meters squared.  No Pain (0) Comment: Data Unavailable   No LMP for male patient.  Allergies reviewed: Yes  Medications reviewed: Yes    Medications: MEDICATION REFILLS NEEDED TODAY. Provider was notified. Patient needs refills on Oxycodone.     Pharmacy name entered into [email or web address]:    [id number] - [place], [place] - [address] [facility] [address] [place], [place] - [address] [place] [address] PHARMACY [place], [place] - [address] PHARMACY [place], [place] - [facility] [phone]    Clinical concerns: No new concerns today  Torsten Polanco was notified.      Guillermina Obrien            "

## 2021-03-23 ENCOUNTER — OFFICE VISIT (OUTPATIENT)
Dept: OPHTHALMOLOGY | Facility: CLINIC | Age: 71
End: 2021-03-23
Payer: MEDICARE

## 2021-03-23 ENCOUNTER — PRE VISIT (OUTPATIENT)
Dept: OPHTHALMOLOGY | Facility: CLINIC | Age: 71
End: 2021-03-23

## 2021-03-23 DIAGNOSIS — H02.239 PARALYTIC LAGOPHTHALMOS, UNSPECIFIED LATERALITY: Primary | ICD-10-CM

## 2021-03-23 DIAGNOSIS — Z11.59 ENCOUNTER FOR SCREENING FOR OTHER VIRAL DISEASES: ICD-10-CM

## 2021-03-23 PROCEDURE — 99024 POSTOP FOLLOW-UP VISIT: CPT | Mod: GC | Performed by: OPHTHALMOLOGY

## 2021-03-23 ASSESSMENT — VISUAL ACUITY
METHOD: SNELLEN - LINEAR
OD_SC+: -1
OD_SC: 20/20
OS_SC: CF@4FT

## 2021-03-23 ASSESSMENT — TONOMETRY
OD_IOP_MMHG: 8
IOP_METHOD: ICARE
OS_IOP_MMHG: 14

## 2021-03-23 ASSESSMENT — CONF VISUAL FIELD
METHOD: COUNTING FINGERS
OS_INFERIOR_TEMPORAL_RESTRICTION: 3

## 2021-03-23 ASSESSMENT — EXTERNAL EXAM - LEFT EYE: OS_EXAM: LEFT FACIAL PALSY

## 2021-03-23 ASSESSMENT — EXTERNAL EXAM - RIGHT EYE: OD_EXAM: NORMAL

## 2021-03-23 NOTE — NURSING NOTE
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  Denies any pain, irritation, discharge, or tearing.  Ocular meds: Erythromycin ilya 4-5x/day LE & AT gel PRN LE  Ran out of Ofloxacin yesterday    Yeny Mccann OT 11:27 AM March 23, 2021

## 2021-03-23 NOTE — PROGRESS NOTES
Chief Complaint(s) and History of Present Illness(es)     Consult For     Laterality: left eye    Quality: blurred vision    Course: stable    Associated symptoms: Negative for eye pain, discharge, tearing and   dryness    Pain scale: 0/10    Comments: Paralytic lagophthalmos with complete scleral show - possible   tarso              Comments     Pt denies any significant vision changes LE since last visit.  Denies any pain, irritation, discharge, or tearing.  Ocular meds: Erythromycin ilya 4-5x/day LE & AT gel PRN LE  Ran out of Ofloxacin yesterday    Yeny Mccann OT 11:27 AM March 23, 2021                   Assessment & Plan     Fortino Moya is a 71 year old male with the following diagnoses:   Encounter Diagnosis   Name Primary?     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  Discussed medial tarsorrhaphy vs upper lid gold weight.  Patient prefers upper lid gold weight.  Sidney Vo MD  Oculoplastic fellow    Plan:  - left upper eyelid weight 1.6 grams, left lower eyelid ectropion repair.  Will ask Dr. Rodgers, and Dr. Shah if ok to proceed from chemo/rads stand point. If unable to proceed will need temporary tarso until we can proceed.    Discussed may require some form of retraction repair with alloderm for the lower eyelid, but that would be at a future date.    Attending Physician Attestation: Complete documentation of historical and exam elements from today's encounter can be found in the full encounter summary report (not reduplicated in this progress note). I personally obtained the chief complaint(s) and history of present illness. I confirmed and edited as necessary the review of systems, past medical/surgical history, family history, social history, and examination findings as documented by others; and I examined the  patient myself. I personally reviewed the relevant tests, images, and reports as documented above. I formulated and edited as necessary the assessment and plan and discussed the findings and management plan with the patient.  -Vivien Yoon MD

## 2021-03-24 ENCOUNTER — INFUSION THERAPY VISIT (OUTPATIENT)
Dept: ONCOLOGY | Facility: CLINIC | Age: 71
End: 2021-03-24
Attending: INTERNAL MEDICINE
Payer: MEDICARE

## 2021-03-24 ENCOUNTER — APPOINTMENT (OUTPATIENT)
Dept: RADIATION ONCOLOGY | Facility: CLINIC | Age: 71
End: 2021-03-24
Attending: RADIOLOGY
Payer: MEDICARE

## 2021-03-24 ENCOUNTER — TELEPHONE (OUTPATIENT)
Dept: OPHTHALMOLOGY | Facility: CLINIC | Age: 71
End: 2021-03-24

## 2021-03-24 ENCOUNTER — HOME INFUSION (PRE-WILLOW HOME INFUSION) (OUTPATIENT)
Dept: PHARMACY | Facility: CLINIC | Age: 71
End: 2021-03-24

## 2021-03-24 VITALS
RESPIRATION RATE: 18 BRPM | DIASTOLIC BLOOD PRESSURE: 74 MMHG | OXYGEN SATURATION: 96 % | HEART RATE: 63 BPM | TEMPERATURE: 98.2 F | SYSTOLIC BLOOD PRESSURE: 122 MMHG

## 2021-03-24 DIAGNOSIS — Z51.89 ENCOUNTER FOR OTHER SPECIFIED AFTERCARE: ICD-10-CM

## 2021-03-24 DIAGNOSIS — C09.9 SQUAMOUS CELL CARCINOMA OF TONSIL (H): Primary | ICD-10-CM

## 2021-03-24 PROCEDURE — 77332 RADIATION TREATMENT AID(S): CPT | Mod: 26 | Performed by: RADIOLOGY

## 2021-03-24 PROCEDURE — 96375 TX/PRO/DX INJ NEW DRUG ADDON: CPT

## 2021-03-24 PROCEDURE — 96413 CHEMO IV INFUSION 1 HR: CPT

## 2021-03-24 PROCEDURE — 96361 HYDRATE IV INFUSION ADD-ON: CPT

## 2021-03-24 PROCEDURE — 96367 TX/PROPH/DG ADDL SEQ IV INF: CPT

## 2021-03-24 PROCEDURE — 250N000011 HC RX IP 250 OP 636: Performed by: INTERNAL MEDICINE

## 2021-03-24 PROCEDURE — 77386 HC IMRT TREATMENT DELIVERY, COMPLEX: CPT | Performed by: RADIOLOGY

## 2021-03-24 PROCEDURE — 77332 RADIATION TREATMENT AID(S): CPT | Performed by: RADIOLOGY

## 2021-03-24 PROCEDURE — 77014 PR CT GUIDE FOR PLACEMENT RADIATION THERAPY FIELDS: CPT | Mod: 26 | Performed by: RADIOLOGY

## 2021-03-24 PROCEDURE — 258N000003 HC RX IP 258 OP 636: Performed by: INTERNAL MEDICINE

## 2021-03-24 PROCEDURE — 97802 MEDICAL NUTRITION INDIV IN: CPT | Mod: TEL,GZ | Performed by: DIETITIAN, REGISTERED

## 2021-03-24 RX ORDER — HEPARIN SODIUM (PORCINE) LOCK FLUSH IV SOLN 100 UNIT/ML 100 UNIT/ML
5 SOLUTION INTRAVENOUS ONCE
Status: COMPLETED | OUTPATIENT
Start: 2021-03-24 | End: 2021-03-24

## 2021-03-24 RX ORDER — PALONOSETRON 0.05 MG/ML
0.25 INJECTION, SOLUTION INTRAVENOUS ONCE
Status: COMPLETED | OUTPATIENT
Start: 2021-03-24 | End: 2021-03-24

## 2021-03-24 RX ADMIN — Medication 5 ML: at 12:14

## 2021-03-24 RX ADMIN — PALONOSETRON 0.25 MG: 0.05 INJECTION, SOLUTION INTRAVENOUS at 10:16

## 2021-03-24 RX ADMIN — CISPLATIN 80 MG: 1 INJECTION, SOLUTION INTRAVENOUS at 10:53

## 2021-03-24 RX ADMIN — FOSAPREPITANT: 150 INJECTION, POWDER, LYOPHILIZED, FOR SOLUTION INTRAVENOUS at 10:25

## 2021-03-24 RX ADMIN — SODIUM CHLORIDE 1000 ML: 9 INJECTION, SOLUTION INTRAVENOUS at 09:57

## 2021-03-24 NOTE — PROGRESS NOTES
Infusion Nursing Note:  Fortino Moya presents today for Cycle 1 Day 1 Cisplatin.    Patient seen by provider: Yes: MENDY Chowdhury on 3/22/21   present during visit today: Not Applicable.    Note: Fortino presents to infusion today feeling well. He denies any changes or concerns since his visit with MENDY Chowdhury. He was assessed per opthalmology yesterday for his facial droop/vision changes. Per provider's visit note, Dr. Yoon spoke with Dr. Rodgers and Dr. Shah regarding a procedure scheduled for Fortino next week and proceeding with treatment this week. Dr. Shah paged.     3/24/21 0918 TORB Dr. Shah/Simin Lipscomb, HERLINDA  -Provider spoke with Dr. Velazquez yeseterday regarding eye procedure next week and plan of care. OK to proceed with chemotherapy today. Do not need to repeat labs, ok to use labs from 3/22.      Intravenous Access:  Implanted Port.    Treatment Conditions:  Lab Results   Component Value Date    HGB 11.5 03/22/2021     Lab Results   Component Value Date    WBC 4.4 03/22/2021      Lab Results   Component Value Date    ANEU 3.1 03/22/2021     Lab Results   Component Value Date     03/22/2021      Lab Results   Component Value Date     03/22/2021                   Lab Results   Component Value Date    POTASSIUM 4.0 03/22/2021           Lab Results   Component Value Date    MAG 2.2 03/22/2021            Lab Results   Component Value Date    CR 0.63 03/22/2021                   Lab Results   Component Value Date    RIYA 8.5 03/22/2021                Lab Results   Component Value Date    BILITOTAL 0.3 03/22/2021           Lab Results   Component Value Date    ALBUMIN 3.3 03/22/2021                    Lab Results   Component Value Date    ALT 18 03/22/2021           Lab Results   Component Value Date    AST 12 03/22/2021       Results reviewed, labs MET treatment parameters, ok to proceed with treatment.      Post Infusion Assessment:  Patient tolerated  infusion without incident.  Blood return noted pre and post infusion.  Site patent and intact, free from redness, edema or discomfort.  No evidence of extravasations.  Access discontinued per protocol.       Discharge Plan:   Patient declined prescription refills.  Discharge instructions reviewed with: Patient.  Patient and/or family verbalized understanding of discharge instructions and all questions answered.  AVS to patient via [employer].  Patient will return 3/30/31 for next appointment.   Patient discharged in stable condition accompanied by: self.  Departure Mode: Ambulatory.    Simin Lipscomb RN

## 2021-03-24 NOTE — LETTER
"    3/24/2021         RE: Fortino Moya  [address]  [facility] [address]        Dear Colleague,    Thank you for referring your patient, Fortino Moya, to the [facility]. Please see a copy of my visit note below.    The patient has been notified of the following:      \"We have found that certain health care needs can be provided without the need for a face to face visit.  This service lets us provide the care you need with a phone conversation.       I will have full access to your [place] medical record during this entire phone call.   I will be taking notes for your medical record.      Since this is like an office visit, we will bill your insurance company for this service.       There are potential benefits and risks of telephone visits (e.g. limits to patient confidentiality) that differ from in-person visits.?  Confidentiality still applies for telephone services, and nobody will record the visit.  It is important to be in a quiet, private space that is free of distractions (including cell phone or other devices) during the visit.??      If during the course of the call I believe a telephone visit is not appropriate, you will not be charged for this service\"     Consent has been obtained for this service by care team member: Yes     CLINICAL NUTRITION SERVICES - REASSESSMENT NOTE   EVALUATION OF PREVIOUS PLAN OF CARE:   Referring Physician: Diana King   Time spent with patient: 15 minutes.    Current diet: soft foods  Current appetite/intake: bites only  PEG Tube: dependent  Chemotherapy: Starting Cisplatin today  Radiation: impending; potentially starting in one week      Monitoring from previous assessment:   --EN intake/tolerance - great tolerance - switched from Isosource 1.5 anastacia to Nutren 2.0 anastacia due to ongonig weight loss with >7-8 cartons of Isosource.    Current regimen:  Formula: Nutren 2.0 anastacia  Volume: 6-8 cartons/day  MOA: gravity bag bolus  Provisions: " >3000kcal, >120g protein/day     --Hydration/food intake - aiming for >10-12 cups water/electrolyte fluids/day  Previous Goals:   1. EN to meet 100 % of est nutr needs - goal met/ongoing   Evaluation: Met     CURRENT NUTRITION DIAGNOSIS   Inadequate oral intake related to related to dysphagia as evidenced by pt dependent upon EN via PEG tube to meet 100% of estimated nutrition needs.    INTERVENTIONS   --EN Composition, EN Schedule, Feeding Tube Flush - reviewed current regimen - will remain the same as tolerance is great. Encouraged fluids >12 cups water/electrolytef fluids/day with Cisplatin.  --General/healthful diet and Medical Food Supplement - reviewed soft, calorie dense foods.   Goals   1. EN to meet 100% of est nutrition needs, PO for pleasure and maintain swallow function    Follow up/Monitoring: prn - followed by [facility]  Food intake, Enteral Nutrition intake and Weight    Ramila Worrell RD, LD  [facility]  [phone]

## 2021-03-24 NOTE — PROGRESS NOTES
RADIATION ONCOLOGY WEEKLY ON TREATMENT VISIT   Encounter Date: 2021    Patient Name: Fortino Moya  MRN: [id number]  : 1950     Disease and Stage: rcT4 N0 M0 p16-positive squamous cell carcinoma of the left tonsil  Treatment Site: Left skull base  Current Dose/Planned Total Dose: 400 / 6600 cGy  Daily Fraction Size: 200 cGy/day, 5 times/week  Concurrent Chemotherapy: Yes  Drug and Frequency: Cisplatin (40 mg/m  weekly)    Treatment Summary:    3/24/2021: Initiation of chemoradiotherapy. Cycle 1, day 1 of weekly cisplatin.    3/25/2021: Weekly RT visit. Current dose of 400 cGy. Tolerating treatment well.    ED visits/Hospitalizations:  None    Missed Treatments:  None    Subjective: Mr. Moya presents to clinic today for his weekly on-treatment visit. He has tolerated the first two fractions of radiotherapy well without any acute treatment-induced toxicities. He describes minimal pain which he rates as a 2/10 and is not particularly bothersome to him. He continues to be PEG dependent for his caloric needs, taking 6-7 cans daily in addition to small amounts PO.     ROS:   Constitutional  Pain (0-10): 2 (mouth), 2 (throat), 2 (skin)  Fatigue: Mild    CNS  Headaches: None    ENT  Mucositis: None    Cardiopulmonary  Dyspnea: None    GI  Nausea/vomiting: None    Nutrition  PEG: Yes  Diet: 6-7 cans daily via PEG. Small amounts of pureed/liquids by mouth.    Integumentary  Dermatitis: None    Objective:   Current weight: 79.9 kg    BP: 131/74 (sitting), 104/73 (standing)  Pulse: 77 (sitting), 89 (standing)    General: Mildly cachectic-appearing 71 year old gentleman seated comfortably in an examination chair in [facility]  HEENT: EOMI. No rhinorrhea or epistaxis. Stable left facial weakness with incomplete eye closure on the left. Dry mucus membranes. Scattered telangiectasias most prominently involving the left posterior buccal mucosa, left tonsillar fossa and left lateral soft palate. No thrush.  Pulmonary: No  wheezing, stridor or respiratory distress  Skin: Scattered telangiectasias and moderate mixed hyper- and hypopigmentation involving the bilateral neck (left > right)    Treatment-related toxicities (CTCAE v5.0):  None    Assessment:    Mr. 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 He was treated with induction TPF x2 cycles with a good partial response to therapy and is now undergoing curative intent treatment with reirradiation and weekly cisplatin. He is tolerating treatment reasonably well although has mild orthostatic hypotension likely from inadequate fluid intake.     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:    Continue chemoradiotherapy    Pain management:    No symptoms requiring medical management    Fluids/Electrolytes/Nutrition:    Recommended increased fluid intake    Follow-up with oncology nutritionist in the next 2-3 days for dietary eval and recommendations    Mucositis:    Start salt/soda rinses at least 5-6 times daily    Dermatitis:    Continue BID/TID Aquaphor application to the lips    Start daily moisturizer use to the left face    Mosaiq chart and setup information reviewed  MVCT images reviewed    Medication list reviewed    Sidney Rodgers MD/PhD    Dept of Radiation Oncology  [facility]

## 2021-03-24 NOTE — TELEPHONE ENCOUNTER
Called patient to schedule procedure with Dr. Yoon, there was no answer. Left message with my direct line [phone].

## 2021-03-24 NOTE — PROGRESS NOTES
"The patient has been notified of the following:      \"We have found that certain health care needs can be provided without the need for a face to face visit.  This service lets us provide the care you need with a phone conversation.       I will have full access to your [facility] medical record during this entire phone call.   I will be taking notes for your medical record.      Since this is like an office visit, we will bill your insurance company for this service.       There are potential benefits and risks of telephone visits (e.g. limits to patient confidentiality) that differ from in-person visits.?  Confidentiality still applies for telephone services, and nobody will record the visit.  It is important to be in a quiet, private space that is free of distractions (including cell phone or other devices) during the visit.??      If during the course of the call I believe a telephone visit is not appropriate, you will not be charged for this service\"     Consent has been obtained for this service by care team member: Yes     CLINICAL NUTRITION SERVICES - REASSESSMENT NOTE   EVALUATION OF PREVIOUS PLAN OF CARE:   Referring Physician: Diana King   Time spent with patient: 15 minutes.    Current diet: soft foods  Current appetite/intake: bites only  PEG Tube: dependent  Chemotherapy: Starting Cisplatin today  Radiation: impending; potentially starting in one week      Monitoring from previous assessment:   --EN intake/tolerance - great tolerance - switched from Isosource 1.5 anastacia to Nutren 2.0 anastacia due to ongonig weight loss with >7-8 cartons of Isosource.    Current regimen:  Formula: Nutren 2.0 anastacia  Volume: 6-8 cartons/day  MOA: gravity bag bolus  Provisions: >3000kcal, >120g protein/day     --Hydration/food intake - aiming for >10-12 cups water/electrolyte fluids/day  Previous Goals:   1. EN to meet 100 % of est nutr needs - goal met/ongoing   Evaluation: Met     CURRENT NUTRITION DIAGNOSIS   Inadequate oral " intake related to related to dysphagia as evidenced by pt dependent upon EN via PEG tube to meet 100% of estimated nutrition needs.    INTERVENTIONS   --EN Composition, EN Schedule, Feeding Tube Flush - reviewed current regimen - will remain the same as tolerance is great. Encouraged fluids >12 cups water/electrolytef fluids/day with Cisplatin.  --General/healthful diet and Medical Food Supplement - reviewed soft, calorie dense foods.   Goals   1. EN to meet 100% of est nutrition needs, PO for pleasure and maintain swallow function    Follow up/Monitoring: prn - followed by [facility]  Food intake, Enteral Nutrition intake and Weight    Ramila Worrell RD, LD  [facility]  [phone]

## 2021-03-25 ENCOUNTER — TELEPHONE (OUTPATIENT)
Dept: ONCOLOGY | Facility: CLINIC | Age: 71
End: 2021-03-25

## 2021-03-25 ENCOUNTER — OFFICE VISIT (OUTPATIENT)
Dept: RADIATION ONCOLOGY | Facility: CLINIC | Age: 71
End: 2021-03-25
Attending: RADIOLOGY
Payer: MEDICARE

## 2021-03-25 VITALS
BODY MASS INDEX: 25.27 KG/M2 | WEIGHT: 176.1 LBS | SYSTOLIC BLOOD PRESSURE: 131 MMHG | HEART RATE: 77 BPM | DIASTOLIC BLOOD PRESSURE: 74 MMHG

## 2021-03-25 DIAGNOSIS — C09.9 SQUAMOUS CELL CARCINOMA OF TONSIL (H): Primary | ICD-10-CM

## 2021-03-25 PROCEDURE — 77386 HC IMRT TREATMENT DELIVERY, COMPLEX: CPT | Performed by: RADIOLOGY

## 2021-03-25 NOTE — LETTER
February 8, 2021       TO: Fortino Moya  [address]  [facility] [address]       DearMr.Griffin,    We are writing to inform you of your test results.    {p results letter list:[id number]}    No results found from the In Basket message.    ***       DISCHARGE

## 2021-03-25 NOTE — PROGRESS NOTES
This is a recent snapshot of the patient's [facility] medical record.  For current drug dose and complete information and questions, call [phone] or In [facility] pool, fv home infusion ([id number])  CSN Number:  [id number]

## 2021-03-26 ENCOUNTER — APPOINTMENT (OUTPATIENT)
Dept: RADIATION ONCOLOGY | Facility: CLINIC | Age: 71
End: 2021-03-26
Attending: RADIOLOGY
Payer: MEDICARE

## 2021-03-26 PROCEDURE — 77386 HC IMRT TREATMENT DELIVERY, COMPLEX: CPT | Performed by: RADIOLOGY

## 2021-03-26 PROCEDURE — 77014 PR CT GUIDE FOR PLACEMENT RADIATION THERAPY FIELDS: CPT | Mod: 26 | Performed by: RADIOLOGY

## 2021-03-26 NOTE — TELEPHONE ENCOUNTER
Patient could not get into his PCP for a H&P or covid test.    Patient has been scheduled with PAC for his H&P.    Patient is scheduled on 4/1 at [facility] for his covid test.

## 2021-03-26 NOTE — TELEPHONE ENCOUNTER
Nutrition Services:    Received message from Almaz Penaloza, radiation RNCC requesting RD to reach out to Fortino to review fluid needs.      RD spoke with Fortino 3/24 for follow-up on EN intake/tolerance and fluids.      RD called Fortino today to review hydration needs via PO/PEG tube.   Advised Fortino to aim for at least 10 cups water and 2-3 cups of electrolyte fluids daily for total of ~3 L/day.     Discussed that electrolyte fluids can go in feeding tube as long as tube is flushed with water before and after infusing electrolytes.      Suggested measuring out 3L of fluids in the morning, using as PO and via tube flush throughout the day until fluids are consumed.  This will meet 100% of hydration needs during CRT.     Advised Fortino to return call to RD if he has further questions.  RD contact number provided.     Ramila Worrell RD, [facility] [place]  [phone]

## 2021-03-26 NOTE — TELEPHONE ENCOUNTER
Spoke with patient to schedule surgery with Dr. Yoon    Surgery was scheduled on 4/2 at [facility] OR  Patient will have H&P at [facility]     Patient is aware a COVID-19 test is needed before their procedure. The test should be with-in 4 days of their procedure.   Test Details: Date 3/29 Location [facility]    Post-Op visit was scheduled on 4/20  Patient is aware a / is needed day of surgery.   Surgery packet was mailed 3/26, patient has my direct contact information for any further questions.

## 2021-03-27 NOTE — TELEPHONE ENCOUNTER
FUTURE VISIT INFORMATION      SURGERY INFORMATION:    Date: 21    Location:  OR    Surgeon:  Karrie    Anesthesia Type:  combined MAC with local    Procedure: Left upper eyelid gold or platinum weight insertion for lagophthalmos - 1.6 grams    Consult: 3.23.21    RECORDS REQUESTED FROM:         Most recent EKG+ Tracin21

## 2021-03-29 ENCOUNTER — APPOINTMENT (OUTPATIENT)
Dept: RADIATION ONCOLOGY | Facility: CLINIC | Age: 71
End: 2021-03-29
Attending: RADIOLOGY
Payer: MEDICARE

## 2021-03-29 PROCEDURE — 77014 PR CT GUIDE FOR PLACEMENT RADIATION THERAPY FIELDS: CPT | Mod: 26 | Performed by: RADIOLOGY

## 2021-03-29 PROCEDURE — 77386 HC IMRT TREATMENT DELIVERY, COMPLEX: CPT | Performed by: RADIOLOGY

## 2021-03-30 ENCOUNTER — ONCOLOGY VISIT (OUTPATIENT)
Dept: ONCOLOGY | Facility: CLINIC | Age: 71
End: 2021-03-30
Attending: INTERNAL MEDICINE
Payer: MEDICARE

## 2021-03-30 ENCOUNTER — VIRTUAL VISIT (OUTPATIENT)
Dept: OPHTHALMOLOGY | Facility: CLINIC | Age: 71
End: 2021-03-30
Payer: MEDICARE

## 2021-03-30 ENCOUNTER — APPOINTMENT (OUTPATIENT)
Dept: RADIATION ONCOLOGY | Facility: CLINIC | Age: 71
End: 2021-03-30
Attending: RADIOLOGY
Payer: MEDICARE

## 2021-03-30 VITALS
DIASTOLIC BLOOD PRESSURE: 66 MMHG | BODY MASS INDEX: 24.97 KG/M2 | TEMPERATURE: 97.9 F | SYSTOLIC BLOOD PRESSURE: 106 MMHG | RESPIRATION RATE: 18 BRPM | HEART RATE: 69 BPM | OXYGEN SATURATION: 98 % | WEIGHT: 174 LBS

## 2021-03-30 DIAGNOSIS — C09.9 SQUAMOUS CELL CARCINOMA OF TONSIL (H): Primary | ICD-10-CM

## 2021-03-30 DIAGNOSIS — G51.0 FACIAL PALSY: ICD-10-CM

## 2021-03-30 DIAGNOSIS — H16.232 NEUROTROPHIC KERATOPATHY OF LEFT EYE: ICD-10-CM

## 2021-03-30 DIAGNOSIS — H02.239 PARALYTIC LAGOPHTHALMOS, UNSPECIFIED LATERALITY: Primary | ICD-10-CM

## 2021-03-30 DIAGNOSIS — Z51.89 ENCOUNTER FOR OTHER SPECIFIED AFTERCARE: ICD-10-CM

## 2021-03-30 DIAGNOSIS — C09.9 SQUAMOUS CELL CARCINOMA OF TONSIL (H): ICD-10-CM

## 2021-03-30 DIAGNOSIS — Z11.59 ENCOUNTER FOR SCREENING FOR OTHER VIRAL DISEASES: ICD-10-CM

## 2021-03-30 DIAGNOSIS — H02.155 PARALYTIC ECTROPION OF LEFT LOWER EYELID: ICD-10-CM

## 2021-03-30 LAB
ALBUMIN SERPL-MCNC: 3.5 G/DL (ref 3.4–5)
ALP SERPL-CCNC: 62 U/L (ref 40–150)
ALT SERPL W P-5'-P-CCNC: 20 U/L (ref 0–70)
ANION GAP SERPL CALCULATED.3IONS-SCNC: 4 MMOL/L (ref 3–14)
AST SERPL W P-5'-P-CCNC: 11 U/L (ref 0–45)
BASOPHILS # BLD AUTO: 0 10E9/L (ref 0–0.2)
BASOPHILS NFR BLD AUTO: 0.4 %
BILIRUB SERPL-MCNC: 0.4 MG/DL (ref 0.2–1.3)
BUN SERPL-MCNC: 16 MG/DL (ref 7–30)
CALCIUM SERPL-MCNC: 8.9 MG/DL (ref 8.5–10.1)
CHLORIDE SERPL-SCNC: 102 MMOL/L (ref 94–109)
CO2 SERPL-SCNC: 30 MMOL/L (ref 20–32)
CREAT SERPL-MCNC: 0.59 MG/DL (ref 0.66–1.25)
DIFFERENTIAL METHOD BLD: ABNORMAL
EOSINOPHIL # BLD AUTO: 0.2 10E9/L (ref 0–0.7)
EOSINOPHIL NFR BLD AUTO: 3.9 %
ERYTHROCYTE [DISTWIDTH] IN BLOOD BY AUTOMATED COUNT: 14.9 % (ref 10–15)
GFR SERPL CREATININE-BSD FRML MDRD: >90 ML/MIN/{1.73_M2}
GLUCOSE SERPL-MCNC: 187 MG/DL (ref 70–99)
HCT VFR BLD AUTO: 34.7 % (ref 40–53)
HGB BLD-MCNC: 11.9 G/DL (ref 13.3–17.7)
IMM GRANULOCYTES # BLD: 0 10E9/L (ref 0–0.4)
IMM GRANULOCYTES NFR BLD: 0 %
LABORATORY COMMENT REPORT: NORMAL
LYMPHOCYTES # BLD AUTO: 0.4 10E9/L (ref 0.8–5.3)
LYMPHOCYTES NFR BLD AUTO: 8.4 %
MAGNESIUM SERPL-MCNC: 2 MG/DL (ref 1.6–2.3)
MCH RBC QN AUTO: 32.8 PG (ref 26.5–33)
MCHC RBC AUTO-ENTMCNC: 34.3 G/DL (ref 31.5–36.5)
MCV RBC AUTO: 96 FL (ref 78–100)
MONOCYTES # BLD AUTO: 0.5 10E9/L (ref 0–1.3)
MONOCYTES NFR BLD AUTO: 9.6 %
NEUTROPHILS # BLD AUTO: 3.6 10E9/L (ref 1.6–8.3)
NEUTROPHILS NFR BLD AUTO: 77.7 %
NRBC # BLD AUTO: 0 10*3/UL
NRBC BLD AUTO-RTO: 0 /100
PLATELET # BLD AUTO: 190 10E9/L (ref 150–450)
POTASSIUM SERPL-SCNC: 3.7 MMOL/L (ref 3.4–5.3)
PROT SERPL-MCNC: 6.3 G/DL (ref 6.8–8.8)
RBC # BLD AUTO: 3.63 10E12/L (ref 4.4–5.9)
SARS-COV-2 RNA RESP QL NAA+PROBE: NEGATIVE
SARS-COV-2 RNA RESP QL NAA+PROBE: NORMAL
SODIUM SERPL-SCNC: 136 MMOL/L (ref 133–144)
SPECIMEN SOURCE: NORMAL
SPECIMEN SOURCE: NORMAL
WBC # BLD AUTO: 4.7 10E9/L (ref 4–11)

## 2021-03-30 PROCEDURE — 250N000011 HC RX IP 250 OP 636: Performed by: PHYSICIAN ASSISTANT

## 2021-03-30 PROCEDURE — 85025 COMPLETE CBC W/AUTO DIFF WBC: CPT | Performed by: INTERNAL MEDICINE

## 2021-03-30 PROCEDURE — U0003 INFECTIOUS AGENT DETECTION BY NUCLEIC ACID (DNA OR RNA); SEVERE ACUTE RESPIRATORY SYNDROME CORONAVIRUS 2 (SARS-COV-2) (CORONAVIRUS DISEASE [COVID-19]), AMPLIFIED PROBE TECHNIQUE, MAKING USE OF HIGH THROUGHPUT TECHNOLOGIES AS DESCRIBED BY CMS-2020-01-R: HCPCS | Performed by: OPHTHALMOLOGY

## 2021-03-30 PROCEDURE — 96413 CHEMO IV INFUSION 1 HR: CPT

## 2021-03-30 PROCEDURE — 96375 TX/PRO/DX INJ NEW DRUG ADDON: CPT

## 2021-03-30 PROCEDURE — 258N000003 HC RX IP 258 OP 636: Performed by: INTERNAL MEDICINE

## 2021-03-30 PROCEDURE — 77427 RADIATION TX MANAGEMENT X5: CPT | Performed by: RADIOLOGY

## 2021-03-30 PROCEDURE — 99214 OFFICE O/P EST MOD 30 MIN: CPT | Performed by: PHYSICIAN ASSISTANT

## 2021-03-30 PROCEDURE — 77386 HC IMRT TREATMENT DELIVERY, COMPLEX: CPT | Performed by: RADIOLOGY

## 2021-03-30 PROCEDURE — 80053 COMPREHEN METABOLIC PANEL: CPT | Performed by: INTERNAL MEDICINE

## 2021-03-30 PROCEDURE — 99442 PR PHYSICIAN TELEPHONE EVALUATION 11-20 MIN: CPT | Mod: 95 | Performed by: OPHTHALMOLOGY

## 2021-03-30 PROCEDURE — 83735 ASSAY OF MAGNESIUM: CPT | Performed by: INTERNAL MEDICINE

## 2021-03-30 PROCEDURE — 77336 RADIATION PHYSICS CONSULT: CPT | Performed by: RADIOLOGY

## 2021-03-30 PROCEDURE — 77014 PR CT GUIDE FOR PLACEMENT RADIATION THERAPY FIELDS: CPT | Mod: 26 | Performed by: RADIOLOGY

## 2021-03-30 PROCEDURE — 250N000011 HC RX IP 250 OP 636: Performed by: INTERNAL MEDICINE

## 2021-03-30 PROCEDURE — 96367 TX/PROPH/DG ADDL SEQ IV INF: CPT

## 2021-03-30 PROCEDURE — 96361 HYDRATE IV INFUSION ADD-ON: CPT

## 2021-03-30 PROCEDURE — U0005 INFEC AGEN DETEC AMPLI PROBE: HCPCS | Performed by: OPHTHALMOLOGY

## 2021-03-30 RX ORDER — HEPARIN SODIUM (PORCINE) LOCK FLUSH IV SOLN 100 UNIT/ML 100 UNIT/ML
5 SOLUTION INTRAVENOUS ONCE
Status: COMPLETED | OUTPATIENT
Start: 2021-03-30 | End: 2021-03-30

## 2021-03-30 RX ORDER — PALONOSETRON 0.05 MG/ML
0.25 INJECTION, SOLUTION INTRAVENOUS ONCE
Status: COMPLETED | OUTPATIENT
Start: 2021-03-30 | End: 2021-03-30

## 2021-03-30 RX ORDER — HEPARIN SODIUM,PORCINE 10 UNIT/ML
5 VIAL (ML) INTRAVENOUS
Status: DISCONTINUED | OUTPATIENT
Start: 2021-03-30 | End: 2021-03-30 | Stop reason: HOSPADM

## 2021-03-30 RX ADMIN — Medication 5 ML: at 07:18

## 2021-03-30 RX ADMIN — CISPLATIN 80 MG: 1 INJECTION, SOLUTION INTRAVENOUS at 11:08

## 2021-03-30 RX ADMIN — Medication 5 ML: at 12:21

## 2021-03-30 RX ADMIN — PALONOSETRON 0.25 MG: 0.05 INJECTION, SOLUTION INTRAVENOUS at 09:52

## 2021-03-30 RX ADMIN — SODIUM CHLORIDE 1000 ML: 9 INJECTION, SOLUTION INTRAVENOUS at 09:49

## 2021-03-30 RX ADMIN — DEXAMETHASONE SODIUM PHOSPHATE: 10 INJECTION, SOLUTION INTRAMUSCULAR; INTRAVENOUS at 09:53

## 2021-03-30 ASSESSMENT — PAIN SCALES - GENERAL: PAINLEVEL: NO PAIN (0)

## 2021-03-30 NOTE — PROGRESS NOTES
"Fortino is a 71 year old who is being evaluated via a billable telephone visit.        Assessment & Plan     Paralytic lagophthalmos, unspecified laterality  Neurotrophic keratopathy of left eye  Squamous cell carcinoma of tonsil (H)  Facial palsy  Paralytic ectropion of left lower eyelid    Given his neurotrophic component along with the facial paralysis his eye is at high risk of corneal decompensation. Options are further tarsorrhaphy or weight + ectropion repair.    Plan: Continue aggressive lubrication, proceed with 1.6 gram weight left eye and left lower lid ectropion repair  Have discussed with rad onc and oncology and ok to proceed with proposed procedure    Today with Fortino Moya, I reviewed the indications, risks, benefits, and alternatives of the proposed surgical procedure including, but not limited to, failure obtain the desired result  and need for additional surgery, bleeding, infection, loss of vision, loss of the eye, and the remote possibility of permanent damage to any organ system or death with the use of anesthesia. We discussed that I am working to avoid corneal decompensation, but despite our best efforts this be be the end result, ultimately requiring further ocular surgery or medications, or even loss of the eye.  I provided multiple opportunities for the questions, answered all questions to the best of my ability, and confirmed that my answers and my discussion were understood.   Vivien Yoon MD        Review of prior external note(s) from - oncololgy, radiation oncology and ent  31 minutes spent on the date of the encounter doing chart review, review of test results, patient visit and documentation        BMI:   Estimated body mass index is 24.97 kg/m  as calculated from the following:    Height as of 3/22/21: 1.778 m (5' 10\").    Weight as of an earlier encounter on 3/30/21: 78.9 kg (174 lb).     Vivien Yoon MD  [facility] [place]    Subjective "   Fortino is a 71 year old presenting for follow-up of neurotrophic keratopathy and facial paralysis in the setting of oropharyngeal squamous cell carcinoma.  There has been minimal change in his facial paralysis, and he continues to have numbness on that side of the face.  He is currently undergoing radiation, and weekly cisplatin.  He has concerns about the proposed surgery and would like to discuss further.  HPI         Review of Systems         Objective             Physical Exam   healthy, alert and no distress  PSYCH: Alert and oriented times 3; coherent speech, normal   rate and volume, able to articulate logical thoughts, able   to abstract reason, no tangential thoughts, no hallucinations   or delusions  His affect is normal  RESP: No cough, no audible wheezing, able to talk in full sentences  Remainder of exam unable to be completed due to telephone visits        I performed the entire visit        Phone call duration: 15 minutes

## 2021-03-30 NOTE — NURSING NOTE
"Oncology Rooming Note    March 30, 2021 7:57 AM   Fortino Moya is a 71 year old male who presents for:    Chief Complaint   Patient presents with     Port Draw     Labs drawn via port by RN in lab. VS taken.      Oncology Clinic Visit     Pt is here for a rtn Squamous Cell Carcinoma of Tonsils     Initial Vitals: Blood Pressure 106/66   Pulse 69   Temperature 97.9  F (36.6  C) (Oral)   Respiration 18   Weight 78.9 kg (174 lb)   Oxygen Saturation 98%   Body Mass Index 24.97 kg/m   Estimated body mass index is 24.97 kg/m  as calculated from the following:    Height as of 3/22/21: 1.778 m (5' 10\").    Weight as of this encounter: 78.9 kg (174 lb). Body surface area is 1.97 meters squared.  No Pain (0) Comment: Data Unavailable   No LMP for male patient.  Allergies reviewed: Yes  Medications reviewed: Yes    Medications: Medication refills not needed today.  Pharmacy name entered into [employer]:    [id number] - [place], [place] - [id number] [place] [address] PHARM[address] [place] [address] PHARMACY [place], [place] - [address] PHARMACY [place], [place] - [address] [facility] [phone]    Clinical concerns: PT Left eye is red. It appears to have ointment it. Pt states he had a virtual visit for this this morning. And this is what his infusion appt. Is for.       Shelbie Lester MA            "

## 2021-03-30 NOTE — PROGRESS NOTES
Oncology/Hematology Visit Note  Mar 30, 2021    Reason for Visit: Follow up of recurrent head and neck SCC      History of Present Illness: Fortino Moya is a 71 year old male with PMH atrial fibrillation, hypercholesterolemia, HTN with recurrent head and neck cancer. He has a history of L oropharynx P16+ squamous cell cancer that was first diagnosed in 2018 in [place], [place].  He initially was offered chemoradiation, but due to a prior history of L sided hearing loss, he was given low dose weekly carboplatin 1.5 AUC weekly with radiation.  His 3 month PET/CT after initiation was negative and he underwent surveillance after that. He relocated to [place] which is where he is from in Feb 2020 and has been followed since by Dr. Treviño and Dr. Flores at [facility].       In August of 2020, he  developed numbenss in chin and then moved up to his forehead.  He had a Pet/CT done in Sept 2020 that showed a hypermetabolic area near his L foramen ovale - he had had recent dental work in that area and it was thought that this might be scar or inflammation related to that procedure.       However, he was offered a 2nd opinion and came to [facility] and saw Dr. Russell in Nov 2020.  Images were reviewed at tumor boardon 12/4/20 - and it was felt that this was concerning enough to merit biopsy - so he was referred for IR-guided biopsy.   IR guided biopsy happened on 1/8/2021 and came back as invasive squamous cell carcinoma.    He was going to have SBRT, however, repeat imaging showed marked disease progression in the L  space and we decided to initiate induction chemotherapy to try to shrink the lesion to get to SBRT.      He initiated TPF on 1/28/21 and cycle 2 on 2/18/21. He had a fair amount of deconditioning with induction therapy however restaging imaging (PET/CT CAP/neck 3/1/21) demonstrated a resolution of previous FDG-uptake within the left skull base with some residual soft tissue fullness involving the  superior aspect of the pterygoids. No cervical adenopathy or distant metastatic disease.      He was recommend chemotherapy with concurrent re-irradiation with weekly cisplatin. He was seen today prior to starting.     Interval History:  Fortino was seen today for follow-up. He is feeling well. He had mild fatigue after cisplatin last week but otherwise no side effects including no nausea, changing to hearing, or changes to neuropathy in his hands. He has slight soreness in left mouth/throat that is managed with Oxycodone BID. He is swallowing well and doing more by mouth, only using 5 cartons via PEG per day. He is drinking more water which helps with constipation. He denies fevers, headaches, dizziness, chest pain, SOB, cough, abdominal pain, urinary concerns, edema, bleeding issues, rashes. Having procedure done on his eye later this week.     Current Outpatient Medications   Medication Sig Dispense Refill     Aspirin Buf,CaCarb-MgCarb-MgO, 81 MG TABS Take 81 mg by mouth       atorvastatin (LIPITOR) 10 MG tablet Take 10 mg by mouth       carboxymethylcellulose PF (REFRESH LIQUIGEL) 1 % ophthalmic gel Place 1 drop Into the left eye 4 times daily 30 each 11     carvedilol (COREG) 12.5 MG tablet TAKE TWO TABLETS BY MOUTH EVERY MORNING AND ONE TABLET EVERY EVENING       cevimeline (EVOXAC) 30 MG capsule TAKE ONE CAPSULE BY MOUTH THREE TIMES DAILY       cholecalciferol 25 MCG (1000 UT) TABS Take 1,000 Units by mouth       erythromycin (ROMYCIN) 5 MG/GM ophthalmic ointment Apply every 3 hours while awake and at bedtime to the left eye. 3.5 g 6     erythromycin (ROMYCIN) 5 MG/GM ophthalmic ointment Place 0.5 inches Into the left eye At Bedtime Instill ~1 cm ribbon into affected eye(s) 4 times daily for 7 days 1 g 0     flecainide (TAMBOCOR) 150 MG tablet Take 150 mg by mouth every 12 hours as needed       lidocaine (XYLOCAINE) 2 % solution        lisinopril (ZESTRIL) 20 MG tablet Take 20 mg by mouth       LORazepam  (ATIVAN) 0.5 MG tablet Take 1 tablet (0.5 mg) by mouth every 4 hours as needed (Anxiety, Nausea/Vomiting or Sleep) 30 tablet 1     magic mouthwash (ENTER INGREDIENTS IN COMMENTS) suspension Swish and spit every 4 hours as needed 240 mL 0     magic mouthwash (ENTER INGREDIENTS IN COMMENTS) suspension Take q 4 hours as needed for mouth pain 240 mL 1     Nutritional Supplements (ISOSOURCE 1.5 RIYA) LIQD Take 1 Can by mouth 7 times daily 100 Can 11     nystatin (MYCOSTATIN) 355472 UNIT/ML suspension Take 5 mLs (500,000 Units) by mouth 4 times daily Swish and swallow 4 times a day 400 mL 1     ofloxacin (OCUFLOX) 0.3 % ophthalmic solution Place 1 drop Into the left eye 2 times daily 5 mL 0     oxyCODONE (ROXICODONE) 5 MG/5ML solution Take 5 mLs (5 mg) by mouth every 4 hours as needed for severe pain 500 mL 0     prochlorperazine (COMPAZINE) 10 MG tablet Take 1 tablet (10 mg) by mouth every 6 hours as needed (Nausea/Vomiting) 30 tablet 1     traZODone (DESYREL) 50 MG tablet TAKE 1 TO 2 TABLETS BY MOUTH ONCE DAILY AT BEDTIME         Physical Examination:  /66   Pulse 69   Temp 97.9  F (36.6  C) (Oral)   Resp 18   Wt 78.9 kg (174 lb)   SpO2 98%   BMI 24.97 kg/m    Wt Readings from Last 10 Encounters:   03/30/21 78.9 kg (174 lb)   03/25/21 79.9 kg (176 lb 1.6 oz)   03/22/21 79.7 kg (175 lb 12.8 oz)   03/10/21 77.1 kg (170 lb)   03/07/21 77.1 kg (170 lb)   02/27/21 76.2 kg (168 lb)   02/18/21 78 kg (172 lb)   02/10/21 80.3 kg (177 lb)   02/06/21 79.4 kg (175 lb)   01/28/21 83.5 kg (184 lb)     Constitutional: Well-appearing male in no acute distress.  Eyes: Erythema and drainage to left eye.   ENT: Oral mucosa is moist without lesions or thrush.   Lymphatic: Neck is supple without cervical or supraclavicular lymphadenopathy, firmness throughout left neck.   Cardiovascular: Regular rate and rhythm. No murmurs, gallops, or rubs. No peripheral edema.  Respiratory: Clear to auscultation bilaterally. No wheezes or  crackles.  Gastrointestinal: Bowel sounds present. Abdomen soft, non-tender. No palpable hepatosplenomegaly or masses. PEG in place with no surrounding erythema or tenderness.   Neurologic: Cranial nerves II through XII are grossly intact. Left facial drop, chronic.   Skin: No rashes, petechiae, or bruising noted on exposed skin.    Laboratory Data:  Results for ELSI IQBAL (MRN [id number]) as of 3/30/2021 08:17   3/30/2021 07:21   Sodium 136   Potassium 3.7   Chloride 102   Carbon Dioxide 30   Urea Nitrogen 16   Creatinine 0.59 (L)   GFR Estimate >90   GFR Estimate If Black >90   Calcium 8.9   Anion Gap 4   Magnesium 2.0   Albumin 3.5   Protein Total 6.3 (L)   Bilirubin Total 0.4   Alkaline Phosphatase 62   ALT 20   AST 11   Glucose 187 (H)   WBC 4.7   Hemoglobin 11.9 (L)   Hematocrit 34.7 (L)   Platelet Count 190   RBC Count 3.63 (L)   MCV 96   MCH 32.8   MCHC 34.3   RDW 14.9   Diff Method Automated Method   % Neutrophils 77.7   % Lymphocytes 8.4   % Monocytes 9.6   % Eosinophils 3.9   % Basophils 0.4   % Immature Granulocytes 0.0   Nucleated RBCs 0   Absolute Neutrophil 3.6   Absolute Lymphocytes 0.4 (L)   Absolute Monocytes 0.5   Absolute Eosinophils 0.2   Absolute Basophils 0.0   Abs Immature Granulocytes 0.0   Absolute Nucleated RBC 0.0       Assessment and Plan:  1. Onc  Recurrent SCC of head and neck, prior chemoradiation, now with local recurrence in L  space. S/p 2 cycles of TPF induction chemotherapy with near CR on PET. Discussion at ENT tumor board with plan to do radiation with weekly cisplatin, started 3/24/21.     Tolerated first week of treatment well with no concerns. Labs reviewed and stable. Will continue with week 2 of cisplatin today.     Will see him weekly on treatment.      2. ENT  Dysphagia: Improved, following with speech. Discussed may worsen with treatment.      Cancer related pain: Continue Oxycodone PRN. Well controlled.      Mucositis: None currently, continue salt/soda  swishes in anticipation of radiation induced mucositis     Thrush: Resolved, has Nystatin at home if needed     Left facial paralysis/Paralytic lagophthalmos with complete scleral show: 2/2 disease involvement, follows with ortho, has procedure later this week, labs WNL so no concern with procedure this week.    Monitor chronic left hearing loss with cisplatin, no issues currently.     3. FEN  Nutrition: Continue PEG, 5 cartons per day, tolerating well and weight stable. Continue oral intake as tolerated, increased currently. RD following.      Hydration: Doing well, creat WNL. Continue good hydration during treatment     15 minutes spent on the date of the encounter doing chart review, review of test results, interpretation of tests, patient visit and documentation     Torsten Polanco PA-C  [facility]  [address] [place] [address] [phone]

## 2021-03-30 NOTE — PROGRESS NOTES
Infusion Nursing Note:  Fortino Moya presents for C1D8 Cisplatin  Met with MENDY Chowdhury before infusion.    Note: pt feeling well today, no new issues or concerns to report.    Pain: denies    Treatment Conditions:  Lab Results   Component Value Date    HGB 11.9 03/30/2021     Lab Results   Component Value Date    WBC 4.7 03/30/2021      Lab Results   Component Value Date    ANEU 3.6 03/30/2021     Lab Results   Component Value Date     03/30/2021      Lab Results   Component Value Date     03/30/2021                   Lab Results   Component Value Date    POTASSIUM 3.7 03/30/2021           Lab Results   Component Value Date    MAG 2.0 03/30/2021            Lab Results   Component Value Date    CR 0.59 03/30/2021                   Lab Results   Component Value Date    RIYA 8.9 03/30/2021                Lab Results   Component Value Date    BILITOTAL 0.4 03/30/2021           Lab Results   Component Value Date    ALBUMIN 3.5 03/30/2021                    Lab Results   Component Value Date    ALT 20 03/30/2021           Lab Results   Component Value Date    AST 11 03/30/2021       Results reviewed, labs MET treatment parameters, ok to proceed with treatment.    Intravenous Access:  Implanted Port.    Post Infusion Assessment:  Patient tolerated infusion without incident.  Blood return noted pre and post infusion.  No evidence of extravasations.  Access discontinued per protocol.    Discharge Plan:   Patient declined prescription refills.  Discharge instructions reviewed with: Patient.  Patient and/or family verbalized understanding of discharge instructions and all questions answered.  AVS to patient via IPXHART.  Patient will return 4/5 for next appointment.   Patient discharged in stable condition accompanied by: self.    Blanca Garcia, RN, RN

## 2021-03-30 NOTE — NURSING NOTE
Chief Complaint   Patient presents with     Port Draw     Labs drawn via port by RN in lab. VS taken.      Port accessed with 20g gripper needle by RN, labs collected, line flushed with saline and heparin.  Vitals taken. Pt checked in for appointment(s).    Jackeline FARRIS RN PHN BSN  BMT/Oncology Lab

## 2021-03-30 NOTE — LETTER
3/30/2021         RE: Fortino Moya  [address]  [facility] [address]      Oncology/Hematology Visit Note  Mar 30, 2021    Reason for Visit: Follow up of recurrent head and neck SCC      History of Present Illness: Fortino Moya is a 71 year old male with PMH atrial fibrillation, hypercholesterolemia, HTN with recurrent head and neck cancer. He has a history of L oropharynx P16+ squamous cell cancer that was first diagnosed in 2018 in [place], [place].  He initially was offered chemoradiation, but due to a prior history of L sided hearing loss, he was given low dose weekly carboplatin 1.5 AUC weekly with radiation.  His 3 month PET/CT after initiation was negative and he underwent surveillance after that. He relocated to [place] which is where he is from in Feb 2020 and has been followed since by Dr. Treviño and Dr. Flores at [facility].       In August of 2020, he  developed numbenss in chin and then moved up to his forehead.  He had a Pet/CT done in Sept 2020 that showed a hypermetabolic area near his L foramen ovale - he had had recent dental work in that area and it was thought that this might be scar or inflammation related to that procedure.       However, he was offered a 2nd opinion and came to [facility] and saw Dr. Russell in Nov 2020.  Images were reviewed at tumor boardon 12/4/20 - and it was felt that this was concerning enough to merit biopsy - so he was referred for IR-guided biopsy.   IR guided biopsy happened on 1/8/2021 and came back as invasive squamous cell carcinoma.    He was going to have SBRT, however, repeat imaging showed marked disease progression in the L  space and we decided to initiate induction chemotherapy to try to shrink the lesion to get to SBRT.      He initiated TPF on 1/28/21 and cycle 2 on 2/18/21. He had a fair amount of deconditioning with induction therapy however restaging imaging (PET/CT CAP/neck 3/1/21) demonstrated a resolution of previous FDG-uptake  within the left skull base with some residual soft tissue fullness involving the superior aspect of the pterygoids. No cervical adenopathy or distant metastatic disease.      He was recommend chemotherapy with concurrent re-irradiation with weekly cisplatin. He was seen today prior to starting.     Interval History:  Fortino was seen today for follow-up. He is feeling well. He had mild fatigue after cisplatin last week but otherwise no side effects including no nausea, changing to hearing, or changes to neuropathy in his hands. He has slight soreness in left mouth/throat that is managed with Oxycodone BID. He is swallowing well and doing more by mouth, only using 5 cartons via PEG per day. He is drinking more water which helps with constipation. He denies fevers, headaches, dizziness, chest pain, SOB, cough, abdominal pain, urinary concerns, edema, bleeding issues, rashes. Having procedure done on his eye later this week.     Current Outpatient Medications   Medication Sig Dispense Refill     Aspirin Buf,CaCarb-MgCarb-MgO, 81 MG TABS Take 81 mg by mouth       atorvastatin (LIPITOR) 10 MG tablet Take 10 mg by mouth       carboxymethylcellulose PF (REFRESH LIQUIGEL) 1 % ophthalmic gel Place 1 drop Into the left eye 4 times daily 30 each 11     carvedilol (COREG) 12.5 MG tablet TAKE TWO TABLETS BY MOUTH EVERY MORNING AND ONE TABLET EVERY EVENING       cevimeline (EVOXAC) 30 MG capsule TAKE ONE CAPSULE BY MOUTH THREE TIMES DAILY       cholecalciferol 25 MCG (1000 UT) TABS Take 1,000 Units by mouth       erythromycin (ROMYCIN) 5 MG/GM ophthalmic ointment Apply every 3 hours while awake and at bedtime to the left eye. 3.5 g 6     erythromycin (ROMYCIN) 5 MG/GM ophthalmic ointment Place 0.5 inches Into the left eye At Bedtime Instill ~1 cm ribbon into affected eye(s) 4 times daily for 7 days 1 g 0     flecainide (TAMBOCOR) 150 MG tablet Take 150 mg by mouth every 12 hours as needed       lidocaine (XYLOCAINE) 2 % solution         lisinopril (ZESTRIL) 20 MG tablet Take 20 mg by mouth       LORazepam (ATIVAN) 0.5 MG tablet Take 1 tablet (0.5 mg) by mouth every 4 hours as needed (Anxiety, Nausea/Vomiting or Sleep) 30 tablet 1     magic mouthwash (ENTER INGREDIENTS IN COMMENTS) suspension Swish and spit every 4 hours as needed 240 mL 0     magic mouthwash (ENTER INGREDIENTS IN COMMENTS) suspension Take q 4 hours as needed for mouth pain 240 mL 1     Nutritional Supplements (ISOSOURCE 1.5 RIYA) LIQD Take 1 Can by mouth 7 times daily 100 Can 11     nystatin (MYCOSTATIN) 766603 UNIT/ML suspension Take 5 mLs (500,000 Units) by mouth 4 times daily Swish and swallow 4 times a day 400 mL 1     ofloxacin (OCUFLOX) 0.3 % ophthalmic solution Place 1 drop Into the left eye 2 times daily 5 mL 0     oxyCODONE (ROXICODONE) 5 MG/5ML solution Take 5 mLs (5 mg) by mouth every 4 hours as needed for severe pain 500 mL 0     prochlorperazine (COMPAZINE) 10 MG tablet Take 1 tablet (10 mg) by mouth every 6 hours as needed (Nausea/Vomiting) 30 tablet 1     traZODone (DESYREL) 50 MG tablet TAKE 1 TO 2 TABLETS BY MOUTH ONCE DAILY AT BEDTIME         Physical Examination:  /66   Pulse 69   Temp 97.9  F (36.6  C) (Oral)   Resp 18   Wt 78.9 kg (174 lb)   SpO2 98%   BMI 24.97 kg/m    Wt Readings from Last 10 Encounters:   03/30/21 78.9 kg (174 lb)   03/25/21 79.9 kg (176 lb 1.6 oz)   03/22/21 79.7 kg (175 lb 12.8 oz)   03/10/21 77.1 kg (170 lb)   03/07/21 77.1 kg (170 lb)   02/27/21 76.2 kg (168 lb)   02/18/21 78 kg (172 lb)   02/10/21 80.3 kg (177 lb)   02/06/21 79.4 kg (175 lb)   01/28/21 83.5 kg (184 lb)     Constitutional: Well-appearing male in no acute distress.  Eyes: Erythema and drainage to left eye.   ENT: Oral mucosa is moist without lesions or thrush.   Lymphatic: Neck is supple without cervical or supraclavicular lymphadenopathy, firmness throughout left neck.   Cardiovascular: Regular rate and rhythm. No murmurs, gallops, or rubs. No peripheral  edema.  Respiratory: Clear to auscultation bilaterally. No wheezes or crackles.  Gastrointestinal: Bowel sounds present. Abdomen soft, non-tender. No palpable hepatosplenomegaly or masses. PEG in place with no surrounding erythema or tenderness.   Neurologic: Cranial nerves II through XII are grossly intact. Left facial drop, chronic.   Skin: No rashes, petechiae, or bruising noted on exposed skin.    Laboratory Data:  Results for ELSI IQBAL (MRN [id number]) as of 3/30/2021 08:17   3/30/2021 07:21   Sodium 136   Potassium 3.7   Chloride 102   Carbon Dioxide 30   Urea Nitrogen 16   Creatinine 0.59 (L)   GFR Estimate >90   GFR Estimate If Black >90   Calcium 8.9   Anion Gap 4   Magnesium 2.0   Albumin 3.5   Protein Total 6.3 (L)   Bilirubin Total 0.4   Alkaline Phosphatase 62   ALT 20   AST 11   Glucose 187 (H)   WBC 4.7   Hemoglobin 11.9 (L)   Hematocrit 34.7 (L)   Platelet Count 190   RBC Count 3.63 (L)   MCV 96   MCH 32.8   MCHC 34.3   RDW 14.9   Diff Method Automated Method   % Neutrophils 77.7   % Lymphocytes 8.4   % Monocytes 9.6   % Eosinophils 3.9   % Basophils 0.4   % Immature Granulocytes 0.0   Nucleated RBCs 0   Absolute Neutrophil 3.6   Absolute Lymphocytes 0.4 (L)   Absolute Monocytes 0.5   Absolute Eosinophils 0.2   Absolute Basophils 0.0   Abs Immature Granulocytes 0.0   Absolute Nucleated RBC 0.0       Assessment and Plan:  1. Onc  Recurrent SCC of head and neck, prior chemoradiation, now with local recurrence in L  space. S/p 2 cycles of TPF induction chemotherapy with near CR on PET. Discussion at ENT tumor board with plan to do radiation with weekly cisplatin, started 3/24/21.     Tolerated first week of treatment well with no concerns. Labs reviewed and stable. Will continue with week 2 of cisplatin today.     Will see him weekly on treatment.      2. ENT  Dysphagia: Improved, following with speech. Discussed may worsen with treatment.      Cancer related pain: Continue Oxycodone PRN.  Well controlled.      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    Monitor chronic left hearing loss with cisplatin, no issues currently.     3. FEN  Nutrition: Continue PEG, 5 cartons per day, tolerating well and weight stable. Continue oral intake as tolerated, increased currently. RD following.      Hydration: Doing well, creat WNL. Continue good hydration during treatment     15 minutes spent on the date of the encounter doing chart review, review of test results, interpretation of tests, patient visit and documentation     Torsten Polanco PA-C  [facility]  [address] [place] [id number]  [phone]          MENDY Chowdhury

## 2021-03-31 ENCOUNTER — APPOINTMENT (OUTPATIENT)
Dept: RADIATION ONCOLOGY | Facility: CLINIC | Age: 71
End: 2021-03-31
Attending: RADIOLOGY
Payer: MEDICARE

## 2021-03-31 ENCOUNTER — PREP FOR PROCEDURE (OUTPATIENT)
Dept: OPHTHALMOLOGY | Facility: CLINIC | Age: 71
End: 2021-03-31

## 2021-03-31 PROCEDURE — 77014 PR CT GUIDE FOR PLACEMENT RADIATION THERAPY FIELDS: CPT | Mod: 26 | Performed by: RADIOLOGY

## 2021-03-31 PROCEDURE — 77386 HC IMRT TREATMENT DELIVERY, COMPLEX: CPT | Performed by: RADIOLOGY

## 2021-04-01 ENCOUNTER — OFFICE VISIT (OUTPATIENT)
Dept: SURGERY | Facility: CLINIC | Age: 71
End: 2021-04-01
Payer: MEDICARE

## 2021-04-01 ENCOUNTER — APPOINTMENT (OUTPATIENT)
Dept: RADIATION ONCOLOGY | Facility: CLINIC | Age: 71
End: 2021-04-01
Attending: RADIOLOGY
Payer: MEDICARE

## 2021-04-01 ENCOUNTER — ANESTHESIA EVENT (OUTPATIENT)
Dept: SURGERY | Facility: CLINIC | Age: 71
End: 2021-04-01
Payer: MEDICARE

## 2021-04-01 ENCOUNTER — PRE VISIT (OUTPATIENT)
Dept: SURGERY | Facility: CLINIC | Age: 71
End: 2021-04-01

## 2021-04-01 VITALS
BODY MASS INDEX: 25.05 KG/M2 | RESPIRATION RATE: 16 BRPM | TEMPERATURE: 97.8 F | SYSTOLIC BLOOD PRESSURE: 150 MMHG | OXYGEN SATURATION: 99 % | DIASTOLIC BLOOD PRESSURE: 90 MMHG | HEIGHT: 70 IN | WEIGHT: 175 LBS | HEART RATE: 66 BPM

## 2021-04-01 VITALS
SYSTOLIC BLOOD PRESSURE: 143 MMHG | WEIGHT: 176 LBS | DIASTOLIC BLOOD PRESSURE: 79 MMHG | BODY MASS INDEX: 25.25 KG/M2 | HEART RATE: 78 BPM

## 2021-04-01 DIAGNOSIS — Z01.818 PREOP EXAMINATION: Primary | ICD-10-CM

## 2021-04-01 DIAGNOSIS — C09.9 SQUAMOUS CELL CARCINOMA OF TONSIL (H): Primary | ICD-10-CM

## 2021-04-01 PROCEDURE — 77386 HC IMRT TREATMENT DELIVERY, COMPLEX: CPT | Performed by: RADIOLOGY

## 2021-04-01 PROCEDURE — 99203 OFFICE O/P NEW LOW 30 MIN: CPT | Mod: 24 | Performed by: CLINICAL NURSE SPECIALIST

## 2021-04-01 ASSESSMENT — PAIN SCALES - GENERAL: PAINLEVEL: MILD PAIN (3)

## 2021-04-01 ASSESSMENT — ENCOUNTER SYMPTOMS: DYSRHYTHMIAS: 1

## 2021-04-01 ASSESSMENT — MIFFLIN-ST. JEOR: SCORE: 1560.04

## 2021-04-01 NOTE — ANESTHESIA PREPROCEDURE EVALUATION
Anesthesia Pre-Procedure Evaluation    Patient: Fortino Moya   MRN: [id number] : 1950        Preoperative Diagnosis: Paralytic lagophthalmos, unspecified laterality [H02.239]   Procedure : Procedure(s):  Left upper eyelid gold or platinum weight insertion for lagophthalmos - 1.6 grams  Left lower eyelid ectropion repair     Past Medical History:   Diagnosis Date     Arrhythmia     A FIB     Atrial fibrillation (H)      Dysphagia      Hypercholesterolemia      Hypertension      Paralytic lagophthalmos      Primary squamous cell carcinoma of tonsil (H)       Past Surgical History:   Procedure Laterality Date     GI SURGERY       IR CHEST PORT PLACEMENT > 5 YRS OF AGE  2/3/2021     IR GASTROSTOMY TUBE PERCUTANEOUS PLCMNT  3/10/2021     JOINT REPLACEMENT       JOINT REPLACEMENT       LASIK Bilateral      ORTHOPEDIC SURGERY       VASCULAR SURGERY        No Known Allergies   Social History     Tobacco Use     Smoking status: Never Smoker     Smokeless tobacco: Never Used   Substance Use Topics     Alcohol use: Never     Frequency: 2-3 times a week      Wt Readings from Last 1 Encounters:   21 79.8 kg (176 lb)        Anesthesia Evaluation   Pt has had prior anesthetic. Type: MAC and General.    No history of anesthetic complications       ROS/MED HX  ENT/Pulmonary:  - neg pulmonary ROS     Neurologic:  - neg neurologic ROS     Cardiovascular: Comment: Paroxysmal atrial fibrillation 2020   Single symptomatic event several years ago requiring cardioversion. None seen since. Anticoagulation stopped by cardiology.     (+) Dyslipidemia hypertension-----Taking blood thinners Pt has not received instructions: Instructions Given to patient: Will remain on ASA 81. dysrhythmias, a-fib, Previous cardiac testing   Echo: Date: Results:    Stress Test: Date: Results:    ECG Reviewed: Date: 21 Results:  SB 1st degree AV block  Cath: Date: Results:      METS/Exercise Tolerance: >4 METS    Hematologic:  - neg  hematologic  ROS     Musculoskeletal:  - neg musculoskeletal ROS     GI/Hepatic: Comment: PEG tube with gravity feedings. Eating softer foods, and taking meds orally.   (-) GERD   Renal/Genitourinary:  - neg Renal ROS     Endo:  - neg endo ROS     Psychiatric/Substance Use:  - neg psychiatric ROS     Infectious Disease:  - neg infectious disease ROS     Malignancy:   (+) Malignancy, History of Other.Other CA Recurrent SCC of head and neck Active status post Chemo and Radiation.    Other:  - neg other ROS          Physical Exam    Airway      Comment: Asymmetrical mouth opening.    Mallampati: II   TM distance: > 3 FB   Neck ROM: limited   Mouth opening: > 3 cm    Respiratory Devices and Support         Dental  no notable dental history         Cardiovascular          Rhythm and rate: regular and normal     Pulmonary           breath sounds clear to auscultation           OUTSIDE LABS:  CBC:   Lab Results   Component Value Date    WBC 4.7 03/30/2021    WBC 4.4 03/22/2021    HGB 11.9 (L) 03/30/2021    HGB 11.5 (L) 03/22/2021    HCT 34.7 (L) 03/30/2021    HCT 34.8 (L) 03/22/2021     03/30/2021     03/22/2021     BMP:   Lab Results   Component Value Date     03/30/2021     03/22/2021    POTASSIUM 3.7 03/30/2021    POTASSIUM 4.0 03/22/2021    CHLORIDE 102 03/30/2021    CHLORIDE 107 03/22/2021    CO2 30 03/30/2021    CO2 30 03/22/2021    BUN 16 03/30/2021    BUN 19 03/22/2021    CR 0.59 (L) 03/30/2021    CR 0.63 (L) 03/22/2021     (H) 03/30/2021    GLC 72 03/22/2021     COAGS:   Lab Results   Component Value Date    INR 1.16 (H) 01/08/2021     POC: No results found for: BGM, HCG, HCGS  HEPATIC:   Lab Results   Component Value Date    ALBUMIN 3.5 03/30/2021    PROTTOTAL 6.3 (L) 03/30/2021    ALT 20 03/30/2021    AST 11 03/30/2021    ALKPHOS 62 03/30/2021    BILITOTAL 0.4 03/30/2021     OTHER:   Lab Results   Component Value Date    RIYA 8.9 03/30/2021    MAG 2.0 03/30/2021       Anesthesia  Plan    ASA Status:  2         Induction: Propofol, Intravenous.           Consents    Anesthesia Plan(s) and associated risks, benefits, and realistic alternatives discussed. Questions answered and patient/representative(s) expressed understanding.     - Discussed with:  Patient      - Extended Intubation/Ventilatory Support Discussed: No.      - Patient is DNR/DNI Status: No    Use of blood products discussed: No .     Postoperative Care    Pain management: Multi-modal analgesia.   PONV prophylaxis: Ondansetron (or other 5HT-3), Dexamethasone or Solumedrol     Comments:    Anesthesia changed to LOCAL without an anesthesia provider.           PAC Discussion and Assessment    ASA Classification: 3    Anesthetic techniques and relevant risks discussed: MAC with GA as backup  Invasive monitoring and risk discussed: No    Possibility and Risk of blood transfusion discussed: No            PAC Resident/NP Anesthesia Assessment: Fortino Moya is a 71 year old male scheduled to undergo Left upper eyelid gold or platinum weight insertion for lagophthalmos - 1.6 grams, Left lower eyelid ectropion repair with Dr. Yoon on 4/2/21 at [facility]. He has the following specific operative considerations:   - RCRI : No serious cardiac risks.    - VTE risk: 3%  - DESIRAE # of risks 3/8 = Intermediate risk  - Risk of PONV score = 2.  If > 2, anti-emetic intervention recommended.    --Paralytic lagophthalmos with above procedure now planned with MAC and local.  --No history of problems with anesthesia.  --Recurrent SCC of head and neck currently undergoing chemotherapy and radiation. Some swallowing issues. Eating soft foods, taking meds. Nutrition primarily via PEG tube.   --Left side facial/jaw pain. Oxycodone prn.   --HLD atorvastatin at HS. HTN. Will take Coreg and lisinopril on DOS. Paroxysmal atrial fib as above s/p cardioversion. Will take flecainide on DOS. ASA 81 mg at HS. EKG SB 1st degree AV block. Denies  cardiac symptoms. Good exercise tolerance.   --Nonsmoker. Denies pulmonary symptoms.   --Right upper chest port.     Confirmed with patient that he has received instructions from [facility] regarding date, arrival time, eating and drinking, shower, and need for a .     Patient is intending to go to radiation therapy on the [place] after his procedure. Reinforced that he cannot drive after receiving sedation. Asked him to discuss this with Anesthesia.     Patient is optimized and is acceptable candidate for the proposed procedure.  No further diagnostic evaluation is needed.     Reviewed and Signed by PAC Mid-Level Provider/Resident  Mid-Level Provider/Resident: SATNAM Romo, CNS  Date: 4/1/21  Time: 3:50pm                               SATNAM Mccabe

## 2021-04-01 NOTE — H&P (VIEW-ONLY)
Pre-Operative H & P     CC:  Preoperative exam to assess for increased cardiopulmonary risk while undergoing surgery and anesthesia.    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 Yoon on 4/2/21 at [facility]. History is obtained from the patient and medical records.     Patient who has been recently followed by Dr. 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 He was last seen on 3/30/21 where minimal change was noted in his facial paralysis, with continued numbness on the left side of his face. He is currently undergoing radiation, and weekly cisplatin for his SCC. He was counseled for above procedure.     His history is otherwise significant for HLD, HTN, and paroxysmal atrial fibrillation, s/p cardioversion. His atrial fibrillation was a single symptomatic episode several years ago, requiring cardioversion. No recurrence and notes indicate that anticoagulation was stopped by cardiology. He continues to take flecainide. Today patient denies fever, cough, shortness of breath, chest pain, irregular HR, or ankle edema. He is having some pain along his left face and jaw and takes oxycodone twice daily. He has dry mouth.     Past Medical History  Past Medical History:   Diagnosis Date     Arrhythmia     A FIB     Atrial fibrillation (H)      Dysphagia      Hypercholesterolemia      Hypertension      Paralytic lagophthalmos      Primary squamous cell carcinoma of tonsil (H)        Past Surgical History  Past Surgical History:   Procedure Laterality Date     GI SURGERY       IR  CHEST PORT PLACEMENT > 5 YRS OF AGE  2/3/2021     IR GASTROSTOMY TUBE PERCUTANEOUS PLCMNT  3/10/2021     JOINT REPLACEMENT       JOINT REPLACEMENT       LASIK Bilateral      ORTHOPEDIC SURGERY       VASCULAR SURGERY         Hx of Blood transfusions/reactions: Denies.      Hx of abnormal bleeding or anti-platelet use: ASA 81 mg daily.     Menstrual history: No LMP for male patient.    Steroid use in the last year: Yes with chemotherapy.    Personal or FH with difficulty with Anesthesia:  Denies.     Prior to Admission Medications  Current Outpatient Medications   Medication Sig Dispense Refill     Aspirin Buf,CaCarb-MgCarb-MgO, 81 MG TABS Take 81 mg by mouth every evening        atorvastatin (LIPITOR) 10 MG tablet Take 10 mg by mouth every evening        carboxymethylcellulose PF (REFRESH LIQUIGEL) 1 % ophthalmic gel Place 1 drop Into the left eye 4 times daily 30 each 11     carvedilol (COREG) 12.5 MG tablet 2 times daily        cevimeline (EVOXAC) 30 MG capsule as needed        erythromycin (ROMYCIN) 5 MG/GM ophthalmic ointment Place 0.5 inches Into the left eye At Bedtime Instill ~1 cm ribbon into affected eye(s) 4 times daily for 7 days 1 g 0     flecainide (TAMBOCOR) 150 MG tablet Take 150 mg by mouth 2 times daily        lidocaine (XYLOCAINE) 2 % solution as needed        lisinopril (ZESTRIL) 20 MG tablet Take 20 mg by mouth 2 times daily        LORazepam (ATIVAN) 0.5 MG tablet Take 1 tablet (0.5 mg) by mouth every 4 hours as needed (Anxiety, Nausea/Vomiting or Sleep) 30 tablet 1     magic mouthwash (ENTER INGREDIENTS IN COMMENTS) suspension Take q 4 hours as needed for mouth pain (Patient taking differently: as needed Take q 4 hours as needed for mouth pain) 240 mL 1     nystatin (MYCOSTATIN) 831292 UNIT/ML suspension Take 5 mLs (500,000 Units) by mouth 4 times daily Swish and swallow 4 times a day (Patient taking differently: Take 500,000 Units by mouth as needed Swish and swallow 4 times a day) 400 mL 1      oxyCODONE (ROXICODONE) 5 MG/5ML solution Take 5 mLs (5 mg) by mouth every 4 hours as needed for severe pain 500 mL 0     prochlorperazine (COMPAZINE) 10 MG tablet Take 1 tablet (10 mg) by mouth every 6 hours as needed (Nausea/Vomiting) 30 tablet 1     traZODone (DESYREL) 50 MG tablet At Bedtime        Nutritional Supplements (ISOSOURCE 1.5 RIYA) LIQD Take 1 Can by mouth 7 times daily 100 Can 11       Allergies  No Known Allergies    Social History  Social History     Socioeconomic History     Marital status: Single     Spouse name: Not on file     Number of children: Not on file     Years of education: Not on file     Highest education level: Not on file   Occupational History     Not on file   Social Needs     Financial resource strain: Not on file     Food insecurity     Worry: Not on file     Inability: Not on file     Transportation needs     Medical: Not on file     Non-medical: Not on file   Tobacco Use     Smoking status: Never Smoker     Smokeless tobacco: Never Used   Substance and Sexual Activity     Alcohol use: Never     Frequency: 2-3 times a week     Drug use: Never     Sexual activity: Not on file   Lifestyle     Physical activity     Days per week: Not on file     Minutes per session: Not on file     Stress: Not on file   Relationships     Social connections     Talks on phone: Not on file     Gets together: Not on file     Attends Hoahaoism service: Not on file     Active member of club or organization: Not on file     Attends meetings of clubs or organizations: Not on file     Relationship status: Not on file     Intimate partner violence     Fear of current or ex partner: Not on file     Emotionally abused: Not on file     Physically abused: Not on file     Forced sexual activity: Not on file   Other Topics Concern     Parent/sibling w/ CABG, MI or angioplasty before 65F 55M? Not Asked   Social History Narrative     Not on file       Family History  Family History   Problem Relation Age of Onset      Hypertension Father      Diabetes Brother      Hypertension Brother      Hypertension Sister      Glaucoma No family hx of      Macular Degeneration No family hx of      ROS/MED HISTORY  The complete review of systems is negative other than noted in the HPI or here.    ENT/Pulmonary:  - neg pulmonary ROS     Neurologic:  - neg neurologic ROS     Cardiovascuar:     (+) Dyslipidemia hypertension-----dysrhythmias, a-fib, Previous cardiac testing   Echo: Date: Results:    Stress Test: Date: Results:    ECG Reviewed: Date: 1/8/21 Results:  SB 1st degree AV block  Cath: Date: Results:   (-) taking anticoagulants/antiplatelets   METS/Exercise Tolerance: >4   Hematologic:  - neg hematologic  ROS     Musculoskeletal:  - neg musculoskeletal ROS     GI/Hepatic: PEG tube     Renal/Genitourinary:  - neg Renal ROS     Endo:  - neg endo ROS     Psychiatric/Substance Use:  - neg psychiatric ROS     Infectious Disease:  - neg infectious disease ROS     Malignancy:   (+) Malignancy, History of Other.Other CA SCC of tonsil Active status post Chemo.    Other:  - neg other ROS           LABS: Personally reviewed  CBC:   Lab Results   Component Value Date    WBC 4.7 03/30/2021    WBC 4.4 03/22/2021    HGB 11.9 (L) 03/30/2021    HGB 11.5 (L) 03/22/2021    HCT 34.7 (L) 03/30/2021    HCT 34.8 (L) 03/22/2021     03/30/2021     03/22/2021     BMP:   Lab Results   Component Value Date     03/30/2021     03/22/2021    POTASSIUM 3.7 03/30/2021    POTASSIUM 4.0 03/22/2021    CHLORIDE 102 03/30/2021    CHLORIDE 107 03/22/2021    CO2 30 03/30/2021    CO2 30 03/22/2021    BUN 16 03/30/2021    BUN 19 03/22/2021    CR 0.59 (L) 03/30/2021    CR 0.63 (L) 03/22/2021     (H) 03/30/2021    GLC 72 03/22/2021     COAGS:   Lab Results   Component Value Date    INR 1.16 (H) 01/08/2021     POC: No results found for: BGM, HCG, HCGS  HEPATIC:   Lab Results   Component Value Date    ALBUMIN 3.5 03/30/2021    PROTTOTAL 6.3 (L)  "03/30/2021    ALT 20 03/30/2021    AST 11 03/30/2021    ALKPHOS 62 03/30/2021    BILITOTAL 0.4 03/30/2021     OTHER:   Lab Results   Component Value Date    RIYA 8.9 03/30/2021    MAG 2.0 03/30/2021     Temp: 97.8  F (36.6  C) Temp src: Oral BP: (!) 150/90 Pulse: 66   Resp: 16 SpO2: 99 %         175 lbs 0 oz  5' 10\"[pt reported[   Body mass index is 25.11 kg/m .       Physical Exam  Constitutional: Awake, alert, cooperative, no apparent distress, and appears stated age.  Eyes: Avoided light for eye exam due to light sensitivity, wearing sunglasses, left eye draining, lower lid droop.  HENT: Normocephalic, oral pharynx with moist mucus membranes, good dentition. No goiter appreciated. Asymmetrical mouth opening.   Respiratory: Clear to auscultation bilaterally, no crackles or wheezing. No cough or obvious dyspnea.  Cardiovascular: Regular rate and rhythm, normal S1 and S2, and no murmur noted.  Carotids +2, no bruits. No edema. Palpable pulses to radial  DP and PT arteries.   GI: Normal bowel sounds, soft, non-distended, non-tender, no masses palpated, no hepatosplenomegaly. Left upper abdomen PEG tube. No skin changes.   Lymph/Hematologic: No cervical lymphadenopathy and no supraclavicular lymphadenopathy. Changes of radiation on left side.   Genitourinary:  Deferred.   Skin: Warm and dry.    Musculoskeletal: Mildly limited ROM of neck. There is no redness, warmth, or swelling of the joints. Gross motor strength is normal.    Neurologic: Awake, alert, oriented to name, place and time. Cranial nerves II-XII are grossly intact. Gait is normal.   Neuropsychiatric: Calm, cooperative. Normal affect.     EKG: Personally reviewed 1/8/21 Sinus bradycardia, 1st degree AV block    3/22/21 Video swallow study    FINDINGS:      Thin: Mild residue. Mild penetration. Otherwise normal.         Nectar: Mild residue. Otherwise normal.         Honey: Not administered.         Pudding: Normal         Semisolid: Severe residue. " Otherwise normal.         Solid: Severe residue. Otherwise normal.         The patient demonstrated nasopharyngeal reflux on multiple swallows.    There is questionable fixed narrowing of the cervical esophagus that    may be due to radiation therapy.         This study only includes the cervical esophagus.         3/1/21 CT Soft tissue neck                                                        Impression:     1. Interval resolution of hypermetabolism seen along the perineural    spread in the left  space extending to middle cranial fossa    representing positive response to chemotherapy. Residual soft tissue    fullness in this area without associated FDG uptake.          3. New diffuse FDG metabolism along the marrow including calvarium,    skull base and visualized upper cervical spine representing benign    postchemotherapy related changes.         3. Mild FDG metabolism along the right tongue base, right lateral    oropharyngeal wall and extending to right pyriform sinus with    associated mild thin mucosal enhancement. Findings can seen with    mucositis. Correlation with direct physical examination is    recommended.          4. No cervical lymphadenopathy.          5. Please refer to the whole body PET CT performed as a separate    report, for the findings of the remainder of the body.         3/1/21 CT CAP                                                                       IMPRESSION: In this patient with known squamous cell carcinoma of the    tonsil status post chemotherapy and radiation:         1. No evidence of metastatic disease of the chest, abdomen, and lower    pelvis.    2. Diffuse increased FDG uptake of the axial skeleton and proximal    long bones, most consistent with Neulasta use.    3. Subcutaneous fluid collection with focal FDG uptake of the left    gluteal soft tissues, recommend direct visualization as this could be    iatrogenic or represent sebaceous cysts, and/or abscess.      4.See dedicated neuroradiology report for the results of the high    resolution PET CT of the neck.          3/1/21 PET Oncology                                                                       IMPRESSION: In this patient with known squamous cell carcinoma of the    tonsil status post chemotherapy and radiation:         1. No evidence of metastatic disease of the chest, abdomen, and lower    pelvis.    2. Diffuse increased FDG uptake of the axial skeleton and proximal    long bones, most consistent with Neulasta use.    3. Subcutaneous fluid collection with focal FDG uptake of the left    gluteal soft tissues, recommend direct visualization as this could be    iatrogenic or represent sebaceous cysts, and/or abscess.     4.See dedicated neuroradiology report for the results of the high    resolution PET CT of the neck.     Imaging reviewed by this provider    Outside records reviewed from: [facility] Everywhere    ASSESSMENT and PLAN  Fortino Moya is a 71 year old male scheduled to undergo Left upper eyelid gold or platinum weight insertion for lagophthalmos - 1.6 grams, Left lower eyelid ectropion repair with Dr. Yoon on 4/2/21 at [facility]. He has the following specific operative considerations:   - RCRI : No serious cardiac risks.    - Anesthesia considerations:  Refer to PAC assessment in anesthesia records  - VTE risk: 3%  - DESIRAE # of risks 3/8 = Intermediate risk  - Risk of PONV score = 2.  If > 2, anti-emetic intervention recommended.    --Paralytic lagophthalmos with above procedure now planned with MAC and local.  --No history of problems with anesthesia.  --Recurrent SCC of head and neck currently undergoing chemotherapy and radiation. Some swallowing issues. Eating soft foods, taking meds. Nutrition primarily via PEG tube.   --Left side facial/jaw pain. Oxycodone prn.   --HLD atorvastatin at HS. HTN. Will take Coreg and lisinopril on DOS. Paroxysmal atrial fib as above s/p  cardioversion. Will take flecainide on DOS. ASA 81 mg at HS. EKG SB 1st degree AV block. Denies cardiac symptoms. Good exercise tolerance.   --Nonsmoker. Denies pulmonary symptoms.   --Right upper chest port.     Confirmed with patient that he has received instructions from [facility] regarding date, arrival time, eating and drinking, shower, and need for a .     Patient is intending to go to radiation therapy on the [place] after his procedure. Reinforced that he cannot drive after receiving sedation. Asked him to discuss this with Anesthesia.     Patient is optimized and is acceptable candidate for the proposed procedure.  No further diagnostic evaluation is needed.         .     SATNAM Mccabe CNS  Preoperative Assessment Center  [facility]  Phone: [phone]  Fax: [phone]

## 2021-04-01 NOTE — PROGRESS NOTES
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 Cycle 1, day 1 of weekly cisplatin.    3/25/2021: Weekly RT visit. Current dose of 400 cGy. Tolerating treatment well.    3/30/2021: Cycle 1, day 8 of weekly cisplatin    2021: Weekly RT visit. Current dose of 1400 cGy. Mild oral cavity/oropharyngeal pain.    ED visits/Hospitalizations:  None    Missed Treatments:  None    Subjective: Mr. Moya presents to clinic today for his weekly on-treatment visit. He continues to tolerate therapy well with no acute treatment-related toxicities. He has noted slightly increased mouth/throat pain over the past week that he rates as a 3-4/10 in severity. He is currently controlling this with oxycodone 5 mg twice daily and he does not feel that he requires additional medications for pain control at this time. He is taking 5 cans/daily via PEG with a smaller amount of soft foods and liquids by mouth. His remaining ROS are otherwise negative. He is scheduled for left upper eyelid weight placement by the ophthalmology service tomorrow (2021).    ROS:   Constitutional  Pain (0-10): 4 (mouth), 3 (throat), 2 (skin)  Fatigue: Mild    CNS  Headaches: None    ENT  Mucositis: None    Cardiopulmonary  Dyspnea: None    GI  Nausea/vomiting: None    Nutrition  PEG: Yes  Diet: 5 cans daily via PEG with small amounts of solids and liquids by mouth    Integumentary  Dermatitis: None    Objective:   Current weight: 79.8 kg  Last week's weight: 79.9 kg  Starting weight: 79.9 kg    BP: 143/79  (sitting), 124/72 (standing)  Pulse: 78 (sitting), 70 (standing)     General: Mildly cachectic-appearing 71 year old gentleman seated comfortably in an examination chair in NAD  HEENT: NC/AT. Stable scleral injection of the left eye with mild tearing. Dry mucus membranes with mild patchy mucositis involving the left lateral soft palate. No thrush.  Pulmonary: No wheezing, stridor or respiratory distress  Skin: No erythema    Treatment-related toxicities (CTCAE v5.0):  Mucositis: Grade 1    Assessment:    Mr. 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 He was treated with induction TPF x2 cycles with a good partial response to therapy and is now undergoing curative intent treatment with reirradiation and weekly cisplatin. He is tolerating treatment well with mild radiation-induced mucositis.    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

## 2021-04-01 NOTE — H&P
Pre-Operative H & P     CC:  Preoperative exam to assess for increased cardiopulmonary risk while undergoing surgery and anesthesia.    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 Yoon on 4/2/21 at [facility]. History is obtained from the patient and medical records.     Patient who has been recently followed by Dr. 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 He was last seen on 3/30/21 where minimal change was noted in his facial paralysis, with continued numbness on the left side of his face. He is currently undergoing radiation, and weekly cisplatin for his SCC. He was counseled for above procedure.     His history is otherwise significant for HLD, HTN, and paroxysmal atrial fibrillation, s/p cardioversion. His atrial fibrillation was a single symptomatic episode several years ago, requiring cardioversion. No recurrence and notes indicate that anticoagulation was stopped by cardiology. He continues to take flecainide. Today patient denies fever, cough, shortness of breath, chest pain, irregular HR, or ankle edema. He is having some pain along his left face and jaw and takes oxycodone twice daily. He has dry mouth.     Past Medical History  Past Medical History:   Diagnosis Date     Arrhythmia     A FIB     Atrial fibrillation (H)      Dysphagia      Hypercholesterolemia      Hypertension      Paralytic lagophthalmos      Primary squamous cell carcinoma of tonsil (H)        Past Surgical History  Past Surgical History:   Procedure Laterality Date     GI SURGERY       IR  CHEST PORT PLACEMENT > 5 YRS OF AGE  2/3/2021     IR GASTROSTOMY TUBE PERCUTANEOUS PLCMNT  3/10/2021     JOINT REPLACEMENT       JOINT REPLACEMENT       LASIK Bilateral      ORTHOPEDIC SURGERY       VASCULAR SURGERY         Hx of Blood transfusions/reactions: Denies.      Hx of abnormal bleeding or anti-platelet use: ASA 81 mg daily.     Menstrual history: No LMP for male patient.    Steroid use in the last year: Yes with chemotherapy.    Personal or FH with difficulty with Anesthesia:  Denies.     Prior to Admission Medications  Current Outpatient Medications   Medication Sig Dispense Refill     Aspirin Buf,CaCarb-MgCarb-MgO, 81 MG TABS Take 81 mg by mouth every evening        atorvastatin (LIPITOR) 10 MG tablet Take 10 mg by mouth every evening        carboxymethylcellulose PF (REFRESH LIQUIGEL) 1 % ophthalmic gel Place 1 drop Into the left eye 4 times daily 30 each 11     carvedilol (COREG) 12.5 MG tablet 2 times daily        cevimeline (EVOXAC) 30 MG capsule as needed        erythromycin (ROMYCIN) 5 MG/GM ophthalmic ointment Place 0.5 inches Into the left eye At Bedtime Instill ~1 cm ribbon into affected eye(s) 4 times daily for 7 days 1 g 0     flecainide (TAMBOCOR) 150 MG tablet Take 150 mg by mouth 2 times daily        lidocaine (XYLOCAINE) 2 % solution as needed        lisinopril (ZESTRIL) 20 MG tablet Take 20 mg by mouth 2 times daily        LORazepam (ATIVAN) 0.5 MG tablet Take 1 tablet (0.5 mg) by mouth every 4 hours as needed (Anxiety, Nausea/Vomiting or Sleep) 30 tablet 1     magic mouthwash (ENTER INGREDIENTS IN COMMENTS) suspension Take q 4 hours as needed for mouth pain (Patient taking differently: as needed Take q 4 hours as needed for mouth pain) 240 mL 1     nystatin (MYCOSTATIN) 943934 UNIT/ML suspension Take 5 mLs (500,000 Units) by mouth 4 times daily Swish and swallow 4 times a day (Patient taking differently: Take 500,000 Units by mouth as needed Swish and swallow 4 times a day) 400 mL 1      oxyCODONE (ROXICODONE) 5 MG/5ML solution Take 5 mLs (5 mg) by mouth every 4 hours as needed for severe pain 500 mL 0     prochlorperazine (COMPAZINE) 10 MG tablet Take 1 tablet (10 mg) by mouth every 6 hours as needed (Nausea/Vomiting) 30 tablet 1     traZODone (DESYREL) 50 MG tablet At Bedtime        Nutritional Supplements (ISOSOURCE 1.5 RIYA) LIQD Take 1 Can by mouth 7 times daily 100 Can 11       Allergies  No Known Allergies    Social History  Social History     Socioeconomic History     Marital status: Single     Spouse name: Not on file     Number of children: Not on file     Years of education: Not on file     Highest education level: Not on file   Occupational History     Not on file   Social Needs     Financial resource strain: Not on file     Food insecurity     Worry: Not on file     Inability: Not on file     Transportation needs     Medical: Not on file     Non-medical: Not on file   Tobacco Use     Smoking status: Never Smoker     Smokeless tobacco: Never Used   Substance and Sexual Activity     Alcohol use: Never     Frequency: 2-3 times a week     Drug use: Never     Sexual activity: Not on file   Lifestyle     Physical activity     Days per week: Not on file     Minutes per session: Not on file     Stress: Not on file   Relationships     Social connections     Talks on phone: Not on file     Gets together: Not on file     Attends Rastafarian service: Not on file     Active member of club or organization: Not on file     Attends meetings of clubs or organizations: Not on file     Relationship status: Not on file     Intimate partner violence     Fear of current or ex partner: Not on file     Emotionally abused: Not on file     Physically abused: Not on file     Forced sexual activity: Not on file   Other Topics Concern     Parent/sibling w/ CABG, MI or angioplasty before 65F 55M? Not Asked   Social History Narrative     Not on file       Family History  Family History   Problem Relation Age of Onset      Hypertension Father      Diabetes Brother      Hypertension Brother      Hypertension Sister      Glaucoma No family hx of      Macular Degeneration No family hx of      ROS/MED HISTORY  The complete review of systems is negative other than noted in the HPI or here.    ENT/Pulmonary:  - neg pulmonary ROS     Neurologic:  - neg neurologic ROS     Cardiovascuar:     (+) Dyslipidemia hypertension-----dysrhythmias, a-fib, Previous cardiac testing   Echo: Date: Results:    Stress Test: Date: Results:    ECG Reviewed: Date: 1/8/21 Results:  SB 1st degree AV block  Cath: Date: Results:   (-) taking anticoagulants/antiplatelets   METS/Exercise Tolerance: >4   Hematologic:  - neg hematologic  ROS     Musculoskeletal:  - neg musculoskeletal ROS     GI/Hepatic: PEG tube     Renal/Genitourinary:  - neg Renal ROS     Endo:  - neg endo ROS     Psychiatric/Substance Use:  - neg psychiatric ROS     Infectious Disease:  - neg infectious disease ROS     Malignancy:   (+) Malignancy, History of Other.Other CA SCC of tonsil Active status post Chemo.    Other:  - neg other ROS           LABS: Personally reviewed  CBC:   Lab Results   Component Value Date    WBC 4.7 03/30/2021    WBC 4.4 03/22/2021    HGB 11.9 (L) 03/30/2021    HGB 11.5 (L) 03/22/2021    HCT 34.7 (L) 03/30/2021    HCT 34.8 (L) 03/22/2021     03/30/2021     03/22/2021     BMP:   Lab Results   Component Value Date     03/30/2021     03/22/2021    POTASSIUM 3.7 03/30/2021    POTASSIUM 4.0 03/22/2021    CHLORIDE 102 03/30/2021    CHLORIDE 107 03/22/2021    CO2 30 03/30/2021    CO2 30 03/22/2021    BUN 16 03/30/2021    BUN 19 03/22/2021    CR 0.59 (L) 03/30/2021    CR 0.63 (L) 03/22/2021     (H) 03/30/2021    GLC 72 03/22/2021     COAGS:   Lab Results   Component Value Date    INR 1.16 (H) 01/08/2021     POC: No results found for: BGM, HCG, HCGS  HEPATIC:   Lab Results   Component Value Date    ALBUMIN 3.5 03/30/2021    PROTTOTAL 6.3 (L)  "03/30/2021    ALT 20 03/30/2021    AST 11 03/30/2021    ALKPHOS 62 03/30/2021    BILITOTAL 0.4 03/30/2021     OTHER:   Lab Results   Component Value Date    RIYA 8.9 03/30/2021    MAG 2.0 03/30/2021     Temp: 97.8  F (36.6  C) Temp src: Oral BP: (!) 150/90 Pulse: 66   Resp: 16 SpO2: 99 %         175 lbs 0 oz  5' 10\"[pt reported[   Body mass index is 25.11 kg/m .       Physical Exam  Constitutional: Awake, alert, cooperative, no apparent distress, and appears stated age.  Eyes: Avoided light for eye exam due to light sensitivity, wearing sunglasses, left eye draining, lower lid droop.  HENT: Normocephalic, oral pharynx with moist mucus membranes, good dentition. No goiter appreciated. Asymmetrical mouth opening.   Respiratory: Clear to auscultation bilaterally, no crackles or wheezing. No cough or obvious dyspnea.  Cardiovascular: Regular rate and rhythm, normal S1 and S2, and no murmur noted.  Carotids +2, no bruits. No edema. Palpable pulses to radial  DP and PT arteries.   GI: Normal bowel sounds, soft, non-distended, non-tender, no masses palpated, no hepatosplenomegaly. Left upper abdomen PEG tube. No skin changes.   Lymph/Hematologic: No cervical lymphadenopathy and no supraclavicular lymphadenopathy. Changes of radiation on left side.   Genitourinary:  Deferred.   Skin: Warm and dry.    Musculoskeletal: Mildly limited ROM of neck. There is no redness, warmth, or swelling of the joints. Gross motor strength is normal.    Neurologic: Awake, alert, oriented to name, place and time. Cranial nerves II-XII are grossly intact. Gait is normal.   Neuropsychiatric: Calm, cooperative. Normal affect.     EKG: Personally reviewed 1/8/21 Sinus bradycardia, 1st degree AV block    3/22/21 Video swallow study    FINDINGS:      Thin: Mild residue. Mild penetration. Otherwise normal.         Nectar: Mild residue. Otherwise normal.         Honey: Not administered.         Pudding: Normal         Semisolid: Severe residue. " Otherwise normal.         Solid: Severe residue. Otherwise normal.         The patient demonstrated nasopharyngeal reflux on multiple swallows.    There is questionable fixed narrowing of the cervical esophagus that    may be due to radiation therapy.         This study only includes the cervical esophagus.         3/1/21 CT Soft tissue neck                                                        Impression:     1. Interval resolution of hypermetabolism seen along the perineural    spread in the left  space extending to middle cranial fossa    representing positive response to chemotherapy. Residual soft tissue    fullness in this area without associated FDG uptake.          3. New diffuse FDG metabolism along the marrow including calvarium,    skull base and visualized upper cervical spine representing benign    postchemotherapy related changes.         3. Mild FDG metabolism along the right tongue base, right lateral    oropharyngeal wall and extending to right pyriform sinus with    associated mild thin mucosal enhancement. Findings can seen with    mucositis. Correlation with direct physical examination is    recommended.          4. No cervical lymphadenopathy.          5. Please refer to the whole body PET CT performed as a separate    report, for the findings of the remainder of the body.         3/1/21 CT CAP                                                                       IMPRESSION: In this patient with known squamous cell carcinoma of the    tonsil status post chemotherapy and radiation:         1. No evidence of metastatic disease of the chest, abdomen, and lower    pelvis.    2. Diffuse increased FDG uptake of the axial skeleton and proximal    long bones, most consistent with Neulasta use.    3. Subcutaneous fluid collection with focal FDG uptake of the left    gluteal soft tissues, recommend direct visualization as this could be    iatrogenic or represent sebaceous cysts, and/or abscess.      4.See dedicated neuroradiology report for the results of the high    resolution PET CT of the neck.          3/1/21 PET Oncology                                                                       IMPRESSION: In this patient with known squamous cell carcinoma of the    tonsil status post chemotherapy and radiation:         1. No evidence of metastatic disease of the chest, abdomen, and lower    pelvis.    2. Diffuse increased FDG uptake of the axial skeleton and proximal    long bones, most consistent with Neulasta use.    3. Subcutaneous fluid collection with focal FDG uptake of the left    gluteal soft tissues, recommend direct visualization as this could be    iatrogenic or represent sebaceous cysts, and/or abscess.     4.See dedicated neuroradiology report for the results of the high    resolution PET CT of the neck.     Imaging reviewed by this provider    Outside records reviewed from: [facility] Everywhere    ASSESSMENT and PLAN  Fortino Moya is a 71 year old male scheduled to undergo Left upper eyelid gold or platinum weight insertion for lagophthalmos - 1.6 grams, Left lower eyelid ectropion repair with Dr. Yoon on 4/2/21 at [facility]. He has the following specific operative considerations:   - RCRI : No serious cardiac risks.    - Anesthesia considerations:  Refer to PAC assessment in anesthesia records  - VTE risk: 3%  - DESIRAE # of risks 3/8 = Intermediate risk  - Risk of PONV score = 2.  If > 2, anti-emetic intervention recommended.    --Paralytic lagophthalmos with above procedure now planned with MAC and local.  --No history of problems with anesthesia.  --Recurrent SCC of head and neck currently undergoing chemotherapy and radiation. Some swallowing issues. Eating soft foods, taking meds. Nutrition primarily via PEG tube.   --Left side facial/jaw pain. Oxycodone prn.   --HLD atorvastatin at HS. HTN. Will take Coreg and lisinopril on DOS. Paroxysmal atrial fib as above s/p  cardioversion. Will take flecainide on DOS. ASA 81 mg at HS. EKG SB 1st degree AV block. Denies cardiac symptoms. Good exercise tolerance.   --Nonsmoker. Denies pulmonary symptoms.   --Right upper chest port.     Confirmed with patient that he has received instructions from [facility] regarding date, arrival time, eating and drinking, shower, and need for a .     Patient is intending to go to radiation therapy on the [place] after his procedure. Reinforced that he cannot drive after receiving sedation. Asked him to discuss this with Anesthesia.     Patient is optimized and is acceptable candidate for the proposed procedure.  No further diagnostic evaluation is needed.         .     SATNAM Mccabe CNS  Preoperative Assessment Center  [facility]  Phone: [phone]  Fax: [phone]

## 2021-04-02 ENCOUNTER — APPOINTMENT (OUTPATIENT)
Dept: RADIATION ONCOLOGY | Facility: CLINIC | Age: 71
End: 2021-04-02
Attending: RADIOLOGY
Payer: MEDICARE

## 2021-04-02 ENCOUNTER — HOSPITAL ENCOUNTER (OUTPATIENT)
Facility: CLINIC | Age: 71
Discharge: HOME | End: 2021-04-02
Attending: OPHTHALMOLOGY | Admitting: OPHTHALMOLOGY
Payer: MEDICARE

## 2021-04-02 ENCOUNTER — ANESTHESIA (OUTPATIENT)
Dept: SURGERY | Facility: CLINIC | Age: 71
End: 2021-04-02
Payer: MEDICARE

## 2021-04-02 VITALS
TEMPERATURE: 97.9 F | HEART RATE: 68 BPM | DIASTOLIC BLOOD PRESSURE: 82 MMHG | SYSTOLIC BLOOD PRESSURE: 127 MMHG | HEIGHT: 70 IN | RESPIRATION RATE: 18 BRPM | BODY MASS INDEX: 24.65 KG/M2 | WEIGHT: 172.2 LBS | OXYGEN SATURATION: 99 %

## 2021-04-02 DIAGNOSIS — H02.239 PARALYTIC LAGOPHTHALMOS, UNSPECIFIED LATERALITY: ICD-10-CM

## 2021-04-02 PROCEDURE — 999N000141 HC STATISTIC PRE-PROCEDURE NURSING ASSESSMENT: Performed by: OPHTHALMOLOGY

## 2021-04-02 PROCEDURE — 360N000076 HC SURGERY LEVEL 3, PER MIN: Performed by: OPHTHALMOLOGY

## 2021-04-02 PROCEDURE — 710N000012 HC RECOVERY PHASE 2, PER MINUTE: Performed by: OPHTHALMOLOGY

## 2021-04-02 PROCEDURE — 710N000009 HC RECOVERY PHASE 1, LEVEL 1, PER MIN: Performed by: OPHTHALMOLOGY

## 2021-04-02 PROCEDURE — 250N000009 HC RX 250: Performed by: OPHTHALMOLOGY

## 2021-04-02 PROCEDURE — 272N000001 HC OR GENERAL SUPPLY STERILE: Performed by: OPHTHALMOLOGY

## 2021-04-02 PROCEDURE — L8610 OCULAR IMPLANT: HCPCS | Performed by: OPHTHALMOLOGY

## 2021-04-02 PROCEDURE — 77386 HC IMRT TREATMENT DELIVERY, COMPLEX: CPT | Performed by: RADIOLOGY

## 2021-04-02 DEVICE — IMPLANTABLE DEVICE: Type: IMPLANTABLE DEVICE | Site: EYELID | Status: FUNCTIONAL

## 2021-04-02 RX ORDER — NEOMYCIN POLYMYXIN B SULFATES AND DEXAMETHASONE 3.5; 10000; 1 MG/ML; [USP'U]/ML; MG/ML
SUSPENSION/ DROPS OPHTHALMIC
Qty: 5 ML | Refills: 0 | Status: SHIPPED | OUTPATIENT
Start: 2021-04-02 | End: 2021-04-20

## 2021-04-02 RX ORDER — NALOXONE HYDROCHLORIDE 0.4 MG/ML
0.2 INJECTION, SOLUTION INTRAMUSCULAR; INTRAVENOUS; SUBCUTANEOUS
Status: DISCONTINUED | OUTPATIENT
Start: 2021-04-02 | End: 2021-04-02 | Stop reason: HOSPADM

## 2021-04-02 RX ORDER — FENTANYL CITRATE 0.05 MG/ML
25-50 INJECTION, SOLUTION INTRAMUSCULAR; INTRAVENOUS
Status: DISCONTINUED | OUTPATIENT
Start: 2021-04-02 | End: 2021-04-02 | Stop reason: HOSPADM

## 2021-04-02 RX ORDER — SODIUM CHLORIDE, SODIUM LACTATE, POTASSIUM CHLORIDE, CALCIUM CHLORIDE 600; 310; 30; 20 MG/100ML; MG/100ML; MG/100ML; MG/100ML
INJECTION, SOLUTION INTRAVENOUS CONTINUOUS
Status: DISCONTINUED | OUTPATIENT
Start: 2021-04-02 | End: 2021-04-02 | Stop reason: HOSPADM

## 2021-04-02 RX ORDER — TETRACAINE HYDROCHLORIDE 5 MG/ML
SOLUTION OPHTHALMIC PRN
Status: DISCONTINUED | OUTPATIENT
Start: 2021-04-02 | End: 2021-04-02 | Stop reason: HOSPADM

## 2021-04-02 RX ORDER — ONDANSETRON 4 MG/1
4 TABLET, ORALLY DISINTEGRATING ORAL EVERY 30 MIN PRN
Status: DISCONTINUED | OUTPATIENT
Start: 2021-04-02 | End: 2021-04-02 | Stop reason: HOSPADM

## 2021-04-02 RX ORDER — LIDOCAINE HCL/EPINEPHRINE/PF 2%-1:200K
VIAL (ML) INJECTION
Status: DISCONTINUED
Start: 2021-04-02 | End: 2021-04-02 | Stop reason: HOSPADM

## 2021-04-02 RX ORDER — ERYTHROMYCIN 5 MG/G
OINTMENT OPHTHALMIC
Status: DISCONTINUED
Start: 2021-04-02 | End: 2021-04-02 | Stop reason: HOSPADM

## 2021-04-02 RX ORDER — HYDROMORPHONE HYDROCHLORIDE 1 MG/ML
.3-.5 INJECTION, SOLUTION INTRAMUSCULAR; INTRAVENOUS; SUBCUTANEOUS EVERY 5 MIN PRN
Status: DISCONTINUED | OUTPATIENT
Start: 2021-04-02 | End: 2021-04-02 | Stop reason: HOSPADM

## 2021-04-02 RX ORDER — CEPHALEXIN 500 MG/1
500 CAPSULE ORAL 2 TIMES DAILY
Qty: 14 CAPSULE | Refills: 0 | Status: SHIPPED | OUTPATIENT
Start: 2021-04-02 | End: 2021-04-09

## 2021-04-02 RX ORDER — MAGNESIUM HYDROXIDE 1200 MG/15ML
LIQUID ORAL PRN
Status: DISCONTINUED | OUTPATIENT
Start: 2021-04-02 | End: 2021-04-02 | Stop reason: HOSPADM

## 2021-04-02 RX ORDER — NALOXONE HYDROCHLORIDE 0.4 MG/ML
0.4 INJECTION, SOLUTION INTRAMUSCULAR; INTRAVENOUS; SUBCUTANEOUS
Status: DISCONTINUED | OUTPATIENT
Start: 2021-04-02 | End: 2021-04-02 | Stop reason: HOSPADM

## 2021-04-02 RX ORDER — ONDANSETRON 2 MG/ML
4 INJECTION INTRAMUSCULAR; INTRAVENOUS EVERY 30 MIN PRN
Status: DISCONTINUED | OUTPATIENT
Start: 2021-04-02 | End: 2021-04-02 | Stop reason: HOSPADM

## 2021-04-02 RX ORDER — ERYTHROMYCIN 5 MG/G
OINTMENT OPHTHALMIC PRN
Status: DISCONTINUED | OUTPATIENT
Start: 2021-04-02 | End: 2021-04-02 | Stop reason: HOSPADM

## 2021-04-02 RX ORDER — ERYTHROMYCIN 5 MG/G
OINTMENT OPHTHALMIC
Qty: 3.5 G | Refills: 0 | Status: SHIPPED | OUTPATIENT
Start: 2021-04-02 | End: 2021-05-25

## 2021-04-02 ASSESSMENT — MIFFLIN-ST. JEOR: SCORE: 1547.34

## 2021-04-02 NOTE — DISCHARGE INSTRUCTIONS
Post-operative Instructions  Ophthalmic Plastic and Reconstructive Surgery    Vivien Yoon M.D.     All instructions apply to the operated eye(s) or eyelid(s).    Wound care and personal care  ? If a patch or bandage has been placed, please leave this in place until seen by your physician. Ensure that the bandage does not get wet when you take a shower.  ? Apply ice compresses 15 minutes of every hour while awake for 2 days. As long as there is no further bleeding, after two days, switch to warm water compresses for five minutes, four times a day until seen by your physician. Instead of warm water compresses, you can use a cup of dry uncooked rice in a clean cotton sock. Place sock in microwave for 30 seconds to one minute. Next place the warm sock in a plastic bag, and place it over a clean warm wet washcloth over operated eye.    ? You may shower or wash your hair the day after surgery. Do not go swimming for at least 2 weeks to prevent contamination of your wounds.  ? Do not apply make-up to the eyes or eyelids for 2 weeks after surgery.  ? Expect some swelling, bruising, black eye (even into the lower eyelids and cheeks). Also expect serum caking, crusting and discharge from the eye and/or incisions. A small amount of surface bleeding, and depending on the type of surgery, bleeding from the inside of the eyelid, is normal for the first 48 hours.  ? Avoid straining, bending at the waist, or lifting more than 15 pounds for 10 days. Activities that raise your blood pressure can worsen swelling, cause bleeding, and breaking of sutures. Like wise, sleeping with your head slightly elevated for the first several days can help swelling resolve more quickly.   ? Do continue to ambulate (walk) as you normally would - being sedentary after surgery can cause blood clots.   ? Your eye(s) and eyelid(s) may be painful and tender. This is normal after surgery.      Contact information and follow-up  ? Return to the Eye  Clinic for a follow-up appointment with your physician as scheduled. If no appointment has been scheduled:   - [facility] eye clinic: [phone] for an appointment with Dr. Yoon within 1 to 2 weeks from your date of surgery.   -  [facility] eye clinic: [phone] for an appointment with Dr. Yoon within 1 to 2 weeks from your date of surgery.   Please email a few photos of your eye(s) or other operative site(s) to [email or web address] prior to your follow up visit.    ? For severe pain, bleeding, or loss of vision, call the [facility] at [phone] or [facility] at [phone].     After hours or on weekends and holidays, call [phone] and ask to speak with the ophthalmologist on call.    An on call person can be reached after hours for concerns. The on call doctor should not call in medication refill requests after hours or on weekends, so please plan accordingly. An effort has been made to provide adequate pain medications following every surgery, and refills will not be provided in most instances. Narcotic pain medications cannot be called in.     Activity restrictions and driving  ? Avoid heavy lifting, bending, exercise or strenuous activity for 1 week after surgery.  You may resume other activities and return to work as tolerated.  ? You may not resume driving if you are using narcotic pain medications (such as Norco, Percocet, Tylenol #3).    Medications  ? Restart all regular home medications and eye drops. If you take Plavix or  Aspirin on a regular basis, wait for 72 hours after your surgery before restarting these in order to decrease the risk of bleeding complications.  ? Avoid aspirin and aspirin-like medications (Motrin, Aleve, Ibuprofen, [place] etc) for 72 hours to reduce the risk of bleeding. You may take Tylenol (acetaminophen) for pain.  ? In addition to your home medications, take the following  post-operative medications as prescribed by your physician.    ? Apply antibiotic ointment to all sutures three times a day, and into the operated eye(s) at night.  ? Instill eye drops 3 times a day for 10 days.   ? Take antibiotic capsules or tablets as directed.  ? In many cases, postoperative discomfort can be managed with Tylenol alone. If narcotic pain medication was prescribed, take pain pills at prescribed frequency as needed for pain.    ? WARNING: Most prescription pain medications contain Tylenol  (acetaminophen). You must not take more than 4,000 mg of acetaminophen per  24-hour period. This is equivalent to 6 tablets of Darvocet, 12 tablets of Norco, Percocet or Tylenol #3. If you take other over-the-counter medications containing acetaminophen, you must take the amount of acetaminophen into account and reduce the number of prescribed pain pills accordingly.  ? Prescribed narcotic medications may make you drowsy. You must not drive a car, operate heavy machinery or drink alcohol while taking them.  ? Prescribed narcotic pain medications may cause constipation and nausea. Take them with some food to prevent a stomach upset.                **If you have questions or concerns about your procedure,   call Dr. Yoon at [phone]**

## 2021-04-02 NOTE — BRIEF OP NOTE
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

## 2021-04-02 NOTE — OP NOTE
Procedure Date: 04/02/2021      PREOPERATIVE DIAGNOSES:   1.  Left paralytic lagophthalmos.   2.  Left paralytic lower eyelid ectropion.   3.  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      POSTOPERATIVE DIAGNOSES:     1.  Left paralytic lagophthalmos.   2.  Left paralytic lower eyelid ectropion.   3.  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      PROCEDURES PERFORMED:   1.  Left upper eyelid platinum weight insertion 1.6 grams.   2.  Left lower eyelid ectropion repair.   3.  Left temporary tarsorrhaphy.      ANESTHESIA:  Local infiltration of 50:50 mixture of 2% lidocaine with epinephrine and 0.5% Marcaine.      SURGEON:  Vivien Yoon MD      ASSISTANTS:  Sidney Vo MD      COMPLICATIONS:  None.      ESTIMATED BLOOD LOSS:  2 mL      INDICATIONS FOR PROCEDURE:  Mr. Moya presented with severe left upper eyelid paralytic lagophthalmos, neurotrophic keratitis and paralytic lower eyelid ectropion all secondary to carcinoma.  Additionally, he is undergoing chemo and radiation at this time, worsening his keratopathy.  We discussed risks, benefits and alternatives to the proposed procedure, and he elected to proceed.  Of note, he requested this be done under local anesthetic due to logistics.  This was felt to be feasible, especially given he is neurotrophic on that side.      DESCRIPTION OF PROCEDURE:  Mr. Moya was brought to the operating room, placed supine on the operating table.  The left upper eyelid crease was marked with a marking pen and infiltrated with local anesthetic.  The lower lid and lateral canthal area were also infiltrated with the same local anesthetic.  Site was prepped and draped in typical sterile fashion for oculoplastic surgery.  Attention was directed to the left side.  An 8 mm incision was made just lateral to the lateral canthal angle.  He had a previous tarsorrhaphy laterally and this was  kept intact.  Dissection was carried through the orbicularis down to the lateral canthal tendon, which was disinserted off of the lateral orbital rim.  This was imbricated with a double-armed 5-0 PDS suture and secured to the superior lateral orbital rim in a mattress fashion with that 5-0 PDS suture.  The skin was closed with interrupted 6-0 plain gut sutures.        Attention was directed to the upper eyelid.  A #15 blade was used to make an incision through the upper eyelid crease.  Dissection was carried through the orbicularis with monopolar cautery down to the superior tarsal plate.  The levator aponeurosis was gently dissected off of the superior tarsal border.  A 1.6 gram weight was soaked in Betadine and then irrigated with saline and secured to the superior tarsal border medially and laterally with a 5-0 Prolene suture that was placed through the predrilled holes in the weight.  The superior hole was secured to the levator aponeurosis.  Orbicularis was closed over top of this with a 6-0 Vicryl suture.  Skin was closed with a running 6-0 plain gut suture.  His corneal epithelium inferiorly was not intact and, given he has neurotrophic, it was felt that it would be in his best interest to perform a temporary tarsorrhaphy as well to allow the corneal epithelium to heal while he is healing from surgery.  Two 6-0 Vicryl sutures were passed through the lower eyelid margin and then through the upper eyelid margin in a mattress fashion and tying this down nicely completed the tarsorrhaphy.  One was placed laterally.  One was placed nasally.  Erythromycin ophthalmic ointment was applied.  He tolerated the procedure well and left the operating room in good condition.         BRENDA HERRERA MD             D: 2021   T: 2021   MT: MARE      Name:     ELSI IQBAL   MRN:      [id number]        Account:        [id number]   :      1950           Procedure Date: 2021      Document: [id number]

## 2021-04-04 NOTE — PROGRESS NOTES
"Fortino is a 71 year old who is being evaluated via a billable video visit.      How would you like to obtain your AVS? MyChart  If the video visit is dropped, the invitation should be resent by: Text to cell phone: [phone]  Will anyone else be joining your video visit? No     Vitals - Patient Reported  Weight (Patient Reported): 77.1 kg (170 lb)  Height (Patient Reported): 177.8 cm (5' 10\")  BMI (Based on Pt Reported Ht/Wt): 24.39  Pain Score: Moderate Pain (4)  Pain Loc: Other - see comment(THROAT / LEFT SIDE OF MOUTH)    Guillermina Obrien [facility] April 5, 2021  6:46 AM         Video Start Time: 7:07am    Video-Visit Details    Type of service:  Video Visit    Video End Time:7:21am    Originating Location (pt. Location): Home    Distant Location (provider location):  [facility]     Platform used for Video Visit: [facility]      Oncology/Hematology Visit Note  Apr 5, 2021    Reason for Visit: Follow up of recurrent head and neck SCC      History of Present Illness: Fortino Moya is a 71 year old male with PMH atrial fibrillation, hypercholesterolemia, HTN with recurrent head and neck cancer. He has a history of L oropharynx P16+ squamous cell cancer that was first diagnosed in 2018 in [place], [place].  He initially was offered chemoradiation, but due to a prior history of L sided hearing loss, he was given low dose weekly carboplatin 1.5 AUC weekly with radiation.  His 3 month PET/CT after initiation was negative and he underwent surveillance after that. He relocated to [place] which is where he is from in Feb 2020 and has been followed since by Dr. Treviño and Dr. Flores at [facility].       In August of 2020, he  developed numbenss in chin and then moved up to his forehead.  He had a Pet/CT done in Sept 2020 that showed a hypermetabolic area near his L foramen ovale - he had had recent dental work in that area and it was thought that this might be scar or inflammation related to that " procedure.       However, he was offered a 2nd opinion and came to [facility] and saw Dr. Russell in Nov 2020.  Images were reviewed at tumor boardon 12/4/20 - and it was felt that this was concerning enough to merit biopsy - so he was referred for IR-guided biopsy.   IR guided biopsy happened on 1/8/2021 and came back as invasive squamous cell carcinoma.    He was going to have SBRT, however, repeat imaging showed marked disease progression in the L  space and we decided to initiate induction chemotherapy to try to shrink the lesion to get to SBRT.      He initiated TPF on 1/28/21 and cycle 2 on 2/18/21. He had a fair amount of deconditioning with induction therapy however restaging imaging (PET/CT CAP/neck 3/1/21) demonstrated a resolution of previous FDG-uptake within the left skull base with some residual soft tissue fullness involving the superior aspect of the pterygoids. No cervical adenopathy or distant metastatic disease.      He was recommend chemotherapy with concurrent re-irradiation with weekly cisplatin. He was seen today for weekly follow-up on treatment.     Interval History:  Fortino was called today for follow-up. He is having slightly more pain in his left throat/mouth but it is manageable with Oxycodone 2-3x per day and salt/soda rinses. He is still eating by mouth in addition to 5 cartons via PEG per day, doing hydration both PO and via feeding tube. He had his eye surgery last week which went well though he has bruising and mild pain. He is getting his 2nd COVID shot today.    He denies fevers, headaches, dizziness, hearing changes, chest pain, SOB, cough (though starting to get more thick mucus), nausea, vomiting, abdominal pain, urinary concerns, edema, bleeding issues, neuropathy, rashes. He does have constipation and plans to use milk of magnesia. Mood bright.     Current Outpatient Medications   Medication Sig Dispense Refill     Aspirin Buf,CaCarb-MgCarb-MgO, 81 MG TABS Take 81 mg by  mouth every evening        atorvastatin (LIPITOR) 10 MG tablet Take 10 mg by mouth every evening        carboxymethylcellulose PF (REFRESH LIQUIGEL) 1 % ophthalmic gel Place 1 drop Into the left eye 4 times daily 30 each 11     carvedilol (COREG) 12.5 MG tablet 2 times daily        cephALEXin (KEFLEX) 500 MG capsule Take 1 capsule (500 mg) by mouth 2 times daily for 7 days 14 capsule 0     cevimeline (EVOXAC) 30 MG capsule as needed        erythromycin (ROMYCIN) 5 MG/GM ophthalmic ointment Apply small amount to incision sites three times daily, then apply to inner lower lid of operative eye(s) at bedtime, as directed. 3.5 g 0     erythromycin (ROMYCIN) 5 MG/GM ophthalmic ointment Place 0.5 inches Into the left eye At Bedtime Instill ~1 cm ribbon into affected eye(s) 4 times daily for 7 days 1 g 0     flecainide (TAMBOCOR) 150 MG tablet Take 150 mg by mouth 2 times daily        lidocaine (XYLOCAINE) 2 % solution as needed        lisinopril (ZESTRIL) 20 MG tablet Take 20 mg by mouth 2 times daily        LORazepam (ATIVAN) 0.5 MG tablet Take 1 tablet (0.5 mg) by mouth every 4 hours as needed (Anxiety, Nausea/Vomiting or Sleep) 30 tablet 1     magic mouthwash (ENTER INGREDIENTS IN COMMENTS) suspension Take q 4 hours as needed for mouth pain (Patient taking differently: as needed Take q 4 hours as needed for mouth pain) 240 mL 1     neomycin-polymixin-dexamethasone (MAXITROL) 0.1 % ophthalmic suspension Instill 1 drop in left eye 3 times a day 5 mL 0     Nutritional Supplements (ISOSOURCE 1.5 RIYA) LIQD Take 1 Can by mouth 7 times daily 100 Can 11     nystatin (MYCOSTATIN) 152876 UNIT/ML suspension Take 5 mLs (500,000 Units) by mouth 4 times daily Swish and swallow 4 times a day (Patient taking differently: Take 500,000 Units by mouth as needed Swish and swallow 4 times a day) 400 mL 1     oxyCODONE (ROXICODONE) 5 MG/5ML solution Take 5 mLs (5 mg) by mouth every 4 hours as needed for severe pain 500 mL 0      prochlorperazine (COMPAZINE) 10 MG tablet Take 1 tablet (10 mg) by mouth every 6 hours as needed (Nausea/Vomiting) 30 tablet 1     traZODone (DESYREL) 50 MG tablet At Bedtime        Physical Examination:  There were no vitals taken for this visit.  Wt Readings from Last 10 Encounters:   04/02/21 78.1 kg (172 lb 3.2 oz)   04/01/21 79.4 kg (175 lb)   04/01/21 79.8 kg (176 lb)   03/30/21 78.9 kg (174 lb)   03/25/21 79.9 kg (176 lb 1.6 oz)   03/22/21 79.7 kg (175 lb 12.8 oz)   03/10/21 77.1 kg (170 lb)   03/07/21 77.1 kg (170 lb)   02/27/21 76.2 kg (168 lb)   02/18/21 78 kg (172 lb)     Video physical exam  General: Patient appears well in no acute distress.   Skin: No visualized rash or lesions on visualized skin  Eyes: EOMI, no erythema, sclera icterus or discharge noted. Bruising around left eye.   Resp: Appears to be breathing comfortably without accessory muscle usage, speaking in full sentences, no cough  MSK: Appears to have normal range of motion based on visualized movements  Neurologic: No apparent tremors, facial movements symmetric  Psych: affect normal, alert and oriented    The rest of a comprehensive physical examination is deferred due to PHE (public health emergency) video restrictions    Laboratory Data:  Results for ELSI IQBAL (MRN [id number]) as of 4/5/2021 10:28   4/5/2021 09:26   Sodium 136   Potassium 4.3   Chloride 102   Carbon Dioxide 30   Urea Nitrogen 21   Creatinine 0.63 (L)   GFR Estimate >90   GFR Estimate If Black >90   Calcium 8.8   Anion Gap 4   Magnesium 2.2   Albumin 3.5   Protein Total 6.5 (L)   Bilirubin Total 0.5   Alkaline Phosphatase 58   ALT 20   AST 9   Glucose 123 (H)   WBC 6.6   Hemoglobin 11.7 (L)   Hematocrit 34.3 (L)   Platelet Count 185   RBC Count 3.59 (L)   MCV 96   MCH 32.6   MCHC 34.1   RDW 14.6   Diff Method Automated Method   % Neutrophils 81.2   % Lymphocytes 6.5   % Monocytes 9.3   % Eosinophils 2.4   % Basophils 0.3   % Immature Granulocytes 0.3   Nucleated  RBCs 0   Absolute Neutrophil 5.3   Absolute Lymphocytes 0.4 (L)   Absolute Monocytes 0.6   Absolute Eosinophils 0.2   Absolute Basophils 0.0   Abs Immature Granulocytes 0.0   Absolute Nucleated RBC 0.0       Assessment and Plan:  1. Onc  Recurrent SCC of head and neck, prior chemoradiation, now with local recurrence in L  space. S/p 2 cycles of TPF induction chemotherapy with near CR on PET. Discussion at ENT tumor board with plan to do radiation with weekly cisplatin, started 3/24/21.      Tolerating treatment well with no concerns. Labs reviewed and stable. Will continue with week 3 of cisplatin today.     Will see him weekly on treatment.      2. ENT  Dysphagia: Stable, following with speech.     Cancer related pain: Slightly worse, Continue Oxycodone PRN. Well controlled.      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     Monitor chronic left hearing loss with cisplatin, no issues currently.     3. FEN  Nutrition: Continue PEG, 5 cartons per day, tolerating well and weight stable. Continue oral intake as tolerated. RD following.      Hydration: Doing well, creat WNL. Continue good hydration during treatment      4. GI  Constipation: Discussed starting Senna-S with ongoing constipation issues in setting of opioid use.    15 minutes spent on the date of the encounter doing chart review, review of test results, interpretation of tests, patient visit and documentation     Addendum: BP slightly low today. Stop Lisinopril.      Torsten Polanco PA-C  [facility]  [address] [place] [address]  [phone]

## 2021-04-05 ENCOUNTER — INFUSION THERAPY VISIT (OUTPATIENT)
Dept: ONCOLOGY | Facility: CLINIC | Age: 71
End: 2021-04-05
Attending: INTERNAL MEDICINE
Payer: MEDICARE

## 2021-04-05 ENCOUNTER — APPOINTMENT (OUTPATIENT)
Dept: RADIATION ONCOLOGY | Facility: CLINIC | Age: 71
End: 2021-04-05
Attending: RADIOLOGY
Payer: MEDICARE

## 2021-04-05 VITALS
SYSTOLIC BLOOD PRESSURE: 98 MMHG | RESPIRATION RATE: 16 BRPM | TEMPERATURE: 97.5 F | OXYGEN SATURATION: 97 % | BODY MASS INDEX: 24.54 KG/M2 | DIASTOLIC BLOOD PRESSURE: 63 MMHG | WEIGHT: 171 LBS | HEART RATE: 98 BPM

## 2021-04-05 DIAGNOSIS — C09.9 TONSILLAR CANCER (H): ICD-10-CM

## 2021-04-05 DIAGNOSIS — C09.9 SQUAMOUS CELL CARCINOMA OF TONSIL (H): Primary | ICD-10-CM

## 2021-04-05 DIAGNOSIS — Z51.89 ENCOUNTER FOR OTHER SPECIFIED AFTERCARE: ICD-10-CM

## 2021-04-05 LAB
ALBUMIN SERPL-MCNC: 3.5 G/DL (ref 3.4–5)
ALP SERPL-CCNC: 58 U/L (ref 40–150)
ALT SERPL W P-5'-P-CCNC: 20 U/L (ref 0–70)
ANION GAP SERPL CALCULATED.3IONS-SCNC: 4 MMOL/L (ref 3–14)
AST SERPL W P-5'-P-CCNC: 9 U/L (ref 0–45)
BASOPHILS # BLD AUTO: 0 10E9/L (ref 0–0.2)
BASOPHILS NFR BLD AUTO: 0.3 %
BILIRUB SERPL-MCNC: 0.5 MG/DL (ref 0.2–1.3)
BUN SERPL-MCNC: 21 MG/DL (ref 7–30)
CALCIUM SERPL-MCNC: 8.8 MG/DL (ref 8.5–10.1)
CHLORIDE SERPL-SCNC: 102 MMOL/L (ref 94–109)
CO2 SERPL-SCNC: 30 MMOL/L (ref 20–32)
CREAT SERPL-MCNC: 0.63 MG/DL (ref 0.66–1.25)
DIFFERENTIAL METHOD BLD: ABNORMAL
EOSINOPHIL # BLD AUTO: 0.2 10E9/L (ref 0–0.7)
EOSINOPHIL NFR BLD AUTO: 2.4 %
ERYTHROCYTE [DISTWIDTH] IN BLOOD BY AUTOMATED COUNT: 14.6 % (ref 10–15)
GFR SERPL CREATININE-BSD FRML MDRD: >90 ML/MIN/{1.73_M2}
GLUCOSE SERPL-MCNC: 123 MG/DL (ref 70–99)
HCT VFR BLD AUTO: 34.3 % (ref 40–53)
HGB BLD-MCNC: 11.7 G/DL (ref 13.3–17.7)
IMM GRANULOCYTES # BLD: 0 10E9/L (ref 0–0.4)
IMM GRANULOCYTES NFR BLD: 0.3 %
LYMPHOCYTES # BLD AUTO: 0.4 10E9/L (ref 0.8–5.3)
LYMPHOCYTES NFR BLD AUTO: 6.5 %
MAGNESIUM SERPL-MCNC: 2.2 MG/DL (ref 1.6–2.3)
MCH RBC QN AUTO: 32.6 PG (ref 26.5–33)
MCHC RBC AUTO-ENTMCNC: 34.1 G/DL (ref 31.5–36.5)
MCV RBC AUTO: 96 FL (ref 78–100)
MONOCYTES # BLD AUTO: 0.6 10E9/L (ref 0–1.3)
MONOCYTES NFR BLD AUTO: 9.3 %
NEUTROPHILS # BLD AUTO: 5.3 10E9/L (ref 1.6–8.3)
NEUTROPHILS NFR BLD AUTO: 81.2 %
NRBC # BLD AUTO: 0 10*3/UL
NRBC BLD AUTO-RTO: 0 /100
PLATELET # BLD AUTO: 185 10E9/L (ref 150–450)
POTASSIUM SERPL-SCNC: 4.3 MMOL/L (ref 3.4–5.3)
PROT SERPL-MCNC: 6.5 G/DL (ref 6.8–8.8)
RBC # BLD AUTO: 3.59 10E12/L (ref 4.4–5.9)
SODIUM SERPL-SCNC: 136 MMOL/L (ref 133–144)
WBC # BLD AUTO: 6.6 10E9/L (ref 4–11)

## 2021-04-05 PROCEDURE — 83735 ASSAY OF MAGNESIUM: CPT | Performed by: INTERNAL MEDICINE

## 2021-04-05 PROCEDURE — 77386 HC IMRT TREATMENT DELIVERY, COMPLEX: CPT | Performed by: RADIOLOGY

## 2021-04-05 PROCEDURE — 99213 OFFICE O/P EST LOW 20 MIN: CPT | Mod: 95 | Performed by: PHYSICIAN ASSISTANT

## 2021-04-05 PROCEDURE — 250N000011 HC RX IP 250 OP 636: Performed by: INTERNAL MEDICINE

## 2021-04-05 PROCEDURE — 96375 TX/PRO/DX INJ NEW DRUG ADDON: CPT

## 2021-04-05 PROCEDURE — 96413 CHEMO IV INFUSION 1 HR: CPT

## 2021-04-05 PROCEDURE — 96367 TX/PROPH/DG ADDL SEQ IV INF: CPT

## 2021-04-05 PROCEDURE — 77014 PR CT GUIDE FOR PLACEMENT RADIATION THERAPY FIELDS: CPT | Mod: 26 | Performed by: RADIOLOGY

## 2021-04-05 PROCEDURE — 258N000003 HC RX IP 258 OP 636: Performed by: INTERNAL MEDICINE

## 2021-04-05 PROCEDURE — 80053 COMPREHEN METABOLIC PANEL: CPT | Performed by: INTERNAL MEDICINE

## 2021-04-05 PROCEDURE — 96361 HYDRATE IV INFUSION ADD-ON: CPT

## 2021-04-05 PROCEDURE — 258N000003 HC RX IP 258 OP 636: Performed by: PHYSICIAN ASSISTANT

## 2021-04-05 PROCEDURE — 85025 COMPLETE CBC W/AUTO DIFF WBC: CPT | Performed by: INTERNAL MEDICINE

## 2021-04-05 RX ORDER — HEPARIN SODIUM (PORCINE) LOCK FLUSH IV SOLN 100 UNIT/ML 100 UNIT/ML
5 SOLUTION INTRAVENOUS
Status: DISCONTINUED | OUTPATIENT
Start: 2021-04-05 | End: 2021-04-05 | Stop reason: HOSPADM

## 2021-04-05 RX ORDER — HEPARIN SODIUM (PORCINE) LOCK FLUSH IV SOLN 100 UNIT/ML 100 UNIT/ML
5 SOLUTION INTRAVENOUS ONCE
Status: DISCONTINUED | OUTPATIENT
Start: 2021-04-05 | End: 2021-04-05 | Stop reason: HOSPADM

## 2021-04-05 RX ORDER — HEPARIN SODIUM,PORCINE 10 UNIT/ML
5 VIAL (ML) INTRAVENOUS
Status: CANCELLED | OUTPATIENT
Start: 2021-04-05

## 2021-04-05 RX ORDER — OXYCODONE HCL 5 MG/5 ML
5 SOLUTION, ORAL ORAL EVERY 4 HOURS PRN
Qty: 500 ML | Refills: 0 | Status: SHIPPED | OUTPATIENT
Start: 2021-04-05 | End: 2021-04-19

## 2021-04-05 RX ORDER — PALONOSETRON 0.05 MG/ML
0.25 INJECTION, SOLUTION INTRAVENOUS ONCE
Status: COMPLETED | OUTPATIENT
Start: 2021-04-05 | End: 2021-04-05

## 2021-04-05 RX ORDER — HEPARIN SODIUM (PORCINE) LOCK FLUSH IV SOLN 100 UNIT/ML 100 UNIT/ML
5 SOLUTION INTRAVENOUS
Status: CANCELLED | OUTPATIENT
Start: 2021-04-05

## 2021-04-05 RX ADMIN — CISPLATIN 80 MG: 1 INJECTION INTRAVENOUS at 10:48

## 2021-04-05 RX ADMIN — Medication 5 ML: at 10:51

## 2021-04-05 RX ADMIN — SODIUM CHLORIDE 1000 ML: 9 INJECTION, SOLUTION INTRAVENOUS at 10:52

## 2021-04-05 RX ADMIN — PALONOSETRON 0.25 MG: 0.05 INJECTION, SOLUTION INTRAVENOUS at 10:44

## 2021-04-05 RX ADMIN — SODIUM CHLORIDE 1000 ML: 9 INJECTION, SOLUTION INTRAVENOUS at 09:48

## 2021-04-05 RX ADMIN — Medication 5 ML: at 09:22

## 2021-04-05 RX ADMIN — DEXAMETHASONE SODIUM PHOSPHATE: 10 INJECTION, SOLUTION INTRAMUSCULAR; INTRAVENOUS at 10:11

## 2021-04-05 ASSESSMENT — PAIN SCALES - GENERAL: PAINLEVEL: NO PAIN (0)

## 2021-04-05 NOTE — LETTER
4/5/2021         RE: Fortino Moya  [address]  [facility] [address]        Dear Colleague,    Thank you for referring your patient, Fortino Moya, to the [facility]. Please see a copy of my visit note below.    Oncology/Hematology Visit Note  Apr 5, 2021    Reason for Visit: Follow up of recurrent head and neck SCC      History of Present Illness: Fortino Moya is a 71 year old male with PMH atrial fibrillation, hypercholesterolemia, HTN with recurrent head and neck cancer. He has a history of L oropharynx P16+ squamous cell cancer that was first diagnosed in 2018 in [place], [place].  He initially was offered chemoradiation, but due to a prior history of L sided hearing loss, he was given low dose weekly carboplatin 1.5 AUC weekly with radiation.  His 3 month PET/CT after initiation was negative and he underwent surveillance after that. He relocated to [place] which is where he is from in Feb 2020 and has been followed since by Dr. Treviño and Dr. Flores at [facility].       In August of 2020, he  developed numbenss in chin and then moved up to his forehead.  He had a Pet/CT done in Sept 2020 that showed a hypermetabolic area near his L foramen ovale - he had had recent dental work in that area and it was thought that this might be scar or inflammation related to that procedure.       However, he was offered a 2nd opinion and came to [facility] and saw Dr. Russell in Nov 2020.  Images were reviewed at tumor boardon 12/4/20 - and it was felt that this was concerning enough to merit biopsy - so he was referred for IR-guided biopsy.   IR guided biopsy happened on 1/8/2021 and came back as invasive squamous cell carcinoma.    He was going to have SBRT, however, repeat imaging showed marked disease progression in the L  space and we decided to initiate induction chemotherapy to try to shrink the lesion to get to SBRT.      He initiated TPF on 1/28/21 and cycle 2 on  2/18/21. He had a fair amount of deconditioning with induction therapy however restaging imaging (PET/CT CAP/neck 3/1/21) demonstrated a resolution of previous FDG-uptake within the left skull base with some residual soft tissue fullness involving the superior aspect of the pterygoids. No cervical adenopathy or distant metastatic disease.      He was recommend chemotherapy with concurrent re-irradiation with weekly cisplatin. He was seen today for weekly follow-up on treatment.     Interval History:  Fortino was called today for follow-up. He is having slightly more pain in his left throat/mouth but it is manageable with Oxycodone 2-3x per day and salt/soda rinses. He is still eating by mouth in addition to 5 cartons via PEG per day, doing hydration both PO and via feeding tube. He had his eye surgery last week which went well though he has bruising and mild pain. He is getting his 2nd COVID shot today.    He denies fevers, headaches, dizziness, hearing changes, chest pain, SOB, cough (though starting to get more thick mucus), nausea, vomiting, abdominal pain, urinary concerns, edema, bleeding issues, neuropathy, rashes. He does have constipation and plans to use milk of magnesia. Mood bright.     Current Outpatient Medications   Medication Sig Dispense Refill     Aspirin Buf,CaCarb-MgCarb-MgO, 81 MG TABS Take 81 mg by mouth every evening        atorvastatin (LIPITOR) 10 MG tablet Take 10 mg by mouth every evening        carboxymethylcellulose PF (REFRESH LIQUIGEL) 1 % ophthalmic gel Place 1 drop Into the left eye 4 times daily 30 each 11     carvedilol (COREG) 12.5 MG tablet 2 times daily        cephALEXin (KEFLEX) 500 MG capsule Take 1 capsule (500 mg) by mouth 2 times daily for 7 days 14 capsule 0     cevimeline (EVOXAC) 30 MG capsule as needed        erythromycin (ROMYCIN) 5 MG/GM ophthalmic ointment Apply small amount to incision sites three times daily, then apply to inner lower lid of operative eye(s) at  bedtime, as directed. 3.5 g 0     erythromycin (ROMYCIN) 5 MG/GM ophthalmic ointment Place 0.5 inches Into the left eye At Bedtime Instill ~1 cm ribbon into affected eye(s) 4 times daily for 7 days 1 g 0     flecainide (TAMBOCOR) 150 MG tablet Take 150 mg by mouth 2 times daily        lidocaine (XYLOCAINE) 2 % solution as needed        lisinopril (ZESTRIL) 20 MG tablet Take 20 mg by mouth 2 times daily        LORazepam (ATIVAN) 0.5 MG tablet Take 1 tablet (0.5 mg) by mouth every 4 hours as needed (Anxiety, Nausea/Vomiting or Sleep) 30 tablet 1     magic mouthwash (ENTER INGREDIENTS IN COMMENTS) suspension Take q 4 hours as needed for mouth pain (Patient taking differently: as needed Take q 4 hours as needed for mouth pain) 240 mL 1     neomycin-polymixin-dexamethasone (MAXITROL) 0.1 % ophthalmic suspension Instill 1 drop in left eye 3 times a day 5 mL 0     Nutritional Supplements (ISOSOURCE 1.5 RIYA) LIQD Take 1 Can by mouth 7 times daily 100 Can 11     nystatin (MYCOSTATIN) 082586 UNIT/ML suspension Take 5 mLs (500,000 Units) by mouth 4 times daily Swish and swallow 4 times a day (Patient taking differently: Take 500,000 Units by mouth as needed Swish and swallow 4 times a day) 400 mL 1     oxyCODONE (ROXICODONE) 5 MG/5ML solution Take 5 mLs (5 mg) by mouth every 4 hours as needed for severe pain 500 mL 0     prochlorperazine (COMPAZINE) 10 MG tablet Take 1 tablet (10 mg) by mouth every 6 hours as needed (Nausea/Vomiting) 30 tablet 1     traZODone (DESYREL) 50 MG tablet At Bedtime        Physical Examination:  There were no vitals taken for this visit.  Wt Readings from Last 10 Encounters:   04/02/21 78.1 kg (172 lb 3.2 oz)   04/01/21 79.4 kg (175 lb)   04/01/21 79.8 kg (176 lb)   03/30/21 78.9 kg (174 lb)   03/25/21 79.9 kg (176 lb 1.6 oz)   03/22/21 79.7 kg (175 lb 12.8 oz)   03/10/21 77.1 kg (170 lb)   03/07/21 77.1 kg (170 lb)   02/27/21 76.2 kg (168 lb)   02/18/21 78 kg (172 lb)     Video physical  exam  General: Patient appears well in no acute distress.   Skin: No visualized rash or lesions on visualized skin  Eyes: EOMI, no erythema, sclera icterus or discharge noted. Bruising around left eye.   Resp: Appears to be breathing comfortably without accessory muscle usage, speaking in full sentences, no cough  MSK: Appears to have normal range of motion based on visualized movements  Neurologic: No apparent tremors, facial movements symmetric  Psych: affect normal, alert and oriented    The rest of a comprehensive physical examination is deferred due to PHE (public health emergency) video restrictions    Laboratory Data:  Results for ELSI IQBAL (MRN [id number]) as of 4/5/2021 10:28   4/5/2021 09:26   Sodium 136   Potassium 4.3   Chloride 102   Carbon Dioxide 30   Urea Nitrogen 21   Creatinine 0.63 (L)   GFR Estimate >90   GFR Estimate If Black >90   Calcium 8.8   Anion Gap 4   Magnesium 2.2   Albumin 3.5   Protein Total 6.5 (L)   Bilirubin Total 0.5   Alkaline Phosphatase 58   ALT 20   AST 9   Glucose 123 (H)   WBC 6.6   Hemoglobin 11.7 (L)   Hematocrit 34.3 (L)   Platelet Count 185   RBC Count 3.59 (L)   MCV 96   MCH 32.6   MCHC 34.1   RDW 14.6   Diff Method Automated Method   % Neutrophils 81.2   % Lymphocytes 6.5   % Monocytes 9.3   % Eosinophils 2.4   % Basophils 0.3   % Immature Granulocytes 0.3   Nucleated RBCs 0   Absolute Neutrophil 5.3   Absolute Lymphocytes 0.4 (L)   Absolute Monocytes 0.6   Absolute Eosinophils 0.2   Absolute Basophils 0.0   Abs Immature Granulocytes 0.0   Absolute Nucleated RBC 0.0       Assessment and Plan:  1. Onc  Recurrent SCC of head and neck, prior chemoradiation, now with local recurrence in L  space. S/p 2 cycles of TPF induction chemotherapy with near CR on PET. Discussion at ENT tumor board with plan to do radiation with weekly cisplatin, started 3/24/21.      Tolerating treatment well with no concerns. Labs reviewed and stable. Will continue with week 3 of  cisplatin today.     Will see him weekly on treatment.      2. ENT  Dysphagia: Stable, following with speech.     Cancer related pain: Slightly worse, Continue Oxycodone PRN. Well controlled.      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     Monitor chronic left hearing loss with cisplatin, no issues currently.     3. FEN  Nutrition: Continue PEG, 5 cartons per day, tolerating well and weight stable. Continue oral intake as tolerated. RD following.      Hydration: Doing well, creat WNL. Continue good hydration during treatment      4. GI  Constipation: Discussed starting Senna-S with ongoing constipation issues in setting of opioid use.    15 minutes spent on the date of the encounter doing chart review, review of test results, interpretation of tests, patient visit and documentation     Addendum: BP slightly low today. Stop Lisinopril.      Torsten Polanco PA-C  [facility]  [address] [place] [address]  [phone]        Again, thank you for allowing me to participate in the care of your patient.      Sincerely,    MENDY Chowdhury

## 2021-04-05 NOTE — NURSING NOTE
"Chief Complaint   Patient presents with     Port Draw     port accessed and labs drawn by rn in lab.  vital signs taken.     Port accessed by RN in lab with 20g 3/4\" gripper needle, labs drawn, port flushed with saline and heparin, vitals checked, pt checked in for next appointment.    Siri Duarte RN      "

## 2021-04-05 NOTE — PROGRESS NOTES
Infusion Nursing Note:  Fortino Moya presents today for C1D15 Cisplatin-2 L NS.    Patient seen by provider today: Yes: Torsten Polanco PA-C   present during visit today: Not Applicable.    Note: Pt saw provider prior to infusion, ok for treatment.    TORB 4/5/21@1100 Torsten Polanco PA-C-Yudi Marie RN  --Give additional 1 L NS today  --Hold Lisinopril       Intravenous Access:  Implanted Port.    Treatment Conditions:  Lab Results   Component Value Date    HGB 11.7 04/05/2021     Lab Results   Component Value Date    WBC 6.6 04/05/2021      Lab Results   Component Value Date    ANEU 5.3 04/05/2021     Lab Results   Component Value Date     04/05/2021      Lab Results   Component Value Date     04/05/2021                   Lab Results   Component Value Date    POTASSIUM 4.3 04/05/2021           Lab Results   Component Value Date    MAG 2.2 04/05/2021            Lab Results   Component Value Date    CR 0.63 04/05/2021                   Lab Results   Component Value Date    RIYA 8.8 04/05/2021                Lab Results   Component Value Date    BILITOTAL 0.5 04/05/2021           Lab Results   Component Value Date    ALBUMIN 3.5 04/05/2021                    Lab Results   Component Value Date    ALT 20 04/05/2021           Lab Results   Component Value Date    AST 9 04/05/2021       Results reviewed, labs MET treatment parameters, ok to proceed with treatment.      Post Infusion Assessment:  Patient tolerated infusion without incident.  Blood return noted pre and post infusion.  Site patent and intact, free from redness, edema or discomfort.  No evidence of extravasations.  Access discontinued per protocol.       Discharge Plan:   Patient declined prescription refills.  Discharge instructions reviewed with: Patient.  Patient and/or family verbalized understanding of discharge instructions and all questions answered.  AVS to patient via [employer].  Patient will return 4/12 for next  appointment.   Patient discharged in stable condition accompanied by: self.  Departure Mode: Ambulatory.    Yudi Marie RN

## 2021-04-06 ENCOUNTER — APPOINTMENT (OUTPATIENT)
Dept: RADIATION ONCOLOGY | Facility: CLINIC | Age: 71
End: 2021-04-06
Attending: RADIOLOGY
Payer: MEDICARE

## 2021-04-06 PROCEDURE — 77014 PR CT GUIDE FOR PLACEMENT RADIATION THERAPY FIELDS: CPT | Mod: 26 | Performed by: RADIOLOGY

## 2021-04-06 PROCEDURE — 77427 RADIATION TX MANAGEMENT X5: CPT | Performed by: RADIOLOGY

## 2021-04-06 PROCEDURE — 77336 RADIATION PHYSICS CONSULT: CPT | Performed by: RADIOLOGY

## 2021-04-06 PROCEDURE — 77386 HC IMRT TREATMENT DELIVERY, COMPLEX: CPT | Performed by: RADIOLOGY

## 2021-04-07 ENCOUNTER — APPOINTMENT (OUTPATIENT)
Dept: RADIATION ONCOLOGY | Facility: CLINIC | Age: 71
End: 2021-04-07
Attending: RADIOLOGY
Payer: MEDICARE

## 2021-04-07 PROCEDURE — 77386 HC IMRT TREATMENT DELIVERY, COMPLEX: CPT | Performed by: RADIOLOGY

## 2021-04-07 PROCEDURE — 77014 PR CT GUIDE FOR PLACEMENT RADIATION THERAPY FIELDS: CPT | Mod: 26 | Performed by: RADIOLOGY

## 2021-04-08 ENCOUNTER — OFFICE VISIT (OUTPATIENT)
Dept: RADIATION ONCOLOGY | Facility: CLINIC | Age: 71
End: 2021-04-08
Attending: RADIOLOGY
Payer: MEDICARE

## 2021-04-08 VITALS
DIASTOLIC BLOOD PRESSURE: 78 MMHG | BODY MASS INDEX: 24.97 KG/M2 | HEART RATE: 88 BPM | SYSTOLIC BLOOD PRESSURE: 124 MMHG | WEIGHT: 174 LBS

## 2021-04-08 DIAGNOSIS — C09.9 SQUAMOUS CELL CARCINOMA OF TONSIL (H): Primary | ICD-10-CM

## 2021-04-08 PROCEDURE — 77386 HC IMRT TREATMENT DELIVERY, COMPLEX: CPT | Performed by: RADIOLOGY

## 2021-04-08 NOTE — LETTER
2021         RE: Fortino Moya  [address]  [facility] [address]        Dear Colleague,    Thank you for referring your patient, Fortino Moya, to the [facility] RADIATION ONCOLOGY. Please see a copy of my visit note below.    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 Cycle 1, day 1 of weekly cisplatin.    3/25/2021: Weekly RT visit. Current dose of 400 cGy. Tolerating treatment well.    3/30/2021: Cycle 1, day 8 of weekly cisplatin    2021: Weekly RT visit. Current dose of 1400 cGy. Mild oral cavity/oropharyngeal pain.    2021: Left upper eyelid platinum weight insertion, left lower eyelid ectropion repair    2021: Weekly RT visit. Current dose of 2400 cGy. Mild mucositis and oral cavity pain.     ED visits/Hospitalizations:  None    Missed Treatments:  None    Subjective: Mr. Moya presents to clinic today for his weekly on-treatment visit. He continues to tolerate therapy well with expected acute toxicity of mild mucositis. He has been using salt/soda rinses. He rates ongoing mouth/throat pain over the past week as 3/10 in severity. This is controlled with oxycodone 5 mg. He is taking 4-5 cans/daily via PEG with a small amount of soft foods and liquids by mouth. His remaining ROS are otherwise negative.     ROS:   Constitutional  Pain (0-10): 3 (mouth)  Fatigue: Mild    CNS  Headaches: None    ENT  Mucositis: None    Cardiopulmonary  Dyspnea: None    GI  Nausea/vomiting: None    Nutrition  PEG: Yes  Diet: 5 cans  daily via PEG with small amounts of solids and liquids by mouth    Integumentary  Dermatitis: None    Objective:   Current weight: 78.9 kg  Last week's weight: 79.8 kg  Starting weight: 79.9 kg    BP: 124/78 (sitting), 120/82 (standing)  Pulse: 88 (sitting), 90 (standing)     General: Mildly cachectic-appearing 71 year old gentleman seated comfortably in an examination chair in [facility]  HEENT: NC/AT. Left eye tarsorrhaphy with clean/dry/intact wound. Dry mucus membranes with mild mucositis involving the left lateral soft palate. No thrush.  Pulmonary: No wheezing, stridor or respiratory distress  Skin: No erythema    Treatment-related toxicities (CTCAE v5.0):  Mucositis: Grade 1    Assessment:    Mr. 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 He was treated with induction TPF x2 cycles with a good partial response to therapy and is now undergoing curative intent treatment with reirradiation and weekly cisplatin. He is tolerating treatment well with mild radiation-induced mucositis.    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  I agree with the resident's note and plan of care.      VIOLET Salazar M.D.  Department of Radiation Oncology  [facility]

## 2021-04-08 NOTE — PROGRESS NOTES
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 Cycle 1, day 1 of weekly cisplatin.    3/25/2021: Weekly RT visit. Current dose of 400 cGy. Tolerating treatment well.    3/30/2021: Cycle 1, day 8 of weekly cisplatin    2021: Weekly RT visit. Current dose of 1400 cGy. Mild oral cavity/oropharyngeal pain.    2021: Left upper eyelid platinum weight insertion, left lower eyelid ectropion repair    2021: Weekly RT visit. Current dose of 2400 cGy. Mild mucositis and oral cavity pain.     ED visits/Hospitalizations:  None    Missed Treatments:  None    Subjective: Mr. Moya presents to clinic today for his weekly on-treatment visit. He continues to tolerate therapy well with expected acute toxicity of mild mucositis. He has been using salt/soda rinses. He rates ongoing mouth/throat pain over the past week as 3/10 in severity. This is controlled with oxycodone 5 mg. He is taking 4-5 cans/daily via PEG with a small amount of soft foods and liquids by mouth. His remaining ROS are otherwise negative.     ROS:   Constitutional  Pain (0-10): 3 (mouth)  Fatigue: Mild    CNS  Headaches: None    ENT  Mucositis: None    Cardiopulmonary  Dyspnea: None    GI  Nausea/vomiting: None    Nutrition  PEG: Yes  Diet: 5 cans daily via PEG with small amounts of solids and liquids by mouth    Integumentary  Dermatitis: None    Objective:   Current weight: 78.9 kg  Last week's weight: 79.8 kg  Starting weight: 79.9 kg    BP: 124/78 (sitting), 120/82 (standing)  Pulse: 88  (sitting), 90 (standing)     General: Mildly cachectic-appearing 71 year old gentleman seated comfortably in an examination chair in [facility]  HEENT: NC/AT. Left eye tarsorrhaphy with clean/dry/intact wound. Dry mucus membranes with mild mucositis involving the left lateral soft palate. No thrush.  Pulmonary: No wheezing, stridor or respiratory distress  Skin: No erythema    Treatment-related toxicities (CTCAE v5.0):  Mucositis: Grade 1    Assessment:    Mr. 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 He was treated with induction TPF x2 cycles with a good partial response to therapy and is now undergoing curative intent treatment with reirradiation and weekly cisplatin. He is tolerating treatment well with mild radiation-induced mucositis.    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  I agree with the resident's note and plan of care.      VIOLET Salazar M.D.  Department of Radiation Oncology  [facility]

## 2021-04-09 ENCOUNTER — APPOINTMENT (OUTPATIENT)
Dept: RADIATION ONCOLOGY | Facility: CLINIC | Age: 71
End: 2021-04-09
Attending: RADIOLOGY
Payer: MEDICARE

## 2021-04-09 PROCEDURE — 77014 PR CT GUIDE FOR PLACEMENT RADIATION THERAPY FIELDS: CPT | Mod: 26 | Performed by: RADIOLOGY

## 2021-04-09 PROCEDURE — 77386 HC IMRT TREATMENT DELIVERY, COMPLEX: CPT | Performed by: RADIOLOGY

## 2021-04-12 ENCOUNTER — INFUSION THERAPY VISIT (OUTPATIENT)
Dept: ONCOLOGY | Facility: CLINIC | Age: 71
End: 2021-04-12
Attending: INTERNAL MEDICINE
Payer: MEDICARE

## 2021-04-12 ENCOUNTER — APPOINTMENT (OUTPATIENT)
Dept: RADIATION ONCOLOGY | Facility: CLINIC | Age: 71
End: 2021-04-12
Attending: RADIOLOGY
Payer: MEDICARE

## 2021-04-12 VITALS
RESPIRATION RATE: 16 BRPM | HEART RATE: 69 BPM | OXYGEN SATURATION: 97 % | SYSTOLIC BLOOD PRESSURE: 117 MMHG | DIASTOLIC BLOOD PRESSURE: 74 MMHG | TEMPERATURE: 97 F | BODY MASS INDEX: 24.77 KG/M2 | WEIGHT: 172.6 LBS

## 2021-04-12 DIAGNOSIS — Z51.89 ENCOUNTER FOR OTHER SPECIFIED AFTERCARE: ICD-10-CM

## 2021-04-12 DIAGNOSIS — C09.9 SQUAMOUS CELL CARCINOMA OF TONSIL (H): Primary | ICD-10-CM

## 2021-04-12 LAB
ALBUMIN SERPL-MCNC: 3.5 G/DL (ref 3.4–5)
ALP SERPL-CCNC: 60 U/L (ref 40–150)
ALT SERPL W P-5'-P-CCNC: 21 U/L (ref 0–70)
ANION GAP SERPL CALCULATED.3IONS-SCNC: 4 MMOL/L (ref 3–14)
AST SERPL W P-5'-P-CCNC: 12 U/L (ref 0–45)
BASOPHILS # BLD AUTO: 0 10E9/L (ref 0–0.2)
BASOPHILS NFR BLD AUTO: 0.3 %
BILIRUB SERPL-MCNC: 0.4 MG/DL (ref 0.2–1.3)
BUN SERPL-MCNC: 15 MG/DL (ref 7–30)
CALCIUM SERPL-MCNC: 8.5 MG/DL (ref 8.5–10.1)
CHLORIDE SERPL-SCNC: 103 MMOL/L (ref 94–109)
CO2 SERPL-SCNC: 31 MMOL/L (ref 20–32)
CREAT SERPL-MCNC: 0.7 MG/DL (ref 0.66–1.25)
DIFFERENTIAL METHOD BLD: ABNORMAL
EOSINOPHIL # BLD AUTO: 0.1 10E9/L (ref 0–0.7)
EOSINOPHIL NFR BLD AUTO: 3.3 %
ERYTHROCYTE [DISTWIDTH] IN BLOOD BY AUTOMATED COUNT: 14.3 % (ref 10–15)
GFR SERPL CREATININE-BSD FRML MDRD: >90 ML/MIN/{1.73_M2}
GLUCOSE SERPL-MCNC: 110 MG/DL (ref 70–99)
HCT VFR BLD AUTO: 32.3 % (ref 40–53)
HGB BLD-MCNC: 11.2 G/DL (ref 13.3–17.7)
IMM GRANULOCYTES # BLD: 0 10E9/L (ref 0–0.4)
IMM GRANULOCYTES NFR BLD: 0.3 %
LYMPHOCYTES # BLD AUTO: 0.3 10E9/L (ref 0.8–5.3)
LYMPHOCYTES NFR BLD AUTO: 9.2 %
MAGNESIUM SERPL-MCNC: 1.9 MG/DL (ref 1.6–2.3)
MCH RBC QN AUTO: 33.3 PG (ref 26.5–33)
MCHC RBC AUTO-ENTMCNC: 34.7 G/DL (ref 31.5–36.5)
MCV RBC AUTO: 96 FL (ref 78–100)
MONOCYTES # BLD AUTO: 0.4 10E9/L (ref 0–1.3)
MONOCYTES NFR BLD AUTO: 12.3 %
NEUTROPHILS # BLD AUTO: 2.7 10E9/L (ref 1.6–8.3)
NEUTROPHILS NFR BLD AUTO: 74.6 %
NRBC # BLD AUTO: 0 10*3/UL
NRBC BLD AUTO-RTO: 0 /100
PLATELET # BLD AUTO: 203 10E9/L (ref 150–450)
POTASSIUM SERPL-SCNC: 3.7 MMOL/L (ref 3.4–5.3)
PROT SERPL-MCNC: 6.3 G/DL (ref 6.8–8.8)
RBC # BLD AUTO: 3.36 10E12/L (ref 4.4–5.9)
SODIUM SERPL-SCNC: 138 MMOL/L (ref 133–144)
WBC # BLD AUTO: 3.6 10E9/L (ref 4–11)

## 2021-04-12 PROCEDURE — 80053 COMPREHEN METABOLIC PANEL: CPT | Performed by: INTERNAL MEDICINE

## 2021-04-12 PROCEDURE — 96375 TX/PRO/DX INJ NEW DRUG ADDON: CPT

## 2021-04-12 PROCEDURE — 96413 CHEMO IV INFUSION 1 HR: CPT

## 2021-04-12 PROCEDURE — 96367 TX/PROPH/DG ADDL SEQ IV INF: CPT

## 2021-04-12 PROCEDURE — 258N000003 HC RX IP 258 OP 636: Performed by: INTERNAL MEDICINE

## 2021-04-12 PROCEDURE — 250N000011 HC RX IP 250 OP 636: Performed by: INTERNAL MEDICINE

## 2021-04-12 PROCEDURE — 77014 PR CT GUIDE FOR PLACEMENT RADIATION THERAPY FIELDS: CPT | Mod: 26 | Performed by: RADIOLOGY

## 2021-04-12 PROCEDURE — 83735 ASSAY OF MAGNESIUM: CPT | Performed by: INTERNAL MEDICINE

## 2021-04-12 PROCEDURE — 77386 HC IMRT TREATMENT DELIVERY, COMPLEX: CPT | Performed by: RADIOLOGY

## 2021-04-12 PROCEDURE — 85025 COMPLETE CBC W/AUTO DIFF WBC: CPT | Performed by: INTERNAL MEDICINE

## 2021-04-12 RX ORDER — HEPARIN SODIUM (PORCINE) LOCK FLUSH IV SOLN 100 UNIT/ML 100 UNIT/ML
5 SOLUTION INTRAVENOUS ONCE
Status: COMPLETED | OUTPATIENT
Start: 2021-04-12 | End: 2021-04-12

## 2021-04-12 RX ORDER — PALONOSETRON 0.05 MG/ML
0.25 INJECTION, SOLUTION INTRAVENOUS ONCE
Status: COMPLETED | OUTPATIENT
Start: 2021-04-12 | End: 2021-04-12

## 2021-04-12 RX ADMIN — Medication 5 ML: at 06:52

## 2021-04-12 RX ADMIN — SODIUM CHLORIDE 1000 ML: 9 INJECTION, SOLUTION INTRAVENOUS at 07:34

## 2021-04-12 RX ADMIN — CISPLATIN 80 MG: 1 INJECTION INTRAVENOUS at 08:41

## 2021-04-12 RX ADMIN — DEXAMETHASONE SODIUM PHOSPHATE: 10 INJECTION, SOLUTION INTRAMUSCULAR; INTRAVENOUS at 08:10

## 2021-04-12 RX ADMIN — PALONOSETRON 0.25 MG: 0.05 INJECTION, SOLUTION INTRAVENOUS at 08:37

## 2021-04-12 RX ADMIN — Medication 5 ML: at 09:54

## 2021-04-12 ASSESSMENT — PAIN SCALES - GENERAL: PAINLEVEL: MILD PAIN (3)

## 2021-04-12 NOTE — PROGRESS NOTES
Infusion Nursing Note:  Fortino Moya presents today for cycle 1 day 22 cisplatin.    Patient seen by provider today: No   present during visit today: Not Applicable.    Note: Pt arrives feeling well today, denies any new complaints/concerns. States that the swelling in his left eye is decreasing and he is following up with ophthalmology. He has pain rated at a 3/10 in his throat from radiation, he denies needing any interventions to help with the pain.     Intravenous Access:  Implanted Port.    Treatment Conditions:  Lab Results   Component Value Date    HGB 11.2 04/12/2021     Lab Results   Component Value Date    WBC 3.6 04/12/2021      Lab Results   Component Value Date    ANEU 2.7 04/12/2021     Lab Results   Component Value Date     04/12/2021      Lab Results   Component Value Date     04/12/2021                   Lab Results   Component Value Date    POTASSIUM 3.7 04/12/2021           Lab Results   Component Value Date    MAG 1.9 04/12/2021            Lab Results   Component Value Date    CR 0.70 04/12/2021                   Lab Results   Component Value Date    RIYA 8.5 04/12/2021                Lab Results   Component Value Date    BILITOTAL 0.4 04/12/2021           Lab Results   Component Value Date    ALBUMIN 3.5 04/12/2021                    Lab Results   Component Value Date    ALT 21 04/12/2021           Lab Results   Component Value Date    AST 12 04/12/2021       Results reviewed, labs MET treatment parameters, ok to proceed with treatment.      Post Infusion Assessment:  Patient tolerated infusion without incident.  Blood return noted pre and post infusion.  Site patent and intact, free from redness, edema or discomfort.  No evidence of extravasations.  Access discontinued per protocol.       Discharge Plan:   Patient declined prescription refills.  Discharge instructions reviewed with: Patient.  Patient and/or family verbalized understanding of discharge instructions and  all questions answered.  AVS to patient via [employer].  Patient will return 4/19/21 for next appointment.   Patient discharged in stable condition accompanied by: self.  Departure Mode: Ambulatory.    Guillermina Barrios RN

## 2021-04-13 ENCOUNTER — OFFICE VISIT (OUTPATIENT)
Dept: OPHTHALMOLOGY | Facility: CLINIC | Age: 71
End: 2021-04-13
Attending: OPHTHALMOLOGY
Payer: MEDICARE

## 2021-04-13 ENCOUNTER — APPOINTMENT (OUTPATIENT)
Dept: RADIATION ONCOLOGY | Facility: CLINIC | Age: 71
End: 2021-04-13
Attending: RADIOLOGY
Payer: MEDICARE

## 2021-04-13 DIAGNOSIS — C09.9 SQUAMOUS CELL CARCINOMA OF TONSIL (H): ICD-10-CM

## 2021-04-13 DIAGNOSIS — H02.239 PARALYTIC LAGOPHTHALMOS, UNSPECIFIED LATERALITY: Primary | ICD-10-CM

## 2021-04-13 DIAGNOSIS — H16.232 NEUROTROPHIC KERATOPATHY OF LEFT EYE: ICD-10-CM

## 2021-04-13 DIAGNOSIS — G51.0 FACIAL PALSY: ICD-10-CM

## 2021-04-13 PROCEDURE — G0463 HOSPITAL OUTPT CLINIC VISIT: HCPCS

## 2021-04-13 PROCEDURE — 77386 HC IMRT TREATMENT DELIVERY, COMPLEX: CPT | Performed by: RADIOLOGY

## 2021-04-13 PROCEDURE — 99024 POSTOP FOLLOW-UP VISIT: CPT | Mod: GC | Performed by: OPHTHALMOLOGY

## 2021-04-13 PROCEDURE — 77427 RADIATION TX MANAGEMENT X5: CPT | Performed by: RADIOLOGY

## 2021-04-13 PROCEDURE — 77336 RADIATION PHYSICS CONSULT: CPT | Performed by: RADIOLOGY

## 2021-04-13 RX ORDER — ERYTHROMYCIN 5 MG/G
0.5 OINTMENT OPHTHALMIC
Qty: 3.5 G | Refills: 11 | Status: SHIPPED | OUTPATIENT
Start: 2021-04-13 | End: 2021-07-08

## 2021-04-13 ASSESSMENT — CONF VISUAL FIELD
OS_SUPERIOR_TEMPORAL_RESTRICTION: 1
OD_NORMAL: 1
METHOD: COUNTING FINGERS
OS_INFERIOR_TEMPORAL_RESTRICTION: 1
OS_SUPERIOR_NASAL_RESTRICTION: 1
OS_INFERIOR_NASAL_RESTRICTION: 1

## 2021-04-13 ASSESSMENT — TONOMETRY
OD_IOP_MMHG: 10
OS_IOP_MMHG: 3
IOP_METHOD: TONOPEN

## 2021-04-13 ASSESSMENT — VISUAL ACUITY
OD_SC+: -2+1
OD_SC: 20/25
OS_SC: HM
METHOD: SNELLEN - LINEAR

## 2021-04-13 ASSESSMENT — EXTERNAL EXAM - RIGHT EYE: OD_EXAM: NORMAL

## 2021-04-13 ASSESSMENT — EXTERNAL EXAM - LEFT EYE: OS_EXAM: LEFT FACIAL PALSY

## 2021-04-13 NOTE — PROGRESS NOTES
- reviewed notes and images from outside provider and the rest of the care team.    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 The patient underwent low dose weekly carboplatin 1.5 AUC with radiation. His PET/CT 2 months after initiation and underwent surveillance after that. In 08/2020, he developed numbness in chin and then forehead. He underwent PET/CT and IR-guided biopsy (1/2021), which demonstrated invasive squamous cell carcinoma.    Per oncology, patient has left-sided paralysis. The patient first noticed left eye redness and lower lid drooping three weeks ago. He notes that these symptoms have progressed during this time. He notes associated watery discharge. He was seen by oncology and started on erythromycin ilya yesterday then recommended to follow up with ophthalmology for further management. He has also been using AT BID during this time. He denies eye irritation, pain, purulent discharge, and flashes/floaters. He notes baseline left eye vision when not using erythromycin ointment.    Interval HPI: S/p lid surgery by Dr. Yoon 4/1/21. Doing well. No pain. Using lots of ilya.     Undergoing chemo and the cancer is visibly gone--- still ongoing chemo and radiation 4/2021.    POH: refractive error (intermittent glasses use)  Surgery: LASIK (25-30 years ago)- monovision  GTTS: AT, erythromycin ilya    PMH:  -L oropharyngeal squamous cell cancer (first diagnosed 2018), recently diagnosed invasive squamous cell cancer (on chemotherapy)  -HTN    FH:  -No AMD, glaucoma    CT soft tissue neck (1/18/21):  1. Abnormal hypermetabolic activity in the left infratemporal  fossa  involving the medial and lateral pterygoid musculature and the  mandibular branch of the left 5th cranial nerve. Abnormal  hypermetabolic activity tracks through the left foramen ovale into the  medial aspect of the left middle cranial fossa and posteriorly to the  root entry zone of the left 5th cranial nerve.  2. Otherwise, normal head and neck PET/CT.    PET oncology  (1/18/21):  In this patient with tonsillar cancer status post chemotherapy and  radiation with new disease in the left  space, not currently  on chemotherapy:  1.  No evidence for metastatic disease in the chest, abdomen, pelvis.  2.  See dedicated neuroradiology report for the results of the high  resolution PET CT of the neck.     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 New disease in the left  space s/p PET/CT and IR-guided biopsy consistent with invasive squamous cell carcinoma s/p induction chemotherapy (1/28/21).  - Exam notable for significant paralytic lagophthalmos with complete scleral show, exposure keratopathy, and large neurotrophic corneal ulcer  - s/p permanent tarsorrhaphy lateral 50% (2/10/21)- Dr. Pendleton  - s/p FLOWER platinum weight, LLL ectropion repair, and temporary tarso - Dr. Yoon (4/1/21)  - copious ointment on the eye today  - misdirected lashes epilated 4/13/21    Plan:  - Continue Emycin to every 3 hours while awake and at bedtime  - STOP Ofloxacin  - Option PFAT's 4-6x/day prn    RTC: 3 weeks - cornea, 1 week - Dr. Yoon as scheduled.    Elisabeth  MD Aleisha  Cornea & External Disease Fellow    Attending Physician Attestation:  Complete documentation of historical and exam elements from today's encounter can be found in the full encounter summary report (not reduplicated in this progress note).  I personally obtained the chief complaint(s) and history of present illness.  I confirmed and edited as necessary the review of systems, past medical/surgical history, family history, social history, and examination findings as documented by others; and I examined the patient myself.  I personally reviewed the relevant tests, images, and reports as documented above.  I formulated and edited as necessary the assessment and plan and discussed the findings and management plan with the patient and family. - Mert Barney MD

## 2021-04-13 NOTE — PATIENT INSTRUCTIONS
Continue erythromycin ointment every ~2-3 hours and at bedtime.     Refresh Plus PF vials - use as needed in right eye.

## 2021-04-13 NOTE — NURSING NOTE
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  Denies any pain, irritation, discharge, and tearing.  Ocular meds: EES 4-5x/day LE, Ofloxacin BID LE, & PFAT's 4-6x/day LE    Yeny Mccann OT 1:17 PM April 13, 2021

## 2021-04-14 ENCOUNTER — APPOINTMENT (OUTPATIENT)
Dept: RADIATION ONCOLOGY | Facility: CLINIC | Age: 71
End: 2021-04-14
Attending: RADIOLOGY
Payer: MEDICARE

## 2021-04-14 PROCEDURE — 77014 PR CT GUIDE FOR PLACEMENT RADIATION THERAPY FIELDS: CPT | Mod: 26 | Performed by: RADIOLOGY

## 2021-04-14 PROCEDURE — 77386 HC IMRT TREATMENT DELIVERY, COMPLEX: CPT | Performed by: RADIOLOGY

## 2021-04-15 ENCOUNTER — APPOINTMENT (OUTPATIENT)
Dept: RADIATION ONCOLOGY | Facility: CLINIC | Age: 71
End: 2021-04-15
Attending: RADIOLOGY
Payer: MEDICARE

## 2021-04-15 VITALS
SYSTOLIC BLOOD PRESSURE: 121 MMHG | BODY MASS INDEX: 24.94 KG/M2 | WEIGHT: 173.8 LBS | HEART RATE: 69 BPM | DIASTOLIC BLOOD PRESSURE: 73 MMHG

## 2021-04-15 DIAGNOSIS — C09.9 SQUAMOUS CELL CARCINOMA OF TONSIL (H): Primary | ICD-10-CM

## 2021-04-15 PROCEDURE — 77386 HC IMRT TREATMENT DELIVERY, COMPLEX: CPT | Performed by: RADIOLOGY

## 2021-04-15 NOTE — PROGRESS NOTES
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 Cycle 1, day 1 of weekly cisplatin.    3/25/2021: Weekly RT visit. Current dose of 400 cGy. Tolerating treatment well.    3/30/2021: Cycle 1, day 8 of weekly cisplatin    2021: Weekly RT visit. Current dose of 1400 cGy. Mild oral cavity/oropharyngeal pain.    2021: Cycle 1, day 15 of weekly cisplatin    2021: Weekly RT visit. Current dose of 2400 cGy. Mild to moderate oral cavity/oropharyngeal pain.    2021: Cycle 1, day 22 of weekly cisplatin    4/15/2021: Weekly RT visit. Current dose of 3400 cGy. Moderate oral cavity/oropharyngeal pain.    ED visits/Hospitalizations:  None    Missed Treatments:  None    Subjective: Mr. Moya presents to clinic today for his weekly on-treatment visit. He reports slightly increased left posterolateral oral cavity/oropharyngeal pain over the past week and is currently taking oxycodone 5-10 mg q6h as needed for relief of his symptoms. This has been working well for him but has also caused significant constipation earlier this week which resolved following administration of a fleets enema yesterday. He additionally reports feeling down yesterday after having memories of his son who passed away at an early age although says that his mood has improved today. He is taking a small amount of pureed/liquids by mouth with the bulk of his caloric intake (5 cans/daily) continuing via PEG. His weight has remained stable over the past week.    ROS:   Constitutional  Pain  (0-10): 4 (mouth), 4 (throat), 2 (skin)  Fatigue: Mild    CNS  Headaches: None    ENT  Mucositis: Moderate    Cardiopulmonary  Dyspnea: None    GI  Nausea/vomiting: None    Nutrition  PEG: Yes  Diet: 5 cans daily via PEG with small amounts of solids and liquids by mouth    Integumentary  Dermatitis: Mild    Objective:   Current weight: 78.8 kg  Last week's weight: 78.9 kg  Starting weight: 79.9 kg    BP: 121/73 (sitting), 113/69 (standing)  Pulse: 69 (sitting), 74 (standing)     General: Mildly fatigued-appearing gentleman seated comfortably in an examination chair in [facility]  HEENT: Left temporary tarsorrhaphy of the left eye. Right eye with normal EOM. No rhinorrhea or epistaxis. Moderately dry mucus membranes. Extensive radiation-related changes involving the left lateral oral cavity and oropharyngeal mucosa. Patchy mucositis involving the left posterolateral soft palate.  Pulmonary: No wheezing, stridor or respiratory distress  Skin: Mild erythema of the left lateral face    Treatment-related toxicities (CTCAE v5.0):  Mucositis: Grade 2  Dermatitis: Grade 1    Assessment:    Mr. 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 He was treated with induction TPF x2 cycles with a good partial response to therapy and is now undergoing curative intent treatment with reirradiation and weekly cisplatin. He is tolerating treatment well with mild acute radiation-induced toxicities.    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 Titrate to goal of 1 soft bowel movement every 1-2 days.    Mosaiq chart and setup information reviewed  MVCT images reviewed    Medication list reviewed    Sidney Rodgers MD/PhD    Dept of Radiation Oncology  [facility]

## 2021-04-16 ENCOUNTER — APPOINTMENT (OUTPATIENT)
Dept: RADIATION ONCOLOGY | Facility: CLINIC | Age: 71
End: 2021-04-16
Attending: RADIOLOGY
Payer: MEDICARE

## 2021-04-16 PROCEDURE — 77386 HC IMRT TREATMENT DELIVERY, COMPLEX: CPT | Performed by: RADIOLOGY

## 2021-04-16 PROCEDURE — 77014 PR CT GUIDE FOR PLACEMENT RADIATION THERAPY FIELDS: CPT | Mod: 26 | Performed by: RADIOLOGY

## 2021-04-18 NOTE — PROGRESS NOTES
Oncology/Hematology Visit Note  Apr 19, 2021    Reason for Visit: Follow up of recurrent head and neck SCC      History of Present Illness: Fortino Moya is a 71 year old male with PMH atrial fibrillation, hypercholesterolemia, HTN with recurrent head and neck cancer. He has a history of L oropharynx P16+ squamous cell cancer that was first diagnosed in 2018 in [place], [place].  He initially was offered chemoradiation, but due to a prior history of L sided hearing loss, he was given low dose weekly carboplatin 1.5 AUC weekly with radiation.  His 3 month PET/CT after initiation was negative and he underwent surveillance after that. He relocated to [place] which is where he is from in Feb 2020 and has been followed since by Dr. Treviño and Dr. Flores at [facility].       In August of 2020, he  developed numbenss in chin and then moved up to his forehead.  He had a Pet/CT done in Sept 2020 that showed a hypermetabolic area near his L foramen ovale - he had had recent dental work in that area and it was thought that this might be scar or inflammation related to that procedure.       However, he was offered a 2nd opinion and came to [facility] and saw Dr. Russell in Nov 2020.  Images were reviewed at tumor boardon 12/4/20 - and it was felt that this was concerning enough to merit biopsy - so he was referred for IR-guided biopsy.   IR guided biopsy happened on 1/8/2021 and came back as invasive squamous cell carcinoma.    He was going to have SBRT, however, repeat imaging showed marked disease progression in the L  space and we decided to initiate induction chemotherapy to try to shrink the lesion to get to SBRT.      He initiated TPF on 1/28/21 and cycle 2 on 2/18/21. He had a fair amount of deconditioning with induction therapy however restaging imaging (PET/CT CAP/neck 3/1/21) demonstrated a resolution of previous FDG-uptake within the left skull base with some residual soft tissue fullness involving the  superior aspect of the pterygoids. No cervical adenopathy or distant metastatic disease.      He was recommend chemotherapy with concurrent re-irradiation with weekly cisplatin. He was seen today for weekly follow-up on treatment.      Interval History:  Fortino was seen today for follow-up. He overall is feeling well. Having slightly more pain in his throat/left side of face but able to manage with Oxycodone 4x per day, 5-10mg depending on his pain. He admits he isn't doing much mouth rinses. His eye swelling is improved but still has slight bruising, has eye follow up tomorrow. He is still eating a little bit by mouth in addition to fluids and isosource via PEG 4-5 cartons daily. He was dealing with constipation last week, now resolved with doing Senna BID, Miralax BID, and Milk of mag daily.     He denies fevers, headaches, dizziness, hearing concerns, trouble swallowing, chest pain, SOB, cough, nausea, vomiting, abdominal pain, urinary concerns, edema, rashes, neuropathy.     Current Outpatient Medications   Medication Sig Dispense Refill     Aspirin Buf,CaCarb-MgCarb-MgO, 81 MG TABS Take 81 mg by mouth every evening        atorvastatin (LIPITOR) 10 MG tablet Take 10 mg by mouth every evening        carboxymethylcellulose PF (REFRESH LIQUIGEL) 1 % ophthalmic gel Place 1 drop Into the left eye 4 times daily 30 each 11     carvedilol (COREG) 12.5 MG tablet 2 times daily        cevimeline (EVOXAC) 30 MG capsule as needed        erythromycin (ROMYCIN) 5 MG/GM ophthalmic ointment Place 0.5 inches Into the left eye 5 times daily 3.5 g 11     erythromycin (ROMYCIN) 5 MG/GM ophthalmic ointment Apply small amount to incision sites three times daily, then apply to inner lower lid of operative eye(s) at bedtime, as directed. 3.5 g 0     erythromycin (ROMYCIN) 5 MG/GM ophthalmic ointment Place 0.5 inches Into the left eye At Bedtime Instill ~1 cm ribbon into affected eye(s) 4 times daily for 7 days 1 g 0     flecainide  (TAMBOCOR) 150 MG tablet Take 150 mg by mouth 2 times daily        lidocaine (XYLOCAINE) 2 % solution as needed        lisinopril (ZESTRIL) 20 MG tablet Take 20 mg by mouth 2 times daily        LORazepam (ATIVAN) 0.5 MG tablet Take 1 tablet (0.5 mg) by mouth every 4 hours as needed (Anxiety, Nausea/Vomiting or Sleep) 30 tablet 1     magic mouthwash (ENTER INGREDIENTS IN COMMENTS) suspension Take q 4 hours as needed for mouth pain (Patient taking differently: as needed Take q 4 hours as needed for mouth pain) 240 mL 1     neomycin-polymixin-dexamethasone (MAXITROL) 0.1 % ophthalmic suspension Instill 1 drop in left eye 3 times a day 5 mL 0     Nutritional Supplements (ISOSOURCE 1.5 RIYA) LIQD Take 1 Can by mouth 7 times daily 100 Can 11     nystatin (MYCOSTATIN) 306435 UNIT/ML suspension Take 5 mLs (500,000 Units) by mouth 4 times daily Swish and swallow 4 times a day (Patient taking differently: Take 500,000 Units by mouth as needed Swish and swallow 4 times a day) 400 mL 1     oxyCODONE (ROXICODONE) 5 MG/5ML solution Take 5 mLs (5 mg) by mouth every 4 hours as needed for severe pain 500 mL 0     prochlorperazine (COMPAZINE) 10 MG tablet Take 1 tablet (10 mg) by mouth every 6 hours as needed (Nausea/Vomiting) 30 tablet 1     traZODone (DESYREL) 50 MG tablet At Bedtime        Physical Examination:  /75 (BP Location: Right arm, Patient Position: Sitting, Cuff Size: Adult Regular)   Pulse 70   Temp 98.1  F (36.7  C) (Oral)   Resp 16   Wt 80 kg (176 lb 6.4 oz)   SpO2 97%   BMI 25.31 kg/m    Wt Readings from Last 10 Encounters:   04/15/21 78.8 kg (173 lb 12.8 oz)   04/12/21 78.3 kg (172 lb 9.6 oz)   04/08/21 78.9 kg (174 lb)   04/05/21 77.6 kg (171 lb)   04/02/21 78.1 kg (172 lb 3.2 oz)   04/01/21 79.4 kg (175 lb)   04/01/21 79.8 kg (176 lb)   03/30/21 78.9 kg (174 lb)   03/25/21 79.9 kg (176 lb 1.6 oz)   03/22/21 79.7 kg (175 lb 12.8 oz)     Constitutional: Well-appearing male in no acute distress.  Eyes:  Left eye closed with ointment, slight swelling and bruising around orbit. No concerns with right eye.   ENT: Oral mucosa is moist, poor dental hygiene left upper tooth vs focal thrush. No mucositis.   Lymphatic: Neck is supple without cervical or supraclavicular lymphadenopathy.   Cardiovascular: Regular rate and rhythm. No murmurs, gallops, or rubs. No peripheral edema.  Respiratory: Clear to auscultation bilaterally. No wheezes or crackles.  Gastrointestinal: Bowel sounds present. Abdomen soft, non-tender. PEG in place with no surrounding erythema or tenderness.   Neurologic: Cranial nerves II through XII are grossly intact.  Skin: No rashes, petechiae, or bruising noted on exposed skin.    Laboratory Data:  Results for ELSI IQBAL (MRN [id number]) as of 4/19/2021 12:35   4/19/2021 06:57   Sodium 138   Potassium 3.7   Chloride 100   Carbon Dioxide 31   Urea Nitrogen 16   Creatinine 0.65 (L)   GFR Estimate >90   GFR Estimate If Black >90   Calcium 8.5   Anion Gap 7   Magnesium 2.1   Albumin 3.4   Protein Total 6.2 (L)   Bilirubin Total 0.3   Alkaline Phosphatase 58   ALT 23   AST 12   Glucose 135 (H)   WBC 2.9 (L)   Hemoglobin 11.3 (L)   Hematocrit 32.9 (L)   Platelet Count 163   RBC Count 3.39 (L)   MCV 97   MCH 33.3 (H)   MCHC 34.3   RDW 14.5   Diff Method Automated Method   % Neutrophils 73.3   % Lymphocytes 10.1   % Monocytes 13.2   % Eosinophils 2.8   % Basophils 0.3   % Immature Granulocytes 0.3   Nucleated RBCs 0   Absolute Neutrophil 2.1   Absolute Lymphocytes 0.3 (L)   Absolute Monocytes 0.4   Absolute Eosinophils 0.1   Absolute Basophils 0.0   Abs Immature Granulocytes 0.0   Absolute Nucleated RBC 0.0     Assessment and Plan:  1. Onc  Recurrent SCC of head and neck, prior chemoradiation, now with local recurrence in L  space. S/p 2 cycles of TPF induction chemotherapy with near CR on PET. Discussion at ENT tumor board with plan to do radiation with weekly cisplatin, started 3/24/21.       Tolerating treatment well with no concerns. Labs reviewed and stable. Will continue with week 5 of cisplatin today.     Will see him weekly on treatment.      2. ENT  Dysphagia: Stable, following with speech.     Cancer related pain: Increasing as expected, Continue Oxycodone PRN. Well controlled.      Mucositis: Encouraged salt/soda swishes in anticipation of radiation induced mucositis     Thrush: Resolved, has Nystatin at home if needed. Suspect left upper mouth findings more likely poor hygiene and discussed salt/soda swishes.       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      Monitor chronic left hearing loss with cisplatin, no issues currently.     3. FEN  Nutrition: Continue PEG, 5 cartons per day, tolerating well and weight stable. Continue oral intake as tolerated. RD following.      Hydration: Doing well, creat WNL. Continue good hydration during treatment      4. GI  Constipation: Improved, continue Senna BID, Miralax BID, Milk of mag daily. Discussed increasing Senna as he increases Oxycodone.     5. Cards  HTN: Holding lisinopril, BP WNL. Continue Coreg    Afib: Continue flecainide     25 minutes spent on the date of the encounter doing chart review, review of test results, interpretation of tests, patient visit and documentation     Torsten Polanco PA-C  [facility]  [address], [place] [id number] [phone]

## 2021-04-19 ENCOUNTER — INFUSION THERAPY VISIT (OUTPATIENT)
Dept: ONCOLOGY | Facility: CLINIC | Age: 71
End: 2021-04-19
Attending: INTERNAL MEDICINE
Payer: MEDICARE

## 2021-04-19 ENCOUNTER — APPOINTMENT (OUTPATIENT)
Dept: RADIATION ONCOLOGY | Facility: CLINIC | Age: 71
End: 2021-04-19
Attending: RADIOLOGY
Payer: MEDICARE

## 2021-04-19 ENCOUNTER — APPOINTMENT (OUTPATIENT)
Dept: LAB | Facility: CLINIC | Age: 71
End: 2021-04-19
Attending: INTERNAL MEDICINE
Payer: MEDICARE

## 2021-04-19 VITALS
HEART RATE: 70 BPM | BODY MASS INDEX: 25.31 KG/M2 | SYSTOLIC BLOOD PRESSURE: 121 MMHG | WEIGHT: 176.4 LBS | RESPIRATION RATE: 16 BRPM | DIASTOLIC BLOOD PRESSURE: 75 MMHG | OXYGEN SATURATION: 97 % | TEMPERATURE: 98.1 F

## 2021-04-19 DIAGNOSIS — Z51.89 ENCOUNTER FOR OTHER SPECIFIED AFTERCARE: ICD-10-CM

## 2021-04-19 DIAGNOSIS — C09.9 SQUAMOUS CELL CARCINOMA OF TONSIL (H): ICD-10-CM

## 2021-04-19 DIAGNOSIS — C09.9 TONSILLAR CANCER (H): Primary | ICD-10-CM

## 2021-04-19 DIAGNOSIS — C09.9 SQUAMOUS CELL CARCINOMA OF TONSIL (H): Primary | ICD-10-CM

## 2021-04-19 LAB
ALBUMIN SERPL-MCNC: 3.4 G/DL (ref 3.4–5)
ALP SERPL-CCNC: 58 U/L (ref 40–150)
ALT SERPL W P-5'-P-CCNC: 23 U/L (ref 0–70)
ANION GAP SERPL CALCULATED.3IONS-SCNC: 7 MMOL/L (ref 3–14)
AST SERPL W P-5'-P-CCNC: 12 U/L (ref 0–45)
BASOPHILS # BLD AUTO: 0 10E9/L (ref 0–0.2)
BASOPHILS NFR BLD AUTO: 0.3 %
BILIRUB SERPL-MCNC: 0.3 MG/DL (ref 0.2–1.3)
BUN SERPL-MCNC: 16 MG/DL (ref 7–30)
CALCIUM SERPL-MCNC: 8.5 MG/DL (ref 8.5–10.1)
CHLORIDE SERPL-SCNC: 100 MMOL/L (ref 94–109)
CO2 SERPL-SCNC: 31 MMOL/L (ref 20–32)
CREAT SERPL-MCNC: 0.65 MG/DL (ref 0.66–1.25)
DIFFERENTIAL METHOD BLD: ABNORMAL
EOSINOPHIL # BLD AUTO: 0.1 10E9/L (ref 0–0.7)
EOSINOPHIL NFR BLD AUTO: 2.8 %
ERYTHROCYTE [DISTWIDTH] IN BLOOD BY AUTOMATED COUNT: 14.5 % (ref 10–15)
GFR SERPL CREATININE-BSD FRML MDRD: >90 ML/MIN/{1.73_M2}
GLUCOSE SERPL-MCNC: 135 MG/DL (ref 70–99)
HCT VFR BLD AUTO: 32.9 % (ref 40–53)
HGB BLD-MCNC: 11.3 G/DL (ref 13.3–17.7)
IMM GRANULOCYTES # BLD: 0 10E9/L (ref 0–0.4)
IMM GRANULOCYTES NFR BLD: 0.3 %
LYMPHOCYTES # BLD AUTO: 0.3 10E9/L (ref 0.8–5.3)
LYMPHOCYTES NFR BLD AUTO: 10.1 %
MAGNESIUM SERPL-MCNC: 2.1 MG/DL (ref 1.6–2.3)
MCH RBC QN AUTO: 33.3 PG (ref 26.5–33)
MCHC RBC AUTO-ENTMCNC: 34.3 G/DL (ref 31.5–36.5)
MCV RBC AUTO: 97 FL (ref 78–100)
MONOCYTES # BLD AUTO: 0.4 10E9/L (ref 0–1.3)
MONOCYTES NFR BLD AUTO: 13.2 %
NEUTROPHILS # BLD AUTO: 2.1 10E9/L (ref 1.6–8.3)
NEUTROPHILS NFR BLD AUTO: 73.3 %
NRBC # BLD AUTO: 0 10*3/UL
NRBC BLD AUTO-RTO: 0 /100
PLATELET # BLD AUTO: 163 10E9/L (ref 150–450)
POTASSIUM SERPL-SCNC: 3.7 MMOL/L (ref 3.4–5.3)
PROT SERPL-MCNC: 6.2 G/DL (ref 6.8–8.8)
RBC # BLD AUTO: 3.39 10E12/L (ref 4.4–5.9)
SODIUM SERPL-SCNC: 138 MMOL/L (ref 133–144)
WBC # BLD AUTO: 2.9 10E9/L (ref 4–11)

## 2021-04-19 PROCEDURE — 96367 TX/PROPH/DG ADDL SEQ IV INF: CPT

## 2021-04-19 PROCEDURE — 96413 CHEMO IV INFUSION 1 HR: CPT

## 2021-04-19 PROCEDURE — 85025 COMPLETE CBC W/AUTO DIFF WBC: CPT | Performed by: INTERNAL MEDICINE

## 2021-04-19 PROCEDURE — 258N000003 HC RX IP 258 OP 636: Performed by: INTERNAL MEDICINE

## 2021-04-19 PROCEDURE — 96375 TX/PRO/DX INJ NEW DRUG ADDON: CPT

## 2021-04-19 PROCEDURE — 77386 HC IMRT TREATMENT DELIVERY, COMPLEX: CPT | Performed by: RADIOLOGY

## 2021-04-19 PROCEDURE — G0463 HOSPITAL OUTPT CLINIC VISIT: HCPCS

## 2021-04-19 PROCEDURE — 83735 ASSAY OF MAGNESIUM: CPT | Performed by: INTERNAL MEDICINE

## 2021-04-19 PROCEDURE — 80053 COMPREHEN METABOLIC PANEL: CPT | Performed by: INTERNAL MEDICINE

## 2021-04-19 PROCEDURE — 250N000011 HC RX IP 250 OP 636: Performed by: INTERNAL MEDICINE

## 2021-04-19 PROCEDURE — 250N000011 HC RX IP 250 OP 636: Performed by: PHYSICIAN ASSISTANT

## 2021-04-19 PROCEDURE — 99214 OFFICE O/P EST MOD 30 MIN: CPT | Performed by: PHYSICIAN ASSISTANT

## 2021-04-19 RX ORDER — OXYCODONE HCL 5 MG/5 ML
5-10 SOLUTION, ORAL ORAL EVERY 4 HOURS PRN
Qty: 500 ML | Refills: 0 | Status: SHIPPED | OUTPATIENT
Start: 2021-04-19 | End: 2021-05-03

## 2021-04-19 RX ORDER — HEPARIN SODIUM (PORCINE) LOCK FLUSH IV SOLN 100 UNIT/ML 100 UNIT/ML
5 SOLUTION INTRAVENOUS ONCE
Status: COMPLETED | OUTPATIENT
Start: 2021-04-19 | End: 2021-04-19

## 2021-04-19 RX ORDER — PALONOSETRON 0.05 MG/ML
0.25 INJECTION, SOLUTION INTRAVENOUS ONCE
Status: COMPLETED | OUTPATIENT
Start: 2021-04-19 | End: 2021-04-19

## 2021-04-19 RX ADMIN — Medication 5 ML: at 06:48

## 2021-04-19 RX ADMIN — Medication 5 ML: at 10:23

## 2021-04-19 RX ADMIN — CISPLATIN 80 MG: 1 INJECTION INTRAVENOUS at 09:06

## 2021-04-19 RX ADMIN — PALONOSETRON 0.25 MG: 0.05 INJECTION, SOLUTION INTRAVENOUS at 08:14

## 2021-04-19 RX ADMIN — SODIUM CHLORIDE 1000 ML: 9 INJECTION, SOLUTION INTRAVENOUS at 07:56

## 2021-04-19 RX ADMIN — DEXAMETHASONE SODIUM PHOSPHATE: 10 INJECTION, SOLUTION INTRAMUSCULAR; INTRAVENOUS at 08:16

## 2021-04-19 ASSESSMENT — PAIN SCALES - GENERAL: PAINLEVEL: MODERATE PAIN (4)

## 2021-04-19 NOTE — LETTER
4/19/2021         RE: Fortino Moya  [address]  [facility] [address]      Oncology/Hematology Visit Note  Apr 19, 2021    Reason for Visit: Follow up of recurrent head and neck SCC      History of Present Illness: Fortino Moya is a 71 year old male with PMH atrial fibrillation, hypercholesterolemia, HTN with recurrent head and neck cancer. He has a history of L oropharynx P16+ squamous cell cancer that was first diagnosed in 2018 in [place], [place].  He initially was offered chemoradiation, but due to a prior history of L sided hearing loss, he was given low dose weekly carboplatin 1.5 AUC weekly with radiation.  His 3 month PET/CT after initiation was negative and he underwent surveillance after that. He relocated to [place] which is where he is from in Feb 2020 and has been followed since by Dr. Treviño and Dr. Flores at [facility].       In August of 2020, he  developed numbenss in chin and then moved up to his forehead.  He had a Pet/CT done in Sept 2020 that showed a hypermetabolic area near his L foramen ovale - he had had recent dental work in that area and it was thought that this might be scar or inflammation related to that procedure.       However, he was offered a 2nd opinion and came to [facility] and saw Dr. Russell in Nov 2020.  Images were reviewed at tumor boardon 12/4/20 - and it was felt that this was concerning enough to merit biopsy - so he was referred for IR-guided biopsy.   IR guided biopsy happened on 1/8/2021 and came back as invasive squamous cell carcinoma.    He was going to have SBRT, however, repeat imaging showed marked disease progression in the L  space and we decided to initiate induction chemotherapy to try to shrink the lesion to get to SBRT.      He initiated TPF on 1/28/21 and cycle 2 on 2/18/21. He had a fair amount of deconditioning with induction therapy however restaging imaging (PET/CT CAP/neck 3/1/21) demonstrated a resolution of previous FDG-uptake  within the left skull base with some residual soft tissue fullness involving the superior aspect of the pterygoids. No cervical adenopathy or distant metastatic disease.      He was recommend chemotherapy with concurrent re-irradiation with weekly cisplatin. He was seen today for weekly follow-up on treatment.      Interval History:  Fortino was seen today for follow-up. He overall is feeling well. Having slightly more pain in his throat/left side of face but able to manage with Oxycodone 4x per day, 5-10mg depending on his pain. He admits he isn't doing much mouth rinses. His eye swelling is improved but still has slight bruising, has eye follow up tomorrow. He is still eating a little bit by mouth in addition to fluids and isosource via PEG 4-5 cartons daily. He was dealing with constipation last week, now resolved with doing Senna BID, Miralax BID, and Milk of mag daily.     He denies fevers, headaches, dizziness, hearing concerns, trouble swallowing, chest pain, SOB, cough, nausea, vomiting, abdominal pain, urinary concerns, edema, rashes, neuropathy.     Current Outpatient Medications   Medication Sig Dispense Refill     Aspirin Buf,CaCarb-MgCarb-MgO, 81 MG TABS Take 81 mg by mouth every evening        atorvastatin (LIPITOR) 10 MG tablet Take 10 mg by mouth every evening        carboxymethylcellulose PF (REFRESH LIQUIGEL) 1 % ophthalmic gel Place 1 drop Into the left eye 4 times daily 30 each 11     carvedilol (COREG) 12.5 MG tablet 2 times daily        cevimeline (EVOXAC) 30 MG capsule as needed        erythromycin (ROMYCIN) 5 MG/GM ophthalmic ointment Place 0.5 inches Into the left eye 5 times daily 3.5 g 11     erythromycin (ROMYCIN) 5 MG/GM ophthalmic ointment Apply small amount to incision sites three times daily, then apply to inner lower lid of operative eye(s) at bedtime, as directed. 3.5 g 0     erythromycin (ROMYCIN) 5 MG/GM ophthalmic ointment Place 0.5 inches Into the left eye At Bedtime Instill ~1 cm  ribbon into affected eye(s) 4 times daily for 7 days 1 g 0     flecainide (TAMBOCOR) 150 MG tablet Take 150 mg by mouth 2 times daily        lidocaine (XYLOCAINE) 2 % solution as needed        lisinopril (ZESTRIL) 20 MG tablet Take 20 mg by mouth 2 times daily        LORazepam (ATIVAN) 0.5 MG tablet Take 1 tablet (0.5 mg) by mouth every 4 hours as needed (Anxiety, Nausea/Vomiting or Sleep) 30 tablet 1     magic mouthwash (ENTER INGREDIENTS IN COMMENTS) suspension Take q 4 hours as needed for mouth pain (Patient taking differently: as needed Take q 4 hours as needed for mouth pain) 240 mL 1     neomycin-polymixin-dexamethasone (MAXITROL) 0.1 % ophthalmic suspension Instill 1 drop in left eye 3 times a day 5 mL 0     Nutritional Supplements (ISOSOURCE 1.5 RIYA) LIQD Take 1 Can by mouth 7 times daily 100 Can 11     nystatin (MYCOSTATIN) 967057 UNIT/ML suspension Take 5 mLs (500,000 Units) by mouth 4 times daily Swish and swallow 4 times a day (Patient taking differently: Take 500,000 Units by mouth as needed Swish and swallow 4 times a day) 400 mL 1     oxyCODONE (ROXICODONE) 5 MG/5ML solution Take 5 mLs (5 mg) by mouth every 4 hours as needed for severe pain 500 mL 0     prochlorperazine (COMPAZINE) 10 MG tablet Take 1 tablet (10 mg) by mouth every 6 hours as needed (Nausea/Vomiting) 30 tablet 1     traZODone (DESYREL) 50 MG tablet At Bedtime        Physical Examination:  /75 (BP Location: Right arm, Patient Position: Sitting, Cuff Size: Adult Regular)   Pulse 70   Temp 98.1  F (36.7  C) (Oral)   Resp 16   Wt 80 kg (176 lb 6.4 oz)   SpO2 97%   BMI 25.31 kg/m    Wt Readings from Last 10 Encounters:   04/15/21 78.8 kg (173 lb 12.8 oz)   04/12/21 78.3 kg (172 lb 9.6 oz)   04/08/21 78.9 kg (174 lb)   04/05/21 77.6 kg (171 lb)   04/02/21 78.1 kg (172 lb 3.2 oz)   04/01/21 79.4 kg (175 lb)   04/01/21 79.8 kg (176 lb)   03/30/21 78.9 kg (174 lb)   03/25/21 79.9 kg (176 lb 1.6 oz)   03/22/21 79.7 kg (175 lb 12.8  oz)     Constitutional: Well-appearing male in no acute distress.  Eyes: Left eye closed with ointment, slight swelling and bruising around orbit. No concerns with right eye.   ENT: Oral mucosa is moist, poor dental hygiene left upper tooth vs focal thrush. No mucositis.   Lymphatic: Neck is supple without cervical or supraclavicular lymphadenopathy.   Cardiovascular: Regular rate and rhythm. No murmurs, gallops, or rubs. No peripheral edema.  Respiratory: Clear to auscultation bilaterally. No wheezes or crackles.  Gastrointestinal: Bowel sounds present. Abdomen soft, non-tender. PEG in place with no surrounding erythema or tenderness.   Neurologic: Cranial nerves II through XII are grossly intact.  Skin: No rashes, petechiae, or bruising noted on exposed skin.    Laboratory Data:  Results for ELSI IQBAL (MRN [id number]) as of 4/19/2021 12:35   4/19/2021 06:57   Sodium 138   Potassium 3.7   Chloride 100   Carbon Dioxide 31   Urea Nitrogen 16   Creatinine 0.65 (L)   GFR Estimate >90   GFR Estimate If Black >90   Calcium 8.5   Anion Gap 7   Magnesium 2.1   Albumin 3.4   Protein Total 6.2 (L)   Bilirubin Total 0.3   Alkaline Phosphatase 58   ALT 23   AST 12   Glucose 135 (H)   WBC 2.9 (L)   Hemoglobin 11.3 (L)   Hematocrit 32.9 (L)   Platelet Count 163   RBC Count 3.39 (L)   MCV 97   MCH 33.3 (H)   MCHC 34.3   RDW 14.5   Diff Method Automated Method   % Neutrophils 73.3   % Lymphocytes 10.1   % Monocytes 13.2   % Eosinophils 2.8   % Basophils 0.3   % Immature Granulocytes 0.3   Nucleated RBCs 0   Absolute Neutrophil 2.1   Absolute Lymphocytes 0.3 (L)   Absolute Monocytes 0.4   Absolute Eosinophils 0.1   Absolute Basophils 0.0   Abs Immature Granulocytes 0.0   Absolute Nucleated RBC 0.0     Assessment and Plan:  1. Onc  Recurrent SCC of head and neck, prior chemoradiation, now with local recurrence in L  space. S/p 2 cycles of TPF induction chemotherapy with near CR on PET. Discussion at ENT tumor board  with plan to do radiation with weekly cisplatin, started 3/24/21.      Tolerating treatment well with no concerns. Labs reviewed and stable. Will continue with week 5 of cisplatin today.     Will see him weekly on treatment.      2. ENT  Dysphagia: Stable, following with speech.     Cancer related pain: Increasing as expected, Continue Oxycodone PRN. Well controlled.      Mucositis: Encouraged salt/soda swishes in anticipation of radiation induced mucositis     Thrush: Resolved, has Nystatin at home if needed. Suspect left upper mouth findings more likely poor hygiene and discussed salt/soda swishes.       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      Monitor chronic left hearing loss with cisplatin, no issues currently.     3. FEN  Nutrition: Continue PEG, 5 cartons per day, tolerating well and weight stable. Continue oral intake as tolerated. RD following.      Hydration: Doing well, creat WNL. Continue good hydration during treatment      4. GI  Constipation: Improved, continue Senna BID, Miralax BID, Milk of mag daily. Discussed increasing Senna as he increases Oxycodone.     5. Cards  HTN: Holding lisinopril, BP WNL. Continue Coreg    Afib: Continue flecainide     25 minutes spent on the date of the encounter doing chart review, review of test results, interpretation of tests, patient visit and documentation     Torsten Polanco PA-C  [facility]  [address] [place] [address]  [phone]          MENDY Chowdhury

## 2021-04-19 NOTE — NURSING NOTE
Chief Complaint   Patient presents with     Port Draw     Labs drawn via port by rn in lab. VS taken.     Port accessed with 20g 3/4 inch gripper needle by RN, labs collected, line flushed with saline and heparin.  Vitals taken. Pt checked in for appointment(s).    Jaguar Zapata, RN

## 2021-04-19 NOTE — PROGRESS NOTES
Infusion Nursing Note:  Fortino Moya presents today for Cycle 1 Day 29 Cisplatin.    Patient seen by provider today: Yes: MENDY Chowdhury   present during visit today: Not Applicable.    Note: pt presents to infusion room today after appt with Torsten feeling well with no complaints.    Pain: denies need for intervention from this RN for throat pain today    Intravenous Access:  Implanted Port.    Treatment Conditions:  Lab Results   Component Value Date    HGB 11.3 04/19/2021     Lab Results   Component Value Date    WBC 2.9 04/19/2021      Lab Results   Component Value Date    ANEU 2.1 04/19/2021     Lab Results   Component Value Date     04/19/2021      Lab Results   Component Value Date     04/19/2021                   Lab Results   Component Value Date    POTASSIUM 3.7 04/19/2021           Lab Results   Component Value Date    MAG 2.1 04/19/2021            Lab Results   Component Value Date    CR 0.65 04/19/2021                   Lab Results   Component Value Date    RIYA 8.5 04/19/2021                Lab Results   Component Value Date    BILITOTAL 0.3 04/19/2021           Lab Results   Component Value Date    ALBUMIN 3.4 04/19/2021                    Lab Results   Component Value Date    ALT 23 04/19/2021           Lab Results   Component Value Date    AST 12 04/19/2021       Results reviewed, labs MET treatment parameters, ok to proceed with treatment.      Post Infusion Assessment:  Patient tolerated infusion without incident. Pt voiding without difficulty pre, during, and post Cisplatin.  Blood return noted pre and post infusion.  Site patent and intact, free from redness, edema or discomfort.  No evidence of extravasations.  Access discontinued per protocol.       Discharge Plan:   Patient declined prescription refills.  Copy of AVS reviewed with patient and/or family.  Patient will return 4/26 for next appointment.  Patient discharged in stable condition accompanied by:  self.  Departure Mode: Ambulatory.    NAYLA LUNA RN

## 2021-04-19 NOTE — NURSING NOTE
"Oncology Rooming Note    April 19, 2021 7:10 AM   Fortino Moya is a 71 year old male who presents for:    Chief Complaint   Patient presents with     Port Draw     Labs drawn via port by rn in lab. VS taken.     Oncology Clinic Visit     squamous cell carcinoma of tonsil     Initial Vitals: /75 (BP Location: Right arm, Patient Position: Sitting, Cuff Size: Adult Regular)   Pulse 70   Temp 98.1  F (36.7  C) (Oral)   Resp 16   Wt 80 kg (176 lb 6.4 oz)   SpO2 97%   BMI 25.31 kg/m   Estimated body mass index is 25.31 kg/m  as calculated from the following:    Height as of 4/2/21: 1.778 m (5' 10\").    Weight as of this encounter: 80 kg (176 lb 6.4 oz). Body surface area is 1.99 meters squared.  Moderate Pain (4) Comment: Data Unavailable   No LMP for male patient.  Allergies reviewed: Yes  Medications reviewed: Yes    Medications: MEDICATION REFILLS NEEDED TODAY. Provider was notified. Hydrocodone needs refill    Pharmacy name entered into [employer]:    [id number] - [place], [place] - [address] [facility] [address] [place], [place] - [address] [place] [address] PHARMACY [place], [place] - [address] [facility] [place], [place] - [address] [facility] [phone]    Clinical concerns: none       Evita Monet CMA            "

## 2021-04-19 NOTE — PATIENT INSTRUCTIONS
Contact Numbers    [facility] Main Line: [phone]  [facility] Triage and after hours / weekends / holidays: [phone]      Please call the triage or after hours line if you experience a temperature greater than or equal to 100.4, shaking chills, have uncontrolled nausea, vomiting and/or diarrhea, dizziness, shortness of breath, chest pain, bleeding, unexplained bruising, or if you have any other new/concerning symptoms, questions or concerns.      If you are having any concerning symptoms or wish to speak to a provider before your next infusion visit, please call your care coordinator or triage to notify them so we can adequately serve you.     If you need a refill on a narcotic prescription or other medication, please call before your infusion appointment.       April 2021 Sunday Monday Tuesday Wednesday Thursday Friday Saturday 1    TREATMENT   8:30 AM   (30 min.)   [facility] RAD ONC RONALD   [facility] Radiation [facility]cology    OTV   9:00 AM   (15 min.)   Sidney Rodgers MD   [facility] Radiation [facility]cology    PAC EVAL   3:45 PM   (60 min.)   America Merchant APRN CNS   [facility] [place]    PRE-PROCEDURE COVID PCR   5:00 PM   (15 min.)    COVID LAB   [facility]    LAB   5:15 PM   (15 min.)    LAB   [facility] [place] 2    Admission   8:00 AM   Vivien Yoon MD   [facility] PreOP/Phase II   (Discharge: 4/2/2021)    TREATMENT   8:30 AM   (30 min.)   [facility]    CORRECTION, RETRACTION, EYELID, USING LOAD WEIGHT INSERTION   9:50 AM   Vivien Yoon MD    OR 3       4     5    VIDEO VISIT RETURN   6:45 AM   (50 min.)   Torsten Polanco PA   [facility]    LAB CENTRAL   9:00 AM   (15 min.)   [facility]   [facility] ONC INFUSION 240   9:30  AM   (240 min.)   [facility] ONCOLOGY INFUSION   [facility]    TREATMENT   1:45 PM   (30 min.)   [facility] RAD ONC RONALD   [facility] Radiation Oncology 6    TREATMENT  10:00 AM   (30 min.)   [facility] RAD ONC [facility]   [facility] 7    TREATMENT  10:00 AM   (30 min.)   [facility] RAD ONC RON[facility]   [facility]iation On[facility] 8    TREATMENT  10:00 AM   (30 min.)   [facility] RAD ONC [facility]   [facility]    OTV  10:30 AM   (15 min.)   Lonnie Salazar MD   [facility] Radiation Oncology 9    TREATMENT  10:00 AM   (30 min.)   [facility] RAD ONC [facility]ON[facility]D   [facility]iation Oncology 10       11     12    LAB CENTRAL   6:30 AM   (15 min.)    [facility]RAW   [facility] ONC INFUSION 360   8:00 AM   (360 min.)   [facility] ONCOLOGY INFUSION   [facility]    TREATMENT  10:00 AM   (30 min.)   [facility] RAD ONC [facility]   [facility] Radiation Oncology 13    TREATMENT  10:00 AM   (30 min.)   [facility] RAD ONC [facility]   [facility]    [facility] RETURN CORNEA   2:00 PM   (15 min.)   Mert Barney MD   [facility] 14    TREATMENT  10:00 AM   (30 min.)   [facility] RAD ONC [facility]   [facility] Radiation Oncology 15    TREATMENT  10:00 AM   (30 min.)   [facility] RAD ONC [facility]   [facility]iation Oncology    OTV  10:30 AM   (15 min.)   Sidney Rodgers MD   [facility] 16    TREATMENT  10:00 AM   (30 min.)   [facility] RAD ONC RON[facility]D   [facility]iation [facility]cology 17       18     19    LAB CENTRAL   6:30 AM   (15 min.)    [facility] DRAW   [facility]    RETURN   6:45 AM   (50 min.)   Torsten Polanco PA   [facility]    [facility] ONC INFUSION 360   8:00 AM   (360 min.)   [facility] ONCOLOGY INFUSION   [facility]  Clinic    TREATMENT  10:00 AM   (30 min.)   [facility] RAD ONC [facility]ON[facility]D   [facility] Radiation Oncology 20    TREATMENT  10:00 AM   (30 min.)   [facility] RAD ONC [facility]   [facility] Radiation Oncology    TELEPHONE VISIT RETURN   2:00 PM   (15 min.)   Vivien Yoon MD   [facility] 21    TREATMENT  10:00 AM   (30 min.)   [facility] RAD ONC [facility]ON[facility]   [facility] Radiation Oncology 22    TREATMENT  10:00 AM   (30 min.)   [facility] RAD ONC [facility]ON[facility]D   [facility] Radiation Oncology    OTV  10:30 AM   (15 min.)   Sidney Rodgers MD   [facility]iation Oncology 23    TREATMENT  10:00 AM   (30 min.)   [facility] RAD ONC RON[facility]D   [facility] Radiation Oncology 24       25     26    LAB CENTRAL   6:30 AM   (15 min.)   [facility]   [facility]    RETURN   6:45 AM   (50 min.)   Torsten Polanco PA   [facility]    [facility] ONC INFUSION 360   8:00 AM   (360 min.)   [facility] ONCOLOGY INFUSION   [facility]    TREATMENT  10:00 AM   (30 min.)   [facility] RAD ONC RONALD   [facility] Radiation Oncology 27    TREATMENT  10:00 AM   (30 min.)   [facility] RAD ONC RONALD   [facility]iation On[facility] 28    TREATMENT  10:00 AM   (30 min.)   [facility] RAD ONC [facility]   [facility] 29    TREATMENT  10:00 AM   (30 min.)   [facility] RAD ONC [facility]ON[facility]D   [facility]    OTV  10:30 AM   (15 min.)   Sidney Rodgers MD   [facility]iation Oncology 30    TREATMENT  10:00 AM   (30 min.)   [facility] RAD ONC [facility]ON[facility]   [facility] Radiation Oncology                  May 2021      [place] Monday Tuesday Wednesday Thursday Friday Saturday                                 1       2     3    TREATMENT  10:00 AM   (30 min.)   [facility] RAD ONC [facility]ON[facility]   [facility] Radiation [facility] 4    [facility] RETURN CORNEA   8:45 AM    (15 min.)   Mert Barney MD   [facility]    TREATMENT  10:00 AM   (30 min.)   [facility] RAD ONC RONALD   [facility] Radiation Oncology 5    TREATMENT  10:00 AM   (30 min.)   P RAD ONC RONALD   [facility] Radiation Oncology 6    TREATMENT  10:00 AM   (30 min.)   P RAD ONC RONALD   [facility] Radiation Oncology 7    TREATMENT  10:00 AM   (30 min.)   P RAD ONC RONALD   [facility] Radiation Oncology 8       9     10     11    VIDEO VISIT RETURN   1:15 PM   (30 min.)   Yohan Shah DO   [facility] 12     13     14     15       16     17     18     19     20     21     22       23     24     25     26     27     28     29       30     31                                             Recent Results (from the past 24 hour(s))   CBC with platelets differential    Collection Time: 04/19/21  6:57 AM   Result Value Ref Range    WBC 2.9 (L) 4.0 - 11.0 10e9/L    RBC Count 3.39 (L) 4.4 - 5.9 10e12/L    Hemoglobin 11.3 (L) 13.3 - 17.7 g/dL    Hematocrit 32.9 (L) 40.0 - 53.0 %    MCV 97 78 - 100 fl    MCH 33.3 (H) 26.5 - 33.0 pg    MCHC 34.3 31.5 - 36.5 g/dL    RDW 14.5 10.0 - 15.0 %    Platelet Count 163 150 - 450 10e9/L    Diff Method Automated Method     % Neutrophils 73.3 %    % Lymphocytes 10.1 %    % Monocytes 13.2 %    % Eosinophils 2.8 %    % Basophils 0.3 %    % Immature Granulocytes 0.3 %    Nucleated RBCs 0 0 /100    Absolute Neutrophil 2.1 1.6 - 8.3 10e9/L    Absolute Lymphocytes 0.3 (L) 0.8 - 5.3 10e9/L    Absolute Monocytes 0.4 0.0 - 1.3 10e9/L    Absolute Eosinophils 0.1 0.0 - 0.7 10e9/L    Absolute Basophils 0.0 0.0 - 0.2 10e9/L    Abs Immature Granulocytes 0.0 0 - 0.4 10e9/L    Absolute Nucleated RBC 0.0    Comprehensive metabolic panel    Collection Time: 04/19/21  6:57 AM   Result Value Ref Range    Sodium 138 133 - 144 mmol/L    Potassium 3.7 3.4 - 5.3 mmol/L    Chloride 100 94 - 109 mmol/L    Carbon Dioxide  31 20 - 32 mmol/L    Anion Gap 7 3 - 14 mmol/L    Glucose 135 (H) 70 - 99 mg/dL    Urea Nitrogen 16 7 - 30 mg/dL    Creatinine 0.65 (L) 0.66 - 1.25 mg/dL    GFR Estimate >90 >60 mL/min/[1.73_m2]    GFR Estimate If Black >90 >60 mL/min/[1.73_m2]    Calcium 8.5 8.5 - 10.1 mg/dL    Bilirubin Total 0.3 0.2 - 1.3 mg/dL    Albumin 3.4 3.4 - 5.0 g/dL    Protein Total 6.2 (L) 6.8 - 8.8 g/dL    Alkaline Phosphatase 58 40 - 150 U/L    ALT 23 0 - 70 U/L    AST 12 0 - 45 U/L   Magnesium    Collection Time: 04/19/21  6:57 AM   Result Value Ref Range    Magnesium 2.1 1.6 - 2.3 mg/dL

## 2021-04-20 ENCOUNTER — APPOINTMENT (OUTPATIENT)
Dept: RADIATION ONCOLOGY | Facility: CLINIC | Age: 71
End: 2021-04-20
Attending: RADIOLOGY
Payer: MEDICARE

## 2021-04-20 ENCOUNTER — OFFICE VISIT (OUTPATIENT)
Dept: OPHTHALMOLOGY | Facility: CLINIC | Age: 71
End: 2021-04-20
Payer: MEDICARE

## 2021-04-20 DIAGNOSIS — H16.232 NEUROTROPHIC KERATOPATHY OF LEFT EYE: ICD-10-CM

## 2021-04-20 DIAGNOSIS — G51.0 FACIAL PALSY: ICD-10-CM

## 2021-04-20 DIAGNOSIS — H02.239 PARALYTIC LAGOPHTHALMOS, UNSPECIFIED LATERALITY: Primary | ICD-10-CM

## 2021-04-20 PROCEDURE — 77386 HC IMRT TREATMENT DELIVERY, COMPLEX: CPT | Performed by: RADIOLOGY

## 2021-04-20 PROCEDURE — 77336 RADIATION PHYSICS CONSULT: CPT | Performed by: RADIOLOGY

## 2021-04-20 PROCEDURE — 99024 POSTOP FOLLOW-UP VISIT: CPT | Performed by: OPHTHALMOLOGY

## 2021-04-20 PROCEDURE — 77427 RADIATION TX MANAGEMENT X5: CPT | Performed by: RADIOLOGY

## 2021-04-20 ASSESSMENT — TONOMETRY
IOP_METHOD: ICARE
OD_IOP_MMHG: 10
OS_IOP_MMHG: 10

## 2021-04-20 ASSESSMENT — VISUAL ACUITY
OS_SC: TARSO
OD_CC: 20/20
OD_CC+: -2
METHOD: SNELLEN - LINEAR

## 2021-04-20 ASSESSMENT — EXTERNAL EXAM - RIGHT EYE: OD_EXAM: NORMAL

## 2021-04-20 ASSESSMENT — EXTERNAL EXAM - LEFT EYE: OS_EXAM: LEFT FACIAL PALSY

## 2021-04-20 NOTE — PROGRESS NOTES
Chief Complaint(s) and History of Present Illness(es)     Post Op (Ophthalmology) Both Eyes     Laterality: both eyes    Frequency: constantly    Course: gradually improving    Treatments tried: eye drops and ointment    Pain scale: 0/10              Comments     S/p Left upper eyelid platinum weight insertion 1.6 grams., Left lower   eyelid ectropion repair, and Left temporary tarsorrhaphy on 4/2/2021. Pt   states left eye is still sore, Swelling has gone down a lot. Erythromycin   QID OS and Supriya drops PRN. Maxitrol BID left eye.    Aquiles Grier, COT COT 2:38 PM April 20, 2021         Assessment & Plan     Fortino Moya is a 71 year old male with the following diagnoses:   Encounter Diagnoses   Name Primary?     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  We discussed he may require a permanent tarsorrhaphy. He plays golf and he feels his lack of binocular vision is very problematic. He does not want a permanent tarsorrhaphy.     Continue very aggressive lubrication, f/u 5 weeks.   Patient disposition:   No follow-ups on file.        Attending Physician Attestation: Complete documentation of historical and exam elements from today's encounter can be found in the full encounter summary report (not reduplicated in this progress note). I personally obtained the chief complaint(s) and history of present illness. I confirmed and edited as necessary the review of systems, past medical/surgical history, family history, social history, and examination findings as documented by others; and I examined the patient myself. I personally reviewed the relevant tests, images, and reports as documented above. I formulated and edited as necessary the assessment and plan and discussed the findings and management plan with the patient.  -Vivien Yoon MD

## 2021-04-20 NOTE — NURSING NOTE
Chief Complaints and History of Present Illnesses   Patient presents with     Post Op (Ophthalmology) Both Eyes     Chief Complaint(s) and History of Present Illness(es)     Post Op (Ophthalmology) Both Eyes     Laterality: both eyes    Frequency: constantly    Course: gradually improving    Treatments tried: eye drops and ointment    Pain scale: 0/10              Comments     S/p Left upper eyelid platinum weight insertion 1.6 grams., Left lower eyelid ectropion repair, and Left temporary tarsorrhaphy on 4/2/2021. Pt states left eye is still sore, Swelling has gone down a lot. Erythromycin QID OS and Supriya drops PRN. Maxitrol BID left eye.    ANA Smith COT 2:38 PM April 20, 2021

## 2021-04-21 ENCOUNTER — APPOINTMENT (OUTPATIENT)
Dept: RADIATION ONCOLOGY | Facility: CLINIC | Age: 71
End: 2021-04-21
Attending: RADIOLOGY
Payer: MEDICARE

## 2021-04-21 PROCEDURE — 77014 PR CT GUIDE FOR PLACEMENT RADIATION THERAPY FIELDS: CPT | Mod: 26 | Performed by: RADIOLOGY

## 2021-04-21 PROCEDURE — 77386 HC IMRT TREATMENT DELIVERY, COMPLEX: CPT | Performed by: RADIOLOGY

## 2021-04-22 ENCOUNTER — APPOINTMENT (OUTPATIENT)
Dept: RADIATION ONCOLOGY | Facility: CLINIC | Age: 71
End: 2021-04-22
Attending: RADIOLOGY
Payer: MEDICARE

## 2021-04-22 VITALS
BODY MASS INDEX: 24.54 KG/M2 | DIASTOLIC BLOOD PRESSURE: 80 MMHG | SYSTOLIC BLOOD PRESSURE: 99 MMHG | WEIGHT: 171 LBS | HEART RATE: 67 BPM

## 2021-04-22 DIAGNOSIS — C09.9 SQUAMOUS CELL CARCINOMA OF TONSIL (H): Primary | ICD-10-CM

## 2021-04-22 DIAGNOSIS — G89.3 CANCER ASSOCIATED PAIN: ICD-10-CM

## 2021-04-22 PROCEDURE — 77386 HC IMRT TREATMENT DELIVERY, COMPLEX: CPT | Performed by: RADIOLOGY

## 2021-04-22 RX ORDER — FENTANYL 12.5 UG/1
1 PATCH TRANSDERMAL
Qty: 5 PATCH | Refills: 0 | Status: SHIPPED | OUTPATIENT
Start: 2021-04-22 | End: 2021-04-26

## 2021-04-22 NOTE — PROGRESS NOTES
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 Cycle 1, day 1 of weekly cisplatin.    3/25/2021: Weekly RT visit. Current dose of 400 cGy. Tolerating treatment well.    3/30/2021: Cycle 1, day 8 of weekly cisplatin    2021: Weekly RT visit. Current dose of 1400 cGy. Mild oral cavity/oropharyngeal pain.    2021: Cycle 1, day 15 of weekly cisplatin    2021: Weekly RT visit. Current dose of 2400 cGy. Mild to moderate oral cavity/oropharyngeal pain.    2021: Cycle 1, day 22 of weekly cisplatin    4/15/2021: Weekly RT visit. Current dose of 3400 cGy. Moderate oral cavity/oropharyngeal pain.    2021: Cycle 1, day 29 of weekly cisplatin    2021: Weekly RT visit. Current dose of 4400 cGy. Moderate oropharyngeal pain and mucositis.    ED visits/Hospitalizations:  None    Missed Treatments:  None    Subjective: Mr. Moya presents to clinic today for his weekly on-treatment visit. He has developed worsening odynophagia over the past week, rating his symptoms as a 5/10 in severity. He is currently treating this with 10 mg oxycodone every 4 hours. His diet consists of a small amount of soft foods/liquids by mouth with, by his estimation, approximately 80% of his calories coming via PEG through the use of 5 cans daily. His weight is down approximately 1 kg over the past week.    ROS:   Constitutional  Pain (0-10): 5 (mouth), 5 (throat), 3 (skin)  Fatigue: Moderate    CNS  Headaches: None    ENT  Mucositis: Moderate to  severe    Cardiopulmonary  Dyspnea: None    GI  Nausea/vomiting: None    Nutrition  PEG: Yes  Diet: 5 cans daily via PEG with small amounts of p.o. intake    Integumentary  Dermatitis: Mild    Objective:   Current weight: 77.6 kg  Last week's weight: 78.8 kg  Starting weight: 79.9 kg    BP: 99/80 (sitting), 123/74 (standing)  Pulse: 67 (sitting), 68(standing)     General: Fatigued-appearing 71-year-old gentleman seated comfortably in an examination chair in no acute distress  HEENT: Stable-appearing left temporary tarsorrhaphy. Right eye with normal EOM. No rhinorrhea or epistaxis. Moderately dry mucous membranes with thickened oral cavity secretions. Confluent mucositis involving the left posterolateral soft palate and left posterior and lateral oropharyngeal walls. No evidence of oral thrush.  Pulmonary: No wheezing, stridor or respiratory distress  Skin: Mild erythema of the left lateral face. No desquamation.    Treatment-related toxicities (CTCAE v5.0):  Mucositis: Grade 2  Dermatitis: Grade 1  Fatigue: Grade 1    Assessment:    Mr. 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 He was treated with induction TPF x2 cycles with a good partial response to therapy and is now undergoing curative intent treatment with reirradiation and weekly cisplatin. He is developing progressive radiation-induced mucositis and associated pain.    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:    Continue chemoradiotherapy    Pain management:    Continue oxycodone 5-10 mg q6h as needed for moderate to severe pain    Start fentanyl 12 mcg/h patch for long-acting pain relief    Fluids/Electrolytes/Nutrition:    Continue caloric/fluid intake per recommendations from oncology dietitian    Mucositis:    Continue salt/soda rinses at least 5-6 times daily    Dermatitis:    Continue BID/TID Aquaphor application to the lips and left lateral  face    Constipation:    Continue current bowel regimen with senna and MiraLAX and titrate to goal of a soft bowel movement every 1-2 days    Mosaiq chart and setup information reviewed  MVCT images reviewed    Medication list reviewed    Sidney Rodgers MD/PhD    Dept of Radiation Oncology  [facility]

## 2021-04-23 ENCOUNTER — APPOINTMENT (OUTPATIENT)
Dept: RADIATION ONCOLOGY | Facility: CLINIC | Age: 71
End: 2021-04-23
Attending: RADIOLOGY
Payer: MEDICARE

## 2021-04-23 ENCOUNTER — TELEPHONE (OUTPATIENT)
Dept: ONCOLOGY | Facility: CLINIC | Age: 71
End: 2021-04-23

## 2021-04-23 PROCEDURE — 77014 PR CT GUIDE FOR PLACEMENT RADIATION THERAPY FIELDS: CPT | Mod: 26 | Performed by: RADIOLOGY

## 2021-04-23 PROCEDURE — 77386 HC IMRT TREATMENT DELIVERY, COMPLEX: CPT | Performed by: RADIOLOGY

## 2021-04-23 NOTE — TELEPHONE ENCOUNTER
Nutrition Services:     Called Fortino today per request of Almaz RNCC and Dr. Rodgers from radiation clinic.   Unable to reach Fortino at this time.  Left detailed VM indicating reason for phone call.      Noted per MD note: increased odynophagia, taking 5 cartons of Nutren 2.0 anastacia/day (2500kcal, 105g protein) = 100% of nutrition needs.      Weight trends: recent 5 lb wt loss.    Wt Readings from Last 6 Encounters:   04/22/21 77.6 kg (171 lb)   04/19/21 80 kg (176 lb 6.4 oz)   04/15/21 78.8 kg (173 lb 12.8 oz)   04/12/21 78.3 kg (172 lb 9.6 oz)   04/08/21 78.9 kg (174 lb)   04/05/21 77.6 kg (171 lb)       RD will attempt to reach Fortino in 2-3 days if no return call has been made.     Ramila Worrell RD,   [facility] [place]  [phone]

## 2021-04-25 NOTE — PROGRESS NOTES
Oncology/Hematology Visit Note  Apr 26, 2021    Reason for Visit: Follow up of recurrent head and neck SCC      History of Present Illness: Fortino Moya is a 71 year old male with PMH atrial fibrillation, hypercholesterolemia, HTN with recurrent head and neck cancer. He has a history of L oropharynx P16+ squamous cell cancer that was first diagnosed in 2018 in [place], [place].  He initially was offered chemoradiation, but due to a prior history of L sided hearing loss, he was given low dose weekly carboplatin 1.5 AUC weekly with radiation.  His 3 month PET/CT after initiation was negative and he underwent surveillance after that. He relocated to [place] which is where he is from in Feb 2020 and has been followed since by Dr. Treviño and Dr. Flores at [facility].       In August of 2020, he  developed numbenss in chin and then moved up to his forehead.  He had a Pet/CT done in Sept 2020 that showed a hypermetabolic area near his L foramen ovale - he had had recent dental work in that area and it was thought that this might be scar or inflammation related to that procedure.       However, he was offered a 2nd opinion and came to [facility] and saw Dr. Russell in Nov 2020.  Images were reviewed at tumor boardon 12/4/20 - and it was felt that this was concerning enough to merit biopsy - so he was referred for IR-guided biopsy.   IR guided biopsy happened on 1/8/2021 and came back as invasive squamous cell carcinoma.    He was going to have SBRT, however, repeat imaging showed marked disease progression in the L  space and we decided to initiate induction chemotherapy to try to shrink the lesion to get to SBRT.      He initiated TPF on 1/28/21 and cycle 2 on 2/18/21. He had a fair amount of deconditioning with induction therapy however restaging imaging (PET/CT CAP/neck 3/1/21) demonstrated a resolution of previous FDG-uptake within the left skull base with some residual soft tissue fullness involving the  superior aspect of the pterygoids. No cervical adenopathy or distant metastatic disease.      He was recommend chemotherapy with concurrent re-irradiation with weekly cisplatin. He was seen today for weekly follow-up on treatment.    Interval History:  oFrtino was seen today for follow-up. Having more pain in his left side of his face/mouth/neck but is able to manage with Oxycodone 10mg 4-5x per day. Dr. Rodgers prescribed Fentanyl but he decided he didn't want to start this and prefers to stick with just Oxycodone. He is doing salt/soda swishes more consistently which has also helped. He is drinking water by mouth but having a harder time with solids, doing 80% via PEG and 20% by mouth, average 6 cartons per day. He is still having issues with constipation, doing 2 Senna, Miralax, and Milk of Mag.     He denies fevers, headaches, dizziness, hearing concerns, chest pain, SOB, cough, nausea, vomiting, abdominal pain, swelling, rashes, neuropathy. He feels like his eye continues to slowly improve.     Current Outpatient Medications   Medication Sig Dispense Refill     Aspirin Buf,CaCarb-MgCarb-MgO, 81 MG TABS Take 81 mg by mouth every evening        atorvastatin (LIPITOR) 10 MG tablet Take 10 mg by mouth every evening        carboxymethylcellulose PF (REFRESH LIQUIGEL) 1 % ophthalmic gel Place 1 drop Into the left eye 4 times daily 30 each 11     carvedilol (COREG) 12.5 MG tablet 2 times daily        cevimeline (EVOXAC) 30 MG capsule as needed        erythromycin (ROMYCIN) 5 MG/GM ophthalmic ointment Place 0.5 inches Into the left eye 5 times daily 3.5 g 11     erythromycin (ROMYCIN) 5 MG/GM ophthalmic ointment Apply small amount to incision sites three times daily, then apply to inner lower lid of operative eye(s) at bedtime, as directed. 3.5 g 0     erythromycin (ROMYCIN) 5 MG/GM ophthalmic ointment Place 0.5 inches Into the left eye At Bedtime Instill ~1 cm ribbon into affected eye(s) 4 times daily for 7 days 1 g 0      fentaNYL (DURAGESIC) 12 mcg/hr 72 hr patch Place 1 patch onto the skin every 72 hours remove old patch. 5 patch 0     flecainide (TAMBOCOR) 150 MG tablet Take 150 mg by mouth 2 times daily        lidocaine (XYLOCAINE) 2 % solution as needed        LORazepam (ATIVAN) 0.5 MG tablet Take 1 tablet (0.5 mg) by mouth every 4 hours as needed (Anxiety, Nausea/Vomiting or Sleep) 30 tablet 1     magic mouthwash (ENTER INGREDIENTS IN COMMENTS) suspension Take q 4 hours as needed for mouth pain (Patient taking differently: as needed Take q 4 hours as needed for mouth pain) 240 mL 1     naloxone (NARCAN) 4 MG/0.1ML nasal spray Spray 1 spray (4 mg) into one nostril alternating nostrils once as needed for opioid reversal every 2-3 minutes until assistance arrives 0.2 mL 0     Nutritional Supplements (ISOSOURCE 1.5 RIYA) LIQD Take 1 Can by mouth 7 times daily 100 Can 11     nystatin (MYCOSTATIN) 829200 UNIT/ML suspension Take 5 mLs (500,000 Units) by mouth 4 times daily Swish and swallow 4 times a day (Patient taking differently: Take 500,000 Units by mouth as needed Swish and swallow 4 times a day) 400 mL 1     oxyCODONE (ROXICODONE) 5 MG/5ML solution Take 5-10 mLs (5-10 mg) by mouth every 4 hours as needed for severe pain 500 mL 0     prochlorperazine (COMPAZINE) 10 MG tablet Take 1 tablet (10 mg) by mouth every 6 hours as needed (Nausea/Vomiting) 30 tablet 1     Physical Examination:  /80   Pulse 70   Temp 98  F (36.7  C) (Oral)   Resp 18   Wt 78.7 kg (173 lb 6.4 oz)   SpO2 98%   BMI 24.88 kg/m    Wt Readings from Last 10 Encounters:   04/22/21 77.6 kg (171 lb)   04/19/21 80 kg (176 lb 6.4 oz)   04/15/21 78.8 kg (173 lb 12.8 oz)   04/12/21 78.3 kg (172 lb 9.6 oz)   04/08/21 78.9 kg (174 lb)   04/05/21 77.6 kg (171 lb)   04/02/21 78.1 kg (172 lb 3.2 oz)   04/01/21 79.4 kg (175 lb)   04/01/21 79.8 kg (176 lb)   03/30/21 78.9 kg (174 lb)     Constitutional: Well-appearing male in no acute distress.  Eyes: Left eye closed  with ointment, minimal swelling and bruising around orbit. No concerns with right eye.   ENT: Oral mucosa is moist, poor dental hygiene left upper tooth vs focal thrush. No mucositis.   Lymphatic: Neck is supple without cervical or supraclavicular lymphadenopathy.   Cardiovascular: Regular rate and rhythm. No murmurs, gallops, or rubs. No peripheral edema.  Respiratory: Clear to auscultation bilaterally. No wheezes or crackles.  Gastrointestinal: Bowel sounds present. Abdomen soft, non-tender. PEG in place with no surrounding erythema or tenderness.   Neurologic: Cranial nerves II through XII are grossly intact.  Skin: No rashes, petechiae, or bruising noted on exposed skin.    Laboratory Data:  Results for ELSI IQBAL (MRN [id number]) as of 4/26/2021 08:26   4/26/2021 06:58   Sodium 137   Potassium 3.7   Chloride 100   Carbon Dioxide 30   Urea Nitrogen 18   Creatinine 0.65 (L)   GFR Estimate >90   GFR Estimate If Black >90   Calcium 8.8   Anion Gap 7   Magnesium 1.8   Albumin 3.5   Protein Total 6.4 (L)   Bilirubin Total 0.4   Alkaline Phosphatase 58   ALT 21   AST 11   Glucose 177 (H)   WBC 2.8 (L)   Hemoglobin 11.1 (L)   Hematocrit 32.0 (L)   Platelet Count 124 (L)   RBC Count 3.27 (L)   MCV 98   MCH 33.9 (H)   MCHC 34.7   RDW 14.0   Diff Method Automated Method   % Neutrophils 77.2   % Lymphocytes 10.5   % Monocytes 10.1   % Eosinophils 1.8   % Basophils 0.4   % Immature Granulocytes 0.0   Nucleated RBCs 0   Absolute Neutrophil 2.1   Absolute Lymphocytes 0.3 (L)   Absolute Monocytes 0.3   Absolute Eosinophils 0.1   Absolute Basophils 0.0   Abs Immature Granulocytes 0.0   Absolute Nucleated RBC 0.0       Assessment and Plan:  1. Onc  Recurrent SCC of head and neck, prior chemoradiation, now with local recurrence in L  space. S/p 2 cycles of TPF induction chemotherapy with near CR on PET. Discussion at ENT tumor board with plan to do radiation with weekly cisplatin, started 3/24/21.       Tolerating treatment well with no concerns. Labs reviewed and stable. Will continue with week 6 of cisplatin today. Confirmed with Dr. Shah do not need week 7 cisplatin. He will see Dr. Shah one week after completion of treatment. Will request DEQUAN follow-up in one month.      2. ENT  Dysphagia: Stable, following with speech.     Cancer related pain: Increasing as expected, Continue Oxycodone PRN. Well controlled. OK to hold off on Fentanyl given he is able to manage with Oxycodone alone.      Mucositis: Continue salt/soda swishes.     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     Monitor chronic left hearing loss with cisplatin, no issues currently.     3. FEN  Nutrition: Continue PEG, 5-6 cartons per day, tolerating well and weight stable. Continue oral intake as tolerated. RD following.      Hydration: Doing well, creat WNL. Continue good hydration during treatment      4. GI  Constipation: Continued issue, continue Senna but increase to 2 tabs BID. Keep Miralax BID, Milk of mag daily. Discussed can do one dose of Magnesium citrate if constipation not resolved over next few days.     5. Cards  HTN: Holding lisinopril, BP WNL. Continue Coreg     Afib: Continue flecainide     35 minutes spent on the date of the encounter doing chart review, review of test results, interpretation of tests, patient visit, documentation and discussion with other provider(s)     Torsten Polanco PA-C  [facility]  [address] [place] [address]  [phone]

## 2021-04-26 ENCOUNTER — ONCOLOGY VISIT (OUTPATIENT)
Dept: ONCOLOGY | Facility: CLINIC | Age: 71
End: 2021-04-26
Attending: INTERNAL MEDICINE
Payer: MEDICARE

## 2021-04-26 ENCOUNTER — APPOINTMENT (OUTPATIENT)
Dept: RADIATION ONCOLOGY | Facility: CLINIC | Age: 71
End: 2021-04-26
Attending: RADIOLOGY
Payer: MEDICARE

## 2021-04-26 ENCOUNTER — OFFICE VISIT (OUTPATIENT)
Dept: RADIATION ONCOLOGY | Facility: CLINIC | Age: 71
End: 2021-04-26
Attending: NURSE PRACTITIONER
Payer: MEDICARE

## 2021-04-26 ENCOUNTER — APPOINTMENT (OUTPATIENT)
Dept: LAB | Facility: CLINIC | Age: 71
End: 2021-04-26
Attending: INTERNAL MEDICINE
Payer: MEDICARE

## 2021-04-26 ENCOUNTER — TELEPHONE (OUTPATIENT)
Dept: ONCOLOGY | Facility: CLINIC | Age: 71
End: 2021-04-26

## 2021-04-26 VITALS
RESPIRATION RATE: 18 BRPM | OXYGEN SATURATION: 98 % | WEIGHT: 173.4 LBS | DIASTOLIC BLOOD PRESSURE: 80 MMHG | HEART RATE: 70 BPM | SYSTOLIC BLOOD PRESSURE: 129 MMHG | BODY MASS INDEX: 24.88 KG/M2 | TEMPERATURE: 98 F

## 2021-04-26 VITALS
SYSTOLIC BLOOD PRESSURE: 151 MMHG | DIASTOLIC BLOOD PRESSURE: 85 MMHG | WEIGHT: 173.6 LBS | HEART RATE: 69 BPM | BODY MASS INDEX: 24.91 KG/M2

## 2021-04-26 DIAGNOSIS — Z51.89 ENCOUNTER FOR OTHER SPECIFIED AFTERCARE: ICD-10-CM

## 2021-04-26 DIAGNOSIS — C09.9 SQUAMOUS CELL CARCINOMA OF TONSIL (H): Primary | ICD-10-CM

## 2021-04-26 DIAGNOSIS — C09.9 TONSILLAR CANCER (H): Primary | ICD-10-CM

## 2021-04-26 LAB
ALBUMIN SERPL-MCNC: 3.5 G/DL (ref 3.4–5)
ALP SERPL-CCNC: 58 U/L (ref 40–150)
ALT SERPL W P-5'-P-CCNC: 21 U/L (ref 0–70)
ANION GAP SERPL CALCULATED.3IONS-SCNC: 7 MMOL/L (ref 3–14)
AST SERPL W P-5'-P-CCNC: 11 U/L (ref 0–45)
BASOPHILS # BLD AUTO: 0 10E9/L (ref 0–0.2)
BASOPHILS NFR BLD AUTO: 0.4 %
BILIRUB SERPL-MCNC: 0.4 MG/DL (ref 0.2–1.3)
BUN SERPL-MCNC: 18 MG/DL (ref 7–30)
CALCIUM SERPL-MCNC: 8.8 MG/DL (ref 8.5–10.1)
CHLORIDE SERPL-SCNC: 100 MMOL/L (ref 94–109)
CO2 SERPL-SCNC: 30 MMOL/L (ref 20–32)
CREAT SERPL-MCNC: 0.65 MG/DL (ref 0.66–1.25)
DIFFERENTIAL METHOD BLD: ABNORMAL
EOSINOPHIL # BLD AUTO: 0.1 10E9/L (ref 0–0.7)
EOSINOPHIL NFR BLD AUTO: 1.8 %
ERYTHROCYTE [DISTWIDTH] IN BLOOD BY AUTOMATED COUNT: 14 % (ref 10–15)
GFR SERPL CREATININE-BSD FRML MDRD: >90 ML/MIN/{1.73_M2}
GLUCOSE SERPL-MCNC: 177 MG/DL (ref 70–99)
HCT VFR BLD AUTO: 32 % (ref 40–53)
HGB BLD-MCNC: 11.1 G/DL (ref 13.3–17.7)
IMM GRANULOCYTES # BLD: 0 10E9/L (ref 0–0.4)
IMM GRANULOCYTES NFR BLD: 0 %
LYMPHOCYTES # BLD AUTO: 0.3 10E9/L (ref 0.8–5.3)
LYMPHOCYTES NFR BLD AUTO: 10.5 %
MAGNESIUM SERPL-MCNC: 1.8 MG/DL (ref 1.6–2.3)
MCH RBC QN AUTO: 33.9 PG (ref 26.5–33)
MCHC RBC AUTO-ENTMCNC: 34.7 G/DL (ref 31.5–36.5)
MCV RBC AUTO: 98 FL (ref 78–100)
MONOCYTES # BLD AUTO: 0.3 10E9/L (ref 0–1.3)
MONOCYTES NFR BLD AUTO: 10.1 %
NEUTROPHILS # BLD AUTO: 2.1 10E9/L (ref 1.6–8.3)
NEUTROPHILS NFR BLD AUTO: 77.2 %
NRBC # BLD AUTO: 0 10*3/UL
NRBC BLD AUTO-RTO: 0 /100
PLATELET # BLD AUTO: 124 10E9/L (ref 150–450)
POTASSIUM SERPL-SCNC: 3.7 MMOL/L (ref 3.4–5.3)
PROT SERPL-MCNC: 6.4 G/DL (ref 6.8–8.8)
RBC # BLD AUTO: 3.27 10E12/L (ref 4.4–5.9)
SODIUM SERPL-SCNC: 137 MMOL/L (ref 133–144)
WBC # BLD AUTO: 2.8 10E9/L (ref 4–11)

## 2021-04-26 PROCEDURE — 83735 ASSAY OF MAGNESIUM: CPT | Performed by: INTERNAL MEDICINE

## 2021-04-26 PROCEDURE — 258N000003 HC RX IP 258 OP 636: Performed by: INTERNAL MEDICINE

## 2021-04-26 PROCEDURE — 85025 COMPLETE CBC W/AUTO DIFF WBC: CPT | Performed by: INTERNAL MEDICINE

## 2021-04-26 PROCEDURE — 250N000011 HC RX IP 250 OP 636: Performed by: PHYSICIAN ASSISTANT

## 2021-04-26 PROCEDURE — 250N000011 HC RX IP 250 OP 636: Performed by: INTERNAL MEDICINE

## 2021-04-26 PROCEDURE — 96367 TX/PROPH/DG ADDL SEQ IV INF: CPT

## 2021-04-26 PROCEDURE — 96413 CHEMO IV INFUSION 1 HR: CPT

## 2021-04-26 PROCEDURE — 77386 HC IMRT TREATMENT DELIVERY, COMPLEX: CPT | Performed by: NURSE PRACTITIONER

## 2021-04-26 PROCEDURE — 96375 TX/PRO/DX INJ NEW DRUG ADDON: CPT

## 2021-04-26 PROCEDURE — 99214 OFFICE O/P EST MOD 30 MIN: CPT | Performed by: PHYSICIAN ASSISTANT

## 2021-04-26 PROCEDURE — 96361 HYDRATE IV INFUSION ADD-ON: CPT

## 2021-04-26 PROCEDURE — 80053 COMPREHEN METABOLIC PANEL: CPT | Performed by: INTERNAL MEDICINE

## 2021-04-26 RX ORDER — HEPARIN SODIUM (PORCINE) LOCK FLUSH IV SOLN 100 UNIT/ML 100 UNIT/ML
5 SOLUTION INTRAVENOUS ONCE
Status: COMPLETED | OUTPATIENT
Start: 2021-04-26 | End: 2021-04-26

## 2021-04-26 RX ORDER — PALONOSETRON 0.05 MG/ML
0.25 INJECTION, SOLUTION INTRAVENOUS ONCE
Status: COMPLETED | OUTPATIENT
Start: 2021-04-26 | End: 2021-04-26

## 2021-04-26 RX ORDER — HEPARIN SODIUM (PORCINE) LOCK FLUSH IV SOLN 100 UNIT/ML 100 UNIT/ML
500 SOLUTION INTRAVENOUS ONCE
Status: COMPLETED | OUTPATIENT
Start: 2021-04-26 | End: 2021-04-26

## 2021-04-26 RX ADMIN — SODIUM CHLORIDE, PRESERVATIVE FREE 500 UNITS: 5 INJECTION INTRAVENOUS at 07:04

## 2021-04-26 RX ADMIN — SODIUM CHLORIDE 1000 ML: 9 INJECTION, SOLUTION INTRAVENOUS at 07:57

## 2021-04-26 RX ADMIN — PALONOSETRON 0.25 MG: 0.05 INJECTION, SOLUTION INTRAVENOUS at 08:27

## 2021-04-26 RX ADMIN — CISPLATIN 80 MG: 1 INJECTION INTRAVENOUS at 09:21

## 2021-04-26 RX ADMIN — Medication 5 ML: at 10:26

## 2021-04-26 RX ADMIN — DEXAMETHASONE SODIUM PHOSPHATE: 10 INJECTION, SOLUTION INTRAMUSCULAR; INTRAVENOUS at 08:31

## 2021-04-26 ASSESSMENT — PAIN SCALES - GENERAL: PAINLEVEL: NO PAIN (0)

## 2021-04-26 NOTE — NURSING NOTE
Chief Complaint   Patient presents with     Port Draw     labs drawn via port by RN in lab      /80   Pulse 70   Temp 98  F (36.7  C) (Oral)   Resp 18   Wt 78.7 kg (173 lb 6.4 oz)   SpO2 98%   BMI 24.88 kg/m      Port accessed by RN. Labs drawn and sent. Line flushed with NS and Heparin. Pt tolerated well. Checked in to next appointment.    Sonia Richardson RN on 4/26/2021 at 7:00 AM

## 2021-04-26 NOTE — PROGRESS NOTES
Infusion Nursing Note:  Fortino Moya presents today for Cycle 1 Day 36 Cisplatin.    Patient seen by provider today: Yes: MENDY Chowdhury   present during visit today: Not Applicable.    Note: Patient arrives to infusion today feeling well. Denies any new concerns or complaints following visit with MENDY Chowdhury this morning.     Intravenous Access:  Implanted Port.    Treatment Conditions:  Lab Results   Component Value Date    HGB 11.1 04/26/2021     Lab Results   Component Value Date    WBC 2.8 04/26/2021      Lab Results   Component Value Date    ANEU 2.1 04/26/2021     Lab Results   Component Value Date     04/26/2021      Lab Results   Component Value Date     04/26/2021                   Lab Results   Component Value Date    POTASSIUM 3.7 04/26/2021           Lab Results   Component Value Date    MAG 1.8 04/26/2021            Lab Results   Component Value Date    CR 0.65 04/26/2021                   Lab Results   Component Value Date    RIYA 8.8 04/26/2021                Lab Results   Component Value Date    BILITOTAL 0.4 04/26/2021           Lab Results   Component Value Date    ALBUMIN 3.5 04/26/2021                    Lab Results   Component Value Date    ALT 21 04/26/2021           Lab Results   Component Value Date    AST 11 04/26/2021     Results reviewed, labs MET treatment parameters, ok to proceed with treatment.    Post Infusion Assessment:  Patient voided pre, post and during Cisplatin infusion.  Patient tolerated infusion without incident.  Blood return noted pre and post infusion.  Site patent and intact, free from redness, edema or discomfort.  No evidence of extravasations.  Access discontinued per protocol.     Discharge Plan:   Patient declined prescription refills.  Discharge instructions reviewed with: Patient.  Patient and/or family verbalized understanding of discharge instructions and all questions answered.  AVS to patient via [facility].  Patient  will return 5/11 for next appointment with Dr. Shah.   Patient discharged in stable condition accompanied by: self.  Departure Mode: Ambulatory.    Tavia Patino RN

## 2021-04-26 NOTE — LETTER
4/26/2021         RE: Fortino Moya  [address]  [facility] [address]      Oncology/Hematology Visit Note  Apr 26, 2021    Reason for Visit: Follow up of recurrent head and neck SCC      History of Present Illness: Fortino Moya is a 71 year old male with PMH atrial fibrillation, hypercholesterolemia, HTN with recurrent head and neck cancer. He has a history of L oropharynx P16+ squamous cell cancer that was first diagnosed in 2018 in [place], [place].  He initially was offered chemoradiation, but due to a prior history of L sided hearing loss, he was given low dose weekly carboplatin 1.5 AUC weekly with radiation.  His 3 month PET/CT after initiation was negative and he underwent surveillance after that. He relocated to [place] which is where he is from in Feb 2020 and has been followed since by Dr. Treviño and Dr. Flores at [facility].       In August of 2020, he  developed numbenss in chin and then moved up to his forehead.  He had a Pet/CT done in Sept 2020 that showed a hypermetabolic area near his L foramen ovale - he had had recent dental work in that area and it was thought that this might be scar or inflammation related to that procedure.       However, he was offered a 2nd opinion and came to [facility] and saw Dr. Russell in Nov 2020.  Images were reviewed at tumor boardon 12/4/20 - and it was felt that this was concerning enough to merit biopsy - so he was referred for IR-guided biopsy.   IR guided biopsy happened on 1/8/2021 and came back as invasive squamous cell carcinoma.    He was going to have SBRT, however, repeat imaging showed marked disease progression in the L  space and we decided to initiate induction chemotherapy to try to shrink the lesion to get to SBRT.      He initiated TPF on 1/28/21 and cycle 2 on 2/18/21. He had a fair amount of deconditioning with induction therapy however restaging imaging (PET/CT CAP/neck 3/1/21) demonstrated a resolution of previous FDG-uptake  within the left skull base with some residual soft tissue fullness involving the superior aspect of the pterygoids. No cervical adenopathy or distant metastatic disease.      He was recommend chemotherapy with concurrent re-irradiation with weekly cisplatin. He was seen today for weekly follow-up on treatment.    Interval History:  Fortino was seen today for follow-up. Having more pain in his left side of his face/mouth/neck but is able to manage with Oxycodone 10mg 4-5x per day. Dr. Rodgers prescribed Fentanyl but he decided he didn't want to start this and prefers to stick with just Oxycodone. He is doing salt/soda swishes more consistently which has also helped. He is drinking water by mouth but having a harder time with solids, doing 80% via PEG and 20% by mouth, average 6 cartons per day. He is still having issues with constipation, doing 2 Senna, Miralax, and Milk of Mag.     He denies fevers, headaches, dizziness, hearing concerns, chest pain, SOB, cough, nausea, vomiting, abdominal pain, swelling, rashes, neuropathy. He feels like his eye continues to slowly improve.     Current Outpatient Medications   Medication Sig Dispense Refill     Aspirin Buf,CaCarb-MgCarb-MgO, 81 MG TABS Take 81 mg by mouth every evening        atorvastatin (LIPITOR) 10 MG tablet Take 10 mg by mouth every evening        carboxymethylcellulose PF (REFRESH LIQUIGEL) 1 % ophthalmic gel Place 1 drop Into the left eye 4 times daily 30 each 11     carvedilol (COREG) 12.5 MG tablet 2 times daily        cevimeline (EVOXAC) 30 MG capsule as needed        erythromycin (ROMYCIN) 5 MG/GM ophthalmic ointment Place 0.5 inches Into the left eye 5 times daily 3.5 g 11     erythromycin (ROMYCIN) 5 MG/GM ophthalmic ointment Apply small amount to incision sites three times daily, then apply to inner lower lid of operative eye(s) at bedtime, as directed. 3.5 g 0     erythromycin (ROMYCIN) 5 MG/GM ophthalmic ointment Place 0.5 inches Into the left eye At  Bedtime Instill ~1 cm ribbon into affected eye(s) 4 times daily for 7 days 1 g 0     fentaNYL (DURAGESIC) 12 mcg/hr 72 hr patch Place 1 patch onto the skin every 72 hours remove old patch. 5 patch 0     flecainide (TAMBOCOR) 150 MG tablet Take 150 mg by mouth 2 times daily        lidocaine (XYLOCAINE) 2 % solution as needed        LORazepam (ATIVAN) 0.5 MG tablet Take 1 tablet (0.5 mg) by mouth every 4 hours as needed (Anxiety, Nausea/Vomiting or Sleep) 30 tablet 1     magic mouthwash (ENTER INGREDIENTS IN COMMENTS) suspension Take q 4 hours as needed for mouth pain (Patient taking differently: as needed Take q 4 hours as needed for mouth pain) 240 mL 1     naloxone (NARCAN) 4 MG/0.1ML nasal spray Spray 1 spray (4 mg) into one nostril alternating nostrils once as needed for opioid reversal every 2-3 minutes until assistance arrives 0.2 mL 0     Nutritional Supplements (ISOSOURCE 1.5 RIYA) LIQD Take 1 Can by mouth 7 times daily 100 Can 11     nystatin (MYCOSTATIN) 383251 UNIT/ML suspension Take 5 mLs (500,000 Units) by mouth 4 times daily Swish and swallow 4 times a day (Patient taking differently: Take 500,000 Units by mouth as needed Swish and swallow 4 times a day) 400 mL 1     oxyCODONE (ROXICODONE) 5 MG/5ML solution Take 5-10 mLs (5-10 mg) by mouth every 4 hours as needed for severe pain 500 mL 0     prochlorperazine (COMPAZINE) 10 MG tablet Take 1 tablet (10 mg) by mouth every 6 hours as needed (Nausea/Vomiting) 30 tablet 1     Physical Examination:  /80   Pulse 70   Temp 98  F (36.7  C) (Oral)   Resp 18   Wt 78.7 kg (173 lb 6.4 oz)   SpO2 98%   BMI 24.88 kg/m    Wt Readings from Last 10 Encounters:   04/22/21 77.6 kg (171 lb)   04/19/21 80 kg (176 lb 6.4 oz)   04/15/21 78.8 kg (173 lb 12.8 oz)   04/12/21 78.3 kg (172 lb 9.6 oz)   04/08/21 78.9 kg (174 lb)   04/05/21 77.6 kg (171 lb)   04/02/21 78.1 kg (172 lb 3.2 oz)   04/01/21 79.4 kg (175 lb)   04/01/21 79.8 kg (176 lb)   03/30/21 78.9 kg (174 lb)      Constitutional: Well-appearing male in no acute distress.  Eyes: Left eye closed with ointment, minimal swelling and bruising around orbit. No concerns with right eye.   ENT: Oral mucosa is moist, poor dental hygiene left upper tooth vs focal thrush. No mucositis.   Lymphatic: Neck is supple without cervical or supraclavicular lymphadenopathy.   Cardiovascular: Regular rate and rhythm. No murmurs, gallops, or rubs. No peripheral edema.  Respiratory: Clear to auscultation bilaterally. No wheezes or crackles.  Gastrointestinal: Bowel sounds present. Abdomen soft, non-tender. PEG in place with no surrounding erythema or tenderness.   Neurologic: Cranial nerves II through XII are grossly intact.  Skin: No rashes, petechiae, or bruising noted on exposed skin.    Laboratory Data:  Results for ELSI IQBAL (MRN [id number]) as of 4/26/2021 08:26   4/26/2021 06:58   Sodium 137   Potassium 3.7   Chloride 100   Carbon Dioxide 30   Urea Nitrogen 18   Creatinine 0.65 (L)   GFR Estimate >90   GFR Estimate If Black >90   Calcium 8.8   Anion Gap 7   Magnesium 1.8   Albumin 3.5   Protein Total 6.4 (L)   Bilirubin Total 0.4   Alkaline Phosphatase 58   ALT 21   AST 11   Glucose 177 (H)   WBC 2.8 (L)   Hemoglobin 11.1 (L)   Hematocrit 32.0 (L)   Platelet Count 124 (L)   RBC Count 3.27 (L)   MCV 98   MCH 33.9 (H)   MCHC 34.7   RDW 14.0   Diff Method Automated Method   % Neutrophils 77.2   % Lymphocytes 10.5   % Monocytes 10.1   % Eosinophils 1.8   % Basophils 0.4   % Immature Granulocytes 0.0   Nucleated RBCs 0   Absolute Neutrophil 2.1   Absolute Lymphocytes 0.3 (L)   Absolute Monocytes 0.3   Absolute Eosinophils 0.1   Absolute Basophils 0.0   Abs Immature Granulocytes 0.0   Absolute Nucleated RBC 0.0       Assessment and Plan:  1. Onc  Recurrent SCC of head and neck, prior chemoradiation, now with local recurrence in L  space. S/p 2 cycles of TPF induction chemotherapy with near CR on PET. Discussion at ENT tumor board  with plan to do radiation with weekly cisplatin, started 3/24/21.      Tolerating treatment well with no concerns. Labs reviewed and stable. Will continue with week 6 of cisplatin today. Confirmed with Dr. Shah do not need week 7 cisplatin. He will see Dr. Shah one week after completion of treatment. Will request DEQUAN follow-up in one month.      2. ENT  Dysphagia: Stable, following with speech.     Cancer related pain: Increasing as expected, Continue Oxycodone PRN. Well controlled. OK to hold off on Fentanyl given he is able to manage with Oxycodone alone.      Mucositis: Continue salt/soda swishes.     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     Monitor chronic left hearing loss with cisplatin, no issues currently.     3. FEN  Nutrition: Continue PEG, 5-6 cartons per day, tolerating well and weight stable. Continue oral intake as tolerated. RD following.      Hydration: Doing well, creat WNL. Continue good hydration during treatment      4. GI  Constipation: Continued issue, continue Senna but increase to 2 tabs BID. Keep Miralax BID, Milk of mag daily. Discussed can do one dose of Magnesium citrate if constipation not resolved over next few days.     5. Cards  HTN: Holding lisinopril, BP WNL. Continue Coreg     Afib: Continue flecainide     35 minutes spent on the date of the encounter doing chart review, review of test results, interpretation of tests, patient visit, documentation and discussion with other provider(s)     Torsten Polanco PA-C  [facility]  [address] [place] [address] [id number] [phone]          MENDY Chowdhury

## 2021-04-26 NOTE — PATIENT INSTRUCTIONS
Contact Numbers  [facility]: [phone] (for symptom and scheduling needs)    Please call the [facility] Triage line if you experience a temperature greater than or equal to 100.4, shaking chills, have uncontrolled nausea, vomiting and/or diarrhea, dizziness, shortness of breath, chest pain, bleeding, unexplained bruising, or if you have any other new/concerning symptoms, questions or concerns.     If you are having any concerning symptoms or wish to speak to a provider before your next infusion visit, please call your care coordinator or triage to notify them so we can adequately serve you.     If you need a refill on a narcotic prescription or other medication, please call triage before your infusion appointment.          April 2021 Sunday Monday Tuesday Wednesday Thursday Friday Saturday 1    TREATMENT   8:30 AM   (30 min.)   [facility] RAD [facility]C [facility] Radiation Oncology    OTV   9:00 AM   (15 min.)   Sidney Rodgers MD   [facility] Radiation Oncology    PAC EVAL   3:45 PM   (60 min.)   America Merchant APRN CNS   [facility] [place]    PRE-PROCEDURE COVID PCR   5:00 PM   (15 min.)    COVID LAB   [facility]    LAB   5:15 PM   (15 min.)    LAB   [facility] [place] 2    Admission   8:00 AM   Vivien Yoon MD   [facility] PreOP/Phase II   (Discharge: 4/2/2021)    TREATMENT   8:30 AM   (30 min.)   [facility] RAD [facility]C [facility] Radiation Oncology    CORRECTION, RETRACTION, EYELID, USING LOAD WEIGHT INSERTION   9:50 AM   Vivien Yoon MD    OR 3       4     5    VIDEO VISIT RETURN   6:45 AM   (50 min.)   Torsten Polanco PA   [facility]    LAB CENTRAL   9:00 AM   (15 min.)   [facility] DRAW   [facility] ONC INFUSION 240   9:30 AM   (240 min.)    ONCOLOGY  INFUSION   [facility]    TREATMENT   1:45 PM   (30 min.)   [facility] RAD ONC RONALD   [facility] Radiation Oncology 6    TREATMENT  10:00 AM   (30 min.)   [facility] RAD ONC RONALD   [facility] Radiation Oncology 7    TREATMENT  10:00 AM   (30 min.)   [facility] RAD ONC RONALD   [facility] Radiation Oncology 8    TREATMENT  10:00 AM   (30 min.)   [facility] RAD ONC RONALD   [facility] Radiation Oncology    OTV  10:30 AM   (15 min.)   Lonnie Salazar MD   [facility] Radiation Oncology 9    TREATMENT  10:00 AM   (30 min.)   [facility] RAD ONC RONALD   [facility] Radiation Oncology 10       11     12    LAB CENTRAL   6:30 AM   (15 min.)   [facility] DRAW   [facility] ONC INFUSION 360   8:00 AM   (360 min.)   [facility] ONCOLOGY INFUSION   [facility]    TREATMENT  10:00 AM   (30 min.)   [facility] RAD ONC RONALD   [facility] Radiation Oncology 13    TREATMENT  10:00 AM   (30 min.)   [facility] RAD ONC RONALD   [facility] Radiation Oncology    [facility] RETURN CORNEA   2:00 PM   (15 min.)   Mert Barney MD   [facility] 14    TREATMENT  10:00 AM   (30 min.)   [facility] RAD ONC RONALD   [facility] Radiation Oncology 15    TREATMENT  10:00 AM   (30 min.)   P RAD ONC RONALD   [facility] Radiation Oncology    OTV  10:30 AM   (15 min.)   Sidney Rodgers MD   [facility] Radiation Oncology 16    TREATMENT  10:00 AM   (30 min.)   [facility] RAD ONC RONALD   [facility] Radiation Oncology 17       18     19    LAB CENTRAL   6:30 AM   (15 min.)   [facility] DRAW   [facility]    RETURN   6:45 AM   (50 min.)   Torsten Polanco PA   [facility]    [facility] ONC INFUSION 360   8:00 AM   (360 min.)   UC ONCOLOGY INFUSION   [facility]    TREATMENT  10:00  AM   (30 min.)   [facility] RAD ONC RONALD   [facility] Radiation Oncology 20    TREATMENT  10:00 AM   (30 min.)   [facility] RAD ONC RONALD   [facility] Radiation Oncology    UMP POST-OP   1:45 PM   (15 min.)   Vivien Yoon MD   [facility] [place] 21    TREATMENT  10:00 AM   (30 min.)   [facility] RAD ONC RONALD   [facility] Radiation Oncology 22    TREATMENT  10:00 AM   (30 min.)   [facility] RAD ONC RONALD   [facility] Radiation Oncology    OTV  10:30 AM   (15 min.)   Sidney Rodgers MD   [facility] Radiation Oncology 23    TREATMENT  10:00 AM   (30 min.)   P RAD ONC RONALD   [facility] Radiation Oncology 24       25     26    LAB CENTRAL   6:30 AM   (15 min.)   [facility] LAB DRAW   [facility]    RETURN   6:45 AM   (50 min.)   Tor[facility] Polanco PA   [facility]   8:00 AM   (360 min.)   UC ONCOLOGY INFUSION   [facility]    TREATMENT  10:00 AM   (30 min.)   P RAD ONC RONALD   [facility] Radiation Oncology    OTV  10:30 AM   (15 min.)   Nafisa Sanders APRN CNP   [facility] Radiation Oncology 27    TREATMENT  10:00 AM   (30 min.)   [facility] RAD ONC RONALD   [facility] Radiation Oncology 28    TREATMENT  10:00 AM   (30 min.)   [facility] RAD ONC RONALD   [facility] Radiation Oncology 29    TREATMENT  10:00 AM   (30 min.)   [facility] RAD ONC RONALD   [facility] Radiation Oncology    OTV  10:30 AM   (15 min.)   Sidney Rodgers MD   [facility] Radiation Oncology 30    TREATMENT  10:00 AM   (30 min.)   [facility] RAD ONC RONALD   [facility] Radiation Oncology                  May 2021      [place] Monday Tuesday Wednesday Thursday Friday Saturday                                 1       2     3    TREATMENT  10:00 AM   (30 min.)   P RAD ONC RONALD   [facility] Radiation Oncology 4    [facility] RETURN CORNEA   8:45 AM   (15 min.)   Mert Barney MD   [facility]    TREATMENT  10:00 AM   (30 min.)   [facility] RAD ONC RONALD   [facility] Radiation Oncology 5    TREATMENT  10:00 AM   (30 min.)   [facility] RAD ONC RONALD   [facility] Radiation Oncology 6    TREATMENT  10:00 AM   (30 min.)   [facility] RAD ONC RONALD   [facility] Radiation Oncology 7    TREATMENT  10:00 AM   (30 min.)   [facility] RAD ONC RONALD   [facility] Radiation Oncology 8       9     10     11    VIDEO VISIT RETURN   1:15 PM   (30 min.)   Yohan Shah DO   [facility] 12     13     14     15       16     17     18     19     20     21     22       23     24     25    [facility] POST-OP  10:45 AM   (15 min.)   Vivien Yoon MD   [facility] [place] 26     27     28     29       30     31                                              Lab Results:  Recent Results (from the past 12 hour(s))   CBC with platelets differential    Collection Time: 04/26/21  6:58 AM   Result Value Ref Range    WBC 2.8 (L) 4.0 - 11.0 10e9/L    RBC Count 3.27 (L) 4.4 - 5.9 10e12/L    Hemoglobin 11.1 (L) 13.3 - 17.7 g/dL    Hematocrit 32.0 (L) 40.0 - 53.0 %    MCV 98 78 - 100 fl    MCH 33.9 (H) 26.5 - 33.0 pg    MCHC 34.7 31.5 - 36.5 g/dL    RDW 14.0 10.0 - 15.0 %    Platelet Count 124 (L) 150 - 450 10e9/L    Diff Method Automated Method     % Neutrophils 77.2 %    % Lymphocytes 10.5 %    % Monocytes 10.1 %    % Eosinophils 1.8 %    % Basophils 0.4 %    % Immature Granulocytes 0.0 %    Nucleated RBCs 0 0 /100    Absolute Neutrophil 2.1 1.6 - 8.3 10e9/L    Absolute Lymphocytes 0.3 (L) 0.8 - 5.3 10e9/L    Absolute Monocytes 0.3 0.0 - 1.3 10e9/L    Absolute Eosinophils 0.1 0.0 - 0.7 10e9/L    Absolute Basophils 0.0 0.0 - 0.2 10e9/L    Abs Immature Granulocytes 0.0 0 - 0.4 10e9/L    Absolute Nucleated RBC 0.0     Comprehensive metabolic panel    Collection Time: 04/26/21  6:58 AM   Result Value Ref Range    Sodium 137 133 - 144 mmol/L    Potassium 3.7 3.4 - 5.3 mmol/L    Chloride 100 94 - 109 mmol/L    Carbon Dioxide 30 20 - 32 mmol/L    Anion Gap 7 3 - 14 mmol/L    Glucose 177 (H) 70 - 99 mg/dL    Urea Nitrogen 18 7 - 30 mg/dL    Creatinine 0.65 (L) 0.66 - 1.25 mg/dL    GFR Estimate >90 >60 mL/min/[1.73_m2]    GFR Estimate If Black >90 >60 mL/min/[1.73_m2]    Calcium 8.8 8.5 - 10.1 mg/dL    Bilirubin Total 0.4 0.2 - 1.3 mg/dL    Albumin 3.5 3.4 - 5.0 g/dL    Protein Total 6.4 (L) 6.8 - 8.8 g/dL    Alkaline Phosphatase 58 40 - 150 U/L    ALT 21 0 - 70 U/L    AST 11 0 - 45 U/L   Magnesium    Collection Time: 04/26/21  6:58 AM   Result Value Ref Range    Magnesium 1.8 1.6 - 2.3 mg/dL

## 2021-04-26 NOTE — LETTER
2021         RE: Fortino Moya  [address]  [place] [address]        Dear Colleague,    Thank you for referring your patient, Fortino Moya, to the [facility] RADIATION ONCOLOGY. Please see a copy of my visit note below.    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 Cycle 1, day 1 of weekly cisplatin.    3/25/2021: Weekly RT visit. Current dose of 400 cGy. Tolerating treatment well.    3/30/2021: Cycle 1, day 8 of weekly cisplatin    2021: Weekly RT visit. Current dose of 1400 cGy. Mild oral cavity/oropharyngeal pain.    2021: Cycle 1, day 15 of weekly cisplatin    2021: Weekly RT visit. Current dose of 2400 cGy. Mild to moderate oral cavity/oropharyngeal pain.    2021: Cycle 1, day 22 of weekly cisplatin    4/15/2021: Weekly RT visit. Current dose of 3400 cGy. Moderate oral cavity/oropharyngeal pain.    2021: Cycle 1, day 29 of weekly cisplatin    2021: Weekly RT visit. Current dose of 4400 cGy. Moderate oropharyngeal pain and mucositis.    ED visits/Hospitalizations:  None    Missed Treatments:  None    Subjective: Mr. Moya presents to clinic today for his weekly on-treatment visit. He has developed worsening odynophagia over the past week, rating his symptoms as a 5/10 in severity. He is currently treating this with 10-15 mg oxycodone every 4 hours. He was given Fentanyl but has opted not to take that as he concerned about side effects. His diet consists of a small amount of soft  foods/liquids by mouth with, by his estimation, approximately 80% of his calories coming via PEG through the use of 5 cans daily. His weight is down approximately 1 kg over the past week.    ROS:   Constitutional  Pain (0-10): 4 (mouth), 4 (throat), 3 (skin)  Fatigue: Moderate    CNS  Headaches: None    ENT  Mucositis: Moderate to severe    Cardiopulmonary  Dyspnea: None    GI  Nausea/vomiting: None    Nutrition  PEG: Yes  Diet: 5 cans daily via PEG with small amounts of p.o. intake    Integumentary  Dermatitis: Mild    Objective:   Current weight: 78.7kg  Last week's weight: 77.6 kg  Starting weight: 79.9 kg    BP (!) 151/85   Pulse 69   Wt 78.7 kg (173 lb 9.6 oz)   BMI 24.91 kg/m    (His BP is much higher than usual, but he did just come from chemotherapy.)    General: Fatigued-appearing 71-year-old gentleman seated comfortably in an examination chair in no acute distress  HEENT: Stable-appearing left temporary tarsorrhaphy. Right eye with normal EOM. No rhinorrhea or epistaxis. Moderately dry mucous membranes with thickened oral cavity secretions. Confluent mucositis involving the left posterolateral soft palate and left posterior and lateral oropharyngeal walls. No evidence of oral thrush.  Pulmonary: No wheezing, stridor or respiratory distress  Skin: Mild erythema of the left lateral face. No desquamation.    Treatment-related toxicities (CTCAE v5.0):  Mucositis: Grade 2  Dermatitis: Grade 1  Fatigue: Grade 1    Assessment:    Mr. 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 He was treated with induction TPF x2 cycles with a good partial response to therapy and is now undergoing curative intent treatment with reirradiation and weekly cisplatin. He is developing progressive radiation-induced mucositis and associated pain.    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  He feels a pain level of 4 is sufficiently managed.    Fluids/Electrolytes/Nutrition:    Continue caloric/fluid intake per recommendations from oncology dietitian    Mucositis:    Continue salt/soda rinses at least 5-6 times daily    Dermatitis:    Continue BID/TID Aquaphor application to the lips and left lateral face    Constipation:    Continue current bowel regimen with senna and MiraLAX and titrate to goal of a soft bowel movement every 1-2 days    Nafisa Sanders NP  Dept of Radiation Oncology  [facility]

## 2021-04-26 NOTE — NURSING NOTE
"Oncology Rooming Note    April 26, 2021 7:08 AM   Fortino Moya is a 71 year old male who presents for:    Chief Complaint   Patient presents with     Port Draw     labs drawn via port by RN in lab      Oncology Clinic Visit     Pt is here for a rtn for Tonsil Cancer     Initial Vitals: Blood Pressure 129/80   Pulse 70   Temperature 98  F (36.7  C) (Oral)   Respiration 18   Weight 78.7 kg (173 lb 6.4 oz)   Oxygen Saturation 98%   Body Mass Index 24.88 kg/m   Estimated body mass index is 24.88 kg/m  as calculated from the following:    Height as of 4/2/21: 1.778 m (5' 10\").    Weight as of this encounter: 78.7 kg (173 lb 6.4 oz). Body surface area is 1.97 meters squared.  No Pain (0) Comment: Data Unavailable   No LMP for male patient.  Allergies reviewed: Yes  Medications reviewed: Yes    Medications: Medication refills not needed today.  Pharmacy name entered into Yoyo:    [id number] - [place] - [address] [facility] [address] [place] [address] - [address] [place] [address] PHARMACY [place], [place] - [address] PHARMACY [place], [place] - [facility] [phone]    Clinical concerns: none       Shelbie Lester MA            "

## 2021-04-26 NOTE — TELEPHONE ENCOUNTER
Nutrition Services:     Called Fortino today per request of [facility] and Dr. Rodgers from radiation clinic. This is 2nd attempt from request last week.      Fortino answered his phone and was just waking up from a nap from his long day of treatment today (chemo/radiation).  He requested to call RD back in the next couple of hours as he wakes up.     He has RD contact.  RD will be available upon return phone call.     Ramila Worrell RD,   [facility] [place]  [phone]

## 2021-04-26 NOTE — PROGRESS NOTES
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 Cycle 1, day 1 of weekly cisplatin.    3/25/2021: Weekly RT visit. Current dose of 400 cGy. Tolerating treatment well.    3/30/2021: Cycle 1, day 8 of weekly cisplatin    2021: Weekly RT visit. Current dose of 1400 cGy. Mild oral cavity/oropharyngeal pain.    2021: Cycle 1, day 15 of weekly cisplatin    2021: Weekly RT visit. Current dose of 2400 cGy. Mild to moderate oral cavity/oropharyngeal pain.    2021: Cycle 1, day 22 of weekly cisplatin    4/15/2021: Weekly RT visit. Current dose of 3400 cGy. Moderate oral cavity/oropharyngeal pain.    2021: Cycle 1, day 29 of weekly cisplatin    2021: Weekly RT visit. Current dose of 4400 cGy. Moderate oropharyngeal pain and mucositis.    ED visits/Hospitalizations:  None    Missed Treatments:  None    Subjective: Mr. Moya presents to clinic today for his weekly on-treatment visit. He has developed worsening odynophagia over the past week, rating his symptoms as a 5/10 in severity. He is currently treating this with 10-15 mg oxycodone every 4 hours. He was given Fentanyl but has opted not to take that as he concerned about side effects. His diet consists of a small amount of soft foods/liquids by mouth with, by his estimation, approximately 80% of his calories coming via PEG through the use of 5 cans daily. His weight is down approximately 1 kg over the past week.    ROS:   Constitutional  Pain (0-10): 4 (mouth), 4 (throat), 3  (skin)  Fatigue: Moderate    CNS  Headaches: None    ENT  Mucositis: Moderate to severe    Cardiopulmonary  Dyspnea: None    GI  Nausea/vomiting: None    Nutrition  PEG: Yes  Diet: 5 cans daily via PEG with small amounts of p.o. intake    Integumentary  Dermatitis: Mild    Objective:   Current weight: 78.7kg  Last week's weight: 77.6 kg  Starting weight: 79.9 kg    BP (!) 151/85   Pulse 69   Wt 78.7 kg (173 lb 9.6 oz)   BMI 24.91 kg/m    (His BP is much higher than usual, but he did just come from chemotherapy.)    General: Fatigued-appearing 71-year-old gentleman seated comfortably in an examination chair in no acute distress  HEENT: Stable-appearing left temporary tarsorrhaphy. Right eye with normal EOM. No rhinorrhea or epistaxis. Moderately dry mucous membranes with thickened oral cavity secretions. Confluent mucositis involving the left posterolateral soft palate and left posterior and lateral oropharyngeal walls. No evidence of oral thrush.  Pulmonary: No wheezing, stridor or respiratory distress  Skin: Mild erythema of the left lateral face. No desquamation.    Treatment-related toxicities (CTCAE v5.0):  Mucositis: Grade 2  Dermatitis: Grade 1  Fatigue: Grade 1    Assessment:    Mr. 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 He was treated with induction TPF x2 cycles with a good partial response to therapy and is now undergoing curative intent treatment with reirradiation and weekly cisplatin. He is developing progressive radiation-induced mucositis and associated pain.    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  He feels a pain level of 4 is sufficiently  managed.    Fluids/Electrolytes/Nutrition:    Continue caloric/fluid intake per recommendations from oncology dietitian    Mucositis:    Continue salt/soda rinses at least 5-6 times daily    Dermatitis:    Continue BID/TID Aquaphor application to the lips and left lateral face    Constipation:    Continue current bowel regimen with senna and MiraLAX and titrate to goal of a soft bowel movement every 1-2 days    Naifsa Sanders NP  Dept of Radiation Oncology  [facility]

## 2021-04-27 ENCOUNTER — APPOINTMENT (OUTPATIENT)
Dept: RADIATION ONCOLOGY | Facility: CLINIC | Age: 71
End: 2021-04-27
Attending: RADIOLOGY
Payer: MEDICARE

## 2021-04-27 PROCEDURE — 77427 RADIATION TX MANAGEMENT X5: CPT | Performed by: RADIOLOGY

## 2021-04-27 PROCEDURE — 77336 RADIATION PHYSICS CONSULT: CPT | Performed by: RADIOLOGY

## 2021-04-27 PROCEDURE — 77014 PR CT GUIDE FOR PLACEMENT RADIATION THERAPY FIELDS: CPT | Mod: 26 | Performed by: RADIOLOGY

## 2021-04-27 PROCEDURE — 77386 HC IMRT TREATMENT DELIVERY, COMPLEX: CPT | Performed by: RADIOLOGY

## 2021-04-28 ENCOUNTER — APPOINTMENT (OUTPATIENT)
Dept: RADIATION ONCOLOGY | Facility: CLINIC | Age: 71
End: 2021-04-28
Attending: RADIOLOGY
Payer: MEDICARE

## 2021-04-28 PROCEDURE — 77014 PR CT GUIDE FOR PLACEMENT RADIATION THERAPY FIELDS: CPT | Mod: 26 | Performed by: RADIOLOGY

## 2021-04-28 PROCEDURE — 77386 HC IMRT TREATMENT DELIVERY, COMPLEX: CPT | Performed by: RADIOLOGY

## 2021-04-29 ENCOUNTER — OFFICE VISIT (OUTPATIENT)
Dept: RADIATION ONCOLOGY | Facility: CLINIC | Age: 71
End: 2021-04-29
Attending: RADIOLOGY
Payer: MEDICARE

## 2021-04-29 VITALS
WEIGHT: 173.9 LBS | HEART RATE: 70 BPM | BODY MASS INDEX: 24.95 KG/M2 | SYSTOLIC BLOOD PRESSURE: 138 MMHG | DIASTOLIC BLOOD PRESSURE: 83 MMHG

## 2021-04-29 DIAGNOSIS — C09.9 SQUAMOUS CELL CARCINOMA OF TONSIL (H): Primary | ICD-10-CM

## 2021-04-29 PROCEDURE — 77386 HC IMRT TREATMENT DELIVERY, COMPLEX: CPT | Performed by: RADIOLOGY

## 2021-04-29 NOTE — PROGRESS NOTES
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 Cycle 1, day 1 of weekly cisplatin.    3/25/2021: Weekly RT visit. Current dose of 400 cGy. Tolerating treatment well.    3/30/2021: Cycle 1, day 8 of weekly cisplatin    2021: Weekly RT visit. Current dose of 1400 cGy. Mild oral cavity/oropharyngeal pain.    2021: Cycle 1, day 15 of weekly cisplatin    2021: Weekly RT visit. Current dose of 2400 cGy. Mild to moderate oral cavity/oropharyngeal pain.    2021: Cycle 1, day 22 of weekly cisplatin    4/15/2021: Weekly RT visit. Current dose of 3400 cGy. Moderate oral cavity/oropharyngeal pain.    2021: Cycle 1, day 29 of weekly cisplatin    2021: Weekly RT visit. Current dose of 4400 cGy. Moderate oropharyngeal pain and mucositis.    2021: Cycle 1, day 36 of weekly cisplatin    2021: Weekly RT visit. Current dose of 5400 cGy. Stable, moderate oropharyngeal pain and mucositis.    ED visits/Hospitalizations:  None    Missed Treatments:  None    Subjective: Mr. Moya presents to clinic today for his weekly on-treatment visit. He reports that he is feeling well on examination today with his biggest complaint related to mildly increased treatment-related fatigue. He continues to have stable, moderate oropharyngeal pain which he rates as a 5/10 in severity. He had been prescribed a low-dose fentanyl patch last week however he has not elected to not start this medication as he feels that his symptoms are currently well controlled with as  needed oxycodone. He is taking a soft/puréed diet by mouth and supplementing with 5-6 cans daily via PEG. He reports that his constipation is resolved following a regimen of titrating up on MiraLAX and Senokot. His remaining ROS are otherwise unchanged from last week.    ROS:   Constitutional  Pain (0-10): 4 (mouth), 5 (throat), 3 (skin)  Fatigue: Moderate    CNS  Headaches: None    ENT  Mucositis: Moderate    Cardiopulmonary  Dyspnea: None    GI  Nausea/vomiting: None  Constipation: None    Nutrition  PEG: Yes  Diet: 5-6 cans daily via PEG with small amounts of p.o. intake    Integumentary  Dermatitis: Mild    Objective:   Current weight: 78.9 kg  Last week's weight: 77.6 kg  Starting weight: 79.9 kg    BP: 138/83 (sitting), 103/75 (standing)  Pulse: 70 (sitting), 73 (standing)     General: Mildly fatigued-appearing 71-year-old gentleman seated comfortably in an examination chair no acute distress  HEENT: Stable-appearing left temporary tarsorrhaphy. Right eye with normal EOM. No rhinorrhea or epistaxis. Dry mucous membranes with thickened oral cavity secretions. Confluent mucositis involving the left posterolateral soft palate and left posterior and lateral oropharyngeal walls. No oral thrush.  Pulmonary: No wheezing, stridor or respiratory distress  Skin: Mild erythema of the left lateral face. No desquamation.    Treatment-related toxicities (CTCAE v5.0):  Mucositis: Grade 2  Dermatitis: Grade 1  Fatigue: Grade 1    Assessment:    Mr. 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 He was treated with induction TPF x2 cycles with a good partial response to therapy and is now undergoing curative intent treatment with reirradiation and weekly cisplatin. He has stable moderate treatment-related side effects, namely mucositis, dermatitis and fatigue.    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:    Continue  chemoradiotherapy    Pain management:    Continue oxycodone 5-10 mg q6h as needed for moderate to severe pain    Fluids/Electrolytes/Nutrition:    Continue caloric/fluid intake per recommendations from oncology dietitian    Mucositis:    Continue salt/soda rinses at least 5-6 times daily    Dermatitis:    Continue BID/TID Aquaphor application to the lips and left lateral face    Constipation:    Continue current bowel regimen with senna and MiraLAX and titrate to goal of a soft bowel movement every 1-2 days    Mosaiq chart and setup information reviewed  MVCT images reviewed    Medication list reviewed    Sidney Rodgers MD/PhD    Dept of Radiation Oncology  [facility]

## 2021-04-30 ENCOUNTER — APPOINTMENT (OUTPATIENT)
Dept: RADIATION ONCOLOGY | Facility: CLINIC | Age: 71
End: 2021-04-30
Attending: RADIOLOGY
Payer: MEDICARE

## 2021-04-30 PROCEDURE — 77386 HC IMRT TREATMENT DELIVERY, COMPLEX: CPT | Performed by: RADIOLOGY

## 2021-04-30 PROCEDURE — 77014 PR CT GUIDE FOR PLACEMENT RADIATION THERAPY FIELDS: CPT | Mod: 26 | Performed by: RADIOLOGY

## 2021-05-03 ENCOUNTER — APPOINTMENT (OUTPATIENT)
Dept: RADIATION ONCOLOGY | Facility: CLINIC | Age: 71
End: 2021-05-03
Attending: RADIOLOGY
Payer: MEDICARE

## 2021-05-03 DIAGNOSIS — C09.9 TONSILLAR CANCER (H): ICD-10-CM

## 2021-05-03 DIAGNOSIS — C09.9 SQUAMOUS CELL CARCINOMA OF TONSIL (H): ICD-10-CM

## 2021-05-03 LAB
BASOPHILS # BLD AUTO: 0 10E9/L (ref 0–0.2)
BASOPHILS NFR BLD AUTO: 0.4 %
DIFFERENTIAL METHOD BLD: ABNORMAL
EOSINOPHIL # BLD AUTO: 0 10E9/L (ref 0–0.7)
EOSINOPHIL NFR BLD AUTO: 1.1 %
ERYTHROCYTE [DISTWIDTH] IN BLOOD BY AUTOMATED COUNT: 14.3 % (ref 10–15)
HCT VFR BLD AUTO: 32.5 % (ref 40–53)
HGB BLD-MCNC: 11.3 G/DL (ref 13.3–17.7)
IMM GRANULOCYTES # BLD: 0 10E9/L (ref 0–0.4)
IMM GRANULOCYTES NFR BLD: 0 %
LYMPHOCYTES # BLD AUTO: 0.3 10E9/L (ref 0.8–5.3)
LYMPHOCYTES NFR BLD AUTO: 9.5 %
MCH RBC QN AUTO: 34.6 PG (ref 26.5–33)
MCHC RBC AUTO-ENTMCNC: 34.8 G/DL (ref 31.5–36.5)
MCV RBC AUTO: 99 FL (ref 78–100)
MONOCYTES # BLD AUTO: 0.3 10E9/L (ref 0–1.3)
MONOCYTES NFR BLD AUTO: 11.7 %
NEUTROPHILS # BLD AUTO: 2.1 10E9/L (ref 1.6–8.3)
NEUTROPHILS NFR BLD AUTO: 77.3 %
NRBC # BLD AUTO: 0 10*3/UL
NRBC BLD AUTO-RTO: 0 /100
PLATELET # BLD AUTO: 121 10E9/L (ref 150–450)
RBC # BLD AUTO: 3.27 10E12/L (ref 4.4–5.9)
WBC # BLD AUTO: 2.7 10E9/L (ref 4–11)

## 2021-05-03 PROCEDURE — 77386 HC IMRT TREATMENT DELIVERY, COMPLEX: CPT | Performed by: RADIOLOGY

## 2021-05-03 PROCEDURE — 36415 COLL VENOUS BLD VENIPUNCTURE: CPT | Performed by: INTERNAL MEDICINE

## 2021-05-03 PROCEDURE — 85025 COMPLETE CBC W/AUTO DIFF WBC: CPT | Performed by: INTERNAL MEDICINE

## 2021-05-03 RX ORDER — OXYCODONE HCL 5 MG/5 ML
5-10 SOLUTION, ORAL ORAL EVERY 4 HOURS PRN
Qty: 500 ML | Refills: 0 | Status: SHIPPED | OUTPATIENT
Start: 2021-05-03 | End: 2021-05-13

## 2021-05-03 NOTE — LETTER
5/3/2021         RE: Fortino Moya  [address]  [facility] [address]        Dear Colleague,    Thank you for referring your patient, Fortino Moya, to the [facility] RADIATION ONCOLOGY. Please see a copy of my visit note below.    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 Cycle 1, day 1 of weekly cisplatin.    3/25/2021: Weekly RT visit. Current dose of 400 cGy. Tolerating treatment well.    3/30/2021: Cycle 1, day 8 of weekly cisplatin    2021: Weekly RT visit. Current dose of 1400 cGy. Mild oral cavity/oropharyngeal pain.    2021: Cycle 1, day 15 of weekly cisplatin    2021: Weekly RT visit. Current dose of 2400 cGy. Mild to moderate oral cavity/oropharyngeal pain.    2021: Cycle 1, day 22 of weekly cisplatin    4/15/2021: Weekly RT visit. Current dose of 3400 cGy. Moderate oral cavity/oropharyngeal pain.    2021: Cycle 1, day 29 of weekly cisplatin    2021: Weekly RT visit. Current dose of 4400 cGy. Moderate oropharyngeal pain and mucositis.    2021: Cycle 1, day 36 of weekly cisplatin    2021: Weekly RT visit. Current dose of 5400 cGy. Stable, moderate oropharyngeal pain and mucositis.    ED visits/Hospitalizations:  None    Missed Treatments:  None    Subjective: Mr. Moya presents to clinic today for because he began to have a bloody nose at 2:30 am and it hasn't stopped. He didn't see clots and it was not bleeding profusely he just couldn't stop it.  He did pack it about 2 hours ago and it seems to  have stopped or not bleed through his packing.  He has been picking his nose. He otherwise is stable.  He did not have chemo this week and was did not have labs.    ROS:   Constitutional  Pain (0-10): 4 (mouth), 5 (throat), 3 (skin)  Fatigue: Moderate    CNS  Headaches: None    ENT  Mucositis: Moderate    Cardiopulmonary  Dyspnea: None    GI  Nausea/vomiting: None  Constipation: None    Nutrition  PEG: Yes  Diet: 5-6 cans daily via PEG with small amounts of p.o. intake    Integumentary  Dermatitis: Mild    Objective:   /82    General: Mildly fatigued-appearing 71-year-old gentleman seated comfortably in an examination chair no acute distress  HEENT: Stable-appearing left temporary tarsorrhaphy. Right eye with normal EOM. No rhinorrhea or epistaxis. Dry mucous membranes with thickened oral cavity secretions. Confluent mucositis involving the left posterolateral soft palate and left posterior and lateral oropharyngeal walls. No oral thrush.  Right nares is packed and small amount of blood is seen in packing.  There is some blood scattered in his oral cavity as well.  Assuming this is from the bloody nose.  Pulmonary: No wheezing, stridor or respiratory distress  Skin: Mild erythema of the left lateral face. No desquamation.    Treatment-related toxicities (CTCAE v5.0):  Mucositis: Grade 2  Dermatitis: Grade 1  Fatigue: Grade 1    Assessment:    Mr. 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 He was treated with induction TPF x2 cycles with a good partial response to therapy and is now undergoing curative intent treatment with reirradiation and weekly cisplatin. He has stable moderate treatment-related side effects, namely mucositis, dermatitis and fatigue.  Today with a difficult bloody nose.    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  Cautiously remove.  Apply aquaphor to inside of nose.  GO to ER is starts bleeding and not able to stop it.  No picking.    Nafisa Sanders NP    Dept of Radiation Oncology  [facility]

## 2021-05-03 NOTE — PROGRESS NOTES
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 Cycle 1, day 1 of weekly cisplatin.    3/25/2021: Weekly RT visit. Current dose of 400 cGy. Tolerating treatment well.    3/30/2021: Cycle 1, day 8 of weekly cisplatin    2021: Weekly RT visit. Current dose of 1400 cGy. Mild oral cavity/oropharyngeal pain.    2021: Cycle 1, day 15 of weekly cisplatin    2021: Weekly RT visit. Current dose of 2400 cGy. Mild to moderate oral cavity/oropharyngeal pain.    2021: Cycle 1, day 22 of weekly cisplatin    4/15/2021: Weekly RT visit. Current dose of 3400 cGy. Moderate oral cavity/oropharyngeal pain.    2021: Cycle 1, day 29 of weekly cisplatin    2021: Weekly RT visit. Current dose of 4400 cGy. Moderate oropharyngeal pain and mucositis.    2021: Cycle 1, day 36 of weekly cisplatin    2021: Weekly RT visit. Current dose of 5400 cGy. Stable, moderate oropharyngeal pain and mucositis.    ED visits/Hospitalizations:  None    Missed Treatments:  None    Subjective: Mr. Moya presents to clinic today for because he began to have a bloody nose at 2:30 am and it hasn't stopped. He didn't see clots and it was not bleeding profusely he just couldn't stop it.  He did pack it about 2 hours ago and it seems to have stopped or not bleed through his packing.  He has been picking his nose. He otherwise is stable.  He did not have chemo this week and was did not have labs.    ROS:   Constitutional  Pain (0-10): 4 (mouth), 5 (throat), 3 (skin)  Fatigue:  Moderate    CNS  Headaches: None    ENT  Mucositis: Moderate    Cardiopulmonary  Dyspnea: None    GI  Nausea/vomiting: None  Constipation: None    Nutrition  PEG: Yes  Diet: 5-6 cans daily via PEG with small amounts of p.o. intake    Integumentary  Dermatitis: Mild    Objective:   /82    General: Mildly fatigued-appearing 71-year-old gentleman seated comfortably in an examination chair no acute distress  HEENT: Stable-appearing left temporary tarsorrhaphy. Right eye with normal EOM. No rhinorrhea or epistaxis. Dry mucous membranes with thickened oral cavity secretions. Confluent mucositis involving the left posterolateral soft palate and left posterior and lateral oropharyngeal walls. No oral thrush.  Right nares is packed and small amount of blood is seen in packing.  There is some blood scattered in his oral cavity as well.  Assuming this is from the bloody nose.  Pulmonary: No wheezing, stridor or respiratory distress  Skin: Mild erythema of the left lateral face. No desquamation.    Treatment-related toxicities (CTCAE v5.0):  Mucositis: Grade 2  Dermatitis: Grade 1  Fatigue: Grade 1    Assessment:    Mr. 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 He was treated with induction TPF x2 cycles with a good partial response to therapy and is now undergoing curative intent treatment with reirradiation and weekly cisplatin. He has stable moderate treatment-related side effects, namely mucositis, dermatitis and fatigue.  Today with a difficult bloody nose.    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  Cautiously remove.  Apply aquaphor to inside of nose.  GO to ER is starts bleeding and not able to stop it.  No picking.    Nafisa Sanders NP    Dept of Radiation Oncology  [facility]

## 2021-05-04 ENCOUNTER — OFFICE VISIT (OUTPATIENT)
Dept: OPHTHALMOLOGY | Facility: CLINIC | Age: 71
End: 2021-05-04
Attending: OPHTHALMOLOGY
Payer: MEDICARE

## 2021-05-04 ENCOUNTER — APPOINTMENT (OUTPATIENT)
Dept: RADIATION ONCOLOGY | Facility: CLINIC | Age: 71
End: 2021-05-04
Attending: RADIOLOGY
Payer: MEDICARE

## 2021-05-04 DIAGNOSIS — C09.9 SQUAMOUS CELL CARCINOMA OF TONSIL (H): ICD-10-CM

## 2021-05-04 DIAGNOSIS — H02.239 PARALYTIC LAGOPHTHALMOS, UNSPECIFIED LATERALITY: ICD-10-CM

## 2021-05-04 DIAGNOSIS — H16.212 EXPOSURE KERATOPATHY, LEFT: ICD-10-CM

## 2021-05-04 DIAGNOSIS — H02.056 TRICHIASIS OF LEFT EYE WITHOUT ENTROPION: Primary | ICD-10-CM

## 2021-05-04 DIAGNOSIS — H16.232 NEUROTROPHIC KERATOPATHY OF LEFT EYE: ICD-10-CM

## 2021-05-04 DIAGNOSIS — G51.0 FACIAL PALSY: ICD-10-CM

## 2021-05-04 PROCEDURE — 77336 RADIATION PHYSICS CONSULT: CPT | Performed by: RADIOLOGY

## 2021-05-04 PROCEDURE — 99024 POSTOP FOLLOW-UP VISIT: CPT | Mod: GC | Performed by: OPHTHALMOLOGY

## 2021-05-04 PROCEDURE — G0463 HOSPITAL OUTPT CLINIC VISIT: HCPCS | Mod: 25

## 2021-05-04 PROCEDURE — 77386 HC IMRT TREATMENT DELIVERY, COMPLEX: CPT | Performed by: RADIOLOGY

## 2021-05-04 PROCEDURE — 77427 RADIATION TX MANAGEMENT X5: CPT | Performed by: RADIOLOGY

## 2021-05-04 PROCEDURE — 67820 REVISE EYELASHES: CPT | Performed by: OPHTHALMOLOGY

## 2021-05-04 ASSESSMENT — TONOMETRY
OS_IOP_MMHG: 10
IOP_METHOD: ICARE
OD_IOP_MMHG: 8

## 2021-05-04 ASSESSMENT — VISUAL ACUITY
OD_CC: 20/20
CORRECTION_TYPE: GLASSES
OD_CC+: -1
METHOD: SNELLEN - LINEAR

## 2021-05-04 ASSESSMENT — CONF VISUAL FIELD
METHOD: COUNTING FINGERS
OS_INFERIOR_NASAL_RESTRICTION: 1
OS_INFERIOR_TEMPORAL_RESTRICTION: 1
OS_SUPERIOR_TEMPORAL_RESTRICTION: 1
OS_SUPERIOR_NASAL_RESTRICTION: 1
OD_NORMAL: 1

## 2021-05-04 ASSESSMENT — EXTERNAL EXAM - LEFT EYE: OS_EXAM: LEFT FACIAL PALSY

## 2021-05-04 ASSESSMENT — EXTERNAL EXAM - RIGHT EYE: OD_EXAM: NORMAL

## 2021-05-04 NOTE — PROGRESS NOTES
- reviewed notes and images from outside provider and the rest of the care team.    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 The patient underwent low dose weekly carboplatin 1.5 AUC with radiation. His PET/CT 2 months after initiation and underwent surveillance after that. In 08/2020, he developed numbness in chin and then forehead. He underwent PET/CT and IR-guided biopsy (1/2021), which demonstrated invasive squamous cell carcinoma.    Per oncology, patient has left-sided paralysis. The patient first noticed left eye redness and lower lid drooping three weeks ago. He notes that these symptoms have progressed during this time. He notes associated watery discharge. He was seen by oncology and started on erythromycin ilya yesterday then recommended to follow up with ophthalmology for further management. He has also been using AT BID during this time. He denies eye irritation, pain, purulent discharge, and flashes/floaters. He notes baseline left eye vision when not using erythromycin ointment.    Interval HPI: S/p lid surgery by Dr. Yoon 4/1/21. Doing well. No bryan eye pain, though has some pain left side of face.  Using lots of ilya. No other concerns.     Undergoing chemo and the cancer is visibly gone--- still ongoing radiation 5/2021 (no more chemo) - plan for one more week.    POH: refractive error (intermittent glasses use)  Surgery: LASIK (25-30 years ago)- monovision  GTTS: AT, erythromycin ilya    PMH:  -L oropharyngeal squamous cell cancer (first diagnosed 2018), recently diagnosed invasive squamous cell cancer (on chemotherapy)  -HTN    FH:  -No AMD, glaucoma    CT  soft tissue neck (1/18/21):  1. Abnormal hypermetabolic activity in the left infratemporal fossa  involving the medial and lateral pterygoid musculature and the  mandibular branch of the left 5th cranial nerve. Abnormal  hypermetabolic activity tracks through the left foramen ovale into the  medial aspect of the left middle cranial fossa and posteriorly to the  root entry zone of the left 5th cranial nerve.  2. Otherwise, normal head and neck PET/CT.    PET oncology  (1/18/21):  In this patient with tonsillar cancer status post chemotherapy and  radiation with new disease in the left  space, not currently  on chemotherapy:  1.  No evidence for metastatic disease in the chest, abdomen, pelvis.  2.  See dedicated neuroradiology report for the results of the high  resolution PET CT of the neck.     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 Pt will likely need more extensive Permanent tarso or scleral contact lens longterm.  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 New disease in the left  space s/p PET/CT and IR-guided biopsy consistent with invasive squamous cell carcinoma s/p induction chemotherapy (1/28/21).  - Exam notable for significant paralytic lagophthalmos with complete scleral show, exposure keratopathy, and large neurotrophic corneal ulcer  - s/p permanent tarsorrhaphy lateral 50% (2/10/21)- Dr. Pendleton  - s/p FLOWER platinum weight, LLL ectropion repair, and temporary tarso  - Dr. Yoon (4/1/21) celio f/u 5/25.  - copious ointment on the eye today  - misdirected trichiatic lashes epilated 5/4/21   - visual potential unclear at this point given cornea +/- radiation.  Plan to follow up 2-3 weeks after oculoplastics follow up    Plan:  - Continue Emycin to every 3 hours while awake and at bedtime  - Option PFAT's 4-6x/day prn  - Karrie f/u 5/25/21    RTC: 5-6 weeks cornea, sooner as needed.  Dr. Yoon as scheduled 5/25/21    Jason Goldberg, MD  Cornea & External Disease Fellow  Department of Ophthalmology and Visual Neurosciences    Attending Physician Attestation:  Complete documentation of historical and exam elements from today's encounter can be found in the full encounter summary report (not reduplicated in this progress note).  I personally obtained the chief complaint(s) and history of present illness.  I confirmed and edited as necessary the review of systems, past medical/surgical history, family history, social history, and examination findings as documented by others; and I examined the patient myself.  I personally reviewed the relevant tests, images, and reports as documented above.  I formulated and edited as necessary the assessment and plan and discussed the findings and management plan with the patient and family. - Mert Barney MD

## 2021-05-04 NOTE — NURSING NOTE
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  Denies any pain, irritation, discharge, or tearing.  Ocular meds: EES ilya q3h/qhs LE & AT's PRN JHONATAN Mccann OT 8:56 AM May 4, 2021

## 2021-05-05 ENCOUNTER — APPOINTMENT (OUTPATIENT)
Dept: RADIATION ONCOLOGY | Facility: CLINIC | Age: 71
End: 2021-05-05
Attending: RADIOLOGY
Payer: MEDICARE

## 2021-05-05 ENCOUNTER — HOME INFUSION (PRE-WILLOW HOME INFUSION) (OUTPATIENT)
Dept: PHARMACY | Facility: CLINIC | Age: 71
End: 2021-05-05

## 2021-05-05 PROCEDURE — 77386 HC IMRT TREATMENT DELIVERY, COMPLEX: CPT | Performed by: RADIOLOGY

## 2021-05-05 PROCEDURE — 77014 PR CT GUIDE FOR PLACEMENT RADIATION THERAPY FIELDS: CPT | Mod: 26 | Performed by: RADIOLOGY

## 2021-05-06 ENCOUNTER — OFFICE VISIT (OUTPATIENT)
Dept: RADIATION ONCOLOGY | Facility: CLINIC | Age: 71
End: 2021-05-06
Attending: RADIOLOGY
Payer: MEDICARE

## 2021-05-06 VITALS
WEIGHT: 174.6 LBS | DIASTOLIC BLOOD PRESSURE: 82 MMHG | BODY MASS INDEX: 25.05 KG/M2 | HEART RATE: 94 BPM | SYSTOLIC BLOOD PRESSURE: 132 MMHG

## 2021-05-06 DIAGNOSIS — C09.9 SQUAMOUS CELL CARCINOMA OF TONSIL (H): Primary | ICD-10-CM

## 2021-05-06 PROCEDURE — 77386 HC IMRT TREATMENT DELIVERY, COMPLEX: CPT | Performed by: RADIOLOGY

## 2021-05-06 NOTE — PROGRESS NOTES
This is a recent snapshot of the patient's [facility] medical record.  For current drug dose and complete information and questions, call [phone] or In Basket pool, [facility] ([id number])  CSN Number:  [id number]

## 2021-05-06 NOTE — PROGRESS NOTES
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 Cycle 1, day 1 of weekly cisplatin.    3/25/2021: Weekly RT visit. Current dose of 400 cGy. Tolerating treatment well.    3/30/2021: Cycle 1, day 8 of weekly cisplatin    2021: Weekly RT visit. Current dose of 1400 cGy. Mild oral cavity/oropharyngeal pain.    2021: Cycle 1, day 15 of weekly cisplatin    2021: Weekly RT visit. Current dose of 2400 cGy. Mild to moderate oral cavity/oropharyngeal pain.    2021: Cycle 1, day 22 of weekly cisplatin    4/15/2021: Weekly RT visit. Current dose of 3400 cGy. Moderate oral cavity/oropharyngeal pain.    2021: Cycle 1, day 29 of weekly cisplatin    2021: Weekly RT visit. Current dose of 4400 cGy. Moderate oropharyngeal pain and mucositis.    2021: Cycle 1, day 36 of weekly cisplatin    2021: Weekly RT visit. Current dose of 5400 cGy. Stable, moderate oropharyngeal pain and mucositis.    2021: Weekly RT visit. Current dose of 6400 cGy. Moderate odynophagia.    ED visits/Hospitalizations:  1. 2021: ED visit at [facility] for chest pain.     Missed Treatments:  None    Subjective: Mr. Moya presents to clinic today for his weekly on-treatment visit. He was seen in the ED at [facility] earlier today after experiencing chest pain overnight. He underwent a work-up for cardiovascular and pulmonary etiologies which were negative and he was ultimately diagnosed with airway congestion and was prescribed Sudafed. On exam today,  he reports stable, moderate oropharyngeal pain which he rates as a 6/10 in severity. He is currently controlling his symptoms with 10-15 mg oxycodone every 4 hours. He is taking a small amount of puréed foods by mouth with 5-6 cans daily via PEG. His remaining ROS are positive for mild intermittent headaches without any additional new focal neurologic symptoms    ROS:   Constitutional  Pain (0-10): 6 (mouth), 6 (throat), 3 (skin)  Fatigue: Moderate    CNS  Headaches: Mild intermittent    ENT  Mucositis: Moderate    Cardiopulmonary  Dyspnea: None    GI  Nausea/vomiting: None  Constipation: None    Nutrition  PEG: Yes  Diet: 5-6 cans daily via PEG with small amounts of p.o. intake    Integumentary  Dermatitis: Mild    Objective:   Current weight: 79.2 kg  Last week's weight: 78.9 kg  Starting weight: 79.9 kg    BP: 132/82 (sitting), 139/77 (standing)  Pulse: 94 (sitting), 94 (standing)     General: Mildly fatigued-appearing 71-year-old gentleman seated comfortably in a chair no acute distress  HEENT: Stable left temporary tarsorrhaphy. No rhinorrhea or epistaxis. Dry mucous membranes with mildly thickened oral cavity secretions. Stable-appearing mucositis involving left posterolateral soft palate and left posterior and lateral oropharyngeal walls. No evidence of oral thrush.  Pulmonary: No wheezing, stridor or respiratory distress  Skin: Mild erythema of the left lateral face. No desquamation.    Treatment-related toxicities (CTCAE v5.0):  Mucositis: Grade 2  Dermatitis: Grade 1  Fatigue: Grade 1    Assessment:    Mr. 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 He was treated with induction TPF x2 cycles with a good partial response to therapy and is now undergoing curative intent treatment with reirradiation and weekly cisplatin. He is tolerating treatment reasonably well with the anticipated acute radiation-induced mucositis and dermatitis.    Plan:   rcT4  N0 M0 p16-positive squamous cell carcinoma of the left tonsil:    Complete chemoradiotherapy tomorrow (5/7/2021)    Follow-up with radiation oncology NP in 1 week for symptom assessment    Follow-up with radiation oncology MD in 6 weeks with CT neck prior    Pain management:    Continue oxycodone 10-15 mg every 4 hours as needed for moderate to severe pain    Fluids/Electrolytes/Nutrition:    Continue caloric/fluid intake per recommendations from oncology dietitian    Mucositis:    Continue salt/soda rinses at least 5-6 times daily    Dermatitis:    Continue BID/TID Aquaphor application to the lips and left lateral face    Constipation:    Continue current bowel regimen with senna and MiraLAX and titrate to goal of a soft bowel movement every 1-2 days    Mosaiq chart and setup information reviewed  MVCT images reviewed    Medication list reviewed    Sidney Rodgers MD/PhD    Dept of Radiation Oncology  [facility]

## 2021-05-07 ENCOUNTER — APPOINTMENT (OUTPATIENT)
Dept: RADIATION ONCOLOGY | Facility: CLINIC | Age: 71
End: 2021-05-07
Attending: RADIOLOGY
Payer: MEDICARE

## 2021-05-07 PROCEDURE — 77014 PR CT GUIDE FOR PLACEMENT RADIATION THERAPY FIELDS: CPT | Mod: 26 | Performed by: RADIOLOGY

## 2021-05-07 PROCEDURE — 77427 RADIATION TX MANAGEMENT X5: CPT | Performed by: RADIOLOGY

## 2021-05-07 PROCEDURE — 77386 HC IMRT TREATMENT DELIVERY, COMPLEX: CPT | Performed by: RADIOLOGY

## 2021-05-07 PROCEDURE — 77336 RADIATION PHYSICS CONSULT: CPT | Performed by: RADIOLOGY

## 2021-05-10 NOTE — PROGRESS NOTES
"Fortino is a 71 year old who is being evaluated via a billable video visit.      How would you like to obtain your AVS? MyChart  If the video visit is dropped, the invitation should be resent by: Text to cell phone: [phone]  Will anyone else be joining your video visit? No     Vitals - Patient Reported  Weight (Patient Reported): 77.1 kg (170 lb)  Height (Patient Reported): 177.8 cm (5' 10\")  BMI (Based on Pt Reported Ht/Wt): 24.39  Pain Score: Severe Pain (6)  Pain Loc: Other - see comment(LEFT SIDE OF MOUTH AND THROAT)    Lindsay TOBIN          Video-Visit Details    Type of service:  Video Visit    Video duration 30 min  Originating Location (pt. Location): Home    Distant Location (provider location):  [facility]     Platform used for Video Visit: [email or web address]      Oncology/Hematology Visit Note  May 11, 2021    Reason for Visit: Follow up of recurrent head and neck SCC      History of Present Illness: Fortino Moya is a 71 year old male with PMH atrial fibrillation, hypercholesterolemia, HTN with recurrent head and neck cancer. He has a history of L oropharynx P16+ squamous cell cancer that was first diagnosed in 2018 in [place], [place].  He initially was offered chemoradiation, but due to a prior history of L sided hearing loss, he was given low dose weekly carboplatin 1.5 AUC weekly with radiation.  His 3 month PET/CT after initiation was negative and he underwent surveillance after that. He relocated to [place] which is where he is from in Feb 2020 and has been followed since by Dr. Treviño and Dr. Flores at [facility].       In August of 2020, he  developed numbenss in chin and then moved up to his forehead.  He had a Pet/CT done in Sept 2020 that showed a hypermetabolic area near his L foramen ovale - he had had recent dental work in that area and it was thought that this might be scar or inflammation related to that procedure.       However, he was offered a 2nd opinion and " came to [facility] and saw Dr. Russell in Nov 2020.  Images were reviewed at tumor boardon 12/4/20 - and it was felt that this was concerning enough to merit biopsy - so he was referred for IR-guided biopsy.   IR guided biopsy happened on 1/8/2021 and came back as invasive squamous cell carcinoma.      He was going to have SBRT, however, repeat imaging showed marked disease progression in the L  space and we decided to initiate induction chemotherapy to try to shrink the lesion to get to SBRT.      He initiated TPF on 1/28/21 and cycle 2 on 2/18/21. He had a fair amount of deconditioning with induction therapy however restaging imaging (PET/CT CAP/neck 3/1/21) demonstrated a resolution of previous FDG-uptake within the left skull base with some residual soft tissue fullness involving the superior aspect of the pterygoids. No cervical adenopathy or distant metastatic disease.      He was started chemotherapy with concurrent re-irradiation with weekly low dose cisplatin. He was seen today as a follow up.     Summary of recent treatments:  TPF (2 cycles) 1/28/21-2/18/21  Concurrent chemoradiation with low dose cisplatin - 3/24/21- 4/26/21 (6 cycles on D1, 8, 15, 22, 29, 36)+XRT 6600cGy completed on 5/7/21(radiation part)    Interval History:  Fortino was seen today for follow-up after completion of CRT. He developed moderate odynophagia, and his daily intake consists of 80% via PEG and 20% by mouth, average 6 cartons per day.  He still complained of shortness and pain that varies from 4-6/10 on the left facial side, though endorses that it is getting better.  He was seen in emergency department of [facility] last week for chest pain and wheezing.  Work-up did not find any significant causes, he had a CT PE protocol and chest x-ray done, both of them without any significant findings, EKG with normal sinus rhythm.  However he was noted to have a hemoglobin of 9.6 g/dL, which is lower from his baseline of 11  g/dL even during the active chemoradiation therapy.  He endorses that 1 or 2 times he has noticed that his stool was dark brown, but not blackish in color.  He endorses that he had colonoscopy done 3 years ago in [place] and was told to have another one repeated in 5 years.  He had several episodes in the past when he noticed blood on the toilet paper related to hemorrhoids.  Currently he denies any signs of acute anemia, like shortness of breath, chest pain lightheadedness.     He denies fevers, headaches, dizziness, hearing concerns, chest pain, SOB, cough, nausea, vomiting, abdominal pain, swelling, rashes, neuropathy.       Current Outpatient Medications   Medication Sig Dispense Refill     Aspirin Buf,CaCarb-MgCarb-MgO, 81 MG TABS Take 81 mg by mouth every evening        atorvastatin (LIPITOR) 10 MG tablet Take 10 mg by mouth every evening        carboxymethylcellulose PF (REFRESH LIQUIGEL) 1 % ophthalmic gel Place 1 drop Into the left eye 4 times daily 30 each 11     carvedilol (COREG) 12.5 MG tablet 2 times daily        cevimeline (EVOXAC) 30 MG capsule as needed        erythromycin (ROMYCIN) 5 MG/GM ophthalmic ointment Place 0.5 inches Into the left eye 5 times daily 3.5 g 11     erythromycin (ROMYCIN) 5 MG/GM ophthalmic ointment Apply small amount to incision sites three times daily, then apply to inner lower lid of operative eye(s) at bedtime, as directed. 3.5 g 0     erythromycin (ROMYCIN) 5 MG/GM ophthalmic ointment Place 0.5 inches Into the left eye At Bedtime Instill ~1 cm ribbon into affected eye(s) 4 times daily for 7 days 1 g 0     flecainide (TAMBOCOR) 150 MG tablet Take 150 mg by mouth 2 times daily        lidocaine (XYLOCAINE) 2 % solution as needed        LORazepam (ATIVAN) 0.5 MG tablet Take 1 tablet (0.5 mg) by mouth every 4 hours as needed (Anxiety, Nausea/Vomiting or Sleep) 30 tablet 1     magic mouthwash (ENTER INGREDIENTS IN COMMENTS) suspension Take q 4 hours as needed for mouth pain  (Patient taking differently: as needed Take q 4 hours as needed for mouth pain) 240 mL 1     naloxone (NARCAN) 4 MG/0.1ML nasal spray Spray 1 spray (4 mg) into one nostril alternating nostrils once as needed for opioid reversal every 2-3 minutes until assistance arrives 0.2 mL 0     Nutritional Supplements (ISOSOURCE 1.5 RIYA) LIQD Take 1 Can by mouth 7 times daily 100 Can 11     nystatin (MYCOSTATIN) 762856 UNIT/ML suspension Take 5 mLs (500,000 Units) by mouth 4 times daily Swish and swallow 4 times a day (Patient taking differently: Take 500,000 Units by mouth as needed Swish and swallow 4 times a day) 400 mL 1     oxyCODONE (ROXICODONE) 5 MG/5ML solution Take 5-10 mLs (5-10 mg) by mouth every 4 hours as needed for severe pain 500 mL 0     prochlorperazine (COMPAZINE) 10 MG tablet Take 1 tablet (10 mg) by mouth every 6 hours as needed (Nausea/Vomiting) 30 tablet 1     Physical Examination:  Limited physical exam in settings of COVID pandemic.   Constitutional: Well-appearing male in no acute distress.  ENT: Oral mucosa is moist, poor dental hygiene left upper tooth vs focal thrush. No mucositis.   Respiratory: Clear to auscultation bilaterally. No wheezes or crackles.  Neurologic: Cranial nerves II through XII are grossly intact.  Skin: No rashes, petechiae, or bruising noted on exposed skin.    Laboratory Data:   Recent Labs   Lab Test 05/03/21  1106 04/26/21  0658 04/19/21  0657   WBC 2.7* 2.8* 2.9*   HGB 11.3* 11.1* 11.3*   HCT 32.5* 32.0* 32.9*   MCV 99 98 97   MCHC 34.8 34.7 34.3   RDW 14.3 14.0 14.5   * 124* 163        Last Comprehensive Metabolic Panel:  Sodium   Date Value Ref Range Status   04/26/2021 137 133 - 144 mmol/L Final   04/19/2021 138 133 - 144 mmol/L Final   04/12/2021 138 133 - 144 mmol/L Final     Potassium   Date Value Ref Range Status   04/26/2021 3.7 3.4 - 5.3 mmol/L Final   04/19/2021 3.7 3.4 - 5.3 mmol/L Final   04/12/2021 3.7 3.4 - 5.3 mmol/L Final     Chloride   Date Value Ref  Range Status   04/26/2021 100 94 - 109 mmol/L Final   04/19/2021 100 94 - 109 mmol/L Final   04/12/2021 103 94 - 109 mmol/L Final     Carbon Dioxide   Date Value Ref Range Status   04/26/2021 30 20 - 32 mmol/L Final   04/19/2021 31 20 - 32 mmol/L Final   04/12/2021 31 20 - 32 mmol/L Final     Anion Gap   Date Value Ref Range Status   04/26/2021 7 3 - 14 mmol/L Final   04/19/2021 7 3 - 14 mmol/L Final   04/12/2021 4 3 - 14 mmol/L Final     Glucose   Date Value Ref Range Status   04/26/2021 177 (H) 70 - 99 mg/dL Final   04/19/2021 135 (H) 70 - 99 mg/dL Final   04/12/2021 110 (H) 70 - 99 mg/dL Final     Urea Nitrogen   Date Value Ref Range Status   04/26/2021 18 7 - 30 mg/dL Final   04/19/2021 16 7 - 30 mg/dL Final   04/12/2021 15 7 - 30 mg/dL Final     Creatinine   Date Value Ref Range Status   04/26/2021 0.65 (L) 0.66 - 1.25 mg/dL Final   04/19/2021 0.65 (L) 0.66 - 1.25 mg/dL Final   04/12/2021 0.70 0.66 - 1.25 mg/dL Final     GFR Estimate   Date Value Ref Range Status   04/26/2021 >90 >60 mL/min/[1.73_m2] Final     Comment:     Non  GFR Calc  Starting 12/18/2018, serum creatinine based estimated GFR (eGFR) will be   calculated using the Chronic Kidney Disease Epidemiology Collaboration   (CKD-EPI) equation.     04/19/2021 >90 >60 mL/min/[1.73_m2] Final     Comment:     Non  GFR Calc  Starting 12/18/2018, serum creatinine based estimated GFR (eGFR) will be   calculated using the Chronic Kidney Disease Epidemiology Collaboration   (CKD-EPI) equation.     04/12/2021 >90 >60 mL/min/[1.73_m2] Final     Comment:     Non  GFR Calc  Starting 12/18/2018, serum creatinine based estimated GFR (eGFR) will be   calculated using the Chronic Kidney Disease Epidemiology Collaboration   (CKD-EPI) equation.       Calcium   Date Value Ref Range Status   04/26/2021 8.8 8.5 - 10.1 mg/dL Final   04/19/2021 8.5 8.5 - 10.1 mg/dL Final   04/12/2021 8.5 8.5 - 10.1 mg/dL Final     Bilirubin  Total   Date Value Ref Range Status   04/26/2021 0.4 0.2 - 1.3 mg/dL Final   04/19/2021 0.3 0.2 - 1.3 mg/dL Final   04/12/2021 0.4 0.2 - 1.3 mg/dL Final     Alkaline Phosphatase   Date Value Ref Range Status   04/26/2021 58 40 - 150 U/L Final   04/19/2021 58 40 - 150 U/L Final   04/12/2021 60 40 - 150 U/L Final     ALT   Date Value Ref Range Status   04/26/2021 21 0 - 70 U/L Final   04/19/2021 23 0 - 70 U/L Final   04/12/2021 21 0 - 70 U/L Final     AST   Date Value Ref Range Status   04/26/2021 11 0 - 45 U/L Final   04/19/2021 12 0 - 45 U/L Final   04/12/2021 12 0 - 45 U/L Final         Assessment and Plan:  1. Recurrent SCC of head and neck   Recurrent SCC of head and neck, prior chemoradiation, now with local recurrence in L  space. S/p 2 cycles of TPF induction chemotherapy with near CR on  PET, and CRT with low dose cisplain started on 3/24/21- completed 5/7/21.   Tolerating treatment relatively well with moderate degree of odynophagia. Feeling better after the completion of active treatment.  The tenderness is getting better, though odynophagia still persists and he is using PEG tube for his nutrition mostly.   - Interval follow up - 5/24/21 with mid level provider, will schedule monthly visits in June and July  - Interval CT neck - 6/18/21 (ordered by radiation oncology)     2.  Acute on chronic anemia  He has a baseline of hemoglobin around 11 g/dL, that was stable during the whole course of chemoradiation.  Recently he was in [facility] emergency department for the chest pain and wheezing, where the blood work showed hemoglobin of 9.6 g/dL.  He denies any active bleeding, and does not have any symptoms of acute anemia.  Though records not available, patient states that he had a colonoscopy done 3 years ago and was told to return in 5 years in [place].   - He has an appointment on 5/14/2021 with radiation oncology provider, we will place CBC ordered to be drawn at that time.    3.  ENT  Dysphagia: Stable, following with speech   Mucositis: Continue salt/soda swishes, should improve once out of CXR.  He has a history of chronic hearing loss, no changes in his hearing after completion of concurrent chemoradiation with low-dose cisplatin.    4. FEN  Nutrition: Continue PEG, 5-6 cartons per day (approximately 80% of his intake is by tube and 20% is oral), tolerating well and weight stable. Continue oral intake as tolerated. RD following.      Plan:   1. CBC on 5/24  2. Interval imaging in 6 weeks from completion of CRT - 6/18/21  3. Monthly mid level visits in June and July  4. PET in August, followed up by appt with Dr. Shah      Discussed with Dr. Pablo Morin MD  Hem/onc fellow    I participated in virtual visit with Dr. Morin and agree with his note.  Pt did reasonably well with treatment.  Discussed with him that now he should be focused on increasing his oral intake and decreasing his tube feeds gradually.  Hopefully, if he is eating more, he should gain weight and if so, then he can drop a carton of TFs about 1 per week so long as he is at least maintaining or gaining weight.     He will have a CT scan in June as an early look and then PET/CT in May.  He will continue to follow with jadyn periodically between now and then.  I will see him back at the time of his PET.     Yohan Shah  Associate Professor of Medicine  Division of Hematology, Oncology, and Transplantation

## 2021-05-11 ENCOUNTER — VIRTUAL VISIT (OUTPATIENT)
Dept: ONCOLOGY | Facility: CLINIC | Age: 71
End: 2021-05-11
Attending: INTERNAL MEDICINE
Payer: MEDICARE

## 2021-05-11 DIAGNOSIS — C09.9 SQUAMOUS CELL CARCINOMA OF TONSIL (H): Primary | ICD-10-CM

## 2021-05-11 PROCEDURE — 999N001193 HC VIDEO/TELEPHONE VISIT; NO CHARGE

## 2021-05-11 PROCEDURE — 99214 OFFICE O/P EST MOD 30 MIN: CPT | Mod: 95 | Performed by: INTERNAL MEDICINE

## 2021-05-11 NOTE — LETTER
"    5/11/2021         RE: Fortino Moya  [address]  [facility] [address]        Dear Colleague,    Thank you for referring your patient, Fortino Moya, to the [facility]. Please see a copy of my visit note below.    Fortino is a 71 year old who is being evaluated via a billable video visit.      How would you like to obtain your AVS? MyChart  If the video visit is dropped, the invitation should be resent by: Text to cell phone: [phone]  Will anyone else be joining your video visit? No     Vitals - Patient Reported  Weight (Patient Reported): 77.1 kg (170 lb)  Height (Patient Reported): 177.8 cm (5' 10\")  BMI (Based on Pt Reported Ht/Wt): 24.39  Pain Score: Severe Pain (6)  Pain Loc: Other - see comment(LEFT SIDE OF MOUTH AND THROAT)    Lindsay TOBIN          Video-Visit Details    Type of service:  Video Visit    Video duration 30 min  Originating Location (pt. Location): Home    Distant Location (provider location):  [facility]     Platform used for Video Visit: [employer]      Oncology/Hematology Visit Note  May 11, 2021    Reason for Visit: Follow up of recurrent head and neck SCC      History of Present Illness: Fortino Moya is a 71 year old male with PMH atrial fibrillation, hypercholesterolemia, HTN with recurrent head and neck cancer. He has a history of L oropharynx P16+ squamous cell cancer that was first diagnosed in 2018 in [place], [place].  He initially was offered chemoradiation, but due to a prior history of L sided hearing loss, he was given low dose weekly carboplatin 1.5 AUC weekly with radiation.  His 3 month PET/CT after initiation was negative and he underwent surveillance after that. He relocated to [place] which is where he is from in Feb 2020 and has been followed since by Dr. Treviño and Dr. Flores at [facility].       In August of 2020, he  developed numbenss in chin and then moved up to his forehead.  He had a Pet/CT done " in Sept 2020 that showed a hypermetabolic area near his L foramen ovale - he had had recent dental work in that area and it was thought that this might be scar or inflammation related to that procedure.       However, he was offered a 2nd opinion and came to [facility] and saw Dr. Russell in Nov 2020.  Images were reviewed at tumor boardon 12/4/20 - and it was felt that this was concerning enough to merit biopsy - so he was referred for IR-guided biopsy.   IR guided biopsy happened on 1/8/2021 and came back as invasive squamous cell carcinoma.      He was going to have SBRT, however, repeat imaging showed marked disease progression in the L  space and we decided to initiate induction chemotherapy to try to shrink the lesion to get to SBRT.      He initiated TPF on 1/28/21 and cycle 2 on 2/18/21. He had a fair amount of deconditioning with induction therapy however restaging imaging (PET/CT CAP/neck 3/1/21) demonstrated a resolution of previous FDG-uptake within the left skull base with some residual soft tissue fullness involving the superior aspect of the pterygoids. No cervical adenopathy or distant metastatic disease.      He was started chemotherapy with concurrent re-irradiation with weekly low dose cisplatin. He was seen today as a follow up.     Summary of recent treatments:  TPF (2 cycles) 1/28/21-2/18/21  Concurrent chemoradiation with low dose cisplatin - 3/24/21- 4/26/21 (6 cycles on D1, 8, 15, 22, 29, 36)+XRT 6600cGy completed on 5/7/21(radiation part)    Interval History:  Fortino was seen today for follow-up after completion of CRT. He developed moderate odynophagia, and his daily intake consists of 80% via PEG and 20% by mouth, average 6 cartons per day.  He still complained of shortness and pain that varies from 4-6/10 on the left facial side, though endorses that it is getting better.  He was seen in emergency department of [facility] last week for chest pain and wheezing.  Work-up did  not find any significant causes, he had a CT PE protocol and chest x-ray done, both of them without any significant findings, EKG with normal sinus rhythm.  However he was noted to have a hemoglobin of 9.6 g/dL, which is lower from his baseline of 11 g/dL even during the active chemoradiation therapy.  He endorses that 1 or 2 times he has noticed that his stool was dark brown, but not blackish in color.  He endorses that he had colonoscopy done 3 years ago in [place] and was told to have another one repeated in 5 years.  He had several episodes in the past when he noticed blood on the toilet paper related to hemorrhoids.  Currently he denies any signs of acute anemia, like shortness of breath, chest pain lightheadedness.     He denies fevers, headaches, dizziness, hearing concerns, chest pain, SOB, cough, nausea, vomiting, abdominal pain, swelling, rashes, neuropathy.       Current Outpatient Medications   Medication Sig Dispense Refill     Aspirin Buf,CaCarb-MgCarb-MgO, 81 MG TABS Take 81 mg by mouth every evening        atorvastatin (LIPITOR) 10 MG tablet Take 10 mg by mouth every evening        carboxymethylcellulose PF (REFRESH LIQUIGEL) 1 % ophthalmic gel Place 1 drop Into the left eye 4 times daily 30 each 11     carvedilol (COREG) 12.5 MG tablet 2 times daily        cevimeline (EVOXAC) 30 MG capsule as needed        erythromycin (ROMYCIN) 5 MG/GM ophthalmic ointment Place 0.5 inches Into the left eye 5 times daily 3.5 g 11     erythromycin (ROMYCIN) 5 MG/GM ophthalmic ointment Apply small amount to incision sites three times daily, then apply to inner lower lid of operative eye(s) at bedtime, as directed. 3.5 g 0     erythromycin (ROMYCIN) 5 MG/GM ophthalmic ointment Place 0.5 inches Into the left eye At Bedtime Instill ~1 cm ribbon into affected eye(s) 4 times daily for 7 days 1 g 0     flecainide (TAMBOCOR) 150 MG tablet Take 150 mg by mouth 2 times daily        lidocaine (XYLOCAINE) 2 % solution as needed         LORazepam (ATIVAN) 0.5 MG tablet Take 1 tablet (0.5 mg) by mouth every 4 hours as needed (Anxiety, Nausea/Vomiting or Sleep) 30 tablet 1     magic mouthwash (ENTER INGREDIENTS IN COMMENTS) suspension Take q 4 hours as needed for mouth pain (Patient taking differently: as needed Take q 4 hours as needed for mouth pain) 240 mL 1     naloxone (NARCAN) 4 MG/0.1ML nasal spray Spray 1 spray (4 mg) into one nostril alternating nostrils once as needed for opioid reversal every 2-3 minutes until assistance arrives 0.2 mL 0     Nutritional Supplements (ISOSOURCE 1.5 RIYA) LIQD Take 1 Can by mouth 7 times daily 100 Can 11     nystatin (MYCOSTATIN) 272840 UNIT/ML suspension Take 5 mLs (500,000 Units) by mouth 4 times daily Swish and swallow 4 times a day (Patient taking differently: Take 500,000 Units by mouth as needed Swish and swallow 4 times a day) 400 mL 1     oxyCODONE (ROXICODONE) 5 MG/5ML solution Take 5-10 mLs (5-10 mg) by mouth every 4 hours as needed for severe pain 500 mL 0     prochlorperazine (COMPAZINE) 10 MG tablet Take 1 tablet (10 mg) by mouth every 6 hours as needed (Nausea/Vomiting) 30 tablet 1     Physical Examination:  Limited physical exam in settings of COVID pandemic.   Constitutional: Well-appearing male in no acute distress.  ENT: Oral mucosa is moist, poor dental hygiene left upper tooth vs focal thrush. No mucositis.   Respiratory: Clear to auscultation bilaterally. No wheezes or crackles.  Neurologic: Cranial nerves II through XII are grossly intact.  Skin: No rashes, petechiae, or bruising noted on exposed skin.    Laboratory Data:   Recent Labs   Lab Test 05/03/21  1106 04/26/21  0658 04/19/21  0657   WBC 2.7* 2.8* 2.9*   HGB 11.3* 11.1* 11.3*   HCT 32.5* 32.0* 32.9*   MCV 99 98 97   MCHC 34.8 34.7 34.3   RDW 14.3 14.0 14.5   * 124* 163        Last Comprehensive Metabolic Panel:  Sodium   Date Value Ref Range Status   04/26/2021 137 133 - 144 mmol/L Final   04/19/2021 138 133 - 144  mmol/L Final   04/12/2021 138 133 - 144 mmol/L Final     Potassium   Date Value Ref Range Status   04/26/2021 3.7 3.4 - 5.3 mmol/L Final   04/19/2021 3.7 3.4 - 5.3 mmol/L Final   04/12/2021 3.7 3.4 - 5.3 mmol/L Final     Chloride   Date Value Ref Range Status   04/26/2021 100 94 - 109 mmol/L Final   04/19/2021 100 94 - 109 mmol/L Final   04/12/2021 103 94 - 109 mmol/L Final     Carbon Dioxide   Date Value Ref Range Status   04/26/2021 30 20 - 32 mmol/L Final   04/19/2021 31 20 - 32 mmol/L Final   04/12/2021 31 20 - 32 mmol/L Final     Anion Gap   Date Value Ref Range Status   04/26/2021 7 3 - 14 mmol/L Final   04/19/2021 7 3 - 14 mmol/L Final   04/12/2021 4 3 - 14 mmol/L Final     Glucose   Date Value Ref Range Status   04/26/2021 177 (H) 70 - 99 mg/dL Final   04/19/2021 135 (H) 70 - 99 mg/dL Final   04/12/2021 110 (H) 70 - 99 mg/dL Final     Urea Nitrogen   Date Value Ref Range Status   04/26/2021 18 7 - 30 mg/dL Final   04/19/2021 16 7 - 30 mg/dL Final   04/12/2021 15 7 - 30 mg/dL Final     Creatinine   Date Value Ref Range Status   04/26/2021 0.65 (L) 0.66 - 1.25 mg/dL Final   04/19/2021 0.65 (L) 0.66 - 1.25 mg/dL Final   04/12/2021 0.70 0.66 - 1.25 mg/dL Final     GFR Estimate   Date Value Ref Range Status   04/26/2021 >90 >60 mL/min/[1.73_m2] Final     Comment:     Non  GFR Calc  Starting 12/18/2018, serum creatinine based estimated GFR (eGFR) will be   calculated using the Chronic Kidney Disease Epidemiology Collaboration   (CKD-EPI) equation.     04/19/2021 >90 >60 mL/min/[1.73_m2] Final     Comment:     Non  GFR Calc  Starting 12/18/2018, serum creatinine based estimated GFR (eGFR) will be   calculated using the Chronic Kidney Disease Epidemiology Collaboration   (CKD-EPI) equation.     04/12/2021 >90 >60 mL/min/[1.73_m2] Final     Comment:     Non  GFR Calc  Starting 12/18/2018, serum creatinine based estimated GFR (eGFR) will be   calculated using the Chronic  Kidney Disease Epidemiology Collaboration   (CKD-EPI) equation.       Calcium   Date Value Ref Range Status   04/26/2021 8.8 8.5 - 10.1 mg/dL Final   04/19/2021 8.5 8.5 - 10.1 mg/dL Final   04/12/2021 8.5 8.5 - 10.1 mg/dL Final     Bilirubin Total   Date Value Ref Range Status   04/26/2021 0.4 0.2 - 1.3 mg/dL Final   04/19/2021 0.3 0.2 - 1.3 mg/dL Final   04/12/2021 0.4 0.2 - 1.3 mg/dL Final     Alkaline Phosphatase   Date Value Ref Range Status   04/26/2021 58 40 - 150 U/L Final   04/19/2021 58 40 - 150 U/L Final   04/12/2021 60 40 - 150 U/L Final     ALT   Date Value Ref Range Status   04/26/2021 21 0 - 70 U/L Final   04/19/2021 23 0 - 70 U/L Final   04/12/2021 21 0 - 70 U/L Final     AST   Date Value Ref Range Status   04/26/2021 11 0 - 45 U/L Final   04/19/2021 12 0 - 45 U/L Final   04/12/2021 12 0 - 45 U/L Final         Assessment and Plan:  1. Recurrent SCC of head and neck   Recurrent SCC of head and neck, prior chemoradiation, now with local recurrence in L  space. S/p 2 cycles of TPF induction chemotherapy with near CR on  PET, and CRT with low dose cisplain started on 3/24/21- completed 5/7/21.   Tolerating treatment relatively well with moderate degree of odynophagia. Feeling better after the completion of active treatment.  The tenderness is getting better, though odynophagia still persists and he is using PEG tube for his nutrition mostly.   - Interval follow up - 5/24/21 with mid level provider, will schedule monthly visits in June and July  - Interval CT neck - 6/18/21 (ordered by radiation oncology)     2.  Acute on chronic anemia  He has a baseline of hemoglobin around 11 g/dL, that was stable during the whole course of chemoradiation.  Recently he was in [facility] emergency department for the chest pain and wheezing, where the blood work showed hemoglobin of 9.6 g/dL.  He denies any active bleeding, and does not have any symptoms of acute anemia.  Though records not available,  patient states that he had a colonoscopy done 3 years ago and was told to return in 5 years in [place].   - He has an appointment on 5/14/2021 with radiation oncology provider, we will place CBC ordered to be drawn at that time.    3. ENT  Dysphagia: Stable, following with speech   Mucositis: Continue salt/soda swishes, should improve once out of CXR.  He has a history of chronic hearing loss, no changes in his hearing after completion of concurrent chemoradiation with low-dose cisplatin.    4. FEN  Nutrition: Continue PEG, 5-6 cartons per day (approximately 80% of his intake is by tube and 20% is oral), tolerating well and weight stable. Continue oral intake as tolerated. RD following.      Plan:   1. CBC on 5/24  2. Interval imaging in 6 weeks from completion of CRT - 6/18/21  3. Monthly mid level visits in June and July  4. PET in August, followed up by appt with Dr. Shah      Discussed with Dr. Pablo Morin MD  Hem/onc fellow    I participated in virtual visit with Dr. Morin and agree with his note.  Pt did reasonably well with treatment.  Discussed with him that now he should be focused on increasing his oral intake and decreasing his tube feeds gradually.  Hopefully, if he is eating more, he should gain weight and if so, then he can drop a carton of TFs about 1 per week so long as he is at least maintaining or gaining weight.     He will have a CT scan in June as an early look and then PET/CT in May.  He will continue to follow with jadyn periodically between now and then.  I will see him back at the time of his PET.     Yohan Shah  Associate Professor of Medicine  Division of Hematology, Oncology, and Transplantation        Again, thank you for allowing me to participate in the care of your patient.        Sincerely,        Yohan Shah, DO

## 2021-05-13 DIAGNOSIS — C09.9 SQUAMOUS CELL CARCINOMA OF TONSIL (H): ICD-10-CM

## 2021-05-13 RX ORDER — OXYCODONE HCL 5 MG/5 ML
5-10 SOLUTION, ORAL ORAL EVERY 4 HOURS PRN
Qty: 500 ML | Refills: 0 | Status: SHIPPED | OUTPATIENT
Start: 2021-05-13 | End: 2021-05-20

## 2021-05-14 ENCOUNTER — OFFICE VISIT (OUTPATIENT)
Dept: RADIATION ONCOLOGY | Facility: CLINIC | Age: 71
End: 2021-05-14
Attending: RADIOLOGY
Payer: MEDICARE

## 2021-05-14 VITALS — BODY MASS INDEX: 24.67 KG/M2 | WEIGHT: 171.9 LBS

## 2021-05-14 DIAGNOSIS — C09.9 TONSILLAR CANCER (H): ICD-10-CM

## 2021-05-14 LAB
ALBUMIN SERPL-MCNC: 3.4 G/DL (ref 3.4–5)
ALP SERPL-CCNC: 53 U/L (ref 40–150)
ALT SERPL W P-5'-P-CCNC: 17 U/L (ref 0–70)
ANION GAP SERPL CALCULATED.3IONS-SCNC: 1 MMOL/L (ref 3–14)
AST SERPL W P-5'-P-CCNC: 13 U/L (ref 0–45)
BASOPHILS # BLD AUTO: 0 10E9/L (ref 0–0.2)
BASOPHILS NFR BLD AUTO: 0.6 %
BILIRUB SERPL-MCNC: 0.5 MG/DL (ref 0.2–1.3)
BUN SERPL-MCNC: 17 MG/DL (ref 7–30)
CALCIUM SERPL-MCNC: 8.9 MG/DL (ref 8.5–10.1)
CHLORIDE SERPL-SCNC: 102 MMOL/L (ref 94–109)
CO2 SERPL-SCNC: 33 MMOL/L (ref 20–32)
CREAT SERPL-MCNC: 0.71 MG/DL (ref 0.66–1.25)
DIFFERENTIAL METHOD BLD: ABNORMAL
EOSINOPHIL # BLD AUTO: 0 10E9/L (ref 0–0.7)
EOSINOPHIL NFR BLD AUTO: 1.8 %
ERYTHROCYTE [DISTWIDTH] IN BLOOD BY AUTOMATED COUNT: 14.6 % (ref 10–15)
GFR SERPL CREATININE-BSD FRML MDRD: >90 ML/MIN/{1.73_M2}
GLUCOSE SERPL-MCNC: 86 MG/DL (ref 70–99)
HCT VFR BLD AUTO: 31.6 % (ref 40–53)
HGB BLD-MCNC: 10.5 G/DL (ref 13.3–17.7)
IMM GRANULOCYTES # BLD: 0 10E9/L (ref 0–0.4)
IMM GRANULOCYTES NFR BLD: 0.6 %
LYMPHOCYTES # BLD AUTO: 0.3 10E9/L (ref 0.8–5.3)
LYMPHOCYTES NFR BLD AUTO: 19.6 %
MCH RBC QN AUTO: 33 PG (ref 26.5–33)
MCHC RBC AUTO-ENTMCNC: 33.2 G/DL (ref 31.5–36.5)
MCV RBC AUTO: 99 FL (ref 78–100)
MONOCYTES # BLD AUTO: 0.4 10E9/L (ref 0–1.3)
MONOCYTES NFR BLD AUTO: 23.3 %
NEUTROPHILS # BLD AUTO: 0.9 10E9/L (ref 1.6–8.3)
NEUTROPHILS NFR BLD AUTO: 54.1 %
NRBC # BLD AUTO: 0 10*3/UL
NRBC BLD AUTO-RTO: 0 /100
PLATELET # BLD AUTO: 177 10E9/L (ref 150–450)
PLATELET # BLD EST: ABNORMAL 10*3/UL
POTASSIUM SERPL-SCNC: 4.6 MMOL/L (ref 3.4–5.3)
PROT SERPL-MCNC: 6.4 G/DL (ref 6.8–8.8)
RBC # BLD AUTO: 3.18 10E12/L (ref 4.4–5.9)
SODIUM SERPL-SCNC: 135 MMOL/L (ref 133–144)
WBC # BLD AUTO: 1.6 10E9/L (ref 4–11)

## 2021-05-14 PROCEDURE — 80053 COMPREHEN METABOLIC PANEL: CPT | Performed by: INTERNAL MEDICINE

## 2021-05-14 PROCEDURE — 85025 COMPLETE CBC W/AUTO DIFF WBC: CPT | Performed by: INTERNAL MEDICINE

## 2021-05-14 NOTE — PROGRESS NOTES
Radiation Oncology Follow-up Visit  May 14, 2021    Fortino Moya  MRN: [id number]   : 1950     DISEASE TREATED:   Squamous cell carcinoma of the left tonsil, rcT4 N0 M0 p16+    RADIATION THERAPY DELIVERED:   6600 cGy to left skull base completed 2021    SYSTEMIC TREATMENT:  Weekly cisplatin    INTERVAL SINCE COMPLETION OF RADIATION THERAPY:   1 week    SUBJECTIVE/HPI:  Fortino Moya is a 71 year old male who is here today for routine 1 week follow up after completing radiation therapy.  He continues to have pain but is well managed on oxycodone 15mg q3-4 hours.  He is PEG dependent and taking 4-5 cans a day, but has lost weight, so will have him increase back to 6 cans a day and weigh himself every few days. He is eating some foods by mouth, but has mild difficulty swallowing solid foods.  He was able to eat a steak though.  He does have a dry mouth.  He has not seen speech therapy for some time and is not doing any swallowing exercises.      ROS:  Complete review of systems is negative except for symptoms discussed in subjective above.    Current Outpatient Medications   Medication     Aspirin Buf,CaCarb-MgCarb-MgO, 81 MG TABS     atorvastatin (LIPITOR) 10 MG tablet     carboxymethylcellulose PF (REFRESH LIQUIGEL) 1 % ophthalmic gel     carvedilol (COREG) 12.5 MG tablet     cevimeline (EVOXAC) 30 MG capsule     erythromycin (ROMYCIN) 5 MG/GM ophthalmic ointment     erythromycin (ROMYCIN) 5 MG/GM ophthalmic ointment     erythromycin (ROMYCIN) 5 MG/GM ophthalmic ointment     flecainide (TAMBOCOR) 150 MG tablet     lidocaine (XYLOCAINE) 2 % solution     LORazepam (ATIVAN) 0.5 MG tablet     magic mouthwash (ENTER INGREDIENTS IN COMMENTS) suspension     naloxone (NARCAN) 4 MG/0.1ML nasal spray     Nutritional Supplements (ISOSOURCE 1.5 RIYA) LIQD     nystatin (MYCOSTATIN) 970710 UNIT/ML suspension     oxyCODONE (ROXICODONE) 5 MG/5ML solution     prochlorperazine (COMPAZINE) 10 MG tablet     No current  facility-administered medications for this visit.      Facility-Administered Medications Ordered in Other Visits   Medication     barium sulfate (EZ-DISK) tablet 700 mg        No Known Allergies    Past Medical History:   Diagnosis Date     Arrhythmia     A FIB     Atrial fibrillation (H)      Dysphagia      Hypercholesterolemia      Hypertension      Paralytic lagophthalmos      Primary squamous cell carcinoma of tonsil (H)          PHYSICAL EXAM:  Wt 78 kg (171 lb 14.4 oz)   BMI 24.67 kg/m     Wt Readings from Last 4 Encounters:   05/14/21 78 kg (171 lb 14.4 oz)   05/06/21 79.2 kg (174 lb 9.6 oz)   04/29/21 78.9 kg (173 lb 14.4 oz)   04/26/21 78.7 kg (173 lb 9.6 oz)     Gen: Alert, in NAD  Eyes: PERRL, EOMI, sclera anicteric  HENT     Head: NC/AT     Ears: TM's clear, no external lesions.     Oral Cavity/Oropharynx: Dry mucous membranes with thickened secretions. No thrush.  Mucositis of left tongue and left posterior soft palate is healing.  Neck:  No lymphedema. No palpable cervical adenopathy.  Pulm: No wheezing, stridor or respiratory distress  CV: Well-perfused, no cyanosis  Musculoskeletal: Normal bulk and tone   Skin: Skin has healed well.      LABS AND IMAGING:  Reviewed. (CBC pending)    IMPRESSION:   Mr. Moya is a 71 year old male with a recurrent SCC of the tonsil s/p chemoradiation now 2 weeks out since completion of treatment and doing well with slow improvement of acute side effects.    PLAN:   1.  Follow up with Dr. Hagen in 1 month.   Continue close follow up with ENT and medical oncology.  I will see him again next week virtually.  2. Continue to moisturize with aquaphor and when skin is completely healed can change to preferred moisturizer.  Discussed importance of sun avoidance or sun protection.  3. Fatigue should continue to improve.  4. Continue oral cares with salt and soda rinses.  Routine dental follow up at least every 6 months.  Continue to use fluoride trays or fluoride  treatments-fluoride toothpaste sent in. Continue jaw, neck and swallowing exercises. I will have him see speech to review exercises.  5. Treatment related pain should continue to diminish.  Recommended to slowly wean off pain medications when pain decreases.  6. Continue to push fluids and caloric intake to maintain weight.  Try to increase oral intake and decrease calories through PEG tube.  Until weight stabilizes and he is eating more, asked him to go up to 6 cans a day.      Nafisa Sanders NP   Radiation Oncology    CC:  Patient Care Team:  [facility], [facility] [place] [facility] as PCP - Ranjit Smiley MD as Assigned PCP  Sidney Rodgers MD as Assigned Cancer Care Provider  Vivien Yoon MD as Assigned Surgical Provider  Jayda Schwartz RN as Specialty Care Coordinator (Hematology & Oncology)

## 2021-05-14 NOTE — LETTER
2021         RE: Fortino Moya  [address]  [facility] [address]        Dear Colleague,    Thank you for referring your patient, Fortino Moya, to the [facility] RADIATION ONCOLOGY. Please see a copy of my visit note below.    Radiation Oncology Follow-up Visit  May 14, 2021    Fortino Moya  MRN: [id number]   : 1950     DISEASE TREATED:   Squamous cell carcinoma of the left tonsil, rcT4 N0 M0 p16+    RADIATION THERAPY DELIVERED:   6600 cGy to left skull base completed 2021    SYSTEMIC TREATMENT:  Weekly cisplatin    INTERVAL SINCE COMPLETION OF RADIATION THERAPY:   1 week    SUBJECTIVE/HPI:  Fortino Moya is a 71 year old male who is here today for routine 1 week follow up after completing radiation therapy.  He continues to have pain but is well managed on oxycodone 15mg q3-4 hours.  He is PEG dependent and taking 4-5 cans a day, but has lost weight, so will have him increase back to 6 cans a day and weigh himself every few days. He is eating some foods by mouth, but has mild difficulty swallowing solid foods.  He was able to eat a steak though.  He does have a dry mouth.  He has not seen speech therapy for some time and is not doing any swallowing exercises.      ROS:  Complete review of systems is negative except for symptoms discussed in subjective above.    Current Outpatient Medications   Medication     Aspirin Buf,CaCarb-MgCarb-MgO, 81 MG TABS     atorvastatin (LIPITOR) 10 MG tablet     carboxymethylcellulose PF (REFRESH LIQUIGEL) 1 % ophthalmic gel     carvedilol (COREG) 12.5 MG tablet     cevimeline (EVOXAC) 30 MG capsule     erythromycin (ROMYCIN) 5 MG/GM ophthalmic ointment     erythromycin (ROMYCIN) 5 MG/GM ophthalmic ointment     erythromycin (ROMYCIN) 5 MG/GM ophthalmic ointment     flecainide (TAMBOCOR) 150 MG tablet     lidocaine (XYLOCAINE) 2 % solution     LORazepam (ATIVAN) 0.5 MG tablet     magic mouthwash (ENTER INGREDIENTS IN COMMENTS) suspension      naloxone (NARCAN) 4 MG/0.1ML nasal spray     Nutritional Supplements (ISOSOURCE 1.5 RIYA) LIQD     nystatin (MYCOSTATIN) 099498 UNIT/ML suspension     oxyCODONE (ROXICODONE) 5 MG/5ML solution     prochlorperazine (COMPAZINE) 10 MG tablet     No current facility-administered medications for this visit.      Facility-Administered Medications Ordered in Other Visits   Medication     barium sulfate (EZ-DISK) tablet 700 mg        No Known Allergies    Past Medical History:   Diagnosis Date     Arrhythmia     A FIB     Atrial fibrillation (H)      Dysphagia      Hypercholesterolemia      Hypertension      Paralytic lagophthalmos      Primary squamous cell carcinoma of tonsil (H)          PHYSICAL EXAM:  Wt 78 kg (171 lb 14.4 oz)   BMI 24.67 kg/m     Wt Readings from Last 4 Encounters:   05/14/21 78 kg (171 lb 14.4 oz)   05/06/21 79.2 kg (174 lb 9.6 oz)   04/29/21 78.9 kg (173 lb 14.4 oz)   04/26/21 78.7 kg (173 lb 9.6 oz)     Gen: Alert, in NAD  Eyes: PERRL, EOMI, sclera anicteric  HENT     Head: NC/AT     Ears: TM's clear, no external lesions.     Oral Cavity/Oropharynx: Dry mucous membranes with thickened secretions. No thrush.  Mucositis of left tongue and left posterior soft palate is healing.  Neck:  No lymphedema. No palpable cervical adenopathy.  Pulm: No wheezing, stridor or respiratory distress  CV: Well-perfused, no cyanosis  Musculoskeletal: Normal bulk and tone   Skin: Skin has healed well.      LABS AND IMAGING:  Reviewed. (CBC pending)    IMPRESSION:   Mr. Moya is a 71 year old male with a recurrent SCC of the tonsil s/p chemoradiation now 2 weeks out since completion of treatment and doing well with slow improvement of acute side effects.    PLAN:   1.  Follow up with Dr. Hagen in 1 month.   Continue close follow up with ENT and medical oncology.  I will see him again next week virtually.  2. Continue to moisturize with aquaphor and when skin is completely healed can change to preferred moisturizer.   Discussed importance of sun avoidance or sun protection.  3. Fatigue should continue to improve.  4. Continue oral cares with salt and soda rinses.  Routine dental follow up at least every 6 months.  Continue to use fluoride trays or fluoride treatments-fluoride toothpaste sent in. Continue jaw, neck and swallowing exercises. I will have him see speech to review exercises.  5. Treatment related pain should continue to diminish.  Recommended to slowly wean off pain medications when pain decreases.  6. Continue to push fluids and caloric intake to maintain weight.  Try to increase oral intake and decrease calories through PEG tube.  Until weight stabilizes and he is eating more, asked him to go up to 6 cans a day.      Nafisa Sanders NP   Radiation Oncology    CC:  Patient Care Team:  [facility] [place] as PCP - Ranjit Smiley MD as Assigned PCP  Sidney Rodgers MD as Assigned Cancer Care Provider  Vivien Yoon MD as Assigned Surgical Provider  Jayda Schwartz, RN as Specialty Care Coordinator (Hematology & Oncology)

## 2021-05-18 ENCOUNTER — DOCUMENTATION ONLY (OUTPATIENT)
Dept: ONCOLOGY | Facility: CLINIC | Age: 71
End: 2021-05-18

## 2021-05-18 NOTE — PROGRESS NOTES
FMLA paperwork received via fax from [employer]. Will be placed in provider folder for signature upon completion.     Fax: [phone]    Simin Rankin CMA

## 2021-05-20 ENCOUNTER — VIRTUAL VISIT (OUTPATIENT)
Dept: RADIATION ONCOLOGY | Facility: CLINIC | Age: 71
End: 2021-05-20
Attending: RADIOLOGY
Payer: MEDICARE

## 2021-05-20 ENCOUNTER — TELEPHONE (OUTPATIENT)
Dept: DERMATOLOGY | Facility: CLINIC | Age: 71
End: 2021-05-20

## 2021-05-20 DIAGNOSIS — C09.9 SQUAMOUS CELL CARCINOMA OF TONSIL (H): ICD-10-CM

## 2021-05-20 RX ORDER — OXYCODONE HCL 5 MG/5 ML
5-10 SOLUTION, ORAL ORAL EVERY 4 HOURS PRN
Qty: 500 ML | Refills: 0 | Status: SHIPPED | OUTPATIENT
Start: 2021-05-20 | End: 2021-06-21

## 2021-05-20 RX ORDER — ACETAMINOPHEN 160 MG/5ML
1000 SUSPENSION ORAL EVERY 8 HOURS PRN
Qty: 500 ML | Refills: 3 | Status: SHIPPED | OUTPATIENT
Start: 2021-05-20 | End: 2022-05-11

## 2021-05-20 NOTE — PROGRESS NOTES
"Radiation Oncology Follow-up Visit  May 20, 2021    The patient has been notified of following:     Due to COVID19 pandemic.  \"This telephone visit will be conducted via a call between you and your physician/provider. We have found that certain health care needs can be provided without the need for a physical exam.  This service lets us provide the care you need with a short phone conversation.  If a prescription is necessary we can send it directly to your pharmacy.  If lab work is needed we can place an order for that and you can then stop by our lab to have the test done at a later time.    Telephone visits are billed at different rates depending on your insurance coverage. During this emergency period, for some insurers they may be billed the same as an in-person visit.  Please reach out to your insurance provider with any questions.    If during the course of the call the physician/provider feels a telephone visit is not appropriate, you will not be charged for this service.\"    Patient has given verbal consent for Telephone visit?  Yes    How would you like to obtain your AVS? Ion    Phone call duration: 29 minutes    Fortino Moya  MRN: [id number]   : 1950     DISEASE TREATED:   Squamous cell carcinoma of the left tonsil, rcT4 N0 M0 p16+    RADIATION THERAPY DELIVERED:   6600 cGy to left skull base completed 2021    SYSTEMIC TREATMENT:  Weekly cisplatin    INTERVAL SINCE COMPLETION OF RADIATION THERAPY:   2 week    SUBJECTIVE/HPI:  Fortino Moya is a 71 year old male who is here today for routine 2 week follow up after completing radiation therapy.  He continues to have pain but is well managed on oxycodone 12mg q4 hours. He would also like to start tylenol for pain to help decrease his oxycodone.  He is PEG dependent and taking 6 cans a day.  He weighted himself today and weight is stable at 171.  He is eating some foods by mouth, but has mild difficulty swallowing solid foods.  He was able " to eat a steak though.  He does have a dry mouth.  He has made an appt with speech therapy.  His skin is healed.  He is still fatigued.  He is using fluoride toothpaste.    ROS:  Complete review of systems is negative except for symptoms discussed in subjective above.    MEDS:  Current Outpatient Medications   Medication     acetaminophen (TYLENOL) 160 MG/5ML suspension     oxyCODONE (ROXICODONE) 5 MG/5ML solution     Aspirin Buf,CaCarb-MgCarb-MgO, 81 MG TABS     atorvastatin (LIPITOR) 10 MG tablet     carboxymethylcellulose PF (REFRESH LIQUIGEL) 1 % ophthalmic gel     carvedilol (COREG) 12.5 MG tablet     cevimeline (EVOXAC) 30 MG capsule     erythromycin (ROMYCIN) 5 MG/GM ophthalmic ointment     erythromycin (ROMYCIN) 5 MG/GM ophthalmic ointment     erythromycin (ROMYCIN) 5 MG/GM ophthalmic ointment     flecainide (TAMBOCOR) 150 MG tablet     naloxone (NARCAN) 4 MG/0.1ML nasal spray     Nutritional Supplements (ISOSOURCE 1.5 RIYA) LIQD     prochlorperazine (COMPAZINE) 10 MG tablet     Sodium Fluoride 1.1 % PSTE     No current facility-administered medications for this visit.      Facility-Administered Medications Ordered in Other Visits   Medication     barium sulfate (EZ-DISK) tablet 700 mg        No Known Allergies    Past Medical History:   Diagnosis Date     Arrhythmia     A FIB     Atrial fibrillation (H)      Dysphagia      Hypercholesterolemia      Hypertension      Paralytic lagophthalmos      Primary squamous cell carcinoma of tonsil (H)          PHYSICAL EXAM:  Per patient weight today at home was 171.  There were no vitals taken for this visit.   Wt Readings from Last 4 Encounters:   05/14/21 78 kg (171 lb 14.4 oz)   05/06/21 79.2 kg (174 lb 9.6 oz)   04/29/21 78.9 kg (173 lb 14.4 oz)   04/26/21 78.7 kg (173 lb 9.6 oz)     Gen: Alert, in NAD        LABS AND IMAGING:  Reviewed. (CBC pending)    IMPRESSION:   Mr. Moya is a 71 year old male with a recurrent SCC of the tonsil s/p chemoradiation now 2 weeks  out since completion of treatment and doing well with slow improvement of acute side effects.    PLAN:   1.  Follow up with Dr. Hagen in 1 month.   Continue close follow up with ENT and medical oncology.  I will see him again in about 10 days virtually.  2. Continue to moisturize with aquaphor and when skin is completely healed can change to preferred moisturizer.  Discussed importance of sun avoidance or sun protection.  3. Fatigue should continue to improve.  4. Continue oral cares with salt and soda rinses.  Routine dental follow up at least every 6 months.  Continue to use fluoride trays or fluoride treatments-fluoride toothpaste sent in. Continue jaw, neck and swallowing exercises. I will have him see speech to review exercises.  5. Treatment related pain should continue to diminish.  Recommended to slowly wean off pain medications when pain decreases.  Script sent for tylenol liquid.    6. Continue to push fluids and caloric intake to maintain weight.  Try to increase oral intake and decrease calories through PEG tube.  Continue on 6 cans a day and he is eating more by mouth and then slowly start to decrease cans.      Nafisa Sanders NP   Radiation Oncology

## 2021-05-20 NOTE — LETTER
"    2021         RE: Fortino Moya  [address]  [facility] [address]        Dear Colleague,    Thank you for referring your patient, Fortino Moya, to the [facility] RADIATION ONCOLOGY. Please see a copy of my visit note below.    Radiation Oncology Follow-up Visit  May 20, 2021    The patient has been notified of following:     Due to COVID19 pandemic.  \"This telephone visit will be conducted via a call between you and your physician/provider. We have found that certain health care needs can be provided without the need for a physical exam.  This service lets us provide the care you need with a short phone conversation.  If a prescription is necessary we can send it directly to your pharmacy.  If lab work is needed we can place an order for that and you can then stop by our lab to have the test done at a later time.    Telephone visits are billed at different rates depending on your insurance coverage. During this emergency period, for some insurers they may be billed the same as an in-person visit.  Please reach out to your insurance provider with any questions.    If during the course of the call the physician/provider feels a telephone visit is not appropriate, you will not be charged for this service.\"    Patient has given verbal consent for Telephone visit?  Yes    How would you like to obtain your AVS? PatriciaPremium    Phone call duration: 29 minutes    Fortino Moya  MRN: [id number]   : 1950     DISEASE TREATED:   Squamous cell carcinoma of the left tonsil, rcT4 N0 M0 p16+    RADIATION THERAPY DELIVERED:   6600 cGy to left skull base completed 2021    SYSTEMIC TREATMENT:  Weekly cisplatin    INTERVAL SINCE COMPLETION OF RADIATION THERAPY:   2 week    SUBJECTIVE/HPI:  Fortino Moya is a 71 year old male who is here today for routine 2 week follow up after completing radiation therapy.  He continues to have pain but is well managed on oxycodone 12mg q4 hours. He would also like to " start tylenol for pain to help decrease his oxycodone.  He is PEG dependent and taking 6 cans a day.  He weighted himself today and weight is stable at 171.  He is eating some foods by mouth, but has mild difficulty swallowing solid foods.  He was able to eat a steak though.  He does have a dry mouth.  He has made an appt with speech therapy.  His skin is healed.  He is still fatigued.  He is using fluoride toothpaste.    ROS:  Complete review of systems is negative except for symptoms discussed in subjective above.    MEDS:  Current Outpatient Medications   Medication     acetaminophen (TYLENOL) 160 MG/5ML suspension     oxyCODONE (ROXICODONE) 5 MG/5ML solution     Aspirin Buf,CaCarb-MgCarb-MgO, 81 MG TABS     atorvastatin (LIPITOR) 10 MG tablet     carboxymethylcellulose PF (REFRESH LIQUIGEL) 1 % ophthalmic gel     carvedilol (COREG) 12.5 MG tablet     cevimeline (EVOXAC) 30 MG capsule     erythromycin (ROMYCIN) 5 MG/GM ophthalmic ointment     erythromycin (ROMYCIN) 5 MG/GM ophthalmic ointment     erythromycin (ROMYCIN) 5 MG/GM ophthalmic ointment     flecainide (TAMBOCOR) 150 MG tablet     naloxone (NARCAN) 4 MG/0.1ML nasal spray     Nutritional Supplements (ISOSOURCE 1.5 RIYA) LIQD     prochlorperazine (COMPAZINE) 10 MG tablet     Sodium Fluoride 1.1 % PSTE     No current facility-administered medications for this visit.      Facility-Administered Medications Ordered in Other Visits   Medication     barium sulfate (EZ-DISK) tablet 700 mg        No Known Allergies    Past Medical History:   Diagnosis Date     Arrhythmia     A FIB     Atrial fibrillation (H)      Dysphagia      Hypercholesterolemia      Hypertension      Paralytic lagophthalmos      Primary squamous cell carcinoma of tonsil (H)          PHYSICAL EXAM:  Per patient weight today at home was 171.  There were no vitals taken for this visit.   Wt Readings from Last 4 Encounters:   05/14/21 78 kg (171 lb 14.4 oz)   05/06/21 79.2 kg (174 lb 9.6 oz)    04/29/21 78.9 kg (173 lb 14.4 oz)   04/26/21 78.7 kg (173 lb 9.6 oz)     Gen: Alert, in NAD        LABS AND IMAGING:  Reviewed. (CBC pending)    IMPRESSION:   Mr. Moya is a 71 year old male with a recurrent SCC of the tonsil s/p chemoradiation now 2 weeks out since completion of treatment and doing well with slow improvement of acute side effects.    PLAN:   1.  Follow up with Dr. Hagen in 1 month.   Continue close follow up with ENT and medical oncology.  I will see him again in about 10 days virtually.  2. Continue to moisturize with aquaphor and when skin is completely healed can change to preferred moisturizer.  Discussed importance of sun avoidance or sun protection.  3. Fatigue should continue to improve.  4. Continue oral cares with salt and soda rinses.  Routine dental follow up at least every 6 months.  Continue to use fluoride trays or fluoride treatments-fluoride toothpaste sent in. Continue jaw, neck and swallowing exercises. I will have him see speech to review exercises.  5. Treatment related pain should continue to diminish.  Recommended to slowly wean off pain medications when pain decreases.  Script sent for tylenol liquid.    6. Continue to push fluids and caloric intake to maintain weight.  Try to increase oral intake and decrease calories through PEG tube.  Continue on 6 cans a day and he is eating more by mouth and then slowly start to decrease cans.      Nafisa Sanders, NP   Radiation Oncology

## 2021-05-24 ENCOUNTER — ONCOLOGY VISIT (OUTPATIENT)
Dept: RADIATION ONCOLOGY | Facility: CLINIC | Age: 71
End: 2021-05-24

## 2021-05-24 ENCOUNTER — ONCOLOGY VISIT (OUTPATIENT)
Dept: ONCOLOGY | Facility: CLINIC | Age: 71
End: 2021-05-24
Attending: PHYSICIAN ASSISTANT
Payer: MEDICARE

## 2021-05-24 ENCOUNTER — APPOINTMENT (OUTPATIENT)
Dept: LAB | Facility: CLINIC | Age: 71
End: 2021-05-24
Attending: INTERNAL MEDICINE
Payer: MEDICARE

## 2021-05-24 VITALS
DIASTOLIC BLOOD PRESSURE: 87 MMHG | WEIGHT: 176.9 LBS | SYSTOLIC BLOOD PRESSURE: 138 MMHG | TEMPERATURE: 97.3 F | BODY MASS INDEX: 25.38 KG/M2 | RESPIRATION RATE: 16 BRPM | HEART RATE: 110 BPM | OXYGEN SATURATION: 94 %

## 2021-05-24 DIAGNOSIS — C09.9 TONSILLAR CANCER (H): Primary | ICD-10-CM

## 2021-05-24 DIAGNOSIS — K94.22 INFECTION OF PEG SITE (H): ICD-10-CM

## 2021-05-24 DIAGNOSIS — R00.0 TACHYCARDIA: ICD-10-CM

## 2021-05-24 LAB
ALBUMIN SERPL-MCNC: 3.5 G/DL (ref 3.4–5)
ALP SERPL-CCNC: 58 U/L (ref 40–150)
ALT SERPL W P-5'-P-CCNC: 13 U/L (ref 0–70)
ANION GAP SERPL CALCULATED.3IONS-SCNC: 4 MMOL/L (ref 3–14)
AST SERPL W P-5'-P-CCNC: 10 U/L (ref 0–45)
BASOPHILS # BLD AUTO: 0 10E9/L (ref 0–0.2)
BASOPHILS NFR BLD AUTO: 0.3 %
BILIRUB SERPL-MCNC: 0.3 MG/DL (ref 0.2–1.3)
BUN SERPL-MCNC: 24 MG/DL (ref 7–30)
CALCIUM SERPL-MCNC: 8.8 MG/DL (ref 8.5–10.1)
CHLORIDE SERPL-SCNC: 103 MMOL/L (ref 94–109)
CO2 SERPL-SCNC: 30 MMOL/L (ref 20–32)
CREAT SERPL-MCNC: 0.68 MG/DL (ref 0.66–1.25)
DIFFERENTIAL METHOD BLD: ABNORMAL
EOSINOPHIL # BLD AUTO: 0 10E9/L (ref 0–0.7)
EOSINOPHIL NFR BLD AUTO: 1.1 %
ERYTHROCYTE [DISTWIDTH] IN BLOOD BY AUTOMATED COUNT: 14.3 % (ref 10–15)
GFR SERPL CREATININE-BSD FRML MDRD: >90 ML/MIN/{1.73_M2}
GLUCOSE SERPL-MCNC: 141 MG/DL (ref 70–99)
HCT VFR BLD AUTO: 31.1 % (ref 40–53)
HGB BLD-MCNC: 10.6 G/DL (ref 13.3–17.7)
IMM GRANULOCYTES # BLD: 0 10E9/L (ref 0–0.4)
IMM GRANULOCYTES NFR BLD: 0.3 %
LYMPHOCYTES # BLD AUTO: 0.3 10E9/L (ref 0.8–5.3)
LYMPHOCYTES NFR BLD AUTO: 9 %
MCH RBC QN AUTO: 33.5 PG (ref 26.5–33)
MCHC RBC AUTO-ENTMCNC: 34.1 G/DL (ref 31.5–36.5)
MCV RBC AUTO: 98 FL (ref 78–100)
MONOCYTES # BLD AUTO: 0.6 10E9/L (ref 0–1.3)
MONOCYTES NFR BLD AUTO: 17.1 %
NEUTROPHILS # BLD AUTO: 2.7 10E9/L (ref 1.6–8.3)
NEUTROPHILS NFR BLD AUTO: 72.2 %
NRBC # BLD AUTO: 0 10*3/UL
NRBC BLD AUTO-RTO: 0 /100
PLATELET # BLD AUTO: 195 10E9/L (ref 150–450)
POTASSIUM SERPL-SCNC: 4.3 MMOL/L (ref 3.4–5.3)
PROT SERPL-MCNC: 6.5 G/DL (ref 6.8–8.8)
RBC # BLD AUTO: 3.16 10E12/L (ref 4.4–5.9)
SODIUM SERPL-SCNC: 138 MMOL/L (ref 133–144)
WBC # BLD AUTO: 3.7 10E9/L (ref 4–11)

## 2021-05-24 PROCEDURE — 80053 COMPREHEN METABOLIC PANEL: CPT | Performed by: INTERNAL MEDICINE

## 2021-05-24 PROCEDURE — 36591 DRAW BLOOD OFF VENOUS DEVICE: CPT

## 2021-05-24 PROCEDURE — G0463 HOSPITAL OUTPT CLINIC VISIT: HCPCS

## 2021-05-24 PROCEDURE — 99215 OFFICE O/P EST HI 40 MIN: CPT | Performed by: PHYSICIAN ASSISTANT

## 2021-05-24 PROCEDURE — G0463 HOSPITAL OUTPT CLINIC VISIT: HCPCS | Mod: 25

## 2021-05-24 PROCEDURE — 85025 COMPLETE CBC W/AUTO DIFF WBC: CPT | Performed by: INTERNAL MEDICINE

## 2021-05-24 PROCEDURE — 93005 ELECTROCARDIOGRAM TRACING: CPT

## 2021-05-24 PROCEDURE — 250N000011 HC RX IP 250 OP 636: Performed by: INTERNAL MEDICINE

## 2021-05-24 PROCEDURE — 93010 ELECTROCARDIOGRAM REPORT: CPT | Performed by: INTERNAL MEDICINE

## 2021-05-24 RX ORDER — HEPARIN SODIUM (PORCINE) LOCK FLUSH IV SOLN 100 UNIT/ML 100 UNIT/ML
5 SOLUTION INTRAVENOUS
Status: DISCONTINUED | OUTPATIENT
Start: 2021-05-24 | End: 2021-05-25 | Stop reason: HOSPADM

## 2021-05-24 RX ORDER — HEPARIN SODIUM,PORCINE 10 UNIT/ML
5 VIAL (ML) INTRAVENOUS
Status: CANCELLED | OUTPATIENT
Start: 2021-05-24

## 2021-05-24 RX ORDER — MUPIROCIN 20 MG/G
OINTMENT TOPICAL 3 TIMES DAILY
Qty: 30 G | Refills: 0 | Status: SHIPPED | OUTPATIENT
Start: 2021-05-24 | End: 2021-08-18

## 2021-05-24 RX ORDER — HEPARIN SODIUM (PORCINE) LOCK FLUSH IV SOLN 100 UNIT/ML 100 UNIT/ML
5 SOLUTION INTRAVENOUS
Status: CANCELLED | OUTPATIENT
Start: 2021-05-24

## 2021-05-24 RX ADMIN — Medication 5 ML: at 09:06

## 2021-05-24 ASSESSMENT — PAIN SCALES - GENERAL: PAINLEVEL: MILD PAIN (3)

## 2021-05-24 NOTE — PATIENT INSTRUCTIONS
-Apply antibiotic ointment 3x daily around feeding tube site  -If redness spreads or the tenderness worsens, call the clinic [phone] and I will prescribe an oral antibiotic

## 2021-05-24 NOTE — LETTER
5/24/2021         RE: Fortino Moya  [address]  [facility] [id number]      Oncology/Hematology Visit Note  May 24, 2021    Reason for Visit: Follow up of recurrent head and neck SCC      History of Present Illness: Fortino Moya is a 71 year old male with PMH atrial fibrillation, hypercholesterolemia, HTN with recurrent head and neck cancer. He has a history of L oropharynx P16+ squamous cell cancer that was first diagnosed in 2018 in [place], [place].  He initially was offered chemoradiation, but due to a prior history of L sided hearing loss, he was given low dose weekly carboplatin 1.5 AUC weekly with radiation.  His 3 month PET/CT after initiation was negative and he underwent surveillance after that. He relocated to [place] which is where he is from in Feb 2020 and has been followed since by Dr. Treviño and Dr. Flores at [facility].       In August of 2020, he  developed numbenss in chin and then moved up to his forehead.  He had a Pet/CT done in Sept 2020 that showed a hypermetabolic area near his L foramen ovale - he had had recent dental work in that area and it was thought that this might be scar or inflammation related to that procedure.       However, he was offered a 2nd opinion and came to [facility] and saw Dr. Russell in Nov 2020.  Images were reviewed at tumor boardon 12/4/20 - and it was felt that this was concerning enough to merit biopsy - so he was referred for IR-guided biopsy.   IR guided biopsy happened on 1/8/2021 and came back as invasive squamous cell carcinoma.    He was going to have SBRT, however, repeat imaging showed marked disease progression in the L  space and we decided to initiate induction chemotherapy to try to shrink the lesion to get to SBRT.      He initiated TPF on 1/28/21 and cycle 2 on 2/18/21. He had a fair amount of deconditioning with induction therapy however restaging imaging (PET/CT CAP/neck 3/1/21) demonstrated a resolution of previous FDG-uptake  within the left skull base with some residual soft tissue fullness involving the superior aspect of the pterygoids. No cervical adenopathy or distant metastatic disease.      He was recommend chemotherapy with concurrent re-irradiation with weekly cisplatin, received 3/24/21-5/7/21.     He was seen today for routine follow-up after treatment.    Interval History:  Fortino returns today for follow-up. He overall is doing OK though notes he just started feeling better the last few days. He has been struggling with fatigue, taking naps which helps. The pain in his throat is starting to improve though he still needs Oxycodone and is planning on tapering this over the next few weeks. Mouth sores improving but not completely resolved. No skin issues other than slight irritation around PEG. His left face is still numb but improved- he has optho follow-up tomorrow and is hoping they will have options for management that doesn't involve sewing eye shut. He is still doing most nutrition via PEG but has been doing fluids/protein shakes by mouth. He hasn't been doing his water flushes in his port and has been trying to do most of his hydration by mouth.     He denies fevers, headaches, dizziness, recurrent chest pain, SOB. Still has to cough to clear phlegm. No nausea, vomiting, abdominal pain, bowel or bladder concerns (no longer needing stool softeners), edema, neuropathy.     Current Outpatient Medications   Medication Sig Dispense Refill     acetaminophen (TYLENOL) 160 MG/5ML suspension Take 31.5 mLs (1,000 mg) by mouth every 8 hours as needed for fever or mild pain 500 mL 3     Aspirin Buf,CaCarb-MgCarb-MgO, 81 MG TABS Take 81 mg by mouth every evening        atorvastatin (LIPITOR) 10 MG tablet Take 10 mg by mouth every evening        carboxymethylcellulose PF (REFRESH LIQUIGEL) 1 % ophthalmic gel Place 1 drop Into the left eye 4 times daily 30 each 11     carvedilol (COREG) 12.5 MG tablet 2 times daily        cevimeline  (EVOXAC) 30 MG capsule as needed        erythromycin (ROMYCIN) 5 MG/GM ophthalmic ointment Place 0.5 inches Into the left eye 5 times daily 3.5 g 11     erythromycin (ROMYCIN) 5 MG/GM ophthalmic ointment Apply small amount to incision sites three times daily, then apply to inner lower lid of operative eye(s) at bedtime, as directed. 3.5 g 0     erythromycin (ROMYCIN) 5 MG/GM ophthalmic ointment Place 0.5 inches Into the left eye At Bedtime Instill ~1 cm ribbon into affected eye(s) 4 times daily for 7 days 1 g 0     flecainide (TAMBOCOR) 150 MG tablet Take 150 mg by mouth 2 times daily        naloxone (NARCAN) 4 MG/0.1ML nasal spray Spray 1 spray (4 mg) into one nostril alternating nostrils once as needed for opioid reversal every 2-3 minutes until assistance arrives 0.2 mL 0     Nutritional Supplements (ISOSOURCE 1.5 RIYA) LIQD Take 1 Can by mouth 7 times daily 100 Can 11     oxyCODONE (ROXICODONE) 5 MG/5ML solution Take 5-10 mLs (5-10 mg) by mouth every 4 hours as needed for severe pain 500 mL 0     prochlorperazine (COMPAZINE) 10 MG tablet Take 1 tablet (10 mg) by mouth every 6 hours as needed (Nausea/Vomiting) 30 tablet 1     Sodium Fluoride 1.1 % PSTE Apply 1 Application to affected area daily 112 g 11     Physical Examination:  /87 (BP Location: Right arm, Patient Position: Sitting, Cuff Size: Adult Regular)   Pulse 110   Temp 97.3  F (36.3  C) (Tympanic)   Resp 16   Wt 80.2 kg (176 lb 14.4 oz)   SpO2 94%   BMI 25.38 kg/m    Wt Readings from Last 10 Encounters:   05/14/21 78 kg (171 lb 14.4 oz)   05/06/21 79.2 kg (174 lb 9.6 oz)   04/29/21 78.9 kg (173 lb 14.4 oz)   04/26/21 78.7 kg (173 lb 9.6 oz)   04/26/21 78.7 kg (173 lb 6.4 oz)   04/22/21 77.6 kg (171 lb)   04/19/21 80 kg (176 lb 6.4 oz)   04/15/21 78.8 kg (173 lb 12.8 oz)   04/12/21 78.3 kg (172 lb 9.6 oz)   04/08/21 78.9 kg (174 lb)     Constitutional: Well-appearing male in no acute distress.  Eyes: Left eye with slight swelling and bruising  around orbit. No concerns with right eye.   ENT: Oral mucosa is moist, no thrush. No mucositis.   Lymphatic: Neck is supple without cervical or supraclavicular lymphadenopathy.   Cardiovascular: Tachycardic rate and regular rhythm. No murmurs, gallops, or rubs. No peripheral edema.  Respiratory: Clear to auscultation bilaterally. No wheezes or crackles.  Gastrointestinal: Bowel sounds present. Abdomen soft, non-tender. PEG in place with mild surrounding erythema and tenderness.  Neurologic: Cranial nerves II through XII are grossly intact.  Skin: No rashes, petechiae, or bruising noted on exposed skin.    Laboratory Data:  Results for ELSI IQBAL (MRN [id number]) as of 5/24/2021 14:45   5/24/2021 09:10   Sodium 138   Potassium 4.3   Chloride 103   Carbon Dioxide 30   Urea Nitrogen 24   Creatinine 0.68   GFR Estimate >90   GFR Estimate If Black >90   Calcium 8.8   Anion Gap 4   Albumin 3.5   Protein Total 6.5 (L)   Bilirubin Total 0.3   Alkaline Phosphatase 58   ALT 13   AST 10   Glucose 141 (H)   WBC 3.7 (L)   Hemoglobin 10.6 (L)   Hematocrit 31.1 (L)   Platelet Count 195   RBC Count 3.16 (L)   MCV 98   MCH 33.5 (H)   MCHC 34.1   RDW 14.3   Diff Method Automated Method   % Neutrophils 72.2   % Lymphocytes 9.0   % Monocytes 17.1   % Eosinophils 1.1   % Basophils 0.3   % Immature Granulocytes 0.3   Nucleated RBCs 0   Absolute Neutrophil 2.7   Absolute Lymphocytes 0.3 (L)   Absolute Monocytes 0.6   Absolute Eosinophils 0.0   Absolute Basophils 0.0   Abs Immature Granulocytes 0.0   Absolute Nucleated RBC 0.0     EKG 5/24/21  Sinus tachycardia with 1st degree A-V block  Nonspecific ST and T wave abnormality  Abnormal ECG  When compared with ECG of 08-JAN-2021 07:57,  Vent. rate has increased BY 51 BPM  ST now depressed in Inferior leads  ST now depressed in Anterior leads  Nonspecific T wave abnormality, worse in Inferior leads  Nonspecific T wave abnormality now evident in Lateral leads  QT has  lengthened    Assessment and Plan:  1. Onc  Recurrent SCC of head and neck, prior chemoradiation, now with local recurrence in L  space. S/p 2 cycles of TPF induction chemotherapy with near CR on PET. Discussion at ENT tumor board with plan to do radiation with weekly cisplatin, started 3/24/21. Completed 5/7/21.      Recovering as expected from treatment. See supportive cares below. Will see myself in one month and then has PET and Dr. Shah in August. Continue follow-up with rad onc and ENT as scheduled.     Counts improving after chemo. Recheck labs in June.     2. ENT  Dysphagia: Starting to improve, following with speech. Discussed importance of swallowing exercises.      Cancer related pain: Increasing as expected, Continue Oxycodone PRN-discussed taper over the next few weeks as pain improves.      Mucositis: Continue salt/soda swishes.     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 Has appointment this week.      Monitor chronic left hearing loss with cisplatin, no issues currently.     3. FEN  Nutrition: Continue PEG, 5-6 cartons per day, tolerating well and weight improved. Continue oral intake as tolerated. RD following. Discussed that as he is able to eat more over the next few weeks he can slowly start cutting back on tube feedings. Discussed options for solid foods.      Hydration: Encouraged him to restart PEG flushes in addition to hydration orally. CMP WNL     4. GI  Constipation: Resolved, discussed prune juice PRN if he wants to avoid meds in future.    PEG infection: Superficial, will start with Bactroban topical TID. Asked him to call if redness or tenderness worsens and will need to do oral abx.       5. Cards  HTN: Holding lisinopril, BP WNL. Continue Coreg     Afib: Continue flecainide. Noted to be slightly tachycardic today-asymptomatic and no recurrent  chest pain. EKG with nonspecific ST changes and prolonged QT. Asked him to see cardiology to follow-up and he states he will schedule ASAP. Did discuss calling or going to ED with any symptoms.     40 minutes spent on the date of the encounter doing chart review, review of test results, interpretation of tests, patient visit and documentation     Torsten Polanco PA-C  [facility]  [address] [place] [id number] [phone]          MENDY Chowdhury

## 2021-05-24 NOTE — Clinical Note
5/24/2021         RE: Fortino Moya  [address]  [facility] [address]        Dear Colleague,    Thank you for referring your patient, Fortino Moya, to the [facility]. Please see a copy of my visit note below.    Oncology/Hematology Visit Note  May 24, 2021    Reason for Visit: Follow up of recurrent head and neck SCC      History of Present Illness: Fortino Moya is a 71 year old male with PMH atrial fibrillation, hypercholesterolemia, HTN with recurrent head and neck cancer. He has a history of L oropharynx P16+ squamous cell cancer that was first diagnosed in 2018 in [place], [place].  He initially was offered chemoradiation, but due to a prior history of L sided hearing loss, he was given low dose weekly carboplatin 1.5 AUC weekly with radiation.  His 3 month PET/CT after initiation was negative and he underwent surveillance after that. He relocated to [place] which is where he is from in Feb 2020 and has been followed since by Dr. Treviño and Dr. Flores at [facility].       In August of 2020, he  developed numbenss in chin and then moved up to his forehead.  He had a Pet/CT done in Sept 2020 that showed a hypermetabolic area near his L foramen ovale - he had had recent dental work in that area and it was thought that this might be scar or inflammation related to that procedure.       However, he was offered a 2nd opinion and came to [facility] and saw Dr. Russell in Nov 2020.  Images were reviewed at tumor boardon 12/4/20 - and it was felt that this was concerning enough to merit biopsy - so he was referred for IR-guided biopsy.   IR guided biopsy happened on 1/8/2021 and came back as invasive squamous cell carcinoma.    He was going to have SBRT, however, repeat imaging showed marked disease progression in the L  space and we decided to initiate induction chemotherapy to try to shrink the lesion to get to SBRT.      He initiated TPF on 1/28/21 and cycle 2 on  2/18/21. He had a fair amount of deconditioning with induction therapy however restaging imaging (PET/CT CAP/neck 3/1/21) demonstrated a resolution of previous FDG-uptake within the left skull base with some residual soft tissue fullness involving the superior aspect of the pterygoids. No cervical adenopathy or distant metastatic disease.      He was recommend chemotherapy with concurrent re-irradiation with weekly cisplatin, received 3/24/21-5/7/21.     He was seen today for routine follow-up after treatment.    Interval History:  Fortino returns today for follow-up. He overall is doing OK though notes he just started feeling better the last few days. He has been struggling with fatigue, taking naps which helps. The pain in his throat is starting to improve though he still needs Oxycodone and is planning on tapering this over the next few weeks. Mouth sores improving but not completely resolved. No skin issues other than slight irritation around PEG. His left face is still numb but improved- he has optho follow-up tomorrow and is hoping they will have options for management that doesn't involve sewing eye shut. He is still doing most nutrition via PEG but has been doing fluids/protein shakes by mouth.     He denies fevers, headaches, dizziness, recurrent chest pain, SOB. Still has to cough to clear phlegm. No nausea, vomiting, abdominal pain, bowel or bladder concerns (no longer needing stool softeners), edema, neuropathy.     Current Outpatient Medications   Medication Sig Dispense Refill     acetaminophen (TYLENOL) 160 MG/5ML suspension Take 31.5 mLs (1,000 mg) by mouth every 8 hours as needed for fever or mild pain 500 mL 3     Aspirin Buf,CaCarb-MgCarb-MgO, 81 MG TABS Take 81 mg by mouth every evening        atorvastatin (LIPITOR) 10 MG tablet Take 10 mg by mouth every evening        carboxymethylcellulose PF (REFRESH LIQUIGEL) 1 % ophthalmic gel Place 1 drop Into the left eye 4 times daily 30 each 11      carvedilol (COREG) 12.5 MG tablet 2 times daily        cevimeline (EVOXAC) 30 MG capsule as needed        erythromycin (ROMYCIN) 5 MG/GM ophthalmic ointment Place 0.5 inches Into the left eye 5 times daily 3.5 g 11     erythromycin (ROMYCIN) 5 MG/GM ophthalmic ointment Apply small amount to incision sites three times daily, then apply to inner lower lid of operative eye(s) at bedtime, as directed. 3.5 g 0     erythromycin (ROMYCIN) 5 MG/GM ophthalmic ointment Place 0.5 inches Into the left eye At Bedtime Instill ~1 cm ribbon into affected eye(s) 4 times daily for 7 days 1 g 0     flecainide (TAMBOCOR) 150 MG tablet Take 150 mg by mouth 2 times daily        naloxone (NARCAN) 4 MG/0.1ML nasal spray Spray 1 spray (4 mg) into one nostril alternating nostrils once as needed for opioid reversal every 2-3 minutes until assistance arrives 0.2 mL 0     Nutritional Supplements (ISOSOURCE 1.5 RIYA) LIQD Take 1 Can by mouth 7 times daily 100 Can 11     oxyCODONE (ROXICODONE) 5 MG/5ML solution Take 5-10 mLs (5-10 mg) by mouth every 4 hours as needed for severe pain 500 mL 0     prochlorperazine (COMPAZINE) 10 MG tablet Take 1 tablet (10 mg) by mouth every 6 hours as needed (Nausea/Vomiting) 30 tablet 1     Sodium Fluoride 1.1 % PSTE Apply 1 Application to affected area daily 112 g 11     Physical Examination:  /87 (BP Location: Right arm, Patient Position: Sitting, Cuff Size: Adult Regular)   Pulse 110   Temp 97.3  F (36.3  C) (Tympanic)   Resp 16   Wt 80.2 kg (176 lb 14.4 oz)   SpO2 94%   BMI 25.38 kg/m    Wt Readings from Last 10 Encounters:   05/14/21 78 kg (171 lb 14.4 oz)   05/06/21 79.2 kg (174 lb 9.6 oz)   04/29/21 78.9 kg (173 lb 14.4 oz)   04/26/21 78.7 kg (173 lb 9.6 oz)   04/26/21 78.7 kg (173 lb 6.4 oz)   04/22/21 77.6 kg (171 lb)   04/19/21 80 kg (176 lb 6.4 oz)   04/15/21 78.8 kg (173 lb 12.8 oz)   04/12/21 78.3 kg (172 lb 9.6 oz)   04/08/21 78.9 kg (174 lb)     Constitutional: Well-appearing male in no  acute distress.  Eyes: Left eye with slight swelling and bruising around orbit. No concerns with right eye.   ENT: Oral mucosa is moist, no thrush. No mucositis.   Lymphatic: Neck is supple without cervical or supraclavicular lymphadenopathy.   Cardiovascular: Tachycardic rate and regular rhythm. No murmurs, gallops, or rubs. No peripheral edema.  Respiratory: Clear to auscultation bilaterally. No wheezes or crackles.  Gastrointestinal: Bowel sounds present. Abdomen soft, non-tender. PEG in place with mild surrounding erythema and tenderness.  Neurologic: Cranial nerves II through XII are grossly intact.  Skin: No rashes, petechiae, or bruising noted on exposed skin.    Laboratory Data:  Results for ELSI IQBAL (MRN [id number]) as of 5/24/2021 14:45   5/24/2021 09:10   Sodium 138   Potassium 4.3   Chloride 103   Carbon Dioxide 30   Urea Nitrogen 24   Creatinine 0.68   GFR Estimate >90   GFR Estimate If Black >90   Calcium 8.8   Anion Gap 4   Albumin 3.5   Protein Total 6.5 (L)   Bilirubin Total 0.3   Alkaline Phosphatase 58   ALT 13   AST 10   Glucose 141 (H)   WBC 3.7 (L)   Hemoglobin 10.6 (L)   Hematocrit 31.1 (L)   Platelet Count 195   RBC Count 3.16 (L)   MCV 98   MCH 33.5 (H)   MCHC 34.1   RDW 14.3   Diff Method Automated Method   % Neutrophils 72.2   % Lymphocytes 9.0   % Monocytes 17.1   % Eosinophils 1.1   % Basophils 0.3   % Immature Granulocytes 0.3   Nucleated RBCs 0   Absolute Neutrophil 2.7   Absolute Lymphocytes 0.3 (L)   Absolute Monocytes 0.6   Absolute Eosinophils 0.0   Absolute Basophils 0.0   Abs Immature Granulocytes 0.0   Absolute Nucleated RBC 0.0     EKG       Assessment and Plan:          Torsten Polanco PA-C  [facility] [place] [address]  [phone]        Again, thank you for allowing me to participate in the care of your patient.        Sincerely,        MENDY Chowdhury

## 2021-05-24 NOTE — NURSING NOTE
"Oncology Rooming Note    May 24, 2021 9:26 AM   Fortino Moya is a 71 year old male who presents for:    Chief Complaint   Patient presents with     Port Draw     port accessed and labs drawn by rn in lab.  vital signs taken.     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    Weight as of this encounter: 80.2 kg (176 lb 14.4 oz). Body surface area is 1.99 meters squared.  Mild Pain (3) Comment: Data Unavailable   No LMP for male patient.  Allergies reviewed: Yes  Medications reviewed: Yes    Medications: Medication refills not needed today.  Pharmacy name entered into Radialpoint:    [id number] - [place], [place] - [address] [facility] - [place], [place] - [address] [facility] [place], [place] - [address] [facility]  [facility] [place], [place] - [address] [facility] [phone]  [facility] [place], [place] - [address] [facility]    Clinical concerns: None.       Amy Cespedes MA            "

## 2021-05-24 NOTE — PROGRESS NOTES
Oncology/Hematology Visit Note  May 24, 2021    Reason for Visit: Follow up of recurrent head and neck SCC      History of Present Illness: Fortino Moya is a 71 year old male with PMH atrial fibrillation, hypercholesterolemia, HTN with recurrent head and neck cancer. He has a history of L oropharynx P16+ squamous cell cancer that was first diagnosed in 2018 in [place], [place].  He initially was offered chemoradiation, but due to a prior history of L sided hearing loss, he was given low dose weekly carboplatin 1.5 AUC weekly with radiation.  His 3 month PET/CT after initiation was negative and he underwent surveillance after that. He relocated to [place] which is where he is from in Feb 2020 and has been followed since by Dr. Treviño and Dr. Flores at [facility].       In August of 2020, he  developed numbenss in chin and then moved up to his forehead.  He had a Pet/CT done in Sept 2020 that showed a hypermetabolic area near his L foramen ovale - he had had recent dental work in that area and it was thought that this might be scar or inflammation related to that procedure.       However, he was offered a 2nd opinion and came to [facility] and saw Dr. Russell in Nov 2020.  Images were reviewed at tumor boardon 12/4/20 - and it was felt that this was concerning enough to merit biopsy - so he was referred for IR-guided biopsy.   IR guided biopsy happened on 1/8/2021 and came back as invasive squamous cell carcinoma.    He was going to have SBRT, however, repeat imaging showed marked disease progression in the L  space and we decided to initiate induction chemotherapy to try to shrink the lesion to get to SBRT.      He initiated TPF on 1/28/21 and cycle 2 on 2/18/21. He had a fair amount of deconditioning with induction therapy however restaging imaging (PET/CT CAP/neck 3/1/21) demonstrated a resolution of previous FDG-uptake within the left skull base with some residual soft tissue fullness involving the  superior aspect of the pterygoids. No cervical adenopathy or distant metastatic disease.      He was recommend chemotherapy with concurrent re-irradiation with weekly cisplatin, received 3/24/21-5/7/21.     He was seen today for routine follow-up after treatment.    Interval History:  Fortino returns today for follow-up. He overall is doing OK though notes he just started feeling better the last few days. He has been struggling with fatigue, taking naps which helps. The pain in his throat is starting to improve though he still needs Oxycodone and is planning on tapering this over the next few weeks. Mouth sores improving but not completely resolved. No skin issues other than slight irritation around PEG. His left face is still numb but improved- he has optho follow-up tomorrow and is hoping they will have options for management that doesn't involve sewing eye shut. He is still doing most nutrition via PEG but has been doing fluids/protein shakes by mouth. He hasn't been doing his water flushes in his port and has been trying to do most of his hydration by mouth.     He denies fevers, headaches, dizziness, recurrent chest pain, SOB. Still has to cough to clear phlegm. No nausea, vomiting, abdominal pain, bowel or bladder concerns (no longer needing stool softeners), edema, neuropathy.     Current Outpatient Medications   Medication Sig Dispense Refill     acetaminophen (TYLENOL) 160 MG/5ML suspension Take 31.5 mLs (1,000 mg) by mouth every 8 hours as needed for fever or mild pain 500 mL 3     Aspirin Buf,CaCarb-MgCarb-MgO, 81 MG TABS Take 81 mg by mouth every evening        atorvastatin (LIPITOR) 10 MG tablet Take 10 mg by mouth every evening        carboxymethylcellulose PF (REFRESH LIQUIGEL) 1 % ophthalmic gel Place 1 drop Into the left eye 4 times daily 30 each 11     carvedilol (COREG) 12.5 MG tablet 2 times daily        cevimeline (EVOXAC) 30 MG capsule as needed        erythromycin (ROMYCIN) 5 MG/GM ophthalmic  ointment Place 0.5 inches Into the left eye 5 times daily 3.5 g 11     erythromycin (ROMYCIN) 5 MG/GM ophthalmic ointment Apply small amount to incision sites three times daily, then apply to inner lower lid of operative eye(s) at bedtime, as directed. 3.5 g 0     erythromycin (ROMYCIN) 5 MG/GM ophthalmic ointment Place 0.5 inches Into the left eye At Bedtime Instill ~1 cm ribbon into affected eye(s) 4 times daily for 7 days 1 g 0     flecainide (TAMBOCOR) 150 MG tablet Take 150 mg by mouth 2 times daily        naloxone (NARCAN) 4 MG/0.1ML nasal spray Spray 1 spray (4 mg) into one nostril alternating nostrils once as needed for opioid reversal every 2-3 minutes until assistance arrives 0.2 mL 0     Nutritional Supplements (ISOSOURCE 1.5 RIYA) LIQD Take 1 Can by mouth 7 times daily 100 Can 11     oxyCODONE (ROXICODONE) 5 MG/5ML solution Take 5-10 mLs (5-10 mg) by mouth every 4 hours as needed for severe pain 500 mL 0     prochlorperazine (COMPAZINE) 10 MG tablet Take 1 tablet (10 mg) by mouth every 6 hours as needed (Nausea/Vomiting) 30 tablet 1     Sodium Fluoride 1.1 % PSTE Apply 1 Application to affected area daily 112 g 11     Physical Examination:  /87 (BP Location: Right arm, Patient Position: Sitting, Cuff Size: Adult Regular)   Pulse 110   Temp 97.3  F (36.3  C) (Tympanic)   Resp 16   Wt 80.2 kg (176 lb 14.4 oz)   SpO2 94%   BMI 25.38 kg/m    Wt Readings from Last 10 Encounters:   05/14/21 78 kg (171 lb 14.4 oz)   05/06/21 79.2 kg (174 lb 9.6 oz)   04/29/21 78.9 kg (173 lb 14.4 oz)   04/26/21 78.7 kg (173 lb 9.6 oz)   04/26/21 78.7 kg (173 lb 6.4 oz)   04/22/21 77.6 kg (171 lb)   04/19/21 80 kg (176 lb 6.4 oz)   04/15/21 78.8 kg (173 lb 12.8 oz)   04/12/21 78.3 kg (172 lb 9.6 oz)   04/08/21 78.9 kg (174 lb)     Constitutional: Well-appearing male in no acute distress.  Eyes: Left eye with slight swelling and bruising around orbit. No concerns with right eye.   ENT: Oral mucosa is moist, no thrush.  No mucositis.   Lymphatic: Neck is supple without cervical or supraclavicular lymphadenopathy.   Cardiovascular: Tachycardic rate and regular rhythm. No murmurs, gallops, or rubs. No peripheral edema.  Respiratory: Clear to auscultation bilaterally. No wheezes or crackles.  Gastrointestinal: Bowel sounds present. Abdomen soft, non-tender. PEG in place with mild surrounding erythema and tenderness.  Neurologic: Cranial nerves II through XII are grossly intact.  Skin: No rashes, petechiae, or bruising noted on exposed skin.    Laboratory Data:  Results for ELSI IQBAL (MRN [id number]) as of 5/24/2021 14:45   5/24/2021 09:10   Sodium 138   Potassium 4.3   Chloride 103   Carbon Dioxide 30   Urea Nitrogen 24   Creatinine 0.68   GFR Estimate >90   GFR Estimate If Black >90   Calcium 8.8   Anion Gap 4   Albumin 3.5   Protein Total 6.5 (L)   Bilirubin Total 0.3   Alkaline Phosphatase 58   ALT 13   AST 10   Glucose 141 (H)   WBC 3.7 (L)   Hemoglobin 10.6 (L)   Hematocrit 31.1 (L)   Platelet Count 195   RBC Count 3.16 (L)   MCV 98   MCH 33.5 (H)   MCHC 34.1   RDW 14.3   Diff Method Automated Method   % Neutrophils 72.2   % Lymphocytes 9.0   % Monocytes 17.1   % Eosinophils 1.1   % Basophils 0.3   % Immature Granulocytes 0.3   Nucleated RBCs 0   Absolute Neutrophil 2.7   Absolute Lymphocytes 0.3 (L)   Absolute Monocytes 0.6   Absolute Eosinophils 0.0   Absolute Basophils 0.0   Abs Immature Granulocytes 0.0   Absolute Nucleated RBC 0.0     EKG 5/24/21  Sinus tachycardia with 1st degree A-V block  Nonspecific ST and T wave abnormality  Abnormal ECG  When compared with ECG of 08-JAN-2021 07:57,  Vent. rate has increased BY 51 BPM  ST now depressed in Inferior leads  ST now depressed in Anterior leads  Nonspecific T wave abnormality, worse in Inferior leads  Nonspecific T wave abnormality now evident in Lateral leads  QT has lengthened    Assessment and Plan:  1. Onc  Recurrent SCC of head and neck, prior chemoradiation, now  with local recurrence in L  space. S/p 2 cycles of TPF induction chemotherapy with near CR on PET. Discussion at ENT tumor board with plan to do radiation with weekly cisplatin, started 3/24/21. Completed 5/7/21.      Recovering as expected from treatment. See supportive cares below. Will see myself in one month and then has PET and Dr. Shah in August. Continue follow-up with rad onc and ENT as scheduled.     Counts improving after chemo. Recheck labs in June.     2. ENT  Dysphagia: Starting to improve, following with speech. Discussed importance of swallowing exercises.      Cancer related pain: Increasing as expected, Continue Oxycodone PRN-discussed taper over the next few weeks as pain improves.      Mucositis: Continue salt/soda swishes.     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 Has appointment this week.      Monitor chronic left hearing loss with cisplatin, no issues currently.     3. FEN  Nutrition: Continue PEG, 5-6 cartons per day, tolerating well and weight improved. Continue oral intake as tolerated. RD following. Discussed that as he is able to eat more over the next few weeks he can slowly start cutting back on tube feedings. Discussed options for solid foods.      Hydration: Encouraged him to restart PEG flushes in addition to hydration orally. CMP WNL     4. GI  Constipation: Resolved, discussed prune juice PRN if he wants to avoid meds in future.    PEG infection: Superficial, will start with Bactroban topical TID. Asked him to call if redness or tenderness worsens and will need to do oral abx.       5. Cards  HTN: Holding lisinopril, BP WNL. Continue Coreg     Afib: Continue flecainide. Noted to be slightly tachycardic today-asymptomatic and no recurrent chest pain. EKG with nonspecific ST changes and prolonged QT. Asked him to see cardiology to follow-up  and he states he will schedule ASAP. Did discuss calling or going to ED with any symptoms.     40 minutes spent on the date of the encounter doing chart review, review of test results, interpretation of tests, patient visit and documentation     Torsten Polanco PA-C  [facility]  [address] [place] [address] [id number] [phone]

## 2021-05-24 NOTE — NURSING NOTE
"Chief Complaint   Patient presents with     Port Draw     port accessed and labs drawn by rn in lab.  vital signs taken.     Port accessed by RN in lab with 20g 3/4\" gripper needle, labs drawn, port flushed with saline and heparin, port de-accessed.  vitals checked, pt checked in for next appointment.    Siri Duarte RN      "

## 2021-05-25 ENCOUNTER — OFFICE VISIT (OUTPATIENT)
Dept: OPHTHALMOLOGY | Facility: CLINIC | Age: 71
End: 2021-05-25
Payer: MEDICARE

## 2021-05-25 DIAGNOSIS — H16.232 NEUROTROPHIC KERATOPATHY OF LEFT EYE: ICD-10-CM

## 2021-05-25 DIAGNOSIS — G51.0 FACIAL PALSY: ICD-10-CM

## 2021-05-25 DIAGNOSIS — C09.9 SQUAMOUS CELL CARCINOMA OF TONSIL (H): ICD-10-CM

## 2021-05-25 DIAGNOSIS — H02.239 PARALYTIC LAGOPHTHALMOS, UNSPECIFIED LATERALITY: Primary | ICD-10-CM

## 2021-05-25 PROCEDURE — 99024 POSTOP FOLLOW-UP VISIT: CPT | Performed by: OPHTHALMOLOGY

## 2021-05-25 ASSESSMENT — EXTERNAL EXAM - RIGHT EYE: OD_EXAM: NORMAL

## 2021-05-25 ASSESSMENT — VISUAL ACUITY
OS_CC: 20/400
METHOD: SNELLEN - LINEAR
OD_CC: 20/20
CORRECTION_TYPE: GLASSES
OD_CC+: -2

## 2021-05-25 ASSESSMENT — TONOMETRY
IOP_METHOD: ICARE
OS_IOP_MMHG: 10
OD_IOP_MMHG: 10

## 2021-05-25 ASSESSMENT — EXTERNAL EXAM - LEFT EYE: OS_EXAM: LEFT FACIAL PALSY

## 2021-05-25 NOTE — NURSING NOTE
Chief Complaints and History of Present Illnesses   Patient presents with     Post Op (Ophthalmology) Left Eye     Chief Complaint(s) and History of Present Illness(es)     Post Op (Ophthalmology) Left Eye     Laterality: left eye    Onset: months ago    Frequency: constantly    Course: gradually improving    Associated symptoms: Negative for eye pain              Comments     S/p Left upper eyelid platinum weight insertion 1.6 grams, Left lower eyelid ectropion repair, and Left temporary tarsorrhaphy on 4/2/2021. Pt states still some swelling around left eye- but a lot better than what it was. No pain. Drops QID left eye, Ointment TID left eye.    Aquiles Grier, ANA COT 11:01 AM May 25, 2021

## 2021-05-25 NOTE — PROGRESS NOTES
Chief Complaint(s) and History of Present Illness(es)     Post Op (Ophthalmology) Left Eye     Laterality: left eye    Onset: months ago    Frequency: constantly    Course: gradually improving    Associated symptoms: Negative for eye pain              Comments     S/p Left upper eyelid platinum weight insertion 1.6 grams, Left lower   eyelid ectropion repair, and Left temporary tarsorrhaphy on 4/2/2021. Pt   states still some swelling around left eye- but a lot better than what it   was. No pain. Drops QID left eye, Ointment TID left eye.    Aquiles Grier, COT COT 11:01 AM May 25, 2021     Fortino is a 71-year-old gentleman with a history of recurrent squamous cell carcinoma of the tonsils status post chemoradiation.  Unfortunately he developed facial paralysis, and neurotrophic keratitis on the left.  He is pleased with how his eyelid has healed. He is also noticing some return of sensation.       Assessment & Plan     Fortino Moya is a 71 year old male with the following diagnoses:   Encounter Diagnoses   Name Primary?     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    We again discussed he does have some residual exposure, though he is doing relatively good job with lubrication.  We discussed her numerous options including remaining conservative with aggressive lubrication, considering scleral lens evaluation, extending the tarsorrhaphy, as well as the option of corneal neurotization.     He does not want any further surgery at this time, and would refuse extending his tarsorrhaphy.  His biggest concern is his youngest grandchild would be scared to look at his eye.  He feels the function is acceptable at this point, he has peripheral vision from  it, and will not consent to any further tarsorrhaphy.    He is interested in discussing the option of a scleral lens, and will discuss that with Dr. Barney at their follow-up visit.  I am happy to see him back at any point.  I did recommend he continue his aggressive lubrication regimen.    This report was dictated using Dragon voice recognition software.     Patient disposition:   No follow-ups on file.        Attending Physician Attestation: Complete documentation of historical and exam elements from today's encounter can be found in the full encounter summary report (not reduplicated in this progress note). I personally obtained the chief complaint(s) and history of present illness. I confirmed and edited as necessary the review of systems, past medical/surgical history, family history, social history, and examination findings as documented by others; and I examined the patient myself. I personally reviewed the relevant tests, images, and reports as documented above. I formulated and edited as necessary the assessment and plan and discussed the findings and management plan with the patient.  -Vivien Yoon MD

## 2021-05-26 ENCOUNTER — THERAPY VISIT (OUTPATIENT)
Dept: SPEECH THERAPY | Facility: CLINIC | Age: 71
End: 2021-05-26
Payer: MEDICARE

## 2021-05-26 DIAGNOSIS — C09.9 SQUAMOUS CELL CARCINOMA OF TONSIL (H): ICD-10-CM

## 2021-05-26 DIAGNOSIS — G50.9: ICD-10-CM

## 2021-05-26 DIAGNOSIS — R13.12 OROPHARYNGEAL DYSPHAGIA: Primary | ICD-10-CM

## 2021-05-26 LAB — INTERPRETATION ECG - MUSE: NORMAL

## 2021-05-26 PROCEDURE — 92610 EVALUATE SWALLOWING FUNCTION: CPT | Mod: GN | Performed by: SPEECH-LANGUAGE PATHOLOGIST

## 2021-05-26 PROCEDURE — 92526 ORAL FUNCTION THERAPY: CPT | Mod: GN | Performed by: SPEECH-LANGUAGE PATHOLOGIST

## 2021-05-26 NOTE — PROGRESS NOTES
[facility] paperwork completed, checked for accuracy, signed and faxed to [employer] @ [phone]. A copy was made, sent to scanning and original mailed to patient at home address.    Successful transmission verified in Right Fax.          Lamar Rodriguez CMA

## 2021-05-26 NOTE — PROCEDURES
Radiotherapy Treatment Summary          Date of Report: May 24, 2021     PATIENT: ELSI MOYA  MEDICAL RECORD NO: [id number]  : 1950     DIAGNOSIS: C09.8 Malignant neoplasm of overlapping sites of tonsil  INTENT OF RADIOTHERAPY: Curative (primary)  PATHOLOGY: Squamous cell carcinoma  STAGE: rcT4 N0 M0  CONCURRENT SYSTEMIC THERAPY: Cisplatin 40 mg/m2 weekly     Details of the treatments summarized below are found in records kept in the Department of Radiation Oncology at [facility].     Treatment Summary:  Treatment Site Dose  Modality From  To  Elapsed Days Fx.  Tumor    6,600 cGy 06 X   3/24/2021  2021  44  33  High risk tissues 5,940 cGy 06 X   3/24/2021  2021  44  33  CN V at skull base 5,400 cGy 06 X   3/24/2021  2021  44  33     Dose per Fraction:   Tumor:   200 cGy  High risk tissues: 180 cGy  CN V at skull base: 164 cGy      COMMENTS:  Mr. 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 He presented a few years later with recurrent disease within the ipsilateral pterygoids extending the skull base with invasion of the cavernous sinus and tracking along CN V to the brainstem. He received 2 cycles of induction chemotherapy with TPF from 2021 - 2021 with repeat imaging demonstrating a near-complete response to therapy. He then received curative-intent chemoradiotherapy as described above.     The sites of disease involvement prior to induction chemotherapy were treated to a dose of 66 Gy in 33 once-daily fractions using 6 MV photons delivered via a helical tomotherapy technique. The surrounding high-risk tissues were treated to 59.4 Gy while the tract of CN V to the brainstem was treated to 54 Gy using a simultaneous integrated boost technique. Radiotherapy was delivered with cisplatin 40 mg/m2 administered weekly.      Mr. Moya has significant deconditioning  after his induction chemotherapy regimen but was able to complete all of his radiotherapy and 6 chemotherapy infusions as scheduled without any interruptions in therapy. By the completion of treatment, he had developed grade 2 mucositis along with grade 1 dermatitis and fatigue. His mucositis was managed with frequent salt/soda rinses and oxycodone 10-15 mg q4h as needed for moderate to severe pain. His dermatitis was managed with frequent applications of Aquaphor to the left lateral face. Mr. Moya was PEG dependent prior to initiation of radiotherapy and remained so for the duration of his treatment. At the end of therapy, he was consuming 5-6 cans daily via PEG with small amounts of PO intake. His weight remained stable throughout therapy. Mr. Moya did present to the ED on 5/6/2021 for chest pain which revealed no acute cardiopulmonary etiology and resolved with supportive cares.      Acute Toxicity Profile (CTCAE v5.0)  Mucositis: Grade 2  Fatigue: Grade 1  Dermatitis: Grade 1     PAIN MANAGEMENT:   Continue oxycodone 10-15 mg q4h as needed for moderate to severe pain     FOLLOW UP PLAN:   Follow-up in radiation oncology clinic with NP in 1 week for symptom assessment  Follow-up in radiation oncology clinic with MD in 6 weeks with CT neck prior     Staff Physician: Sidney Rodgers MD, PhD  Physicist: Tuan Fagan PhD     CC:   David Yoon MD                                 Radiation Oncology:  [facility] [address] [place] [address]

## 2021-06-01 ENCOUNTER — VIRTUAL VISIT (OUTPATIENT)
Dept: RADIATION ONCOLOGY | Facility: CLINIC | Age: 71
End: 2021-06-01
Attending: NURSE PRACTITIONER
Payer: MEDICARE

## 2021-06-01 DIAGNOSIS — C09.9 SQUAMOUS CELL CARCINOMA OF TONSIL (H): Primary | ICD-10-CM

## 2021-06-01 NOTE — PROGRESS NOTES
Speech-Language Pathology Department   EVALUATION  [facility]  Clinical Swallow Evaluation    05/26/21 6797   General Information   Type Of Visit Initial   Start Of Care Date 05/26/21   Referring Physician Dr. David Russell   Orders Evaluate And Treat   Orders Comment Clinical Swallow Evaluation   Medical Diagnosis Oropharyngeal dysphagia; fifth cranial nerve palsy; SCC tonsil   Onset Of Illness/injury Or Date Of Surgery 05/26/21   Precautions/limitations Swallowing Precautions   Hearing Functional in 1:1 setting   Pertinent History of Current Problem/OT: Additional Occupational Profile Info Fortino Moya is a 71 year old male with PMH atrial fibrillation, hypercholesterolemia, HTN and gT2Z4D6 p16+ SCC left tonsil s/p chemoXRT in [place] in 2018. He recently presented with recurrent disease in ipsilateral pterygoids extending to the skull base with invasion of the cavernous sinus and track along CN V to the brainstem. He did two cycles of induction chemo from 1/28/21-2/18/21 and then underwent chemoXRT from 3/24/21 to 5/7/21. He was PEG dependent prior to beginning chemoXRT and continues to be PEG dependent. Last video swallow study on 3/22/21 and demonstrated moderately severe oropharyngeal dysphagia, but was improved compared to 2/11/21. Recommended DD3/soft regular textures and thin liquids. He presents today for clinical evaluation and would like to work towards getting his PEG out. He is currently doing 5 to 6 cartons via PEG each and and states he takes 95-96% of his nutrition via PEG. He does a little PO occasionally.    Respiratory Status Room air   Prior Level Of Function Swallowing   Prior Level Of Function Comment PEG primary source of nutrition/hydration; minimal PO    General Observations Pt is pleasant and cooperative throughout evaluation.    Patient/family Goals To get feeding tube out   Clinical Swallow Evaluation   Oral Musculature anomalies  present   Structural Abnormalities none present   Dentition present and adequate   Mucosal Quality dry   Mandibular Strength and Mobility impaired   Oral Labial Strength and Mobility other (see comments)  (impaired on left)   Lingual Strength and Mobility other (see comments)  (left tongue deviation)   Velar Elevation impaired   Buccal Strength and Mobility impaired   Laryngeal Function Cough;Swallow;Voicing initiated   Oral Musculature Comments Left sided deficits d/t CN V palsy.    Clinical Swallow Eval: Thin Liquid Texture Trial   Mode of Presentation, Thin Liquids cup   Volume of Liquid or Food Presented 2oz   Oral Phase of Swallow WFL   Pharyngeal Phase of Swallow repeated swallows   Diagnostic Statement No overt clinical s/sx of penetration/aspiration. Multiple swallows noted   Swallow Compensations   Swallow Compensations Reduce amounts;Multiple swallow   Educational Assessment   Barriers to Learning No barriers   General Therapy Interventions   Planned Therapy Interventions Dysphagia Treatment   Dysphagia treatment Oropharyngeal exercise training;Compensatory strategies for swallowing;Modified diet education;Instruction of safe swallow strategies   Swallow Eval: Clinical Impressions   Skilled Criteria for Therapy Intervention Skilled criteria met.  Treatment indicated.   Functional Assessment Scale (FAS) 2   Treatment Diagnosis Moderate to Moderately severe oropharyngeal dysphagia   Diet texture recommendations Regular diet;Thin liquids   Recommended Feeding/Eating Techniques alternate between small bites and sips of food/liquid;maintain upright posture during/after eating for 30 mins;small sips/bites;other (see comments)  (oral cares)   Rehab Potential fair, will monitor progress closely   Demonstrates Need for Referral to Another Service dietitian   Predicted Duration of Therapy Intervention (days/wks) 1x/week for 8 weeks   Anticipated Discharge Disposition home w/ outpatient services   Risks and Benefits  of Treatment have been explained. Yes   Patient, family and/or staff in agreement with Plan of Care Yes   Clinical Impression Comments Pt presents with continued moderate to moderately severe oropharyngeal dysphagia. Pt continues to be PEG dependent. Previous video swallow studies reviewed for today's visit. Pt assessed with thin liquid with no overt clinical s/sx of penetration/aspiartion. Pt noted to swallow multiple times. Pt's goal is to work towards removing his PEG. Recommend pt meet with dietician to assist in weaning off PEG. Recommend pt try to use supplemental protein drinks (Boost, Ensure, etc) to help decrease tube feeds. Recommend pt try to eat small amount of soft textures and thin liquids a few times a day. Pt will benefit from ongoing SLP services to ensure diet tolerance, assist in working towards PEG removal and train pharyngeal strengthening/ROM exercises to maximize swallow function in the setting of XRT induced fibrosis.    Swallow Goals   SLP Swallow Goals 1;2   Swallow Goal 1   Goal Identifier Diet   Goal Description 1. Pt will tolerate regular textures and thin liquids with no overt clinical s/sx of penetration/aspiration or adverse pulmonary events over a 3 month period.   Target Date 08/24/21   Swallow Goal 2   Goal Identifier Exercises   Goal Description 2. Pt will increase pharyngeal strength/ROM by completing home exercise program as prescribed on daily basis independently.    Target Date 08/24/21   Total Session Time   SLP Eval: oral/pharyngeal swallow function, clinical minutes ([id number]) 12   Total Evaluation Time 12   Therapy Certification   Certification date from 05/26/21   Certification date to 08/24/21   Medical Diagnosis Oropharyngeal dysphagia   Certification I certify the need for these services furnished under this plan of treatment and while under my care.  (Physician co-signature of this document indicates review and certification of the therapy plan).     Thank you for the  referral of Fortino Moya. If you have any questions about this report, please contact me using the information below.     KATTY Salgado MA (music), CCC-SLP   Speech Language Pathologist  [place] Trained Vocologist   [facility] [place]  Dept. of Otolaryngology  Department of Rehabilitation Services  [address] [place] [id number]  Email: [email or web address]   Pronouns: she/her/hers

## 2021-06-01 NOTE — PROGRESS NOTES
"Radiation Oncology Follow-up Visit  2021    The patient has been notified of following:     Due to COVID19 pandemic.  \"This telephone visit will be conducted via a call between you and your physician/provider. We have found that certain health care needs can be provided without the need for a physical exam.  This service lets us provide the care you need with a short phone conversation.  If a prescription is necessary we can send it directly to your pharmacy.  If lab work is needed we can place an order for that and you can then stop by our lab to have the test done at a later time.    Telephone visits are billed at different rates depending on your insurance coverage. During this emergency period, for some insurers they may be billed the same as an in-person visit.  Please reach out to your insurance provider with any questions.    If during the course of the call the physician/provider feels a telephone visit is not appropriate, you will not be charged for this service.\"    Patient has given verbal consent for Telephone visit?  Yes    How would you like to obtain your AVS? [employer]    Phone call duration: 21 minutes    Fortino Moya  MRN: [id number]   : 1950     DISEASE TREATED:   Squamous cell carcinoma of the left tonsil, rcT4 N0 M0 p16+    RADIATION THERAPY DELIVERED:   6600 cGy to left skull base completed 2021    SYSTEMIC TREATMENT:  Weekly cisplatin    INTERVAL SINCE COMPLETION OF RADIATION THERAPY:   3+ week    SUBJECTIVE/HPI:  Fortino Moya is a 71 year old male who is here today for routine 3 week follow up after completing radiation therapy.  He continues to have pain on the left side of his tongue but is well managed on oxycodone 10mg three times a day which is down from when I saw him last. He is also taking tylenol.  He will continue to wean.  He is PEG dependent and taking 5-6 cans a day.  He weighted himself today and weight is down just a couple of lbs at 169.  He is eating some " foods by mouth, but has mild difficulty swallowing solid foods.  He was able to eat a steak though. He has seen speech therapy and is working on swallowing exercises.  He is discouraged that he isn't able to eat more. He does have a dry mouth.  His skin is healed.  He is still fatigued but it seems to be improving.  He still has numbness which is related to his tumor (not surgery).  He is using fluoride toothpaste.    ROS:  Complete review of systems is negative except for symptoms discussed in subjective above.    MEDS:  Current Outpatient Medications   Medication     acetaminophen (TYLENOL) 160 MG/5ML suspension     Aspirin Buf,CaCarb-MgCarb-MgO, 81 MG TABS     atorvastatin (LIPITOR) 10 MG tablet     carboxymethylcellulose PF (REFRESH LIQUIGEL) 1 % ophthalmic gel     carvedilol (COREG) 12.5 MG tablet     cevimeline (EVOXAC) 30 MG capsule     erythromycin (ROMYCIN) 5 MG/GM ophthalmic ointment     flecainide (TAMBOCOR) 150 MG tablet     mupirocin (BACTROBAN) 2 % external ointment     Nutritional Supplements (ISOSOURCE 1.5 RIYA) LIQD     oxyCODONE (ROXICODONE) 5 MG/5ML solution     prochlorperazine (COMPAZINE) 10 MG tablet     Sodium Fluoride 1.1 % PSTE     No current facility-administered medications for this visit.      Facility-Administered Medications Ordered in Other Visits   Medication     barium sulfate (EZ-DISK) tablet 700 mg        No Known Allergies    Past Medical History:   Diagnosis Date     Arrhythmia     A FIB     Atrial fibrillation (H)      Dysphagia      Hypercholesterolemia      Hypertension      Paralytic lagophthalmos      Primary squamous cell carcinoma of tonsil (H)          PHYSICAL EXAM:  Per patient weight today at home was 169-170 (down from 171 last week).  There were no vitals taken for this visit.   Wt Readings from Last 4 Encounters:   05/24/21 80.2 kg (176 lb 14.4 oz)   05/14/21 78 kg (171 lb 14.4 oz)   05/06/21 79.2 kg (174 lb 9.6 oz)   04/29/21 78.9 kg (173 lb 14.4 oz)     Gen: Alert,  in NAD        LABS AND IMAGING:  Lab Results   Component Value Date    WBC 3.7 05/24/2021     Lab Results   Component Value Date    RBC 3.16 05/24/2021     Lab Results   Component Value Date    HGB 10.6 05/24/2021     Lab Results   Component Value Date    HCT 31.1 05/24/2021     No components found for: MCT  Lab Results   Component Value Date    MCV 98 05/24/2021     Lab Results   Component Value Date    MCH 33.5 05/24/2021     Lab Results   Component Value Date    MCHC 34.1 05/24/2021     Lab Results   Component Value Date    RDW 14.3 05/24/2021     Lab Results   Component Value Date     05/24/2021         IMPRESSION:   Mr. Moya is a 71 year old male with a recurrent SCC of the tonsil s/p chemoradiation now 3 weeks out since completion of treatment and doing well with slow improvement of acute side effects.    PLAN:   1.  Follow up with Dr. Hagen in 1 month.   Continue close follow up with ENT and medical oncology.   2. Continue to moisturize with aquaphor and when skin is completely healed can change to preferred moisturizer.  Discussed importance of sun avoidance or sun protection.  3. Fatigue should continue to improve.  4. Continue oral cares with salt and soda rinses.  Routine dental follow up at least every 6 months.  Continue to use fluoride trays or fluoride treatments-fluoride toothpaste sent in. Continue jaw, neck and swallowing exercises.   5. Treatment related pain should continue to diminish.  Recommended to slowly wean off pain medications when pain decreases.  Script sent for tylenol liquid.    6. Continue to push fluids and caloric intake to maintain weight.  Try to increase oral intake and decrease calories through PEG tube.  Continue on 6 cans a day and he is eating more by mouth and then slowly start to decrease cans.      Nafisa Sanders, NP   Radiation Oncology

## 2021-06-01 NOTE — LETTER
"    2021         RE: Fortino Moya  [address]  [facility] [address]        Dear Colleague,    Thank you for referring your patient, Fortino Moya, to the [facility] RADIATION ONCOLOGY. Please see a copy of my visit note below.    Radiation Oncology Follow-up Visit  2021    The patient has been notified of following:     Due to COVID19 pandemic.  \"This telephone visit will be conducted via a call between you and your physician/provider. We have found that certain health care needs can be provided without the need for a physical exam.  This service lets us provide the care you need with a short phone conversation.  If a prescription is necessary we can send it directly to your pharmacy.  If lab work is needed we can place an order for that and you can then stop by our lab to have the test done at a later time.    Telephone visits are billed at different rates depending on your insurance coverage. During this emergency period, for some insurers they may be billed the same as an in-person visit.  Please reach out to your insurance provider with any questions.    If during the course of the call the physician/provider feels a telephone visit is not appropriate, you will not be charged for this service.\"    Patient has given verbal consent for Telephone visit?  Yes    How would you like to obtain your AVS? Ion    Phone call duration: 21 minutes    oFrtino Moya  MRN: [id number]   : 1950     DISEASE TREATED:   Squamous cell carcinoma of the left tonsil, rcT4 N0 M0 p16+    RADIATION THERAPY DELIVERED:   6600 cGy to left skull base completed 2021    SYSTEMIC TREATMENT:  Weekly cisplatin    INTERVAL SINCE COMPLETION OF RADIATION THERAPY:   3+ week    SUBJECTIVE/HPI:  Fortino Moya is a 71 year old male who is here today for routine 3 week follow up after completing radiation therapy.  He continues to have pain on the left side of his tongue but is well managed on oxycodone 10mg " three times a day which is down from when I saw him last. He is also taking tylenol.  He will continue to wean.  He is PEG dependent and taking 5-6 cans a day.  He weighted himself today and weight is down just a couple of lbs at 169.  He is eating some foods by mouth, but has mild difficulty swallowing solid foods.  He was able to eat a steak though. He has seen speech therapy and is working on swallowing exercises.  He is discouraged that he isn't able to eat more. He does have a dry mouth.  His skin is healed.  He is still fatigued but it seems to be improving.  He still has numbness which is related to his tumor (not surgery).  He is using fluoride toothpaste.    ROS:  Complete review of systems is negative except for symptoms discussed in subjective above.    MEDS:  Current Outpatient Medications   Medication     acetaminophen (TYLENOL) 160 MG/5ML suspension     Aspirin Buf,CaCarb-MgCarb-MgO, 81 MG TABS     atorvastatin (LIPITOR) 10 MG tablet     carboxymethylcellulose PF (REFRESH LIQUIGEL) 1 % ophthalmic gel     carvedilol (COREG) 12.5 MG tablet     cevimeline (EVOXAC) 30 MG capsule     erythromycin (ROMYCIN) 5 MG/GM ophthalmic ointment     flecainide (TAMBOCOR) 150 MG tablet     mupirocin (BACTROBAN) 2 % external ointment     Nutritional Supplements (ISOSOURCE 1.5 RIYA) LIQD     oxyCODONE (ROXICODONE) 5 MG/5ML solution     prochlorperazine (COMPAZINE) 10 MG tablet     Sodium Fluoride 1.1 % PSTE     No current facility-administered medications for this visit.      Facility-Administered Medications Ordered in Other Visits   Medication     barium sulfate (EZ-DISK) tablet 700 mg        No Known Allergies    Past Medical History:   Diagnosis Date     Arrhythmia     A FIB     Atrial fibrillation (H)      Dysphagia      Hypercholesterolemia      Hypertension      Paralytic lagophthalmos      Primary squamous cell carcinoma of tonsil (H)          PHYSICAL EXAM:  Per patient weight today at home was 169-170 (down from  171 last week).  There were no vitals taken for this visit.   Wt Readings from Last 4 Encounters:   05/24/21 80.2 kg (176 lb 14.4 oz)   05/14/21 78 kg (171 lb 14.4 oz)   05/06/21 79.2 kg (174 lb 9.6 oz)   04/29/21 78.9 kg (173 lb 14.4 oz)     Gen: Alert, in NAD        LABS AND IMAGING:  Lab Results   Component Value Date    WBC 3.7 05/24/2021     Lab Results   Component Value Date    RBC 3.16 05/24/2021     Lab Results   Component Value Date    HGB 10.6 05/24/2021     Lab Results   Component Value Date    HCT 31.1 05/24/2021     No components found for: MCT  Lab Results   Component Value Date    MCV 98 05/24/2021     Lab Results   Component Value Date    MCH 33.5 05/24/2021     Lab Results   Component Value Date    MCHC 34.1 05/24/2021     Lab Results   Component Value Date    RDW 14.3 05/24/2021     Lab Results   Component Value Date     05/24/2021         IMPRESSION:   Mr. Moya is a 71 year old male with a recurrent SCC of the tonsil s/p chemoradiation now 3 weeks out since completion of treatment and doing well with slow improvement of acute side effects.    PLAN:   1.  Follow up with Dr. Hagen in 1 month.   Continue close follow up with ENT and medical oncology.   2. Continue to moisturize with aquaphor and when skin is completely healed can change to preferred moisturizer.  Discussed importance of sun avoidance or sun protection.  3. Fatigue should continue to improve.  4. Continue oral cares with salt and soda rinses.  Routine dental follow up at least every 6 months.  Continue to use fluoride trays or fluoride treatments-fluoride toothpaste sent in. Continue jaw, neck and swallowing exercises.   5. Treatment related pain should continue to diminish.  Recommended to slowly wean off pain medications when pain decreases.  Script sent for tylenol liquid.    6. Continue to push fluids and caloric intake to maintain weight.  Try to increase oral intake and decrease calories through PEG tube.  Continue on 6  cans a day and he is eating more by mouth and then slowly start to decrease cans.      Nafisa Sanders NP   Radiation Oncology

## 2021-06-01 NOTE — PROGRESS NOTES
OUTPATIENT SWALLOW  EVALUATION  PLAN OF TREATMENT FOR OUTPATIENT REHABILITATION  (COMPLETE FOR INITIAL CLAIMS ONLY)  Patient's Last Name, First Name, M.I.  YOB: 1950  GriffinFortino  ANGELA     Provider's Name   MIGUEL Salgado   Medical Record No.  [id number]     Start of Care Date:  05/26/21   Onset Date:  05/26/21   Type:     ___PT   ____OT  ___X_SLP Medical Diagnosis:  Oropharyngeal dysphagia     Treatment Diagnosis:  Moderate to Moderately severe oropharyngeal dysphagia Visits from SOC:  1     _________________________________________________________________________________  Plan of Treatment/Functional Goals:  Planned Therapy Interventions: Dysphagia Treatment  Dysphagia treatment: Oropharyngeal exercise training, Compensatory strategies for swallowing, Modified diet education, Instruction of safe swallow strategies                     Goals   1. Goal Identifier: Diet       Goal Description: 1. Pt will tolerate regular textures and thin liquids with no overt clinical s/sx of penetration/aspiration or adverse pulmonary events over a 3 month period.       Target Date: 08/24/21           2. Goal Identifier: Exercises       Goal Description: 2. Pt will increase pharyngeal strength/ROM by completing home exercise program as prescribed on daily basis independently.        Target Date: 08/24/21                         Predicted Duration of Therapy Intervention (days/wks): 1x/week for 8 weeks    MIGUEL Salgado       I CERTIFY THE NEED FOR THESE SERVICES FURNISHED UNDER        THIS PLAN OF TREATMENT AND WHILE UNDER MY CARE     (Physician attestation of this document indicates review and certification of the therapy plan).                  Certification date from: 05/26/21 Certification date to: 08/24/21          Referring Physician: Dr. David Russell    Initial Assessment        See Epic Evaluation Start Of Care Date: 05/26/21

## 2021-06-03 ENCOUNTER — OFFICE VISIT (OUTPATIENT)
Dept: OPHTHALMOLOGY | Facility: CLINIC | Age: 71
End: 2021-06-03
Attending: OPHTHALMOLOGY
Payer: MEDICARE

## 2021-06-03 DIAGNOSIS — H02.239 PARALYTIC LAGOPHTHALMOS, UNSPECIFIED LATERALITY: Primary | ICD-10-CM

## 2021-06-03 DIAGNOSIS — C09.9 SQUAMOUS CELL CARCINOMA OF TONSIL (H): ICD-10-CM

## 2021-06-03 DIAGNOSIS — H02.056 TRICHIASIS OF LEFT EYE WITHOUT ENTROPION: ICD-10-CM

## 2021-06-03 DIAGNOSIS — H16.232 NEUROTROPHIC KERATOPATHY OF LEFT EYE: ICD-10-CM

## 2021-06-03 DIAGNOSIS — G51.0 FACIAL PALSY: ICD-10-CM

## 2021-06-03 DIAGNOSIS — H16.212 EXPOSURE KERATOPATHY, LEFT: ICD-10-CM

## 2021-06-03 PROCEDURE — 99024 POSTOP FOLLOW-UP VISIT: CPT | Mod: GC | Performed by: OPHTHALMOLOGY

## 2021-06-03 PROCEDURE — G0463 HOSPITAL OUTPT CLINIC VISIT: HCPCS

## 2021-06-03 ASSESSMENT — CONF VISUAL FIELD
METHOD: COUNTING FINGERS
OS_NORMAL: 1
OD_NORMAL: 1

## 2021-06-03 ASSESSMENT — VISUAL ACUITY
OS_CC+: +2
METHOD: SNELLEN - LINEAR
CORRECTION_TYPE: GLASSES
OS_PH_CC+: -2
OS_PH_CC: 20/40
OD_CC: 20/20
OS_CC: 20/80
OD_CC+: -2

## 2021-06-03 ASSESSMENT — REFRACTION_WEARINGRX
OD_ADD: +1.75
OD_CYLINDER: +1.25
OD_SPHERE: +0.25
SPECS_TYPE: PAL
OD_AXIS: 166
OS_SPHERE: -0.75
OS_CYLINDER: +1.50
OS_ADD: +1.75
OS_AXIS: 008

## 2021-06-03 ASSESSMENT — TONOMETRY
OS_IOP_MMHG: 8
OD_IOP_MMHG: 9
IOP_METHOD: ICARE

## 2021-06-03 ASSESSMENT — EXTERNAL EXAM - LEFT EYE: OS_EXAM: LEFT FACIAL PALSY

## 2021-06-03 ASSESSMENT — EXTERNAL EXAM - RIGHT EYE: OD_EXAM: NORMAL

## 2021-06-03 NOTE — PROGRESS NOTES
- reviewed notes and images from outside provider and the rest of the care team.    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 The patient underwent low dose weekly carboplatin 1.5 AUC with radiation. His PET/CT 2 months after initiation and underwent surveillance after that. In 08/2020, he developed numbness in chin and then forehead. He underwent PET/CT and IR-guided biopsy (1/2021), which demonstrated invasive squamous cell carcinoma.    Per oncology, patient has left-sided paralysis. The patient first noticed left eye redness and lower lid drooping three weeks ago. He notes that these symptoms have progressed during this time. He notes associated watery discharge. He was seen by oncology and started on erythromycin ilya yesterday then recommended to follow up with ophthalmology for further management. He has also been using AT BID during this time. He denies eye irritation, pain, purulent discharge, and flashes/floaters. He notes baseline left eye vision when not using erythromycin ointment.    Interval HPI: S/p lid surgery by Dr. Yoon 4/1/21. Vision muchbetter. No bryan eye pain, though has some pain left side of face.  Using lots of ilya. No other concerns.     Undergoing chemo and the cancer is visibly gone--- finished radiation 2.5 weeks ago    POH: refractive error (intermittent glasses use)  Surgery: LASIK (25-30 years ago)- monovision  GTTS: AT, erythromycin ilya    PMH:  -L oropharyngeal squamous cell cancer (first diagnosed 2018), recently diagnosed invasive squamous cell cancer (on chemotherapy)  -HTN    FH:  -No AMD, glaucoma    CT soft tissue neck (1/18/21):  1.  Abnormal hypermetabolic activity in the left infratemporal fossa  involving the medial and lateral pterygoid musculature and the  mandibular branch of the left 5th cranial nerve. Abnormal  hypermetabolic activity tracks through the left foramen ovale into the  medial aspect of the left middle cranial fossa and posteriorly to the  root entry zone of the left 5th cranial nerve.  2. Otherwise, normal head and neck PET/CT.    PET oncology  (1/18/21):  In this patient with tonsillar cancer status post chemotherapy and  radiation with new disease in the left  space, not currently  on chemotherapy:  1.  No evidence for metastatic disease in the chest, abdomen, pelvis.  2.  See dedicated neuroradiology report for the results of the high  resolution PET CT of the neck.     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 Pt will likely need more extensive Permanent tarso or scleral contact lens longterm.  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 New disease in the left  space s/p PET/CT and IR-guided biopsy consistent with invasive squamous cell carcinoma s/p induction chemotherapy (1/28/21).  - Exam notable for significant paralytic lagophthalmos with complete scleral show, exposure keratopathy, and large neurotrophic corneal ulcer  - s/p permanent tarsorrhaphy lateral 50% (2/10/21)- Dr. Pendleton  - s/p FLOWER platinum weight, LLL ectropion repair, and temporary tarso - Dr. Yoon (4/1/21) celio f/u  5/25.  - copious ointment on the eye today  - misdirected trichiatic lashes epilated 5/4/21    Plan:  - Continue Emycin at bedtime  -stop preserved gtt and use systane ultra PF, or celluvisc 6/ day.  - Option PFAT's 4-6x/day prn  - consider scleral lens as patient does not want tarsorrphaphy for depth perception and also because his grand kids would be nervous to see his eyelids shut.      RTC:4 weeks cornea, sooner as needed.    Follow up with Dr. Parod for scleral lens fitting    Attending Physician Attestation:  Complete documentation of historical and exam elements from today's encounter can be found in the full encounter summary report (not reduplicated in this progress note).  I personally obtained the chief complaint(s) and history of present illness.  I confirmed and edited as necessary the review of systems, past medical/surgical history, family history, social history, and examination findings as documented by others; and I examined the patient myself.  I personally reviewed the relevant tests, images, and reports as documented above.  I formulated and edited as necessary the assessment and plan and discussed the findings and management plan with the patient and family. - MD Paola Razo MD  Ophthalmology Resident, PGY-3

## 2021-06-03 NOTE — NURSING NOTE
Chief Complaints and History of Present Illnesses   Patient presents with     Follow Up     Exposure keratopathy, left      Chief Complaint(s) and History of Present Illness(es)     Follow Up     Laterality: left eye    Course: gradually improving    Associated symptoms: itching.  Negative for eye pain, dryness, tearing, flashes, floaters, photophobia, foreign body sensation and burning    Treatments tried: ointment and artificial tears    Response to treatment: moderate improvement    Pain scale: 0/10    Comments: Exposure keratopathy, left               Comments     Pt states vision is better since last visit.  Finished chemo about a month ago.  Radiation ended 3.5 weeks ago.    Erythromycin ilya LE 2-3x/day  PFAT's BE 3-4x/day    ANA Stuart Consuelo 3, 2021 9:58 AM

## 2021-06-04 ENCOUNTER — VIRTUAL VISIT (OUTPATIENT)
Dept: SPEECH THERAPY | Facility: CLINIC | Age: 71
End: 2021-06-04
Payer: MEDICARE

## 2021-06-04 DIAGNOSIS — R68.2 DRY MOUTH: ICD-10-CM

## 2021-06-04 DIAGNOSIS — C09.9 MALIGNANT NEOPLASM OF TONSIL (H): Primary | ICD-10-CM

## 2021-06-04 DIAGNOSIS — C09.9 SQUAMOUS CELL CARCINOMA OF TONSIL (H): ICD-10-CM

## 2021-06-04 DIAGNOSIS — G50.9: ICD-10-CM

## 2021-06-04 DIAGNOSIS — R13.12 OROPHARYNGEAL DYSPHAGIA: Primary | ICD-10-CM

## 2021-06-04 PROCEDURE — 92526 ORAL FUNCTION THERAPY: CPT | Mod: GN | Performed by: SPEECH-LANGUAGE PATHOLOGIST

## 2021-06-04 RX ORDER — CEVIMELINE HYDROCHLORIDE 30 MG/1
30 CAPSULE ORAL 2 TIMES DAILY
Qty: 90 CAPSULE | Refills: 3 | Status: SHIPPED | OUTPATIENT
Start: 2021-06-04 | End: 2022-12-01

## 2021-06-04 NOTE — PROGRESS NOTES
Fortino Moya is a 71 year old male who is being seen via a billable video visit.      Patient has given verbal consent for Video visit? Yes    Video Start Time: 9:59am    Telehealth Visit Details    Type of Service:  Telehealth    Video End Time (time video stopped): 10:28am    Originating Location (pt. location): Home    Additional Participants in Telehealth Visit: None    D[facility]     Mode of Communication (Audio Visual or Audio Only):  Audio Visual via Brenda Cortez, SLP  June 4, 2021

## 2021-06-07 ENCOUNTER — VIRTUAL VISIT (OUTPATIENT)
Dept: ONCOLOGY | Facility: CLINIC | Age: 71
End: 2021-06-07
Attending: INTERNAL MEDICINE
Payer: MEDICARE

## 2021-06-07 DIAGNOSIS — C09.9 SQUAMOUS CELL CARCINOMA OF TONSIL (H): Primary | ICD-10-CM

## 2021-06-07 PROCEDURE — 97802 MEDICAL NUTRITION INDIV IN: CPT | Mod: 95 | Performed by: DIETITIAN, REGISTERED

## 2021-06-07 NOTE — LETTER
"    6/7/2021         RE: Fortino Moya  [address]  [place] [address]        Dear Colleague,    Thank you for referring your patient, Fortino Moya, to the [facility]. Please see a copy of my visit note below.    The patient has been notified of the following:      \"We have found that certain health care needs can be provided without the need for a face to face visit.  This service lets us provide the care you need with a phone conversation.       I will have full access to your [place] medical record during this entire phone call.   I will be taking notes for your medical record.      Since this is like an office visit, we will bill your insurance company for this service.       There are potential benefits and risks of telephone visits (e.g. limits to patient confidentiality) that differ from in-person visits.?  Confidentiality still applies for telephone services, and nobody will record the visit.  It is important to be in a quiet, private space that is free of distractions (including cell phone or other devices) during the visit.??      If during the course of the call I believe a telephone visit is not appropriate, you will not be charged for this service\"     Consent has been obtained for this service by care team member: Yes     CLINICAL NUTRITION SERVICES - REASSESSMENT NOTE   EVALUATION OF PREVIOUS PLAN OF CARE:   Referring Physician: Diana King  Time spent with patient: 15 minutes.    Current diet: pureed foods, liquids  Current appetite/intake: poor  PEG Tube: dependent - would like to start weaning  SLP recs - 5/26/21 - okay to start weaning TF with regular diet, thin liquids      Monitoring from previous assessment:   -Food intake - Fortino has been eating pureed and soft foods with added moisture for ease of swallowing.  He is choosing tapioca pudding and toast with jam.  He is drinking home made nutrition shakes with high anastacia/high protein powder.   -Fluid/beverage " intake - taking only sips of water as it takes so much time to drink; taking most of his hydration via PEG  -EN intake - taking 5 cartons of Nutren 2.0 anastacia (2500kcal, 105g protein) plus   -Weight trends -   Wt Readings from Last 6 Encounters:   05/24/21 80.2 kg (176 lb 14.4 oz)   05/14/21 78 kg (171 lb 14.4 oz)   05/06/21 79.2 kg (174 lb 9.6 oz)   04/29/21 78.9 kg (173 lb 14.4 oz)   04/26/21 78.7 kg (173 lb 9.6 oz)   04/26/21 78.7 kg (173 lb 6.4 oz)     Previous Goals:   1. EN to meet 100% of est nutrition needs, PO for pleasure and maintain swallow function    Evaluation: Met   Previous Nutrition Diagnosis:   Inadequate oral intake related to related to dysphagia as evidenced by pt dependent upon EN via PEG tube to meet 100% of estimated nutrition needs  Evaluation: Improving with increased PO intake   NEW FINDINGS:   --Initiate weaning of EN   CURRENT NUTRITION DIAGNOSIS   Inadequate oral intake related to dysphagia as evidenced by pt dependent upon EN to meet >75% of estimated nutrition needs.    INTERVENTIONS   Recommendations / Nutrition Prescription   1. Reduce EN by 1 carton (take 4 cartons/day)  2. Increase PO intake by 500 anastacia, 25g protein/day   Implementation  -EN Composition, EN Schedule, Feeding Tube Flush - reviewed current regimen.  Discussed nutrition needs and balance of EN and PO to start EN weaning process.    Discussed that for every 500 anastacia and 25g protein consumed, he can reduce EN by one carton.    -Medical Food Supplement - Reviewed nutrition provisions of ONS (KF, Nutren 2.0, home made shake etc) and encouraged to increase 1 ONS each week with reduction of EN by one carton each week.  Sent nutrition guidelines for weaning of EN and Tips to increase calories in diet.   Goals   1. Start EN wean by 1 carton each week  2. Increase PO intake by 500 anastacia, 25g protein each week  3. Measure out 6 cups water/electrolyte fluids, consume daily  4. Weight stability     Follow up/Monitoring:   Food  intake  Enteral Nutrition intake/wean  Weight    Ramila Worrell RD, [facility]  [phone]            Again, thank you for allowing me to participate in the care of your patient.        Sincerely,        Ramila Worrell RD

## 2021-06-07 NOTE — PROGRESS NOTES
"The patient has been notified of the following:      \"We have found that certain health care needs can be provided without the need for a face to face visit.  This service lets us provide the care you need with a phone conversation.       I will have full access to your [place] medical record during this entire phone call.   I will be taking notes for your medical record.      Since this is like an office visit, we will bill your insurance company for this service.       There are potential benefits and risks of telephone visits (e.g. limits to patient confidentiality) that differ from in-person visits.?  Confidentiality still applies for telephone services, and nobody will record the visit.  It is important to be in a quiet, private space that is free of distractions (including cell phone or other devices) during the visit.??      If during the course of the call I believe a telephone visit is not appropriate, you will not be charged for this service\"     Consent has been obtained for this service by care team member: Yes     CLINICAL NUTRITION SERVICES - REASSESSMENT NOTE   EVALUATION OF PREVIOUS PLAN OF CARE:   Referring Physician: Diana King  Time spent with patient: 15 minutes.    Current diet: pureed foods, liquids  Current appetite/intake: poor  PEG Tube: dependent - would like to start weaning  SLP recs - 5/26/21 - okay to start weaning TF with regular diet, thin liquids      Monitoring from previous assessment:   -Food intake - Fortino has been eating pureed and soft foods with added moisture for ease of swallowing.  He is choosing tapioca pudding and toast with jam.  He is drinking home made nutrition shakes with high anastacia/high protein powder.   -Fluid/beverage intake - taking only sips of water as it takes so much time to drink; taking most of his hydration via PEG  -EN intake - taking 5 cartons of Nutren 2.0 anastacia (2500kcal, 105g protein) plus   -Weight trends -   Wt Readings from Last 6 Encounters: "   05/24/21 80.2 kg (176 lb 14.4 oz)   05/14/21 78 kg (171 lb 14.4 oz)   05/06/21 79.2 kg (174 lb 9.6 oz)   04/29/21 78.9 kg (173 lb 14.4 oz)   04/26/21 78.7 kg (173 lb 9.6 oz)   04/26/21 78.7 kg (173 lb 6.4 oz)     Previous Goals:   1. EN to meet 100% of est nutrition needs, PO for pleasure and maintain swallow function    Evaluation: Met   Previous Nutrition Diagnosis:   Inadequate oral intake related to related to dysphagia as evidenced by pt dependent upon EN via PEG tube to meet 100% of estimated nutrition needs  Evaluation: Improving with increased PO intake   NEW FINDINGS:   --Initiate weaning of EN   CURRENT NUTRITION DIAGNOSIS   Inadequate oral intake related to dysphagia as evidenced by pt dependent upon EN to meet >75% of estimated nutrition needs.    INTERVENTIONS   Recommendations / Nutrition Prescription   1. Reduce EN by 1 carton (take 4 cartons/day)  2. Increase PO intake by 500 anastacia, 25g protein/day   Implementation  -EN Composition, EN Schedule, Feeding Tube Flush - reviewed current regimen.  Discussed nutrition needs and balance of EN and PO to start EN weaning process.    Discussed that for every 500 anastacia and 25g protein consumed, he can reduce EN by one carton.    -Medical Food Supplement - Reviewed nutrition provisions of ONS (KF, Nutren 2.0, home made shake etc) and encouraged to increase 1 ONS each week with reduction of EN by one carton each week.  Sent nutrition guidelines for weaning of EN and Tips to increase calories in diet.   Goals   1. Start EN wean by 1 carton each week  2. Increase PO intake by 500 anastacia, 25g protein each week  3. Measure out 6 cups water/electrolyte fluids, consume daily  4. Weight stability     Follow up/Monitoring:   Food intake  Enteral Nutrition intake/wean  Weight    Ramila Worrell RD, [facility]  [phone]

## 2021-06-12 ENCOUNTER — HOME INFUSION (PRE-WILLOW HOME INFUSION) (OUTPATIENT)
Dept: PHARMACY | Facility: CLINIC | Age: 71
End: 2021-06-12

## 2021-06-15 ENCOUNTER — TELEPHONE (OUTPATIENT)
Dept: DERMATOLOGY | Facility: CLINIC | Age: 71
End: 2021-06-15

## 2021-06-15 NOTE — TELEPHONE ENCOUNTER
LVM w/pt to call back and schedule excision with Derm Surg next available.    Barry Conley, Facilitator

## 2021-06-16 ENCOUNTER — VIRTUAL VISIT (OUTPATIENT)
Dept: SPEECH THERAPY | Facility: CLINIC | Age: 71
End: 2021-06-16
Payer: MEDICARE

## 2021-06-16 DIAGNOSIS — R13.12 OROPHARYNGEAL DYSPHAGIA: Primary | ICD-10-CM

## 2021-06-16 DIAGNOSIS — C09.9 SQUAMOUS CELL CARCINOMA OF TONSIL (H): ICD-10-CM

## 2021-06-16 DIAGNOSIS — G50.9: ICD-10-CM

## 2021-06-16 PROCEDURE — 92526 ORAL FUNCTION THERAPY: CPT | Mod: GN | Performed by: SPEECH-LANGUAGE PATHOLOGIST

## 2021-06-16 NOTE — PROGRESS NOTES
This is a recent snapshot of the patient's [facility] medical record.  For current drug dose and complete information and questions, call [phone] or In Basket pool, [facility] home infusion ([id number])  CSN Number:  [id number]

## 2021-06-17 NOTE — PROGRESS NOTES
Fortino Moya is a 71 year old male who is being seen via a billable video visit.      Patient has given verbal consent for Video visit? Yes    Video Start Time: 10:28am    Telehealth Visit Details    Type of Service:  Telehealth    Video End Time (time video stopped): 10:41am    Originating Location (pt. location): Home    Additional Participants in Telehealth Visit: None    [facility]     Mode of Communication (Audio Visual or Audio Only):  Audio Visual via Brenda Cortez, SLP  June 17, 2021

## 2021-06-18 ENCOUNTER — OFFICE VISIT (OUTPATIENT)
Dept: OTOLARYNGOLOGY | Facility: CLINIC | Age: 71
End: 2021-06-18
Payer: MEDICARE

## 2021-06-18 ENCOUNTER — HOSPITAL ENCOUNTER (OUTPATIENT)
Dept: CT IMAGING | Facility: CLINIC | Age: 71
End: 2021-06-18
Attending: RADIOLOGY
Payer: MEDICARE

## 2021-06-18 VITALS
RESPIRATION RATE: 16 BRPM | HEART RATE: 86 BPM | WEIGHT: 173.8 LBS | BODY MASS INDEX: 24.94 KG/M2 | SYSTOLIC BLOOD PRESSURE: 110 MMHG | TEMPERATURE: 97.1 F | OXYGEN SATURATION: 96 % | DIASTOLIC BLOOD PRESSURE: 77 MMHG

## 2021-06-18 DIAGNOSIS — C09.9 TONSIL CANCER (H): Primary | ICD-10-CM

## 2021-06-18 DIAGNOSIS — C09.9 SQUAMOUS CELL CARCINOMA OF TONSIL (H): ICD-10-CM

## 2021-06-18 DIAGNOSIS — C09.9 TONSILLAR CANCER (H): ICD-10-CM

## 2021-06-18 LAB
ALBUMIN SERPL-MCNC: 3.8 G/DL (ref 3.4–5)
ALP SERPL-CCNC: 52 U/L (ref 40–150)
ALT SERPL W P-5'-P-CCNC: 19 U/L (ref 0–70)
ANION GAP SERPL CALCULATED.3IONS-SCNC: 4 MMOL/L (ref 3–14)
AST SERPL W P-5'-P-CCNC: 12 U/L (ref 0–45)
BASOPHILS # BLD AUTO: 0 10E9/L (ref 0–0.2)
BASOPHILS NFR BLD AUTO: 0.4 %
BILIRUB SERPL-MCNC: 0.6 MG/DL (ref 0.2–1.3)
BUN SERPL-MCNC: 13 MG/DL (ref 7–30)
CALCIUM SERPL-MCNC: 8.6 MG/DL (ref 8.5–10.1)
CHLORIDE SERPL-SCNC: 104 MMOL/L (ref 94–109)
CO2 SERPL-SCNC: 27 MMOL/L (ref 20–32)
CREAT SERPL-MCNC: 0.74 MG/DL (ref 0.66–1.25)
DIFFERENTIAL METHOD BLD: ABNORMAL
EOSINOPHIL # BLD AUTO: 0.1 10E9/L (ref 0–0.7)
EOSINOPHIL NFR BLD AUTO: 2.2 %
ERYTHROCYTE [DISTWIDTH] IN BLOOD BY AUTOMATED COUNT: 13.7 % (ref 10–15)
GFR SERPL CREATININE-BSD FRML MDRD: >90 ML/MIN/{1.73_M2}
GLUCOSE SERPL-MCNC: 199 MG/DL (ref 70–99)
HCT VFR BLD AUTO: 33.9 % (ref 40–53)
HGB BLD-MCNC: 11.8 G/DL (ref 13.3–17.7)
IMM GRANULOCYTES # BLD: 0 10E9/L (ref 0–0.4)
IMM GRANULOCYTES NFR BLD: 0.4 %
LYMPHOCYTES # BLD AUTO: 0.4 10E9/L (ref 0.8–5.3)
LYMPHOCYTES NFR BLD AUTO: 6.3 %
MCH RBC QN AUTO: 33.7 PG (ref 26.5–33)
MCHC RBC AUTO-ENTMCNC: 34.8 G/DL (ref 31.5–36.5)
MCV RBC AUTO: 97 FL (ref 78–100)
MONOCYTES # BLD AUTO: 0.5 10E9/L (ref 0–1.3)
MONOCYTES NFR BLD AUTO: 9.7 %
NEUTROPHILS # BLD AUTO: 4.5 10E9/L (ref 1.6–8.3)
NEUTROPHILS NFR BLD AUTO: 81 %
NRBC # BLD AUTO: 0 10*3/UL
NRBC BLD AUTO-RTO: 0 /100
PLATELET # BLD AUTO: 191 10E9/L (ref 150–450)
POTASSIUM SERPL-SCNC: 3.8 MMOL/L (ref 3.4–5.3)
PROT SERPL-MCNC: 6.5 G/DL (ref 6.8–8.8)
RBC # BLD AUTO: 3.5 10E12/L (ref 4.4–5.9)
SODIUM SERPL-SCNC: 135 MMOL/L (ref 133–144)
WBC # BLD AUTO: 5.5 10E9/L (ref 4–11)

## 2021-06-18 PROCEDURE — 80053 COMPREHEN METABOLIC PANEL: CPT | Performed by: INTERNAL MEDICINE

## 2021-06-18 PROCEDURE — 85025 COMPLETE CBC W/AUTO DIFF WBC: CPT | Performed by: INTERNAL MEDICINE

## 2021-06-18 PROCEDURE — 250N000011 HC RX IP 250 OP 636: Performed by: RADIOLOGY

## 2021-06-18 PROCEDURE — G1004 CDSM NDSC: HCPCS | Performed by: RADIOLOGY

## 2021-06-18 PROCEDURE — 70491 CT SOFT TISSUE NECK W/DYE: CPT | Mod: MG

## 2021-06-18 PROCEDURE — 250N000011 HC RX IP 250 OP 636: Performed by: INTERNAL MEDICINE

## 2021-06-18 PROCEDURE — 99024 POSTOP FOLLOW-UP VISIT: CPT | Performed by: RADIOLOGY

## 2021-06-18 PROCEDURE — 70491 CT SOFT TISSUE NECK W/DYE: CPT | Mod: 26 | Performed by: RADIOLOGY

## 2021-06-18 RX ORDER — HEPARIN SODIUM (PORCINE) LOCK FLUSH IV SOLN 100 UNIT/ML 100 UNIT/ML
5 SOLUTION INTRAVENOUS
Status: COMPLETED | OUTPATIENT
Start: 2021-06-18 | End: 2021-06-18

## 2021-06-18 RX ORDER — IOPAMIDOL 755 MG/ML
100 INJECTION, SOLUTION INTRAVASCULAR ONCE
Status: COMPLETED | OUTPATIENT
Start: 2021-06-18 | End: 2021-06-18

## 2021-06-18 RX ORDER — HEPARIN SODIUM (PORCINE) LOCK FLUSH IV SOLN 100 UNIT/ML 100 UNIT/ML
5 SOLUTION INTRAVENOUS ONCE
Status: COMPLETED | OUTPATIENT
Start: 2021-06-18 | End: 2021-06-18

## 2021-06-18 RX ADMIN — Medication 5 ML: at 09:55

## 2021-06-18 RX ADMIN — Medication 5 ML: at 10:26

## 2021-06-18 RX ADMIN — IOPAMIDOL 100 ML: 755 INJECTION, SOLUTION INTRAVENOUS at 10:20

## 2021-06-18 ASSESSMENT — PAIN SCALES - GENERAL: PAINLEVEL: NO PAIN (0)

## 2021-06-18 NOTE — LETTER
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 He presented a few years later with recurrent disease within the ipsilateral pterygoids extending to the skull base with invasion of the cavernous sinus and tracking along CN V to the brainstem. He received 2 cycles of induction chemotherapy with TPF from 2021-2021 with repeat imaging demonstrated a near-complete response to therapy. He then received curative-intent chemoradiotherapy as described above.    Mr. Moya returns to clinic today for a routine post-treatment disease surveillance visit.  He reports that he is feeling significantly improved over the past several weeks and is eating a soft diet without difficulty. His weight has been stable since the completion of therapy and he reports no new or worsening headaches, visual symptoms, nausea/vomiting, dysphagia, odynophagia, dyspnea, chest pain or abdominal pain with his remaining ROS likewise negative. He had a CT neck performed earlier today which demonstrates post-treatment changes with no evidence of recurrent disease.    PHYSICAL EXAM:  Weight: 78.8 kg  BP: 110/77  Pulse: 86  Temp: 36.2 C     General: Healthy-appearing 71 year old gentleman seated comfortably in an  examination chair in [facility]  HEENT: NC/AT. Mildly improved left ptosis. PERRL. No rhinorrhea or epistaxis. Dry mucus membranes. Extensive post-radiation changes of the oral cavity. No visible oral lesions. Palpation reveals soft mucosa of the FOM, oral tongue and bilateral tongue with no discrete masses or nodularity.  Neck: Moderate fibrosis of the bilateral neck. No palpable cervical adenopathy.  Pulmonary: No wheezing, stridor or respiratory distress   Skin: Mixed hyper and hypopigmentation of the bilateral neck. No desquamation.    Flexible Fiberoptic Nasopharyngoscopy:  Consent for fiberoptic laryngoscopy was obtained and I confirmed correctness of procedure and identity of patient. Fiberoptic laryngoscopy was indicated due to post-treatment disease surveillance. The nose was topically decongested and anesthetized. The fiberoptic laryngoscope was passed through the right naris under endoscopic vision. The turbinates were normal. The inferior and middle meati were clear without purulence, masses, or polyps. The nasopharynx was clear with a small healing shallow ulceration along the left lateral wall. The soft palate appeared normal with good mobility. Passage of the scope into the oropharynx revealed bland-appearing mucosa without any discrete visible lesions. There were extensive radiation changes of the oropharyngeal mucosa and glottic larynx with symmetric mild edema of the bilateral arytenoids. The cords were mobile bilaterally. There was no pooling of secretions in the hypopharynx. The scope was then removed and the procedure was terminated without incident.     LABS AND IMAGIN2021 labs:  Electrolytes: WNL  Hgb: 11.8  WBC: 5.5  Plts: 191    2021 CT neck:  Post-treatment changes with no evidence of residual disease.    IMPRESSION:   Mr. 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 He  is making a steady recovery from his acute treatment-related toxicities and has no clinical or radiologic evidence of recurrent disease.    PLAN:   1. Follow-up in radiation oncology clinic in mid 8/2021 after restaging PET/CT  2. TSH with next clinic visit  3. Continue SLP exercises    Sidney Rodgers MD/PhD    Department of Radiation Oncology  [facility]

## 2021-06-18 NOTE — NURSING NOTE
"Chief Complaint   Patient presents with     Port Draw     labs drawn from port by rn.  vs taken     Port accessed with 20 gauge 3/4\" Power needle and labs drawn by rn.  Port flushed with NS and heparin.  Pt tolerated well.  VS taken.    Pat Aceves RN      "

## 2021-06-18 NOTE — PROGRESS NOTES
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 He presented a few years later with recurrent disease within the ipsilateral pterygoids extending to the skull base with invasion of the cavernous sinus and tracking along CN V to the brainstem. He received 2 cycles of induction chemotherapy with TPF from 2021-2021 with repeat imaging demonstrated a near-complete response to therapy. He then received curative-intent chemoradiotherapy as described above.    Mr. Moya returns to clinic today for a routine post-treatment disease surveillance visit.  He reports that he is feeling significantly improved over the past several weeks and is eating a soft diet without difficulty. His weight has been stable since the completion of therapy and he reports no new or worsening headaches, visual symptoms, nausea/vomiting, dysphagia, odynophagia, dyspnea, chest pain or abdominal pain with his remaining ROS likewise negative. He had a CT neck performed earlier today which demonstrates post-treatment changes with no evidence of recurrent disease.    PHYSICAL EXAM:  Weight: 78.8 kg  BP: 110/77  Pulse: 86  Temp: 36.2 C     General: Healthy-appearing 71 year old gentleman seated comfortably in an examination chair in [facility]  HEENT: NC/AT. Mildly improved left ptosis. PERRL. No  rhinorrhea or epistaxis. Dry mucus membranes. Extensive post-radiation changes of the oral cavity. No visible oral lesions. Palpation reveals soft mucosa of the FOM, oral tongue and bilateral tongue with no discrete masses or nodularity.  Neck: Moderate fibrosis of the bilateral neck. No palpable cervical adenopathy.  Pulmonary: No wheezing, stridor or respiratory distress   Skin: Mixed hyper and hypopigmentation of the bilateral neck. No desquamation.    Flexible Fiberoptic Nasopharyngoscopy:  Consent for fiberoptic laryngoscopy was obtained and I confirmed correctness of procedure and identity of patient. Fiberoptic laryngoscopy was indicated due to post-treatment disease surveillance. The nose was topically decongested and anesthetized. The fiberoptic laryngoscope was passed through the right naris under endoscopic vision. The turbinates were normal. The inferior and middle meati were clear without purulence, masses, or polyps. The nasopharynx was clear with a small healing shallow ulceration along the left lateral wall. The soft palate appeared normal with good mobility. Passage of the scope into the oropharynx revealed bland-appearing mucosa without any discrete visible lesions. There were extensive radiation changes of the oropharyngeal mucosa and glottic larynx with symmetric mild edema of the bilateral arytenoids. The cords were mobile bilaterally. There was no pooling of secretions in the hypopharynx. The scope was then removed and the procedure was terminated without incident.     LABS AND IMAGIN2021 labs:  Electrolytes: WNL  Hgb: 11.8  WBC: 5.5  Plts: 191    2021 CT neck:  Post-treatment changes with no evidence of residual disease.    IMPRESSION:   Mr. 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 He is making a steady recovery from his acute treatment-related toxicities and  has no clinical or radiologic evidence of recurrent disease.    PLAN:   1. Follow-up in radiation oncology clinic in mid 8/2021 after restaging PET/CT  2. TSH with next clinic visit  3. Continue SLP exercises    Sidney Rodgers MD/PhD    Department of Radiation Oncology  [facility]

## 2021-06-19 NOTE — PROGRESS NOTES
"Fortino is a 71 year old who is being evaluated via a billable video visit.      How would you like to obtain your AVS? MyChart  If the video visit is dropped, the invitation should be resent by: Text to cell phone: [phone]  Will anyone else be joining your video visit? No       I have reviewed and updated the patient's allergies and medication list.    Concerns: none  Refills: none     Vitals - Patient Reported  Weight (Patient Reported): 78.5 kg (173 lb)  Height (Patient Reported): 177.8 cm (5' 10\")  BMI (Based on Pt Reported Ht/Wt): 24.82  Pain Score: Moderate Pain (4)  Pain Loc: Other - see comment(tongue)    Evita Monet CMA    Video Start Time: 10:24am    Video-Visit Details    Type of service:  Video Visit    Video End Time: 10:43am    Originating Location (pt. Location): Home    Distant Location (provider location):  [facility]     Platform used for Video Visit: [facility]      Oncology/Hematology Visit Note  Jun 21, 2021    Reason for Visit: Follow up of recurrent head and neck SCC      History of Present Illness: Fortino Moya is a 71 year old male with PMH atrial fibrillation, hypercholesterolemia, HTN with recurrent head and neck cancer. He has a history of L oropharynx P16+ squamous cell cancer that was first diagnosed in 2018 in [place], [place].  He initially was offered chemoradiation, but due to a prior history of L sided hearing loss, he was given low dose weekly carboplatin 1.5 AUC weekly with radiation.  His 3 month PET/CT after initiation was negative and he underwent surveillance after that. He relocated to [place] which is where he is from in Feb 2020 and has been followed since by Dr. Treviño and Dr. Flores at [facility].       In August of 2020, he  developed numbenss in chin and then moved up to his forehead.  He had a Pet/CT done in Sept 2020 that showed a hypermetabolic area near his L foramen ovale - he had had recent dental work in that area and it was " thought that this might be scar or inflammation related to that procedure.       However, he was offered a 2nd opinion and came to [facility] and saw Dr. Russell in Nov 2020.  Images were reviewed at tumor boardon 12/4/20 - and it was felt that this was concerning enough to merit biopsy - so he was referred for IR-guided biopsy.   IR guided biopsy happened on 1/8/2021 and came back as invasive squamous cell carcinoma.    He was going to have SBRT, however, repeat imaging showed marked disease progression in the L  space and we decided to initiate induction chemotherapy to try to shrink the lesion to get to SBRT.      He initiated TPF on 1/28/21 and cycle 2 on 2/18/21. He had a fair amount of deconditioning with induction therapy however restaging imaging (PET/CT CAP/neck 3/1/21) demonstrated a resolution of previous FDG-uptake within the left skull base with some residual soft tissue fullness involving the superior aspect of the pterygoids. No cervical adenopathy or distant metastatic disease.      He was recommend chemotherapy with concurrent re-irradiation with weekly cisplatin, received 3/24/21-5/7/21.      He was seen today for routine follow-up after treatment.    Interval History:  Fortino was seen today for follow-up. He is doing well. Still not back to baseline but energy levels improved and he plans to start PT at [facility]. He is off Oxycodone, though still has pain in left tongue and neck. Is taking Tylenol PRN which helps slightly. He denies any sores. He is able to swallow and is almost back to all nutrition by mouth and is hoping to get his feeding tube out soon. He still has numbness in his left face and is hoping this will improve with time. His left eye is improved.    He denies fevers, headaches, dizziness unless he stands too quickly, hearing concerns, chest pain, SOB, cough, nausea, vomiting, bowel or bladder concerns, edema, rashes, neuropathy. Is happy that he was able to see new  cardiologist. Has had mild allergy symptoms, using Allegra and Flonase.     Current Outpatient Medications   Medication Sig Dispense Refill     acetaminophen (TYLENOL) 160 MG/5ML suspension Take 31.5 mLs (1,000 mg) by mouth every 8 hours as needed for fever or mild pain 500 mL 3     Aspirin Buf,CaCarb-MgCarb-MgO, 81 MG TABS Take 81 mg by mouth every evening        atorvastatin (LIPITOR) 10 MG tablet Take 10 mg by mouth every evening        carboxymethylcellulose PF (REFRESH LIQUIGEL) 1 % ophthalmic gel Place 1 drop Into the left eye 4 times daily 30 each 11     carvedilol (COREG) 12.5 MG tablet 2 times daily        cevimeline (EVOXAC) 30 MG capsule Take 1 capsule (30 mg) by mouth 2 times daily 90 capsule 3     erythromycin (ROMYCIN) 5 MG/GM ophthalmic ointment Place 0.5 inches Into the left eye 5 times daily 3.5 g 11     flecainide (TAMBOCOR) 150 MG tablet Take 150 mg by mouth 2 times daily        mupirocin (BACTROBAN) 2 % external ointment Apply topically 3 times daily 30 g 0     Nutritional Supplements (ISOSOURCE 1.5 RIYA) LIQD Take 1 Can by mouth 7 times daily 100 Can 11     oxyCODONE (ROXICODONE) 5 MG/5ML solution Take 5-10 mLs (5-10 mg) by mouth every 4 hours as needed for severe pain 500 mL 0     prochlorperazine (COMPAZINE) 10 MG tablet Take 1 tablet (10 mg) by mouth every 6 hours as needed (Nausea/Vomiting) 30 tablet 1     rivaroxaban ANTICOAGULANT (XARELTO) 20 MG TABS tablet        Sodium Fluoride 1.1 % PSTE Apply 1 Application to affected area daily 112 g 11     Physical Examination:  There were no vitals taken for this visit.  Wt Readings from Last 10 Encounters:   06/18/21 78.8 kg (173 lb 12.8 oz)   05/24/21 80.2 kg (176 lb 14.4 oz)   05/14/21 78 kg (171 lb 14.4 oz)   05/06/21 79.2 kg (174 lb 9.6 oz)   04/29/21 78.9 kg (173 lb 14.4 oz)   04/26/21 78.7 kg (173 lb 9.6 oz)   04/26/21 78.7 kg (173 lb 6.4 oz)   04/22/21 77.6 kg (171 lb)   04/19/21 80 kg (176 lb 6.4 oz)   04/15/21 78.8 kg (173 lb 12.8 oz)      Video physical exam  General: Patient appears well in no acute distress.   Skin: No visualized rash or lesions on visualized skin  Eyes: No erythema, sclera icterus or discharge noted. Asymmetry as expected after prior left eye surgeries.   Resp: Appears to be breathing comfortably without accessory muscle usage, speaking in full sentences, no cough  MSK: Appears to have normal range of motion based on visualized movements  Neurologic: No apparent tremors, facial movements symmetric  Psych: affect bright, alert and oriented    The rest of a comprehensive physical examination is deferred due to PHE (public health emergency) video restrictions    Laboratory Data:  Results for ELSI IQBAL (MRN [id number]) as of 6/21/2021 11:13   6/18/2021 09:59   Sodium 135   Potassium 3.8   Chloride 104   Carbon Dioxide 27   Urea Nitrogen 13   Creatinine 0.74   GFR Estimate >90   GFR Estimate If Black >90   Calcium 8.6   Anion Gap 4   Albumin 3.8   Protein Total 6.5 (L)   Bilirubin Total 0.6   Alkaline Phosphatase 52   ALT 19   AST 12   Glucose 199 (H)   WBC 5.5   Hemoglobin 11.8 (L)   Hematocrit 33.9 (L)   Platelet Count 191   RBC Count 3.50 (L)   MCV 97   MCH 33.7 (H)   MCHC 34.8   RDW 13.7   Diff Method Automated Method   % Neutrophils 81.0   % Lymphocytes 6.3   % Monocytes 9.7   % Eosinophils 2.2   % Basophils 0.4   % Immature Granulocytes 0.4   Nucleated RBCs 0   Absolute Neutrophil 4.5   Absolute Lymphocytes 0.4 (L)   Absolute Monocytes 0.5   Absolute Eosinophils 0.1   Absolute Basophils 0.0   Abs Immature Granulocytes 0.0   Absolute Nucleated RBC 0.0       Assessment and Plan:  1. Onc  Recurrent SCC of head and neck, prior chemoradiation, now with local recurrence in L  space. S/p 2 cycles of TPF induction chemotherapy with near CR on PET. Discussion at ENT tumor board with plan to do radiation with weekly cisplatin, started 3/24/21. Completed 5/7/21.      Recovering well after treatment. Agree with PT. See  supportive cares below. Will see myself in one month and then has PET and Dr. Shah in August. Continue follow-up with rad onc and ENT as scheduled. CT in rad onc last week reassuring.      Labs from 6/18 reviewed and no concerning findings. Recheck labs with PET in August.      2. ENT  Dysphagia: Improving, following with speech. Back to almost normal oral diet.      Cancer related pain: Improved, off Oxycodone. Discussed 1000mg Tylenol TID for better pain control.      Mucositis: Continue salt/soda swishes.     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 Hopefully will improve with time.     Monitor chronic left hearing loss with cisplatin, no issues currently.     3. FEN  Nutrition: Now doing most nutrition via mouth, has RD follow-up later today. Weight has been stable. If he continues to do well over next 2 weeks could discuss removal. Will message ENT to assess at their visit next week as they will be seeing him.      Hydration: Doing well by mouth. CMP WNL     4. GI  Constipation: Resolved.     PEG infection: Resolved.     5. Cards  HTN: Back on Lisinopril and Coreg. Has local cardiologist following      Afib: Off Flecanide-established with local cardiologist. Now doing rate control and Xarelto, no concerns.    30 minutes spent on the date of the encounter doing chart review, review of test results, interpretation of tests, patient visit and documentation     Torsten Polanco PA-C  [facility]  [address] [place] [address]  [phone]

## 2021-06-21 ENCOUNTER — HOSPITAL ENCOUNTER (OUTPATIENT)
Dept: NUTRITION | Facility: CLINIC | Age: 71
End: 2021-06-21
Attending: DIETITIAN, REGISTERED
Payer: MEDICARE

## 2021-06-21 ENCOUNTER — VIRTUAL VISIT (OUTPATIENT)
Dept: ONCOLOGY | Facility: CLINIC | Age: 71
End: 2021-06-21
Attending: PHYSICIAN ASSISTANT
Payer: MEDICARE

## 2021-06-21 DIAGNOSIS — C09.9 SQUAMOUS CELL CARCINOMA OF TONSIL (H): Primary | ICD-10-CM

## 2021-06-21 PROCEDURE — 97803 MED NUTRITION INDIV SUBSEQ: CPT | Mod: TEL,GZ | Performed by: DIETITIAN, REGISTERED

## 2021-06-21 PROCEDURE — 999N001193 HC VIDEO/TELEPHONE VISIT; NO CHARGE

## 2021-06-21 PROCEDURE — 99214 OFFICE O/P EST MOD 30 MIN: CPT | Mod: 95 | Performed by: PHYSICIAN ASSISTANT

## 2021-06-21 RX ORDER — LISINOPRIL 5 MG/1
1 TABLET ORAL EVERY EVENING
COMMUNITY

## 2021-06-21 RX ORDER — TRAZODONE HYDROCHLORIDE 50 MG/1
1 TABLET, FILM COATED ORAL AT BEDTIME
COMMUNITY
Start: 2021-06-10

## 2021-06-21 NOTE — LETTER
"    6/21/2021         RE: Fortino Moya  [address]  [facility] [address]        Dear Colleague,    Thank you for referring your patient, Fortino Moya, to the [facility]. Please see a copy of my visit note below.    Fortino is a 71 year old who is being evaluated via a billable video visit.      How would you like to obtain your AVS? MyChart  If the video visit is dropped, the invitation should be resent by: Text to cell phone: [phone]  Will anyone else be joining your video visit? No       I have reviewed and updated the patient's allergies and medication list.    Concerns: none  Refills: none     Vitals - Patient Reported  Weight (Patient Reported): 78.5 kg (173 lb)  Height (Patient Reported): 177.8 cm (5' 10\")  BMI (Based on Pt Reported Ht/Wt): 24.82  Pain Score: Moderate Pain (4)  Pain Loc: Other - see comment(tongue)    Evita oMnet CMA    Video Start Time: 10:24am    Video-Visit Details    Type of service:  Video Visit    Video End Time: 10:43am    Originating Location (pt. Location): Home    Distant Location (provider location):  [facility]     Platform used for Video Visit: [facility]      Oncology/Hematology Visit Note  Jun 21, 2021    Reason for Visit: Follow up of recurrent head and neck SCC      History of Present Illness: Fortino Moya is a 71 year old male with PMH atrial fibrillation, hypercholesterolemia, HTN with recurrent head and neck cancer. He has a history of L oropharynx P16+ squamous cell cancer that was first diagnosed in 2018 in [place], [place].  He initially was offered chemoradiation, but due to a prior history of L sided hearing loss, he was given low dose weekly carboplatin 1.5 AUC weekly with radiation.  His 3 month PET/CT after initiation was negative and he underwent surveillance after that. He relocated to [place] which is where he is from in Feb 2020 and has been followed since by Dr. Treviño and Dr. Flores at [facility].       In August of 2020, he  developed numbenss in chin and then moved up to his forehead.  He had a Pet/CT done in Sept 2020 that showed a hypermetabolic area near his L foramen ovale - he had had recent dental work in that area and it was thought that this might be scar or inflammation related to that procedure.       However, he was offered a 2nd opinion and came to [facility] and saw Dr. Russell in Nov 2020.  Images were reviewed at tumor boardon 12/4/20 - and it was felt that this was concerning enough to merit biopsy - so he was referred for IR-guided biopsy.   IR guided biopsy happened on 1/8/2021 and came back as invasive squamous cell carcinoma.    He was going to have SBRT, however, repeat imaging showed marked disease progression in the L  space and we decided to initiate induction chemotherapy to try to shrink the lesion to get to SBRT.      He initiated TPF on 1/28/21 and cycle 2 on 2/18/21. He had a fair amount of deconditioning with induction therapy however restaging imaging (PET/CT CAP/neck 3/1/21) demonstrated a resolution of previous FDG-uptake within the left skull base with some residual soft tissue fullness involving the superior aspect of the pterygoids. No cervical adenopathy or distant metastatic disease.      He was recommend chemotherapy with concurrent re-irradiation with weekly cisplatin, received 3/24/21-5/7/21.      He was seen today for routine follow-up after treatment.    Interval History:  Fortino was seen today for follow-up. He is doing well. Still not back to baseline but energy levels improved and he plans to start PT at [facility]. He is off Oxycodone, though still has pain in left tongue and neck. Is taking Tylenol PRN which helps slightly. He denies any sores. He is able to swallow and is almost back to all nutrition by mouth and is hoping to get his feeding tube out soon. He still has numbness in his left face and is hoping this will improve with time. His  left eye is improved.    He denies fevers, headaches, dizziness unless he stands too quickly, hearing concerns, chest pain, SOB, cough, nausea, vomiting, bowel or bladder concerns, edema, rashes, neuropathy. Is happy that he was able to see new cardiologist. Has had mild allergy symptoms, using Allegra and Flonase.     Current Outpatient Medications   Medication Sig Dispense Refill     acetaminophen (TYLENOL) 160 MG/5ML suspension Take 31.5 mLs (1,000 mg) by mouth every 8 hours as needed for fever or mild pain 500 mL 3     Aspirin Buf,CaCarb-MgCarb-MgO, 81 MG TABS Take 81 mg by mouth every evening        atorvastatin (LIPITOR) 10 MG tablet Take 10 mg by mouth every evening        carboxymethylcellulose PF (REFRESH LIQUIGEL) 1 % ophthalmic gel Place 1 drop Into the left eye 4 times daily 30 each 11     carvedilol (COREG) 12.5 MG tablet 2 times daily        cevimeline (EVOXAC) 30 MG capsule Take 1 capsule (30 mg) by mouth 2 times daily 90 capsule 3     erythromycin (ROMYCIN) 5 MG/GM ophthalmic ointment Place 0.5 inches Into the left eye 5 times daily 3.5 g 11     flecainide (TAMBOCOR) 150 MG tablet Take 150 mg by mouth 2 times daily        mupirocin (BACTROBAN) 2 % external ointment Apply topically 3 times daily 30 g 0     Nutritional Supplements (ISOSOURCE 1.5 RIYA) LIQD Take 1 Can by mouth 7 times daily 100 Can 11     oxyCODONE (ROXICODONE) 5 MG/5ML solution Take 5-10 mLs (5-10 mg) by mouth every 4 hours as needed for severe pain 500 mL 0     prochlorperazine (COMPAZINE) 10 MG tablet Take 1 tablet (10 mg) by mouth every 6 hours as needed (Nausea/Vomiting) 30 tablet 1     rivaroxaban ANTICOAGULANT (XARELTO) 20 MG TABS tablet        Sodium Fluoride 1.1 % PSTE Apply 1 Application to affected area daily 112 g 11     Physical Examination:  There were no vitals taken for this visit.  Wt Readings from Last 10 Encounters:   06/18/21 78.8 kg (173 lb 12.8 oz)   05/24/21 80.2 kg (176 lb 14.4 oz)   05/14/21 78 kg (171 lb 14.4  oz)   05/06/21 79.2 kg (174 lb 9.6 oz)   04/29/21 78.9 kg (173 lb 14.4 oz)   04/26/21 78.7 kg (173 lb 9.6 oz)   04/26/21 78.7 kg (173 lb 6.4 oz)   04/22/21 77.6 kg (171 lb)   04/19/21 80 kg (176 lb 6.4 oz)   04/15/21 78.8 kg (173 lb 12.8 oz)     Video physical exam  General: Patient appears well in no acute distress.   Skin: No visualized rash or lesions on visualized skin  Eyes: No erythema, sclera icterus or discharge noted. Asymmetry as expected after prior left eye surgeries.   Resp: Appears to be breathing comfortably without accessory muscle usage, speaking in full sentences, no cough  MSK: Appears to have normal range of motion based on visualized movements  Neurologic: No apparent tremors, facial movements symmetric  Psych: affect bright, alert and oriented    The rest of a comprehensive physical examination is deferred due to PHE (public health emergency) video restrictions    Laboratory Data:  Results for ELSI IQBAL (MRN [id number]) as of 6/21/2021 11:13   6/18/2021 09:59   Sodium 135   Potassium 3.8   Chloride 104   Carbon Dioxide 27   Urea Nitrogen 13   Creatinine 0.74   GFR Estimate >90   GFR Estimate If Black >90   Calcium 8.6   Anion Gap 4   Albumin 3.8   Protein Total 6.5 (L)   Bilirubin Total 0.6   Alkaline Phosphatase 52   ALT 19   AST 12   Glucose 199 (H)   WBC 5.5   Hemoglobin 11.8 (L)   Hematocrit 33.9 (L)   Platelet Count 191   RBC Count 3.50 (L)   MCV 97   MCH 33.7 (H)   MCHC 34.8   RDW 13.7   Diff Method Automated Method   % Neutrophils 81.0   % Lymphocytes 6.3   % Monocytes 9.7   % Eosinophils 2.2   % Basophils 0.4   % Immature Granulocytes 0.4   Nucleated RBCs 0   Absolute Neutrophil 4.5   Absolute Lymphocytes 0.4 (L)   Absolute Monocytes 0.5   Absolute Eosinophils 0.1   Absolute Basophils 0.0   Abs Immature Granulocytes 0.0   Absolute Nucleated RBC 0.0       Assessment and Plan:  1. Onc  Recurrent SCC of head and neck, prior chemoradiation, now with local recurrence in L   space. S/p 2 cycles of TPF induction chemotherapy with near CR on PET. Discussion at ENT tumor board with plan to do radiation with weekly cisplatin, started 3/24/21. Completed 5/7/21.      Recovering well after treatment. Agree with PT. See supportive cares below. Will see myself in one month and then has PET and Dr. Shah in August. Continue follow-up with rad onc and ENT as scheduled. CT in rad onc last week reassuring.      Labs from 6/18 reviewed and no concerning findings. Recheck labs with PET in August.      2. ENT  Dysphagia: Improving, following with speech. Back to almost normal oral diet.      Cancer related pain: Improved, off Oxycodone. Discussed 1000mg Tylenol TID for better pain control.      Mucositis: Continue salt/soda swishes.     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 Hopefully will improve with time.     Monitor chronic left hearing loss with cisplatin, no issues currently.     3. FEN  Nutrition: Now doing most nutrition via mouth, has RD follow-up later today. Weight has been stable. If he continues to do well over next 2 weeks could discuss removal. Will message ENT to assess at their visit next week as they will be seeing him.      Hydration: Doing well by mouth. CMP WNL     4. GI  Constipation: Resolved.     PEG infection: Resolved.     5. Cards  HTN: Back on Lisinopril and Coreg. Has local cardiologist following      Afib: Off Flecanide-established with local cardiologist. Now doing rate control and Xarelto, no concerns.    30 minutes spent on the date of the encounter doing chart review, review of test results, interpretation of tests, patient visit and documentation     Torsten Polanco PA-C  [facility]  [address] [place] [address] [phone]        Again, thank you for allowing me to participate in the care of your patient.         Sincerely,        MENDY Chowdhury

## 2021-06-21 NOTE — PROGRESS NOTES
"The patient has been notified of the following:      \"We have found that certain health care needs can be provided without the need for a face to face visit.  This service lets us provide the care you need with a phone conversation.       I will have full access to your [facility] medical record during this entire phone call.   I will be taking notes for your medical record.      Since this is like an office visit, we will bill your insurance company for this service.       There are potential benefits and risks of telephone visits (e.g. limits to patient confidentiality) that differ from in-person visits.?  Confidentiality still applies for telephone services, and nobody will record the visit.  It is important to be in a quiet, private space that is free of distractions (including cell phone or other devices) during the visit.??      If during the course of the call I believe a telephone visit is not appropriate, you will not be charged for this service\"     Consent has been obtained for this service by care team member: Yes     CLINICAL NUTRITION SERVICES - REASSESSMENT NOTE   EVALUATION OF PREVIOUS PLAN OF CARE:   Referring Physician: Diana King  Time spent with patient: 15 minutes.     Current diet: soft foods  Current appetite/intake: fair, improved  PEG Tube: Not currently using  SLP recs - 5/26/21 - okay to start weaning TF with regular diet, thin liquids     Monitoring from previous assessment:   -Food intake - Fortino tells me that his intake has improved but continues to take mostly soft foods and protein shakes.    He is really struggling with the left side of his mouth as there is pain in his tongue and throat when swallowing.   He has discontinued using his feeding tube as he really wants it out.    He has only taken 1 carton of formula in the past one week.   He appears motivated to increase his PO intake using protein shakes/smoothies made with Pro performance powder (1340 anastacia, 50g pro), taking 2-3 /day. "   He agrees to continue his swallow exercises and get better at tracking his food (calories/protein).   He tells me that he was not aware of his nutrition needs until discussed today, however, this has been reviewed with him several times in the past.   -Weight trends - down 3 lb x past 3 weeks   Wt Readings from Last 6 Encounters:   06/18/21 78.8 kg (173 lb 12.8 oz)   05/24/21 80.2 kg (176 lb 14.4 oz)   05/14/21 78 kg (171 lb 14.4 oz)   05/06/21 79.2 kg (174 lb 9.6 oz)   04/29/21 78.9 kg (173 lb 14.4 oz)   04/26/21 78.7 kg (173 lb 9.6 oz)     Previous Nutrition Diagnosis:   Inadequate oral intake related to dysphagia as evidenced by pt dependent upon EN to meet >75% of estimated nutrition needs.    Evaluation: Improving   NEW FINDINGS:   No longer using his feeding tube   CURRENT NUTRITION DIAGNOSIS   Food and nutrition-related knowledge deficit related to nutrition needs for maintenance as evidenced by pt unable to verbalize calorie/protein needs   INTERVENTIONS   Recommendations / Nutrition Prescription   1. Calories - >2500/day  2. Protein - >100g /day   Implementation  Composition of Meals and Snacks - reviewed calorie/protein needs as above.  Reviewed soft, calorie/protein dense foods to try.   Encouraged to track daily intake of calories and protein.   Encouraged to continue working with SLP for swallow tx to improve ability to eat.   Goals   1. Aim for >2500 anastacia, 100g protein daily  2. Weight stability      Follow up/Monitoring:   -Food intake  -Weight     Ramila Worrell RD, LD  [facility]  [phone]

## 2021-06-30 ENCOUNTER — THERAPY VISIT (OUTPATIENT)
Dept: SPEECH THERAPY | Facility: CLINIC | Age: 71
End: 2021-06-30
Payer: MEDICARE

## 2021-06-30 ENCOUNTER — OFFICE VISIT (OUTPATIENT)
Dept: OTOLARYNGOLOGY | Facility: CLINIC | Age: 71
End: 2021-06-30
Payer: MEDICARE

## 2021-06-30 VITALS
TEMPERATURE: 97.3 F | HEIGHT: 69 IN | HEART RATE: 88 BPM | SYSTOLIC BLOOD PRESSURE: 120 MMHG | BODY MASS INDEX: 26.12 KG/M2 | OXYGEN SATURATION: 99 % | DIASTOLIC BLOOD PRESSURE: 80 MMHG | WEIGHT: 176.37 LBS

## 2021-06-30 DIAGNOSIS — C09.9 SQUAMOUS CELL CARCINOMA OF TONSIL (H): ICD-10-CM

## 2021-06-30 DIAGNOSIS — C09.9 MALIGNANT NEOPLASM OF TONSIL (H): Primary | ICD-10-CM

## 2021-06-30 DIAGNOSIS — G50.9: ICD-10-CM

## 2021-06-30 DIAGNOSIS — J45.909 UNCOMPLICATED ASTHMA, UNSPECIFIED ASTHMA SEVERITY, UNSPECIFIED WHETHER PERSISTENT: ICD-10-CM

## 2021-06-30 DIAGNOSIS — R13.12 OROPHARYNGEAL DYSPHAGIA: Primary | ICD-10-CM

## 2021-06-30 PROCEDURE — 99213 OFFICE O/P EST LOW 20 MIN: CPT | Performed by: OTOLARYNGOLOGY

## 2021-06-30 PROCEDURE — 92526 ORAL FUNCTION THERAPY: CPT | Mod: GN | Performed by: SPEECH-LANGUAGE PATHOLOGIST

## 2021-06-30 ASSESSMENT — PAIN SCALES - GENERAL: PAINLEVEL: MILD PAIN (3)

## 2021-06-30 ASSESSMENT — MIFFLIN-ST. JEOR: SCORE: 1550.38

## 2021-06-30 NOTE — LETTER
6/30/2021       RE: Fortino Moya  [address]  [place] [address]     Dear Colleague,    Thank you for referring your patient, Fortino Moya, to the [facility]IC [place] at [facility]. Please see a copy of my visit note below.    PRIOR ONCOLOGIC HISTORY:  Mr. 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  He initially started with a clinically staged T3 N1 p16 positive squamous cell carcinoma of the left tonsil treated in [place] with chemoradiotherapy.  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  He had symptoms of numbness in all three divisions of his fifth cranial nerve.  He then received two cycles of induction chemotherapy with TPF completed on 02/28/2021.  His post-induction imaging suggests a near complete response.  He then received definitive chemoradiotherapy with weekly cisplatin and a total of 6600 cGy finished on 05/07/2021.        INTERVAL HISTORY:  Since that time, the patient is now about seven months out from the end of treatment.  He has been eating and trying not to use his PEG tube and has been doing this fairly successfully despite concerns from our Speech Pathology team.  He has some pain in the back of his tongue, and he continues to have numbness in his face on the left side.  He also has some facial weakness that he has noticed that he thinks has been present for some time.  He denies odynophagia or lumps or bumps in his neck.    PHYSICAL EXAMINATION:  He is well-appearing, in no distress.  Examination of the oral cavity reveals a radiation ulcer in the junction between the anterior tonsillar pillar and the tongue.  This area is tender, but the remainder of the oral tongue is normal.  The floor of mouth is normal as  is the gingival buccal mucosa.  The posterior pharyngeal wall is normal.  The palate is normal.  She cannot tolerate mirror exam.  Examination of the neck is normal.  His cranial nerve exam reveals he is totally out in his fifth cranial nerve on the left side.  His facial nerve is approximately III-IV/VI in his marginal mandibular branch and a III/VI in his mid face and also about a IV/VI in his eyelid and brow.    IMPRESSION:  Status post treatment of a pterygoid recurrence with skull base involvement.    PLAN:      1.  We will get a PET scan about six weeks from now which will be three months at the end of treatment.  2.  I will see him back in about two months.  3.  He does want to get his PEG tube out which will probably happen sometime in the next several weeks.  Ideally, we would not do this given his swallow function on swallow test; however, he is doing functionally well and is very insistent upon this and understands that he may need the tube back if he has swallowing trouble in the future or aspiration pneumonias.      Again, thank you for allowing me to participate in the care of your patient.      Sincerely,    David Russell MD    cc:  Sidney Rodgers MD  [facility] 494    Yohan Shah MD   [facility] 480

## 2021-06-30 NOTE — PROGRESS NOTES
PRIOR ONCOLOGIC HISTORY:  Mr. 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  He initially started with a clinically staged T3 N1 p16 positive squamous cell carcinoma of the left tonsil treated in [place] with chemoradiotherapy.  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  He had symptoms of numbness in all three divisions of his fifth cranial nerve.  He then received two cycles of induction chemotherapy with TPF completed on 02/28/2021.  His post-induction imaging suggests a near complete response.  He then received definitive chemoradiotherapy with weekly cisplatin and a total of 6600 cGy finished on 05/07/2021.        INTERVAL HISTORY:  Since that time, the patient is now about seven months out from the end of treatment.  He has been eating and trying not to use his PEG tube and has been doing this fairly successfully despite concerns from our Speech Pathology team.  He has some pain in the back of his tongue, and he continues to have numbness in his face on the left side.  He also has some facial weakness that he has noticed that he thinks has been present for some time.  He denies odynophagia or lumps or bumps in his neck.    PHYSICAL EXAMINATION:  He is well-appearing, in no distress.  Examination of the oral cavity reveals a radiation ulcer in the junction between the anterior tonsillar pillar and the tongue.  This area is tender, but the remainder of the oral tongue is normal.  The floor of mouth is normal as is the gingival buccal mucosa.  The posterior pharyngeal wall is normal.  The palate is normal.  She cannot tolerate mirror exam.  Examination of the neck is normal.  His cranial nerve exam reveals he is totally out in his fifth cranial nerve on the left side.  His facial nerve is approximately III-IV/VI in his  marginal mandibular branch and a III/VI in his mid face and also about a IV/VI in his eyelid and brow.    IMPRESSION:  Status post treatment of a pterygoid recurrence with skull base involvement.    PLAN:      1.  We will get a PET scan about six weeks from now which will be three months at the end of treatment.  2.  I will see him back in about two months.  3.  He does want to get his PEG tube out which will probably happen sometime in the next several weeks.  Ideally, we would not do this given his swallow function on swallow test; however, he is doing functionally well and is very insistent upon this and understands that he may need the tube back if he has swallowing trouble in the future or aspiration pneumonias.      cc:    Sidney Rodgers MD  [facility] 494    Yohan Shah MD   [facility] 480

## 2021-06-30 NOTE — PATIENT INSTRUCTIONS
1. Please follow-up in clinic after PET scan scheduled on 8/16.   2. Please call the ENT clinic with any questions,concerns, new or worsening symptoms.    -Clinic number is [phone]   - Almaz's direct line (Dr. Russell's nurse) [phone]    3. We will call you to arrange date for PEG tube removal

## 2021-06-30 NOTE — NURSING NOTE
"Chief Complaint   Patient presents with     RECHECK     follow up      Blood pressure 120/80, pulse 88, temperature 97.3  F (36.3  C), height 1.753 m (5' 9\"), weight 80 kg (176 lb 5.9 oz), SpO2 99 %.    Frank Moreau LPN    "

## 2021-07-01 ENCOUNTER — TELEPHONE (OUTPATIENT)
Dept: SURGERY | Facility: CLINIC | Age: 71
End: 2021-07-01

## 2021-07-01 NOTE — TELEPHONE ENCOUNTER
From: Heydi Parekh APRN CNS  Sent: 6/30/2021   4:07 PM CDT  To: SATNAM Calvillo, #    Hey,    Can you please schedule him for a PEG removal with Linh or I? Not urgent. Next available is fine.    Thanks  H  ----- Message -----  From: Almaz Sorto RN  Sent: 6/30/2021   3:45 PM CDT  To: SATNAM Calvillo, #    Hey!  This patient needs a PEG tube removal. Can you guys help arrange? Thanks  Almaz

## 2021-07-05 ENCOUNTER — TELEPHONE (OUTPATIENT)
Dept: ONCOLOGY | Facility: CLINIC | Age: 71
End: 2021-07-05

## 2021-07-05 NOTE — TELEPHONE ENCOUNTER
Extension  forms received via right fax from [employer].      Forms to be completed and put in folder for provider to approve.    Fax #:  [phone]  Claim:     Shelbie Lester MA

## 2021-07-06 NOTE — TELEPHONE ENCOUNTER
Extension  forms filled out and put in providers folder for review and signature.      Shelbie Lester, MA

## 2021-07-08 ENCOUNTER — OFFICE VISIT (OUTPATIENT)
Dept: OPHTHALMOLOGY | Facility: CLINIC | Age: 71
End: 2021-07-08
Attending: OPHTHALMOLOGY
Payer: MEDICARE

## 2021-07-08 ENCOUNTER — OFFICE VISIT (OUTPATIENT)
Dept: SURGERY | Facility: CLINIC | Age: 71
End: 2021-07-08
Attending: CLINICAL NURSE SPECIALIST
Payer: MEDICARE

## 2021-07-08 VITALS
RESPIRATION RATE: 18 BRPM | TEMPERATURE: 97.2 F | DIASTOLIC BLOOD PRESSURE: 78 MMHG | BODY MASS INDEX: 25.95 KG/M2 | HEART RATE: 101 BPM | SYSTOLIC BLOOD PRESSURE: 143 MMHG | WEIGHT: 175.7 LBS | OXYGEN SATURATION: 97 %

## 2021-07-08 DIAGNOSIS — C09.9 SQUAMOUS CELL CARCINOMA OF TONSIL (H): ICD-10-CM

## 2021-07-08 DIAGNOSIS — Z43.1 PEG (PERCUTANEOUS ENDOSCOPIC GASTROSTOMY) ADJUSTMENT/REPLACEMENT/REMOVAL (H): ICD-10-CM

## 2021-07-08 DIAGNOSIS — H16.212 EXPOSURE KERATOPATHY, LEFT: ICD-10-CM

## 2021-07-08 DIAGNOSIS — H02.239 PARALYTIC LAGOPHTHALMOS, UNSPECIFIED LATERALITY: Primary | ICD-10-CM

## 2021-07-08 DIAGNOSIS — C09.9 TONSILLAR CANCER (H): Primary | ICD-10-CM

## 2021-07-08 DIAGNOSIS — H16.232 NEUROTROPHIC KERATOPATHY OF LEFT EYE: ICD-10-CM

## 2021-07-08 DIAGNOSIS — H02.056 TRICHIASIS OF LEFT EYE WITHOUT ENTROPION: ICD-10-CM

## 2021-07-08 PROCEDURE — 99214 OFFICE O/P EST MOD 30 MIN: CPT | Mod: GC | Performed by: OPHTHALMOLOGY

## 2021-07-08 PROCEDURE — 99212 OFFICE O/P EST SF 10 MIN: CPT | Performed by: CLINICAL NURSE SPECIALIST

## 2021-07-08 PROCEDURE — G0463 HOSPITAL OUTPT CLINIC VISIT: HCPCS | Mod: 27

## 2021-07-08 PROCEDURE — G0463 HOSPITAL OUTPT CLINIC VISIT: HCPCS

## 2021-07-08 RX ORDER — ERYTHROMYCIN 5 MG/G
0.5 OINTMENT OPHTHALMIC
Qty: 3.5 G | Refills: 11 | Status: SHIPPED | OUTPATIENT
Start: 2021-07-08 | End: 2021-08-07

## 2021-07-08 ASSESSMENT — EXTERNAL EXAM - LEFT EYE: OS_EXAM: LEFT FACIAL PALSY

## 2021-07-08 ASSESSMENT — VISUAL ACUITY
OD_CC: 20/25
OS_PH_CC: 20/70
CORRECTION_TYPE: GLASSES
OS_CC+: -1
METHOD: SNELLEN - LINEAR
OS_CC: 20/80

## 2021-07-08 ASSESSMENT — TONOMETRY
IOP_METHOD: TONOPEN
OD_IOP_MMHG: 07
OS_IOP_MMHG: 07

## 2021-07-08 ASSESSMENT — PAIN SCALES - GENERAL: PAINLEVEL: NO PAIN (0)

## 2021-07-08 ASSESSMENT — CONF VISUAL FIELD
METHOD: COUNTING FINGERS
OD_NORMAL: 1
OS_NORMAL: 1

## 2021-07-08 ASSESSMENT — EXTERNAL EXAM - RIGHT EYE: OD_EXAM: NORMAL

## 2021-07-08 NOTE — TELEPHONE ENCOUNTER
Extension  paperwork completed, checked for accuracy, signed and faxed to [employer] @ [phone]. A copy was made, sent to scanning and original mailed to patient at home address.    Successful transmission verified in Right Fax.      Shelbie Lester MA

## 2021-07-08 NOTE — NURSING NOTE
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  The patient uses EES ointment at bedtime and PFAT's five or six times daily in the left eye.  JOHANN Lobato, COA 9:37 AM 07/08/2021

## 2021-07-08 NOTE — NURSING NOTE
"Oncology Rooming Note    July 8, 2021 8:51 AM   Fortino Moya is a 71 year old male who presents for:    Chief Complaint   Patient presents with     Oncology Clinic Visit     Patient with tonsillar cancer here for provider visit      Initial Vitals: BP (!) 143/78 (BP Location: Right arm, Patient Position: Sitting, Cuff Size: Adult Regular)   Pulse 101   Temp 97.2  F (36.2  C) (Tympanic)   Resp 18   Wt 79.7 kg (175 lb 11.2 oz)   SpO2 97%   BMI 25.95 kg/m   Estimated body mass index is 25.95 kg/m  as calculated from the following:    Height as of 6/30/21: 1.753 m (5' 9\").    Weight as of this encounter: 79.7 kg (175 lb 11.2 oz). Body surface area is 1.97 meters squared.  No Pain (0) Comment: Data Unavailable   No LMP for male patient.  Allergies reviewed: Yes  Medications reviewed: Yes    Medications: Medication refills not needed today.  Pharmacy name entered into [employer]:    [id number] - [place], [place] - [address] [facility] - [place], [place] - [address] [place] [address] PHARM[place] - [address] [facility]  [facility] [place], [place] - [address] [facility] [phone]  [facility] [place], [place] - [address] [facility]    Clinical concerns:     Sheila Culver CMA              "

## 2021-07-08 NOTE — PROGRESS NOTES
REASON FOR VISIT: PEG removal    TUBE TYPE: 22 Fr gastrostomy tube    DATE PLACED: 03/10/21    SUBJECTIVE: Fortino is doing well. He is eager to get the PEG out. He has not used it for nutrition in the last 3-4 weeks. He is eating just fine and has no difficulties swallowing. He is on Xarelto.    OBJECTIVE: Tube site is clean and without erythema. Surrounding skin is clean, dry and intact. SSince this is a balloon gastrostomy tube and there will be no trauma to remove the PEG, I will remove it in clinic even though he is on Xarelto. I deflated the balloon and the PEG slid out easily without any bleeding.    I applied a clean dressing to the site.     SITE CARE INSTRUCTIONS: OK to remove dressing to shower. NO submerging in water (hot tubs, pools, etc). Change dressing at least daily until the site closes.    PLAN: follow up as needed    Total time spent, 15 minutes, all in counseling and coordination of care.    Heydi Parekh, CNS  Thoracic Surgery

## 2021-07-08 NOTE — PROGRESS NOTES
- reviewed notes and images from outside provider and the rest of the care team.    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    Of note, patient has a history of L oropharynx P16+ squamous cell cancer first diagnosed in 2018. The patient underwent low dose weekly carboplatin 1.5 AUC with radiation. His PET/CT 2 months after initiation and underwent surveillance after that. In 08/2020, he developed numbness in chin and then forehead. He underwent PET/CT and IR-guided biopsy (1/2021), which demonstrated invasive squamous cell carcinoma.    Per oncology, patient has left-sided paralysis. The patient first noticed left eye redness and lower lid drooping in about January 2021. He notes that these symptoms progressed during through April 2021. He noted associated watery discharge. He was seen by oncology and started on erythromycin ilya then recommended to follow up with ophthalmology for further management. He has also been using AT BID during this time. He denies eye irritation, pain, purulent discharge, and flashes/floaters. He notes baseline left eye vision when not using erythromycin ointment.    Interval HPI: S/p lid surgery by Dr. Yoon 4/1/21. Vision was initially much better. Notes no bryan eye pain but vision is a little blurrier and his left eye is always red.  Using ointment nightly and refresh every 2 hours.  Scheduled to see optometry for lens fitting.  No other concerns.     Undergoing chemo and the cancer is visibly gone--- finished radiation 2.5 weeks ago    POH: refractive error (intermittent glasses use)  Surgery: LASIK (25-30 years ago)- monovision  S/p   GTTS: AT  Q2 left eye , erythromycin ilya at bedtime left eye      PMH:  -L oropharyngeal squamous cell cancer (first diagnosed 2018), recently diagnosed invasive squamous cell cancer (on chemotherapy)  -HTN    FH:  -No AMD, glaucoma    CT soft tissue neck (1/18/21):  1. Abnormal hypermetabolic activity in the left infratemporal fossa  involving the medial and lateral pterygoid musculature and the  mandibular branch of the left 5th cranial nerve. Abnormal  hypermetabolic activity tracks through the left foramen ovale into the  medial aspect of the left middle cranial fossa and posteriorly to the  root entry zone of the left 5th cranial nerve.  2. Otherwise, normal head and neck PET/CT.    PET oncology  (1/18/21):  In this patient with tonsillar cancer status post chemotherapy and  radiation with new disease in the left  space, not currently  on chemotherapy:  1.  No evidence for metastatic disease in the chest, abdomen, pelvis.  2.  See dedicated neuroradiology report for the results of the high  resolution PET CT of the neck.     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 Pt will likely need more extensive Permanent tarso or scleral contact lens longterm.  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 New disease in the left  space s/p PET/CT and IR-guided biopsy consistent with invasive squamous cell carcinoma s/p induction chemotherapy (1/28/21).  - Exam notable for significant paralytic lagophthalmos with complete scleral show,  exposure keratopathy, and large neurotrophic corneal ulcer  - s/p permanent tarsorrhaphy lateral 50% (2/10/21)- Dr. Pendleton  - s/p FLOWER platinum weight, LLL ectropion repair, and temporary tarso - Dr. Yoon (4/1/21) celio f/u 5/25.  - misdirected trichiatic lashes epilated 7/8/21    Plan:  - Increase to Emycin QID  - Continue celluvisc 6/ day.  - Due to see Dr. Pardo next week for scleral lens as patient does not want tarsorrphaphy for depth perception and also because his grand kids would be nervous to see his eyelids shut.      RTC: 4 to 5 weeks -  sooner as needed.    Follow up with Dr. Pardo for scleral lens fitting    Bigg Johnson DO  Fellow, Cornea and External Disease  [facility]      Attending Physician Attestation:  Complete documentation of historical and exam elements from today's encounter can be found in the full encounter summary report (not reduplicated in this progress note).  I personally obtained the chief complaint(s) and history of present illness.  I confirmed and edited as necessary the review of systems, past medical/surgical history, family history, social history, and examination findings as documented by others; and I examined the patient myself.  I personally reviewed the relevant tests, images, and reports as documented above.  I formulated and edited as necessary the assessment and plan and discussed the findings and management plan with the patient and family. - Mert Barney MD

## 2021-07-08 NOTE — LETTER
7/8/2021         RE: Fortino Moya  [address]  [place] [address]        Dear Colleague,    Thank you for referring your patient, Fortino Moya, to the [facility]. Please see a copy of my visit note below.    REASON FOR VISIT: PEG removal    TUBE TYPE: 22 Fr gastrostomy tube    DATE PLACED: 03/10/21    SUBJECTIVE: Fortino is doing well. He is eager to get the PEG out. He has not used it for nutrition in the last 3-4 weeks. He is eating just fine and has no difficulties swallowing. He is on Xarelto.    OBJECTIVE: Tube site is clean and without erythema. Surrounding skin is clean, dry and intact. SSince this is a balloon gastrostomy tube and there will be no trauma to remove the PEG, I will remove it in clinic even though he is on Xarelto. I deflated the balloon and the PEG slid out easily without any bleeding.    I applied a clean dressing to the site.     SITE CARE INSTRUCTIONS: OK to remove dressing to shower. NO submerging in water (hot tubs, pools, etc). Change dressing at least daily until the site closes.    PLAN: follow up as needed    Total time spent, 15 minutes, all in counseling and coordination of care.    Heydi Parekh, CNS  Thoracic Surgery        Again, thank you for allowing me to participate in the care of your patient.        Sincerely,        SATNAM Nunes CNS

## 2021-07-14 ENCOUNTER — HOME INFUSION (PRE-WILLOW HOME INFUSION) (OUTPATIENT)
Dept: PHARMACY | Facility: CLINIC | Age: 71
End: 2021-07-14

## 2021-07-16 ENCOUNTER — HOME INFUSION (PRE-WILLOW HOME INFUSION) (OUTPATIENT)
Dept: PHARMACY | Facility: CLINIC | Age: 71
End: 2021-07-16

## 2021-07-20 ENCOUNTER — OFFICE VISIT (OUTPATIENT)
Dept: OPTOMETRY | Facility: CLINIC | Age: 71
End: 2021-07-20
Payer: MEDICARE

## 2021-07-20 DIAGNOSIS — H16.212 EXPOSURE KERATOPATHY, LEFT: Primary | ICD-10-CM

## 2021-07-20 DIAGNOSIS — H02.23B PARALYTIC LAGOPHTHALMOS OF UPPER AND LOWER EYELID OF LEFT EYE: ICD-10-CM

## 2021-07-20 DIAGNOSIS — H04.123 DRY EYES: ICD-10-CM

## 2021-07-20 ASSESSMENT — REFRACTION_WEARINGRX
OS_CYLINDER: +1.50
OD_SPHERE: +0.25
OD_AXIS: 166
OS_AXIS: 008
OS_SPHERE: -0.75
OD_CYLINDER: +1.25
SPECS_TYPE: PAL
OD_ADD: +1.75
OS_ADD: +1.75

## 2021-07-20 ASSESSMENT — VISUAL ACUITY
OS_CC: 20/70-2
METHOD: SNELLEN - LINEAR
OD_CC: 20/25
CORRECTION_TYPE: GLASSES

## 2021-07-20 ASSESSMENT — REFRACTION_CURRENTRX
OS_SPHERE: -1.50
OS_DIAMETER: 17.0
OS_SPHERE: -2.00
OS_BRAND: ZENLENS PROLATE
OS_BRAND: ONEFIT 2.0
OS_DIAMETER: 14.9
OS_BASECURVE: 8.0

## 2021-07-20 ASSESSMENT — EXTERNAL EXAM - LEFT EYE: OS_EXAM: LEFT FACIAL PALSY

## 2021-07-20 ASSESSMENT — EXTERNAL EXAM - RIGHT EYE: OD_EXAM: NORMAL

## 2021-07-20 ASSESSMENT — SLIT LAMP EXAM - LIDS: COMMENTS: NORMAL

## 2021-07-20 NOTE — PROGRESS NOTES
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 Now on topical lubrication only  -Blurry vision, irritated eye. Bothered cosmetically by it  -Scleral lens would be a good option for ocular surface protection  -Mild improvement in vision today (20/50), would continue to monitor  -Poor fit with larger diam lenses today due to scleral irregularity, improved with smaller diam lens  -Eye has minimal feeling  -Reviewed findings with pt, he would like to proceed. Discussed updated glasses over vs no distance correction and wearing OTC readers    Order lens, RTC 2 weeks for lens dispense/I&R

## 2021-07-21 ENCOUNTER — VIRTUAL VISIT (OUTPATIENT)
Dept: ONCOLOGY | Facility: CLINIC | Age: 71
End: 2021-07-21
Attending: INTERNAL MEDICINE
Payer: MEDICARE

## 2021-07-21 DIAGNOSIS — C09.9 SQUAMOUS CELL CARCINOMA OF TONSIL (H): Primary | ICD-10-CM

## 2021-07-21 PROCEDURE — 99213 OFFICE O/P EST LOW 20 MIN: CPT | Mod: 95 | Performed by: PHYSICIAN ASSISTANT

## 2021-07-21 NOTE — LETTER
7/21/2021         RE: Fortino Moya  [address]  [facility] [address]      Fortino is a 71 year old who is being evaluated via a billable video visit.      How would you like to obtain your AVS? MyChart  If the video visit is dropped, the invitation should be resent by: Text to cell phone: [phone]  Will anyone else be joining your video visit? MAHAD Nassar    Video Start Time: 5:01pm    Video-Visit Details    Type of service:  Video Visit    Video End Time: 5:14pm    Originating Location (pt. Location): Home    Distant Location (provider location):  [facility]     Platform used for Video Visit: [facility]    Oncology/Hematology Visit Note  Jul 21, 2021    Reason for Visit: Follow up of recurrent head and neck SCC      History of Present Illness: Fortino Moya is a 71 year old male with PMH atrial fibrillation, hypercholesterolemia, HTN with recurrent head and neck cancer. He has a history of L oropharynx P16+ squamous cell cancer that was first diagnosed in 2018 in [place], [place].  He initially was offered chemoradiation, but due to a prior history of L sided hearing loss, he was given low dose weekly carboplatin 1.5 AUC weekly with radiation.  His 3 month PET/CT after initiation was negative and he underwent surveillance after that. He relocated to [place] which is where he is from in Feb 2020 and has been followed since by Dr. Treviño and Dr. Flores at [facility].       In August of 2020, he  developed numbenss in chin and then moved up to his forehead.  He had a Pet/CT done in Sept 2020 that showed a hypermetabolic area near his L foramen ovale - he had had recent dental work in that area and it was thought that this might be scar or inflammation related to that procedure.       However, he was offered a 2nd opinion and came to [facility] and saw Dr. Russell in Nov 2020.  Images were reviewed at tumor boardon 12/4/20 - and it was felt that this was concerning  enough to merit biopsy - so he was referred for IR-guided biopsy.   IR guided biopsy happened on 1/8/2021 and came back as invasive squamous cell carcinoma.    He was going to have SBRT, however, repeat imaging showed marked disease progression in the L  space and we decided to initiate induction chemotherapy to try to shrink the lesion to get to SBRT.      He initiated TPF on 1/28/21 and cycle 2 on 2/18/21. He had a fair amount of deconditioning with induction therapy however restaging imaging (PET/CT CAP/neck 3/1/21) demonstrated a resolution of previous FDG-uptake within the left skull base with some residual soft tissue fullness involving the superior aspect of the pterygoids. No cervical adenopathy or distant metastatic disease.      He was recommend chemotherapy with concurrent re-irradiation with weekly cisplatin, received 3/24/21-5/7/21.      He was seen today for routine follow-up after treatment.    Interval History:  Fortino was called today for follow-up. He is doing well. He is having some troubles with eating due to the numbness in his left face and some troubles swallowing but he is maintaining his weight now that his PEG is out. He hasn't started PT yet but would like to, they were scheduled out weeks. He is having minimal pain. His neck is well healed. He is working with [employer] for his eye and it is going well. He generally feels stronger. He has had some afib issues and is working with cards on this.    He denies fevers, headaches, dizziness, chest pain, SOB, cough, nausea, vomiting, bowel or bladder issues, neuropathy (outside of baseline), hearing concerns, edema, rashes. Mood is bright. He has been golfing.     Current Outpatient Medications   Medication Sig Dispense Refill     acetaminophen (TYLENOL) 160 MG/5ML suspension Take 31.5 mLs (1,000 mg) by mouth every 8 hours as needed for fever or mild pain 500 mL 3     atorvastatin (LIPITOR) 10 MG tablet Take 10 mg by mouth every evening         carboxymethylcellulose PF (REFRESH LIQUIGEL) 1 % ophthalmic gel Place 1 drop Into the left eye 4 times daily 30 each 11     carvedilol (COREG) 12.5 MG tablet 2 times daily        cevimeline (EVOXAC) 30 MG capsule Take 1 capsule (30 mg) by mouth 2 times daily 90 capsule 3     erythromycin (ROMYCIN) 5 MG/GM ophthalmic ointment Place 0.5 inches Into the left eye 5 times daily 3.5 g 11     lisinopril (ZESTRIL) 10 MG tablet        mupirocin (BACTROBAN) 2 % external ointment Apply topically 3 times daily 30 g 0     prochlorperazine (COMPAZINE) 10 MG tablet Take 1 tablet (10 mg) by mouth every 6 hours as needed (Nausea/Vomiting) 30 tablet 1     rivaroxaban ANTICOAGULANT (XARELTO) 20 MG TABS tablet        Sodium Fluoride 1.1 % PSTE Apply 1 Application to affected area daily 112 g 11     traZODone (DESYREL) 50 MG tablet          Physical Examination:  There were no vitals taken for this visit.  Wt Readings from Last 10 Encounters:   07/08/21 79.7 kg (175 lb 11.2 oz)   06/30/21 80 kg (176 lb 5.9 oz)   06/18/21 78.8 kg (173 lb 12.8 oz)   05/24/21 80.2 kg (176 lb 14.4 oz)   05/14/21 78 kg (171 lb 14.4 oz)   05/06/21 79.2 kg (174 lb 9.6 oz)   04/29/21 78.9 kg (173 lb 14.4 oz)   04/26/21 78.7 kg (173 lb 9.6 oz)   04/26/21 78.7 kg (173 lb 6.4 oz)   04/22/21 77.6 kg (171 lb)     Video physical exam  General: Patient appears well in no acute distress.   Skin: No visualized rash or lesions on visualized skin  Eyes: No erythema, sclera icterus or discharge noted. Asymmetry as expected after prior left eye surgeries.   Resp: Appears to be breathing comfortably without accessory muscle usage, speaking in full sentences, no cough  MSK: Appears to have normal range of motion based on visualized movements  Neurologic: No apparent tremors, facial movements symmetric  Psych: affect bright, alert and oriented     The rest of a comprehensive physical examination is deferred due to PHE (public health emergency) video  restrictions    Laboratory Data:  No new labs to review       Assessment and Plan:  1. Onc  Recurrent SCC of head and neck, prior chemoradiation, now with local recurrence in L  space. S/p 2 cycles of TPF induction chemotherapy with near CR on PET. Discussion at ENT tumor board with plan to do radiation with weekly cisplatin, started 3/24/21. Completed 5/7/21.      Recovering well after treatment. Will talk to our PT team about scheduling. See supportive cares belowWill have PET and Dr. Shah in August. Continue follow-up with rad onc and ENT as scheduled.      Labs from 6/18 reviewed and no concerning findings. Recheck labs with PET in August.      2. ENT  Dysphagia: Improving, following with speech. Back to almost normal oral diet.      Cancer related pain: Improved, continue Tylenol PRN.      Mucositis: Continue salt/soda swishes.     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 Hopefully will improve with time.     Monitor chronic left hearing loss with cisplatin, no issues currently.     3. FEN  Nutrition: PEG removed, doing OK with oral intake. Discussed this will continue to improve with time. Weight stable my report. Can do protein drinks PRN.      Hydration: Denies any concerns.      4. GI  Constipation: Resolved.     PEG infection: Resolved.     5. Cards  HTN: Back on Lisinopril and Coreg. Has local cardiologist following      Afib: Off Flecanide-established with local cardiologist. Now doing rate control and Xarelto, will follow-up with cards given few palpitation issues.    20 minutes spent on the date of the encounter doing chart review, patient visit and documentation     Torsten Polanco PA-C  [facility]  [address] [place], [place] [address] [phone]          MENDY Chowdhury

## 2021-07-21 NOTE — Clinical Note
"    7/21/2021         RE: Fortino Moya  [address]  [facility] [address]        Dear Colleague,    Thank you for referring your patient, Fortino Moya, to the [facility]. Please see a copy of my visit note below.    Fortino is a 71 year old who is being evaluated via a billable video visit.      How would you like to obtain your AVS? MyChart  If the video visit is dropped, the invitation should be resent by: Text to cell phone: [phone]  Will anyone else be joining your video visit? No  {If patient encounters technical issues they should call [phone] :[id number]}     MAHAD Graham    Video Start Time: {video visit start/end time for provider to select:308587}     Video-Visit Details    Type of service:  Video Visit    Video End Time:{video visit start/end time for provider to select:635531}    Originating Location (pt. Location): {video visit patient location:770650::\"Home\"}    Distant Location (provider location):  [facility]     Platform used for Video Visit: {Virtual Visit Platforms:724556::\"Well\"}    Oncology/Hematology Visit Note  Jul 21, 2021    Reason for Visit: Follow up of recurrent head and neck SCC      History of Present Illness: Fortino Moya is a 71 year old male with PMH atrial fibrillation, hypercholesterolemia, HTN with recurrent head and neck cancer. He has a history of L oropharynx P16+ squamous cell cancer that was first diagnosed in 2018 in [place], [place].  He initially was offered chemoradiation, but due to a prior history of L sided hearing loss, he was given low dose weekly carboplatin 1.5 AUC weekly with radiation.  His 3 month PET/CT after initiation was negative and he underwent surveillance after that. He relocated to [place] which is where he is from in Feb 2020 and has been followed since by Dr. Treviño and Dr. Flores at [facility].       In August of 2020, he  developed numbenss in chin and then moved up to " his forehead.  He had a Pet/CT done in Sept 2020 that showed a hypermetabolic area near his L foramen ovale - he had had recent dental work in that area and it was thought that this might be scar or inflammation related to that procedure.       However, he was offered a 2nd opinion and came to [facility] and saw Dr. Russell in Nov 2020.  Images were reviewed at tumor boardon 12/4/20 - and it was felt that this was concerning enough to merit biopsy - so he was referred for IR-guided biopsy.   IR guided biopsy happened on 1/8/2021 and came back as invasive squamous cell carcinoma.    He was going to have SBRT, however, repeat imaging showed marked disease progression in the L  space and we decided to initiate induction chemotherapy to try to shrink the lesion to get to SBRT.      He initiated TPF on 1/28/21 and cycle 2 on 2/18/21. He had a fair amount of deconditioning with induction therapy however restaging imaging (PET/CT CAP/neck 3/1/21) demonstrated a resolution of previous FDG-uptake within the left skull base with some residual soft tissue fullness involving the superior aspect of the pterygoids. No cervical adenopathy or distant metastatic disease.      He was recommend chemotherapy with concurrent re-irradiation with weekly cisplatin, received 3/24/21-5/7/21.      He was seen today for routine follow-up after treatment.    Interval History:      Review of Systems:  Patient denies fevers, chills, night sweats, unexplained weight changes, headaches, dizziness, vision or hearing changes, new lumps or bumps, chest pain, shortness of breath, cough, abdominal pain, nausea, vomiting, changes to bowel or bladder, swelling of extremities, bleeding issues, or rash.    Current Outpatient Medications   Medication Sig Dispense Refill     acetaminophen (TYLENOL) 160 MG/5ML suspension Take 31.5 mLs (1,000 mg) by mouth every 8 hours as needed for fever or mild pain 500 mL 3     atorvastatin (LIPITOR) 10 MG tablet Take  10 mg by mouth every evening        carboxymethylcellulose PF (REFRESH LIQUIGEL) 1 % ophthalmic gel Place 1 drop Into the left eye 4 times daily 30 each 11     carvedilol (COREG) 12.5 MG tablet 2 times daily        cevimeline (EVOXAC) 30 MG capsule Take 1 capsule (30 mg) by mouth 2 times daily 90 capsule 3     erythromycin (ROMYCIN) 5 MG/GM ophthalmic ointment Place 0.5 inches Into the left eye 5 times daily 3.5 g 11     lisinopril (ZESTRIL) 10 MG tablet        mupirocin (BACTROBAN) 2 % external ointment Apply topically 3 times daily 30 g 0     prochlorperazine (COMPAZINE) 10 MG tablet Take 1 tablet (10 mg) by mouth every 6 hours as needed (Nausea/Vomiting) 30 tablet 1     rivaroxaban ANTICOAGULANT (XARELTO) 20 MG TABS tablet        Sodium Fluoride 1.1 % PSTE Apply 1 Application to affected area daily 112 g 11     traZODone (DESYREL) 50 MG tablet          Physical Examination:  There were no vitals taken for this visit.  Wt Readings from Last 10 Encounters:   07/08/21 79.7 kg (175 lb 11.2 oz)   06/30/21 80 kg (176 lb 5.9 oz)   06/18/21 78.8 kg (173 lb 12.8 oz)   05/24/21 80.2 kg (176 lb 14.4 oz)   05/14/21 78 kg (171 lb 14.4 oz)   05/06/21 79.2 kg (174 lb 9.6 oz)   04/29/21 78.9 kg (173 lb 14.4 oz)   04/26/21 78.7 kg (173 lb 9.6 oz)   04/26/21 78.7 kg (173 lb 6.4 oz)   04/22/21 77.6 kg (171 lb)     Constitutional: Well-appearing male in no acute distress.  Eyes: EOMI, PERRL. No scleral icterus.  ENT: Oral mucosa is moist without lesions or thrush.   Lymphatic: Neck is supple without cervical or supraclavicular lymphadenopathy. No axillary lymphadenopathy.  Cardiovascular: Regular rate and rhythm. No murmurs, gallops, or rubs. No peripheral edema.  Respiratory: Clear to auscultation bilaterally. No wheezes or crackles.  Gastrointestinal: Bowel sounds present. Abdomen soft, non-tender. No palpable hepatosplenomegaly or masses.   Neurologic: Cranial nerves II through XII are grossly intact.  Skin: No rashes, petechiae,  or bruising noted on exposed skin.    Laboratory Data:        Assessment and Plan:          Torsten Polanco PA-C  [facility] [address] [address]  [phone]        Again, thank you for allowing me to participate in the care of your patient.        Sincerely,        MENDY Chowdhury

## 2021-07-21 NOTE — PROGRESS NOTES
Fortino is a 71 year old who is being evaluated via a billable video visit.      How would you like to obtain your AVS? MyChart  If the video visit is dropped, the invitation should be resent by: Text to cell phone: [phone]  Will anyone else be joining your video visit? MAHAD Nassar    Video Start Time: 5:01pm    Video-Visit Details    Type of service:  Video Visit    Video End Time: 5:14pm    Originating Location (pt. Location): Home    Distant Location (provider location):  [facility]     Platform used for Video Visit: [facility]    Oncology/Hematology Visit Note  Jul 21, 2021    Reason for Visit: Follow up of recurrent head and neck SCC      History of Present Illness: Fortino Moya is a 71 year old male with PMH atrial fibrillation, hypercholesterolemia, HTN with recurrent head and neck cancer. He has a history of L oropharynx P16+ squamous cell cancer that was first diagnosed in 2018 in [place], [place].  He initially was offered chemoradiation, but due to a prior history of L sided hearing loss, he was given low dose weekly carboplatin 1.5 AUC weekly with radiation.  His 3 month PET/CT after initiation was negative and he underwent surveillance after that. He relocated to [place] which is where he is from in Feb 2020 and has been followed since by Dr. Treviño and Dr. Flores at [facility].       In August of 2020, he  developed numbenss in chin and then moved up to his forehead.  He had a Pet/CT done in Sept 2020 that showed a hypermetabolic area near his L foramen ovale - he had had recent dental work in that area and it was thought that this might be scar or inflammation related to that procedure.       However, he was offered a 2nd opinion and came to [facility] and saw Dr. Russell in Nov 2020.  Images were reviewed at tumor boardon 12/4/20 - and it was felt that this was concerning enough to merit biopsy - so he was referred for IR-guided biopsy.   IR guided biopsy  What Type Of Note Output Would You Prefer (Optional)?: Bullet Format Is The Patient Presenting As Previously Scheduled?: Yes happened on 1/8/2021 and came back as invasive squamous cell carcinoma.    He was going to have SBRT, however, repeat imaging showed marked disease progression in the L  space and we decided to initiate induction chemotherapy to try to shrink the lesion to get to SBRT.      He initiated TPF on 1/28/21 and cycle 2 on 2/18/21. He had a fair amount of deconditioning with induction therapy however restaging imaging (PET/CT CAP/neck 3/1/21) demonstrated a resolution of previous FDG-uptake within the left skull base with some residual soft tissue fullness involving the superior aspect of the pterygoids. No cervical adenopathy or distant metastatic disease.      He was recommend chemotherapy with concurrent re-irradiation with weekly cisplatin, received 3/24/21-5/7/21.      He was seen today for routine follow-up after treatment.    Interval History:  Fortino was called today for follow-up. He is doing well. He is having some troubles with eating due to the numbness in his left face and some troubles swallowing but he is maintaining his weight now that his PEG is out. He hasn't started PT yet but would like to, they were scheduled out weeks. He is having minimal pain. His neck is well healed. He is working with [employer] for his eye and it is going well. He generally feels stronger. He has had some afib issues and is working with cards on this.    He denies fevers, headaches, dizziness, chest pain, SOB, cough, nausea, vomiting, bowel or bladder issues, neuropathy (outside of baseline), hearing concerns, edema, rashes. Mood is bright. He has been golfing.     Current Outpatient Medications   Medication Sig Dispense Refill     acetaminophen (TYLENOL) 160 MG/5ML suspension Take 31.5 mLs (1,000 mg) by mouth every 8 hours as needed for fever or mild pain 500 mL 3     atorvastatin (LIPITOR) 10 MG tablet Take 10 mg by mouth every evening        carboxymethylcellulose PF (REFRESH LIQUIGEL) 1 % ophthalmic gel Place 1 drop Into  How Severe Is Your Rash?: mild the left eye 4 times daily 30 each 11     carvedilol (COREG) 12.5 MG tablet 2 times daily        cevimeline (EVOXAC) 30 MG capsule Take 1 capsule (30 mg) by mouth 2 times daily 90 capsule 3     erythromycin (ROMYCIN) 5 MG/GM ophthalmic ointment Place 0.5 inches Into the left eye 5 times daily 3.5 g 11     lisinopril (ZESTRIL) 10 MG tablet        mupirocin (BACTROBAN) 2 % external ointment Apply topically 3 times daily 30 g 0     prochlorperazine (COMPAZINE) 10 MG tablet Take 1 tablet (10 mg) by mouth every 6 hours as needed (Nausea/Vomiting) 30 tablet 1     rivaroxaban ANTICOAGULANT (XARELTO) 20 MG TABS tablet        Sodium Fluoride 1.1 % PSTE Apply 1 Application to affected area daily 112 g 11     traZODone (DESYREL) 50 MG tablet          Physical Examination:  There were no vitals taken for this visit.  Wt Readings from Last 10 Encounters:   07/08/21 79.7 kg (175 lb 11.2 oz)   06/30/21 80 kg (176 lb 5.9 oz)   06/18/21 78.8 kg (173 lb 12.8 oz)   05/24/21 80.2 kg (176 lb 14.4 oz)   05/14/21 78 kg (171 lb 14.4 oz)   05/06/21 79.2 kg (174 lb 9.6 oz)   04/29/21 78.9 kg (173 lb 14.4 oz)   04/26/21 78.7 kg (173 lb 9.6 oz)   04/26/21 78.7 kg (173 lb 6.4 oz)   04/22/21 77.6 kg (171 lb)     Video physical exam  General: Patient appears well in no acute distress.   Skin: No visualized rash or lesions on visualized skin  Eyes: No erythema, sclera icterus or discharge noted. Asymmetry as expected after prior left eye surgeries.   Resp: Appears to be breathing comfortably without accessory muscle usage, speaking in full sentences, no cough  MSK: Appears to have normal range of motion based on visualized movements  Neurologic: No apparent tremors, facial movements symmetric  Psych: affect bright, alert and oriented     The rest of a comprehensive physical examination is deferred due to PHE (public health emergency) video restrictions    Laboratory Data:  No new labs to review       Assessment and Plan:  1. Onc  Recurrent SCC of  Is This A New Presentation, Or A Follow-Up?: Rash head and neck, prior chemoradiation, now with local recurrence in L  space. S/p 2 cycles of TPF induction chemotherapy with near CR on PET. Discussion at ENT tumor board with plan to do radiation with weekly cisplatin, started 3/24/21. Completed 5/7/21.      Recovering well after treatment. Will talk to our PT team about scheduling. See supportive cares belowWill have PET and Dr. Shah in August. Continue follow-up with rad onc and ENT as scheduled.      Labs from 6/18 reviewed and no concerning findings. Recheck labs with PET in August.      2. ENT  Dysphagia: Improving, following with speech. Back to almost normal oral diet.      Cancer related pain: Improved, continue Tylenol PRN.      Mucositis: Continue salt/soda swishes.     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 Hopefully will improve with time.     Monitor chronic left hearing loss with cisplatin, no issues currently.     3. FEN  Nutrition: PEG removed, doing OK with oral intake. Discussed this will continue to improve with time. Weight stable my report. Can do protein drinks PRN.      Hydration: Denies any concerns.      4. GI  Constipation: Resolved.     PEG infection: Resolved.     5. Cards  HTN: Back on Lisinopril and Coreg. Has local cardiologist following      Afib: Off Flecanide-established with local cardiologist. Now doing rate control and Xarelto, will follow-up with cards given few palpitation issues.    20 minutes spent on the date of the encounter doing chart review, patient visit and documentation     Torsten Polanco PA-C  [facility]  [address] [place], [place] [address] [phone]     Additional History: \\n\\nPt has a large tub she relaxes in and wonders if that’s where she developed rash

## 2021-07-22 ENCOUNTER — HOME INFUSION (PRE-WILLOW HOME INFUSION) (OUTPATIENT)
Dept: PHARMACY | Facility: CLINIC | Age: 71
End: 2021-07-22

## 2021-07-22 NOTE — PROGRESS NOTES
This is a recent snapshot of the patient's [facility] medical record.  For current drug dose and complete information and questions, call [phone] or In Basket pool, fv home infusion ([id number])  CSN Number:  [id number]

## 2021-07-23 NOTE — PROGRESS NOTES
This is a recent snapshot of the patient's [facility] medical record.  For current drug dose and complete information and questions, call [phone] or In Basket pool, fv home infusion ([id number])  CSN Number:  [id number]

## 2021-08-02 ENCOUNTER — TELEPHONE (OUTPATIENT)
Dept: OPTOMETRY | Facility: CLINIC | Age: 71
End: 2021-08-02

## 2021-08-10 ENCOUNTER — TELEPHONE (OUTPATIENT)
Dept: OPTOMETRY | Facility: CLINIC | Age: 71
End: 2021-08-10

## 2021-08-16 ENCOUNTER — OFFICE VISIT (OUTPATIENT)
Dept: OPTOMETRY | Facility: CLINIC | Age: 71
End: 2021-08-16
Payer: MEDICARE

## 2021-08-16 ENCOUNTER — HOSPITAL ENCOUNTER (OUTPATIENT)
Dept: PET IMAGING | Facility: CLINIC | Age: 71
End: 2021-08-16
Attending: INTERNAL MEDICINE
Payer: MEDICARE

## 2021-08-16 ENCOUNTER — HOSPITAL ENCOUNTER (OUTPATIENT)
Facility: CLINIC | Age: 71
End: 2021-08-16
Attending: INTERNAL MEDICINE
Payer: MEDICARE

## 2021-08-16 ENCOUNTER — LAB (OUTPATIENT)
Dept: INFUSION THERAPY | Facility: CLINIC | Age: 71
End: 2021-08-16
Attending: INTERNAL MEDICINE
Payer: MEDICARE

## 2021-08-16 DIAGNOSIS — C09.9 SQUAMOUS CELL CARCINOMA OF TONSIL (H): Primary | ICD-10-CM

## 2021-08-16 DIAGNOSIS — H04.123 DRY EYES: Primary | ICD-10-CM

## 2021-08-16 DIAGNOSIS — H16.212 EXPOSURE KERATOPATHY, LEFT: ICD-10-CM

## 2021-08-16 DIAGNOSIS — C09.9 SQUAMOUS CELL CARCINOMA OF TONSIL (H): ICD-10-CM

## 2021-08-16 DIAGNOSIS — H02.23B PARALYTIC LAGOPHTHALMOS OF UPPER AND LOWER EYELID OF LEFT EYE: ICD-10-CM

## 2021-08-16 DIAGNOSIS — C09.9 TONSILLAR CANCER (H): Primary | ICD-10-CM

## 2021-08-16 LAB
ALBUMIN SERPL-MCNC: 3.7 G/DL (ref 3.4–5)
ALP SERPL-CCNC: 56 U/L (ref 40–150)
ALT SERPL W P-5'-P-CCNC: 23 U/L (ref 0–70)
ANION GAP SERPL CALCULATED.3IONS-SCNC: 2 MMOL/L (ref 3–14)
AST SERPL W P-5'-P-CCNC: 10 U/L (ref 0–45)
BASOPHILS # BLD AUTO: 0 10E3/UL (ref 0–0.2)
BASOPHILS NFR BLD AUTO: 0 %
BILIRUB SERPL-MCNC: 0.3 MG/DL (ref 0.2–1.3)
BUN SERPL-MCNC: 18 MG/DL (ref 7–30)
CALCIUM SERPL-MCNC: 8.9 MG/DL (ref 8.5–10.1)
CHLORIDE BLD-SCNC: 105 MMOL/L (ref 94–109)
CO2 SERPL-SCNC: 29 MMOL/L (ref 20–32)
CREAT BLD-MCNC: 0.7 MG/DL (ref 0.7–1.3)
CREAT SERPL-MCNC: 0.68 MG/DL (ref 0.66–1.25)
EOSINOPHIL # BLD AUTO: 0.2 10E3/UL (ref 0–0.7)
EOSINOPHIL NFR BLD AUTO: 4 %
ERYTHROCYTE [DISTWIDTH] IN BLOOD BY AUTOMATED COUNT: 12.5 % (ref 10–15)
GFR SERPL CREATININE-BSD FRML MDRD: >60 ML/MIN/1.73M2
GFR SERPL CREATININE-BSD FRML MDRD: >90 ML/MIN/1.73M2
GLUCOSE BLD-MCNC: 89 MG/DL (ref 70–99)
HCT VFR BLD AUTO: 33 % (ref 40–53)
HGB BLD-MCNC: 11.2 G/DL (ref 13.3–17.7)
IMM GRANULOCYTES # BLD: 0 10E3/UL
IMM GRANULOCYTES NFR BLD: 0 %
LYMPHOCYTES # BLD AUTO: 0.4 10E3/UL (ref 0.8–5.3)
LYMPHOCYTES NFR BLD AUTO: 8 %
MCH RBC QN AUTO: 32.9 PG (ref 26.5–33)
MCHC RBC AUTO-ENTMCNC: 33.9 G/DL (ref 31.5–36.5)
MCV RBC AUTO: 97 FL (ref 78–100)
MONOCYTES # BLD AUTO: 0.6 10E3/UL (ref 0–1.3)
MONOCYTES NFR BLD AUTO: 12 %
NEUTROPHILS # BLD AUTO: 3.4 10E3/UL (ref 1.6–8.3)
NEUTROPHILS NFR BLD AUTO: 76 %
NRBC # BLD AUTO: 0 10E3/UL
NRBC BLD AUTO-RTO: 0 /100
PLATELET # BLD AUTO: 197 10E3/UL (ref 150–450)
POTASSIUM BLD-SCNC: 3.9 MMOL/L (ref 3.4–5.3)
PROT SERPL-MCNC: 6.6 G/DL (ref 6.8–8.8)
RBC # BLD AUTO: 3.4 10E6/UL (ref 4.4–5.9)
SODIUM SERPL-SCNC: 136 MMOL/L (ref 133–144)
T4 FREE SERPL-MCNC: 0.75 NG/DL (ref 0.76–1.46)
TSH SERPL DL<=0.005 MIU/L-ACNC: 8.64 MU/L (ref 0.4–4)
WBC # BLD AUTO: 4.6 10E3/UL (ref 4–11)

## 2021-08-16 PROCEDURE — 78816 PET IMAGE W/CT FULL BODY: CPT | Mod: 26 | Performed by: RADIOLOGY

## 2021-08-16 PROCEDURE — 82565 ASSAY OF CREATININE: CPT | Mod: 91

## 2021-08-16 PROCEDURE — 84443 ASSAY THYROID STIM HORMONE: CPT | Performed by: RADIOLOGY

## 2021-08-16 PROCEDURE — 250N000011 HC RX IP 250 OP 636: Performed by: INTERNAL MEDICINE

## 2021-08-16 PROCEDURE — 84439 ASSAY OF FREE THYROXINE: CPT | Performed by: RADIOLOGY

## 2021-08-16 PROCEDURE — G1004 CDSM NDSC: HCPCS | Mod: GC | Performed by: RADIOLOGY

## 2021-08-16 PROCEDURE — A9552 F18 FDG: HCPCS | Performed by: INTERNAL MEDICINE

## 2021-08-16 PROCEDURE — 71260 CT THORAX DX C+: CPT | Mod: 26 | Performed by: RADIOLOGY

## 2021-08-16 PROCEDURE — 343N000001 HC RX 343: Performed by: INTERNAL MEDICINE

## 2021-08-16 PROCEDURE — 74177 CT ABD & PELVIS W/CONTRAST: CPT

## 2021-08-16 PROCEDURE — 36591 DRAW BLOOD OFF VENOUS DEVICE: CPT

## 2021-08-16 PROCEDURE — 74177 CT ABD & PELVIS W/CONTRAST: CPT | Mod: 26 | Performed by: RADIOLOGY

## 2021-08-16 PROCEDURE — 78816 PET IMAGE W/CT FULL BODY: CPT | Mod: PS,MG

## 2021-08-16 PROCEDURE — 85025 COMPLETE CBC W/AUTO DIFF WBC: CPT | Performed by: INTERNAL MEDICINE

## 2021-08-16 PROCEDURE — 82040 ASSAY OF SERUM ALBUMIN: CPT | Performed by: INTERNAL MEDICINE

## 2021-08-16 RX ORDER — HEPARIN SODIUM (PORCINE) LOCK FLUSH IV SOLN 100 UNIT/ML 100 UNIT/ML
500 SOLUTION INTRAVENOUS EVERY 8 HOURS
Status: DISCONTINUED | OUTPATIENT
Start: 2021-08-16 | End: 2021-08-17 | Stop reason: HOSPADM

## 2021-08-16 RX ORDER — HEPARIN SODIUM (PORCINE) LOCK FLUSH IV SOLN 100 UNIT/ML 100 UNIT/ML
5 SOLUTION INTRAVENOUS
Status: CANCELLED | OUTPATIENT
Start: 2021-08-16

## 2021-08-16 RX ORDER — HEPARIN SODIUM (PORCINE) LOCK FLUSH IV SOLN 100 UNIT/ML 100 UNIT/ML
5 SOLUTION INTRAVENOUS
Status: DISCONTINUED | OUTPATIENT
Start: 2021-08-16 | End: 2021-08-16 | Stop reason: HOSPADM

## 2021-08-16 RX ORDER — IOPAMIDOL 755 MG/ML
10-135 INJECTION, SOLUTION INTRAVASCULAR ONCE
Status: COMPLETED | OUTPATIENT
Start: 2021-08-16 | End: 2021-08-16

## 2021-08-16 RX ORDER — HEPARIN SODIUM,PORCINE 10 UNIT/ML
5 VIAL (ML) INTRAVENOUS
Status: DISCONTINUED | OUTPATIENT
Start: 2021-08-16 | End: 2021-08-16 | Stop reason: HOSPADM

## 2021-08-16 RX ORDER — HEPARIN SODIUM,PORCINE 10 UNIT/ML
5 VIAL (ML) INTRAVENOUS
Status: CANCELLED | OUTPATIENT
Start: 2021-08-16

## 2021-08-16 RX ADMIN — Medication 500 UNITS: at 09:44

## 2021-08-16 RX ADMIN — IOPAMIDOL 107 ML: 755 INJECTION, SOLUTION INTRAVENOUS at 08:58

## 2021-08-16 RX ADMIN — FLUDEOXYGLUCOSE F-18 11.47 MCI.: 500 INJECTION, SOLUTION INTRAVENOUS at 08:30

## 2021-08-16 ASSESSMENT — VISUAL ACUITY
OS_CC: 20/150
OD_CC: 20/25
METHOD: SNELLEN - LINEAR
CORRECTION_TYPE: GLASSES

## 2021-08-16 ASSESSMENT — EXTERNAL EXAM - LEFT EYE: OS_EXAM: LEFT FACIAL PALSY

## 2021-08-16 ASSESSMENT — REFRACTION_CURRENTRX
OS_BASECURVE: 8.0
OS_ADDL_SPECS: OPT EXTRA BLUE, HYDRAPEG
OS_DIAMETER: 14.9
OS_BRAND: ONEFIT 2.0
OS_SPHERE: -3.00

## 2021-08-16 ASSESSMENT — EXTERNAL EXAM - RIGHT EYE: OD_EXAM: NORMAL

## 2021-08-16 ASSESSMENT — SLIT LAMP EXAM - LIDS: COMMENTS: NORMAL

## 2021-08-16 NOTE — PROGRESS NOTES
Nursing Note:  Fortino Moya presents today for port labs.    Patient seen by provider today: No   present during visit today: Not Applicable.    Note: N/A.    Intravenous Access:  Labs drawn without difficulty.  Implanted Port.    Discharge Plan:   Patient was sent to home for future appointment.    Kely Uribe RN

## 2021-08-16 NOTE — PROGRESS NOTES
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 Now on topical lubrication only  -Blurry vision, irritated eye. Bothered cosmetically by it  -Scleral lens would be a good option for ocular surface protection  -Mild improvement in vision today (20/50), would continue to monitor    Interval Hx:  -Difficult fit, likely due to irregular eye shape. Initially Rx lens did not look good, with excessive vault. On reinsertion it looked better and he experienced good comfort. I think this lens is reasonable to start with, but would like to make some adjustments. Discussed option of Eyeprint if scleral lens is working well but we cannot get fit nailed down.   -Successful I&R, reviewed CL care and hygiene with pt (Jossue Beverly)  -Dispensed lens today, order new lens with reduced K vault and adjusted PC's and mail direct  -OTC readers over    F/u 3-4 weeks wearing new lens for progress eval    Contact Lens Billing  V-Code:  Scleral Cover Shell  Final Contact Lens Rx       Brand Base Curve Diameter Sphere Lens Addl. Specs    Right          Left Onefit Oblate 8.0 14.9 +1.00 , 1 flat/2 steep edge Opt Extra blue, HydraPEG         # of units: 1  Price per Unit: $250    This patient requires contact lenses that are medically necessary for either improvement in vision over spectacles, support of the ocular surface, or other therapeutic benefit. These are not cosmetic contact lenses.     Encounter Diagnoses   Name Primary?     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

## 2021-08-16 NOTE — PROGRESS NOTES
This is a recent snapshot of the patient's [facility] medical record.  For current drug dose and complete information and questions, call [phone] or In Basket pool, [facility] home infusion ([id number])  CSN Number:  [id number]

## 2021-08-17 ENCOUNTER — VIRTUAL VISIT (OUTPATIENT)
Dept: ONCOLOGY | Facility: CLINIC | Age: 71
End: 2021-08-17
Attending: INTERNAL MEDICINE
Payer: MEDICARE

## 2021-08-17 DIAGNOSIS — C09.9 SQUAMOUS CELL CARCINOMA OF TONSIL (H): Primary | ICD-10-CM

## 2021-08-17 DIAGNOSIS — E03.9 ACQUIRED HYPOTHYROIDISM: Primary | ICD-10-CM

## 2021-08-17 PROCEDURE — 99214 OFFICE O/P EST MOD 30 MIN: CPT | Mod: 95 | Performed by: INTERNAL MEDICINE

## 2021-08-17 RX ORDER — LEVOTHYROXINE SODIUM 25 UG/1
25 TABLET ORAL DAILY
Qty: 90 TABLET | Refills: 1 | Status: SHIPPED | OUTPATIENT
Start: 2021-08-17 | End: 2021-11-15 | Stop reason: DRUGHIGH

## 2021-08-17 NOTE — PROGRESS NOTES
"Fortino is a 71 year old who is being evaluated via a billable video visit.      How would you like to obtain your AVS? MyChart  If the video visit is dropped, the invitation should be resent by: Text to cell phone: [phone]  Will anyone else be joining your video visit? No    Vitals - Patient Reported  Weight (Patient Reported): 79.4 kg (175 lb)  Height (Patient Reported): 177.8 cm (5' 10\")  BMI (Based on Pt Reported Ht/Wt): 25.11  Pain Score: No Pain (0)  Video Start Time: 5:01  Video-Visit Details    Type of service:  Video Visit    Video End Time:5:11    Originating Location (pt. Location): Home    Distant Location (provider location):  [facility]     Platform used for Video Visit: Kimberly Cespedes [facility]      Oncology/Hematology Visit Note  Aug 17, 2021    Reason for Visit: Follow up of recurrent head and neck SCC      History of Present Illness: Fortino Moya is a 71 year old male with PMH atrial fibrillation, hypercholesterolemia, HTN with recurrent head and neck cancer. He has a history of L oropharynx P16+ squamous cell cancer that was first diagnosed in 2018 in [place], [place].  He initially was offered chemoradiation, but due to a prior history of L sided hearing loss, he was given low dose weekly carboplatin 1.5 AUC weekly with radiation.  His 3 month PET/CT after initiation was negative and he underwent surveillance after that. He relocated to [place] which is where he is from in Feb 2020 and has been followed since by Dr. Treviño and Dr. Flores at [facility].       In August of 2020, he  developed numbness in chin and then moved up to his forehead.  He had a Pet/CT done in Sept 2020 that showed a hypermetabolic area near his L foramen ovale - he had had recent dental work in that area and it was thought that this might be scar or inflammation related to that procedure.       However, he was offered a 2nd opinion and came to [facility] and saw Dr. Russell in Nov " 2020.  Images were reviewed at tumor boardon 12/4/20 - and it was felt that this was concerning enough to merit biopsy - so he was referred for IR-guided biopsy.   IR guided biopsy happened on 1/8/2021 and came back as invasive squamous cell carcinoma.      He was going to have SBRT, however, repeat imaging showed marked disease progression in the L  space and we decided to initiate induction chemotherapy to try to shrink the lesion to get to SBRT.      He initiated TPF on 1/28/21 and cycle 2 on 2/18/21. He had a fair amount of deconditioning with induction therapy however restaging imaging (PET/CT CAP/neck 3/1/21) demonstrated a resolution of previous FDG-uptake within the left skull base with some residual soft tissue fullness involving the superior aspect of the pterygoids. No cervical adenopathy or distant metastatic disease.      He had chemotherapy with concurrent re-irradiation with weekly low dose cisplatin as below:   Summary of recent treatments:  TPF (2 cycles) 1/28/21-2/18/21  Concurrent chemoradiation with low dose cisplatin - 3/24/21- 4/26/21 (6 cycles on D1, 8, 15, 22, 29, 36)+XRT 6600cGy completed on 5/7/21(radiation part)    Interval History:  Fortino was seen today for follow-up by video.     He had to leave by 5:20 and would not be available after 6.    He is not doing tube feeding for couple weeks and his weight is steady.  Because his mouth numbness he had to chew multiple times so he does not eat quite a lot.  He still has on and off mucositis but is not doing salt/soda mouth rinse frequently.  He feels fatigue and low energy level.    He denies fevers, headaches, dizziness, hearing concerns, chest pain, SOB, cough, nausea, vomiting, abdominal pain, swelling, rashes.       Current Outpatient Medications   Medication Sig Dispense Refill     acetaminophen (TYLENOL) 160 MG/5ML suspension Take 31.5 mLs (1,000 mg) by mouth every 8 hours as needed for fever or mild pain 500 mL 3      atorvastatin (LIPITOR) 10 MG tablet Take 10 mg by mouth every evening        carvedilol (COREG) 12.5 MG tablet 2 times daily        cevimeline (EVOXAC) 30 MG capsule Take 1 capsule (30 mg) by mouth 2 times daily 90 capsule 3     levothyroxine (SYNTHROID/LEVOTHROID) 25 MCG tablet Take 1 tablet (25 mcg) by mouth daily 90 tablet 1     lisinopril (ZESTRIL) 10 MG tablet        prochlorperazine (COMPAZINE) 10 MG tablet Take 1 tablet (10 mg) by mouth every 6 hours as needed (Nausea/Vomiting) 30 tablet 1     rivaroxaban ANTICOAGULANT (XARELTO) 20 MG TABS tablet        Sodium Fluoride 1.1 % PSTE Apply 1 Application to affected area daily 112 g 11     traZODone (DESYREL) 50 MG tablet        carboxymethylcellulose PF (REFRESH LIQUIGEL) 1 % ophthalmic gel Place 1 drop Into the left eye 4 times daily 30 each 11     mupirocin (BACTROBAN) 2 % external ointment Apply topically 3 times daily 30 g 0     Physical Examination:  Limited physical exam in settings of COVID pandemic.   Constitutional: Well-appearing male in no acute distress.  Respiratory: Speaking full sentences without issue  Neurologic: No focal deficit notified based on limited imaging  Laboratory Data:     Last Comprehensive Metabolic Panel:  Sodium   Date Value Ref Range Status   08/16/2021 136 133 - 144 mmol/L Final   06/18/2021 135 133 - 144 mmol/L Final   05/24/2021 138 133 - 144 mmol/L Final   05/14/2021 135 133 - 144 mmol/L Final     Potassium   Date Value Ref Range Status   08/16/2021 3.9 3.4 - 5.3 mmol/L Final   06/18/2021 3.8 3.4 - 5.3 mmol/L Final   05/24/2021 4.3 3.4 - 5.3 mmol/L Final   05/14/2021 4.6 3.4 - 5.3 mmol/L Final     Chloride   Date Value Ref Range Status   08/16/2021 105 94 - 109 mmol/L Final   06/18/2021 104 94 - 109 mmol/L Final   05/24/2021 103 94 - 109 mmol/L Final   05/14/2021 102 94 - 109 mmol/L Final     Carbon Dioxide   Date Value Ref Range Status   06/18/2021 27 20 - 32 mmol/L Final   05/24/2021 30 20 - 32 mmol/L Final   05/14/2021 33  (H) 20 - 32 mmol/L Final     Carbon Dioxide (CO2)   Date Value Ref Range Status   08/16/2021 29 20 - 32 mmol/L Final     Anion Gap   Date Value Ref Range Status   08/16/2021 2 (L) 3 - 14 mmol/L Final   06/18/2021 4 3 - 14 mmol/L Final   05/24/2021 4 3 - 14 mmol/L Final   05/14/2021 1 (L) 3 - 14 mmol/L Final     Glucose   Date Value Ref Range Status   08/16/2021 89 70 - 99 mg/dL Final   06/18/2021 199 (H) 70 - 99 mg/dL Final   05/24/2021 141 (H) 70 - 99 mg/dL Final   05/14/2021 86 70 - 99 mg/dL Final     Urea Nitrogen   Date Value Ref Range Status   08/16/2021 18 7 - 30 mg/dL Final   06/18/2021 13 7 - 30 mg/dL Final   05/24/2021 24 7 - 30 mg/dL Final   05/14/2021 17 7 - 30 mg/dL Final     Creatinine   Date Value Ref Range Status   08/16/2021 0.68 0.66 - 1.25 mg/dL Final   06/18/2021 0.74 0.66 - 1.25 mg/dL Final   05/24/2021 0.68 0.66 - 1.25 mg/dL Final   05/14/2021 0.71 0.66 - 1.25 mg/dL Final     Creatinine POCT   Date Value Ref Range Status   08/16/2021 0.7 0.7 - 1.3 mg/dL Final     GFR Estimate   Date Value Ref Range Status   08/16/2021 >90 >60 mL/min/1.73m2 Final     Comment:     As of July 11, 2021, eGFR is calculated by the CKD-EPI creatinine equation, without race adjustment. eGFR can be influenced by muscle mass, exercise, and diet. The reported eGFR is an estimation only and is only applicable if the renal function is stable.   06/18/2021 >90 >60 mL/min/[1.73_m2] Final     Comment:     Non  GFR Calc  Starting 12/18/2018, serum creatinine based estimated GFR (eGFR) will be   calculated using the Chronic Kidney Disease Epidemiology Collaboration   (CKD-EPI) equation.     05/24/2021 >90 >60 mL/min/[1.73_m2] Final     Comment:     Non  GFR Calc  Starting 12/18/2018, serum creatinine based estimated GFR (eGFR) will be   calculated using the Chronic Kidney Disease Epidemiology Collaboration   (CKD-EPI) equation.     05/14/2021 >90 >60 mL/min/[1.73_m2] Final     Comment:     Non   GFR Calc  Starting 12/18/2018, serum creatinine based estimated GFR (eGFR) will be   calculated using the Chronic Kidney Disease Epidemiology Collaboration   (CKD-EPI) equation.       GFR, ESTIMATED POCT   Date Value Ref Range Status   08/16/2021 >60 >60 mL/min/1.73m2 Final     Calcium   Date Value Ref Range Status   08/16/2021 8.9 8.5 - 10.1 mg/dL Final   06/18/2021 8.6 8.5 - 10.1 mg/dL Final   05/24/2021 8.8 8.5 - 10.1 mg/dL Final   05/14/2021 8.9 8.5 - 10.1 mg/dL Final     Bilirubin Total   Date Value Ref Range Status   08/16/2021 0.3 0.2 - 1.3 mg/dL Final   06/18/2021 0.6 0.2 - 1.3 mg/dL Final   05/24/2021 0.3 0.2 - 1.3 mg/dL Final   05/14/2021 0.5 0.2 - 1.3 mg/dL Final     Alkaline Phosphatase   Date Value Ref Range Status   08/16/2021 56 40 - 150 U/L Final   06/18/2021 52 40 - 150 U/L Final   05/24/2021 58 40 - 150 U/L Final   05/14/2021 53 40 - 150 U/L Final     ALT   Date Value Ref Range Status   08/16/2021 23 0 - 70 U/L Final   06/18/2021 19 0 - 70 U/L Final   05/24/2021 13 0 - 70 U/L Final   05/14/2021 17 0 - 70 U/L Final     AST   Date Value Ref Range Status   08/16/2021 10 0 - 45 U/L Final   06/18/2021 12 0 - 45 U/L Final   05/24/2021 10 0 - 45 U/L Final   05/14/2021 13 0 - 45 U/L Final     8/16/2 PET scan:   IMPRESSION: In this patient with 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 No evidence of local recurrence, or metastatic disease of the chest, abdomen or pelvis.  2. Stable incidental findings of coronary artery atherosclerotic calcifications, degenerative change of the spine, and scattered sub-6 mm solid pulmonary nodules.       Assessment and Plan:  1. Recurrent SCC of head and neck   -He had a recurrent SCC of head and neck with prior chemoradiation and developed local recurrence in L  space, s/p 2 cycles of TPF induction chemotherapy with near CR on  PET, and CRT with low dose cisplain started on 3/24/21- completed 5/7/21.   Tolerating treatment relatively well with moderate degree of odynophagia.  Patient is off tube feeding now with a steady weight.  - PET scan on 8/16 showed no evidence of local recurrence, or metastatic disease of the chest, abdomen or pelvis.  -He feels upset that I told him about the good news since he wishes the PET scan result should be told by Dr. Shah.  I apologized to him.  -He has mild amount of mouth pain and is recommended to continue use salt/soda mouth rinse      2. Hypothyroidism with TSH 8.64 and T4 0.75.   -He had a flutter currently.  And Synthroid was sent today by Mr. Castillos.  He will follow radiation oncology for Synthroid use.    3.  Chronic anemia  He has a baseline of hemoglobin around 11 g/dL, that was stable during the whole course of chemoradiation.  Recently he was in [facility] emergency department for the chest pain and wheezing, where the blood work showed hemoglobin of 9.6 g/dL. His HGB has been stable around 11 in the last couples of check.   -Continue to monitor closely  - Though records not available, patient states that he had a colonoscopy done roughly 3 years ago and was told to return in 5 years in [place].     4. ENT  Mucositis: Continue  to use salt/soda swishes.  He has a history of chronic hearing loss, no changes in his hearing after completion of concurrent chemoradiation with low-dose cisplatin.      Plan:   - start synthroid 25mcg daily  - survillance with CT neck and chest in 6 months with Dr. Shah and DEQUAN in 3 months.   - f/u with ENT and radonc as scheduled     Discussed with Dr. Pablo Rubi MD  Hem/onc fellow

## 2021-08-17 NOTE — PROGRESS NOTES
Fortino has cardiac history with atrial flutter currently and history of atrial fib.  Will dose low and slowly increase.    Synthroid 25 mcg sent to pharmacy.  Discussed with patient.  Recheck 2-3 months.

## 2021-08-17 NOTE — LETTER
"    8/17/2021         RE: Fortino Moya  [address]  [facility] [address]        Dear Colleague,    Thank you for referring your patient, Fortino Moya, to the [facility]. Please see a copy of my visit note below.    Fortino is a 71 year old who is being evaluated via a billable video visit.      How would you like to obtain your AVS? MyChart  If the video visit is dropped, the invitation should be resent by: Text to cell phone: [phone]  Will anyone else be joining your video visit? No    Vitals - Patient Reported  Weight (Patient Reported): 79.4 kg (175 lb)  Height (Patient Reported): 177.8 cm (5' 10\")  BMI (Based on Pt Reported Ht/Wt): 25.11  Pain Score: No Pain (0)  Video Start Time: 5:01  Video-Visit Details    Type of service:  Video Visit    Video End Time:5:11    Originating Location (pt. Location): Home    Distant Location (provider location):  [facility]     Platform used for Video Visit: Kimberly Cespedes [facility]      Oncology/Hematology Visit Note  Aug 17, 2021    Reason for Visit: Follow up of recurrent head and neck SCC      History of Present Illness: Fortino Moya is a 71 year old male with PMH atrial fibrillation, hypercholesterolemia, HTN with recurrent head and neck cancer. He has a history of L oropharynx P16+ squamous cell cancer that was first diagnosed in 2018 in [place], [place].  He initially was offered chemoradiation, but due to a prior history of L sided hearing loss, he was given low dose weekly carboplatin 1.5 AUC weekly with radiation.  His 3 month PET/CT after initiation was negative and he underwent surveillance after that. He relocated to [place] which is where he is from in Feb 2020 and has been followed since by Dr. Treviño and Dr. Flores at [facility].       In August of 2020, he  developed numbness in chin and then moved up to his forehead.  He had a Pet/CT done in Sept 2020 that showed a hypermetabolic " area near his L foramen ovale - he had had recent dental work in that area and it was thought that this might be scar or inflammation related to that procedure.       However, he was offered a 2nd opinion and came to [facility] and saw Dr. Russell in Nov 2020.  Images were reviewed at tumor boardon 12/4/20 - and it was felt that this was concerning enough to merit biopsy - so he was referred for IR-guided biopsy.   IR guided biopsy happened on 1/8/2021 and came back as invasive squamous cell carcinoma.      He was going to have SBRT, however, repeat imaging showed marked disease progression in the L  space and we decided to initiate induction chemotherapy to try to shrink the lesion to get to SBRT.      He initiated TPF on 1/28/21 and cycle 2 on 2/18/21. He had a fair amount of deconditioning with induction therapy however restaging imaging (PET/CT CAP/neck 3/1/21) demonstrated a resolution of previous FDG-uptake within the left skull base with some residual soft tissue fullness involving the superior aspect of the pterygoids. No cervical adenopathy or distant metastatic disease.      He had chemotherapy with concurrent re-irradiation with weekly low dose cisplatin as below:   Summary of recent treatments:  TPF (2 cycles) 1/28/21-2/18/21  Concurrent chemoradiation with low dose cisplatin - 3/24/21- 4/26/21 (6 cycles on D1, 8, 15, 22, 29, 36)+XRT 6600cGy completed on 5/7/21(radiation part)    Interval History:  Fortino was seen today for follow-up by video.     He had to leave by 5:20 and would not be available after 6.    He is not doing tube feeding for couple weeks and his weight is steady.  Because his mouth numbness he had to chew multiple times so he does not eat quite a lot.  He still has on and off mucositis but is not doing salt/soda mouth rinse frequently.  He feels fatigue and low energy level.    He denies fevers, headaches, dizziness, hearing concerns, chest pain, SOB, cough, nausea, vomiting,  abdominal pain, swelling, rashes.       Current Outpatient Medications   Medication Sig Dispense Refill     acetaminophen (TYLENOL) 160 MG/5ML suspension Take 31.5 mLs (1,000 mg) by mouth every 8 hours as needed for fever or mild pain 500 mL 3     atorvastatin (LIPITOR) 10 MG tablet Take 10 mg by mouth every evening        carvedilol (COREG) 12.5 MG tablet 2 times daily        cevimeline (EVOXAC) 30 MG capsule Take 1 capsule (30 mg) by mouth 2 times daily 90 capsule 3     levothyroxine (SYNTHROID/LEVOTHROID) 25 MCG tablet Take 1 tablet (25 mcg) by mouth daily 90 tablet 1     lisinopril (ZESTRIL) 10 MG tablet        prochlorperazine (COMPAZINE) 10 MG tablet Take 1 tablet (10 mg) by mouth every 6 hours as needed (Nausea/Vomiting) 30 tablet 1     rivaroxaban ANTICOAGULANT (XARELTO) 20 MG TABS tablet        Sodium Fluoride 1.1 % PSTE Apply 1 Application to affected area daily 112 g 11     traZODone (DESYREL) 50 MG tablet        carboxymethylcellulose PF (REFRESH LIQUIGEL) 1 % ophthalmic gel Place 1 drop Into the left eye 4 times daily 30 each 11     mupirocin (BACTROBAN) 2 % external ointment Apply topically 3 times daily 30 g 0     Physical Examination:  Limited physical exam in settings of COVID pandemic.   Constitutional: Well-appearing male in no acute distress.  Respiratory: Speaking full sentences without issue  Neurologic: No focal deficit notified based on limited imaging  Laboratory Data:     Last Comprehensive Metabolic Panel:  Sodium   Date Value Ref Range Status   08/16/2021 136 133 - 144 mmol/L Final   06/18/2021 135 133 - 144 mmol/L Final   05/24/2021 138 133 - 144 mmol/L Final   05/14/2021 135 133 - 144 mmol/L Final     Potassium   Date Value Ref Range Status   08/16/2021 3.9 3.4 - 5.3 mmol/L Final   06/18/2021 3.8 3.4 - 5.3 mmol/L Final   05/24/2021 4.3 3.4 - 5.3 mmol/L Final   05/14/2021 4.6 3.4 - 5.3 mmol/L Final     Chloride   Date Value Ref Range Status   08/16/2021 105 94 - 109 mmol/L Final    06/18/2021 104 94 - 109 mmol/L Final   05/24/2021 103 94 - 109 mmol/L Final   05/14/2021 102 94 - 109 mmol/L Final     Carbon Dioxide   Date Value Ref Range Status   06/18/2021 27 20 - 32 mmol/L Final   05/24/2021 30 20 - 32 mmol/L Final   05/14/2021 33 (H) 20 - 32 mmol/L Final     Carbon Dioxide (CO2)   Date Value Ref Range Status   08/16/2021 29 20 - 32 mmol/L Final     Anion Gap   Date Value Ref Range Status   08/16/2021 2 (L) 3 - 14 mmol/L Final   06/18/2021 4 3 - 14 mmol/L Final   05/24/2021 4 3 - 14 mmol/L Final   05/14/2021 1 (L) 3 - 14 mmol/L Final     Glucose   Date Value Ref Range Status   08/16/2021 89 70 - 99 mg/dL Final   06/18/2021 199 (H) 70 - 99 mg/dL Final   05/24/2021 141 (H) 70 - 99 mg/dL Final   05/14/2021 86 70 - 99 mg/dL Final     Urea Nitrogen   Date Value Ref Range Status   08/16/2021 18 7 - 30 mg/dL Final   06/18/2021 13 7 - 30 mg/dL Final   05/24/2021 24 7 - 30 mg/dL Final   05/14/2021 17 7 - 30 mg/dL Final     Creatinine   Date Value Ref Range Status   08/16/2021 0.68 0.66 - 1.25 mg/dL Final   06/18/2021 0.74 0.66 - 1.25 mg/dL Final   05/24/2021 0.68 0.66 - 1.25 mg/dL Final   05/14/2021 0.71 0.66 - 1.25 mg/dL Final     Creatinine POCT   Date Value Ref Range Status   08/16/2021 0.7 0.7 - 1.3 mg/dL Final     GFR Estimate   Date Value Ref Range Status   08/16/2021 >90 >60 mL/min/1.73m2 Final     Comment:     As of July 11, 2021, eGFR is calculated by the CKD-EPI creatinine equation, without race adjustment. eGFR can be influenced by muscle mass, exercise, and diet. The reported eGFR is an estimation only and is only applicable if the renal function is stable.   06/18/2021 >90 >60 mL/min/[1.73_m2] Final     Comment:     Non  GFR Calc  Starting 12/18/2018, serum creatinine based estimated GFR (eGFR) will be   calculated using the Chronic Kidney Disease Epidemiology Collaboration   (CKD-EPI) equation.     05/24/2021 >90 >60 mL/min/[1.73_m2] Final     Comment:     Non   American GFR Calc  Starting 12/18/2018, serum creatinine based estimated GFR (eGFR) will be   calculated using the Chronic Kidney Disease Epidemiology Collaboration   (CKD-EPI) equation.     05/14/2021 >90 >60 mL/min/[1.73_m2] Final     Comment:     Non  GFR Calc  Starting 12/18/2018, serum creatinine based estimated GFR (eGFR) will be   calculated using the Chronic Kidney Disease Epidemiology Collaboration   (CKD-EPI) equation.       GFR, ESTIMATED POCT   Date Value Ref Range Status   08/16/2021 >60 >60 mL/min/1.73m2 Final     Calcium   Date Value Ref Range Status   08/16/2021 8.9 8.5 - 10.1 mg/dL Final   06/18/2021 8.6 8.5 - 10.1 mg/dL Final   05/24/2021 8.8 8.5 - 10.1 mg/dL Final   05/14/2021 8.9 8.5 - 10.1 mg/dL Final     Bilirubin Total   Date Value Ref Range Status   08/16/2021 0.3 0.2 - 1.3 mg/dL Final   06/18/2021 0.6 0.2 - 1.3 mg/dL Final   05/24/2021 0.3 0.2 - 1.3 mg/dL Final   05/14/2021 0.5 0.2 - 1.3 mg/dL Final     Alkaline Phosphatase   Date Value Ref Range Status   08/16/2021 56 40 - 150 U/L Final   06/18/2021 52 40 - 150 U/L Final   05/24/2021 58 40 - 150 U/L Final   05/14/2021 53 40 - 150 U/L Final     ALT   Date Value Ref Range Status   08/16/2021 23 0 - 70 U/L Final   06/18/2021 19 0 - 70 U/L Final   05/24/2021 13 0 - 70 U/L Final   05/14/2021 17 0 - 70 U/L Final     AST   Date Value Ref Range Status   08/16/2021 10 0 - 45 U/L Final   06/18/2021 12 0 - 45 U/L Final   05/24/2021 10 0 - 45 U/L Final   05/14/2021 13 0 - 45 U/L Final     8/16/2 PET scan:   IMPRESSION: In this patient with 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 No evidence of local recurrence, or metastatic disease of the chest, abdomen or pelvis.  2. Stable incidental findings of coronary artery atherosclerotic calcifications, degenerative change of the spine, and scattered sub-6 mm solid pulmonary nodules.       Assessment and Plan:  1. Recurrent SCC of head and neck   -He  had a recurrent SCC of head and neck with prior chemoradiation and developed local recurrence in L  space, s/p 2 cycles of TPF induction chemotherapy with near CR on  PET, and CRT with low dose cisplain started on 3/24/21- completed 5/7/21.  Tolerating treatment relatively well with moderate degree of odynophagia.  Patient is off tube feeding now with a steady weight.  - PET scan on 8/16 showed no evidence of local recurrence, or metastatic disease of the chest, abdomen or pelvis.  -He feels upset that I told him about the good news since he wishes the PET scan result should be told by Dr. Shah.  I apologized to him.  -He has mild amount of mouth pain and is recommended to continue use salt/soda mouth rinse      2. Hypothyroidism with TSH 8.64 and T4 0.75.   -He had a flutter currently.  And Synthroid was sent today by Mr. Marx.  He will follow radiation oncology for Synthroid use.    3.  Chronic anemia  He has a baseline of hemoglobin around 11 g/dL, that was stable during the whole course of chemoradiation.  Recently he was in [facility] emergency department for the chest pain and wheezing, where the blood work showed hemoglobin of 9.6 g/dL. His HGB has been stable around 11 in the last couples of check.   -Continue to monitor closely  - Though records not available, patient states that he had a colonoscopy done roughly 3 years ago and was told to return in 5 years in [place].     4. ENT  Mucositis: Continue  to use salt/soda swishes.  He has a history of chronic hearing loss, no changes in his hearing after completion of concurrent chemoradiation with low-dose cisplatin.      Plan:   - start synthroid 25mcg daily  - survillance with CT neck and chest in 6 months with Dr. Shah and DEQUAN in 3 months.   - f/u with ENT and radonc as scheduled     Discussed with Dr. Pablo Rubi MD  Hem/onc fellow        Attestation signed by Yohan Shah DO at 8/20/2021  9:49 AM:  I  participated in video visit separately from Dr. Rubi and agree with her note.  Patient is doing well clinically and is recovering very well from chemoradiation. He played golf today and is going to see the [employer] exhibit tonight.  His scan shows BRONWYN which is the best possible result at this timepoint.  We discussed that this finding does not mean that he is cured, but it is a good sign.  We will need to continue with ongoing surveillance. He will need to follow-up with ENT and with us going forward.  He will need follow-up visit with DEQUAN In 3 months and repeat scan in 6 months.      Yohan Shah  Associate Professor of Medicine  Division of Hematology, Oncology, and Transplantation        Again, thank you for allowing me to participate in the care of your patient.        Sincerely,        Yohan Shah, DO

## 2021-08-18 ENCOUNTER — OFFICE VISIT (OUTPATIENT)
Dept: RADIATION ONCOLOGY | Facility: CLINIC | Age: 71
End: 2021-08-18
Attending: RADIOLOGY
Payer: MEDICARE

## 2021-08-18 VITALS — SYSTOLIC BLOOD PRESSURE: 101 MMHG | BODY MASS INDEX: 25.87 KG/M2 | DIASTOLIC BLOOD PRESSURE: 73 MMHG | WEIGHT: 175.2 LBS

## 2021-08-18 DIAGNOSIS — C09.9 TONSIL CANCER (H): Primary | ICD-10-CM

## 2021-08-18 DIAGNOSIS — C09.9 SQUAMOUS CELL CARCINOMA OF TONSIL (H): Primary | ICD-10-CM

## 2021-08-18 PROCEDURE — 31231 NASAL ENDOSCOPY DX: CPT | Performed by: RADIOLOGY

## 2021-08-18 PROCEDURE — 31575 DIAGNOSTIC LARYNGOSCOPY: CPT | Performed by: RADIOLOGY

## 2021-08-18 PROCEDURE — 99214 OFFICE O/P EST MOD 30 MIN: CPT | Mod: 25 | Performed by: RADIOLOGY

## 2021-08-18 PROCEDURE — G0463 HOSPITAL OUTPT CLINIC VISIT: HCPCS | Performed by: RADIOLOGY

## 2021-08-18 NOTE — PROGRESS NOTES
FOLLOW-UP VISIT    Patient Name: Fortino Moya      : 1950     Age: 71 year old        ______________________________________________________________________________     Chief Complaint   Patient presents with     Cancer     Follow up:Tonsil Cancer: Left skull base 6600 cGy completed 21       Pain  3-4/10, left side of tongue and throat, takes Tylenol    Labs  Other Labs: Yes: 21    Imaging  CT: 21      Dental:   Most Recent Dental Visit: No      Speech/Swallowing:   Most Recent evaluation or testing: No  Swallowing Restrictions: No difficulties with swallowing    Trismus/Jaw Exercises: Yes    Nutrition:    Weight:   Wt Readings from Last 3 Encounters:   21 79.7 kg (175 lb 11.2 oz)   21 80 kg (176 lb 5.9 oz)   21 78.8 kg (173 lb 12.8 oz)         Oral Symptoms:   Xerostomia:1- Symptomatic without significant dietary alteration; unstimulated saliva flow >0.2 ml/min  Dysphagia: 0-None  Mucositis Oral Symptoms: 0-None  Mucositis: 0- None  Esophagitis:0- None    Other Appointments:     MD Name: Dr Russell Appointment Date: 21   MD Name: Appointment Date:   MD Name: Appointment Date:   Other Appointment Notes:     Residual Radiation side effect: Dry mouth, mouth sores, left ear feels plugged     Additional Instructions:     Nurse face-to-face time: Level 3:  10 min face to face time

## 2021-08-18 NOTE — PROGRESS NOTES
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 He presented a few years later with recurrent disease within the ipsilateral pterygoids extending to the skull base with invasion of the cavernous sinus and tracking along CN V to the brainstem. He received 2 cycles of induction chemotherapy with TPF from 2021-2021 with repeat imaging demonstrated a near-complete response to therapy. He then received curative-intent chemoradiotherapy as described above.    Mr. Moya returns to clinic today for a routine post-treatment disease surveillance visit. Overall, he reports that he is doing well and he has no pressing concerns or complaints outside of ongoing, stable numbness of the left face as well as mild left-sided oral cavity pain. He continues to have ongoing moderate xerostomia related to his previous head and neck cancer therapies although he denies any significant dysphagia with p.o. intake. He had a restaging PET/CT performed earlier this week which demonstrates a complete response to therapy with no evidence of residual disease.    PHYSICAL EXAM:  Weight: 79.5 kg  BP: 101/73    General: 71-year-old gentleman seated comfortably in an examination chair in no acute distress  HEENT: NC/AT.  Stable left-sided ptosis.  No rhinorrhea or epistaxis.  Dry  mucous membranes.  Extensive post-therapy changes involving the oral cavity and visualized oropharynx with scarring and soft tissue retraction of the left tonsillar fossa.  There is a small 5 mm shallow ulceration over the left posterolateral oral tongue.  No additional evidence of mucositis.  Palpation of the gingiva, buccal mucosa, floor of mouth, oral tongue and bilateral tongue base reveal scattered fibrosis with no discrete nodularity, induration or masses.  Neck: Moderate fibrosis within the bilateral neck (left > right).  No palpable cervical adenopathy bilaterally.  Pulmonary: No wheezing, stridor or respiratory distress  Skin: Scattered hyper and hypopigmentation throughout the left lateral face and neck.  No desquamation or ulceration.  Neuro: A/O x3.  Normal gait.    Cranial Nerve Exam  I: Not tested  II: Not tested  III/IV/VI: PERRL. EOMI.   V: Decreased sensation to light touch within the left V1/V2 distributions  VII: Moderate left-sided facial weakness with mild asymmetry at rest.  VIII: Grossly absent hearing within the left ear.  Normal on the right.  IX/X: Palate elevates symmetrically. Normal phonation.  XI: Strength is 5/5 in bilateral trapezius and SCM musculature.  XII: Tongue protrudes in the midline. No atrophy or fasciculations.     Flexible Fiberoptic Nasopharyngoscopy:  Consent for fiberoptic laryngoscopy was obtained and I confirmed correctness of procedure and identity of patient. Fiberoptic laryngoscopy was indicated due to post-treatment disease surveillance. The fiberoptic laryngoscope was passed through the left naris under endoscopic vision. The turbinates were normal. The inferior and middle meati were clear without purulence, masses, or polyps. Evaluation of the nasopharynx revealed bland-appearing mucosa with mild retraction along the left posterolateral wall and left lateral soft palate. No evidence of residual/recurrent exophytic tumor. Passage of the scope into the oropharynx  revealed extensive post open treatment changes with bland-appearing mucosa and an otherwise normal-appearing base of tongue, vallecula and epiglottis. There was a mild amount of thickened secretions involving the bilateral tongue base and vallecula. The larynx was clear and the cords were mobile bilaterally. There was no pooling of secretions in the hypopharynx. The scope was then removed and the procedure was terminated without incident.     LABS AND IMAGIN2021 labs:  Electrolytes: WNL  Hgb: 11.8  WBC: 5.5  Plts: 191    2021 CT neck:  Post-treatment changes with no evidence of residual disease.    IMPRESSION:   Mr. 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 He is currently 3 months out from completion of salvage chemoradiotherapy and has no clinical or radiologic evidence of residual disease.    PLAN:   1. Follow-up in radiation oncology clinic with NP in 3 months and MD in 6 months  2. Start Synthroid 25 mcg daily and recheck TSH in 3 months  3. CT neck and chest in 6 months    Sidney Rodgers MD/PhD    Department of Radiation Oncology  [facility]

## 2021-08-18 NOTE — LETTER
2021         RE: Fortino Moya  [address]  [facility] [address]      FOLLOW-UP VISIT    Patient Name: Fortino Moya      : 1950     Age: 71 year old        ______________________________________________________________________________     Chief Complaint   Patient presents with     Cancer     Follow up:Tonsil Cancer: Left skull base 6600 cGy completed 21       Pain  3-4/10, left side of tongue and throat, takes Tylenol    Labs  Other Labs: Yes: 21    Imaging  CT: 21      Dental:   Most Recent Dental Visit: No      Speech/Swallowing:   Most Recent evaluation or testing: No  Swallowing Restrictions: No difficulties with swallowing    Trismus/Jaw Exercises: Yes    Nutrition:    Weight:   Wt Readings from Last 3 Encounters:   21 79.7 kg (175 lb 11.2 oz)   21 80 kg (176 lb 5.9 oz)   21 78.8 kg (173 lb 12.8 oz)         Oral Symptoms:   Xerostomia:1- Symptomatic without significant dietary alteration; unstimulated saliva flow >0.2 ml/min  Dysphagia: 0-None  Mucositis Oral Symptoms: 0-None  Mucositis: 0- None  Esophagitis:0- None    Other Appointments:     MD Name: Dr Russell Appointment Date: 21   MD Name: Appointment Date:   MD Name: Appointment Date:   Other Appointment Notes:     Residual Radiation side effect: Dry mouth, mouth sores, left ear feels plugged     Additional Instructions:     Nurse face-to-face time: Level 3:  10 min face to face time             Department of Radiation Oncology  [facility]  [address] [place] [address] [phone]       Radiation Oncology Follow-up Visit  2021      Fortino Moya  MRN: [id number]   : 1950     DISEASE TREATED:   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 He presented a few years later with recurrent disease within the ipsilateral pterygoids extending to the skull base with invasion of the cavernous sinus and tracking along CN V to the brainstem. He received 2 cycles of induction chemotherapy with TPF from 1/28/2021-2/18/2021 with repeat imaging demonstrated a near-complete response to therapy. He then received curative-intent chemoradiotherapy as described above.    Mr. Moya returns to clinic today for a routine post-treatment disease surveillance visit. Overall, he reports that he is doing well and he has no pressing concerns or complaints outside of ongoing, stable numbness of the left face as well as mild left-sided oral cavity pain. He continues to have ongoing moderate xerostomia related to his previous head and neck cancer therapies although he denies any significant dysphagia with p.o. intake. He had a restaging PET/CT performed earlier this week which demonstrates a complete response to therapy with no evidence of residual disease.    PHYSICAL EXAM:  Weight: 79.5 kg  BP: 101/73    General: 71-year-old gentleman seated comfortably in an examination chair in no acute distress  HEENT: NC/AT.  Stable left-sided ptosis.  No rhinorrhea or epistaxis.  Dry mucous membranes.  Extensive post-therapy changes involving the oral cavity and visualized oropharynx with scarring and soft tissue retraction of the left tonsillar fossa.  There is a small 5 mm shallow ulceration over the left posterolateral oral tongue.  No additional evidence of mucositis.  Palpation of the gingiva, buccal mucosa, floor of mouth, oral tongue and bilateral tongue base reveal scattered fibrosis with no discrete nodularity, induration or masses.  Neck: Moderate fibrosis within the  bilateral neck (left > right).  No palpable cervical adenopathy bilaterally.  Pulmonary: No wheezing, stridor or respiratory distress  Skin: Scattered hyper and hypopigmentation throughout the left lateral face and neck.  No desquamation or ulceration.  Neuro: A/O x3.  Normal gait.    Cranial Nerve Exam  I: Not tested  II: Not tested  III/IV/VI: PERRL. EOMI.   V: Decreased sensation to light touch within the left V1/V2 distributions  VII: Moderate left-sided facial weakness with mild asymmetry at rest.  VIII: Grossly absent hearing within the left ear.  Normal on the right.  IX/X: Palate elevates symmetrically. Normal phonation.  XI: Strength is 5/5 in bilateral trapezius and SCM musculature.  XII: Tongue protrudes in the midline. No atrophy or fasciculations.     Flexible Fiberoptic Nasopharyngoscopy:  Consent for fiberoptic laryngoscopy was obtained and I confirmed correctness of procedure and identity of patient. Fiberoptic laryngoscopy was indicated due to post-treatment disease surveillance. The fiberoptic laryngoscope was passed through the left naris under endoscopic vision. The turbinates were normal. The inferior and middle meati were clear without purulence, masses, or polyps. Evaluation of the nasopharynx revealed bland-appearing mucosa with mild retraction along the left posterolateral wall and left lateral soft palate. No evidence of residual/recurrent exophytic tumor. Passage of the scope into the oropharynx revealed extensive post open treatment changes with bland-appearing mucosa and an otherwise normal-appearing base of tongue, vallecula and epiglottis. There was a mild amount of thickened secretions involving the bilateral tongue base and vallecula. The larynx was clear and the cords were mobile bilaterally. There was no pooling of secretions in the hypopharynx. The scope was then removed and the procedure was terminated without incident.     LABS AND IMAGIN2021 labs:  Electrolytes:  WNL  Hgb: 11.8  WBC: 5.5  Plts: 191    6/18/2021 CT neck:  Post-treatment changes with no evidence of residual disease.    IMPRESSION:   Mr. 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 He is currently 3 months out from completion of salvage chemoradiotherapy and has no clinical or radiologic evidence of residual disease.    PLAN:   1. Follow-up in radiation oncology clinic with NP in 3 months and MD in 6 months  2. Start Synthroid 25 mcg daily and recheck TSH in 3 months  3. CT neck and chest in 6 months    Sidney Rodgers MD/PhD    Department of Radiation Oncology  [facility]

## 2021-08-19 DIAGNOSIS — C09.9 SQUAMOUS CELL CARCINOMA OF TONSIL (H): Primary | ICD-10-CM

## 2021-08-20 ENCOUNTER — HOSPITAL ENCOUNTER (OUTPATIENT)
Facility: AMBULATORY SURGERY CENTER | Age: 71
End: 2021-08-20
Attending: RADIOLOGY
Payer: MEDICARE

## 2021-08-20 DIAGNOSIS — Z11.59 ENCOUNTER FOR SCREENING FOR OTHER VIRAL DISEASES: ICD-10-CM

## 2021-08-23 DIAGNOSIS — Z11.59 ENCOUNTER FOR SCREENING FOR OTHER VIRAL DISEASES: ICD-10-CM

## 2021-08-26 ENCOUNTER — TELEPHONE (OUTPATIENT)
Dept: INTERVENTIONAL RADIOLOGY/VASCULAR | Facility: CLINIC | Age: 71
End: 2021-08-26

## 2021-08-26 NOTE — TELEPHONE ENCOUNTER
Pt called for pre-procedure/covid.  Pt was given number to call and get his test scheduled.  Pt had a message left with all information and was given the unit number to call with any questions.

## 2021-08-30 ENCOUNTER — LAB (OUTPATIENT)
Dept: URGENT CARE | Facility: URGENT CARE | Age: 71
End: 2021-08-30
Attending: INTERNAL MEDICINE
Payer: MEDICARE

## 2021-08-30 DIAGNOSIS — Z11.59 ENCOUNTER FOR SCREENING FOR OTHER VIRAL DISEASES: ICD-10-CM

## 2021-08-30 LAB — SARS-COV-2 RNA RESP QL NAA+PROBE: NEGATIVE

## 2021-08-30 PROCEDURE — U0005 INFEC AGEN DETEC AMPLI PROBE: HCPCS

## 2021-08-30 PROCEDURE — U0003 INFECTIOUS AGENT DETECTION BY NUCLEIC ACID (DNA OR RNA); SEVERE ACUTE RESPIRATORY SYNDROME CORONAVIRUS 2 (SARS-COV-2) (CORONAVIRUS DISEASE [COVID-19]), AMPLIFIED PROBE TECHNIQUE, MAKING USE OF HIGH THROUGHPUT TECHNOLOGIES AS DESCRIBED BY CMS-2020-01-R: HCPCS

## 2021-09-01 ENCOUNTER — TELEPHONE (OUTPATIENT)
Dept: INTERVENTIONAL RADIOLOGY/VASCULAR | Facility: CLINIC | Age: 71
End: 2021-09-01
Payer: MEDICARE

## 2021-09-01 RX ORDER — NALOXONE HYDROCHLORIDE 0.4 MG/ML
0.2 INJECTION, SOLUTION INTRAMUSCULAR; INTRAVENOUS; SUBCUTANEOUS
Status: CANCELLED | OUTPATIENT
Start: 2021-09-01

## 2021-09-01 RX ORDER — NALOXONE HYDROCHLORIDE 0.4 MG/ML
0.4 INJECTION, SOLUTION INTRAMUSCULAR; INTRAVENOUS; SUBCUTANEOUS
Status: CANCELLED | OUTPATIENT
Start: 2021-09-01

## 2021-09-01 RX ORDER — FLUMAZENIL 0.1 MG/ML
0.2 INJECTION, SOLUTION INTRAVENOUS
Status: CANCELLED | OUTPATIENT
Start: 2021-09-01

## 2021-09-01 RX ORDER — FENTANYL CITRATE 50 UG/ML
25-50 INJECTION, SOLUTION INTRAMUSCULAR; INTRAVENOUS EVERY 5 MIN PRN
Status: CANCELLED | OUTPATIENT
Start: 2021-09-01

## 2021-09-02 ENCOUNTER — APPOINTMENT (OUTPATIENT)
Dept: INTERVENTIONAL RADIOLOGY/VASCULAR | Facility: CLINIC | Age: 71
End: 2021-09-02
Attending: INTERNAL MEDICINE
Payer: MEDICARE

## 2021-09-02 ENCOUNTER — HOSPITAL ENCOUNTER (OUTPATIENT)
Facility: CLINIC | Age: 71
Discharge: HOME | End: 2021-09-02
Attending: RADIOLOGY | Admitting: RADIOLOGY
Payer: MEDICARE

## 2021-09-02 VITALS
RESPIRATION RATE: 18 BRPM | SYSTOLIC BLOOD PRESSURE: 136 MMHG | OXYGEN SATURATION: 96 % | DIASTOLIC BLOOD PRESSURE: 94 MMHG | HEART RATE: 89 BPM | TEMPERATURE: 96.1 F

## 2021-09-02 DIAGNOSIS — C09.9 SQUAMOUS CELL CARCINOMA OF TONSIL (H): ICD-10-CM

## 2021-09-02 PROCEDURE — 36590 REMOVAL TUNNELED CV CATH: CPT

## 2021-09-02 PROCEDURE — 999N000163 HC STATISTIC SIMPLE TUBE INSERTION/CHARGE, PORT, CATH, FISTULOGRAM

## 2021-09-02 PROCEDURE — 272N000602 HC WOUND GLUE CR1

## 2021-09-02 PROCEDURE — 250N000009 HC RX 250: Performed by: RADIOLOGY

## 2021-09-02 RX ORDER — LIDOCAINE HYDROCHLORIDE 10 MG/ML
1-30 INJECTION, SOLUTION EPIDURAL; INFILTRATION; INTRACAUDAL; PERINEURAL
Status: COMPLETED | OUTPATIENT
Start: 2021-09-02 | End: 2021-09-02

## 2021-09-02 RX ORDER — ACETAMINOPHEN 325 MG/1
650 TABLET ORAL
Status: DISCONTINUED | OUTPATIENT
Start: 2021-09-02 | End: 2021-09-02 | Stop reason: HOSPADM

## 2021-09-02 RX ADMIN — LIDOCAINE HYDROCHLORIDE 8 ML: 10; .005 INJECTION, SOLUTION EPIDURAL; INFILTRATION; INTRACAUDAL; PERINEURAL at 14:27

## 2021-09-02 RX ADMIN — LIDOCAINE HYDROCHLORIDE 10 ML: 10 INJECTION, SOLUTION INFILTRATION; PERINEURAL at 14:27

## 2021-09-02 NOTE — PROGRESS NOTES
Care Suites Admission Nursing Note    Patient Information  Name: Fortino Moya  Age: 71 year old  Reason for admission: port removal  Care Suites arrival time: 1130    Visitor Information  Name: N/A    Patient Admission/Assessment   Pre-procedure assessment complete: Yes  If abnormal assessment/labs, provider notified: Yes - Katarina BROOKE notified that pt does not have someone to stay with him after sedation. Decision made between Katarina and pt that he will go ahead with port removal without sedation. No PIV needed per Katarina and Dr. Castro.  NPO: Yes  Medications held per instructions/orders: Yes - last took eliquis Tuesday 8/31/21  Consent: deferred to provider    Patient oriented to room: Yes  Education/questions answered: Yes  Plan/other: Proceed with prep for port removal    Discharge Planning  Discharge name/phone number: Sebastian brown [phone]  Overnight post sedation caregiver: N/A - no sedation being used  Discharge location: home    Ruthie Alvarado RN

## 2021-09-02 NOTE — IR NOTE
Patient Name: Fortino Moya  Medical Record Number: [id number]  Today's Date: September 2, 2021    Start Time: 1415  End of procedure time: 1434  Procedure: port removal, no sedation  Report given to: HERLINDA Hendricks  Time pt departs:  1445    Other Notes: Pt into IR suite 1 via cart. Pt awake and alert. Supine position prepped and draped with 2%. Dr. Castro in room. Time out and procedure started. Pt tolerated procedure well. Debrief with Dr. Castro. Pressure held until hemostasis achieved. Dressing CDI. No complications. Pt transferred back to . Report given to HERLINDA Hendricks.     Lidocaine 1% 10ml  Lidocaine with epi 8ml    Vera Hartley RN

## 2021-09-02 NOTE — PROGRESS NOTES
PATIENT/VISITOR WELLNESS SCREENING    Step 1 Patient Screening    1. In the last month, have you been in contact with someone who was confirmed or suspected to have Coronavirus/COVID-19? No    2. Do you have the following symptoms?  Fever/Chills? No   Cough? No   Shortness of breath? No   New loss of taste or smell? No  Sore throat? No  Muscle or body aches? No  Headaches? No  Fatigue? No  Vomiting or diarrhea? No    Step 2 Visitor Screening    1. Name of Visitor (1 visitor per patient): N/A

## 2021-09-02 NOTE — PRE-PROCEDURE
GENERAL PRE-PROCEDURE:   Procedure:  Port removal. no sedation  Date/Time:  9/2/2021 12:27 PM    Written consent obtained?: Yes    Risks and benefits: Risks, benefits and alternatives were discussed    Consent given by:  Patient  Patient states understanding of procedure being performed: Yes    Patient's understanding of procedure matches consent: Yes    Procedure consent matches procedure scheduled: Yes

## 2021-09-02 NOTE — PROGRESS NOTES
Care Suites Post Procedure Note    Patient Information  Name: Fortino Moya  Age: 71 year old    Post Procedure  Time patient returned to Care Suites: 1445  Concerns/abnormal assessment: N/A, right chest port removal site covered with gauze and tegaderm, CDI, CMS intact, no hematoma  If abnormal assessment, provider notified: N/A  Plan/Other: No sedation was given, pt will stay until 1545 to monitor site/dressing for any issues or bleeding. If all is WDL pt may discharge. Pt given juice and crackers. No pain or complaints.     Ruthie Alvarado, RN

## 2021-09-02 NOTE — DISCHARGE INSTRUCTIONS
Port Removal Discharge Instructions     After you go home:      You may resume your normal diet      Care of Puncture Site:      Keep the dressings on your site clean & dry for 3 days. Change it only if it gets wet or dirty.    You may shower after the dressing comes off in 3 days    Do not remove the small white strips of tape, if present. Allow them to fall off on their own.     You may cover the wound with a bandaid after the dressing is removed if needed for comfort      Activity       Avoid heavy lifting (greater than 10 pounds) or the overuse of your shoulder for 3 days    Bleeding:      If you start bleeding from the incision site in your chest or have swelling in your neck, sit down and press on the site for 5-10 minutes.     If bleeding has not stopped after 10 minutes, call your provider.        Call 911 right away if you have heavy bleeding or bleeding that does not stop.      Medicines:      You may resume all medications    Resume your Warfarin/Coumadin at your regular dose today. Follow up with your provider to have your INR rechecked    Resume your Platelet Inhibitors and Aspirin tomorrow at your regular dose    For minor pain, you may take Acetaminophen (Tylenol) or Ibuprofen (Advil)          Call the provider who ordered this procedure if:      You have swelling in your neck or over your port site    The incision area is red, swollen, hot or tender    You have chills or a fever greater than 101 F (38 C)    Any questions or concerns    Call  911 or go to the Emergency Room if you have:      Severe chest pain or trouble breathing    Bleeding that you cannot control    If you have questions call:          [facility] @ [phone]    The provider who performed your procedure was Dr. Castro.

## 2021-09-02 NOTE — PROGRESS NOTES
Care Suites Discharge Nursing Note    Patient Information  Name: Fortino Moya  Age: 71 year old    Discharge Education:  Discharge instructions reviewed: Yes  Additional education/resources provided: N/A  Patient/patient representative verbalizes understanding: Yes  Patient discharging on new medications: No  Medication education completed: N/A    Discharge Plans:   Discharge location: home  Discharge ride contacted: N/A. Pt walking home, lives right next to [place] across the [address]. This was OK'd with MENDY Matute as pt did not receive sedation and was monitored for 1 hour post-procedure to ensure port removal site is WDL.  Approximate discharge time: 1545    Discharge Criteria:  Discharge criteria met and vital signs stable: Yes    Patient Belongs:  Patient belongings returned to patient: Yes    Ruthie Alvarado RN

## 2021-09-02 NOTE — PLAN OF CARE
MD Notification    Notified Person: PA    Notified Person Name: Katarina Barrow    Notification Date/Time: 12:13 PM     Notification Interaction: page    Purpose of Notification: Pt has a ride home but no one to stay with him for a few hours after port removal. States if he has to stay here for 6 hours his ride will no longer be available to pick him up.    Orders Received: Provider at bedside - will do procedure without sedation - no PIV needed - clarified with Katarina BROOKE and Dr. Castro    Comments:

## 2021-09-03 ENCOUNTER — TELEPHONE (OUTPATIENT)
Dept: OPHTHALMOLOGY | Facility: CLINIC | Age: 71
End: 2021-09-03

## 2021-09-03 NOTE — TELEPHONE ENCOUNTER
Pt doing well with new scleral lens and would like to review if gold/platinium weight could be removed    Reviewed pt due for follow up Dr. Barney and scheduled first available 10-1-2021 and placed on wait list    Pt also seeing Dr. Pardo in couple weeks and may review also at that vist    Pt seemed comfortable with plan    Live Arthur RN 4:44 PM 09/03/21          [facility] Call Center    Phone Message    May a detailed message be left on voicemail: yes     Reason for Call:  Pt called and had questions about continuing to keep the weight in his eye. Please call to advise.  Thank you.      Action Taken: Message routed to:  [facility] Eye    Travel Screening: Not Applicable

## 2021-09-05 ENCOUNTER — HEALTH MAINTENANCE LETTER (OUTPATIENT)
Age: 71
End: 2021-09-05

## 2021-09-07 ENCOUNTER — IMMUNIZATION (OUTPATIENT)
Dept: NURSING | Facility: CLINIC | Age: 71
End: 2021-09-07
Payer: MEDICARE

## 2021-09-07 PROCEDURE — 91300 PR COVID VAC PFIZER DIL RECON 30 MCG/0.3 ML IM: CPT

## 2021-09-07 PROCEDURE — 0003A PR COVID VAC PFIZER DIL RECON 30 MCG/0.3 ML IM: CPT

## 2021-09-16 ENCOUNTER — OFFICE VISIT (OUTPATIENT)
Dept: OPHTHALMOLOGY | Facility: CLINIC | Age: 71
End: 2021-09-16
Payer: MEDICARE

## 2021-09-16 ENCOUNTER — TELEPHONE (OUTPATIENT)
Dept: OPHTHALMOLOGY | Facility: CLINIC | Age: 71
End: 2021-09-16

## 2021-09-16 DIAGNOSIS — H16.232 NEUROTROPHIC KERATOPATHY OF LEFT EYE: ICD-10-CM

## 2021-09-16 DIAGNOSIS — H02.239 PARALYTIC LAGOPHTHALMOS, UNSPECIFIED LATERALITY: Primary | ICD-10-CM

## 2021-09-16 DIAGNOSIS — H16.212 EXPOSURE KERATOPATHY, LEFT: ICD-10-CM

## 2021-09-16 PROCEDURE — 99214 OFFICE O/P EST MOD 30 MIN: CPT | Performed by: OPTOMETRIST

## 2021-09-16 ASSESSMENT — VISUAL ACUITY
OS_CC: 20/70
METHOD: SNELLEN - LINEAR
OD_SC: 20/20
CORRECTION_TYPE: CONTACTS
OD_SC+: -1

## 2021-09-16 ASSESSMENT — CONF VISUAL FIELD
METHOD: COUNTING FINGERS
OD_NORMAL: 1
OS_NORMAL: 1

## 2021-09-16 ASSESSMENT — EXTERNAL EXAM - LEFT EYE: OS_EXAM: LEFT FACIAL PALSY

## 2021-09-16 ASSESSMENT — REFRACTION_CURRENTRX
OS_SPHERE: +1.00
OS_BASECURVE: 8.0
OS_BRAND: ONEFIT OBLATE
OS_ADDL_SPECS: OPT EXTRA BLUE, HYDRAPEG
OS_DIAMETER: 14.9

## 2021-09-16 ASSESSMENT — EXTERNAL EXAM - RIGHT EYE: OD_EXAM: NORMAL

## 2021-09-16 ASSESSMENT — SLIT LAMP EXAM - LIDS: COMMENTS: NORMAL

## 2021-09-16 ASSESSMENT — TONOMETRY
OD_IOP_MMHG: 10
IOP_METHOD: ICARE
OS_IOP_MMHG: -

## 2021-09-16 NOTE — TELEPHONE ENCOUNTER
LVM for patient regarding scheduled appointment for Next available with  for lid weight removal eval left eye. Provided date and time and sent appointment reminder. Provided direct number for any scheduling conflicts.

## 2021-09-16 NOTE — PROGRESS NOTES
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 Now on topical lubrication only  -Blurry vision, irritated eye. Bothered cosmetically by it  -Scleral lens would be a good option for ocular surface protection    Interval Hx:  -Doing well in scleral lens. Comfortable and improves vision. He is happy with vision improvement for daily activities and golf  -Current lens fitting slightly tight, would loosen haptics only. Order and mail direct  -Bothered cosmetically by lid weight - would like to talk to oculoplastics about removal  -VA limited by cataract left eye but he is functioning well and not interested in surgery at this time    Order new left lens and mail direct. F/u 6 month with me    I have confirmed the patient's CC, HPI and reviewed Past Medical History, Past Surgical History, Social History, Family History, Problem List, Medication List and agree with Tech note.     Antonella Pardo, DINA FAAO FSLS

## 2021-09-16 NOTE — NURSING NOTE
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     Chief Complaint(s) and History of Present Illness(es)     Follow Up     Laterality: left eye    Associated symptoms: redness.  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              Comments     Feel like contact is working well. Does get cloudy at times but can golf and put well with it so very happy with result.   Would like to discuss considering taking out lid weight in [place] feels it may not be necessary any longer. Would like referral to Dr Puga for this if possible.     EES Q HS left eye  PF AT PRN left eye     Jazlyn Woodard, COT COT 3:07 PM September 16, 2021

## 2021-09-17 ENCOUNTER — TELEPHONE (OUTPATIENT)
Dept: OPHTHALMOLOGY | Facility: CLINIC | Age: 71
End: 2021-09-17

## 2021-09-20 ENCOUNTER — TELEPHONE (OUTPATIENT)
Dept: OPHTHALMOLOGY | Facility: CLINIC | Age: 71
End: 2021-09-20

## 2021-09-20 NOTE — TELEPHONE ENCOUNTER
Patient LVM requesting appointment on 9/21/21 for 9:15am instead. Rescheduled patient as requested.-Per Patient's VM

## 2021-09-21 ENCOUNTER — OFFICE VISIT (OUTPATIENT)
Dept: OPHTHALMOLOGY | Facility: CLINIC | Age: 71
End: 2021-09-21
Payer: MEDICARE

## 2021-09-21 VITALS — WEIGHT: 175 LBS | BODY MASS INDEX: 25.05 KG/M2 | HEIGHT: 70 IN

## 2021-09-21 DIAGNOSIS — C09.9 SQUAMOUS CELL CARCINOMA OF TONSIL (H): ICD-10-CM

## 2021-09-21 DIAGNOSIS — H02.239 PARALYTIC LAGOPHTHALMOS, UNSPECIFIED LATERALITY: ICD-10-CM

## 2021-09-21 DIAGNOSIS — H16.232 NEUROTROPHIC KERATOPATHY OF LEFT EYE: ICD-10-CM

## 2021-09-21 DIAGNOSIS — H02.239 PARALYTIC LAGOPHTHALMOS, UNSPECIFIED LATERALITY: Primary | ICD-10-CM

## 2021-09-21 DIAGNOSIS — H02.056 TRICHIASIS OF LEFT EYE WITHOUT ENTROPION: ICD-10-CM

## 2021-09-21 PROCEDURE — 99213 OFFICE O/P EST LOW 20 MIN: CPT | Mod: 25 | Performed by: OPHTHALMOLOGY

## 2021-09-21 PROCEDURE — 67820 REVISE EYELASHES: CPT | Mod: LT | Performed by: OPHTHALMOLOGY

## 2021-09-21 ASSESSMENT — VISUAL ACUITY
OS_PH_SC+: -2
OD_SC+: -3
METHOD: SNELLEN - LINEAR
OD_SC: 20/25
OD_PH_SC: 20/25
OS_SC: 20/70
OS_PH_SC: 20/60

## 2021-09-21 ASSESSMENT — MIFFLIN-ST. JEOR: SCORE: 1560.04

## 2021-09-21 ASSESSMENT — CONF VISUAL FIELD
OS_NORMAL: 1
METHOD: COUNTING FINGERS
OD_NORMAL: 1

## 2021-09-21 ASSESSMENT — SLIT LAMP EXAM - LIDS: COMMENTS: NORMAL

## 2021-09-21 ASSESSMENT — EXTERNAL EXAM - RIGHT EYE: OD_EXAM: NORMAL

## 2021-09-21 NOTE — PROGRESS NOTES
Chief Complaint(s) and History of Present Illness(es)     Consult For     Laterality: left eye    Pain scale: 0/10    Comments: Possible removal of lid weight. History with Exposure   keratopathy/Paralytic Lagophthalmos left eye   -2' to previous cancer Tx, s/p upper lid weight   -H/o K ulcer left eye resolved              Comments     Patient reports not knowing the significance of the weight and if its   necessary at this point with using Scleral contact. Does not have scleral   contact in upon visit today. He feel that the scleral lens is helping   manage dryness issues and not sure the weight may be as necessary. Does   not like the appearance of the weighted lids. No issue with discharge or   discomfort with left eye or lids.   EES ointment Q HS left eye.   Lubricants Q 2 H even with the lens left eye.     Jazlyn Woodard, COT COT 9:25 AM September 21, 2021       71 year old with history of oropharyngeal squamous cell carcinoma diagnosis in 2018. Initially treated with chemorads, with progression noted in 2020 to foramen ovale based on inferior rectus guided biopsy. Undederwent chemorads 6600 cGY completed 5/7/21.      Assessment & Plan     Fortino Moya is a 71 year old male with the following diagnoses:   Encounter Diagnoses   Name Primary?     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 Underwent tarso with Dr. Pendleton followed by left upper eyelid 1.6 gram weight 4/2/21. He now has a significant inferior corneal scar. He has had improvement in her exposure keratopathy with some improvement in his degree of paralysis. His cornea remains neurotrophic. He has trace lagophthalmos. There are 5 lashes touching the temporal cornea,  and these were epilated today. I was present for the entire procedure. Vivien Yoon MD    We discussed the risks and benefits of removal of the weight. I am concerned given his neurotrophic cornea that we may have to deal with his significant exposure keratopathy with removal of the weight.     He was in agreement that at this point it is safest to wait. I will see him back in 3-4 months, and we can re-evaluate at that time.         Patient disposition:   Return for 3-4 months follow up paralytic lagophthalmos .        Attending Physician Attestation: Complete documentation of historical and exam elements from today's encounter can be found in the full encounter summary report (not reduplicated in this progress note). I personally obtained the chief complaint(s) and history of present illness. I confirmed and edited as necessary the review of systems, past medical/surgical history, family history, social history, and examination findings as documented by others; and I examined the patient myself. I personally reviewed the relevant tests, images, and reports as documented above. I formulated and edited as necessary the assessment and plan and discussed the findings and management plan with the patient.  -Vivien Yoon MD

## 2021-09-21 NOTE — NURSING NOTE
Chief Complaints and History of Present Illnesses   Patient presents with     Consult For     Possible removal of lid weight. 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 Does not have scleral contact in upon visit today. He feel that the scleral lens is helping manage dryness issues and not sure the weight may be as necessary. Does not like the appearance of the weighted lids. No issue with discharge or discomfort with left eye or lids.   EES ointment Q HS left eye.   Lubricants Q 2 H even with the lens left eye.     Jazlyn Woodard, COT COT 9:25 AM September 21, 2021

## 2021-09-22 ENCOUNTER — OFFICE VISIT (OUTPATIENT)
Dept: OTOLARYNGOLOGY | Facility: CLINIC | Age: 71
End: 2021-09-22
Payer: MEDICARE

## 2021-09-22 ENCOUNTER — THERAPY VISIT (OUTPATIENT)
Dept: SPEECH THERAPY | Facility: CLINIC | Age: 71
End: 2021-09-22
Payer: MEDICARE

## 2021-09-22 VITALS — WEIGHT: 175 LBS | BODY MASS INDEX: 25.05 KG/M2 | HEIGHT: 70 IN

## 2021-09-22 DIAGNOSIS — H61.22 IMPACTED CERUMEN OF LEFT EAR: Primary | ICD-10-CM

## 2021-09-22 DIAGNOSIS — H69.93 DYSFUNCTION OF BOTH EUSTACHIAN TUBES: ICD-10-CM

## 2021-09-22 DIAGNOSIS — G50.9: ICD-10-CM

## 2021-09-22 DIAGNOSIS — C09.9 SQUAMOUS CELL CARCINOMA OF TONSIL (H): ICD-10-CM

## 2021-09-22 DIAGNOSIS — R13.12 OROPHARYNGEAL DYSPHAGIA: Primary | ICD-10-CM

## 2021-09-22 DIAGNOSIS — H73.892 RETRACTION OF TYMPANIC MEMBRANE OF LEFT EAR: ICD-10-CM

## 2021-09-22 DIAGNOSIS — C09.9 MALIGNANT NEOPLASM OF TONSIL (H): ICD-10-CM

## 2021-09-22 PROCEDURE — 99213 OFFICE O/P EST LOW 20 MIN: CPT | Mod: 25 | Performed by: REGISTERED NURSE

## 2021-09-22 PROCEDURE — 92526 ORAL FUNCTION THERAPY: CPT | Mod: GN | Performed by: SPEECH-LANGUAGE PATHOLOGIST

## 2021-09-22 PROCEDURE — 92504 EAR MICROSCOPY EXAMINATION: CPT | Performed by: REGISTERED NURSE

## 2021-09-22 RX ORDER — CIPROFLOXACIN AND DEXAMETHASONE 3; 1 MG/ML; MG/ML
SUSPENSION/ DROPS AURICULAR (OTIC)
Qty: 7.5 ML | Refills: 0 | Status: SHIPPED | OUTPATIENT
Start: 2021-09-22 | End: 2021-10-02

## 2021-09-22 ASSESSMENT — PAIN SCALES - GENERAL: PAINLEVEL: NO PAIN (0)

## 2021-09-22 ASSESSMENT — MIFFLIN-ST. JEOR: SCORE: 1560.04

## 2021-09-22 NOTE — PROGRESS NOTES
Rehabilitation Services      OUTPATIENT SPEECH LANGUAGE PATHOLOGY  PLAN OF TREATMENT FOR OUTPATIENT REHABILITATION    Patient's Last Name, First Name, M.I.                         YOB: 1950  GriffinFortino  ANGELA     Provider's Name   MIGUEL Salgado    Medical Record No.  [id number]      Start of Care Date:  05/26/21    Onset Date:  05/26/21   Type:     ___PT   ____OT  ___X_SLP Medical Diagnosis:  Oropharyngeal dysphagia      Treatment Diagnosis:  Moderate to Moderately severe oropharyngeal dysphagia Visits from SOC:  1        _________________________________________________________________________________  Plan of Treatment:    Frequency/Duration: 1x/2-3 months in conjunction with ENT clinic visits     Goals:  Goal Identifier Diet   Goal Description 1. Pt will tolerate regular textures and thin liquids with no overt clinical s/sx of penetration/aspiration or adverse pulmonary events over a 3 month period.   Target Date 11/21/21   Date Met  09/22/21   Progress (detail required for progress note):       Goal Identifier Exercises   Goal Description 2. Pt will increase pharyngeal strength/ROM by completing home exercise program as prescribed on daily basis independently.    Target Date 11/21/21   Date Met  09/22/21   Progress (detail required for progress note):         Certification date from 08/24/21 to 11/21/21.    Farzaneh Cortez SLP          I CERTIFY THE NEED FOR THESE SERVICES FURNISHED UNDER        THIS PLAN OF TREATMENT AND WHILE UNDER MY CARE     (Physician attestation of this document indicates review and certification of the therapy plan).                Referring Provider: Dr. David Russell

## 2021-09-22 NOTE — PROGRESS NOTES
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  He had symptoms of numbness in all three divisions of his fifth cranial nerve.  He then received two cycles of induction chemotherapy with TPF completed on 02/28/2021.  His post-induction imaging suggests a near complete response.  He then received definitive chemoradiotherapy with weekly cisplatin and a total of 6600 cGy finished on 05/07/2021. 3 month post treatment PET/CT on 8/16/21 showed no evidence of local recurrence or metastatic disease.    Interval History:   Patient comes in today for routine surveillance. Patient states that he is most bothered by his left sided hearing loss. Feels like he is underwater. Denies drainage or otalgia.     Patient states that he is eating/drinking without difficulty. Had his PEG tube removed. Has difficulty with dry foods and sticks with moist, soft foods.     Patient denies any odynophagia, new sore throat, mouth pain, ear pain, bleeding from the mouth, hemoptysis, voice changes or lumps/bumps of the neck.    Past Medical History:  Past Medical History:   Diagnosis Date     Arrhythmia     A FIB     Atrial fibrillation (H)      Coronary artery disease 6/15/2021    A Fib     Dysphagia      Hearing problem 1995     History of radiation therapy 1/21     Hypercholesterolemia      Hypertension      Paralytic lagophthalmos      Primary squamous cell carcinoma of tonsil (H)      Past Surgical History:  Past Surgical History:   Procedure Laterality Date     GI SURGERY       IMPLANT GOLD WEIGHT EYELID Left 4/2/2021    Procedure: Left upper eyelid gold or platinum weight insertion for  lagophthalmos - 1.6 grams;  Surgeon: Vivien Yoon MD;  Location: [facility] OR     IR CHEST PORT PLACEMENT > 5 YRS OF AGE  2/3/2021     IR GASTROSTOMY TUBE PERCUTANEOUS PLCMNT  3/10/2021     IR PORT REMOVAL RIGHT  9/2/2021     JOINT REPLACEMENT       JOINT REPLACEMENT       LASIK Bilateral      ORTHOPEDIC SURGERY       REPAIR ECTROPION Left 4/2/2021    Procedure: Left lower eyelid ectropion repair;  Surgeon: Vivien Yoon MD;  Location: [facility] OR     REPAIR PTOSIS Left      VASCULAR SURGERY       Medications:  Current Outpatient Medications   Medication Sig Dispense Refill     acetaminophen (TYLENOL) 160 MG/5ML suspension Take 31.5 mLs (1,000 mg) by mouth every 8 hours as needed for fever or mild pain 500 mL 3     atorvastatin (LIPITOR) 10 MG tablet Take 10 mg by mouth every evening        carvedilol (COREG) 12.5 MG tablet 2 times daily        cevimeline (EVOXAC) 30 MG capsule Take 1 capsule (30 mg) by mouth 2 times daily 90 capsule 3     ciprofloxacin-dexamethasone (CIPRODEX) 0.3-0.1 % otic suspension Instill 5 drops into the left ear twice daily for 10 days 7.5 mL 0     levothyroxine (SYNTHROID/LEVOTHROID) 25 MCG tablet Take 1 tablet (25 mcg) by mouth daily 90 tablet 1     lisinopril (ZESTRIL) 10 MG tablet        rivaroxaban ANTICOAGULANT (XARELTO) 20 MG TABS tablet        traZODone (DESYREL) 50 MG tablet        prochlorperazine (COMPAZINE) 10 MG tablet Take 1 tablet (10 mg) by mouth every 6 hours as needed (Nausea/Vomiting) 30 tablet 1     Sodium Fluoride 1.1 % PSTE Apply 1 Application to affected area daily 112 g 11     Allergies:  No Known Allergies     Social History:  Social History     Tobacco Use     Smoking status: Never Smoker     Smokeless tobacco: Never Used   Vaping Use     Vaping Use: Never used   Substance Use Topics     Alcohol use: Yes     Comment: 3 beers/week     Drug use: Not Currently      ROS: 10 point ROS neg other than the symptoms noted above in the HPI.    Physical Exam:    Ht 1.778 m (5'  "10\")   Wt 79.4 kg (175 lb)   BMI 25.11 kg/m    Wt Readings from Last 3 Encounters:   21 79.4 kg (175 lb)   21 79.4 kg (175 lb)   21 79.5 kg (175 lb 3.2 oz)      Constitutional:  The patient was unaccompanied, well-groomed, and in no acute distress.     Neurologic: Alert and oriented x 3.  Facial weakness on the left side.  Voice normal.    Communication:  Normal; communicates verbally, normal voice quality.    Respiratory: Breathing comfortably without stridor or exertion of accessory muscles.    Ears: Pinnae and tragus non-tender.     Oral Cavity: Normal tongue, floor of mouth, buccal mucosa, and palate.  No lesions or masses on inspection or palpation.    Oropharynx: Mucosa with post-radiation changes. Scarring of the left tonsillar fossa. No lesions or ulcerations.     Neck: Fibrosis of neck without palpable lymphadenopathy in the neck.    Otologic microscope exam:    Patient's ear pathology required use of the binocular microscope for the purpose of cleaning and improving visualization in order to assure a more accurate diagnostic evaluation.    Left ear was also examined under the microscope.  Dry crusted cerumen lining ear canal and across TM. It was cleaned with right angle hook, suction and alligator forceps. Cleaning of canal reveal very dry, fragile skin of canal without evidence of ORN. Could not clean cerumen from TM which appeared retracted could not assess middle ear.     Labs and Imaging Reviewed:  [email or web address]  PET/CT  IMPRESSION: In this patient with 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 No evidence of local recurrence, or metastatic disease of the  chest, abdomen or pelvis.  2. Stable incidental findings of coronary artery atherosclerotic  calcifications, degenerative change of the spine, and scattered sub-6  mm solid pulmonary nodules.    Assessment/Plan:  1. Malignant neoplasm of tonsil (H)  Patient with history of recurrent " T4 N0 M0 squamous cell carcinoma of the left tonsil. Most recently s/p treatment of a pterygoid recurrence with skull base involvement that was completed on 5/7/21. There is no evidence of disease on today's exam. Recommend follow up with Dr. Russell in 2 months. Surveillance CT scans will be due in February 2022.    2. Dysfunction of both eustachian tubes  Patient with a retracted left TM due to eustachian tube dysfunction. There is likely a middle ear effusion due to ETD that is causing patient's hearing loss. I could not, however, fully clean cerumen off of the TM today which also may be contributing to patient's ear symptoms. Recommend using drops to left ear to soften wax for further cleaning. Will discuss TM management with neurotology. Discussed with patient that he will need a hearing test prior to any intervention.      Reviewed signs and symptoms that would necessitate a sooner exam including new sore throat, mouth pain, ear pain, dysphagia, bleeding from the mouth or lumps/bumps of the neck.    Otherwise, patient will follow up in 2 months with Dr. Russell for continued surveillance. Will call patient with plan for left ear symptoms.    Mela Gutierrez DNP, APRN, CNP  Otolaryngology  Head & Neck Surgery  [phone]    30 minutes spent on the date of the encounter doing chart review, history and exam, documentation and further activities per the note

## 2021-09-22 NOTE — LETTER
9/22/2021       RE: Fortino Moya  [address]  [facility] [address]     Dear Colleague,    Thank you for referring your patient, Fortino Moya, to the [facility] [place] [facility]. Please see a copy of my visit note below.    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  He had symptoms of numbness in all three divisions of his fifth cranial nerve.  He then received two cycles of induction chemotherapy with TPF completed on 02/28/2021.  His post-induction imaging suggests a near complete response.  He then received definitive chemoradiotherapy with weekly cisplatin and a total of 6600 cGy finished on 05/07/2021. 3 month post treatment PET/CT on 8/16/21 showed no evidence of local recurrence or metastatic disease.    Interval History:   Patient comes in today for routine surveillance. Patient states that he is most bothered by his left sided hearing loss. Feels like he is underwater. Denies drainage or otalgia.     Patient states that he is eating/drinking without difficulty. Had his PEG tube removed. Has difficulty with dry foods and sticks with moist, soft foods.     Patient denies any odynophagia, new sore throat, mouth pain, ear pain, bleeding from the mouth, hemoptysis, voice changes or lumps/bumps of the neck.    Past Medical History:  Past Medical History:   Diagnosis Date     Arrhythmia     A FIB     Atrial fibrillation (H)      Coronary artery disease 6/15/2021    A Fib     Dysphagia      Hearing problem 1995     History of radiation therapy 1/21     Hypercholesterolemia       Hypertension      Paralytic lagophthalmos      Primary squamous cell carcinoma of tonsil (H)      Past Surgical History:  Past Surgical History:   Procedure Laterality Date     GI SURGERY       IMPLANT GOLD WEIGHT EYELID Left 4/2/2021    Procedure: Left upper eyelid gold or platinum weight insertion for lagophthalmos - 1.6 grams;  Surgeon: Vivien Yoon MD;  Location: [facility] OR     IR CHEST PORT PLACEMENT > 5 YRS OF AGE  2/3/2021     IR GASTROSTOMY TUBE PERCUTANEOUS PLCMNT  3/10/2021     IR PORT REMOVAL RIGHT  9/2/2021     JOINT REPLACEMENT       JOINT REPLACEMENT       LASIK Bilateral      ORTHOPEDIC SURGERY       REPAIR ECTROPION Left 4/2/2021    Procedure: Left lower eyelid ectropion repair;  Surgeon: Vivien Yoon MD;  Location: [facility] OR     REPAIR PTOSIS Left      VASCULAR SURGERY       Medications:  Current Outpatient Medications   Medication Sig Dispense Refill     acetaminophen (TYLENOL) 160 MG/5ML suspension Take 31.5 mLs (1,000 mg) by mouth every 8 hours as needed for fever or mild pain 500 mL 3     atorvastatin (LIPITOR) 10 MG tablet Take 10 mg by mouth every evening        carvedilol (COREG) 12.5 MG tablet 2 times daily        cevimeline (EVOXAC) 30 MG capsule Take 1 capsule (30 mg) by mouth 2 times daily 90 capsule 3     ciprofloxacin-dexamethasone (CIPRODEX) 0.3-0.1 % otic suspension Instill 5 drops into the left ear twice daily for 10 days 7.5 mL 0     levothyroxine (SYNTHROID/LEVOTHROID) 25 MCG tablet Take 1 tablet (25 mcg) by mouth daily 90 tablet 1     lisinopril (ZESTRIL) 10 MG tablet        rivaroxaban ANTICOAGULANT (XARELTO) 20 MG TABS tablet        traZODone (DESYREL) 50 MG tablet        prochlorperazine (COMPAZINE) 10 MG tablet Take 1 tablet (10 mg) by mouth every 6 hours as needed (Nausea/Vomiting) 30 tablet 1     Sodium Fluoride 1.1 % PSTE Apply 1 Application to affected area daily 112 g 11     Allergies:  No Known Allergies     Social History:  Social History     Tobacco Use      "Smoking status: Never Smoker     Smokeless tobacco: Never Used   Vaping Use     Vaping Use: Never used   Substance Use Topics     Alcohol use: Yes     Comment: 3 beers/week     Drug use: Not Currently      ROS: 10 point ROS neg other than the symptoms noted above in the HPI.    Physical Exam:    Ht 1.778 m (5' 10\")   Wt 79.4 kg (175 lb)   BMI 25.11 kg/m    Wt Readings from Last 3 Encounters:   21 79.4 kg (175 lb)   21 79.4 kg (175 lb)   21 79.5 kg (175 lb 3.2 oz)      Constitutional:  The patient was unaccompanied, well-groomed, and in no acute distress.     Neurologic: Alert and oriented x 3.  Facial weakness on the left side.  Voice normal.    Communication:  Normal; communicates verbally, normal voice quality.    Respiratory: Breathing comfortably without stridor or exertion of accessory muscles.    Ears: Pinnae and tragus non-tender.     Oral Cavity: Normal tongue, floor of mouth, buccal mucosa, and palate.  No lesions or masses on inspection or palpation.    Oropharynx: Mucosa with post-radiation changes. Scarring of the left tonsillar fossa. No lesions or ulcerations.     Neck: Fibrosis of neck without palpable lymphadenopathy in the neck.    Otologic microscope exam:    Patient's ear pathology required use of the binocular microscope for the purpose of cleaning and improving visualization in order to assure a more accurate diagnostic evaluation.    Left ear was also examined under the microscope.  Dry crusted cerumen lining ear canal and across TM. It was cleaned with right angle hook, suction and alligator forceps. Cleaning of canal reveal very dry, fragile skin of canal without evidence of ORN. Could not clean cerumen from TM which appeared retracted could not assess middle ear.     Labs and Imaging Reviewed:  [email or web address]  PET/CT  IMPRESSION: In this patient with 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 No evidence of local recurrence, or " metastatic disease of the  chest, abdomen or pelvis.  2. Stable incidental findings of coronary artery atherosclerotic  calcifications, degenerative change of the spine, and scattered sub-6  mm solid pulmonary nodules.    Assessment/Plan:  1. Malignant neoplasm of tonsil (H)  Patient with history of recurrent T4 N0 M0 squamous cell carcinoma of the left tonsil. Most recently s/p treatment of a pterygoid recurrence with skull base involvement that was completed on 5/7/21. There is no evidence of disease on today's exam. Recommend follow up with Dr. Russell in 2 months. Surveillance CT scans will be due in February 2022.    2. Dysfunction of both eustachian tubes  Patient with a retracted left TM due to eustachian tube dysfunction. There is likely a middle ear effusion due to ETD that is causing patient's hearing loss. I could not, however, fully clean cerumen off of the TM today which also may be contributing to patient's ear symptoms. Recommend using drops to left ear to soften wax for further cleaning. Will discuss TM management with neurotology. Discussed with patient that he will need a hearing test prior to any intervention.      Reviewed signs and symptoms that would necessitate a sooner exam including new sore throat, mouth pain, ear pain, dysphagia, bleeding from the mouth or lumps/bumps of the neck.    Otherwise, patient will follow up in 2 months with Dr. Russell for continued surveillance. Will call patient with plan for left ear symptoms.    Mela Gutierrez DNP, APRN, CNP  Otolaryngology  Head & Neck Surgery  [phone]    30 minutes spent on the date of the encounter doing chart review, history and exam, documentation and further activities per the note

## 2021-09-22 NOTE — PROGRESS NOTES
Outpatient Speech Language Pathology Discharge Note     Patient: Fortino Moya  : 1950    Beginning/End Dates of Reporting Period:  21 to 21    Referring Provider: Dr. David Russell    Therapy Diagnosis: Oropharyngeal dysphagia    Client Self Report: Pt returns today in conjunction with ENT clinic visit and is seen per MD request. He had his PEG removed. He reports he is eating/drinking well.         Goals:  Goal Identifier Diet   Goal Description 1. Pt will tolerate regular textures and thin liquids with no overt clinical s/sx of penetration/aspiration or adverse pulmonary events over a 3 month period.   Target Date 21   Date Met  21   Progress (detail required for progress note):  Pt reports he is tolerating regular, moist textures and thin liquids without difficulty. No adverse pulmonary events. He is keeping his weight. Please see daily documentation for details.     Goal Identifier Exercises   Goal Description 2. Pt will increase pharyngeal strength/ROM by completing home exercise program as prescribed on daily basis independently.    Target Date 21   Date Met  21   Progress (detail required for progress note):  Pt reports he stopped exercises because he is eating/drinking by mouth without difficulty. Encouraged pt to continue home exercise program 1x/day to reduce risk of XRT induced fibrosis, which can lead to progressive dysphagia requiring a need for enteral support. Pt agreeable.        Plan:  Discharge from therapy.    Discharge:    Reason for Discharge: Patient has met all goals.    Equipment Issued: None    Discharge Plan: Patient to continue home program. Pt will notify MD with new or worsening dysphagia at which point re-evaluation would be warranted.

## 2021-10-01 ENCOUNTER — OFFICE VISIT (OUTPATIENT)
Dept: OPHTHALMOLOGY | Facility: CLINIC | Age: 71
End: 2021-10-01
Attending: OPHTHALMOLOGY
Payer: MEDICARE

## 2021-10-01 DIAGNOSIS — H04.122 DRY EYE SYNDROME OF LEFT EYE: ICD-10-CM

## 2021-10-01 DIAGNOSIS — H25.012 CATARACT CORTICAL, SENILE, LEFT: ICD-10-CM

## 2021-10-01 DIAGNOSIS — H02.239 PARALYTIC LAGOPHTHALMOS, UNSPECIFIED LATERALITY: Primary | ICD-10-CM

## 2021-10-01 DIAGNOSIS — H16.232 NEUROTROPHIC KERATOPATHY OF LEFT EYE: ICD-10-CM

## 2021-10-01 DIAGNOSIS — G51.0 FACIAL PALSY: ICD-10-CM

## 2021-10-01 PROCEDURE — G0463 HOSPITAL OUTPT CLINIC VISIT: HCPCS

## 2021-10-01 PROCEDURE — 99214 OFFICE O/P EST MOD 30 MIN: CPT | Performed by: OPHTHALMOLOGY

## 2021-10-01 RX ORDER — ERYTHROMYCIN 5 MG/G
OINTMENT OPHTHALMIC
COMMUNITY
Start: 2021-09-27 | End: 2022-09-06

## 2021-10-01 ASSESSMENT — VISUAL ACUITY
OD_SC+: -
OS_PH_SC+: -1
OS_PH_SC: 20/50
OS_SC+: -1
OD_SC: 20/25
METHOD: SNELLEN - LINEAR
OS_SC: 20/100

## 2021-10-01 ASSESSMENT — CONF VISUAL FIELD
OD_NORMAL: 1
METHOD: COUNTING FINGERS
OS_NORMAL: 1

## 2021-10-01 ASSESSMENT — EXTERNAL EXAM - LEFT EYE: OS_EXAM: LEFT FACIAL PALSY

## 2021-10-01 ASSESSMENT — TONOMETRY
OS_IOP_MMHG: 05
OD_IOP_MMHG: 07
IOP_METHOD: ICARE

## 2021-10-01 ASSESSMENT — EXTERNAL EXAM - RIGHT EYE: OD_EXAM: NORMAL

## 2021-10-01 NOTE — NURSING NOTE
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  He states that his vision has seemed stable in both eyes, since his last eye exam.     He wears a scleral lens (not in today due to exam) in his left eye and uses:  Artificial tears  Erythromycin ointment at night in left eye    ANA Robins 8:00 AM  October 1, 2021

## 2021-10-01 NOTE — PROGRESS NOTES
- reviewed notes and images from outside provider and the rest of the care team.    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    Of note, patient has a history of L oropharynx P16+ squamous cell cancer first diagnosed in 2018. The patient underwent low dose weekly carboplatin 1.5 AUC with radiation. His PET/CT 2 months after initiation and underwent surveillance after that. In 08/2020, he developed numbness in chin and then forehead. He underwent PET/CT and IR-guided biopsy (1/2021), which demonstrated invasive squamous cell carcinoma.    Per oncology, patient has left-sided paralysis. The patient first noticed left eye redness and lower lid drooping in about January 2021. He notes that these symptoms progressed during through April 2021. He noted associated watery discharge. He was seen by oncology and started on erythromycin ilya then recommended to follow up with ophthalmology for further management. He has also been using AT BID during this time. He denies eye irritation, pain, purulent discharge, and flashes/floaters. He notes baseline left eye vision when not using erythromycin ointment.    Interval HPI: S/p lid surgery by Dr. Yoon 4/1/21. Vision was initially much better. Notes no bryan eye pain but vision is a little blurrier and his left eye is always red.  Using ointment nightly and refresh every 2 hours.  Scheduled to see optometry for lens fitting.  No other concerns.     Undergoing chemo and the cancer is visibly gone--- finished radiation 2.5 weeks ago    POH: refractive error (intermittent glasses use)  Surgery: LASIK (25-30 years ago)- monovision  S/p   GTTS: AT  Q2 left eye , erythromycin ilya at bedtime left eye      PMH:  -L oropharyngeal squamous cell cancer (first diagnosed 2018), recently diagnosed invasive squamous cell cancer (on chemotherapy)  -HTN    FH:  -No AMD, glaucoma    CT soft tissue neck (1/18/21):  1. Abnormal hypermetabolic activity in the left infratemporal fossa  involving the medial and lateral pterygoid musculature and the  mandibular branch of the left 5th cranial nerve. Abnormal  hypermetabolic activity tracks through the left foramen ovale into the  medial aspect of the left middle cranial fossa and posteriorly to the  root entry zone of the left 5th cranial nerve.  2. Otherwise, normal head and neck PET/CT.    PET oncology  (1/18/21):  In this patient with tonsillar cancer status post chemotherapy and  radiation with new disease in the left  space, not currently  on chemotherapy:  1.  No evidence for metastatic disease in the chest, abdomen, pelvis.  2.  See dedicated neuroradiology report for the results of the high  resolution PET CT of the neck.     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 Pt will likely need more extensive Permanent tarso or scleral contact lens longterm.  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 New disease in the left  space s/p PET/CT and IR-guided biopsy consistent with invasive squamous cell carcinoma s/p induction chemotherapy (1/28/21).  - Exam notable for significant paralytic lagophthalmos with  complete scleral show, exposure keratopathy, and large neurotrophic corneal ulcer  - s/p permanent tarsorrhaphy lateral 50% (2/10/21)- Dr. Pendleton  - s/p FLOWER platinum weight, LLL ectropion repair, and temporary tarso - Dr. Yoon (4/1/21) celio f/u 5/25.  - misdirected trichiatic lashes epilated 7/8/21    10/1/21: left eye cataract increasing. left eye ocular surface dry but epi intact.    Plan:  - Increase to Emycin QID  - Increase celluvisc 6/ day.  - Due to see Dr. Pardo next week for scleral lens as patient does not want tarsorrphaphy for depth perception and also because his grand kids would be nervous to see his eyelids shut.  - Disc left eye cataract surgery with increased risk due to left eye exposure and dry eye - disc need for increased lubrication. Disc Dr Puga's nerve transposition research study.      RTC: 6 weeks -  sooner as needed.    Follow up with Dr. Pardo as scheduled    Mert Barney MD    Attending Physician Attestation:  Complete documentation of historical and exam elements from today's encounter can be found in the full encounter summary report (not reduplicated in this progress note).  I personally obtained the chief complaint(s) and history of present illness.  I confirmed and edited as necessary the review of systems, past medical/surgical history, family history, social history, and examination findings as documented by others; and I examined the patient myself.  I personally reviewed the relevant tests, images, and reports as documented above.  I formulated and edited as necessary the assessment and plan and discussed the findings and management plan with the patient and family. - Mert Barney MD

## 2021-11-12 ENCOUNTER — LAB (OUTPATIENT)
Dept: LAB | Facility: CLINIC | Age: 71
End: 2021-11-12
Attending: RADIOLOGY
Payer: MEDICARE

## 2021-11-12 DIAGNOSIS — E03.9 HYPOTHYROIDISM, UNSPECIFIED TYPE: ICD-10-CM

## 2021-11-12 DIAGNOSIS — E03.9 HYPOTHYROIDISM, UNSPECIFIED TYPE: Primary | ICD-10-CM

## 2021-11-12 DIAGNOSIS — C09.9 TONSILLAR CANCER (H): ICD-10-CM

## 2021-11-12 LAB
ALBUMIN SERPL-MCNC: 3.9 G/DL (ref 3.4–5)
ALP SERPL-CCNC: 78 U/L (ref 40–150)
ALT SERPL W P-5'-P-CCNC: 24 U/L (ref 0–70)
ANION GAP SERPL CALCULATED.3IONS-SCNC: 6 MMOL/L (ref 3–14)
AST SERPL W P-5'-P-CCNC: 16 U/L (ref 0–45)
BASOPHILS # BLD AUTO: 0 10E3/UL (ref 0–0.2)
BASOPHILS NFR BLD AUTO: 1 %
BILIRUB SERPL-MCNC: 0.6 MG/DL (ref 0.2–1.3)
BUN SERPL-MCNC: 13 MG/DL (ref 7–30)
CALCIUM SERPL-MCNC: 9.1 MG/DL (ref 8.5–10.1)
CHLORIDE BLD-SCNC: 106 MMOL/L (ref 94–109)
CO2 SERPL-SCNC: 28 MMOL/L (ref 20–32)
CREAT SERPL-MCNC: 0.95 MG/DL (ref 0.66–1.25)
EOSINOPHIL # BLD AUTO: 0.4 10E3/UL (ref 0–0.7)
EOSINOPHIL NFR BLD AUTO: 7 %
ERYTHROCYTE [DISTWIDTH] IN BLOOD BY AUTOMATED COUNT: 12.9 % (ref 10–15)
GFR SERPL CREATININE-BSD FRML MDRD: 81 ML/MIN/1.73M2
GLUCOSE BLD-MCNC: 102 MG/DL (ref 70–99)
HCT VFR BLD AUTO: 39.8 % (ref 40–53)
HGB BLD-MCNC: 13.5 G/DL (ref 13.3–17.7)
IMM GRANULOCYTES # BLD: 0 10E3/UL
IMM GRANULOCYTES NFR BLD: 0 %
LYMPHOCYTES # BLD AUTO: 0.5 10E3/UL (ref 0.8–5.3)
LYMPHOCYTES NFR BLD AUTO: 9 %
MCH RBC QN AUTO: 32.4 PG (ref 26.5–33)
MCHC RBC AUTO-ENTMCNC: 33.9 G/DL (ref 31.5–36.5)
MCV RBC AUTO: 95 FL (ref 78–100)
MONOCYTES # BLD AUTO: 0.7 10E3/UL (ref 0–1.3)
MONOCYTES NFR BLD AUTO: 12 %
NEUTROPHILS # BLD AUTO: 4.4 10E3/UL (ref 1.6–8.3)
NEUTROPHILS NFR BLD AUTO: 71 %
NRBC # BLD AUTO: 0 10E3/UL
NRBC BLD AUTO-RTO: 0 /100
PLATELET # BLD AUTO: 202 10E3/UL (ref 150–450)
POTASSIUM BLD-SCNC: 4.2 MMOL/L (ref 3.4–5.3)
PROT SERPL-MCNC: 7.1 G/DL (ref 6.8–8.8)
RBC # BLD AUTO: 4.17 10E6/UL (ref 4.4–5.9)
SODIUM SERPL-SCNC: 140 MMOL/L (ref 133–144)
T4 FREE SERPL-MCNC: 0.89 NG/DL (ref 0.76–1.46)
TSH SERPL DL<=0.005 MIU/L-ACNC: 7.25 MU/L (ref 0.4–4)
WBC # BLD AUTO: 6.1 10E3/UL (ref 4–11)

## 2021-11-12 PROCEDURE — 80053 COMPREHEN METABOLIC PANEL: CPT | Performed by: PATHOLOGY

## 2021-11-12 PROCEDURE — 36415 COLL VENOUS BLD VENIPUNCTURE: CPT | Performed by: PATHOLOGY

## 2021-11-12 PROCEDURE — 84439 ASSAY OF FREE THYROXINE: CPT | Performed by: PATHOLOGY

## 2021-11-12 PROCEDURE — 85025 COMPLETE CBC W/AUTO DIFF WBC: CPT | Performed by: PATHOLOGY

## 2021-11-12 PROCEDURE — 84443 ASSAY THYROID STIM HORMONE: CPT | Performed by: PATHOLOGY

## 2021-11-15 ENCOUNTER — OFFICE VISIT (OUTPATIENT)
Dept: RADIATION ONCOLOGY | Facility: CLINIC | Age: 71
End: 2021-11-15
Attending: RADIOLOGY
Payer: MEDICARE

## 2021-11-15 VITALS — BODY MASS INDEX: 27.08 KG/M2 | WEIGHT: 188.7 LBS

## 2021-11-15 DIAGNOSIS — C09.9 SQUAMOUS CELL CARCINOMA OF TONSIL (H): Primary | ICD-10-CM

## 2021-11-15 DIAGNOSIS — E03.9 HYPOTHYROIDISM, UNSPECIFIED TYPE: ICD-10-CM

## 2021-11-15 RX ORDER — LEVOTHYROXINE SODIUM 50 UG/1
50 TABLET ORAL DAILY
Qty: 90 TABLET | Refills: 3 | Status: SHIPPED | OUTPATIENT
Start: 2021-11-15 | End: 2022-09-27

## 2021-11-15 NOTE — LETTER
11/15/2021         RE: Fortino Moya  [address]  [facility] [address]        Dear Colleague,    Thank you for referring your patient, Fortino Moya, to the [facility] RADIATION ONCOLOGY. Please see a copy of my visit note below.       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 He presented a few years later with recurrent disease within the ipsilateral pterygoids extending to the skull base with invasion of the cavernous sinus and tracking along CN V to the brainstem. He received 2 cycles of induction chemotherapy with TPF from 2021-2021 with repeat imaging demonstrated a near-complete response to therapy. He then received curative-intent chemoradiotherapy as described above.    Mr. Moya returns to clinic today for a routine post-treatment disease surveillance visit. Overall, he reports that he is doing well and he has no pressing concerns or complaints outside of ongoing, stable numbness of the left face as well as mild left-sided oral cavity pain. He continues to have ongoing moderate xerostomia related to his previous head and neck cancer therapies although he denies any significant dysphagia with p.o. intake. His weight is up.  He does have hypothyroidism and is currently on 25 mcg of Synthroid.  He  had his TSH on 1112 which was elevated.  He feels like he does have some fatigue.    PHYSICAL EXAM:  Wt Readings from Last 4 Encounters:   11/15/21 85.6 kg (188 lb 11.2 oz)   21 79.4 kg (175 lb)   21 79.4 kg (175 lb)   21 79.5 kg (175 lb 3.2 oz)         General: 71-year-old gentleman seated comfortably in an examination chair in no acute distress  HEENT: NC/AT.  Stable left-sided ptosis.  No rhinorrhea or epistaxis.  Dry mucous membranes.  Extensive post-therapy changes involving the oral cavity and visualized oropharynx with scarring and soft tissue retraction of the left tonsillar fossa.  Palpation of the gingiva, buccal mucosa, floor of mouth, oral tongue and bilateral tongue base reveal scattered fibrosis with no discrete nodularity, induration or masses.  Neck: Moderate fibrosis within the bilateral neck (left > right).  No palpable cervical adenopathy bilaterally.  Pulmonary: No wheezing, stridor or respiratory distress  Skin: Scattered hyper and hypopigmentation throughout the left lateral face and neck.  No desquamation or ulceration.  Neuro: A/O x3.  Normal gait.    Cranial Nerve Exam  I: Not tested  II: Not tested  III/IV/VI: PERRL. EOMI.   V: Decreased sensation to light touch within the left V1/V2 distributions  VII: Moderate left-sided facial weakness with mild asymmetry at rest.  VIII: Grossly absent hearing within the left ear.  Normal on the right.  IX/X: Palate elevates symmetrically. Normal phonation.  XI: Strength is 5/5 in bilateral trapezius and SCM musculature.  XII: Tongue protrudes in the midline. No atrophy or fasciculations.          LABS AND IMAGIN2021 labs:  Electrolytes: WNL  Hgb: 13.5  WBC: 6.4  Plts: 202  TSH-7.25    2021 CT neck:  Post-treatment changes with no evidence of residual disease.    IMPRESSION:   Mr. 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 He is currently 6 months out from completion of salvage chemoradiotherapy and has no clinical or radiologic evidence of residual disease.    PLAN:   1. Follow-up in radiation oncology clinic with MD in 3 months  2. Dosage increase of synthroid to 50 mcg.  Recheck in 6-8 weeks.  3. CT neck and chest in 3 months  4. Survivorship care plan printed and sent to patient. (he was not interested in a separate visit)  5. Reviewed survivorship material.    Nafisa Sanders NP  Department of Radiation Oncology  [facility]

## 2021-12-14 ENCOUNTER — OFFICE VISIT (OUTPATIENT)
Dept: OPHTHALMOLOGY | Facility: CLINIC | Age: 71
End: 2021-12-14
Attending: OPHTHALMOLOGY
Payer: MEDICARE

## 2021-12-14 ENCOUNTER — OFFICE VISIT (OUTPATIENT)
Dept: AUDIOLOGY | Facility: CLINIC | Age: 71
End: 2021-12-14
Payer: MEDICARE

## 2021-12-14 ENCOUNTER — OFFICE VISIT (OUTPATIENT)
Dept: OPHTHALMOLOGY | Facility: CLINIC | Age: 71
End: 2021-12-14
Payer: MEDICARE

## 2021-12-14 DIAGNOSIS — H16.232 NEUROTROPHIC KERATOPATHY OF LEFT EYE: ICD-10-CM

## 2021-12-14 DIAGNOSIS — H90.A32 MIXED CONDUCTIVE AND SENSORINEURAL HEARING LOSS OF LEFT EAR WITH RESTRICTED HEARING OF RIGHT EAR: ICD-10-CM

## 2021-12-14 DIAGNOSIS — H02.239 PARALYTIC LAGOPHTHALMOS, UNSPECIFIED LATERALITY: Primary | ICD-10-CM

## 2021-12-14 DIAGNOSIS — G51.0 FACIAL PALSY: ICD-10-CM

## 2021-12-14 DIAGNOSIS — H02.239 PARALYTIC LAGOPHTHALMOS, UNSPECIFIED LATERALITY: ICD-10-CM

## 2021-12-14 DIAGNOSIS — C09.9 SQUAMOUS CELL CARCINOMA OF TONSIL (H): ICD-10-CM

## 2021-12-14 DIAGNOSIS — H90.A21 SENSORINEURAL HEARING LOSS (SNHL) OF RIGHT EAR WITH RESTRICTED HEARING OF LEFT EAR: Primary | ICD-10-CM

## 2021-12-14 DIAGNOSIS — H02.056 TRICHIASIS OF LEFT EYE WITHOUT ENTROPION: Primary | ICD-10-CM

## 2021-12-14 PROCEDURE — G0463 HOSPITAL OUTPT CLINIC VISIT: HCPCS

## 2021-12-14 PROCEDURE — 99213 OFFICE O/P EST LOW 20 MIN: CPT | Mod: 25 | Performed by: OPHTHALMOLOGY

## 2021-12-14 PROCEDURE — 92567 TYMPANOMETRY: CPT | Performed by: AUDIOLOGIST

## 2021-12-14 PROCEDURE — 67820 REVISE EYELASHES: CPT | Mod: E2 | Performed by: OPHTHALMOLOGY

## 2021-12-14 PROCEDURE — 99214 OFFICE O/P EST MOD 30 MIN: CPT | Mod: 25 | Performed by: OPHTHALMOLOGY

## 2021-12-14 PROCEDURE — 67820 REVISE EYELASHES: CPT | Mod: LT | Performed by: OPHTHALMOLOGY

## 2021-12-14 PROCEDURE — 92557 COMPREHENSIVE HEARING TEST: CPT | Performed by: AUDIOLOGIST

## 2021-12-14 ASSESSMENT — VISUAL ACUITY
OS_SC+: -1
OD_SC: 20/20
OD_SC: 20/20
METHOD: SNELLEN - LINEAR
OS_PH_SC: 20/60
OS_SC: 20/100
OS_SC+: -1
METHOD: SNELLEN - LINEAR
OS_SC: 20/100
OS_PH_SC: 20/60

## 2021-12-14 ASSESSMENT — EXTERNAL EXAM - LEFT EYE: OS_EXAM: LEFT FACIAL PALSY

## 2021-12-14 ASSESSMENT — TONOMETRY
OD_IOP_MMHG: 11
IOP_METHOD: TONOPEN
OS_IOP_MMHG: 9
IOP_METHOD: ICARE
OS_IOP_MMHG: 09
OD_IOP_MMHG: 11

## 2021-12-14 ASSESSMENT — EXTERNAL EXAM - RIGHT EYE
OD_EXAM: NORMAL
OD_EXAM: NORMAL

## 2021-12-14 ASSESSMENT — CONF VISUAL FIELD
OS_NORMAL: 1
OD_NORMAL: 1

## 2021-12-14 NOTE — PROGRESS NOTES
Chief Complaint(s) and History of Present Illness(es)     Follow Up     Laterality: left eye    Course: stable    Associated symptoms: Negative for eye pain, tearing, dryness, redness,   floaters and flashes    Treatments tried: ointment and artificial tears    Pain scale: 0/10    Comments: exposure keratopathy left eye               Comments     Pt here today for follow up.  States eyes seem improved. Not wearing scleral lens today.  Can move eyes more with minimal pain and discomfort.    Ocular meds = artificial tears & erythromycin ilya.    Paz WILLS, DESIRAE 9:24 AM 12/14/2021             Assessment & Plan     Fortino Moya is a 71 year old male with the following diagnoses:   Encounter Diagnoses   Name Primary?     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 Pendleton  S/p left upper lid 1.6 gram platinum weight 4/2/21 - [facility]    He has had some improvement in his facial nerve function, and today really has minimal to no lagophthalmos.  At this point he has stable K scar.    Discussed options - plan left lower eyelid severing of tarsorhaphy and left lower eyelid ectropion repair (canthopexy).    He is numb in v1, v2 distribution so can do in clinic.    Discussed he has misdirected lashes which may become an issue, and a neurotrophic cornea so he may ultimately require replacement of the tarsorrhaphy, but he has done well with aggressive lubrication and scleral lenses.    Patient disposition:   No follow-ups on file.        Attending Physician Attestation: Complete documentation of historical and exam elements from today's encounter can be found in the full encounter summary report (not reduplicated in this progress note). I personally obtained the chief complaint(s) and history of present illness. I confirmed  and edited as necessary the review of systems, past medical/surgical history, family history, social history, and examination findings as documented by others; and I examined the patient myself. I personally reviewed the relevant tests, images, and reports as documented above. I formulated and edited as necessary the assessment and plan and discussed the findings and management plan with the patient.  -Vivien Yoon MD

## 2021-12-14 NOTE — NURSING NOTE
Chief Complaints and History of Present Illnesses   Patient presents with     Dry Eye(s) Follow-Up     Chief Complaint(s) and History of Present Illness(es)     Dry Eye(s) Follow-Up     Laterality: both eyes              Comments      Pt here today for follow up.  States eyes seem improved. Not wearing scleral lens today.  Can move eyes more with minimal pain and discomfort.     Ocular meds = artificial tears & erythromycin ilya.     Paz WILLS, DESIRAE 9:24 AM 12/14/2021

## 2021-12-14 NOTE — PROGRESS NOTES
AUDIOLOGY REPORT    SUMMARY: Audiology visit completed. See audiogram for results.      RECOMMENDATIONS: Follow-up with ENT.      Tess Bocanegra.  Licensed Audiologist  [place] #[id number]

## 2021-12-14 NOTE — PROGRESS NOTES
- reviewed notes and images from outside provider and the rest of the care team.    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    Of note, patient has a history of L oropharynx P16+ squamous cell cancer first diagnosed in 2018. The patient underwent low dose weekly carboplatin 1.5 AUC with radiation. His PET/CT 2 months after initiation and underwent surveillance after that. In 08/2020, he developed numbness in chin and then forehead. He underwent PET/CT and IR-guided biopsy (1/2021), which demonstrated invasive squamous cell carcinoma.    Per oncology, patient has left-sided paralysis. The patient first noticed left eye redness and lower lid drooping in about January 2021. He notes that these symptoms progressed during through April 2021. He noted associated watery discharge. He was seen by oncology and started on erythromycin ilya then recommended to follow up with ophthalmology for further management. He has also been using AT BID during this time. He denies eye irritation, pain, purulent discharge, and flashes/floaters. He notes baseline left eye vision when not using erythromycin ointment.    Interval HPI: S/p lid surgery by Dr. Yoon 4/1/21. Pt notes that his left eye feels good.  He did not wear his contact lens today (left eye).  He feels the lens is working well left eye.  Using PFATs about 6 times daily.  Next due for follow up with Dr. Pardo in March 2022.   No other concerns.     Undergoing chemo and the cancer is visibly gone--- finished radiation 2.5 weeks ago    POH: refractive error (intermittent glasses use)  Surgery: LASIK (25-30 years ago)- monovision  S/p   GTTS:   -PFAT Q2 left eye  -Erythromycin ilya at  bedtime left eye     PMH:  -L oropharyngeal squamous cell cancer (first diagnosed 2018), recently diagnosed invasive squamous cell cancer (on chemotherapy)  -HTN    FH:  -No AMD, glaucoma    CT soft tissue neck (1/18/21):  1. Abnormal hypermetabolic activity in the left infratemporal fossa  involving the medial and lateral pterygoid musculature and the  mandibular branch of the left 5th cranial nerve. Abnormal  hypermetabolic activity tracks through the left foramen ovale into the  medial aspect of the left middle cranial fossa and posteriorly to the  root entry zone of the left 5th cranial nerve.  2. Otherwise, normal head and neck PET/CT.    PET oncology  (1/18/21):  In this patient with tonsillar cancer status post chemotherapy and  radiation with new disease in the left  space, not currently  on chemotherapy:  1.  No evidence for metastatic disease in the chest, abdomen, pelvis.  2.  See dedicated neuroradiology report for the results of the high  resolution PET CT of the neck.     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 Pt will likely need more extensive Permanent tarso or scleral contact lens longterm.  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 New disease in the left  space s/p PET/CT and IR-guided biopsy consistent with invasive squamous cell carcinoma s/p induction chemotherapy (1/28/21).  - Exam notable for  significant paralytic lagophthalmos with complete scleral show, exposure keratopathy, and large neurotrophic corneal ulcer  - s/p permanent tarsorrhaphy lateral 50% (2/10/21)- Dr. Pendleton  - s/p FLOWER platinum weight, LLL ectropion repair, and temporary tarso - Dr. Yoon (4/1/21) celio f/u 5/25.  - misdirected trichiatic lashes epilated 7/8/21    10/1/21: left eye cataract increasing. left eye ocular surface dry but epi intact.  12/14/21: Lashes pulled.  Encouraged more PFAT use.    Plan:  - Continue Emycin at night and when not using the scleral lens  - Continue celluvisc 6/ day.  - Continue with scleral lens wear.   - Disc left eye cataract surgery with increased risk due to left eye exposure and dry eye - disc need for increased lubrication. Disc Dr Puga's nerve transposition research study.    RTC: 6 weeks -  sooner as needed.    Follow up with Dr. Pardo as scheduled in     Bigg Johnson DO  Fellow, Cornea & External Disease  Department of Ophthalmology  [facility]    Attending Physician Attestation:  Complete documentation of historical and exam elements from today's encounter can be found in the full encounter summary report (not reduplicated in this progress note).  I personally obtained the chief complaint(s) and history of present illness.  I confirmed and edited as necessary the review of systems, past medical/surgical history, family history, social history, and examination findings as documented by others; and I examined the patient myself.  I personally reviewed the relevant tests, images, and reports as documented above.  I formulated and edited as necessary the assessment and plan and discussed the findings and management plan with the patient and family. - Mert Barney MD     I was present for the entire procedure(s). - Mert Barney MD

## 2021-12-14 NOTE — NURSING NOTE
Chief Complaints and History of Present Illnesses   Patient presents with     Follow Up     exposure keratopathy left eye      Chief Complaint(s) and History of Present Illness(es)     Follow Up     Laterality: left eye    Course: stable    Associated symptoms: Negative for eye pain, tearing, dryness, redness, floaters and flashes    Treatments tried: ointment and artificial tears    Pain scale: 0/10    Comments: exposure keratopathy left eye               Comments     Pt here today for follow up.  States eyes seem improved. Not wearing scleral lens today.  Can move eyes more with minimal pain and discomfort.    Ocular meds = artificial tears & erythromycin ilya.    Paz WILLS, DESIRAE 9:24 AM 12/14/2021

## 2021-12-15 NOTE — TELEPHONE ENCOUNTER
FUTURE VISIT INFORMATION      FUTURE VISIT INFORMATION:    Date: 12/28/2021    Time: 1:45PM    [place] [facility] [place]FORMATION:    Referring provider:      Referring providers clinic:      Reason for visit/diagnosis  scheduled per clinic, patient confirmed appt time and date -MT 12/14    RECORDS REQUESTED FROM:       Clinic name Comments Records Status Imaging Status   [facility] Audiology [place] 12/14/2021 audiogram  [facility] ENT [place] 9/22/2021 note from Dr Russell  Epic    Imaging 8/16/2021 PET CT   1/26/2021 MR [facility] PACS   [place] [address] Ear, Nose, and Throat   11/20/2020 note  From Anna Campos PA-C  Care everywhere

## 2021-12-28 ENCOUNTER — PRE VISIT (OUTPATIENT)
Dept: OTOLARYNGOLOGY | Facility: CLINIC | Age: 71
End: 2021-12-28

## 2021-12-28 ENCOUNTER — OFFICE VISIT (OUTPATIENT)
Dept: OTOLARYNGOLOGY | Facility: CLINIC | Age: 71
End: 2021-12-28
Payer: MEDICARE

## 2021-12-28 VITALS
TEMPERATURE: 98 F | HEIGHT: 70 IN | OXYGEN SATURATION: 99 % | HEART RATE: 77 BPM | BODY MASS INDEX: 26.92 KG/M2 | WEIGHT: 188 LBS

## 2021-12-28 DIAGNOSIS — H90.3 BILATERAL SENSORINEURAL HEARING LOSS: ICD-10-CM

## 2021-12-28 DIAGNOSIS — H93.13 TINNITUS, BILATERAL: ICD-10-CM

## 2021-12-28 DIAGNOSIS — H69.93 DYSFUNCTION OF BOTH EUSTACHIAN TUBES: Primary | ICD-10-CM

## 2021-12-28 DIAGNOSIS — H61.813 EXOSTOSIS OF EXTERNAL AUDITORY CANAL, BILATERAL: ICD-10-CM

## 2021-12-28 PROCEDURE — 99213 OFFICE O/P EST LOW 20 MIN: CPT | Performed by: OTOLARYNGOLOGY

## 2021-12-28 ASSESSMENT — PAIN SCALES - GENERAL: PAINLEVEL: NO PAIN (0)

## 2021-12-28 ASSESSMENT — MIFFLIN-ST. JEOR: SCORE: 1619.01

## 2021-12-28 NOTE — LETTER
12/28/2021       RE: Fortino Moya  [address]  [facility] [place] [facility] [address]     Dear Colleague,    Thank you for referring your patient, Fortino Moya, to the [facility] EAR NOSE AND THROAT [facility]INIC [place] [facility]. Please see a copy of my visit note below.    Dear Dr. Talbert, [facility] [place]:    I had the pleasure of meeting Fortino Moya in consultation today at the [facility] Ot[facility] at your request.    CHIEF COMPLAINT: Ears    HISTORY OF PRESENT ILLNESS: Patient is a 71-year-old in today for consultation on his ears from his family physician.  Past history is significant for having a tonsil cancer with chemoradiation in [place] in 2018, a recurrence in 2021 was treated with TPF therapy followed by definitive chemoradiation.  Doing well from that standpoint.  From the ears, he has had a mixed hearing loss in the left ear, began noticing the left worse than the right even before his radiation.  He has a family history of multiple members having hearing loss in the ears.  He has had multiple imaging studies as well as PET scans following the tonsil cancer.  He has had pressure in his ears and the mixed hearing loss on the left side that has been increasing.  He has what sounds to be exostosis in both ear canals.  There is been no pain or drainage.  Denies any dizziness.  Occasional tinnitus but not problematic.  There is no dysphagia, hoarseness, facial paresthesias.    ALLERGIES:  No Known Allergies    HABITS: Social History    Substance and Sexual Activity      Alcohol use: Yes        Comment: 3 beers/week     History   Smoking Status     Never Smoker   Smokeless Tobacco     Never Used         PAST MEDICAL HISTORY: Please see today's intake form (for the remainder of the PMH) which I reviewed and signed.  Past Medical History:   Diagnosis Date     Arrhythmia     A FIB     Atrial fibrillation (H)      Coronary artery  disease 6/15/2021    A Fib     Dysphagia      Hearing problem 1995     History of radiation therapy 1/21     Hypercholesterolemia      Hypertension      Paralytic lagophthalmos      Primary squamous cell carcinoma of tonsil (H)        FAMILY HISTORY/SOCIAL HISTORY:   Family History   Problem Relation Age of Onset     Hypertension Father      Diabetes Brother      Hypertension Brother      Substance Abuse Brother      Hypertension Sister      Cancer Sister      Hypertension Brother      Hypertension Brother      Glaucoma No family hx of      Macular Degeneration No family hx of       Social History     Socioeconomic History     Marital status: Single     Spouse name: Not on file     Number of children: Not on file     Years of education: Not on file     Highest education level: Not on file   Occupational History     Not on file   Tobacco Use     Smoking status: Never Smoker     Smokeless tobacco: Never Used   Vaping Use     Vaping Use: Never used   Substance and Sexual Activity     Alcohol use: Yes     Comment: 3 beers/week     Drug use: Not Currently     Sexual activity: Yes     Partners: Female   Other Topics Concern     Parent/sibling w/ CABG, MI or angioplasty before 65F 55M? Not Asked   Social History Narrative     Not on file     Social Determinants of Health     Financial Resource Strain: Not on file   Food Insecurity: Not on file   Transportation Needs: Not on file   Physical Activity: Not on file   Stress: Not on file   Social Connections: Not on file   Intimate Partner Violence: Not on file   Housing Stability: Not on file       REVIEW OF SYSTEMS: Patient Supplied Answers to Review of Systems   ENT ROS 12/27/2021   Constitutional -   Neurology -   Ears, Nose, Throat Ringing/noise in ears   Musculoskeletal -   Allergy/Immunology -   Hematologic -            The remainder of the 10 point ROS is negative    PHYSICIAL EXAMINATION:  Constitutional: The patient was well-groomed and in no acute distress.   Skin:  Warm and pink.  Psychiatric: The patient's affect was calm, cooperative, and appropriate.   Respiratory: Breathing comfortably without stridor or exertion of accessory muscles.  Eyes: Pupils were equal and reactive. Extraocular movement intact.   Head: Normocephalic and atraumatic. No lesions or scars.  Ears: Both ears examined on the microscope.  Right side was cleaned of mild cerumen.  He does have exostosis of the superior medial canal and significant inferior canal.  I can see the tympanic membrane which looks normal in position in color.  No active drainage noted.  The left side was cleaned and again has significant exostosis especially on the inferior medial aspect.  Portion of the tympanic membrane can be seen superiorly and looks intact with good color.  Nose: Sinuses were nontender. Anterior rhinoscopy revealed midline septum and absence of purulence or polyps.  Oral Cavity: Normal tongue, floor of mouth, buccal mucosa, and palate. No lesions or masses on inspection or palpation. No abnormal lymph tissue in the oropharynx.   Neck: The parotid is soft without masses. Supple with normal laryngeal and tracheal landmarks.   Lymphatic: There is no palpable lymphadenopathy or other masses in the neck.   Neurologic: Alert and oriented x 3. Cranial nerves III-XI within normal limits. Voice quality normal.  Cerebellar Function Tests:  Grossly normal    Audiogram: Audiogram performed shows a right high-frequency sensorineural hearing loss above 1500 Hz.  Maintains good discrimination at 92%.  The left ear shows a significant sensorineural hearing loss with discrimination only at 32%.      IMPRESSION AND PLAN:   1. Bilateral external auditory canal exostosis: Discussed these with him in detail, answered multiple questions.  We need to monitor this.  He has a left sensorineural hearing loss and has a hearing aid for that but not really helpful.  Discussed option for crossover hearing aid if he like to get benefit from  the left side.  He feels doing okay on the right side has a hearing aid there.  Exostosis may finally closed and we need to monitor that.  If so would have to open it on the right side with surgery.  For now is happy monitoring it and really does not want to have any done.  Recommend follow-up in 6 months and then yearly if remaining stable.  Follow-up sooner with concerns or problems.  2. Left sensorineural hearing loss: Discussed with him, has had multiple imaging studies which have all been negative from an ear standpoint.  Recommend we monitor for now.  3. Right sensorineural hearing loss: Maximize benefit from hearing aids.  4. Bilateral tinnitus: No treatment needed, monitor.    Thank you very much for the opportunity to participate in the care of your patient.    Rick L Nissen MD

## 2021-12-28 NOTE — NURSING NOTE
"Chief Complaint   Patient presents with     Consult     hearing loss, tinnitus  left ear       Pulse 77, temperature 98  F (36.7  C), height 1.778 m (5' 10\"), weight 85.3 kg (188 lb), SpO2 99 %.    Frank Moreau LPN    "

## 2021-12-28 NOTE — PATIENT INSTRUCTIONS
1. You were seen in the ENT Clinic today by Dr. Nissen.  If you have any questions or concerns after your appointment, please call   - Option 1: ENT Clinic: [phone]   - Option 2: [place] (Dr. Nissen's Nurse): [phone]                   Anna(Dr. Nissen's Nurse): [phone]      2.   Plan to return to clinic in 1 year with hearing test    Elsy Doyle LPN  [facility] - Otolaryngology

## 2021-12-28 NOTE — PROGRESS NOTES
Dear Dr. Talbert, [facility]:    I had the pleasure of meeting Fortino Moya in consultation today at the [facility] at your request.    CHIEF COMPLAINT: Ears    HISTORY OF PRESENT ILLNESS: Patient is a 71-year-old in today for consultation on his ears from his family physician.  Past history is significant for having a tonsil cancer with chemoradiation in [place] in 2018, a recurrence in 2021 was treated with TPF therapy followed by definitive chemoradiation.  Doing well from that standpoint.  From the ears, he has had a mixed hearing loss in the left ear, began noticing the left worse than the right even before his radiation.  He has a family history of multiple members having hearing loss in the ears.  He has had multiple imaging studies as well as PET scans following the tonsil cancer.  He has had pressure in his ears and the mixed hearing loss on the left side that has been increasing.  He has what sounds to be exostosis in both ear canals.  There is been no pain or drainage.  Denies any dizziness.  Occasional tinnitus but not problematic.  There is no dysphagia, hoarseness, facial paresthesias.    ALLERGIES:  No Known Allergies    HABITS: Social History    Substance and Sexual Activity      Alcohol use: Yes        Comment: 3 beers/week     History   Smoking Status     Never Smoker   Smokeless Tobacco     Never Used         PAST MEDICAL HISTORY: Please see today's intake form (for the remainder of the PMH) which I reviewed and signed.  Past Medical History:   Diagnosis Date     Arrhythmia     A FIB     Atrial fibrillation (H)      Coronary artery disease 6/15/2021    A Fib     Dysphagia      Hearing problem 1995     History of radiation therapy 1/21     Hypercholesterolemia      Hypertension      Paralytic lagophthalmos      Primary squamous cell carcinoma of tonsil (H)        FAMILY HISTORY/SOCIAL HISTORY:   Family History   Problem Relation Age of Onset      Hypertension Father      Diabetes Brother      Hypertension Brother      Substance Abuse Brother      Hypertension Sister      Cancer Sister      Hypertension Brother      Hypertension Brother      Glaucoma No family hx of      Macular Degeneration No family hx of       Social History     Socioeconomic History     Marital status: Single     Spouse name: Not on file     Number of children: Not on file     Years of education: Not on file     Highest education level: Not on file   Occupational History     Not on file   Tobacco Use     Smoking status: Never Smoker     Smokeless tobacco: Never Used   Vaping Use     Vaping Use: Never used   Substance and Sexual Activity     Alcohol use: Yes     Comment: 3 beers/week     Drug use: Not Currently     Sexual activity: Yes     Partners: Female   Other Topics Concern     Parent/sibling w/ CABG, MI or angioplasty before 65F 55M? Not Asked   Social History Narrative     Not on file     Social Determinants of Health     Financial Resource Strain: Not on file   Food Insecurity: Not on file   Transportation Needs: Not on file   Physical Activity: Not on file   Stress: Not on file   Social Connections: Not on file   Intimate Partner Violence: Not on file   Housing Stability: Not on file       REVIEW OF SYSTEMS: Patient Supplied Answers to Review of Systems   ENT ROS 12/27/2021   Constitutional -   Neurology -   Ears, Nose, Throat Ringing/noise in ears   Musculoskeletal -   Allergy/Immunology -   Hematologic -            The remainder of the 10 point ROS is negative    PHYSICIAL EXAMINATION:  Constitutional: The patient was well-groomed and in no acute distress.   Skin: Warm and pink.  Psychiatric: The patient's affect was calm, cooperative, and appropriate.   Respiratory: Breathing comfortably without stridor or exertion of accessory muscles.  Eyes: Pupils were equal and reactive. Extraocular movement intact.   Head: Normocephalic and atraumatic. No lesions or scars.  Ears: Both  ears examined on the microscope.  Right side was cleaned of mild cerumen.  He does have exostosis of the superior medial canal and significant inferior canal.  I can see the tympanic membrane which looks normal in position in color.  No active drainage noted.  The left side was cleaned and again has significant exostosis especially on the inferior medial aspect.  Portion of the tympanic membrane can be seen superiorly and looks intact with good color.  Nose: Sinuses were nontender. Anterior rhinoscopy revealed midline septum and absence of purulence or polyps.  Oral Cavity: Normal tongue, floor of mouth, buccal mucosa, and palate. No lesions or masses on inspection or palpation. No abnormal lymph tissue in the oropharynx.   Neck: The parotid is soft without masses. Supple with normal laryngeal and tracheal landmarks.   Lymphatic: There is no palpable lymphadenopathy or other masses in the neck.   Neurologic: Alert and oriented x 3. Cranial nerves III-XI within normal limits. Voice quality normal.  Cerebellar Function Tests:  Grossly normal    Audiogram: Audiogram performed shows a right high-frequency sensorineural hearing loss above 1500 Hz.  Maintains good discrimination at 92%.  The left ear shows a significant sensorineural hearing loss with discrimination only at 32%.      IMPRESSION AND PLAN:   1. Bilateral external auditory canal exostosis: Discussed these with him in detail, answered multiple questions.  We need to monitor this.  He has a left sensorineural hearing loss and has a hearing aid for that but not really helpful.  Discussed option for crossover hearing aid if he like to get benefit from the left side.  He feels doing okay on the right side has a hearing aid there.  Exostosis may finally closed and we need to monitor that.  If so would have to open it on the right side with surgery.  For now is happy monitoring it and really does not want to have any done.  Recommend follow-up in 6 months and then  yearly if remaining stable.  Follow-up sooner with concerns or problems.  2. Left sensorineural hearing loss: Discussed with him, has had multiple imaging studies which have all been negative from an ear standpoint.  Recommend we monitor for now.  3. Right sensorineural hearing loss: Maximize benefit from hearing aids.  4. Bilateral tinnitus: No treatment needed, monitor.    Thank you very much for the opportunity to participate in the care of your patient.    Rick L Nissen MD

## 2022-01-11 ENCOUNTER — LAB (OUTPATIENT)
Dept: LAB | Facility: CLINIC | Age: 72
End: 2022-01-11
Attending: NURSE PRACTITIONER
Payer: MEDICARE

## 2022-01-11 ENCOUNTER — OFFICE VISIT (OUTPATIENT)
Dept: OPHTHALMOLOGY | Facility: CLINIC | Age: 72
End: 2022-01-11
Payer: MEDICARE

## 2022-01-11 DIAGNOSIS — H02.235 PARALYTIC LAGOPHTHALMOS OF LEFT LOWER EYELID: ICD-10-CM

## 2022-01-11 DIAGNOSIS — E03.9 HYPOTHYROIDISM, UNSPECIFIED TYPE: ICD-10-CM

## 2022-01-11 DIAGNOSIS — C09.9 TONSILLAR CANCER (H): ICD-10-CM

## 2022-01-11 DIAGNOSIS — H02.109 ECTROPION: Primary | ICD-10-CM

## 2022-01-11 DIAGNOSIS — C09.9 SQUAMOUS CELL CARCINOMA OF TONSIL (H): ICD-10-CM

## 2022-01-11 DIAGNOSIS — H02.056 TRICHIASIS OF LEFT EYE WITHOUT ENTROPION: ICD-10-CM

## 2022-01-11 DIAGNOSIS — H16.212 EXPOSURE KERATOPATHY, LEFT: ICD-10-CM

## 2022-01-11 LAB
ALBUMIN SERPL-MCNC: 3.8 G/DL (ref 3.4–5)
ALP SERPL-CCNC: 82 U/L (ref 40–150)
ALT SERPL W P-5'-P-CCNC: 21 U/L (ref 0–70)
ANION GAP SERPL CALCULATED.3IONS-SCNC: 6 MMOL/L (ref 3–14)
AST SERPL W P-5'-P-CCNC: 11 U/L (ref 0–45)
BASOPHILS # BLD AUTO: 0 10E3/UL (ref 0–0.2)
BASOPHILS NFR BLD AUTO: 0 %
BILIRUB SERPL-MCNC: 0.4 MG/DL (ref 0.2–1.3)
BUN SERPL-MCNC: 13 MG/DL (ref 7–30)
CALCIUM SERPL-MCNC: 9.1 MG/DL (ref 8.5–10.1)
CHLORIDE BLD-SCNC: 104 MMOL/L (ref 94–109)
CO2 SERPL-SCNC: 28 MMOL/L (ref 20–32)
CREAT SERPL-MCNC: 0.99 MG/DL (ref 0.66–1.25)
EOSINOPHIL # BLD AUTO: 0.3 10E3/UL (ref 0–0.7)
EOSINOPHIL NFR BLD AUTO: 3 %
ERYTHROCYTE [DISTWIDTH] IN BLOOD BY AUTOMATED COUNT: 13.2 % (ref 10–15)
GFR SERPL CREATININE-BSD FRML MDRD: 82 ML/MIN/1.73M2
GLUCOSE BLD-MCNC: 184 MG/DL (ref 70–99)
HCT VFR BLD AUTO: 38.4 % (ref 40–53)
HGB BLD-MCNC: 13 G/DL (ref 13.3–17.7)
IMM GRANULOCYTES # BLD: 0 10E3/UL
IMM GRANULOCYTES NFR BLD: 0 %
LYMPHOCYTES # BLD AUTO: 0.6 10E3/UL (ref 0.8–5.3)
LYMPHOCYTES NFR BLD AUTO: 7 %
MCH RBC QN AUTO: 31.6 PG (ref 26.5–33)
MCHC RBC AUTO-ENTMCNC: 33.9 G/DL (ref 31.5–36.5)
MCV RBC AUTO: 93 FL (ref 78–100)
MONOCYTES # BLD AUTO: 0.8 10E3/UL (ref 0–1.3)
MONOCYTES NFR BLD AUTO: 10 %
NEUTROPHILS # BLD AUTO: 6.2 10E3/UL (ref 1.6–8.3)
NEUTROPHILS NFR BLD AUTO: 80 %
NRBC # BLD AUTO: 0 10E3/UL
NRBC BLD AUTO-RTO: 0 /100
PLATELET # BLD AUTO: 229 10E3/UL (ref 150–450)
POTASSIUM BLD-SCNC: 4.1 MMOL/L (ref 3.4–5.3)
PROT SERPL-MCNC: 6.8 G/DL (ref 6.8–8.8)
RBC # BLD AUTO: 4.12 10E6/UL (ref 4.4–5.9)
SODIUM SERPL-SCNC: 138 MMOL/L (ref 133–144)
TSH SERPL DL<=0.005 MIU/L-ACNC: 2.46 MU/L (ref 0.4–4)
WBC # BLD AUTO: 7.8 10E3/UL (ref 4–11)

## 2022-01-11 PROCEDURE — 67710 SEVERING TARSORRHAPHY: CPT | Mod: 51 | Performed by: OPHTHALMOLOGY

## 2022-01-11 PROCEDURE — 36415 COLL VENOUS BLD VENIPUNCTURE: CPT | Performed by: PATHOLOGY

## 2022-01-11 PROCEDURE — 85025 COMPLETE CBC W/AUTO DIFF WBC: CPT | Performed by: PATHOLOGY

## 2022-01-11 PROCEDURE — 67917 REPAIR EYELID DEFECT: CPT | Mod: E2 | Performed by: OPHTHALMOLOGY

## 2022-01-11 PROCEDURE — 80053 COMPREHEN METABOLIC PANEL: CPT | Performed by: PATHOLOGY

## 2022-01-11 PROCEDURE — 84443 ASSAY THYROID STIM HORMONE: CPT | Performed by: PATHOLOGY

## 2022-01-11 RX ORDER — ERYTHROMYCIN 5 MG/G
OINTMENT OPHTHALMIC ONCE
Status: COMPLETED | OUTPATIENT
Start: 2022-01-11 | End: 2022-01-11

## 2022-01-11 RX ORDER — LIDOCAINE HYDROCHLORIDE AND EPINEPHRINE 10; 10 MG/ML; UG/ML
1 INJECTION, SOLUTION INFILTRATION; PERINEURAL ONCE
Status: COMPLETED | OUTPATIENT
Start: 2022-01-11 | End: 2022-01-11

## 2022-01-11 RX ADMIN — ERYTHROMYCIN 1 G: 5 OINTMENT OPHTHALMIC at 10:53

## 2022-01-11 RX ADMIN — LIDOCAINE HYDROCHLORIDE AND EPINEPHRINE 1 ML: 10; 10 INJECTION, SOLUTION INFILTRATION; PERINEURAL at 10:53

## 2022-01-11 NOTE — PROGRESS NOTES
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  He has previously undergone a tarsorrhaphy.  He is developed lower eyelid ectropion.  We discussed treatment options and he elected to proceed.    PROCEDURE: Fortino was brought to the minor room placed spine on the table.  Local anesthetic as above was infiltrated into the lateral canthal area on the left side.  He was prepped and draped in typical sterile fashion.  Attention was directed to the left side.  Hemostat was placed between the lashes of the upper and lower eyelid through the area of the tarsorrhaphy.  Mars scissor was used to sever the tarsorrhaphy.  About 8 mm of tissue was transected.  Lateral canthotomy and for Lysis was performed.  A tarsal strip was fashioned.  The strip was shortened about 4 mm.  Posterior lamella was de-epithelialized.  The strip was imbricated with 5-0 PDS suture.  PDS suture was then secured to the superior lateral orbital rim in the area of Whitnall's tubercle.  The upper eyelid was then noted to have an area of lash ptosis and trichiasis temporally.  A small area of lateral axis was excised and then an incision was made just superior to the lash line and a small strip of skin and orbicularis was excised.  6-0 Vicryl suture was passed through the skin and orbicularis and secured to the superior edge of the tarsal plate tying this down everted the upper lashes enough to pull them off of the globe.  Skin was closed with 6-0 plain gut sutures.  He  tolerated the procedure well erythromycin abdominal comment was given to the patient but not placing his eye as he was driving home.  I was present for the entire procedure.      Vivien Yoon MD  This report was dictated using Dragon voice recognition software.

## 2022-01-11 NOTE — PATIENT INSTRUCTIONS
Post-operative Instructions    Ophthalmic Plastic and Reconstructive Surgery  Vivien Yoon M.D.    All instructions apply to the operated eye(s) or eyelid(s).  Wound care and personal care    ? Apply ice compresses 15 minutes on 15 minutes off while awake for 2 days, then switch to warm water compresses 4 times a day until seen by your physician. For warm packs you can place a cup of dry uncooked rice in a clean cotton sock. Then place sock in microwave 30 seconds to one minute. Next place the warm sock into a plastic bag and wrap the bag with clean warm wet washcloth and place over operated eye.      You may shower or wash your hair the day after surgery. Do not bathe or go swimming for 1 week to prevent contamination of your wounds.    Expect some swelling, bruising, black eye (even into the lower eyelids and cheeks). Also expect serum caking, crusting and discharge from the eye and/or incisions. A small amount of surface bleeding is normal for the first 48 hours. Swelling will typically worsen over the first 48-72 hours.  Your eye(s) and eyelid(s) may be painful and tender. This is normal after surgery.    ? Apply antibiotic ointment to all sutures three times a day, and into the operated eye(s) at night.

## 2022-01-12 ENCOUNTER — TELEPHONE (OUTPATIENT)
Dept: OPHTHALMOLOGY | Facility: CLINIC | Age: 72
End: 2022-01-12
Payer: MEDICARE

## 2022-01-12 NOTE — TELEPHONE ENCOUNTER
Reviewed with Dr. Rendon    Spoke to pt at 1130    Reviewed would hold on dental work which in invasive/cause gum bleeding for about a month to help prevent any concerns of infection in post-op period.    Reviewed ok to have evaluation only tomorrow to review jaw concerns and review future plan for treatment.    Pt states will hold on cavity work if indicated at visit tomorrow and have consult only for the jaw concerns    Pt aware may reach out if needing any urgent/emergent dental work.    Live Arthur RN 11:36 AM 01/13/22              Left severing of tarsorrhaphy, left lower eyelid ectropion repair--performed yesterday     Reviewed no contraindication to fly per ophthalmology following procedure above.    Pt states having very difficult time with mouth/jaw opening up and making it difficult to eat.    Pt states has dental appt this Friday set up and was previously told to hold one month by Dr. Puga.  But, pt did not express his severity of concern to see dentist and would like to re-review an Ok to f/u this Friday with dental    Note to Dr. Rendon/care coordinator to assist in confirming    Live Arthur RN 2:13 PM 01/12/22             Health Call Center    Phone Message    May a detailed message be left on voicemail: yes     Reason for Call: Other: pt wanted to know if he could fly in a plane and restrictions after his Procedure, please call pt back to discuss    Thank you,    Action Taken: Message routed to:  [facility] eye    Travel Screening: Not Applicable

## 2022-01-14 ENCOUNTER — TELEPHONE (OUTPATIENT)
Dept: OPHTHALMOLOGY | Facility: CLINIC | Age: 72
End: 2022-01-14
Payer: MEDICARE

## 2022-01-14 ENCOUNTER — PATIENT OUTREACH (OUTPATIENT)
Dept: OTOLARYNGOLOGY | Facility: CLINIC | Age: 72
End: 2022-01-14
Payer: MEDICARE

## 2022-01-14 NOTE — PROGRESS NOTES
Received a call from patient indicating he is having a hard time with mouth opening and swallowing.  He feels that these symptoms are slowly progressing over time. He is able to maintain his weight and hydration currently with protein shakes and liquids but he would like to have this evaluated.     Discussed with patient that he can be seen by Dr. Russell but patient indicates that he is leaving tomorrow for [place] for a week. He would like to come to clinic later next week for evaluation. He was agreeable to see [facility] for evaluation on Friday 1/21.    Patient was advised that if swallowing is worsening enough where he is unable to eat or drink enough to maintain calories and hydration, he needs to seek care more urgently in [place] if needed. Patient verbalized understanding and was encouraged to call with further questions or concerns.       Almaz Sorto, RN, BSN

## 2022-01-14 NOTE — TELEPHONE ENCOUNTER
[facility] Call Center    Phone Message    May a detailed message be left on voicemail: yes     Reason for Call: Other: Pt had procedure done Tues. 1/11, and is experiencing some light bleeding from left eye.      Action Taken: Message routed to:  [facility] EYE    Travel Screening: Not Applicable     Left VM with patient to call back to attain more information, otherwise to call  [phone] and ask for on call eye doctor if concerned or bleeding continues.     Kip Santoyo COT 2:47 PM January 14, 2022              Patient called at 3:05p  And I spoke with him regarding the blood in LE. He says he had small amount of active bleeding from  LE upon waking today.  Had stitches removed on 1-11.  The bleeding seems to have stopped now and he denied pain, pressure or vision loss.   Patient is heading out of town tomorrow but will keep tabs on LE. I provided  [phone] for him to call if he needs to get hold of on call provider.    Kip Santoyo COT 3:42 PM January 14, 2022

## 2022-01-16 NOTE — TELEPHONE ENCOUNTER
Called patient 1/16/22, no answer. Left voicemail that we will try to call back at another time.     Jeff Pichardo MD  Resident Physician, PGY-2  Ophthalmology

## 2022-01-16 NOTE — TELEPHONE ENCOUNTER
----- Message from Jennifer Sullivan sent at 1/15/2022  7:41 AM CST -----  Contact: [phone]    ----- Message -----  From: Karely Vaughn  Sent: 1/14/2022   3:07 PM CST  To: Vivien Yoon MD, [address] Paul called with concerns of the amount of blood he is loosing after surgery. He states he not in any pain or discomfort. He would like some to call him to discuss it.    Thanks  Karely

## 2022-01-17 NOTE — TELEPHONE ENCOUNTER
I spoke to the patient and he has had his questions answered.  He appreciated the return phone call

## 2022-01-17 NOTE — TELEPHONE ENCOUNTER
Patient states that his left eyelid is bleeding when he wakes up in the morning after sleeping on his left side. Advised that he avoid sleeping on his side, as he is likely irritating the sutures and recently operated eyelid. Recommend that he hold gentle pressure to stop the bleeding. Requested that pt send in photos of his eyelid to ensure sutures have not come undone. Still waiting for photos.

## 2022-01-21 ENCOUNTER — THERAPY VISIT (OUTPATIENT)
Dept: SPEECH THERAPY | Facility: CLINIC | Age: 72
End: 2022-01-21
Payer: MEDICARE

## 2022-01-21 ENCOUNTER — OFFICE VISIT (OUTPATIENT)
Dept: OTOLARYNGOLOGY | Facility: CLINIC | Age: 72
End: 2022-01-21
Payer: MEDICARE

## 2022-01-21 VITALS
HEIGHT: 70 IN | BODY MASS INDEX: 26.77 KG/M2 | SYSTOLIC BLOOD PRESSURE: 116 MMHG | OXYGEN SATURATION: 97 % | WEIGHT: 187 LBS | HEART RATE: 72 BPM | DIASTOLIC BLOOD PRESSURE: 73 MMHG | TEMPERATURE: 97.2 F
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
## 2022-01-21 DIAGNOSIS — C09.9 SQUAMOUS CELL CARCINOMA OF TONSIL (H): ICD-10-CM

## 2022-01-21 DIAGNOSIS — C09.9 MALIGNANT NEOPLASM OF TONSIL (H): Primary | ICD-10-CM

## 2022-01-21 DIAGNOSIS — R13.19 OTHER DYSPHAGIA: ICD-10-CM

## 2022-01-21 DIAGNOSIS — M87.30 OSTEORADIONECROSIS (H): ICD-10-CM

## 2022-01-21 DIAGNOSIS — R13.12 OROPHARYNGEAL DYSPHAGIA: Primary | ICD-10-CM

## 2022-01-21 DIAGNOSIS — R25.2 TRISMUS: ICD-10-CM

## 2022-01-21 PROCEDURE — 31575 DIAGNOSTIC LARYNGOSCOPY: CPT | Performed by: REGISTERED NURSE

## 2022-01-21 PROCEDURE — 99214 OFFICE O/P EST MOD 30 MIN: CPT | Mod: 25 | Performed by: REGISTERED NURSE

## 2022-01-21 PROCEDURE — 92610 EVALUATE SWALLOWING FUNCTION: CPT | Mod: GN | Performed by: SPEECH-LANGUAGE PATHOLOGIST

## 2022-01-21 PROCEDURE — 92526 ORAL FUNCTION THERAPY: CPT | Mod: GN | Performed by: SPEECH-LANGUAGE PATHOLOGIST

## 2022-01-21 RX ORDER — AMOXICILLIN AND CLAVULANATE POTASSIUM 400; 57 MG/5ML; MG/5ML
875 POWDER, FOR SUSPENSION ORAL 2 TIMES DAILY
Qty: 218 ML | Refills: 0 | Status: SHIPPED | OUTPATIENT
Start: 2022-01-21 | End: 2022-02-17

## 2022-01-21 ASSESSMENT — MIFFLIN-ST. JEOR: SCORE: 1614.48

## 2022-01-21 ASSESSMENT — PAIN SCALES - GENERAL: PAINLEVEL: MODERATE PAIN (4)

## 2022-01-21 NOTE — LETTER
1/21/2022       RE: Fortino Moya  [address]  [facility] [address]     Dear Colleague,    Thank you for referring your patient, Fortino Moya, to the [facility] [place] [facility]. Please see a copy of my visit note below.    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  He had symptoms of numbness in all three divisions of his fifth cranial nerve.  He then received two cycles of induction chemotherapy with TPF completed on 02/28/2021.  His post-induction imaging suggests a near complete response.  He then received definitive chemoradiotherapy with weekly cisplatin and a total of 6600 cGy finished on 05/07/2021. 3 month post treatment PET/CT on 8/16/21 showed no evidence of local recurrence or metastatic disease.    Interval History:   Patient comes in today for due to worsening mouth opening and difficulty swallowing. Patient reports a worsening radiating pain across the left TMJ, down jaw. He has been seeing a dentist for concern of dental cause. He is scheduled to see an endodontist next week for evaluation. Also reports more difficulty opening mouth and chewing food. He admits that he is not doing the swallowing and jaw exercises given to him by SLP. He is maintaining his weight with primarily protein drinks. Has trouble with water coming out of his nose when he tries to drink, thicker liquids are better.     Patient denies any odynophagia, new sore throat, bleeding from the mouth, hemoptysis, voice changes or lumps/bumps of the neck.    Past Medical History:  Past Medical  History:   Diagnosis Date     Arrhythmia     A FIB     Atrial fibrillation (H)      Coronary artery disease 6/15/2021    A Fib     Dysphagia      Hearing problem 1995     History of radiation therapy 1/21     Hypercholesterolemia      Hypertension      Paralytic lagophthalmos      Primary squamous cell carcinoma of tonsil (H)        Past Surgical History:  Past Surgical History:   Procedure Laterality Date     GI SURGERY       IMPLANT GOLD WEIGHT EYELID Left 4/2/2021    Procedure: Left upper eyelid gold or platinum weight insertion for lagophthalmos - 1.6 grams;  Surgeon: Vivien Yoon MD;  Location: [facility] OR     IR CHEST PORT PLACEMENT > 5 YRS OF AGE  2/3/2021     IR GASTROSTOMY TUBE PERCUTANEOUS PLCMNT  3/10/2021     IR PORT REMOVAL RIGHT  9/2/2021     JOINT REPLACEMENT       JOINT REPLACEMENT       LASIK Bilateral      ORTHOPEDIC SURGERY       REPAIR ECTROPION Left 4/2/2021    Procedure: Left lower eyelid ectropion repair;  Surgeon: Vivien Yoon MD;  Location: [facility] OR     REPAIR PTOSIS Left      VASCULAR SURGERY         Medications:  Current Outpatient Medications   Medication Sig Dispense Refill     acetaminophen (TYLENOL) 160 MG/5ML suspension Take 31.5 mLs (1,000 mg) by mouth every 8 hours as needed for fever or mild pain 500 mL 3     atorvastatin (LIPITOR) 10 MG tablet Take 10 mg by mouth every evening        carvedilol (COREG) 12.5 MG tablet 2 times daily        cevimeline (EVOXAC) 30 MG capsule Take 1 capsule (30 mg) by mouth 2 times daily 90 capsule 3     erythromycin (ROMYCIN) 5 MG/GM ophthalmic ointment        levothyroxine (SYNTHROID/LEVOTHROID) 50 MCG tablet Take 1 tablet (50 mcg) by mouth daily 90 tablet 3     lisinopril (ZESTRIL) 10 MG tablet        rivaroxaban ANTICOAGULANT (XARELTO) 20 MG TABS tablet        Sodium Fluoride 1.1 % PSTE Apply 1 Application to affected area daily 112 g 11     traZODone (DESYREL) 50 MG tablet        Allergies:  No Known Allergies     Social History:  Social  History     Tobacco Use     Smoking status: Never Smoker     Smokeless tobacco: Never Used   Vaping Use     Vaping Use: Never used   Substance Use Topics     Alcohol use: Yes     Comment: 3 beers/week     Drug use: Not Currently     ROS: 10 point ROS neg other than the symptoms noted above in the HPI.    Physical Exam:    There were no vitals taken for this visit.  Wt Readings from Last 3 Encounters:   12/28/21 85.3 kg (188 lb)   11/15/21 85.6 kg (188 lb 11.2 oz)   09/22/21 79.4 kg (175 lb)        Constitutional:  The patient was unaccompanied, well-groomed, and in no acute distress.     Neurologic: Alert and oriented x 3.     Psychiatric: The patient's affect was calm, cooperative, and appropriate.    Communication:  Normal; communicates verbally, normal voice quality.   Respiratory: Breathing comfortably without stridor or exertion of accessory muscles.    Head/Face:  Normocephalic and atraumatic.  Left ptosis with facial weakness.    Ears: Pinnae and tragus non-tender.    Oral Cavity: Limited opening, trismus present. Significant xerostomia. Two smmall superficial ulcerations on left lateral tongue without bleeding. Tenderness in left lateral tongue with scarring in posterior tongue with palpation.     Oropharynx: 5 mm area of exposed bone on left soft palate just posterior to the retromolar trigone.    Neck: Significant fibrosis on left side with moderate fibrosis of right neck. Normal range of motion.   Lymphatic: There is no palpable lymphadenopathy in the neck.          Flexible fiberoptic laryngoscopy: Scope exam was indicated due to history of oropharyngeal cancer. Verbal consent was obtained. The nasal cavity was prepped with an aerosolized solution of topical anesthetic and vasoconstrictive agent. The scope was passed through the anterior nasal cavity and advanced. Inspection of the nasopharynx revealed no gross abnormality. The base of tongue and vallecula are normal without lesions. The epiglottis, AE  folds, and arytenoids slightly edematous consistent with radiation changes. Inspection of the larynx revealed bilaterally mobile vocal cords. Unable to adequately visualize pyriform sinuses. The airway is patent. Procedure tolerated well with no immediate complications noted.    Labs and Imaging Reviewed:  Imaging:  PET/CT  8/16/21  IMPRESSION: In this patient with 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 No evidence of local recurrence, or metastatic disease of the  chest, abdomen or pelvis.  2. Stable incidental findings of coronary artery atherosclerotic  calcifications, degenerative change of the spine, and scattered sub-6  mm solid pulmonary nodules.    Labs:  TSH   Date Value Ref Range Status   01/11/2022 2.46 0.40 - 4.00 mU/L Final       Assessment/Plan:  1. 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 There is no evidence of disease on today's exam. Patient is experiencing post treatment side effect including dysphagia and trismus and will work with SLP today.    There is an area of exposed bone on the left palate concerning for ORN. Will have patient start Augmentin and salt/soda rinses. Recommend follow up with Dr. Russell to discuss debridement.     Will place letter in MyChart regarding dental recommendations after radiation.    Reviewed signs and symptoms that would necessitate a sooner exam including new sore throat, mouth pain, ear pain, dysphagia, bleeding from the mouth or lumps/bumps of the neck.    Otherwise, patient will follow up with Dr. Russell in 2-3 weeks for recheck. Patient is scheduled for surveillance CTs 2/16/22.    Mela Gutierrez DNP, APRN, CNP  Otolaryngology  Head & Neck  Surgery  [phone]    45 minutes spent on the date of the encounter doing chart review, history and exam, documentation and further activities per the note        Again, thank you for allowing me to participate in the care of your patient.      Sincerely,    Yandy Gutierrez NP

## 2022-01-21 NOTE — LETTER
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  He then received two cycles of induction chemotherapy with TPF completed on 02/28/2021. He then received definitive chemoradiotherapy with weekly cisplatin and a total of 6600 cGy finished on 05/07/2021.    Due to the significant amount of radiation that he has received, Mr. Moya is at significant risk for poor healing and osteoradionecrosis of the maxilla and mandible. If dental work or extractions are recommended, please contact his Head and Neck Cancer treatment team prior to any procedures. Please contact Almaz Sorto RN Care Coordinator, at [phone] with any questions or concerns regarding patient.    Sincerely,       Mela Gutierrez DNP, APRN, CNP.

## 2022-01-21 NOTE — NURSING NOTE
"Chief Complaint   Patient presents with     RECHECK     dysphagia        Blood pressure 116/73, pulse 72, temperature 97.2  F (36.2  C), height 1.778 m (5' 10\"), weight 84.8 kg (187 lb), head circumference 177.8 cm (70\"), SpO2 97 %.    Frank Moreau LPN    "

## 2022-01-21 NOTE — PROGRESS NOTES
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  He had symptoms of numbness in all three divisions of his fifth cranial nerve.  He then received two cycles of induction chemotherapy with TPF completed on 02/28/2021.  His post-induction imaging suggests a near complete response.  He then received definitive chemoradiotherapy with weekly cisplatin and a total of 6600 cGy finished on 05/07/2021. 3 month post treatment PET/CT on 8/16/21 showed no evidence of local recurrence or metastatic disease.    Interval History:   Patient comes in today for due to worsening mouth opening and difficulty swallowing. Patient reports a worsening radiating pain across the left TMJ, down jaw. He has been seeing a dentist for concern of dental cause. He is scheduled to see an endodontist next week for evaluation. Also reports more difficulty opening mouth and chewing food. He admits that he is not doing the swallowing and jaw exercises given to him by SLP. He is maintaining his weight with primarily protein drinks. Has trouble with water coming out of his nose when he tries to drink, thicker liquids are better.     Patient denies any odynophagia, new sore throat, bleeding from the mouth, hemoptysis, voice changes or lumps/bumps of the neck.    Past Medical History:  Past Medical History:   Diagnosis Date     Arrhythmia     A FIB     Atrial fibrillation (H)      Coronary artery disease 6/15/2021    A Fib     Dysphagia      Hearing problem 1995     History of radiation therapy 1/21     Hypercholesterolemia      Hypertension      Paralytic lagophthalmos      Primary squamous cell carcinoma of tonsil  (H)        Past Surgical History:  Past Surgical History:   Procedure Laterality Date     GI SURGERY       IMPLANT GOLD WEIGHT EYELID Left 4/2/2021    Procedure: Left upper eyelid gold or platinum weight insertion for lagophthalmos - 1.6 grams;  Surgeon: Vivien Yoon MD;  Location: [facility] OR     IR CHEST PORT PLACEMENT > 5 YRS OF AGE  2/3/2021     IR GASTROSTOMY TUBE PERCUTANEOUS PLCMNT  3/10/2021     IR PORT REMOVAL RIGHT  9/2/2021     JOINT REPLACEMENT       JOINT REPLACEMENT       LASIK Bilateral      ORTHOPEDIC SURGERY       REPAIR ECTROPION Left 4/2/2021    Procedure: Left lower eyelid ectropion repair;  Surgeon: Vivien Yoon MD;  Location: [facility] OR     REPAIR PTOSIS Left      VASCULAR SURGERY         Medications:  Current Outpatient Medications   Medication Sig Dispense Refill     acetaminophen (TYLENOL) 160 MG/5ML suspension Take 31.5 mLs (1,000 mg) by mouth every 8 hours as needed for fever or mild pain 500 mL 3     atorvastatin (LIPITOR) 10 MG tablet Take 10 mg by mouth every evening        carvedilol (COREG) 12.5 MG tablet 2 times daily        cevimeline (EVOXAC) 30 MG capsule Take 1 capsule (30 mg) by mouth 2 times daily 90 capsule 3     erythromycin (ROMYCIN) 5 MG/GM ophthalmic ointment        levothyroxine (SYNTHROID/LEVOTHROID) 50 MCG tablet Take 1 tablet (50 mcg) by mouth daily 90 tablet 3     lisinopril (ZESTRIL) 10 MG tablet        rivaroxaban ANTICOAGULANT (XARELTO) 20 MG TABS tablet        Sodium Fluoride 1.1 % PSTE Apply 1 Application to affected area daily 112 g 11     traZODone (DESYREL) 50 MG tablet        Allergies:  No Known Allergies     Social History:  Social History     Tobacco Use     Smoking status: Never Smoker     Smokeless tobacco: Never Used   Vaping Use     Vaping Use: Never used   Substance Use Topics     Alcohol use: Yes     Comment: 3 beers/week     Drug use: Not Currently     ROS: 10 point ROS neg other than the symptoms noted above in the HPI.    Physical Exam:    There  were no vitals taken for this visit.  Wt Readings from Last 3 Encounters:   12/28/21 85.3 kg (188 lb)   11/15/21 85.6 kg (188 lb 11.2 oz)   09/22/21 79.4 kg (175 lb)        Constitutional:  The patient was unaccompanied, well-groomed, and in no acute distress.     Neurologic: Alert and oriented x 3.     Psychiatric: The patient's affect was calm, cooperative, and appropriate.    Communication:  Normal; communicates verbally, normal voice quality.   Respiratory: Breathing comfortably without stridor or exertion of accessory muscles.    Head/Face:  Normocephalic and atraumatic.  Left ptosis with facial weakness.    Ears: Pinnae and tragus non-tender.    Oral Cavity: Limited opening, trismus present. Significant xerostomia. Two smmall superficial ulcerations on left lateral tongue without bleeding. Tenderness in left lateral tongue with scarring in posterior tongue with palpation.     Oropharynx: 5 mm area of exposed bone on left soft palate just posterior to the retromolar trigone.    Neck: Significant fibrosis on left side with moderate fibrosis of right neck. Normal range of motion.   Lymphatic: There is no palpable lymphadenopathy in the neck.          Flexible fiberoptic laryngoscopy: Scope exam was indicated due to history of oropharyngeal cancer. Verbal consent was obtained. The nasal cavity was prepped with an aerosolized solution of topical anesthetic and vasoconstrictive agent. The scope was passed through the anterior nasal cavity and advanced. Inspection of the nasopharynx revealed no gross abnormality. The base of tongue and vallecula are normal without lesions. The epiglottis, AE folds, and arytenoids slightly edematous consistent with radiation changes. Inspection of the larynx revealed bilaterally mobile vocal cords. Unable to adequately visualize pyriform sinuses. The airway is patent. Procedure tolerated well with no immediate complications noted.    Labs and Imaging  Reviewed:  Imaging:  PET/CT  8/16/21  IMPRESSION: In this patient with 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 No evidence of local recurrence, or metastatic disease of the  chest, abdomen or pelvis.  2. Stable incidental findings of coronary artery atherosclerotic  calcifications, degenerative change of the spine, and scattered sub-6  mm solid pulmonary nodules.    Labs:  TSH   Date Value Ref Range Status   01/11/2022 2.46 0.40 - 4.00 mU/L Final       Assessment/Plan:  1. 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 There is no evidence of disease on today's exam. Patient is experiencing post treatment side effect including dysphagia and trismus and will work with SLP today.    There is an area of exposed bone on the left palate concerning for ORN. Will have patient start Augmentin and salt/soda rinses. Recommend follow up with Dr. Russell to discuss debridement.     Will place letter in MyChart regarding dental recommendations after radiation.    Reviewed signs and symptoms that would necessitate a sooner exam including new sore throat, mouth pain, ear pain, dysphagia, bleeding from the mouth or lumps/bumps of the neck.    Otherwise, patient will follow up with Dr. Russell in 2-3 weeks for recheck. Patient is scheduled for surveillance CTs 2/16/22.    Mela Gutierrez DNP, APRN, CNP  Otolaryngology  Head & Neck Surgery  [phone]    45 minutes spent on the date of the encounter doing chart review, history and exam, documentation and further activities per the note

## 2022-01-21 NOTE — PATIENT INSTRUCTIONS
- You were seen in the ENT Clinic today by Mela Gutierrez NP.   - Start antibiotic for exposed bone.   - Follow up in 2 weeks with Dr. Russell for assessment.  - Use salt and soda rinses. Mix 1/4 teaspoon of baking soda and 1/8 teaspoon of salt in 1 cup of warm water. Stir it up. Then swish it around in your mouth and spit it out. Complete 2-4 times per day.  - Please send a [employer] message or call the ENT clinic at [phone] with any questions or concerns.

## 2022-01-24 NOTE — PROGRESS NOTES
Speech-Language Pathology Department   EVALUATION  [facility]  Clinical Swallow Evaluation    01/21/22 2318   General Information   Type Of Visit Initial   Start Of Care Date 01/21/22   Referring Physician Mela Gutierrez, ROBERT   Orders Evaluate And Treat   Orders Comment Clinical Swallow Evaluation   Medical Diagnosis SCC tonsil; oropharyngeal dysphagia   Precautions/limitations No Known Precautions/limitations   Hearing Functional in 1:1 seting   Pertinent History of Current Problem/OT: Additional Occupational Profile Info Fortino Moya is a 71 year old male with PMH atrial fibrillation, hypercholesterolemia, HTN and [id number] p16+ SCC left tonsil s/p chemoXRT in [place] in 2018. He recently presented with recurrent disease in ipsilateral pterygoids extending to the skull base with invasion of the cavernous sinus and track along CN V to the brainstem. He did two cycles of induction chemo from 1/28/21-2/18/21 and then underwent chemoXRT from 3/24/21 to 5/7/21. 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 He previously worked with speech and was able to get his PEG out. He was discharged from SLP services in the fall of 2021 with recommendation to continue home exercise program. He presents today in conjunction with ENT clinic visit and is seen per DNP request for worsening trouble swallowing. Reports gradual increase in difficulty swallowing and jaw ROM. He stopped the exercises. Reports he is mainly doing just liquids right now. Reports he is frustrated thin liquids are coming out his nose with occasional coughing. Solids are getting stuck, but are washed down with a liquid wash. Sometimes he has to bring them back up and chew them again. He is struggling to get bites into his mouth d/t trismus.    Respiratory Status Room air   Prior Level Of Function Swallowing   Prior Level Of Function Comment Mainly liquids   General Observations Pt  pleasant and cooperative throughout evaluation   Patient/family Goals To eat and drink without difficulty; increase jaw ROM   Clinical Swallow Evaluation   Oral Musculature anomalies present   Dentition present and adequate   Mucosal Quality dry   Mandibular Strength and Mobility impaired  (22mm)   Oral Labial Strength and Mobility other (see comments)  (left sided impairment)   Lingual Strength and Mobility other (see comments)  (left tongue deviation)   Velar Elevation impaired   Buccal Strength and Mobility impaired   Laryngeal Function Cough;Swallow;Voicing initiated   Oral Musculature Comments Trismus measuring at 22mm; left sided deficits   Additional Documentation Yes   Clinical Swallow Eval: Thin Liquid Texture Trial   Mode of Presentation, Thin Liquids other (see comments)  (PO trials held-pt's throat was anesthetized for scope exam)   Diagnostic Statement PO trials held as pt's throat was anesthetized prior to assessment for DNP's scope exam. Dry swallow palpated with reduced hyolaryngeal excursion and mild difficulty triggering swallow response.   Educational Assessment   Barriers to Learning No barriers   General Therapy Interventions   Planned Therapy Interventions Dysphagia Treatment   Dysphagia treatment Oropharyngeal exercise training;Modified diet education;Instruction of safe swallow strategies;Compensatory strategies for swallowing   Swallow Eval: Clinical Impressions   Skilled Criteria for Therapy Intervention Skilled criteria met.  Treatment indicated.   Dysphagia Outcome Severity Scale (LEROY) Level 4 - LEROY   Treatment Diagnosis Mild to moderate oropharyngeal dysphagia   Diet texture recommendations Soft & Bite Sized diet (level 6);Thin liquids (level 0)   Recommended Feeding/Eating Techniques alternate between small bites and sips of food/liquid;maintain upright posture during/after eating for 30 mins;small sips/bites;other (see comments)  (oral cares, slow pace)   Rehab Potential fair, will  monitor progress closely   Predicted Duration of Therapy Intervention (days/wks) 2x/month for 6 months   Anticipated Discharge Disposition home w/ outpatient services   Risks and Benefits of Treatment have been explained. Yes   Patient, family and/or staff in agreement with Plan of Care Yes   Clinical Impression Comments Pt presents today with mild to moderate oropharyngeal dysphagia characterized by trismus and pharyngeal weakness related to radiation induced fibrosis. Oral motor examination reveals trismus with maximium opening of 22mm; left sided oral labial and lingual deficits. PO trials held as pt's throat was anesthetized prior to assessment for DNP's scope exam. Dry swallow palpated with reduced hyolaryngeal excursion and mild difficulty triggering swallow response. Recommend pt try to start introducing soft and bite sized solids and thin liquids with use of safe swallow strategies: small sips/bites, alternate solids and liquids, slow pace, frequent and thorough oral cares. Pt would benefit from a skilled course of speech therapy services to ensure diet tolerance and maximize swallow function by training oropharyngeal and jaw strengthening/ROM exercises given risk of worsening radiation induced fibrosis, which can lead to progressive dysphagia.    Swallow Goals   SLP Swallow Goals 1;2;3   Swallow Goal 1   Goal Identifier PO   Goal Description 1. Pt will tolerate least restrictive diet with thin liquids with no overt clinical s/sx of penetration/aspiration in 9/10 PO trials and no adverse pulmonary events over a 3 month period.    Target Date 04/21/22   Swallow Goal 2   Goal Identifier Exercises   Goal Description 2. Pt will maximize swallow function by completing 10 reps of 5/5 oropharyngeal and jaw strengthening and ROM exercises 3-5x/day with minimal written and/or verbal cues.    Target Date 04/21/22   Swallow Goal 3   Goal Identifier Jaw ROM   Goal Description 3. Pt will maximize jaw ROM to at least 35mm  as measured in at least 2 consecutive therapy sessions.    Target Date 04/21/22   Total Session Time   SLP Eval: oral/pharyngeal swallow function, clinical minutes ([id number]) 12   Total Evaluation Time 12   Therapy Certification   Certification date from 01/21/22   Certification date to 04/21/22   Medical Diagnosis Oropharyngeal dysphagia   Certification I certify the need for these services furnished under this plan of treatment and while under my care.  (Physician co-signature of this document indicates review and certification of the therapy plan).     Thank you for the referral of Fortino Moya. If you have any questions about this report, please contact me using the information below.     KATTY Salgado (emerson), MA, CCC-SLP   Speech Language Pathologist  [facility] Trained Vocologist   [facility] [place]  Dept. of Otolaryngology  Department of Rehabilitation Services  [address] [place] [id number]  Email: [email or web address]   Pronouns: she/her/hers

## 2022-01-24 NOTE — PROGRESS NOTES
OUTPATIENT SWALLOW  EVALUATION  PLAN OF TREATMENT FOR OUTPATIENT REHABILITATION  (COMPLETE FOR INITIAL CLAIMS ONLY)  Patient's Last Name, First Name, M.I.  YOB: 1950  GriffinFortino MILLER     Provider's Name   MIGUEL Salgado   Medical Record No.  [id number]     Start of Care Date:  01/21/22   Onset Date:      Type:     ___PT   ____OT  ___X_SLP Medical Diagnosis:  Oropharyngeal dysphagia     Treatment Diagnosis:  Mild to moderate oropharyngeal dysphagia Visits from SOC:  1     _________________________________________________________________________________  Plan of Treatment/Functional Goals:  Planned Therapy Interventions: Dysphagia Treatment  Dysphagia treatment: Oropharyngeal exercise training,Modified diet education,Instruction of safe swallow strategies,Compensatory strategies for swallowing                     Goals   1. Goal Identifier: PO       Goal Description: 1. Pt will tolerate least restrictive diet with thin liquids with no overt clinical s/sx of penetration/aspiration in 9/10 PO trials and no adverse pulmonary events over a 3 month period.        Target Date: 04/21/22           2. Goal Identifier: Exercises       Goal Description: 2. Pt will maximize swallow function by completing 10 reps of 5/5 oropharyngeal and jaw strengthening and ROM exercises 3-5x/day with minimal written and/or verbal cues.        Target Date: 04/21/22           3. Goal Identifier: Jaw ROM       Goal Description: 3. Pt will maximize jaw ROM to at least 35mm as measured in at least 2 consecutive therapy sessions.        Target Date: 04/21/22                         Predicted Duration of Therapy Intervention (days/wks): 2x/month for 6 months    MIGUEL Salgado       I CERTIFY THE NEED FOR THESE SERVICES FURNISHED UNDER        THIS PLAN OF TREATMENT AND WHILE UNDER MY CARE     (Physician attestation of this document indicates review and  certification of the therapy plan).                  Certification date from: 01/21/22 Certification date to: 04/21/22          Referring Physician: Mela Gutierrez DNP    Initial Assessment        See Epic Evaluation Start Of Care Date: 01/21/22

## 2022-01-25 ENCOUNTER — OFFICE VISIT (OUTPATIENT)
Dept: OPHTHALMOLOGY | Facility: CLINIC | Age: 72
End: 2022-01-25
Attending: OPHTHALMOLOGY
Payer: MEDICARE

## 2022-01-25 DIAGNOSIS — G51.0 FACIAL PALSY: ICD-10-CM

## 2022-01-25 DIAGNOSIS — H02.056 TRICHIASIS OF LEFT EYE WITHOUT ENTROPION: Primary | ICD-10-CM

## 2022-01-25 DIAGNOSIS — H02.235 PARALYTIC LAGOPHTHALMOS OF LEFT LOWER EYELID: ICD-10-CM

## 2022-01-25 DIAGNOSIS — H16.232 NEUROTROPHIC KERATOPATHY OF LEFT EYE: ICD-10-CM

## 2022-01-25 PROCEDURE — 99024 POSTOP FOLLOW-UP VISIT: CPT | Performed by: OPHTHALMOLOGY

## 2022-01-25 ASSESSMENT — VISUAL ACUITY
OS_PH_SC: 20/60
METHOD: SNELLEN - LINEAR
OD_SC: 20/20
OS_SC: 20/100

## 2022-01-25 ASSESSMENT — TONOMETRY
OD_IOP_MMHG: 9
IOP_METHOD: ICARE
OS_IOP_MMHG: 9

## 2022-01-25 NOTE — NURSING NOTE
Chief Complaints and History of Present Illnesses   Patient presents with     Follow Up     Chief Complaint(s) and History of Present Illness(es)     Follow Up     Laterality: left eye    Associated symptoms: tearing.  Negative for eye pain, floaters, flashes, itching, discharge and redness.  Comments: (haloes: after drops & ilya application )    Treatments tried: ointment and eye drops    Pain scale: 0/10              Comments     PROCEDURE: Left severing of tarsorrhaphy, left lower eyelid ectropion repair (DOS 1/11/2022)    Pt here today for post procedure visit.  States healing is going well.  Nothing to report.  Pt states he is happy with results.    Ocular meds = artificial tears & erythromycin ilya.     Paz Block COA, DESIRAE 11:00 AM 01/25/2022

## 2022-01-25 NOTE — PROGRESS NOTES
Chief Complaint(s) and History of Present Illness(es)     Follow Up     Laterality: left eye    Associated symptoms: tearing.  Negative for eye pain, floaters, flashes,   itching, discharge and redness.  Comments: (haloes: after drops & ilya   application )    Treatments tried: ointment and eye drops    Pain scale: 0/10              Comments     PROCEDURE: Left severing of tarsorrhaphy, left lower eyelid ectropion   repair (DOS 1/11/2022)    Pt here today for post procedure visit.  States healing is going well.  Nothing to report.  Pt states he is happy with results.    Ocular meds = artificial tears & erythromycin ilya.     Paz WILLS, DESIRAE 11:00 AM 01/25/2022               Assessment & Plan     Fortino Moya is a 71 year old male with the following diagnoses:   Encounter Diagnoses   Name Primary?     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     He does have significant cataract left eye, and is seeing Dr. Barney soon. We discussed he is neurotrophic and could consider corneal neurotization surgery. We did discuss the surgery a bit, att this time he is not interested.     F/u with Dr. Barney, and I am happy to see him back as needed.  Continue PFATs throughout the day and EES ilya at night. He discussed realistically it is difficult for him to get PFATs in. My preference is PFATs but if not able to do that, could try artificial tear gel like Refresh Liquigel 5 x daily.     Patient disposition:   No follow-ups on file.        Attending Physician Attestation: Complete documentation of historical and exam elements from today's encounter can be found in the full encounter summary report (not reduplicated in this progress note). I personally  obtained the chief complaint(s) and history of present illness. I confirmed and edited as necessary the review of systems, past medical/surgical history, family history, social history, and examination findings as documented by others; and I examined the patient myself. I personally reviewed the relevant tests, images, and reports as documented above. I formulated and edited as necessary the assessment and plan and discussed the findings and management plan with the patient.  -Vivien Yoon MD

## 2022-01-31 ENCOUNTER — OFFICE VISIT (OUTPATIENT)
Dept: OPTOMETRY | Facility: CLINIC | Age: 72
End: 2022-01-31
Payer: MEDICARE

## 2022-01-31 ENCOUNTER — TELEPHONE (OUTPATIENT)
Dept: OPTOMETRY | Facility: CLINIC | Age: 72
End: 2022-01-31
Payer: MEDICARE

## 2022-01-31 DIAGNOSIS — H04.123 DRY EYES: Primary | ICD-10-CM

## 2022-01-31 DIAGNOSIS — H02.23B PARALYTIC LAGOPHTHALMOS OF UPPER AND LOWER EYELID OF LEFT EYE: ICD-10-CM

## 2022-01-31 DIAGNOSIS — H16.212 EXPOSURE KERATOPATHY, LEFT: ICD-10-CM

## 2022-01-31 ASSESSMENT — REFRACTION_CURRENTRX
OS_SPHERE: +1.00
OS_DIAMETER: 14.9
OS_ADDL_SPECS: OPT EXTRA BLUE, HYDRAPEG
OS_BASECURVE: 8.0

## 2022-01-31 NOTE — PROGRESS NOTES
No office visit. CL order only. Pt's lens broke, needs replacement. Per Esme he preferred older Rx, listed below. Reordered this one.     Mailed to clinic - call for pickup when arrives.    Contact Lens Billing  V-Code:  Scleral Cover Shell  Final Contact Lens Rx       Brand Base Curve Diameter Sphere Lens Addl. Specs    Right          Left Onefit Oblate 8.0 14.9 +1.00 , 1 flat/2 steep edge Opt Extra blue, HydraPEG          Order #[id number]     # of units: 1  Price per Unit: $225 ([employer])    This patient requires contact lenses that are medically necessary for either improvement in vision over spectacles, support of the ocular surface, or other therapeutic benefit. These are not cosmetic contact lenses.     Encounter Diagnoses   Name Primary?     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

## 2022-02-07 ENCOUNTER — TELEPHONE (OUTPATIENT)
Dept: OPTOMETRY | Facility: CLINIC | Age: 72
End: 2022-02-07
Payer: MEDICARE

## 2022-02-16 ENCOUNTER — OFFICE VISIT (OUTPATIENT)
Dept: RADIATION ONCOLOGY | Facility: CLINIC | Age: 72
End: 2022-02-16
Attending: RADIOLOGY
Payer: MEDICARE

## 2022-02-16 ENCOUNTER — TELEPHONE (OUTPATIENT)
Dept: OTOLARYNGOLOGY | Facility: CLINIC | Age: 72
End: 2022-02-16

## 2022-02-16 ENCOUNTER — ANCILLARY PROCEDURE (OUTPATIENT)
Dept: CT IMAGING | Facility: CLINIC | Age: 72
End: 2022-02-16
Attending: NURSE PRACTITIONER
Payer: MEDICARE

## 2022-02-16 VITALS
HEART RATE: 92 BPM | WEIGHT: 191 LBS | SYSTOLIC BLOOD PRESSURE: 148 MMHG | DIASTOLIC BLOOD PRESSURE: 72 MMHG | BODY MASS INDEX: 27.41 KG/M2
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
## 2022-02-16 DIAGNOSIS — C09.9 SQUAMOUS CELL CARCINOMA OF TONSIL (H): ICD-10-CM

## 2022-02-16 DIAGNOSIS — M87.30 OSTEORADIONECROSIS (H): ICD-10-CM

## 2022-02-16 DIAGNOSIS — C09.9 SQUAMOUS CELL CARCINOMA OF TONSIL (H): Primary | ICD-10-CM

## 2022-02-16 LAB
CREAT BLD-MCNC: 1.1 MG/DL (ref 0.7–1.3)
GFR SERPL CREATININE-BSD FRML MDRD: >60 ML/MIN/1.73M2

## 2022-02-16 PROCEDURE — 31575 DIAGNOSTIC LARYNGOSCOPY: CPT | Performed by: RADIOLOGY

## 2022-02-16 PROCEDURE — 99213 OFFICE O/P EST LOW 20 MIN: CPT | Mod: 25 | Performed by: RADIOLOGY

## 2022-02-16 PROCEDURE — 31231 NASAL ENDOSCOPY DX: CPT | Performed by: RADIOLOGY

## 2022-02-16 PROCEDURE — 70491 CT SOFT TISSUE NECK W/DYE: CPT | Mod: MG | Performed by: RADIOLOGY

## 2022-02-16 PROCEDURE — G1004 CDSM NDSC: HCPCS | Performed by: RADIOLOGY

## 2022-02-16 PROCEDURE — G0463 HOSPITAL OUTPT CLINIC VISIT: HCPCS | Mod: 25 | Performed by: RADIOLOGY

## 2022-02-16 PROCEDURE — 71260 CT THORAX DX C+: CPT | Performed by: RADIOLOGY

## 2022-02-16 RX ORDER — IOPAMIDOL 755 MG/ML
92 INJECTION, SOLUTION INTRAVASCULAR ONCE
Status: COMPLETED | OUTPATIENT
Start: 2022-02-16 | End: 2022-02-16

## 2022-02-16 RX ADMIN — IOPAMIDOL 92 ML: 755 INJECTION, SOLUTION INTRAVASCULAR at 12:33

## 2022-02-16 NOTE — PROGRESS NOTES
FOLLOW-UP VISIT    Patient Name: Fortino Moya      : 1950     Age: 71 year old        ______________________________________________________________________________     Chief Complaint   Patient presents with     Cancer     Follow up:Tonsil Cancer: Left skull base 6600 cGy completed 21     Pain  Denies    Labs  Other Labs: No    Imaging  CT: today      Dental:   Most Recent Dental Visit: Yes      Speech/Swallowing:   Most Recent evaluation or testing: No  Swallowing Restrictions: No difficulties with swallowing    Trismus/Jaw Exercises: Yes, jaw has tightened over the last 3 weeks    Nutrition:    Weight:   Wt Readings from Last 3 Encounters:   22 84.8 kg (187 lb)   21 85.3 kg (188 lb)   11/15/21 85.6 kg (188 lb 11.2 oz)         Oral Symptoms:   Xerostomia:1- Symptomatic without significant dietary alteration; unstimulated saliva flow >0.2 ml/min  Dysphagia: 0-None  Mucositis Oral Symptoms: 0-None  Mucositis: 0- None  Esophagitis:0- None    Other Appointments:     MD Name:  Appointment Date:    MD Name: Appointment Date:   MD Name: Appointment Date:   Other Appointment Notes:     Residual Radiation side effect: Dry mouth, jaw pain    Additional Instructions:     Nurse face-to-face time: Level 3:  10 min face to face time

## 2022-02-16 NOTE — LETTER
2022         RE: Fortino Moya  [address]  [facility] [address]      FOLLOW-UP VISIT    Patient Name: Fortino Moya      : 1950     Age: 71 year old        ______________________________________________________________________________     Chief Complaint   Patient presents with     Cancer     Follow up:Tonsil Cancer: Left skull base 6600 cGy completed 21     Pain  Denies    Labs  Other Labs: No    Imaging  CT: today      Dental:   Most Recent Dental Visit: Yes      Speech/Swallowing:   Most Recent evaluation or testing: No  Swallowing Restrictions: No difficulties with swallowing    Trismus/Jaw Exercises: Yes, jaw has tightened over the last 3 weeks    Nutrition:    Weight:   Wt Readings from Last 3 Encounters:   22 84.8 kg (187 lb)   21 85.3 kg (188 lb)   11/15/21 85.6 kg (188 lb 11.2 oz)         Oral Symptoms:   Xerostomia:1- Symptomatic without significant dietary alteration; unstimulated saliva flow >0.2 ml/min  Dysphagia: 0-None  Mucositis Oral Symptoms: 0-None  Mucositis: 0- None  Esophagitis:0- None    Other Appointments:     MD Name:  Appointment Date:    MD Name: Appointment Date:   MD Name: Appointment Date:   Other Appointment Notes:     Residual Radiation side effect: Dry mouth, jaw pain    Additional Instructions:     Nurse face-to-face time: Level 3:  10 min face to face time             Department of Radiation Oncology  [facility]  [address] [place] [address]  [phone]       Radiation Oncology Follow-up Visit  2022      Fortino Moya  MRN: [id number]   : 1950     DISEASE TREATED:   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 He presented a few years later with recurrent disease within the ipsilateral pterygoids extending to the skull base with invasion of the cavernous sinus and tracking along CN V to the brainstem. He received 2 cycles of induction chemotherapy with TPF from 1/28/2021-2/18/2021 with repeat imaging demonstrated a near-complete response to therapy. He then received curative-intent chemoradiotherapy as described above.    Mr. Moya returns to clinic today for a routine post-treatment disease surveillance visit. His biggest complaint today is related to worsening trismus over the past several weeks to a month. Prior to this, he reports that he was eating a regular diet without difficulty and is now having more difficulty taking large bites of food due to his limited incisal opening and has also complained of left-sided jaw and ear pain which he rates as an up to 6/10 in severity. In addition to these symptoms, he also notes ongoing issues with weakness of the left lower division of the facial nerve and states that he has anxiety about eating in public due to this. He had a CT neck and chest performed earlier today which demonstrates no evidence of metastatic disease however there is a subtle area of enhancement within the left  space of unknown etiology.    PHYSICAL EXAM:  Weight: 86.6 kg  BP: 148/72  Pulse: 92    General: 72-year-old gentleman seated in an examination chair in no acute distress  HEENT: NC/AT. Stable left-sided ptosis. No rhinorrhea or epistaxis. Moderate trismus with approximately 10 mm incisal opening. Dry mucous membranes. Visualization of the oral cavity is impeded secondary to the significant trismus however there is 5-10 mm area of exposed bone involving the medial aspect of the left retromolar trigone extending into the left tonsillar fossa. Mild erythema  of the surrounding mucosa. No additional oral cavity lesions appreciated.   Neck: Significant fibrosis of the left lateral neck. No adenopathy appreciated.  Pulmonary: No wheezing, stridor or respiratory distress  Skin: Scattered areas of hypopigmentation throughout the left neck. No ulceration or desquamation.  Neuro: A/O x3.  Cranial Nerve Exam  I: Not tested  II: Not tested  III/IV/VI: PERRL. EOMI.   V: Diminished sensation within the left V1-V3 distribution  VII: Mild to moderate weakness of the left marginal mandibular and buccal branches. House-Brackmann III/VI  VIII: Hearing is grossly intact bilaterally  IX/X: Palate elevates symmetrically. Normal phonation.  XI: Strength is 5/5 in bilateral trapezius and SCM musculature.  XII: Tongue protrudes in the midline. No atrophy or fasciculations.     Flexible Fiberoptic Nasopharyngoscopy:  Consent for fiberoptic laryngoscopy was obtained and I confirmed correctness of procedure and identity of patient. Fiberoptic laryngoscopy was indicated due to post-treatment disease surveillance. The nose was topically decongested and anesthetized. The fiberoptic laryngoscope was passed through the right naris under endoscopic vision. The turbinates were normal. The inferior and middle meati were clear without purulence, masses, or polyps. The nasopharynx contained some dried secretions along the posterior nasopharyngeal wall with otherwise normal-appearing mucosa and no areas of exposed bone, mucosal masses or other lesions. Advancement of the scope into the oropharynx revealed extensive post-treatment changes with bland-appearing mucosa and thickened secretions. There was no evidence of recurrent tumor. The larynx was clear with mobile cords bilaterally. The piriforms were likewise unremarkable in appearance with no pooling of secretions. The scope was then removed and the procedure was terminated without incident.      LABS AND IMAGIN2022 labs:  TSH:  2.46    2/16/2022 CT neck:  Subtle ill-defined heterogeneous enhancement measuring 1.9 x 1.8 cm within the left parapharyngeal and lower left  space. No cervical adenopathy.    2/16/2022 CT chest:  No evidence of metastatic pulmonary disease    IMPRESSION:   Mr. 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 He is 9 months out from the completion of salvage chemoradiotherapy and presents with worsening pain and trismus with clinical examination revealing exposed bone within the left oropharynx and radiographic evidence of contrast-enhancement within the left parapharyngeal/ space. 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 Also on the differential is recurrent tumor although felt to be potentially less likely.    PLAN:   1. Continue antibiotics (Augmentin) as prescribed by ENT  2. Continue frequent salt/soda rinses  3. Start gabapentin 300 mg 3 times daily  4. Follow-up in radiation oncology clinic with NP in 1 month with MRI face/sinuses prior to assess for response to medical management   5. If repeat imaging is suggestive of recurrent disease, we will plan to discuss with ENT and interventional radiology regarding feasibility of biopsy for tissue diagnosis   6. We will hold off on prescribing pentoxifylline/vitamin D for now given that recurrent disease has not been completely ruled out and he is currently on Mary Rodgers MD

## 2022-02-16 NOTE — TELEPHONE ENCOUNTER
amoxicillin-clavulanate (AUGMENTIN) 400-57 MG/5ML suspension      NOT ON MEDICATION LIST    Last Office Visit : 1-  Future Office visit:  2-    Routing refill request to provider for review/approval because:  Drug not active on patient's medication list      Kathleen M Doege RN

## 2022-02-16 NOTE — TELEPHONE ENCOUNTER
[facility] Call Center    Phone Message    May a detailed message be left on voicemail: yes     Reason for Call: Medication Refill Request    Has the patient contacted the pharmacy for the refill? Yes   Name of medication being requested: amoxicillin-clavulanate (AUGMENTIN) 400-57 MG/5ML suspension  Provider who prescribed the medication: Yandy Gutierrez  Pharmacy: [id number] - [place], [place] - [address] AT [address] [place] [address] & [facility]  Date medication is needed: Asap, Pt is out of medication      Action Taken: Message routed to:  [facility] ([facility] ENT    Travel Screening: Not Applicable

## 2022-02-17 RX ORDER — AMOXICILLIN AND CLAVULANATE POTASSIUM 400; 57 MG/5ML; MG/5ML
875 POWDER, FOR SUSPENSION ORAL 2 TIMES DAILY
Qty: 305 ML | Refills: 0 | Status: SHIPPED | OUTPATIENT
Start: 2022-02-17 | End: 2022-04-21

## 2022-02-17 RX ORDER — GABAPENTIN 300 MG/1
300 CAPSULE ORAL 3 TIMES DAILY
Qty: 90 CAPSULE | Refills: 3 | Status: SHIPPED | OUTPATIENT
Start: 2022-02-17 | End: 2022-05-10

## 2022-02-17 NOTE — TELEPHONE ENCOUNTER
"\"order another 2 weeks and then get him scheduled to see Dr. Russell in the next 2-3 weeks\" per Mela Gutierrez NP    Left detailed message to call and schedule an appt with  in 2-3 weeks- return slot. Medication sent to the pharmacy. Call back number given.    Elsy Doyle LPN            "

## 2022-02-20 ENCOUNTER — HEALTH MAINTENANCE LETTER (OUTPATIENT)
Age: 72
End: 2022-02-20

## 2022-02-21 NOTE — PROGRESS NOTES
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 He presented a few years later with recurrent disease within the ipsilateral pterygoids extending to the skull base with invasion of the cavernous sinus and tracking along CN V to the brainstem. He received 2 cycles of induction chemotherapy with TPF from 2021-2021 with repeat imaging demonstrated a near-complete response to therapy. He then received curative-intent chemoradiotherapy as described above.    Mr. Moya returns to clinic today for a routine post-treatment disease surveillance visit. His biggest complaint today is related to worsening trismus over the past several weeks to a month. Prior to this, he reports that he was eating a regular diet without difficulty and is now having more difficulty taking large bites of food due to his limited incisal opening and has also complained of left-sided jaw and ear pain which he rates as an up to 6/10 in severity. In addition to these symptoms, he also notes ongoing issues with weakness of the left lower division of the facial nerve and states that he has anxiety about eating in public due to this. He had a CT neck and chest performed earlier today which demonstrates no evidence of metastatic disease however there is a subtle area of enhancement  within the left  space of unknown etiology.    PHYSICAL EXAM:  Weight: 86.6 kg  BP: 148/72  Pulse: 92    General: 72-year-old gentleman seated in an examination chair in no acute distress  HEENT: NC/AT. Stable left-sided ptosis. No rhinorrhea or epistaxis. Moderate trismus with approximately 10 mm incisal opening. Dry mucous membranes. Visualization of the oral cavity is impeded secondary to the significant trismus however there is 5-10 mm area of exposed bone involving the medial aspect of the left retromolar trigone extending into the left tonsillar fossa. Mild erythema of the surrounding mucosa. No additional oral cavity lesions appreciated.   Neck: Significant fibrosis of the left lateral neck. No adenopathy appreciated.  Pulmonary: No wheezing, stridor or respiratory distress  Skin: Scattered areas of hypopigmentation throughout the left neck. No ulceration or desquamation.  Neuro: A/O x3.  Cranial Nerve Exam  I: Not tested  II: Not tested  III/IV/VI: PERRL. EOMI.   V: Diminished sensation within the left V1-V3 distribution  VII: Mild to moderate weakness of the left marginal mandibular and buccal branches. House-Brackmann III/VI  VIII: Hearing is grossly intact bilaterally  IX/X: Palate elevates symmetrically. Normal phonation.  XI: Strength is 5/5 in bilateral trapezius and SCM musculature.  XII: Tongue protrudes in the midline. No atrophy or fasciculations.     Flexible Fiberoptic Nasopharyngoscopy:  Consent for fiberoptic laryngoscopy was obtained and I confirmed correctness of procedure and identity of patient. Fiberoptic laryngoscopy was indicated due to post-treatment disease surveillance. The nose was topically decongested and anesthetized. The fiberoptic laryngoscope was passed through the right naris under endoscopic vision. The turbinates were normal. The inferior and middle meati were clear without purulence, masses, or polyps. The nasopharynx contained some dried secretions along the  posterior nasopharyngeal wall with otherwise normal-appearing mucosa and no areas of exposed bone, mucosal masses or other lesions. Advancement of the scope into the oropharynx revealed extensive post-treatment changes with bland-appearing mucosa and thickened secretions. There was no evidence of recurrent tumor. The larynx was clear with mobile cords bilaterally. The piriforms were likewise unremarkable in appearance with no pooling of secretions. The scope was then removed and the procedure was terminated without incident.      LABS AND IMAGIN2022 labs:  TSH: 2.46    2022 CT neck:  Subtle ill-defined heterogeneous enhancement measuring 1.9 x 1.8 cm within the left parapharyngeal and lower left  space. No cervical adenopathy.    2022 CT chest:  No evidence of metastatic pulmonary disease    IMPRESSION:   Mr. 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 He is 9 months out from the completion of salvage chemoradiotherapy and presents with worsening pain and trismus with clinical examination revealing exposed bone within the left oropharynx and radiographic evidence of contrast-enhancement within the left parapharyngeal/ space. 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 Also on the differential is recurrent tumor although felt to be potentially less likely.    PLAN:   1. Continue antibiotics (Augmentin) as prescribed by ENT  2. Continue frequent salt/soda rinses  3. Start gabapentin 300 mg 3 times daily  4. Follow-up in radiation oncology clinic with NP in 1 month with MRI face/sinuses prior to assess for response to medical management   5. If repeat imaging is suggestive  of recurrent disease, we will plan to discuss with ENT and interventional radiology regarding feasibility of biopsy for tissue diagnosis   6. We will hold off on prescribing pentoxifylline/vitamin D for now given that recurrent disease has not been completely ruled out and he is currently on Mary Rodgers MD/PhD    Department of Radiation [facility]  [facility]

## 2022-02-23 ENCOUNTER — PREP FOR PROCEDURE (OUTPATIENT)
Dept: OTOLARYNGOLOGY | Facility: CLINIC | Age: 72
End: 2022-02-23

## 2022-02-23 ENCOUNTER — OFFICE VISIT (OUTPATIENT)
Dept: OTOLARYNGOLOGY | Facility: CLINIC | Age: 72
End: 2022-02-23
Payer: MEDICARE

## 2022-02-23 ENCOUNTER — THERAPY VISIT (OUTPATIENT)
Dept: SPEECH THERAPY | Facility: CLINIC | Age: 72
End: 2022-02-23
Payer: MEDICARE

## 2022-02-23 VITALS
SYSTOLIC BLOOD PRESSURE: 124 MMHG | HEART RATE: 89 BPM | BODY MASS INDEX: 27.16 KG/M2 | WEIGHT: 189.7 LBS | DIASTOLIC BLOOD PRESSURE: 78 MMHG | HEIGHT: 70 IN | TEMPERATURE: 97 F

## 2022-02-23 DIAGNOSIS — G50.9: ICD-10-CM

## 2022-02-23 DIAGNOSIS — C09.9 MALIGNANT NEOPLASM OF TONSIL (H): Primary | ICD-10-CM

## 2022-02-23 DIAGNOSIS — R13.12 OROPHARYNGEAL DYSPHAGIA: Primary | ICD-10-CM

## 2022-02-23 DIAGNOSIS — C09.9 SQUAMOUS CELL CARCINOMA OF TONSIL (H): ICD-10-CM

## 2022-02-23 PROCEDURE — 99214 OFFICE O/P EST MOD 30 MIN: CPT | Mod: 25 | Performed by: OTOLARYNGOLOGY

## 2022-02-23 PROCEDURE — 92526 ORAL FUNCTION THERAPY: CPT | Mod: GN | Performed by: SPEECH-LANGUAGE PATHOLOGIST

## 2022-02-23 PROCEDURE — 31575 DIAGNOSTIC LARYNGOSCOPY: CPT | Performed by: OTOLARYNGOLOGY

## 2022-02-23 ASSESSMENT — PAIN SCALES - GENERAL: PAINLEVEL: MODERATE PAIN (5)

## 2022-02-23 NOTE — LETTER
2/23/2022       RE: Fortino Moya  [address]  [facility] [address]     Dear Colleague,    Thank you for referring your patient, Fortino Moya, to the [facility] [place] at [facility]. Please see a copy of my visit note below.    Relevant Diagnosis: transoral mandibular debridement  Teaching Topic: pre-op teaching  Person(s) involved in teaching:  Patient     Teaching Concerns Addressed:  Pre op teaching included the need for an H&P, NPO status pre op, hospital routines, expected recovery, activity  restrictions, antimicrobial scrub, s/s of infection, pain control methods and the importance of follow up appointments.  The patient voiced an understanding of all instructions and will call with questions.     Motivation Level:  Asks Questions:   Yes  Eager to Learn:   Yes  Cooperative:   Yes  Receptive (willing/able to accept information):   Yes     Patient  demonstrates understanding of the following:  Reason for the appointment, diagnosis and treatment plan:   Yes  Knowledge of proper use of medications and conditions for which they are ordered (with special attention to potential side effects or drug interactions):   Yes  Which situations necessitate calling provider and whom to contact:   Yes        Proper use and care of  (medical equip, care aids, etc.):   NA  Nutritional needs and diet plan:   Yes  Pain management techniques:   Yes  Patient instructed on hand hygiene:  Yes  How and/when to access community resources:   NA     Infection Prevention:  Patient   demonstrates understanding of the following:  Surgical procedure site care taught   Signs and symptoms of infection taught Yes  Wound care taught Yes     Instructional Materials Used/Given: AVS instructions, soap, surgery packet      PRIOR ONCOLOGIC HISTORY:  Mr. 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  He  initially started with a clinically staged T3 N1 p16 positive squamous cell carcinoma of the left tonsil treated in [place] with chemoradiotherapy.  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  He had symptoms of numbness in all three divisions of his fifth cranial nerve.  He then received two cycles of induction chemotherapy with TPF completed on 02/28/2021.  His post-induction imaging suggests a near complete response.  He then received definitive chemoradiotherapy with weekly cisplatin and a total of 6600 cGy finished on 05/07/2021.      INTERVAL HISTORY:  Since I last saw Mr. Moya, he has had some progressive worsening in terms of trismus and early development of osteoradionecrosis.  He has had an area in the retromolar trigone with some exposed bone that is quite small, but he is very aware of this.  In addition, he has noticed that his jaw opening has decreased significantly.  He currently has only has about 5-6 mm interincisal opening distance.  He also describes various types of pain that emanate along his left mandible, ramus and also behind the ear and into his neck.  He had a gold weight placed because of some facial nerve weakness on the left side.  He is able to make some movements on volitional effort, but at rest, he is asymmetric and I would estimate that he is approximately House-Brackmann III in most of its branches.      He had scans that a week ago did not show anything in the chest.  In the neck, there was some ill-defined heterogeneously enhancing area in the left parapharyngeal area, but otherwise the scan was unremarkable.  The patient states that he is really interested in getting some answers about what to expect.  He currently is taking food only through a  but he puts all kinds of things in the area including potato chips, hot dogs and protein  powder.    PHYSICAL EXAMINATION:  On examination, he is well-appearing, in no distress.  Examination shows that he is at rest asymmetric with some left facial droop compared to the right.  This is moderate and clearly not a full paralysis as he has volitional movement.  He definitely has some ptosis of the left lid related to the placement of a gold weight, which now allows him to close.      Examination of the oral cavity reveals again he is about 5-6 mm of opening.  I am able to find the mucosal defect in the left retromolar trigone and was able to place a scope in here as well as a suction and suction some debris out.  I believe I am seeing the medial surface of the ramus of the mandible here, although it is difficult to say for sure.  There is no other exposed bone that I can identify in the oral cavity.  I do not see any lesions suspicious for malignancy, although the exam is limited by his trismus and I do not see any of the oropharynx.      Examination of the neck reveals radiation changes.  No evidence of any lymphadenopathy.  I did place the suction into the mucosal defect, which is only about 5-6 mm in size and suctioned out some debris.  This allowed me to see, what I believe is the mandible a little bit better.    PROCEDURE NOTE:  Flexible endoscopy was performed on the left nasal cavity after anesthetization and vasoconstriction using Helder-Synephrine and Xylocaine.  This reveals normal nasopharynx, oropharynx, hypopharynx, and larynx.  He has some postradiation edema of the epiglottis, which is typical as well as the supraglottis, but no obstructing lesions.  The airway is wide open.    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    PLAN:  I counseled the patient that I can take him to the operating room and try to drill away some of his bone and see if I can get the area to heal.  It would also serve as an examination under anesthesia  of the area that is open on the retromolar trigone.  I can hopefully determine if this is indeed pterygoid plate or mandibular ramus.  I would try to get down to some bleeding bone.      I counseled him extensively that his trismus is not likely to get better, but I think we can start stretching exercises and Farzaneh Cortez, MIGUEL will work with him on that today.  He also understands now that some of these radiation changes are progressive and we could progress as far as needing a segmental mandibulectomy and a free flap and I am hopeful that we will get to that point.  We will schedule outpatient procedure and start with that and see how he does.  All of his questions were answered.          Again, thank you for allowing me to participate in the care of your patient.      Sincerely,    David Russell MD

## 2022-02-23 NOTE — NURSING NOTE
"Chief Complaint   Patient presents with     RECHECK     2 week follow up per Mela SMITH       Blood pressure 124/78, pulse 89, temperature 97  F (36.1  C), temperature source Temporal, height 1.778 m (5' 10\"), weight 86 kg (189 lb 11.2 oz).    Beena Traylor, EMT    "

## 2022-02-23 NOTE — PROGRESS NOTES
PRIOR ONCOLOGIC HISTORY:  Mr. 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  He initially started with a clinically staged T3 N1 p16 positive squamous cell carcinoma of the left tonsil treated in [place] with chemoradiotherapy.  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  He had symptoms of numbness in all three divisions of his fifth cranial nerve.  He then received two cycles of induction chemotherapy with TPF completed on 02/28/2021.  His post-induction imaging suggests a near complete response.  He then received definitive chemoradiotherapy with weekly cisplatin and a total of 6600 cGy finished on 05/07/2021.      INTERVAL HISTORY:  Since I last saw Mr. Moya, he has had some progressive worsening in terms of trismus and early development of osteoradionecrosis.  He has had an area in the retromolar trigone with some exposed bone that is quite small, but he is very aware of this.  In addition, he has noticed that his jaw opening has decreased significantly.  He currently has only has about 5-6 mm interincisal opening distance.  He also describes various types of pain that emanate along his left mandible, ramus and also behind the ear and into his neck.  He had a gold weight placed because of some facial nerve weakness on the left side.  He is able to make some movements on volitional effort, but at rest, he is asymmetric and I would estimate that he is approximately House-Brackmann III in most of its branches.      He had scans that a week ago did not show anything in the chest.  In the neck, there was some ill-defined heterogeneously enhancing area in the left parapharyngeal area, but otherwise the scan was unremarkable.  The patient states that he is really interested in getting some answers about what to expect.  He  currently is taking food only through a  but he puts all kinds of things in the area including potato chips, hot dogs and protein powder.    PHYSICAL EXAMINATION:  On examination, he is well-appearing, in no distress.  Examination shows that he is at rest asymmetric with some left facial droop compared to the right.  This is moderate and clearly not a full paralysis as he has volitional movement.  He definitely has some ptosis of the left lid related to the placement of a gold weight, which now allows him to close.      Examination of the oral cavity reveals again he is about 5-6 mm of opening.  I am able to find the mucosal defect in the left retromolar trigone and was able to place a scope in here as well as a suction and suction some debris out.  I believe I am seeing the medial surface of the ramus of the mandible here, although it is difficult to say for sure.  There is no other exposed bone that I can identify in the oral cavity.  I do not see any lesions suspicious for malignancy, although the exam is limited by his trismus and I do not see any of the oropharynx.      Examination of the neck reveals radiation changes.  No evidence of any lymphadenopathy.  I did place the suction into the mucosal defect, which is only about 5-6 mm in size and suctioned out some debris.  This allowed me to see, what I believe is the mandible a little bit better.    PROCEDURE NOTE:  Flexible endoscopy was performed on the left nasal cavity after anesthetization and vasoconstriction using Helder-Synephrine and Xylocaine.  This reveals normal nasopharynx, oropharynx, hypopharynx, and larynx.  He has some postradiation edema of the epiglottis, which is typical as well as the supraglottis, but no obstructing lesions.  The airway is wide open.    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    PLAN:  I counseled the patient that I can take him to the operating  room and try to drill away some of his bone and see if I can get the area to heal.  It would also serve as an examination under anesthesia of the area that is open on the retromolar trigone.  I can hopefully determine if this is indeed pterygoid plate or mandibular ramus.  I would try to get down to some bleeding bone.      I counseled him extensively that his trismus is not likely to get better, but I think we can start stretching exercises and Farzaneh Cortez, SLP will work with him on that today.  He also understands now that some of these radiation changes are progressive and we could progress as far as needing a segmental mandibulectomy and a free flap and I am hopeful that we will get to that point.  We will schedule outpatient procedure and start with that and see how he does.  All of his questions were answered.

## 2022-02-23 NOTE — PATIENT INSTRUCTIONS
1. You were seen in the ENT Clinic today by Dr. Russell. If you have any questions or concerns after your appointment, please call   - ENT Clinic: [phone]    2.   Our surgery scheduler, Ashlee, will reach out to your regarding a surgery date. Once you confirm a surgery date, then you should schedule your pre-op appointment. Ashlee will also mail you a surgery packet in the mail with further details about the surgery process, and will also include scrub soap. A member of our covid team will reach out to you 1-2 weeks before your surgery to schedule a covid test.      Anna, RN, BSN, PHN  RN Care Coordinator  [facility]  [phone]        Surgery Teaching      1.You must have a physical exam (called  history and physical ) within 30 days of surgery. You can do this at the PAC clinic or your family clinic.     2.For same-day surgery, you must arrange for an adult to take you home from the Center. An adult must stay with you for the first 24 hours after surgery. You cannot drive for 24 hours.     3. Ask your doctor what medicines are safe before surgery.     4. Stop drinking alcohol at least 24 hours before surgery.     5. Stop or at least cut down on smoking 24 hours before surgery.    6.Take a bath or shower the night before and the morning of surgery (as told by your surgeon). Use an antiseptic soap. If your doctor does not give you special soap, buy Hibiclens or Nelly-Stat at the drug store or ask the pharmacist to suggest a brand.  Do not put on lotion, powder, perfume, deodorant or make-up after bathing.    7. You can eat a normal meal the night before surgery. Do not eat any solid foods or drink any milk products for 8 hours before surgery.     8. You may drink clear liquids until 2 hours before surgery. Clear liquids include water, Gatorade, apple juice and liquids you can read through.    9. NO MOTRIN, IBUPROFEN, ASPIRIN, ALEVE, GARLIC SUPPLEMENTS or FISH OIL x 7 days prior to surgery ( to  prevent excess bleeding and bruising at time of surgery)

## 2022-02-23 NOTE — PROGRESS NOTES
Relevant Diagnosis: transoral mandibular debridement  Teaching Topic: pre-op teaching  Person(s) involved in teaching:  Patient     Teaching Concerns Addressed:  Pre op teaching included the need for an H&P, NPO status pre op, hospital routines, expected recovery, activity  restrictions, antimicrobial scrub, s/s of infection, pain control methods and the importance of follow up appointments.  The patient voiced an understanding of all instructions and will call with questions.     Motivation Level:  Asks Questions:   Yes  Eager to Learn:   Yes  Cooperative:   Yes  Receptive (willing/able to accept information):   Yes     Patient  demonstrates understanding of the following:  Reason for the appointment, diagnosis and treatment plan:   Yes  Knowledge of proper use of medications and conditions for which they are ordered (with special attention to potential side effects or drug interactions):   Yes  Which situations necessitate calling provider and whom to contact:   Yes        Proper use and care of  (medical equip, care aids, etc.):   NA  Nutritional needs and diet plan:   Yes  Pain management techniques:   Yes  Patient instructed on hand hygiene:  Yes  How and/when to access community resources:   NA     Infection Prevention:  Patient   demonstrates understanding of the following:  Surgical procedure site care taught   Signs and symptoms of infection taught Yes  Wound care taught Yes     Instructional Materials Used/Given: AVS instructions, soap, surgery packet

## 2022-02-25 ENCOUNTER — TELEPHONE (OUTPATIENT)
Dept: OPHTHALMOLOGY | Facility: CLINIC | Age: 72
End: 2022-02-25
Payer: MEDICARE

## 2022-02-25 ENCOUNTER — TELEPHONE (OUTPATIENT)
Dept: OTOLARYNGOLOGY | Facility: CLINIC | Age: 72
End: 2022-02-25
Payer: MEDICARE

## 2022-02-25 NOTE — TELEPHONE ENCOUNTER
Left message regarding scheduling surgery/procedure with Dr. Russell.   Writer left call back number on the patients voicemail.      Ashlee Maddox on 2/25/2022 at 3:11 PM   P: [phone]

## 2022-02-25 NOTE — TELEPHONE ENCOUNTER
Fortino Moya called stating that his eye with the platinum weight is drooping.  He would like to talk with Dr. Yoon about if it can be removed.   Please call patient at [phone].  He understood that he may not get a call back until next week.

## 2022-03-01 ENCOUNTER — TELEPHONE (OUTPATIENT)
Dept: OPHTHALMOLOGY | Facility: CLINIC | Age: 72
End: 2022-03-01
Payer: MEDICARE

## 2022-03-01 NOTE — TELEPHONE ENCOUNTER
"Spoke with patient regarding scheduling for a Procedure with . Scheduled accordingly for a Procedure for : \"Upper eyelid weight removed\". in clinic as believe patient is numb on that side of his face. Informed patient not to take any blood thinners.Patient is aware Povider will evaluate to make sure it is safe prior to removing it. Sent appointment letter to address in Chart and patient will see appointment in [facility] as well. -Per Patient    (Sent message to Provider with regard to how many days prior to Procedure does patient need to stop Blood Thinners?)  "

## 2022-03-03 ENCOUNTER — OFFICE VISIT (OUTPATIENT)
Dept: OPHTHALMOLOGY | Facility: CLINIC | Age: 72
End: 2022-03-03
Attending: OPHTHALMOLOGY
Payer: MEDICARE

## 2022-03-03 ENCOUNTER — TELEPHONE (OUTPATIENT)
Dept: OPHTHALMOLOGY | Facility: CLINIC | Age: 72
End: 2022-03-03

## 2022-03-03 DIAGNOSIS — H16.232 NEUROTROPHIC KERATOPATHY OF LEFT EYE: ICD-10-CM

## 2022-03-03 DIAGNOSIS — H04.122 DRY EYE SYNDROME OF LEFT EYE: ICD-10-CM

## 2022-03-03 DIAGNOSIS — C09.9 SQUAMOUS CELL CARCINOMA OF TONSIL (H): ICD-10-CM

## 2022-03-03 DIAGNOSIS — H02.056 TRICHIASIS OF LEFT EYE WITHOUT ENTROPION: Primary | ICD-10-CM

## 2022-03-03 PROCEDURE — 99214 OFFICE O/P EST MOD 30 MIN: CPT | Mod: 25 | Performed by: OPHTHALMOLOGY

## 2022-03-03 PROCEDURE — G0463 HOSPITAL OUTPT CLINIC VISIT: HCPCS

## 2022-03-03 PROCEDURE — 67820 REVISE EYELASHES: CPT | Performed by: OPHTHALMOLOGY

## 2022-03-03 ASSESSMENT — CONF VISUAL FIELD
OD_NORMAL: 1
OS_NORMAL: 1

## 2022-03-03 ASSESSMENT — VISUAL ACUITY
METHOD: SNELLEN - LINEAR
CORRECTION_TYPE: GLASSES
OD_CC: 20/20
OS_SC: 20/70
OS_PH_SC: 20/60

## 2022-03-03 ASSESSMENT — TONOMETRY
OS_IOP_MMHG: 07
OD_IOP_MMHG: 07
IOP_METHOD: ICARE

## 2022-03-03 ASSESSMENT — EXTERNAL EXAM - RIGHT EYE: OD_EXAM: NORMAL

## 2022-03-03 ASSESSMENT — EXTERNAL EXAM - LEFT EYE: OS_EXAM: LEFT FACIAL PALSY

## 2022-03-03 NOTE — TELEPHONE ENCOUNTER
Spoke with patient regarding question of when and if patient needed to stop taking his Blood Thinner medication. Confirmed with patient that the only Blood Thinner patient was taking was  Xarelto as Provider sees in Chart? Correct as patient stated. Informed patient Provider stated patient would need to hold for 48 hours before procedure. Patient is aware of instructions before Procedure.-Per Patient

## 2022-03-03 NOTE — PROGRESS NOTES
- reviewed notes and images from outside provider and the rest of the care team.    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    Of note, patient has a history of L oropharynx P16+ squamous cell cancer first diagnosed in 2018. The patient underwent low dose weekly carboplatin 1.5 AUC with radiation. His PET/CT 2 months after initiation and underwent surveillance after that. In 08/2020, he developed numbness in chin and then forehead. He underwent PET/CT and IR-guided biopsy (1/2021), which demonstrated invasive squamous cell carcinoma.    Per oncology, patient has left-sided paralysis. The patient first noticed left eye redness and lower lid drooping in about January 2021. He notes that these symptoms progressed during through April 2021. He noted associated watery discharge. He was seen by oncology and started on erythromycin ilya then recommended to follow up with ophthalmology for further management. He has also been using AT BID during this time. He denies eye irritation, pain, purulent discharge, and flashes/floaters. He notes baseline left eye vision when not using erythromycin ointment.    Interval HPI: He denies vision changes, eye pain, discharge since last visit. Due for follow up with Dr. Yoon and Dr. Pardo in a few weeks.. No other concerns.     POH: refractive error (intermittent glasses use)    Surgery: LASIK (25-30 years ago)- monovision  S/p     GTTS:   -PFAT Q2 left eye  -Erythromycin ilya at bedtime left eye     PMH:  -L oropharyngeal squamous cell cancer (first diagnosed 2018), recently diagnosed invasive squamous cell cancer (on chemotherapy)  -HTN    FH:  -No AMD, glaucoma    CT soft tissue neck  (2/16/22):  Primary  1: No evidence of recurrence: routine surveillance  2: Low suspicion    a) Superficial abnormality (skin, mucosal surface): direct visual  inspection    b) Ill-defined deep abnormality: short interval follow-up* or PET  3: High suspicion (new or enlarging discrete nodule/mass): biopsy  4: Definitive recurrence (path proven or clinical progression): no  biopsy needed     Nodes  1: No evidence of recurrence: routine surveillance  2: Low suspicion (ill-defined): short interval follow-up or PET  3: High suspicion (new or enlarging lymph node): biopsy if clinically  needed  4: Definitive recurrence (path proven or clinical progression): no  biopsy needed    PET oncology  (8/16/21):                                                 IMPRESSION: In this patient with 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 No evidence of local recurrence, or metastatic disease of the  chest, abdomen or pelvis.  2. Stable incidental findings of coronary artery atherosclerotic  calcifications, degenerative change of the spine, and scattered sub-6  mm solid pulmonary nodules.     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 New disease in the left  space s/p PET/CT and IR-guided biopsy consistent with invasive squamous cell carcinoma s/p induction chemotherapy (1/28/21).  - s/p permanent tarsorrhaphy lateral 50% (2/10/21)- Dr. Pendleton  - s/p FLOWER platinum weight, LLL  ectropion repair, and temporary tarso - Dr. Yoon (4/1/21)  7/8/21: misdirected trichiatic lashes epilated   10/1/21: left eye cataract increasing. left eye ocular surface dry but epi intact.  12/14/21: Lashes pulled.  Encouraged more PFAT use.  3/3/22: Encouraged more PFAT use bilaterally. Corneal surface has rare PK. Pt doing well with scleral lenses. Few left eye LL ltrichiasis.    Plan:  - Continue Emycin at night and when not using the scleral lens  - Increase celluvisc to 6x daily, use in both eyes  - Continue with scleral lens wear.   - Disc left eye cataract surgery with increased risk due to left eye exposure and dry eye - disc need for increased lubrication. Recommend Cat surgery during summer months to increase ambient humidity.    RTC: 8-12 weeks w/ VT -  sooner as needed.    Follow up with Dr. Yoon and Dr. Pardo as scheduled.    Narda Mancini MD  Ophthalmology Resident, PGY-3    Attending Physician Attestation:  Complete documentation of historical and exam elements from today's encounter can be found in the full encounter summary report (not reduplicated in this progress note).  I personally obtained the chief complaint(s) and history of present illness.  I confirmed and edited as necessary the review of systems, past medical/surgical history, family history, social history, and examination findings as documented by others; and I examined the patient myself.  I personally reviewed the relevant tests, images, and reports as documented above.  I formulated and edited as necessary the assessment and plan and discussed the findings and management plan with the patient and family. - Mert Barney MD

## 2022-03-03 NOTE — NURSING NOTE
Chief Complaints and History of Present Illnesses   Patient presents with     Dry Eye(s) Both Eyes     Chief Complaint(s) and History of Present Illness(es)     Dry Eye(s) Both Eyes     Laterality: both eyes    Associated symptoms: Negative for burning, itching and eye pain    Treatments tried: artificial tears and ointment    Pain scale: 0/10              Comments     Follow up dry eyes and exposure K.  The patient wears a scleral contact lens in the left eye.  He is not having eye problems now.  Katerine Fuentes COA, COA 9:55 AM 03/03/2022

## 2022-03-07 ENCOUNTER — HOSPITAL ENCOUNTER (OUTPATIENT)
Facility: CLINIC | Age: 72
End: 2022-03-07
Attending: OTOLARYNGOLOGY | Admitting: OTOLARYNGOLOGY
Payer: MEDICARE

## 2022-03-07 DIAGNOSIS — Z11.59 ENCOUNTER FOR SCREENING FOR OTHER VIRAL DISEASES: Primary | ICD-10-CM

## 2022-03-07 NOTE — TELEPHONE ENCOUNTER
Patient called back regarding scheduling surgery with Dr. Russell. Declined receiving phone call from writer. Agreeable to the following details   Date of Surgery: 03/28/22  Approximate arrival time given:  No  [facility] of surgery: [facility]  Pre-Op H&P: [facility]   Post-Op Appt Date: 1 week    Imaging needed:  No  Scheduled/Discussed COVID-19 Testing: Yes    Authorization Completed (Before Scheduling)  No    Patient aware that pre-op RN will call 2-3 days prior to surgery with arrival time and instructions Yes  Packet sent out: Already received    All patients questions were answered and was instructed to review surgical packet and call back with any questions or concerns.       Ashlee Maddox on 3/7/2022 at 4:08 PM

## 2022-03-07 NOTE — TELEPHONE ENCOUNTER
Left message for Fortino and returned his call regarding scheduling surgery. Asked patient to call back direct number for scheduling. Informed patient on vm writer left message 2/25.     Ashlee Maddox on 3/7/2022 at 3:38 PM

## 2022-03-10 ENCOUNTER — TELEPHONE (OUTPATIENT)
Dept: SPEECH THERAPY | Facility: CLINIC | Age: 72
End: 2022-03-10
Payer: MEDICARE

## 2022-03-10 NOTE — TELEPHONE ENCOUNTER
Received voicemail requesting call back to discuss questions. Called and spoke with pt. Pt inquiring whether or not there is anything else he can do for his trismus. He is limited to basically a full liquid diet and is frustrated with limited progress. Discussed with pt that trismus is d/t both radiation induced fibrosis and most recently the development of ORN. D/t ORN he cannot stretch aggressively d/t increased risk of fracture. Encouraged pt to continue with gentle stretching. Pt inquired whether or not he would have to get a feeding tube b/c of this. Trained pt if he can maintain his weight on a full liquid diet and doesn't mind doing this, then he does not need to consider a feeding tube at this time. However, if he does not want to get his nutrition via full liquids then he would need to consider alternative means of nutrition. If pt was getting recurrent pneumonias and/or losing weight unintentionally then alternative means of nutrition, such as a feeding tube, would be recommended. Pt inquired whether there was anything else he could do for his jaw. This clinician is unsure of the role of PT for his trismus specifically d/t the presence of ORN. This clinician will reach out to PT to determine if PT would be appropriate. Trained pt regardless, any further therapy will need to wait until after his upcoming oral debridement surgery with Dr. Russell at the end of March. Pt verbalized understanding and appreciative of phone call. Time spent talking with patient: 13 minutes.    KATTY Salgado (emerson), MA, CCC-SLP   Speech Language Pathologist  [facility] Trained Vocologist   [facility] [place]  Dept. of Otolaryngology  Department of Rehabilitation Services  [address] [place] [id number]  Email: [email or web address]   Phone: [phone]  Pronouns: she/her/hers

## 2022-03-14 ENCOUNTER — PATIENT OUTREACH (OUTPATIENT)
Dept: OTOLARYNGOLOGY | Facility: CLINIC | Age: 72
End: 2022-03-14
Payer: MEDICARE

## 2022-03-14 NOTE — TELEPHONE ENCOUNTER
Called to check in with patient to ensure that he is scheduled for pre-op physical with his PCP and has a plan to hold his Xarelto. Discussed with patient that typically patients hold Xerelto 2-3 days prior to surgery but this should be reviewed and plan should be made with PCP and/or cardiologist. Patient verbalized understanding and has pre-op scheduled for next week with his PCP.     Patient has multiple questions regarding plan for surgery. Writer was able to answer most questions but patient felt that he would like to review again with Dr. Russell prior to surgery. Patient scheduled for phone follow-up visit with Dr. Russell to further discuss on Wednesday 3/16. Patient verbalized understanding and was encouraged to call with further questions or concerns.       Almaz Sorto, RN, BSN

## 2022-03-15 ENCOUNTER — OFFICE VISIT (OUTPATIENT)
Dept: OPHTHALMOLOGY | Facility: CLINIC | Age: 72
End: 2022-03-15
Payer: MEDICARE

## 2022-03-15 VITALS — WEIGHT: 182 LBS | HEIGHT: 70 IN | BODY MASS INDEX: 26.05 KG/M2

## 2022-03-15 DIAGNOSIS — H02.239 PARALYTIC LAGOPHTHALMOS, UNSPECIFIED LATERALITY: Primary | ICD-10-CM

## 2022-03-15 PROCEDURE — 99213 OFFICE O/P EST LOW 20 MIN: CPT | Mod: 24 | Performed by: OPHTHALMOLOGY

## 2022-03-15 ASSESSMENT — LAGOPHTHALMOS
OD_LAGOPHTHALMOS: 0
OS_LAGOPHTHALMOS: 0

## 2022-03-15 ASSESSMENT — VISUAL ACUITY
OS_PH_SC+: -1
CORRECTION_TYPE: GLASSES
OD_CC: 20/25
METHOD: SNELLEN - LINEAR
OD_CC+: -1
OS_PH_SC: 20/80
OS_SC: 20/125-1

## 2022-03-15 ASSESSMENT — MARGIN REFLEX DISTANCE
OD_MRD1: 5
OS_MRD1: 4

## 2022-03-15 ASSESSMENT — CONF VISUAL FIELD
METHOD: COUNTING FINGERS
OD_NORMAL: 1
OS_NORMAL: 1

## 2022-03-15 ASSESSMENT — EXTERNAL EXAM - RIGHT EYE: OD_EXAM: NORMAL

## 2022-03-15 ASSESSMENT — EXTERNAL EXAM - LEFT EYE: OS_EXAM: LEFT FACIAL PALSY

## 2022-03-15 NOTE — PROGRESS NOTES
Chief Complaint(s) and History of Present Illness(es)     Follow Up     Laterality: left eye    Pain scale: 0/10    Comments:  - s/p permanent tarsorrhaphy lateral 50% (2/10/21)- Dr. Pendleton  - s/p FLOWER platinum weight, LLL ectropion repair, and temporary tarso - Dr. Yoon (4/1/21). Patient requesting weight removal due to feeling   numbness.               Comments     Patient reports feeling numbness on the left side of his face that   includes the left lid. This all began prior to cancer treatment last year   and has been going on well over a year now. Vision remains stable. Scleral   lens in not in place today with thought that it may interfere with   possible surgery today.   EES ointment Q HS left eye   Artifical tears each eye PRN.     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 The patient underwent low dose weekly carboplatin 1.5 AUC with radiation. His PET/CT 2 months after initiation and underwent surveillance after that. In 08/2020, he developed numbness in chin and then forehead. He underwent PET/CT and IR-guided biopsy (1/2021), which demonstrated invasive squamous cell carcinoma.     Per oncology, patient has left-sided paralysis. The patient first noticed left eye redness and lower lid drooping in about January 2021.    Patient last saw Dr. Diallo 3/3/22 who recommended scleral lens wear, emycin ilya at bedtime, increasing celluvisc to 6x/day each eye, and will consider cataract surgery in the summer.        CT soft tissue neck (2/16/22):  Primary  1: No evidence of recurrence: routine surveillance  2: Low suspicion    a) Superficial abnormality (skin, mucosal surface): direct visual  inspection    b)  Ill-defined deep abnormality: short interval follow-up* or PET  3: High suspicion (new or enlarging discrete nodule/mass): biopsy  4: Definitive recurrence (path proven or clinical progression): no  biopsy needed     Nodes  1: No evidence of recurrence: routine surveillance  2: Low suspicion (ill-defined): short interval follow-up or PET  3: High suspicion (new or enlarging lymph node): biopsy if clinically  needed  4: Definitive recurrence (path proven or clinical progression): no  biopsy needed     PET oncology  (8/16/21):                                                 IMPRESSION: In this patient with 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 No evidence of local recurrence, or metastatic disease of the  chest, abdomen or pelvis.  2. Stable incidental findings of coronary artery atherosclerotic  calcifications, degenerative change of the spine, and scattered sub-6  mm solid pulmonary nodules.    POHx:  - s/p permanent tarsorrhaphy lateral 50% (2/10/21)- Dr. Pendleton  - s/p FLOWER platinum weight, LLL ectropion repair, and temporary tarso - Dr. Yoon (4/1/21)  7/8/21: misdirected trichiatic lashes epilated   12/14/21: Lashes pulled    Assessment & Plan     Fortino Moya is a 72 year old male with the following diagnoses:   Encounter Diagnosis   Name Primary?     Paralytic lagophthalmos, unspecified laterality Yes        S/p Left severing of tarsorrhaphy, left lower eyelid ectropion   repair (DOS 1/11/2022)  - Lid in much improved position, resolution of lateral upper eyelid trichiasis, and lower lid with good tone, and he is happy. No lagophthalmos on exam today  - pt interested in removal of FLOWER weight as he is very bothered by the FLOWER ptosis - will plan to remove in clinic today. Discussed risk of lagophthalmos with removal of lid weight, and pt understands and still wishes to proceed.    Ocular surface:  - continue treatment plan per Dr. Barney: scleral lens wear,  emycin ilya at bedtime, increasing celluvisc to 6x/day each eye, and will consider cataract surgery in the summer.    ** of note, patient is scheduled for transoral mandibular debridement 3/28/22    Plan:  Follow up 2-3 weeks      Meka Rendon MD  Oculoplastics Fellow  Agree with above. 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 He had previously undergone tarsorrhaphy and weight placement. He has had significant recovery of his facial nerve function, though his cornea remains neurotrophic. His tarsorrhaphy was lysed and he continues to lubricate his cornea aggressively. With that he has done well, and desires removal of the left upper eyelid weight to address the mechanical ptosis which is obstructing his vision. We discussed risks including recurrent lagophthalmos, corneal decompensation, infection, bleeding, need for more surgery. He would like to proceed. As he is numb on the left side of his face, decision was made to proceed in clinic rather than with anesthesia.    Description of procedure: Fortino was brought to the minor procedure room and placed supine on the table.  The upper eyelid crease was marked out with a marking pen.  The area was infiltrated with local anesthetic, consisting of 1% lidocaine with epinephrine.  He was prepped and draped in typical sterile fashion for oculoplastic surgery.  Attention was directed to the left eye.  A 15 blade was used to incise the upper eyelid crease and dissection was carried out through the orbicularis and orbital septum.  The orbital fat was retracted superiorly and the platinum weight was identified.  It was secured to the levator aponeurosis with sutures as well as the superior tarsal plate.  This was dissected out and the 3 sutures were excised.  The weight was removed.  Hemostasis was assured with thermal cautery.  Wound closure was achieved with 6-0 plain gut  sutures.  He has erythromycin ophthalmic ointment at home, thus instructions were provided to apply the ointment, and apply ice.  He tolerated the procedure well and left the minor room in good condition.    Attending Physician Attestation: Complete documentation of historical and exam elements from today's encounter can be found in the full encounter summary report (not reduplicated in this progress note). I personally obtained the chief complaint(s) and history of present illness. I confirmed and edited as necessary the review of systems, past medical/surgical history, family history, social history, and examination findings as documented by others; and I examined the patient myself. I personally reviewed the relevant tests, images, and reports as documented above. I formulated and edited as necessary the assessment and plan and discussed the findings and management plan with the patient.  -Vivien Yoon MD

## 2022-03-15 NOTE — NURSING NOTE
Chief Complaints and History of Present Illnesses   Patient presents with     Follow Up      - s/p permanent tarsorrhaphy lateral 50% (2/10/21)- Dr. Pendleton  - s/p FLOWER platinum weight, LLL ectropion repair, and temporary tarso - Dr. Yoon (4/1/21). Patient requesting weight removal due to feeling numbness.      Chief Complaint(s) and History of Present Illness(es)     Follow Up     Laterality: left eye    Pain scale: 0/10    Comments:  - s/p permanent tarsorrhaphy lateral 50% (2/10/21)- Dr. Pendleton  - s/p FLOWER platinum weight, LLL ectropion repair, and temporary tarso - Dr. Yoon (4/1/21). Patient requesting weight removal due to feeling numbness.               Comments     Patient reports feeling numbness on the left side of his face that includes the left lid. This all began prior to cancer treatment last year and has been going on well over a year now. Vision remains stable. Scleral lens in not in place today with thought that it may interfere with possible surgery today.   EES ointment Q HS left eye   Artifical tears each eye PRN.     ANA Guerra 11:27 AM March 15, 2022

## 2022-03-16 ENCOUNTER — VIRTUAL VISIT (OUTPATIENT)
Dept: OTOLARYNGOLOGY | Facility: CLINIC | Age: 72
End: 2022-03-16
Payer: MEDICARE

## 2022-03-16 VITALS — WEIGHT: 185 LBS | HEIGHT: 70 IN | BODY MASS INDEX: 26.48 KG/M2

## 2022-03-16 DIAGNOSIS — M87.30 OSTEORADIONECROSIS (H): Primary | ICD-10-CM
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
## 2022-03-16 PROCEDURE — 99442 PR PHYSICIAN TELEPHONE EVALUATION 11-20 MIN: CPT | Mod: 95 | Performed by: OTOLARYNGOLOGY

## 2022-03-16 ASSESSMENT — PAIN SCALES - GENERAL: PAINLEVEL: SEVERE PAIN (6)

## 2022-03-16 NOTE — LETTER
3/16/2022       RE: Fortino Moya  [address]  [place] [facility] [place] [address]     Dear Colleague,    Thank you for referring your patient, Fortino Moya, to the [facility] [place] at [facility]. Please see a copy of my visit note below.     Dictation on: 03/16/2022  6:02 PM by: DAVID RUSSELL [[id number]]         Mr. Moya is a patient I saw recently regarding osteoradionecrosis.  He wanted to discuss our proposed procedure to drill away some of the bone of the retromolar trigone to see if we can get to bleeding bone.  He discussed with me that actually right now, he is doing quite well.  He does not have that much pain on Neurontin and he is going bowling, etc. and doing other activities so he is wondering if it is actually worthwhile to do this.    I discussed with him that I would only drill out a limited amount of bone to see if I could get to some bleeding bone and if not, he may require a segmental mandibulectomy, but I also did tell him that if he wanted to wait, there is not necessarily a rush on this procedure.  He also asked about hyperbaric oxygen. I told him that he can certainly try this and in some patients it does have efficacy, although the data suggests that it is not uniformly helpful for osteoradionecrosis.  He would like to try this and we will set him up with appointment at the hyperbaric center at [facility].  He will check back in with us in three to six months and let us know how he is doing.  All of his questions were answered.    I spent 20 minutes on the phone with the patient today.      David Russell MD, MS  Professor and Chair   Dept. of Otolaryngology-Head and Neck Surgery   [facility]

## 2022-03-16 NOTE — NURSING NOTE
"Chief Complaint   Patient presents with     RECHECK     discuss surgery       Height 1.778 m (5' 10\"), weight 83.9 kg (185 lb).    Beena Traylor, EMT    "

## 2022-03-16 NOTE — PROGRESS NOTES
Mr. Moya is a patient I saw recently regarding osteoradionecrosis.  He wanted to discuss our proposed procedure to drill away some of the bone of the retromolar trigone to see if we can get to bleeding bone.  He discussed with me that actually right now, he is doing quite well.  He does not have that much pain on Neurontin and he is going bowling, etc. and doing other activities so he is wondering if it is actually worthwhile to do this.    I discussed with him that I would only drill out a limited amount of bone to see if I could get to some bleeding bone and if not, he may require a segmental mandibulectomy, but I also did tell him that if he wanted to wait, there is not necessarily a rush on this procedure.  He also asked about hyperbaric oxygen. I told him that he can certainly try this and in some patients it does have efficacy, although the data suggests that it is not uniformly helpful for osteoradionecrosis.  He would like to try this and we will set him up with appointment at the hyperbaric center at [facility].  He will check back in with us in three to six months and let us know how he is doing.  All of his questions were answered.    I spent 20 minutes on the phone with the patient today.      David Russell MD, MS  Professor and Chair   Dept. of Otolaryngology-Head and Neck Surgery   [facility]

## 2022-03-17 NOTE — NURSING NOTE
Hyperbaric oxygen consult [place] [facility] Fax number [phone].     They will contact patient to schedule.       Almaz Sorto, RN, BSN

## 2022-03-18 ENCOUNTER — PATIENT OUTREACH (OUTPATIENT)
Dept: OTOLARYNGOLOGY | Facility: CLINIC | Age: 72
End: 2022-03-18
Payer: MEDICARE

## 2022-03-18 NOTE — TELEPHONE ENCOUNTER
Called and spoke with patient to check in after his visit with Dr. Russell to ensure that he would like to cancel his procedure scheduled on 3/28 with Dr. Russell. Patient indicates he would like to cancel and consult with [facility] to discuss possible hyperbaric oxygen treatment. Advised patient referral was placed and faxed and he should receive a call to arrange consult. Advised patient to give them a few days to connect with him but call writer if he has not heard after about a week. Patient in agreement with this plan.     He will return for follow-up with Dr. Russell in 3 months. Message sent to schedulers to arrange follow-up.     Patient was encouraged to call with any further questions or concerns.       Almaz Sorto, RN, BSN

## 2022-04-07 ENCOUNTER — TELEPHONE (OUTPATIENT)
Dept: OTOLARYNGOLOGY | Facility: CLINIC | Age: 72
End: 2022-04-07
Payer: MEDICARE

## 2022-04-07 NOTE — TELEPHONE ENCOUNTER
[facility] Call Center    Phone Message    May a detailed message be left on voicemail: yes     Reason for Call: Other: Pt is still waiting to hear about scheduling treatment in the hyperbaric chamber. He is also wonder if there is some place closer than [facility]. Please call to discuss. Thank you    Action Taken: Message routed to:  [facility] ([facility] ENT    Travel Screening: Not Applicable

## 2022-04-07 NOTE — TELEPHONE ENCOUNTER
Returned call to patient with update regarding [facility] referral. Reviewed that referral was placed and sent on 3/17/22.  Patient indicates he has not received a call. Provided patient with HBO clinic contact number and advised him to call to arrange consult. Patient verbalized understanding and will call with any difficulty with scheduling.       Almaz Sorto, RN, BSN

## 2022-04-12 ENCOUNTER — OFFICE VISIT (OUTPATIENT)
Dept: OPHTHALMOLOGY | Facility: CLINIC | Age: 72
End: 2022-04-12
Payer: MEDICARE

## 2022-04-12 DIAGNOSIS — H02.239 PARALYTIC LAGOPHTHALMOS, UNSPECIFIED LATERALITY: Primary | ICD-10-CM

## 2022-04-12 DIAGNOSIS — C09.9 SQUAMOUS CELL CARCINOMA OF TONSIL (H): ICD-10-CM

## 2022-04-12 DIAGNOSIS — H04.122 DRY EYE SYNDROME OF LEFT EYE: ICD-10-CM

## 2022-04-12 PROCEDURE — 99024 POSTOP FOLLOW-UP VISIT: CPT | Performed by: OPHTHALMOLOGY

## 2022-04-12 ASSESSMENT — VISUAL ACUITY
OD_SC: 20/20
METHOD: SNELLEN - LINEAR
OS_SC: 20/125

## 2022-04-12 ASSESSMENT — TONOMETRY
OS_IOP_MMHG: 08
OD_IOP_MMHG: 09
IOP_METHOD: ICARE

## 2022-04-12 ASSESSMENT — EXTERNAL EXAM - LEFT EYE: OS_EXAM: LEFT FACIAL PALSY

## 2022-04-12 ASSESSMENT — EXTERNAL EXAM - RIGHT EYE: OD_EXAM: NORMAL

## 2022-04-12 NOTE — NURSING NOTE
Chief Complaints and History of Present Illnesses   Patient presents with     Post Op (Ophthalmology) Left Eye     POM#1 s/p FLOWER weight removal      Chief Complaint(s) and History of Present Illness(es)     Post Op (Ophthalmology) Left Eye     Laterality: left eye    Comments: POM#1 s/p FLOWER weight removal               Comments     Pt notes that the LE is doing great! He is using EES ilya 2-3x daily and PFAT 4-6x daily, eye doesn't feel dry due to regimen per pt. Denies pain    Naida Sullivan COT April 12, 2022 11:32 AM

## 2022-04-12 NOTE — PROGRESS NOTES
Chief Complaint(s) and History of Present Illness(es)     Post Op (Ophthalmology) Left Eye     Laterality: left eye    Comments: POM#1 s/p FLOWER weight removal               Comments     Pt notes that the LE is doing great! He is using EES ilya 2-3x daily and   PFAT 4-6x daily, eye doesn't feel dry due to regimen per pt. Denies pain    Naida Sullivan COT April 12, 2022 11:32 AM          Pleased post removal of left upper eyelid weight. Happy with outcome. He has enjoyed improvement in his facial nerve function. He does have a bit of lagophthalmos compared to preop, but has been aggressively lubricating. Does not like PFATs, so using Refresh Liquigel.    - Vaseline to incision qhs  - continue warm packs  Has f/u with Dr. Barney, happy to see back as needed.      Attending Physician Attestation: Complete documentation of historical and exam elements from today's encounter can be found in the full encounter summary report (not reduplicated in this progress note). I personally obtained the chief complaint(s) and history of present illness. I confirmed and edited as necessary the review of systems, past medical/surgical history, family history, social history, and examination findings as documented by others; and I examined the patient myself. I personally reviewed the relevant tests, images, and reports as documented above. I formulated and edited as necessary the assessment and plan and discussed the findings and management plan with the patient and family. I personally reviewed the ophthalmic test(s) associated with this encounter, agree with the interpretation(s) as documented by the resident/fellow, and have edited the corresponding report(s) as necessary. Vivien Yoon MD

## 2022-04-19 ENCOUNTER — HOSPITAL ENCOUNTER (OUTPATIENT)
Dept: MRI IMAGING | Facility: CLINIC | Age: 72
Discharge: HOME | End: 2022-04-19
Attending: RADIOLOGY | Admitting: RADIOLOGY
Payer: MEDICARE

## 2022-04-19 ENCOUNTER — OFFICE VISIT (OUTPATIENT)
Dept: RADIATION ONCOLOGY | Facility: CLINIC | Age: 72
End: 2022-04-19
Attending: RADIOLOGY
Payer: MEDICARE

## 2022-04-19 VITALS — BODY MASS INDEX: 27.25 KG/M2 | WEIGHT: 189.9 LBS

## 2022-04-19 DIAGNOSIS — C09.9 SQUAMOUS CELL CARCINOMA OF TONSIL (H): Primary | ICD-10-CM

## 2022-04-19 DIAGNOSIS — C09.9 SQUAMOUS CELL CARCINOMA OF TONSIL (H): ICD-10-CM
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
## 2022-04-19 DIAGNOSIS — M87.30 OSTEORADIONECROSIS (H): ICD-10-CM

## 2022-04-19 PROCEDURE — A9585 GADOBUTROL INJECTION: HCPCS | Performed by: RADIOLOGY

## 2022-04-19 PROCEDURE — 70543 MRI ORBT/FAC/NCK W/O &W/DYE: CPT | Mod: MG

## 2022-04-19 PROCEDURE — G1004 CDSM NDSC: HCPCS | Performed by: RADIOLOGY

## 2022-04-19 PROCEDURE — 70543 MRI ORBT/FAC/NCK W/O &W/DYE: CPT | Mod: 26 | Performed by: RADIOLOGY

## 2022-04-19 PROCEDURE — 255N000002 HC RX 255 OP 636: Performed by: RADIOLOGY

## 2022-04-19 RX ORDER — GADOBUTROL 604.72 MG/ML
10 INJECTION INTRAVENOUS ONCE
Status: COMPLETED | OUTPATIENT
Start: 2022-04-19 | End: 2022-04-19

## 2022-04-19 RX ADMIN — GADOBUTROL 7.5 ML: 604.72 INJECTION INTRAVENOUS at 09:57

## 2022-04-19 NOTE — LETTER
2022    RE: Fortino Moya  [address]   [address]  [place] [address]    Dear Colleague,    Thank you for referring your patient, Fortino Moya, to the [facility] RADIATION ONCOLOGY. Please see a copy of my visit note below.       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 He presented a few years later with recurrent disease within the ipsilateral pterygoids extending to the skull base with invasion of the cavernous sinus and tracking along CN V to the brainstem. He received 2 cycles of induction chemotherapy with TPF from 2021-2021 with repeat imaging demonstrated a near-complete response to therapy. He then received curative-intent chemoradiotherapy as described above.    Mr. Moya returns to clinic today for a routine short interval post-treatment disease surveillance visit. His biggest complaint today is related to worsening trismus over the past several months. 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  He has started gabapentin and has good relief with  that.  He opted to not have any surgical intervention currently.  He is gently working on trismus exercises.  He is blending his food.  He is not doing fluoride. He states that after his last round of antibiotics, he felt a little better, so he wonders if there isn't infection present.    PHYSICAL EXAM:  Wt Readings from Last 4 Encounters:   22 86.1 kg (189 lb 14.4 oz)   22 83.9 kg (185 lb)   03/15/22 82.6 kg (182 lb)   22 86 kg (189 lb 11.2 oz)     General: 72-year-old gentleman seated in an examination chair in no acute distress  HEENT: NC/AT. Stable left-sided ptosis. No rhinorrhea or epistaxis. Moderate trismus with approximately 10 mm incisal opening. Dry mucous membranes. Visualization of the oral cavity is impeded secondary to the significant trismus however there is 5-10 mm area of exposed bone involving the medial aspect of the left retromolar trigone extending into the left tonsillar fossa. Mild erythema of the surrounding mucosa. No additional oral cavity lesions appreciated.  I can't get my finger in his mouth to palpate.  Neck: Significant fibrosis of the left lateral neck. No adenopathy appreciated.  Pulmonary: No wheezing, stridor or respiratory distress  Skin: Scattered areas of hypopigmentation throughout the left neck. No ulceration or desquamation.  Neuro: A/O x3.  Cranial Nerve Exam  I: Not tested  II: Not tested  III/IV/VI: PERRL. EOMI.   V: Diminished sensation within the left V1-V3 distribution  VII: Mild to moderate weakness of the left marginal mandibular and buccal branches. House-Brackmann III/VI  VIII: Hearing is grossly intact bilaterally  IX/X: Palate elevates symmetrically. Normal phonation.  XI: Strength is 5/5 in bilateral trapezius and SCM musculature.  XII: Tongue protrudes in the midline. No atrophy or fasciculations.           LABS AND IMAGIN2022 labs:  TSH: 2.46    2022 CT neck:  Subtle ill-defined heterogeneous enhancement measuring 1.9 x 1.8 cm  within the left parapharyngeal and lower left  space. No cervical adenopathy.    2/16/2022 CT chest:  No evidence of metastatic pulmonary disease    MRI 4/19/2022:     Left mandibular second molar periapical signal changes with associated  inner table focal cortical breach and also associated mandibular  marrow signal abnormality. Abnormal enhancement of the left inferior  alveolar nerve. Extensive signal abnormalities involving the left   muscles. A small pocket of fluid and air containing  collection in the medial aspect of the left  space.      The above findings could be due to left second molar tooth abscess and  associated soft tissue inflammation/ infection/ small abscess-phlegmon  in the left  space. Dental consultation would be helpful.  Given lack of additional bony changes in the left hemimandible  radiation necrosis is thought to be present less likely.      Abnormal enhancement of the left facial nerve. This could be a chronic  finding of prior radiation. No abnormal thickening of the nerve.      Asymmetric thin mucosal enhancement on the partially visualized left  pyriform sinus. Correlation with physical exam findings is  recommended. Focal round area of abnormal enhancement in the left  tongue base. No corresponding suspicious appearance on recent CT. FDG  PET CT can be obtained to better evaluate these findings.        IMPRESSION:   Mr. 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 He is 11 months out from the completion of salvage chemoradiotherapy with persistant pain and trismus with clinical examination revealing exposed bone within the left oropharynx and radiographic evidence of contrast-enhancement within the left parapharyngeal/ space with probable ORN.    PLAN:     1. Continue frequent salt/soda rinses  3.   Continue on  synthroid.  Recheck in July.  4.   Follow up with Dr. Rodgers in July.  5.   He will follow up with ENT in next 2 months.  6.  Use fluoride toothpaste.  7.  Continue trismus exercises given by SLP.    8.  MRI results discussed with Dr. Rodgers.  MRI will be reviewed at tumor conference on Friday 4/22.  Will put back on Augmentin for possible infection.  Discussed with patient.  He has oral surgeon appt in 2 weeks, Dr. Denis Meeks.      Department of Radiation Oncology  [facility]    ASTNAM Stinson CNP

## 2022-04-19 NOTE — PROGRESS NOTES
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 He presented a few years later with recurrent disease within the ipsilateral pterygoids extending to the skull base with invasion of the cavernous sinus and tracking along CN V to the brainstem. He received 2 cycles of induction chemotherapy with TPF from 2021-2021 with repeat imaging demonstrated a near-complete response to therapy. He then received curative-intent chemoradiotherapy as described above.    Mr. Moya returns to clinic today for a routine short interval post-treatment disease surveillance visit. His biggest complaint today is related to worsening trismus over the past several months. 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  He has started gabapentin and has good relief with that.  He opted to not have any surgical intervention currently.  He is gently working on trismus exercises.  He is blending his food.  He is not doing fluoride. He states that after his last round of antibiotics, he felt a little better, so  he wonders if there isn't infection present.    PHYSICAL EXAM:  Wt Readings from Last 4 Encounters:   22 86.1 kg (189 lb 14.4 oz)   22 83.9 kg (185 lb)   03/15/22 82.6 kg (182 lb)   22 86 kg (189 lb 11.2 oz)       General: 72-year-old gentleman seated in an examination chair in no acute distress  HEENT: NC/AT. Stable left-sided ptosis. No rhinorrhea or epistaxis. Moderate trismus with approximately 10 mm incisal opening. Dry mucous membranes. Visualization of the oral cavity is impeded secondary to the significant trismus however there is 5-10 mm area of exposed bone involving the medial aspect of the left retromolar trigone extending into the left tonsillar fossa. Mild erythema of the surrounding mucosa. No additional oral cavity lesions appreciated.  I can't get my finger in his mouth to palpate.  Neck: Significant fibrosis of the left lateral neck. No adenopathy appreciated.  Pulmonary: No wheezing, stridor or respiratory distress  Skin: Scattered areas of hypopigmentation throughout the left neck. No ulceration or desquamation.  Neuro: A/O x3.  Cranial Nerve Exam  I: Not tested  II: Not tested  III/IV/VI: PERRL. EOMI.   V: Diminished sensation within the left V1-V3 distribution  VII: Mild to moderate weakness of the left marginal mandibular and buccal branches. House-Brackmann III/VI  VIII: Hearing is grossly intact bilaterally  IX/X: Palate elevates symmetrically. Normal phonation.  XI: Strength is 5/5 in bilateral trapezius and SCM musculature.  XII: Tongue protrudes in the midline. No atrophy or fasciculations.           LABS AND IMAGIN2022 labs:  TSH: 2.46    2022 CT neck:  Subtle ill-defined heterogeneous enhancement measuring 1.9 x 1.8 cm within the left parapharyngeal and lower left  space. No cervical adenopathy.    2022 CT chest:  No evidence of metastatic pulmonary disease    MRI 2022:     Left mandibular second molar periapical signal changes with  associated  inner table focal cortical breach and also associated mandibular  marrow signal abnormality. Abnormal enhancement of the left inferior  alveolar nerve. Extensive signal abnormalities involving the left   muscles. A small pocket of fluid and air containing  collection in the medial aspect of the left  space.      The above findings could be due to left second molar tooth abscess and  associated soft tissue inflammation/ infection/ small abscess-phlegmon  in the left  space. Dental consultation would be helpful.  Given lack of additional bony changes in the left hemimandible  radiation necrosis is thought to be present less likely.      Abnormal enhancement of the left facial nerve. This could be a chronic  finding of prior radiation. No abnormal thickening of the nerve.      Asymmetric thin mucosal enhancement on the partially visualized left  pyriform sinus. Correlation with physical exam findings is  recommended. Focal round area of abnormal enhancement in the left  tongue base. No corresponding suspicious appearance on recent CT. FDG  PET CT can be obtained to better evaluate these findings.            IMPRESSION:   Mr. 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 He is 11 months out from the completion of salvage chemoradiotherapy with persistant pain and trismus with clinical examination revealing exposed bone within the left oropharynx and radiographic evidence of contrast-enhancement within the left parapharyngeal/ space with probable ORN.    PLAN:     1. Continue frequent salt/soda rinses  3.   Continue on synthroid.  Recheck in July.  4.   Follow up with Dr. Rodgers in July.  5.   He will follow up with ENT in next 2 months.  6.  Use fluoride toothpaste.  7.  Continue trismus exercises given by SLP.    8.  MRI results discussed with Dr. Rodgers.   MRI will be reviewed at tumor conference on Friday 4/22.  Will put back on Augmentin for possible infection.  Discussed with patient.  He has oral surgeon appt in 2 weeks, Dr. Denis Meeks.        Department of Radiation Oncology  [facility]

## 2022-04-21 ENCOUNTER — TELEPHONE (OUTPATIENT)
Dept: OTOLARYNGOLOGY | Facility: CLINIC | Age: 72
End: 2022-04-21
Payer: MEDICARE

## 2022-04-21 RX ORDER — AMOXICILLIN AND CLAVULANATE POTASSIUM 400; 57 MG/5ML; MG/5ML
875 POWDER, FOR SUSPENSION ORAL 2 TIMES DAILY
Qty: 305 ML | Refills: 0 | Status: SHIPPED | OUTPATIENT
Start: 2022-04-21 | End: 2022-05-11

## 2022-04-21 NOTE — TELEPHONE ENCOUNTER
[facility] Call Center    Phone Message    May a detailed message be left on voicemail: yes     Reason for Call: Other: Fortino called to report that he needs a tube placed in his left ear as it was not adjusting properly for hyberbaric chamber therapy. Please reach out to Fortino to discuss. Thank you!     Action Taken: Message routed to:  [facility] ([facility] ENT    Travel Screening: Not Applicable

## 2022-04-22 ENCOUNTER — TUMOR CONFERENCE (OUTPATIENT)
Dept: ONCOLOGY | Facility: CLINIC | Age: 72
End: 2022-04-22
Payer: MEDICARE

## 2022-04-22 NOTE — TUMOR CONFERENCE
Head & Neck Tumor Conference Note     Status: Established   Staff: Dr. David Russell    Tumor Site: Left tonsil   Tumor Pathology: p16+ SCC  Tumor Stage: rT4 N0 M0  Tumor Treatment:  - Chemoradiation [place]  - Induction chemotherapy 2/28/2021  - Definitive chemoradiotherapy with weekly cisplatin and a total of 6600 cGy finished on 05/07/2021      Reason for Review: Review imaging, path, and POC    Brief History: Mr. 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  He initially started with a clinically staged T3 N1 p16 positive squamous cell carcinoma of the left tonsil treated in [place] with chemoradiotherapy.  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  He had symptoms of numbness in all three divisions of his fifth cranial nerve.  He then received two cycles of induction chemotherapy with TPF completed on 02/28/2021.  His post-induction imaging suggests a near complete response.  He then received definitive chemoradiotherapy with weekly cisplatin and a total of 6600 cGy finished on 05/07/2021.  Since Chemoradiation he has developed left mandibular ORN. He had scans performed recently that showed  ill-defined heterogeneously enhancing area in the left parapharyngeal area, but otherwise the scan was unremarkable.  Today he is being presented to discuss imaging and POC.     Pertinent PMH:   Past Medical History:   Diagnosis Date     Arrhythmia     A FIB     Atrial fibrillation (H)      Coronary artery disease 6/15/2021    A Fib     Dysphagia      Hearing problem 1995     History of radiation therapy 1/21     Hypercholesterolemia      Hypertension      Paralytic lagophthalmos      Primary squamous cell carcinoma of tonsil (H)         Social Hx:   Social History     Tobacco Use     Smoking status: Never Smoker     Smokeless  tobacco: Never Used   Vaping Use     Vaping Use: Never used   Substance Use Topics     Alcohol use: Yes     Comment: 3 beers/week     Drug use: Not Currently       Imaging:    MRI Sinus 4/19/2022  MR SINUS FACE W/O & W CONTRAST 4/19/2022 9:57 AM     History:  Patient with recurrent squamous cell carcinoma of the left  tonsil status post chemoradiotherapy. Now presenting with worsening  trismus and concern for radiation-induced inflammation versus  recurrent disease.; Squamous cell carcinoma of tonsil (H)  ICD-10: Squamous cell carcinoma of tonsil (H)     Comparison:  CT dated 2/16/2022 and there is a PET CT from 8/16/2021.     Technique: Sagittal and coronal T1-weighted and axial T2-weighted  images with fat saturation of the face and nasopharynx from the roofs  of the orbits through the base of C2 were obtained without intravenous  contrast. Following intravenous administration of gadolinium, axial  and coronal T1-weighted images with fat saturation of the face were  also obtained.     Contrast: 7.5cc of Gadavist injected.      Findings:     There is asymmetric mucosal enhancement of the left piriform sinus  extending to the posterior hypopharyngeal wall. This is partially  imaged on this MRI (Series 10, image 1). There is resultant partial  effacement of the sinus.      In the left tongue base there is a roundish area of T1 hypointensity  T2 hyperintensity and enhancement extending to the vallecula. No  associated restricted diffusion (series 3, image 4, series 9, image  4). On the recent CT this area appears as a hypodensity without  definite enhancement.     Partially visualized epiglottis appears thickened which might be due  to prior radiation treatment.      There is slight asymmetric enhancement of the left tonsil without  masslike appearance.      There is abnormal T2 hyperintensity and enhancement along the entire  temporalis muscle, masseter muscle and the pterygoid muscles in the  left  space.  There is also ill-defined enhancement extending  along the left retromaxillary fat pad with associated heterogeneous T2  signal (series 10, image 17). Within the left  space  medially, is a curvilinear area of air and fluid containing focus in  the expected region of the parapharyngeal fat. This area has  associated restricted diffusion (Series 9, image 17). Thickness of  this collection measures 6.7 mm. Another focus of restricted diffusion  is seen along the inner margin of the left mandibular body at the  level of the mandibular foramen (series 6, image 70). On postcontrast  images there is abnormal enhancement along the inferior alveolar nerve  within the left hemimandible. There is also abnormal T1 hypointense,  T2 hyperintense enhancing signal in the medullary cavity of the left  hemimandible. This is particularly around the level of the left second  mandibular molar tooth. There is focal breach of the inner cortical  table of the left hemimandible at this level better seen on recent CT.  Also seen on CT,  there is suggestion of lucency around this molar  root.      There is asymmetric abnormal enhancement along the left facial nerve  extending along the stylomastoid foramen to the mastoid, tympanic and  labyrinthine segments (series 10, image 28, 27, 25, 21 and 20). There  is no abnormal enhancement along the foramen ovale. Former rotundum  appears symmetric and normal. Cavernous sinuses appear symmetric.     Benign inflammatory mucosal thickening of the maxillary sinuses,  ethmoid air cells are noted. Orbital cavity contents are normal.  Infraorbital canals are normal. Left mastoid air cells are partially  opacified. Trace fluid opacification of the right mastoid air cells.  No abnormal enhancement along the inner ear structures or internal  auditory canals.      Few scattered white matter chronic small vessel ischemic changes in  the supratentorial white matter. Right parotid gland is normal.  There  is slight T2 hyperintensity in asymmetric enhancement posterior to the  ramus of the left mandible along the deep lobe of the parotid gland.   In the visualized upper neck no lymphadenopathy. Submandibular glands  are atrophic.                                                                      IMPRESSION:     Left mandibular second molar periapical signal changes with associated  inner table focal cortical breach and also associated mandibular  marrow signal abnormality. Abnormal enhancement of the left inferior  alveolar nerve. Extensive signal abnormalities involving the left   muscles. A small pocket of fluid and air containing  collection in the medial aspect of the left  space.      The above findings could be due to left second molar tooth abscess and  associated soft tissue inflammation/ infection/ small abscess-phlegmon  in the left  space. Dental consultation would be helpful.  Given lack of additional bony changes in the left hemimandible  radiation necrosis is thought to be present less likely.      Abnormal enhancement of the left facial nerve. This could be a chronic  finding of prior radiation. No abnormal thickening of the nerve.      Asymmetric thin mucosal enhancement on the partially visualized left  pyriform sinus. Correlation with physical exam findings is  recommended. Focal round area of abnormal enhancement in the left  tongue base. No corresponding suspicious appearance on recent CT. FDG  PET CT can be obtained to better evaluate these findings.      COLTEN AGGARWAL MD     CT Soft tissue neck w contrast 2/16/2022 12:46 PM      Comparison:  Neck CT 6/18/2021.    Findings:  Mild hypopharyngeal mucosal edema, increased since 6/18/2021.    Subtle ill-defined region of heterogeneous predominantly peripheral  enhancement with central hypoattenuation and small foci of internal  air in the left parapharyngeal and lower left  space  measuring approximately  1.9 x 1.8 cm (series 3, image 55).    Unchanged mild enlargement of the left foramen ovale.    No pathologically enlarged, necrotic, or otherwise abnormal lymph  nodes.    Expected post-treatment changes are noted including effacement of fat  planes, supraglottic mucosal edema and thickening of the skin and  subcutaneous soft tissues.    There are no findings to suggest a second primary in the imaged  aerodigestive tract.    Left eyelid weight.    The major salivary glands are unremarkable. The thyroid gland appears  normal.    Mild atherosclerotic calcification of the bilateral carotid  bifurcations    Multilevel cervical degenerative changes. Mild to moderate paranasal  sinus mucosal thickening, slightly increased since 6/18/2021. Mild  bilateral dependent mastoid effusions, left greater than right.    The visualized lung apices are clear.    Impression  Impression:  Primary: 2b} Ill-defined heterogeneously enhancing lesion in the left  parapharyngeal and lower left  spaces containing central  foci of air. Differential considerations include necrotic mass versus  phlegmonous infected collection. Recommend correlation with direct  visualization of the posterior oropharyngeal cavity for mucosal defect  given the central foci of air. This may also be further evaluated with  MRI or PET/CT.  Neck: 1}  No abnormal lymph node.      MOISE GARCIA MD      CT Chest w contrast* 2/16/2022    COMPARISON: PET/CT 8/16/2021    FINDINGS: 92 mL intravenous Isovue 370 contrast utilized. The included  thyroid appears unremarkable. No enlarged low neck lymphadenopathy. No  enlarged axillary, mediastinal or hilar lymph nodes. Thoracic aorta  there is ectatic at approximately 4.2 cm. Main pulmonary artery is  normal in size. There is No pleural or pericardial effusion.  The included upper abdomen shows no masses. Typical contrast mixing  artifact in the spleen from timing on the scan noted. There is  tortuosity of the  descending thoracic aorta again there is  atherosclerotic calcification in the aorta as well as the coronary  arteries.  Bones short degenerative disc changes throughout the thoracic spine.  These are most notable with endplate changes and vacuum disc at  T11-T12 and diffuse sclerotic endplate changes at T3-4 and T2-3. These  findings appear similar.  Detail of the lungs show mild biapical fibrosis. Scarring and/or  subsegmental atelectasis also noted in both lower lobes. No discrete  new pulmonary nodule. 2 mm nodule laterally in the right upper lobe  (series 5 image 121) is unchanged.    Impression  IMPRESSION: No CT evidence of metastatic disease to the chest.  Bibasilar scarring and/or subsegmental atelectasis. Mild biapical  fibrosis. Atherosclerosis. Ectatic mid ascending thoracic aorta,  proximally 4.2 cm.    MARLY CHIANG MD        Pathology: No new pathology       Tumor Board Recommendation:   Discussion: This is a 72 year old male with rT4 N0 M0 p16+ SCC of left tonsil.     Imaging was reviewed and demonstrates:  MRI 4/19/2022  - Mandiblular marrow singal changes and inferior alevaoler n.  - Tooth root with bony changes and posterior cortical bone changes. This does not correspond with CT evaluation of bone 2/2022. CT shows small area of cortical bone dehiscence. Exam shows hole in madibular mucosa.   - MRI fluid pocket in deep paraphgyngeal space   - Post contrast enchacment in  space muscles. Unsure if it is tumor vs beign posttreatment changes.   - Left Facial nerve enchacement either tumor or post radiatoin     Discussion  - ORN at angle of mandible. He opted for HBO.   - Chemoradiation treatment completed 5/2021. The current enhancement seems increased to be one year out from treatment.   - Current imaging show no discrete mass to biopsy. PET scan maybe able to give a clearer picture for an area that can be biopsied to determine if this enchantment in  space is post treatment  inflammation vs recurrence.     Plan:  - Schedule PET scan     Mary Bee MD, PGY-3  Otolaryngology- Head and Neck Surgery    Documentation / Disclaimer Cancer Tumor Board Note  Cancer tumor board recommendations do not override what is determined to be reasonable care and treatment, which is dependent on the circumstances of a patient's case; the patient's medical, social, and personal concerns; and the clinical judgment of the oncologist [physician].

## 2022-04-22 NOTE — TELEPHONE ENCOUNTER
Patient left voicemail on surgery scheduling line regarding wanting Dr. Russell to place a tube in ear or someone to place an ear tube.  Phone call not yet returned. Routed to the clinic.       Ashlee Maddox on 4/22/2022 at 11:52 AM

## 2022-04-25 DIAGNOSIS — C09.9 TONSILLAR CANCER (H): Primary | ICD-10-CM

## 2022-04-26 NOTE — TUMOR CONFERENCE
Called and spoke with patient regarding tumor board discussion and recommendation to proceed with PET scan for further evaluation of the area seen on the MRI. Patient in agreement and is scheduled for PET on Friday 4/29. Advised patient that we would like him to wait for HBO until after PET scan and we can arrange tube placement for his ear to proceed with HBO following PET and next steps. Patient in agreement and was encouraged to call with further questions or concerns.       Almaz Sorto, RN, BSN

## 2022-04-29 ENCOUNTER — HOSPITAL ENCOUNTER (OUTPATIENT)
Dept: PET IMAGING | Facility: CLINIC | Age: 72
Discharge: HOME | End: 2022-04-29
Attending: RADIOLOGY
Payer: MEDICARE

## 2022-04-29 DIAGNOSIS — C09.9 TONSILLAR CANCER (H): ICD-10-CM

## 2022-04-29 PROCEDURE — 343N000001 HC RX 343: Performed by: RADIOLOGY

## 2022-04-29 PROCEDURE — G1004 CDSM NDSC: HCPCS | Mod: PS

## 2022-04-29 PROCEDURE — A9552 F18 FDG: HCPCS | Performed by: RADIOLOGY

## 2022-04-29 PROCEDURE — 70491 CT SOFT TISSUE NECK W/DYE: CPT | Mod: 26 | Performed by: RADIOLOGY

## 2022-04-29 PROCEDURE — G1004 CDSM NDSC: HCPCS | Mod: GC | Performed by: RADIOLOGY

## 2022-04-29 PROCEDURE — 250N000011 HC RX IP 250 OP 636: Performed by: RADIOLOGY

## 2022-04-29 PROCEDURE — 70491 CT SOFT TISSUE NECK W/DYE: CPT | Mod: MG

## 2022-04-29 PROCEDURE — 78816 PET IMAGE W/CT FULL BODY: CPT | Mod: 26 | Performed by: RADIOLOGY

## 2022-04-29 PROCEDURE — 74177 CT ABD & PELVIS W/CONTRAST: CPT

## 2022-04-29 PROCEDURE — 71260 CT THORAX DX C+: CPT | Mod: 26 | Performed by: RADIOLOGY

## 2022-04-29 PROCEDURE — 74177 CT ABD & PELVIS W/CONTRAST: CPT | Mod: 26 | Performed by: RADIOLOGY

## 2022-04-29 RX ORDER — IOPAMIDOL 755 MG/ML
45-150 INJECTION, SOLUTION INTRAVASCULAR ONCE
Status: COMPLETED | OUTPATIENT
Start: 2022-04-29 | End: 2022-04-29

## 2022-04-29 RX ADMIN — FLUDEOXYGLUCOSE F-18 11.12 MCI.: 500 INJECTION, SOLUTION INTRAVENOUS at 13:07

## 2022-04-29 RX ADMIN — IOPAMIDOL 111 ML: 755 INJECTION, SOLUTION INTRAVENOUS at 14:16

## 2022-05-06 ENCOUNTER — TUMOR CONFERENCE (OUTPATIENT)
Dept: ONCOLOGY | Facility: CLINIC | Age: 72
End: 2022-05-06
Payer: MEDICARE

## 2022-05-06 NOTE — TUMOR CONFERENCE
Head & Neck Tumor Conference Note      Status: Established   Staff: Dr. David Russell     Tumor Site: Left tonsil   Tumor Pathology: p16+ SCC  Tumor Stage: rT4 N0 M0  Tumor Treatment:  - Chemoradiation [place]  - Induction chemotherapy 2/28/2021  - Definitive chemoradiotherapy with weekly cisplatin and a total of 6600 cGy finished on 05/07/2021     Reason for Review: Review imaging, path, and POC     Brief History: Mr. 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  He initially started with a clinically staged T3 N1 p16 positive squamous cell carcinoma of the left tonsil treated in [place] with chemoradiotherapy.  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  He had symptoms of numbness in all three divisions of his fifth cranial nerve.  He then received two cycles of induction chemotherapy with TPF completed on 02/28/2021.  His post-induction imaging suggests a near complete response.  He then received definitive chemoradiotherapy with weekly cisplatin and a total of 6600 cGy finished on 05/07/2021.  Since Chemoradiation he has developed left mandibular ORN. He had scans performed recently that showed  ill-defined heterogeneously enhancing area in the left parapharyngeal area, but otherwise the scan was unremarkable.  Today he is being presented to discuss imaging and POC.      Pertinent PMH:   Past Medical History        Past Medical History:   Diagnosis Date     Arrhythmia       A FIB     Atrial fibrillation (H)       Coronary artery disease 6/15/2021     A Fib     Dysphagia       Hearing problem 1995     History of radiation therapy 1/21     Hypercholesterolemia       Hypertension       Paralytic lagophthalmos       Primary squamous cell carcinoma of tonsil (H)              Social Hx:   Social History            Tobacco  Use     Smoking status: Never Smoker     Smokeless tobacco: Never Used   Vaping Use     Vaping Use: Never used   Substance Use Topics     Alcohol use: Yes       Comment: 3 beers/week     Drug use: Not Currently         Imagin2022  2.4 x 2.5 cm most of fluid filled and also air-containing area in the  left  space, fistulized to the oral cavity. This is felt to  be complication of mucosal and submucosal necrosis possibly from prior  radiation. FDG uptake and mucosal enhancement seen along the posterior  wall of this area suggestive of active inflammation.     No abnormal FDG uptake along the left hemimandible or of left lateral  pterygoid or masseter muscles that showed signal changes on prior MRI  suggesting lack of malignancy in these areas.      Focal FDG uptake along the right lateral oropharyngeal wall and  corresponding slight mucosal thickening. FDG uptake extends slightly  caudally along the right posterolateral wall. Correlation with  physical examination findings to exclude another focus of tumor is  recommended. If no suspicious clinical findings at this time, a  short-term follow-up in 3 months is recommended to ensure its  stability and/or resolution    IMPRESSION:   1. No evidence of metastatic disease below the neck.           Pathology: No new pathology         Tumor Board Recommendation:   Discussion: This is a 72 year old male with rT4 N0 M0 p16+ SCC of left tonsil. ORN was not particularly dramatic. Inner table exposed near the mandible.    PET scan was reviewed. There is necrotic tissue in the parapharyngeal space extending all the way to the mucosa. Some mucosal enhancement. Bone itself has low uptake on PET.     Patient is feeling better since starting on antibiotics.     Plan:  - patient is getting HBO  - repeat CT scan in 3 mo  - repeat scope in clinic    Kenny Jerry MD, PGY-3  Otolaryngology- Head and Neck Surgery

## 2022-05-06 NOTE — TUMOR CONFERENCE
Called and left message for patient to review tumor board recommendations and advised patient that he can proceed with HBO. Discussed that Dr. Russell can place ear tube if needed. Advised patient return call to discuss and plan for in clinic follow-up for exam and tube placement. Left direct line for return call.       Almaz Sorto, RN, BSN

## 2022-05-10 DIAGNOSIS — C09.9 SQUAMOUS CELL CARCINOMA OF TONSIL (H): ICD-10-CM

## 2022-05-10 RX ORDER — GABAPENTIN 300 MG/1
300 CAPSULE ORAL 3 TIMES DAILY
Qty: 270 CAPSULE | Refills: 3 | Status: SHIPPED | OUTPATIENT
Start: 2022-05-10 | End: 2023-07-05

## 2022-05-11 ENCOUNTER — THERAPY VISIT (OUTPATIENT)
Dept: SPEECH THERAPY | Facility: CLINIC | Age: 72
End: 2022-05-11
Payer: MEDICARE

## 2022-05-11 ENCOUNTER — OFFICE VISIT (OUTPATIENT)
Dept: OTOLARYNGOLOGY | Facility: CLINIC | Age: 72
End: 2022-05-11
Payer: MEDICARE

## 2022-05-11 VITALS
HEIGHT: 70 IN | SYSTOLIC BLOOD PRESSURE: 130 MMHG | WEIGHT: 186 LBS | BODY MASS INDEX: 26.63 KG/M2 | DIASTOLIC BLOOD PRESSURE: 81 MMHG | OXYGEN SATURATION: 98 % | HEART RATE: 85 BPM | TEMPERATURE: 98.4 F

## 2022-05-11 DIAGNOSIS — C09.9 MALIGNANT NEOPLASM OF TONSIL (H): Primary | ICD-10-CM

## 2022-05-11 DIAGNOSIS — Z43.1 PEG (PERCUTANEOUS ENDOSCOPIC GASTROSTOMY) ADJUSTMENT/REPLACEMENT/REMOVAL (H): ICD-10-CM

## 2022-05-11 DIAGNOSIS — G50.9: ICD-10-CM

## 2022-05-11 DIAGNOSIS — C09.9 SQUAMOUS CELL CARCINOMA OF TONSIL (H): Primary | ICD-10-CM

## 2022-05-11 DIAGNOSIS — R13.12 OROPHARYNGEAL DYSPHAGIA: ICD-10-CM

## 2022-05-11 DIAGNOSIS — I48.92 ATRIAL FLUTTER, UNSPECIFIED TYPE (H): ICD-10-CM

## 2022-05-11 PROCEDURE — 99213 OFFICE O/P EST LOW 20 MIN: CPT | Mod: 25 | Performed by: OTOLARYNGOLOGY

## 2022-05-11 PROCEDURE — 92526 ORAL FUNCTION THERAPY: CPT | Mod: GN | Performed by: SPEECH-LANGUAGE PATHOLOGIST

## 2022-05-11 PROCEDURE — 69433 CREATE EARDRUM OPENING: CPT | Mod: LT | Performed by: OTOLARYNGOLOGY

## 2022-05-11 PROCEDURE — 31575 DIAGNOSTIC LARYNGOSCOPY: CPT | Mod: 51 | Performed by: OTOLARYNGOLOGY

## 2022-05-11 ASSESSMENT — PAIN SCALES - GENERAL: PAINLEVEL: NO PAIN (0)

## 2022-05-11 NOTE — PROGRESS NOTES
PRIOR ONCOLOGIC HISTORY:  Mr. 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  He initially started with a clinically staged T3 N1 p16 positive squamous cell carcinoma of the left tonsil treated in [place] with chemoradiotherapy.  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  He had symptoms of numbness in all three divisions of his fifth cranial nerve.  He then received two cycles of induction chemotherapy with TPF completed on 02/28/2021.  His post-induction imaging suggests a near complete response.  He then received definitive chemoradiotherapy with weekly cisplatin and a total of 6600 cGy finished on 05/07/2021.      INTERVAL HISTORY:  Mr. Moya had a CT and PET in the recent weeks, which reveals uptake in the posterior pharyngeal wall, which was recommended for visualization.  He also started hyperbaric oxygen and was able to equalize his right ear, but not his left ear and therefore is here for a left ear tube.     He also is expressing frustration that he was not able to get approval for an emotional support animal.     He notes that the reading of the PET scans and MRI suggested that perhaps he did have ORN.  I did confirm that he does have ORN based on the fact that I can see exposed bone in his oral cavity.    PHYSICAL EXAMINATION:  Examination today consisted of two procedures.      PROCEDURE#1:  I examined the left ear first and put phenol in the inferior aspect of the drum.  He had a large bony prominence anteriorly, so I could not see the most anterior aspect of the drum.  I then made an inferior myringotomy and placed a tube, which was a Paparella tube through the myringotomy.  The patient tolerated this well.      PROCEDURE #2: Next, I did flexible fiberoptic endoscopy through the left nasal cavity after  anesthetization and vasoconstriction.  It did reveal some submucosal bone with what appears to be submucosal bony prominence at the level of the oropharynx.  I do not see any mucosal lesions on the posterior wall of the oropharynx as suggested by the PET scan.    IMPRESSION:  1.  ORN.  Continue with hyperbaric oxygen.  2.  Oropharynx looks normal.    PLAN:  We will see the patient once he has completed his hyperbaric oxygen.      David Russell MD, MS  Professor and Chair   Dept. of Otolaryngology-Head and Neck Surgery   [facility]

## 2022-05-11 NOTE — NURSING NOTE
"Chief Complaint   Patient presents with     RECHECK     Follow up after PET      fvr  Blood pressure 130/81, pulse 85, temperature 98.4  F (36.9  C), height 1.778 m (5' 10\"), weight 84.4 kg (186 lb), SpO2 98 %.    Frank Moreau LPN    "

## 2022-05-11 NOTE — LETTER
5/11/2022       RE: Fortino Moya  [address]   [address]  [facility] [address]     Dear Colleague,    Thank you for referring your patient, Fortino Moya, to the [facility] EAR NOSE AND THROAT CLINIC [place] at [facility]. Please see a copy of my visit note below.    PRIOR ONCOLOGIC HISTORY:  Mr. 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  He initially started with a clinically staged T3 N1 p16 positive squamous cell carcinoma of the left tonsil treated in [place] with chemoradiotherapy.  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  He had symptoms of numbness in all three divisions of his fifth cranial nerve.  He then received two cycles of induction chemotherapy with TPF completed on 02/28/2021.  His post-induction imaging suggests a near complete response.  He then received definitive chemoradiotherapy with weekly cisplatin and a total of 6600 cGy finished on 05/07/2021.      INTERVAL HISTORY:  Mr. Moya had a CT and PET in the recent weeks, which reveals uptake in the posterior pharyngeal wall, which was recommended for visualization.  He also started hyperbaric oxygen and was able to equalize his right ear, but not his left ear and therefore is here for a left ear tube.     He also is expressing frustration that he was not able to get approval for an emotional support animal.     He notes that the reading of the PET scans and MRI suggested that perhaps he did have ORN.  I did confirm that he does have ORN based on the fact that I can see exposed bone in his oral cavity.    PHYSICAL EXAMINATION:  Examination today consisted of two procedures.      PROCEDURE#1:  I examined the left ear first and put phenol in the inferior aspect of the drum.  He had a large bony  prominence anteriorly, so I could not see the most anterior aspect of the drum.  I then made an inferior myringotomy and placed a tube, which was a Paparella tube through the myringotomy.  The patient tolerated this well.      PROCEDURE #2: Next, I did flexible fiberoptic endoscopy through the left nasal cavity after anesthetization and vasoconstriction.  It did reveal some submucosal bone with what appears to be submucosal bony prominence at the level of the oropharynx.  I do not see any mucosal lesions on the posterior wall of the oropharynx as suggested by the PET scan.    IMPRESSION:  1.  ORN.  Continue with hyperbaric oxygen.  2.  Oropharynx looks normal.    PLAN:  We will see the patient once he has completed his hyperbaric oxygen.      David Russell MD, MS  Professor and Chair   Dept. of Otolaryngology-Head and Neck Surgery   [facility]

## 2022-05-13 NOTE — PROGRESS NOTES
[facility]    OUTPATIENT SPEECH LANGUAGE PATHOLOGY  PLAN OF TREATMENT FOR OUTPATIENT REHABILITATION AND PROGRESS NOTE                                                          Patient's Last Name, First Name, Fortino Gomez Date of Birth  1950   Provider's Name  [facility] Medical Record No.  [id number]    Onset Date  01/21/22 Start of Care Date  01/21/22   Type:     __PT   ___OT   _X_SLP Medical Diagnosis  Oropharyngeal dysphagia   SLP Diagnosis  Mild to moderate oropharyngeal dysphagia Plan of Treatment  Frequency/Duration: 1x/2-3 months in conjunction with ENT clinic visits  Certification date from 04/21/22 to 7/21/2022     Goals:  Goal Identifier PO   Goal Description 1. Pt will tolerate least restrictive diet with thin liquids with no overt clinical s/sx of penetration/aspiration in 9/10 PO trials and no adverse pulmonary events over a 3 month period.    Target Date 04/21/22   Date Met      Progress (detail required for progress note):  Goal not met. Please see daily documentation for details.     Goal Identifier Exercises   Goal Description 2. Pt will maximize swallow function by completing 10 reps of 5/5 oropharyngeal and jaw strengthening and ROM exercises 3-5x/day with minimal written and/or verbal cues.    Target Date 04/21/22   Date Met      Progress (detail required for progress note):  Goal not met. Please see daily documentation for details.     Goal Identifier Jaw ROM   Goal Description 3. Pt will maximize jaw ROM to at least 35mm as measured in at least 2 consecutive therapy sessions.    Target Date 04/21/22   Date Met      Progress (detail required for progress note):  Goal not met. Please see daily documentation for details.       Beginning/End Dates of Progress Note Reporting Period:  1/21/22 to 4/21/22    Progress Toward Goals:   Progress  this reporting period: Goal not met. Please see daily documentation for details.    Client Self (Subjective) Report for Progress Note Reporting Period: Pt returns today in conjunction with ENT clinic visit. He expresses frustration with multiple medical issues. Reports he is going to try hyperbaric oxygen therapy.    Outcome Measures (Most Recent Score):          Objective Measurements:   Jaw ROM  16mm                                     I CERTIFY THE NEED FOR THESE SERVICES FURNISHED UNDER        THIS PLAN OF TREATMENT AND WHILE UNDER MY CARE     (Physician attestation of this document indicates review and certification of the therapy plan).                Referring Provider: Dr. David Cortez, SLP

## 2022-06-02 ENCOUNTER — HOSPITAL ENCOUNTER (OUTPATIENT)
Facility: CLINIC | Age: 72
End: 2022-06-02
Attending: ORTHOPAEDIC SURGERY | Admitting: ORTHOPAEDIC SURGERY
Payer: MEDICARE

## 2022-06-07 ENCOUNTER — OFFICE VISIT (OUTPATIENT)
Dept: OPHTHALMOLOGY | Facility: CLINIC | Age: 72
End: 2022-06-07
Attending: OPHTHALMOLOGY
Payer: MEDICARE

## 2022-06-07 DIAGNOSIS — G51.0 FACIAL PALSY: ICD-10-CM

## 2022-06-07 DIAGNOSIS — H02.056 TRICHIASIS OF LEFT EYE WITHOUT ENTROPION: ICD-10-CM

## 2022-06-07 DIAGNOSIS — H16.232 NEUROTROPHIC KERATOPATHY OF LEFT EYE: ICD-10-CM

## 2022-06-07 DIAGNOSIS — H04.122 DRY EYE SYNDROME OF LEFT EYE: ICD-10-CM

## 2022-06-07 DIAGNOSIS — H02.056 TRICHIASIS OF LEFT EYE WITHOUT ENTROPION: Primary | ICD-10-CM

## 2022-06-07 DIAGNOSIS — H02.239 PARALYTIC LAGOPHTHALMOS, UNSPECIFIED LATERALITY: ICD-10-CM

## 2022-06-07 PROCEDURE — 67820 REVISE EYELASHES: CPT | Performed by: OPHTHALMOLOGY

## 2022-06-07 PROCEDURE — G0463 HOSPITAL OUTPT CLINIC VISIT: HCPCS

## 2022-06-07 PROCEDURE — 99214 OFFICE O/P EST MOD 30 MIN: CPT | Mod: 25 | Performed by: OPHTHALMOLOGY

## 2022-06-07 ASSESSMENT — VISUAL ACUITY
OS_PH_SC: 20/150
METHOD: SNELLEN - LINEAR
OS_SC: 20/200
OD_SC: 20/20

## 2022-06-07 ASSESSMENT — TONOMETRY
OD_IOP_MMHG: 09
OS_IOP_MMHG: 08
IOP_METHOD: ICARE

## 2022-06-07 ASSESSMENT — EXTERNAL EXAM - LEFT EYE: OS_EXAM: LEFT FACIAL PALSY

## 2022-06-07 ASSESSMENT — CONF VISUAL FIELD
OD_NORMAL: 1
OS_NORMAL: 1

## 2022-06-07 ASSESSMENT — EXTERNAL EXAM - RIGHT EYE: OD_EXAM: NORMAL

## 2022-06-07 NOTE — PROGRESS NOTES
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    Of note, patient has a history of L oropharynx P16+ squamous cell cancer first diagnosed in 2018. The patient underwent low dose weekly carboplatin 1.5 AUC with radiation. His PET/CT 2 months after initiation and underwent surveillance after that. In 08/2020, he developed numbness in chin and then forehead. He underwent PET/CT and IR-guided biopsy (1/2021), which demonstrated invasive squamous cell carcinoma.    Per oncology, patient has left-sided paralysis. The patient first noticed left eye redness and lower lid drooping in about January 2021. He notes that these symptoms progressed during through April 2021. He noted associated watery discharge. He was seen by oncology and started on erythromycin ilya then recommended to follow up with ophthalmology for further management. He has also been using AT BID during this time. He denies eye irritation, pain, purulent discharge, and flashes/floaters. He notes baseline left eye vision when not using erythromycin ointment.    Interval:   Had FLOWER weight removed Has healed well and does not need to see oculo-plastics  again    POH: refractive error (intermittent glasses use)    Surgery: LASIK (25-30 years ago)- monovision  S/p     GTTS:   -PFAT Q2 left eye  -Erythromycin ilya at bedtime left eye     PMH:  -L oropharyngeal squamous cell cancer (first diagnosed 2018), recently diagnosed invasive squamous cell cancer (on chemotherapy)  -HTN    FH:  -No AMD, glaucoma     Drops:  - Emycin ilya at bedtime left eye   - PFAT's 6x daily left eye (using 4-5x daily currently)  - SCL from Dr EDGE.    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 New disease in the left  space s/p PET/CT and IR-guided biopsy consistent with invasive squamous cell carcinoma s/p induction chemotherapy (1/28/21).  - s/p permanent tarsorrhaphy lateral 50% (2/10/21)- Dr. Pendleton  - s/p FLOWER platinum weight, LLL ectropion repair, and temporary tarso - Dr. Yoon (4/1/21)  7/8/21: misdirected trichiatic lashes epilated   10/1/21: left eye cataract increasing. left eye ocular surface dry but epi intact.  12/14/21: Lashes pulled.  Encouraged more PFAT use.  3/3/22: Encouraged more PFAT use bilaterally. Corneal surface has rare PK. Pt doing well with scleral lenses. Few left eye LL ltrichiasis.  6/7: in May had lid weight removed - due to did not like cosmesis of droopy left lid.     Patient feels he is doing well, increases use of erythromycin as needed and this calms the eye down. Does well with the scleral lens.     Plan:  OS  - Continue Emycin at night and when not using the scleral lens  - continue celluvisc or (refresh plus) to at least 6x daily, use in both eyes  - Continue with scleral lens wear     - Disc left eye cataract surgery - patient is NOT interested at this time. Would like to minimize burden of doctor's appointments at this time given overall health and how many visits he has had in the past couple of years. Doing well and content with where he is now.     RTC: 3 months w/ VT -  sooner as needed.      Katarina Copeland MD  Ophthalmology Resident PGY3  [facility]     Attending Physician Attestation:  Complete documentation of historical and exam elements from today's  encounter can be found in the full encounter summary report (not reduplicated in this progress note).  I personally obtained the chief complaint(s) and history of present illness.  I confirmed and edited as necessary the review of systems, past medical/surgical history, family history, social history, and examination findings as documented by others; and I examined the patient myself.  I personally reviewed the relevant tests, images, and reports as documented above.  I formulated and edited as necessary the assessment and plan and discussed the findings and management plan with the patient and family. - Mert Barney MD     I was present for the entire procedure(s). - Mert Barney MD   no concerns

## 2022-06-07 NOTE — NURSING NOTE
Chief Complaints and History of Present Illnesses   Patient presents with     Trichiasis Follow Up     Chief Complaint(s) and History of Present Illness(es)     Trichiasis Follow Up               Comments     Pt. States that he is doing well. No change in VA BE. No pain BE. No dryness BE.   Apryl Mcdowell COT 12:35 PM June 7, 2022

## 2022-06-27 ENCOUNTER — TELEPHONE (OUTPATIENT)
Dept: OTOLARYNGOLOGY | Facility: CLINIC | Age: 72
End: 2022-06-27

## 2022-06-27 NOTE — TELEPHONE ENCOUNTER
[facility] Call Center    Phone Message    May a detailed message be left on voicemail: yes     Reason for Call: Pt reached out to schedule with Anselmo and I scheduled next Available, He completed 30 hyperbaric chamber treatments.  As recommended by Dr Russell, follow up ORN examination.  Debridement of ORN?  Future prognosis of ORN.  He was worried that the date I scheduled was to far out can we verify and let him know Please  His Appt is 7/13/2022 at 4:30 PM    Thank you,    Action Taken: Message routed to:  [facility] ENT    Travel Screening: Not Applicable

## 2022-06-28 NOTE — TELEPHONE ENCOUNTER
Returned call to patient who indicates he completed 30 hyperbaric oxygen treatments for his ORN. He is requesting follow-up after completion. Patient is scheduled for follow-up with Dr. Russell for 7/13. He will continue with that plan and will call with further questions or concerns.       Almaz Sorto, RN, BSN

## 2022-06-29 ENCOUNTER — OFFICE VISIT (OUTPATIENT)
Dept: URGENT CARE | Facility: URGENT CARE | Age: 72
End: 2022-06-29
Payer: MEDICARE

## 2022-06-29 ENCOUNTER — TELEPHONE (OUTPATIENT)
Dept: OTOLARYNGOLOGY | Facility: CLINIC | Age: 72
End: 2022-06-29

## 2022-06-29 VITALS
SYSTOLIC BLOOD PRESSURE: 114 MMHG | HEIGHT: 70 IN | RESPIRATION RATE: 16 BRPM | BODY MASS INDEX: 26.63 KG/M2 | DIASTOLIC BLOOD PRESSURE: 72 MMHG | WEIGHT: 186 LBS | HEART RATE: 62 BPM | TEMPERATURE: 98.2 F | OXYGEN SATURATION: 97 %

## 2022-06-29 DIAGNOSIS — H92.12 OTORRHEA, LEFT: Primary | ICD-10-CM

## 2022-06-29 PROCEDURE — 99214 OFFICE O/P EST MOD 30 MIN: CPT | Performed by: PHYSICIAN ASSISTANT

## 2022-06-29 RX ORDER — OFLOXACIN 3 MG/ML
10 SOLUTION AURICULAR (OTIC) DAILY
Qty: 7 ML | Refills: 0 | Status: SHIPPED | OUTPATIENT
Start: 2022-06-29 | End: 2022-07-13

## 2022-06-29 NOTE — TELEPHONE ENCOUNTER
[facility] Call Center    Phone Message    May a detailed message be left on voicemail: yes     Reason for Call: Other: Pt states that left ear that tube was inserted is consistently leaking fluid and he believes its infected also states he believes is hearing is being effected . Does not think should wait until his appt on 7/13 . Please reach out to pt to discuss . Thank you !      Action Taken: Other: [facility] ENT     Travel Screening: Not Applicable

## 2022-06-29 NOTE — PROGRESS NOTES
"    SUBJECTIVE:  Fortino Moya is a 72 year old male who presents with left ear discharge for 3 day(s).   Severity: mild   Timing:still present  Additional symptoms include none.      History of recurrent otitis: no    Complex history cancer of tonsil, necrotic mandible, ear tube due to hyperbaric therapy    Past Medical History:   Diagnosis Date     Arrhythmia     A FIB     Atrial fibrillation (H)      Coronary artery disease 6/15/2021    A Fib     Dysphagia      Hearing problem 1995     History of radiation therapy 1/21     Hypercholesterolemia      Hypertension      Paralytic lagophthalmos      Primary squamous cell carcinoma of tonsil (H)      Current Outpatient Medications   Medication Sig Dispense Refill     atorvastatin (LIPITOR) 10 MG tablet Take 10 mg by mouth every evening        carvedilol (COREG) 12.5 MG tablet 2 times daily        cevimeline (EVOXAC) 30 MG capsule Take 1 capsule (30 mg) by mouth 2 times daily 90 capsule 3     erythromycin (ROMYCIN) 5 MG/GM ophthalmic ointment        levothyroxine (SYNTHROID/LEVOTHROID) 50 MCG tablet Take 1 tablet (50 mcg) by mouth daily 90 tablet 3     lisinopril (ZESTRIL) 10 MG tablet        ofloxacin (FLOXIN) 0.3 % otic solution Place 10 drops Into the left ear daily for 14 days 7 mL 0     rivaroxaban ANTICOAGULANT (XARELTO) 20 MG TABS tablet        traZODone (DESYREL) 50 MG tablet        gabapentin (NEURONTIN) 300 MG capsule Take 1 capsule (300 mg) by mouth 3 times daily (Patient not taking: Reported on 6/29/2022) 270 capsule 3     Social History     Tobacco Use     Smoking status: Never Smoker     Smokeless tobacco: Never Used   Substance Use Topics     Alcohol use: Yes     Comment: 3 beers/week       ROS:   Review of systems negative except as stated above.    OBJECTIVE:  /72 (BP Location: Right arm, Patient Position: Chair, Cuff Size: Adult Regular)   Pulse 62   Temp 98.2  F (36.8  C) (Oral)   Resp 16   Ht 1.778 m (5' 10\")   Wt 84.4 kg (186 lb)   SpO2 " 97%   BMI 26.69 kg/m     EXAM following lavage:  The right TM is normal: no effusions, no erythema, and normal landmarks     The right auditory canal is normal and without drainage, edema or erythema  The left TM is normal: no effusions, no erythema, and normal landmarks  The left auditory canal is normal and without drainage, edema or erythema  Oropharynx exam is normal: no lesions, erythema, adenopathy or exudate.  GENERAL: no acute distress  EYES: EOMI,  PERRL, conjunctiva clear  NECK: supple, non-tender to palpation, no adenopathy noted  RESP: lungs clear to auscultation - no rales, rhonchi or wheezes  CV: regular rates and rhythm, normal S1 S2, no murmur noted  SKIN: no suspicious lesions or rashes     ASSESSMENT:  (H92.12) Otorrhea, left  (primary encounter diagnosis)  Comment: Spoke with Dr. Mary molina ENT at [facility] rec drops until follow up  Plan: ofloxacin (FLOXIN) 0.3 % otic solution      Red flags and emergent follow up discussed, and understood by patient  Follow up with PCP if symptoms worsen or fail to improve

## 2022-06-29 NOTE — PATIENT INSTRUCTIONS
Follow up with ENT within 2 weeks as scheduled    Medication will take a few days to start working.  Please follow up with ENT if new or worsening symptoms

## 2022-06-30 NOTE — TELEPHONE ENCOUNTER
Called and spoke with patient who indicates that he went to urgent care in [place] yesterday and they prescribed him ear drops. He indicates that he feels some relief with the ear drops. He will continue these and update writer if he has any additional concerns. He is scheduled for follow-up with Dr. Russell on 7/13 and will update writer if he would like to be seen sooner for ear evaluation.     Patient has direct line and is agreeable to plan.     Almaz Sorto, RN, BSN

## 2022-07-13 ENCOUNTER — OFFICE VISIT (OUTPATIENT)
Dept: OTOLARYNGOLOGY | Facility: CLINIC | Age: 72
End: 2022-07-13
Payer: MEDICARE

## 2022-07-13 VITALS
HEART RATE: 87 BPM | DIASTOLIC BLOOD PRESSURE: 80 MMHG | WEIGHT: 184 LBS | BODY MASS INDEX: 26.34 KG/M2 | HEIGHT: 70 IN | TEMPERATURE: 97.9 F | SYSTOLIC BLOOD PRESSURE: 125 MMHG

## 2022-07-13 DIAGNOSIS — C09.9 MALIGNANT NEOPLASM OF TONSIL (H): Primary | ICD-10-CM

## 2022-07-13 PROCEDURE — 99214 OFFICE O/P EST MOD 30 MIN: CPT | Performed by: OTOLARYNGOLOGY

## 2022-07-13 ASSESSMENT — PAIN SCALES - GENERAL: PAINLEVEL: MODERATE PAIN (4)

## 2022-07-13 NOTE — LETTER
7/13/2022       RE: Fortino Moya  [address]   [address]  [facility] [address]     Dear Colleague,    Thank you for referring your patient, Fortino Moya, to the [facility] EAR NOSE AND THROAT CLINIC [place] at [facility]. Please see a copy of my visit note below.    PRIOR ONCOLOGIC HISTORY:  Mr. 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  He initially started with a clinically staged T3 N1 p16 positive squamous cell carcinoma of the left tonsil treated in [place] with chemoradiotherapy.  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  He had symptoms of numbness in all three divisions of his fifth cranial nerve.  He then received two cycles of induction chemotherapy with TPF completed on 02/28/2021.  His post-induction imaging suggests a near complete response.  He then received definitive chemoradiotherapy with weekly cisplatin and a total of 6600 cGy finished on 05/07/2021.       INTERVAL HISTORY:  Since I last saw the patient, he received approximately 30 dives of hyperbaric oxygen to address some exposed bone of the retromolar trigone as well as imaging findings consistent with early osteoradionecrosis.  The patient also had an episode of otitis externa versus otitis media on the left side, which was treated with drops.  He continues to have some pain on the left side of his mandible when he pushes.  He describes this as about 2-3/10.  He thinks his trismus might be a little bit better since he finished his hyperbaric oxygen.  Lastly, he needs some work done on his teeth and is finding that most of the surgery or oral surgeons and dentists in the community do not want to risk worsening his osteoradionecrosis by working on it.    PHYSICAL EXAMINATION:  On examination, he is  well-appearing, in no distress.  His interincisal distance is just over 1 cm.  The retromolar trigone that had an opening cavity is still open.  I suctioned some debris away.  The cavity itself looks quite clean with a little bit of what appears to be necrotic pterygoid muscle still present there that I am not able to remove it to suction.  The remainder of the oral cavity is unremarkable.  There are no mucosal lesions.  I do not see any exposed bone on the upper and lower gingiva.  The oropharynx is normal as is the neck.    IMPRESSION:  Stable ORN.    PLAN: I do not recommend going after the small amount of necrotic tissue in the medial aspect of the ramus of the mandible.  At this point, the patient is in agreement with this.  I would like to watch this and see what happens over the next couple months.  I recommend that he see Dr. Kim [facility] to address any dental needs and oral surgery needs that he may have.     David Russell MD, MS  Professor and Chair   Dept. of Otolaryngology-Head and Neck Surgery   [facility]       cc:    Kely Kim, REINA   [facility] [employer]  [place] [address]     David Russell MD, MS  Professor and Chair   Dept. of Otolaryngology-Head and Neck Surgery   [facility]

## 2022-07-13 NOTE — NURSING NOTE
"Chief Complaint   Patient presents with     RECHECK     Follow up after hyperbaric chamber      Blood pressure 125/80, pulse 87, temperature 97.9  F (36.6  C), height 1.778 m (5' 10\"), weight 83.5 kg (184 lb).    Frank Moreau LPN    "

## 2022-07-13 NOTE — PATIENT INSTRUCTIONS
1. Please schedule CT scan for when you are here seeing Dr. Rodgers next.   2. Please call the ENT clinic with any questions,concerns, new or worsening symptoms.    -Clinic number is [phone]   - Almaz's direct line (Dr. Russell's nurse) [phone]   3. We will arrange consult with Dr. Murphy

## 2022-07-13 NOTE — PROGRESS NOTES
PRIOR ONCOLOGIC HISTORY:  Mr. 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  He initially started with a clinically staged T3 N1 p16 positive squamous cell carcinoma of the left tonsil treated in [place] with chemoradiotherapy.  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  He had symptoms of numbness in all three divisions of his fifth cranial nerve.  He then received two cycles of induction chemotherapy with TPF completed on 02/28/2021.  His post-induction imaging suggests a near complete response.  He then received definitive chemoradiotherapy with weekly cisplatin and a total of 6600 cGy finished on 05/07/2021.       INTERVAL HISTORY:  Since I last saw the patient, he received approximately 30 dives of hyperbaric oxygen to address some exposed bone of the retromolar trigone as well as imaging findings consistent with early osteoradionecrosis.  The patient also had an episode of otitis externa versus otitis media on the left side, which was treated with drops.  He continues to have some pain on the left side of his mandible when he pushes.  He describes this as about 2-3/10.  He thinks his trismus might be a little bit better since he finished his hyperbaric oxygen.  Lastly, he needs some work done on his teeth and is finding that most of the surgery or oral surgeons and dentists in the community do not want to risk worsening his osteoradionecrosis by working on it.    PHYSICAL EXAMINATION:  On examination, he is well-appearing, in no distress.  His interincisal distance is just over 1 cm.  The retromolar trigone that had an opening cavity is still open.  I suctioned some debris away.  The cavity itself looks quite clean with a little bit of what appears to be necrotic pterygoid muscle still present there that I am not able to  remove it to suction.  The remainder of the oral cavity is unremarkable.  There are no mucosal lesions.  I do not see any exposed bone on the upper and lower gingiva.  The oropharynx is normal as is the neck.    IMPRESSION:  Stable ORN.    PLAN: I do not recommend going after the small amount of necrotic tissue in the medial aspect of the ramus of the mandible.  At this point, the patient is in agreement with this.  I would like to watch this and see what happens over the next couple months.  I recommend that he see Dr. Kim [facility] to address any dental needs and oral surgery needs that he may have.     David Russell MD, MS  Professor and Chair   Dept. of Otolaryngology-Head and Neck Surgery   [facility]       cc:    Kely Kim, MIGELS   [facility] [employer] [facility]  [place] [address]              David Russell MD, MS  Professor and Chair   Dept. of Otolaryngology-Head and Neck Surgery   [facility]

## 2022-07-20 ENCOUNTER — OFFICE VISIT (OUTPATIENT)
Dept: RADIATION ONCOLOGY | Facility: CLINIC | Age: 72
End: 2022-07-20
Attending: RADIOLOGY
Payer: MEDICARE

## 2022-07-20 ENCOUNTER — ANCILLARY PROCEDURE (OUTPATIENT)
Dept: CT IMAGING | Facility: CLINIC | Age: 72
End: 2022-07-20
Attending: OTOLARYNGOLOGY
Payer: MEDICARE

## 2022-07-20 VITALS
WEIGHT: 185.6 LBS | SYSTOLIC BLOOD PRESSURE: 125 MMHG | BODY MASS INDEX: 26.63 KG/M2 | HEART RATE: 88 BPM | DIASTOLIC BLOOD PRESSURE: 79 MMHG

## 2022-07-20 DIAGNOSIS — E03.9 HYPOTHYROIDISM, UNSPECIFIED TYPE: ICD-10-CM

## 2022-07-20 DIAGNOSIS — C09.9 TONSILLAR CANCER (H): ICD-10-CM

## 2022-07-20 DIAGNOSIS — C09.9 MALIGNANT NEOPLASM OF TONSIL (H): ICD-10-CM

## 2022-07-20 DIAGNOSIS — C09.9 SQUAMOUS CELL CARCINOMA OF TONSIL (H): Primary | ICD-10-CM

## 2022-07-20 LAB
CREAT BLD-MCNC: 0.8 MG/DL (ref 0.7–1.3)
GFR SERPL CREATININE-BSD FRML MDRD: >60 ML/MIN/1.73M2
TSH SERPL DL<=0.005 MIU/L-ACNC: 2.07 UIU/ML (ref 0.3–4.2)

## 2022-07-20 PROCEDURE — 70491 CT SOFT TISSUE NECK W/DYE: CPT | Mod: MG | Performed by: RADIOLOGY

## 2022-07-20 PROCEDURE — 84443 ASSAY THYROID STIM HORMONE: CPT | Performed by: RADIOLOGY

## 2022-07-20 PROCEDURE — G0463 HOSPITAL OUTPT CLINIC VISIT: HCPCS | Mod: 25

## 2022-07-20 PROCEDURE — 82565 ASSAY OF CREATININE: CPT | Performed by: PATHOLOGY

## 2022-07-20 PROCEDURE — G1010 CDSM STANSON: HCPCS | Mod: GC | Performed by: RADIOLOGY

## 2022-07-20 PROCEDURE — 36415 COLL VENOUS BLD VENIPUNCTURE: CPT | Performed by: RADIOLOGY

## 2022-07-20 RX ORDER — IOPAMIDOL 755 MG/ML
90 INJECTION, SOLUTION INTRAVASCULAR ONCE
Status: COMPLETED | OUTPATIENT
Start: 2022-07-20 | End: 2022-07-20

## 2022-07-20 RX ADMIN — IOPAMIDOL 90 ML: 755 INJECTION, SOLUTION INTRAVASCULAR at 13:09

## 2022-07-20 NOTE — PROGRESS NOTES
Department of Radiation Oncology  [facility]  [address] [place] [address]  [phone]       Radiation Oncology Follow-up Visit  2022      Fortino Moya  MRN: [id number]   : 1950     DISEASE TREATED:   rcT4 N0 M0 squamous cell carcinoma of the left tonsil    RADIATION THERAPY DELIVERED:   6600 cGy in 33 fractions, from 3/24/2021 - 2021    SYSTEMIC THERAPY:  Cisplatin 40 mg/m2 weekly    INTERVAL SINCE COMPLETION OF RADIATION THERAPY:   1 year and 2 months    SUBJECTIVE:   Fortino Moya is a 72 year old male with a PMH significant for a cT3 N1 M0 p16 positive squamous cell carcinoma of the left tonsil treated with chemoradiotherapy in [place] in 2018. He presented a few years later with recurrent disease within the ipsilateral pterygoids extending to the skull base with invasion of the cavernous sinus and tracking along CN V to the brainstem. He received 2 cycles of induction chemotherapy with TPF from 2021-2021 with repeat imaging demonstrated a near-complete response to therapy. He then received curative-intent chemoradiotherapy as described above.    Mr. Moya returns to clinic today for a routine post-treatment disease surveillance visit. On exam, he reports that he continues to have left oropharyngeal and ear pain attributed to his ORN that he rates as a 6/10 in severity. He is currently controlling his symptoms with gabapentin 300 mg twice daily and acetaminophen 7731-8059 mg daily. He reports compliance with his recommended SLP exercises and he has noted slightly improved trismus over the past several months. He continues to tolerate a soft diet without difficulty and he is remaining active, playing golf a couple of times per week. He was seen last week by Dr. Russell who noted to have stable ORN with a persistent cavity overlying the retromolar trigone that appeared clean without evidence of recurrent disease. He underwent a  repeat CT of the neck earlier today. Although the official radiology read is currently pending, review in clinic demonstrates ongoing post-radiotherapy changes within the left oropharynx.    PHYSICAL EXAM:  Weight: 84.2 kg  BP: 125/79  Pulse: 88    General: 72-year-old gentleman seated comfortably in a chair in no acute distress  HEENT: NC/AT. EOMI. No rhinorrhea or epistaxis. Dry mucous membranes. There is a 2-3 cm area of exposed bone within the left retromolar trigone. Anteriorly, there also appears to be ORN of the posterior left mandible extending to approximately the level of the premolars. There does not appear to be any recurrent lesions visualized within the remainder of the oral cavity oropharynx mucosa and palpation fails to demonstrate any discrete masses.  Neck: Moderate to severe fibrosis of the left upper neck. No palpable cervical adenopathy bilaterally.  Pulmonary: No wheezing, stridor or respiratory distress  Skin: Scattered telangiectasias throughout the left hemiface and upper neck.      LABS AND IMAGIN2022 labs:  TSH: 2.07    2022 CT neck:  Official radiology read currently pending. Review in clinic demonstrates post-treatment changes with ongoing thickening/inflammation along the left oropharyngeal wall.    IMPRESSION:   Mr. 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 He is over a year out from treatment and remains clinically BRONWYN with ongoing osteoradionecrosis and soft tissue breakdown within the left oropharynx due to radiation-induced toxicity from his 2 courses of head and neck radiotherapy.    PLAN:   1. Follow-up in radiation oncology clinic with NP in 3 months and MD in 6 months  2. Repeat TSH in 2023  3. Follow-up finalized results from today's CT neck. If stable from previous imaging, will repeat in 2023.    Sidney Rodgers MD/PhD  Assistant  Professor  Department of Radiation Oncology  [facility]

## 2022-07-20 NOTE — PROGRESS NOTES
FOLLOW-UP VISIT    Patient Name: Fortino Moya      : 1950     Age: 72 year old        ______________________________________________________________________________     Chief Complaint   Patient presents with     Cancer     Follow up:Tonsil Cancer: Left skull base 6600 cGy completed 21         Pain  Denies    Labs  Other Labs: Yes: today    Imaging  CT: today      Dental:   Most Recent Dental Visit: Needs to see oral surgeon      Speech/Swallowing:   Most Recent evaluation or testing: no  Swallowing Restrictions: No difficulties with swallowing    Trismus/Jaw Exercises: Yes    Nutrition:    Weight:   Wt Readings from Last 3 Encounters:   22 83.5 kg (184 lb)   22 84.4 kg (186 lb)   22 84.4 kg (186 lb)         Oral Symptoms:   Xerostomia:1- Symptomatic without significant dietary alteration; unstimulated saliva flow >0.2 ml/min  Dysphagia: 0-None  Mucositis Oral Symptoms: 0-None  Mucositis: 0- None  Esophagitis:0- None    Other Appointments:     MD Name:  Appointment Date:    MD Name: Appointment Date:   MD Name: Appointment Date:   Other Appointment Notes:     Residual Radiation side effect: Hole in his mouth is a concern

## 2022-07-20 NOTE — LETTER
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 He presented a few years later with recurrent disease within the ipsilateral pterygoids extending to the skull base with invasion of the cavernous sinus and tracking along CN V to the brainstem. He received 2 cycles of induction chemotherapy with TPF from 2021-2021 with repeat imaging demonstrated a near-complete response to therapy. He then received curative-intent chemoradiotherapy as described above.    Mr. Moya returns to clinic today for a routine post-treatment disease surveillance visit. On exam, he reports that he continues to have left oropharyngeal and ear pain attributed to his ORN that he rates as a 6/10 in severity. He is currently controlling his symptoms with gabapentin 300 mg twice daily and acetaminophen 5324-5193 mg daily. He reports compliance with his recommended SLP exercises and he has noted slightly improved trismus over the past several months. He continues to tolerate a soft diet without difficulty and he is remaining active, playing golf a couple of times per week. He was seen last week by Dr. Russell who noted to have stable ORN with a persistent cavity overlying the retromolar  trigone that appeared clean without evidence of recurrent disease. He underwent a repeat CT of the neck earlier today. Although the official radiology read is currently pending, review in clinic demonstrates ongoing post-radiotherapy changes within the left oropharynx.    PHYSICAL EXAM:  Weight: 84.2 kg  BP: 125/79  Pulse: 88    General: 72-year-old gentleman seated comfortably in a chair in no acute distress  HEENT: NC/AT. EOMI. No rhinorrhea or epistaxis. Dry mucous membranes. There is a 2-3 cm area of exposed bone within the left retromolar trigone. Anteriorly, there also appears to be ORN of the posterior left mandible extending to approximately the level of the premolars. There does not appear to be any recurrent lesions visualized within the remainder of the oral cavity oropharynx mucosa and palpation fails to demonstrate any discrete masses.  Neck: Moderate to severe fibrosis of the left upper neck. No palpable cervical adenopathy bilaterally.  Pulmonary: No wheezing, stridor or respiratory distress  Skin: Scattered telangiectasias throughout the left hemiface and upper neck.      LABS AND IMAGIN2022 labs:  TSH: 2.07    2022 CT neck:  Official radiology read currently pending. Review in clinic demonstrates post-treatment changes with ongoing thickening/inflammation along the left oropharyngeal wall.    IMPRESSION:   Mr. 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 He is over a year out from treatment and remains clinically BRONWYN with ongoing osteoradionecrosis and soft tissue breakdown within the left oropharynx due to radiation-induced toxicity from his 2 courses of head and neck radiotherapy.    PLAN:   1. Follow-up in radiation oncology clinic with NP in 3 months and MD in 6 months  2. Repeat TSH in 2023  3. Follow-up finalized results from today's CT neck. If stable from  previous imaging, will repeat in 2023.    Sidney Rodgers MD/PhD    Department of Radiation Oncology  [facility]      FOLLOW-UP VISIT    Patient Name: Fortino Moya      : 1950     Age: 72 year old        ______________________________________________________________________________     Chief Complaint   Patient presents with     Cancer     Follow up:Tonsil Cancer: Left skull base 6600 cGy completed 21         Pain  Denies    Labs  Other Labs: Yes: today    Imaging  CT: today      Dental:   Most Recent Dental Visit: Needs to see oral surgeon      Speech/Swallowing:   Most Recent evaluation or testing: no  Swallowing Restrictions: No difficulties with swallowing    Trismus/Jaw Exercises: Yes    Nutrition:    Weight:   Wt Readings from Last 3 Encounters:   22 83.5 kg (184 lb)   22 84.4 kg (186 lb)   22 84.4 kg (186 lb)         Oral Symptoms:   Xerostomia:1- Symptomatic without significant dietary alteration; unstimulated saliva flow >0.2 ml/min  Dysphagia: 0-None  Mucositis Oral Symptoms: 0-None  Mucositis: 0- None  Esophagitis:0- None    Other Appointments:     MD Name:  Appointment Date:    MD Name: Appointment Date:   MD Name: Appointment Date:   Other Appointment Notes:     Residual Radiation side effect: Hole in his mouth is a concern                 Sidney Rodgers MD

## 2022-07-24 RX ORDER — VITAMIN E 268 MG
400 CAPSULE ORAL 2 TIMES DAILY
Qty: 60 CAPSULE | Refills: 11 | Status: SHIPPED | OUTPATIENT
Start: 2022-07-24 | End: 2022-12-01

## 2022-07-28 ENCOUNTER — NURSE TRIAGE (OUTPATIENT)
Dept: NURSING | Facility: CLINIC | Age: 72
End: 2022-07-28

## 2022-07-28 DIAGNOSIS — C09.9 SQUAMOUS CELL CARCINOMA OF TONSIL (H): Primary | ICD-10-CM

## 2022-07-28 NOTE — TELEPHONE ENCOUNTER
"Call from Fortino, see triage note  Best number to contact Fortino today [phone]  Nurse Triage SBAR    Is this a 2nd Level Triage? YES, LICENSED PRACTITIONER REVIEW IS REQUIRED    Situation:   left sided facial pain, below left ear and swelling along with left neck pain radiating into back of neck and base of skull.  Appears red, no rash, unable to tell if hot to touch.  Started on Augmentin today, took first dose an hour ago, prescribed by oncologist.  Gabapentin and tylenol are not relieving pain today.  States \" I need help today\"    Background:   History of cancer left tonsil,  Has had radiation therapy    Assessment:   follow up per ENT team today    Protocol Recommended Disposition:   Go To Office Now          Reason for Disposition    SEVERE pain (e.g., excruciating, unable to do any normal activities)    Swollen area of face that is painful to touch    Additional Information    Negative: Shock suspected (e.g., cold/pale/clammy skin, too weak to stand, low BP, rapid pulse)    Negative: Similar pain previously and it was from 'heart attack'    Negative: Similar pain previously and it was from 'angina' and not relieved by nitroglycerin    Negative: Sounds like a life-threatening emergency to the triager    Negative: Chest pain    Negative: Sinus pain and congestion    Negative: Headache or pain in upper forehead    Negative: Toothache is the main symptom    Negative: Followed a face injury    Negative: Difficulty breathing or unusual sweating (e.g., sweating without exertion)    Negative: Can't close the mouth fully (i.e., \"mouth is locked open\", patient will have difficulty talking)    Negative: Fever and localized red rash    Negative: Fever and area of face is swollen    Negative: New onset jaw pain of unknown cause AND at least one cardiac risk factor (i.e., hypertension, diabetes, obesity, smoker or strong family history of heart disease)    Negative: Patient sounds very sick or weak to the " "triager    Answer Assessment - Initial Assessment Questions  1. ONSET: \"When did the pain start?\" (e.g., minutes, hours, days)      3 days ago  2. ONSET: \"Does the pain come and go, or has it been constant since it started?\" (e.g., constant, intermittent, fleeting)      constant  3. SEVERITY: \"How bad is the pain?\"   (Scale 1-10; mild, moderate or severe)    - MILD (1-3): doesn't interfere with normal activities     - MODERATE (4-7): interferes with normal activities or awakens from sleep     - SEVERE (8-10): excruciating pain, unable to do any normal activities       7-8/10  Today the tylenol and gabapentin are not helping with the pain  4. LOCATION: \"Where does it hurt?\"       Left side of face, below ear, neck, jaw, back of neck and base of skull  5. RASH: \"Is there any redness, rash, or swelling of the face?\"      Swelling face, neck, along with facial redness.  No rash noticed, unable to differentiate skin temp change. 6. FEVER: \"Do you have a fever?\" If so, ask: \"What is it, how was it measured, and when did it start?\"       Unsure if has fever, denies chilling  7. OTHER SYMPTOMS: \"Do you have any other symptoms?\" (e.g., fever, toothache, nasal discharge, nasal congestion, clicking sensation in jaw joint)      Able to swallow fluids with regurgitation, does thicken liquids  8. PREGNANCY: \"Is there any chance you are pregnant?\" \"When was your last menstrual period?\"      N/A    Protocols used: FACE PAIN-A-OH      "

## 2022-07-29 DIAGNOSIS — G89.3 CANCER ASSOCIATED PAIN: ICD-10-CM

## 2022-07-29 DIAGNOSIS — C09.9 SQUAMOUS CELL CARCINOMA OF TONSIL (H): Primary | ICD-10-CM

## 2022-07-29 RX ORDER — OXYCODONE HYDROCHLORIDE 5 MG/1
5 TABLET ORAL EVERY 6 HOURS PRN
Qty: 15 TABLET | Refills: 0 | Status: SHIPPED | OUTPATIENT
Start: 2022-07-29 | End: 2022-08-24

## 2022-08-13 ENCOUNTER — HOSPITAL ENCOUNTER (OUTPATIENT)
Dept: PET IMAGING | Facility: CLINIC | Age: 72
Discharge: HOME | End: 2022-08-13
Attending: OTOLARYNGOLOGY
Payer: MEDICARE

## 2022-08-13 DIAGNOSIS — C09.9 SQUAMOUS CELL CARCINOMA OF TONSIL (H): ICD-10-CM

## 2022-08-13 PROCEDURE — 78816 PET IMAGE W/CT FULL BODY: CPT | Mod: PS

## 2022-08-13 PROCEDURE — 74177 CT ABD & PELVIS W/CONTRAST: CPT

## 2022-08-13 PROCEDURE — 343N000001 HC RX 343: Performed by: OTOLARYNGOLOGY

## 2022-08-13 PROCEDURE — A9552 F18 FDG: HCPCS | Performed by: OTOLARYNGOLOGY

## 2022-08-13 PROCEDURE — 74177 CT ABD & PELVIS W/CONTRAST: CPT | Mod: 26 | Performed by: RADIOLOGY

## 2022-08-13 PROCEDURE — 250N000011 HC RX IP 250 OP 636: Performed by: OTOLARYNGOLOGY

## 2022-08-13 PROCEDURE — 70491 CT SOFT TISSUE NECK W/DYE: CPT

## 2022-08-13 PROCEDURE — 70491 CT SOFT TISSUE NECK W/DYE: CPT | Mod: 26 | Performed by: RADIOLOGY

## 2022-08-13 PROCEDURE — 71260 CT THORAX DX C+: CPT | Mod: 26 | Performed by: RADIOLOGY

## 2022-08-13 PROCEDURE — 78816 PET IMAGE W/CT FULL BODY: CPT | Mod: 26 | Performed by: RADIOLOGY

## 2022-08-13 RX ORDER — IOPAMIDOL 755 MG/ML
50-135 INJECTION, SOLUTION INTRAVASCULAR ONCE
Status: COMPLETED | OUTPATIENT
Start: 2022-08-13 | End: 2022-08-13

## 2022-08-13 RX ADMIN — FLUDEOXYGLUCOSE F-18 11 MCI.: 500 INJECTION, SOLUTION INTRAVENOUS at 08:16

## 2022-08-13 RX ADMIN — IOPAMIDOL 103 ML: 755 INJECTION, SOLUTION INTRAVENOUS at 09:15

## 2022-08-18 ENCOUNTER — PATIENT OUTREACH (OUTPATIENT)
Dept: OTOLARYNGOLOGY | Facility: CLINIC | Age: 72
End: 2022-08-18

## 2022-08-18 NOTE — TELEPHONE ENCOUNTER
Called patient with the following PET scan results:    IMPRESSION:   In this patient with a history of tonsillar cancer:  1. No evidence of metastatic disease in the chest, abdomen, or pelvis.  2. Please see same day PET/CT head/neck for further details.     Impression:   In this patient with a history of tonsillar cancer:     Primary NIRADS 2b: Persistent left lateral oropharyngeal soft tissue  necrosis. Deep and posterior to this necrotic tissue new FDG avid soft  tissue fullness surrounding the left styloid process. New occlusion of  the left jugular vein and irregular narrowing of the left internal  carotid artery at this level and above when compared with 4/29/2022.  Further evaluation of these findings with MRI is recommended to  exclude malignant process.     Neck NIRADS 1. No abnormal lymph nodes.     Please refer to the whole body PET CT performed as a separate report,  for the findings of the remainder of the body    Reviewed with patient that we will review at tumor board tomorrow and update patient with recommendations. Patient agreeable to plan and was encouraged to call with further questions or concerns.       Almaz Sorto, RN, BSN

## 2022-08-23 ENCOUNTER — HOSPITAL ENCOUNTER (OUTPATIENT)
Dept: MRI IMAGING | Facility: CLINIC | Age: 72
Discharge: HOME | End: 2022-08-23
Attending: OTOLARYNGOLOGY
Payer: MEDICARE

## 2022-08-23 DIAGNOSIS — C09.9 SQUAMOUS CELL CARCINOMA OF TONSIL (H): ICD-10-CM

## 2022-08-23 DIAGNOSIS — R22.0 LOCALIZED SWELLING, MASS AND LUMP, HEAD: ICD-10-CM

## 2022-08-23 PROCEDURE — G1010 CDSM STANSON: HCPCS

## 2022-08-23 PROCEDURE — 255N000002 HC RX 255 OP 636

## 2022-08-23 PROCEDURE — A9585 GADOBUTROL INJECTION: HCPCS

## 2022-08-23 PROCEDURE — 70549 MR ANGIOGRAPH NECK W/O&W/DYE: CPT | Mod: 26 | Performed by: RADIOLOGY

## 2022-08-23 PROCEDURE — 70543 MRI ORBT/FAC/NCK W/O &W/DYE: CPT | Mod: MG

## 2022-08-23 PROCEDURE — G1010 CDSM STANSON: HCPCS | Mod: GC | Performed by: RADIOLOGY

## 2022-08-23 PROCEDURE — 70543 MRI ORBT/FAC/NCK W/O &W/DYE: CPT | Mod: 26 | Performed by: RADIOLOGY

## 2022-08-23 RX ORDER — GADOBUTROL 604.72 MG/ML
10 INJECTION INTRAVENOUS ONCE
Status: COMPLETED | OUTPATIENT
Start: 2022-08-23 | End: 2022-08-23

## 2022-08-23 RX ADMIN — GADOBUTROL 8.5 ML: 604.72 INJECTION INTRAVENOUS at 15:45

## 2022-08-24 ENCOUNTER — OFFICE VISIT (OUTPATIENT)
Dept: OTOLARYNGOLOGY | Facility: CLINIC | Age: 72
End: 2022-08-24

## 2022-08-24 VITALS
SYSTOLIC BLOOD PRESSURE: 122 MMHG | OXYGEN SATURATION: 100 % | TEMPERATURE: 65 F | DIASTOLIC BLOOD PRESSURE: 88 MMHG | WEIGHT: 186 LBS | HEIGHT: 70 IN | BODY MASS INDEX: 26.63 KG/M2

## 2022-08-24 DIAGNOSIS — C09.9 MALIGNANT NEOPLASM OF TONSIL (H): Primary | ICD-10-CM

## 2022-08-24 DIAGNOSIS — I77.819 DILATATION OF AORTA (H): ICD-10-CM

## 2022-08-24 PROCEDURE — 88305 TISSUE EXAM BY PATHOLOGIST: CPT | Mod: TC | Performed by: OTOLARYNGOLOGY

## 2022-08-24 PROCEDURE — 99213 OFFICE O/P EST LOW 20 MIN: CPT | Mod: 25 | Performed by: OTOLARYNGOLOGY

## 2022-08-24 PROCEDURE — 88173 CYTOPATH EVAL FNA REPORT: CPT | Mod: 26 | Performed by: PATHOLOGY

## 2022-08-24 PROCEDURE — 88305 TISSUE EXAM BY PATHOLOGIST: CPT | Mod: 26 | Performed by: PATHOLOGY

## 2022-08-24 PROCEDURE — 10021 FNA BX W/O IMG GDN 1ST LES: CPT | Performed by: OTOLARYNGOLOGY

## 2022-08-24 RX ORDER — OXYCODONE HYDROCHLORIDE 5 MG/1
5 TABLET ORAL EVERY 6 HOURS PRN
Qty: 6 TABLET | Refills: 0 | Status: SHIPPED | OUTPATIENT
Start: 2022-08-24 | End: 2022-11-30

## 2022-08-24 ASSESSMENT — PAIN SCALES - GENERAL: PAINLEVEL: EXTREME PAIN (9)

## 2022-08-24 NOTE — PROGRESS NOTES
PRIOR ONCOLOGIC HISTORY:  Mr. 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  He initially started with a clinically staged T3 N1 p16 positive squamous cell carcinoma of the left tonsil treated in [place] with chemoradiotherapy.  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  He had symptoms of numbness in all three divisions of his fifth cranial nerve.  He then received two cycles of induction chemotherapy with TPF completed on 02/28/2021.  His post-induction imaging suggests a near complete response.  He then received definitive chemoradiotherapy with weekly cisplatin and a total of 6600 cGy finished on 05/07/2021.    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  He did 30 dives of hyperbaric oxygen earlier this year.  He recently was reviewed at our tumor board and a CT scan showed an area in the retromandibular area that appeared to be changing.  This was somewhat amorphous and around the great vessels.  We therefore got an MRI which showed again an ill-defined mass around the great vessels that probably should be biopsied.  The patient himself has noticed a lump just under his left mastoid.  It is quite painful.  He has done some reading and thought he might have mastoiditis.  He feels that the pain is quite significant and continues to get worse.  He continues to try to keep the open area in his mouth where the bone is exposed as clean as possible, but understands that it is difficult because he cannot get his mouth open very well.    PHYSICAL EXAMINATION:  On examination, he is well-appearing, in no distress.  Examination of the oral cavity reveals he continues to have trismus at about 1-1.5 cm.  He continues to have an approximately 1 cm  opening in the retromolar trigone through which there appears to be some necrotic pterygoid musculature.  I was not able to remove any debris today.  The remainder of the oral cavity is unremarkable.  I do not see any mucosal lesions.  He cannot tolerate mirror exam.  Examination of the neck reveals in the left retromandibular area there is a palpable mass that is near the inferior aspect of the auricle that is quite tender, just palpable just under the skin.  He does not have any lymphadenopathy.  The mass is  about 4 cm in size.    PROCEDURE:  Left fine needle biopsy.  Two passes with a 25-gauge needle into the mass in question.  The patient tolerated this, although it was quite painful for him.    IMPRESSION:  Suspicion for recurrent mass in the left neck and retromandibular area.    PLAN:    1.  We will review his MRI at Tumor Board.  2.  If biopsy is nondiagnostic, I would suggest an image-guided biopsy to sample this area more definitively.  We will call with the results.  3.  In the meantime, we will treat him with Augmentin and narcotic pain medication.      David Russell MD, MS  Professor and Chair   Dept. of Otolaryngology-Head and Neck Surgery   [facility]

## 2022-08-24 NOTE — LETTER
8/24/2022       RE: Fortino Moya  [address]   [address]  [facility] [address]     Dear Colleague,    Thank you for referring your patient, Fortino Moya, to the [facility] EAR NOSE AND THROAT CLINIC [place] at [facility]. Please see a copy of my visit note below.    PRIOR ONCOLOGIC HISTORY:  Mr. 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  He initially started with a clinically staged T3 N1 p16 positive squamous cell carcinoma of the left tonsil treated in [place] with chemoradiotherapy.  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  He had symptoms of numbness in all three divisions of his fifth cranial nerve.  He then received two cycles of induction chemotherapy with TPF completed on 02/28/2021.  His post-induction imaging suggests a near complete response.  He then received definitive chemoradiotherapy with weekly cisplatin and a total of 6600 cGy finished on 05/07/2021.    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  He did 30 dives of hyperbaric oxygen earlier this year.  He recently was reviewed at our tumor board and a CT scan showed an area in the retromandibular area that appeared to be changing.  This was somewhat amorphous and around the great vessels.  We therefore got an MRI which showed again an ill-defined mass around the great vessels that probably should be biopsied.  The patient himself has noticed a lump just under his left mastoid.  It is quite painful.  He has done some reading and thought he might have mastoiditis.  He feels that the pain is quite significant and continues to get worse.  He continues to try to keep the open area in his mouth where the bone is exposed  as clean as possible, but understands that it is difficult because he cannot get his mouth open very well.    PHYSICAL EXAMINATION:  On examination, he is well-appearing, in no distress.  Examination of the oral cavity reveals he continues to have trismus at about 1-1.5 cm.  He continues to have an approximately 1 cm opening in the retromolar trigone through which there appears to be some necrotic pterygoid musculature.  I was not able to remove any debris today.  The remainder of the oral cavity is unremarkable.  I do not see any mucosal lesions.  He cannot tolerate mirror exam.  Examination of the neck reveals in the left retromandibular area there is a palpable mass that is near the inferior aspect of the auricle that is quite tender, just palpable just under the skin.  He does not have any lymphadenopathy.  The mass is  about 4 cm in size.    PROCEDURE:  Left fine needle biopsy.  Two passes with a 25-gauge needle into the mass in question.  The patient tolerated this, although it was quite painful for him.    IMPRESSION:  Suspicion for recurrent mass in the left neck and retromandibular area.    PLAN:    1.  We will review his MRI at Tumor Board.  2.  If biopsy is nondiagnostic, I would suggest an image-guided biopsy to sample this area more definitively.  We will call with the results.  3.  In the meantime, we will treat him with Augmentin and narcotic pain medication.      David Russell MD, MS  Professor and Chair   Dept. of Otolaryngology-Head and Neck Surgery   [facility]

## 2022-08-24 NOTE — NURSING NOTE
"Chief Complaint   Patient presents with     RECHECK     Follow up after MRA and MRI        Blood pressure 122/88, temperature (!) 65  F (18.3  C), height 1.778 m (5' 10\"), weight 84.4 kg (186 lb), SpO2 100 %.    Frank Moreau LPN    "

## 2022-08-26 LAB
PATH REPORT.COMMENTS IMP SPEC: NORMAL
PATH REPORT.COMMENTS IMP SPEC: NORMAL
PATH REPORT.FINAL DX SPEC: NORMAL
PATH REPORT.GROSS SPEC: NORMAL
PATH REPORT.MICROSCOPIC SPEC OTHER STN: NORMAL
PATH REPORT.RELEVANT HX SPEC: NORMAL

## 2022-08-30 ENCOUNTER — TELEPHONE (OUTPATIENT)
Dept: OTOLARYNGOLOGY | Facility: CLINIC | Age: 72
End: 2022-08-30

## 2022-08-30 DIAGNOSIS — C09.9 SQUAMOUS CELL CARCINOMA OF TONSIL (H): Primary | ICD-10-CM

## 2022-08-30 NOTE — CONSULTS
Outpatient Neuroradiology Biopsy Referral    Patient is a 71 y/o male with a PMH of recurrent SCC tonsil cancer s/p chemoradiation, chemotherapy, asthma, A fib, dilated aorta, recent imaging notes disease progression. Patient's scans performed recently show ill-defined heterogeneously enhancing area in the left parapharyngeal area, with necrotic tissue in the parapharyngeal space extending all the way to the mucosa. ENT FNA to left neck mass resulted unsatisfactory sample.  Neuroradiology has been asked to biopsy left neck soft tissue mass.    8/23/22 MR and MRA  1. Ill-defined nodular enhancement centered in the left   space with involvement of the left muscles of mastication, cranial  nerve V and cranial nerve VII. 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 This had a similar appearance on prior PET/CT  and remains indeterminate for recurrence versus posttreatment necrotic  change. Recommend continued attention on follow-up imaging.  2. MRA demonstrates patent major arteries without stenosis.    8/13/22  PET CT   Impression:   In this patient with a history of tonsillar cancer:     Primary NIRADS 2b: Persistent left lateral oropharyngeal soft tissue  necrosis. Deep and posterior to this necrotic tissue new FDG avid soft  tissue fullness surrounding the left styloid process. New occlusion of  the left jugular vein and irregular narrowing of the left internal  carotid artery at this level and above when compared with 4/29/2022.  Further evaluation of these findings with MRI is recommended to  exclude malignant process    Case and imaging PET CT 8/13/22, MRI 8/23/22, MRA 8/23/22 was reviewed with Dr. Vargas from Neuroradiology and CT guided biopsy of the left neck mass is approved.     Procedure order, surgical pathology placed.    Xarelto will need to be held for three doses CrCl 26.69 prior to scheduled procedure.     If requesting team would like samples sent for anything else please  enter them or notify Neuroradiology prior to scheduled procedure.    Primary team Dr. Drew JAQUEZ made aware of Neuroradiology recommendations via epic messaging.    SATNAM Maldonado CNP  Interventional Radiology   IR on-call pager: [phone]

## 2022-08-30 NOTE — TELEPHONE ENCOUNTER
[facility]    Phone Message    May a detailed message be left on voicemail: yes     Reason for Call: Requesting Results    Name/type of test: MR Soft Tissue Neck w/o & w Contrast & MRA Neck (Carotids) wo & w Contrast   Date of test: 8/23/22  Was test done at a location other than [facility] (Please fill in the location if not [facility])?: No    Other: Fortino called to ask for the results for his MRI and MRA scans and he is requesting the appointment notes from Dr. Russell from 8/24. Pt is requesting these prior to his appointment w/Dr. Rodgers tomorrow. Please release results for scans and send appointment notes from Dr. Russell as soon as possible for Fortino. Thank you!    Action Taken: Message routed to:  [facility] ([facility] ENT    Travel Screening: Not Applicable

## 2022-08-31 ENCOUNTER — OFFICE VISIT (OUTPATIENT)
Dept: RADIATION ONCOLOGY | Facility: CLINIC | Age: 72
End: 2022-08-31
Attending: RADIOLOGY
Payer: MEDICARE

## 2022-08-31 ENCOUNTER — TELEPHONE (OUTPATIENT)
Dept: OTOLARYNGOLOGY | Facility: CLINIC | Age: 72
End: 2022-08-31

## 2022-08-31 DIAGNOSIS — C09.9 MALIGNANT NEOPLASM OF TONSIL (H): ICD-10-CM

## 2022-08-31 DIAGNOSIS — C09.9 SQUAMOUS CELL CARCINOMA OF TONSIL (H): Primary | ICD-10-CM

## 2022-08-31 PROCEDURE — G0463 HOSPITAL OUTPT CLINIC VISIT: HCPCS

## 2022-08-31 NOTE — PROGRESS NOTES
Department of Radiation Oncology  [facility]  [address] [place] [address]  [phone]       Radiation Oncology Follow-up Visit  2022      Fortino Moya  MRN: [id number]   : 1950     DISEASE TREATED:   rcT4 N0 M0 squamous cell carcinoma of the left tonsil    RADIATION THERAPY DELIVERED:   6600 cGy in 33 fractions, from 3/24/2021 - 2021    SYSTEMIC THERAPY:  Cisplatin 40 mg/m2 weekly    INTERVAL SINCE COMPLETION OF RADIATION THERAPY:   Approximately 1 year and 3.5 months    SUBJECTIVE:   Fortino Moya is a 72 year old male with a PMH significant for a cT3 N1 M0 p16 positive squamous cell carcinoma of the left tonsil treated with chemoradiotherapy in [place] in 2018. He presented a few years later with recurrent disease within the ipsilateral pterygoids extending to the skull base with invasion of the cavernous sinus and tracking along CN V to the brainstem. He received 2 cycles of induction chemotherapy with TPF from 2021-2021 with repeat imaging demonstrated a near-complete response to therapy. He then received curative-intent chemoradiotherapy as described above.    Mr. Moya returns to clinic today for a routine post-treatment disease surveillance visit. He underwent repeat imaging earlier this month with a recent MRI demonstrating ill-defined nodular enhancement within the left  space involving cranial nerves V and VII. His case was discussed at our interdisciplinary tumor board with this felt to be potentially related to his ongoing radionecrosis versus ORN plus recurrent disease. He is currently scheduled for biopsy on 2022. On exam, Mr. Moya reports that he has been having ongoing left-sided ear and jaw pain that has slightly improved over the past 2 weeks following initiation of antibiotics (Augmentin). He questions whether he can have another 2-3-week refill to remain on antibiotics until his upcoming biopsy.  His remaining ROS are otherwise grossly unchanged from his previous follow-up with me in July.    PHYSICAL EXAM:  Weight: 84.4 kg  BP: 122/88    General: 72-year-old gentleman seated comfortably in an examination chair in no acute distress  HEENT: NC/AT. EOMI. EACs clear bilaterally. TM on the left with moderate erythema and a small serous effusion. The TM on the right is normal in appearance. No rhinorrhea or epistaxis. Dry mucous membranes with tacky secretions. There is a 5 mm defect within the left posterior lateral soft palate immediately superior to the superior pole of the left tonsillar fossa. No evidence of gross disease within the oral cavity or oropharynx.  Neck: Moderate fibrosis of bilateral neck. No palpable cervical adenopathy bilaterally.  Pulmonary: No wheezing, stridor or respiratory distress  Skin: Scattered telangiectasias throughout the left lateral face and neck      LABS AND IMAGIN2022 MRI neck:  Extensive posttreatment changes with an ill-defined nodular enhancement centered in the left  space with involvement of the left muscles of mastication, cranial nerves V and VII with unchanged occlusion of the left internal jugular vein.    IMPRESSION:   Mr. 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 He is over a year out from treatment and has imaging evidence concerning for post-treatment changes/ORN versus recurrent disease.    PLAN:   1. Refill ordered for Augmentin  2. Follow-up biopsy results from upcoming IR-guided biopsy of right parapharyngeal space changes    Sidney Rodgers MD/PhD    Department of Radiation Oncology  [facility]

## 2022-08-31 NOTE — TELEPHONE ENCOUNTER
Phone call to pt.  Left message stating Dr Rodgers will be able to access all records through Epic.  Asked pt to call back with further questions. Julianna Bryan RN

## 2022-08-31 NOTE — TELEPHONE ENCOUNTER
Left voicemail to discuss need for ultrasound guided biopsy. Provided call back number to review further.

## 2022-08-31 NOTE — LETTER
2022         RE: Fortino Moya  [address]   [address]  [facility] [address]         Department of Radiation Oncology  [facility]  [address] [place] [address]  [phone]       Radiation Oncology Follow-up Visit  2022      Fortino Moya  MRN: [id number]   : 1950     DISEASE TREATED:   rcT4 N0 M0 squamous cell carcinoma of the left tonsil    RADIATION THERAPY DELIVERED:   6600 cGy in 33 fractions, from 3/24/2021 - 2021    SYSTEMIC THERAPY:  Cisplatin 40 mg/m2 weekly    INTERVAL SINCE COMPLETION OF RADIATION THERAPY:   Approximately 1 year and 3.5 months    SUBJECTIVE:   Fortino Moya is a 72 year old male with a PMH significant for a cT3 N1 M0 p16 positive squamous cell carcinoma of the left tonsil treated with chemoradiotherapy in [place] in 2018. He presented a few years later with recurrent disease within the ipsilateral pterygoids extending to the skull base with invasion of the cavernous sinus and tracking along CN V to the brainstem. He received 2 cycles of induction chemotherapy with TPF from 2021-2021 with repeat imaging demonstrated a near-complete response to therapy. He then received curative-intent chemoradiotherapy as described above.    Mr. Moya returns to clinic today for a routine post-treatment disease surveillance visit. He underwent repeat imaging earlier this month with a recent MRI demonstrating ill-defined nodular enhancement within the left  space involving cranial nerves V and VII. His case was discussed at our interdisciplinary tumor board with this felt to be potentially related to his ongoing radionecrosis versus ORN plus recurrent disease. He is currently scheduled for biopsy on 2022. On exam, Mr. Moya reports that he has been having ongoing left-sided ear and jaw pain that has slightly improved over the past 2 weeks following initiation of antibiotics (Augmentin). He questions whether he  can have another 2-3-week refill to remain on antibiotics until his upcoming biopsy. His remaining ROS are otherwise grossly unchanged from his previous follow-up with me in July.    PHYSICAL EXAM:  Weight: 84.4 kg  BP: 122/88    General: 72-year-old gentleman seated comfortably in an examination chair in no acute distress  HEENT: NC/AT. EOMI. EACs clear bilaterally. TM on the left with moderate erythema and a small serous effusion. The TM on the right is normal in appearance. No rhinorrhea or epistaxis. Dry mucous membranes with tacky secretions. There is a 5 mm defect within the left posterior lateral soft palate immediately superior to the superior pole of the left tonsillar fossa. No evidence of gross disease within the oral cavity or oropharynx.  Neck: Moderate fibrosis of bilateral neck. No palpable cervical adenopathy bilaterally.  Pulmonary: No wheezing, stridor or respiratory distress  Skin: Scattered telangiectasias throughout the left lateral face and neck      LABS AND IMAGIN2022 MRI neck:  Extensive posttreatment changes with an ill-defined nodular enhancement centered in the left  space with involvement of the left muscles of mastication, cranial nerves V and VII with unchanged occlusion of the left internal jugular vein.    IMPRESSION:   Mr. 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 He is over a year out from treatment and has imaging evidence concerning for post-treatment changes/ORN versus recurrent disease.    PLAN:   1. Refill ordered for Augmentin  2. Follow-up biopsy results from upcoming IR-guided biopsy of right parapharyngeal space changes    Sidney Rodgers MD/PhD    Department of Radiation Oncology  [facility]      FOLLOW-UP VISIT    Patient Name: Fortino Moya      : 1950     Age: 72 year old         ______________________________________________________________________________     Chief Complaint   Patient presents with     Cancer     Follow up:Tonsil Cancer: Left skull base 6600 cGy completed 05/07/21     Pain  Denies    Labs  Other Labs: No    Imaging  MR: 08/23/22      Dental:   Most Recent Dental Visit: In the process    Speech/Swallowing:   Most Recent evaluation or testing: No  Swallowing Restrictions: No difficulties with swallowing    Trismus/Jaw Exercises: Yes    Nutrition:    Weight:   Wt Readings from Last 3 Encounters:   08/24/22 84.4 kg (186 lb)   07/20/22 84.2 kg (185 lb 9.6 oz)   07/13/22 83.5 kg (184 lb)         Oral Symptoms:   Xerostomia:1- Symptomatic without significant dietary alteration; unstimulated saliva flow >0.2 ml/min  Dysphagia: 0-None  Mucositis Oral Symptoms: 0-None  Mucositis: 0- None  Esophagitis:0- None    Other Appointments:     MD Name:  Appointment Date:    MD Name: Appointment Date:   MD Name: Appointment Date:   Other Appointment Notes:     Residual Radiation side effect:                Sidney Rodgers MD

## 2022-08-31 NOTE — PROGRESS NOTES
FOLLOW-UP VISIT    Patient Name: Fortino Moya      : 1950     Age: 72 year old        ______________________________________________________________________________     Chief Complaint   Patient presents with     Cancer     Follow up:Tonsil Cancer: Left skull base 6600 cGy completed 21     Pain  Denies    Labs  Other Labs: No    Imaging  MR: 22      Dental:   Most Recent Dental Visit: In the process    Speech/Swallowing:   Most Recent evaluation or testing: No  Swallowing Restrictions: No difficulties with swallowing    Trismus/Jaw Exercises: Yes    Nutrition:    Weight:   Wt Readings from Last 3 Encounters:   22 84.4 kg (186 lb)   22 84.2 kg (185 lb 9.6 oz)   22 83.5 kg (184 lb)         Oral Symptoms:   Xerostomia:1- Symptomatic without significant dietary alteration; unstimulated saliva flow >0.2 ml/min  Dysphagia: 0-None  Mucositis Oral Symptoms: 0-None  Mucositis: 0- None  Esophagitis:0- None    Other Appointments:     MD Name:  Appointment Date:    MD Name: Appointment Date:   MD Name: Appointment Date:   Other Appointment Notes:     Residual Radiation side effect:

## 2022-09-06 DIAGNOSIS — H04.122 DRY EYE SYNDROME OF LEFT EYE: Primary | ICD-10-CM

## 2022-09-07 RX ORDER — ERYTHROMYCIN 5 MG/G
OINTMENT OPHTHALMIC AT BEDTIME
Qty: 3.5 G | Refills: 4 | Status: SHIPPED | OUTPATIENT
Start: 2022-09-07 | End: 2022-11-15

## 2022-09-07 NOTE — TELEPHONE ENCOUNTER
erythromycin (ROMYCIN) 5 MG/GM ophthalmic ointment  Last Written Prescription Date:  ?  Last Fill Quantity: /,   # refills: ?  Last Office Visit :  6/7/2022  Future Office visit:   9/8/2022     Mert Barney MD    Ophthalmology     Patient feels he is doing well, increases use of erythromycin as needed and this calms the eye down. Does well with the scleral lens.      Plan:  OS  - Continue Emycin at night and when not using the scleral lens  - continue celluvisc or (refresh plus) to at least 6x daily, use in both eyes  - Continue with scleral lens wear      - Disc left eye cataract surgery - patient is NOT interested at this time. Would like to minimize burden of doctor's appointments at this time given overall health and how many visits he has had in the past couple of years. Doing well and content with where he is now.      RTC: 3 months w/ VT -  sooner as needed.       Katarina Copeland MD  Ophthalmology Resident PGY3  [facility]        Routing refill request to provider for review/approval because:  Medication is reported/historical      Lora Bradshaw RN  Central Triage Red Flags/Med Refills

## 2022-09-08 ENCOUNTER — MYC MEDICAL ADVICE (OUTPATIENT)
Dept: INTERVENTIONAL RADIOLOGY/VASCULAR | Facility: CLINIC | Age: 72
End: 2022-09-08

## 2022-09-16 ENCOUNTER — APPOINTMENT (OUTPATIENT)
Dept: MEDSURG UNIT | Facility: CLINIC | Age: 72
End: 2022-09-16
Attending: OTOLARYNGOLOGY
Payer: MEDICARE

## 2022-09-16 ENCOUNTER — APPOINTMENT (OUTPATIENT)
Dept: INTERVENTIONAL RADIOLOGY/VASCULAR | Facility: CLINIC | Age: 72
End: 2022-09-16
Attending: OTOLARYNGOLOGY
Payer: MEDICARE

## 2022-09-16 ENCOUNTER — HOSPITAL ENCOUNTER (OUTPATIENT)
Facility: CLINIC | Age: 72
Discharge: HOME | End: 2022-09-16
Attending: OTOLARYNGOLOGY | Admitting: RADIOLOGY
Payer: MEDICARE

## 2022-09-16 VITALS
WEIGHT: 182.8 LBS | HEIGHT: 70 IN | RESPIRATION RATE: 16 BRPM | TEMPERATURE: 97.5 F | SYSTOLIC BLOOD PRESSURE: 122 MMHG | DIASTOLIC BLOOD PRESSURE: 86 MMHG | HEART RATE: 68 BPM | BODY MASS INDEX: 26.17 KG/M2 | OXYGEN SATURATION: 96 %

## 2022-09-16 DIAGNOSIS — C09.9 SQUAMOUS CELL CARCINOMA OF TONSIL (H): ICD-10-CM

## 2022-09-16 LAB
ERYTHROCYTE [DISTWIDTH] IN BLOOD BY AUTOMATED COUNT: 13.4 % (ref 10–15)
HCT VFR BLD AUTO: 37.3 % (ref 40–53)
HGB BLD-MCNC: 12.2 G/DL (ref 13.3–17.7)
INR PPP: 1.19 (ref 0.85–1.15)
MCH RBC QN AUTO: 30.3 PG (ref 26.5–33)
MCHC RBC AUTO-ENTMCNC: 32.7 G/DL (ref 31.5–36.5)
MCV RBC AUTO: 93 FL (ref 78–100)
PLATELET # BLD AUTO: 262 10E3/UL (ref 150–450)
RBC # BLD AUTO: 4.02 10E6/UL (ref 4.4–5.9)
WBC # BLD AUTO: 5.5 10E3/UL (ref 4–11)

## 2022-09-16 PROCEDURE — 258N000003 HC RX IP 258 OP 636: Performed by: NURSE PRACTITIONER

## 2022-09-16 PROCEDURE — 999N000142 HC STATISTIC PROCEDURE PREP ONLY

## 2022-09-16 PROCEDURE — 272N000505 HC NEEDLE CR5

## 2022-09-16 PROCEDURE — 250N000011 HC RX IP 250 OP 636: Performed by: RADIOLOGY

## 2022-09-16 PROCEDURE — 10009 FNA BX W/CT GDN 1ST LES: CPT | Mod: GC | Performed by: RADIOLOGY

## 2022-09-16 PROCEDURE — 999N000132 HC STATISTIC PP CARE STAGE 1

## 2022-09-16 PROCEDURE — 85014 HEMATOCRIT: CPT | Performed by: NURSE PRACTITIONER

## 2022-09-16 PROCEDURE — 250N000009 HC RX 250: Performed by: RADIOLOGY

## 2022-09-16 PROCEDURE — 99152 MOD SED SAME PHYS/QHP 5/>YRS: CPT

## 2022-09-16 PROCEDURE — 88173 CYTOPATH EVAL FNA REPORT: CPT | Mod: TC | Performed by: OTOLARYNGOLOGY

## 2022-09-16 PROCEDURE — 99152 MOD SED SAME PHYS/QHP 5/>YRS: CPT | Mod: GC | Performed by: RADIOLOGY

## 2022-09-16 PROCEDURE — 36415 COLL VENOUS BLD VENIPUNCTURE: CPT | Performed by: NURSE PRACTITIONER

## 2022-09-16 PROCEDURE — 85610 PROTHROMBIN TIME: CPT | Performed by: NURSE PRACTITIONER

## 2022-09-16 RX ORDER — FENTANYL CITRATE 50 UG/ML
25-50 INJECTION, SOLUTION INTRAMUSCULAR; INTRAVENOUS EVERY 5 MIN PRN
Status: DISCONTINUED | OUTPATIENT
Start: 2022-09-16 | End: 2022-09-16 | Stop reason: HOSPADM

## 2022-09-16 RX ORDER — NALOXONE HYDROCHLORIDE 0.4 MG/ML
0.4 INJECTION, SOLUTION INTRAMUSCULAR; INTRAVENOUS; SUBCUTANEOUS
Status: DISCONTINUED | OUTPATIENT
Start: 2022-09-16 | End: 2022-09-16 | Stop reason: HOSPADM

## 2022-09-16 RX ORDER — IOPAMIDOL 755 MG/ML
100 INJECTION, SOLUTION INTRAVASCULAR ONCE
Status: COMPLETED | OUTPATIENT
Start: 2022-09-16 | End: 2022-09-16

## 2022-09-16 RX ORDER — LIDOCAINE 40 MG/G
CREAM TOPICAL
Status: DISCONTINUED | OUTPATIENT
Start: 2022-09-16 | End: 2022-09-16 | Stop reason: HOSPADM

## 2022-09-16 RX ORDER — FLUMAZENIL 0.1 MG/ML
0.2 INJECTION, SOLUTION INTRAVENOUS
Status: DISCONTINUED | OUTPATIENT
Start: 2022-09-16 | End: 2022-09-16 | Stop reason: HOSPADM

## 2022-09-16 RX ORDER — NALOXONE HYDROCHLORIDE 0.4 MG/ML
0.2 INJECTION, SOLUTION INTRAMUSCULAR; INTRAVENOUS; SUBCUTANEOUS
Status: DISCONTINUED | OUTPATIENT
Start: 2022-09-16 | End: 2022-09-16 | Stop reason: HOSPADM

## 2022-09-16 RX ORDER — SODIUM CHLORIDE 9 MG/ML
INJECTION, SOLUTION INTRAVENOUS CONTINUOUS
Status: DISCONTINUED | OUTPATIENT
Start: 2022-09-16 | End: 2022-09-16 | Stop reason: HOSPADM

## 2022-09-16 RX ADMIN — LIDOCAINE HYDROCHLORIDE 3 ML: 10 INJECTION, SOLUTION EPIDURAL; INFILTRATION; INTRACAUDAL; PERINEURAL at 11:40

## 2022-09-16 RX ADMIN — FENTANYL CITRATE 25 MCG: 50 INJECTION, SOLUTION INTRAMUSCULAR; INTRAVENOUS at 11:00

## 2022-09-16 RX ADMIN — FENTANYL CITRATE 25 MCG: 50 INJECTION, SOLUTION INTRAMUSCULAR; INTRAVENOUS at 11:16

## 2022-09-16 RX ADMIN — MIDAZOLAM HYDROCHLORIDE 0.5 MG: 1 INJECTION, SOLUTION INTRAMUSCULAR; INTRAVENOUS at 11:00

## 2022-09-16 RX ADMIN — FENTANYL CITRATE 25 MCG: 50 INJECTION, SOLUTION INTRAMUSCULAR; INTRAVENOUS at 11:33

## 2022-09-16 RX ADMIN — SODIUM CHLORIDE: 9 INJECTION, SOLUTION INTRAVENOUS at 08:11

## 2022-09-16 RX ADMIN — IOPAMIDOL 60 ML: 755 INJECTION, SOLUTION INTRAVENOUS at 11:48

## 2022-09-16 RX ADMIN — MIDAZOLAM HYDROCHLORIDE 0.5 MG: 1 INJECTION, SOLUTION INTRAMUSCULAR; INTRAVENOUS at 11:17

## 2022-09-16 RX ADMIN — MIDAZOLAM HYDROCHLORIDE 0.5 MG: 1 INJECTION, SOLUTION INTRAMUSCULAR; INTRAVENOUS at 11:33

## 2022-09-16 ASSESSMENT — ACTIVITIES OF DAILY LIVING (ADL)
ADLS_ACUITY_SCORE: 35

## 2022-09-16 NOTE — PROGRESS NOTES
Interventional Radiology Pre-Procedure Sedation Assessment   Time of Assessment: 9:28 AM    Expected Level: Moderate Sedation    Indication: Sedation is required for the following type of Procedure: Biopsy    Sedation and procedural consent: Risks, benefits and alternatives were discussed with Patient    PO Intake: Appropriately NPO for procedure    ASA Class: Class 2 - MILD SYSTEMIC DISEASE, NO ACUTE PROBLEMS, NO FUNCTIONAL LIMITATIONS.    Mallampati: Grade 3:  Soft palate visible, posterior pharyngeal wall not visible    Lungs: Lungs Clear with good breath sounds bilaterally    Heart: Normal heart sounds and rate    History and physical reviewed and updates include resolution of left neck swelling.. I have reviewed the lab findings, diagnostic data, medications, and the plan for sedation. I have determined this patient to be an appropriate candidate for the planned sedation and procedure and have reassessed the patient IMMEDIATELY PRIOR to sedation and procedure.    Live Vargas MD

## 2022-09-16 NOTE — PROGRESS NOTES
Pt returns to 2a s/p lymph node biopsy. Left cheek site CDI, soft, flat, non tender. Pt alert, tolerating PO liquid, declined food.

## 2022-09-16 NOTE — IR NOTE
Patient Name: Fortino Moya  Medical Record Number: [id number]  Today's Date: 9/16/2022    Procedure: CT guided left neck biopsy  Proceduralist: Dr. Cortez Strauss  Pathology present: YES    Procedure Start: 1059  Procedure end: 1144  Sedation medications administered:    Fentanyl 75 mcg   Versed 1.5 mg    Sedation time: 44 minutes (1100 - 1144)    Report given to:    : na    Other Notes: Pt arrived to IR room CT2 from . Consent signed and reviewed. Pt denies any questions or concerns regarding procedure. Pt positioned left side down and monitored per protocol.     Pt tolerated procedure without any noted complications. Bandied placed to left neck under the ear is CDI.    Order placed for a surgical path order. Per pathology it needs to be an FNA order. Order changed.      Pt transferred back to .

## 2022-09-16 NOTE — DISCHARGE INSTRUCTIONS
[facility]    Interventional Radiology  Patient Instructions Following Biopsy    AFTER YOU GO HOME  If you were given sedation DO NOT drive or operate machinery at home or at work for at least 24 hours  DO relax and take it easy for 48 hours, no strenuous activity for 24 hours  DO drink plenty of fluids  DO resume your regular diet, unless otherwise instructed by your Primary Physician  Keep the dressing dry and in place for 24 hours.  DO NOT SMOKE FOR AT LEAST 24 HOURS, if you have been given any medications that were to help you relax or sedate you during your procedure  DO NOT drink alcoholic beverages the day of your procedure  DO NOT do any strenuous exercise or lifting (> 10 lbs) for at least 7 days following your procedure  DO NOT take a bath or shower for at least 12 hours following your procedure  Remove dressing after shower the next day. Replace with Band aid for 2 days.  Never leave a wet dressing in place.  DO NOT make any important or legal decisions for 24 hours following your procedure  There should be minimum drainage from the biopsy site    CALL THE PHYSICIAN IF:  You start bleeding from the procedure site.  If you do start to bleed from that site, lie down flat and hold pressure on the site for a minimum of 10 minutes.  Your physician will tell you if you need to return to the hospital  You develop nausea or vomiting  You have excessive swelling, redness, or tenderness at the site  You have drainage that looks like it is infected.  You experience severe pain  You develop hives or a rash or unexplained itching  You develop shortness of breath  You develop a temperature of 101 degrees F or greater      ADDITIONAL INSTRUCTIONS  If you are taking Coumadin, restart tonight.  Follow up with your Coumadin Clinic or Primary Care MD to have your INR rechecked.    [facility] INTERVENTIONAL RADIOLOGY DEPARTMENT  Procedure Physician: Dr Vargas                                     Date of  procedure: September 16, 2022  Telephone Numbers: [phone]      Monday-Friday 7:30 am to 4:00 pm  [phone] After 4:00 pm Monday-Friday, Weekends & Holidays.   Ask for the Neuro-Radiologist on call.  Someone is on call 24 hrs/day  [facility] toll free number: [phone] Monday-Friday 8:00 am to 4:30 pm  [facility] Emergency Dept: [phone]

## 2022-09-16 NOTE — PROGRESS NOTES
Pt arrives to 2a, with friend, for lymph node biopsy. H&P needs to be updated. Consent needs to be signed. Labs sent.

## 2022-09-19 PROCEDURE — 88173 CYTOPATH EVAL FNA REPORT: CPT | Mod: 26 | Performed by: PATHOLOGY

## 2022-09-19 PROCEDURE — 88305 TISSUE EXAM BY PATHOLOGIST: CPT | Mod: 26 | Performed by: PATHOLOGY

## 2022-09-27 DIAGNOSIS — E03.9 HYPOTHYROIDISM, UNSPECIFIED TYPE: ICD-10-CM

## 2022-09-27 RX ORDER — LEVOTHYROXINE SODIUM 50 UG/1
50 TABLET ORAL DAILY
Qty: 90 TABLET | Refills: 3 | Status: SHIPPED | OUTPATIENT
Start: 2022-09-27 | End: 2023-09-18

## 2022-09-27 RX ORDER — LEVOTHYROXINE SODIUM 50 UG/1
TABLET ORAL
Qty: 90 TABLET | Refills: 3 | OUTPATIENT
Start: 2022-09-27

## 2022-10-04 PROBLEM — C79.9 SECONDARY MALIGNANT NEOPLASM OF UNSPECIFIED SITE (CODE) (H): Status: ACTIVE | Noted: 2022-05-11

## 2022-10-11 ENCOUNTER — TELEPHONE (OUTPATIENT)
Dept: OTOLARYNGOLOGY | Facility: CLINIC | Age: 72
End: 2022-10-11

## 2022-10-23 ENCOUNTER — HEALTH MAINTENANCE LETTER (OUTPATIENT)
Age: 72
End: 2022-10-23

## 2022-11-09 NOTE — TUMOR CONFERENCE
Tumor Conference Information  Tumor Conference: ENT  Specialties Present: Medical oncology, Radiation oncology, Pathology, Radiology, Surgery  Patient Status: Prospective  Stage: rT4 N0 M0  Treatment to Date: Biopsy, Chemotherapy, Radiation  Clinical Trials: Not discussed  Genetic Testing Discussed/Recommended?: No  Supportive Care Services Discussed/Recommended?: No  Recommended Plan: Follows evidence-based guidelines  Did the review exceed 30 minutes?: did not           Documentation / Disclaimer Cancer Tumor Board Note  Cancer tumor board recommendations do not override what is determined to be reasonable care and treatment, which is dependent on the circumstances of a patient's case; the patient's medical, social, and personal concerns; and the clinical judgment of the oncologist [physician].

## 2022-11-09 NOTE — TUMOR CONFERENCE
Tumor Conference Information  Tumor Conference: ENT  Specialties Present: Medical oncology, Radiation oncology, Pathology, Radiology, Surgery  Patient Status: Prospective  Stage: rT4 N0 M0  Treatment to Date: Chemotherapy, Radiation, Biopsy  Clinical Trials: Not discussed  Genetic Testing Discussed/Recommended?: No  Supportive Care Services Discussed/Recommended?: No  Recommended Plan: Follows evidence-based guidelines  Did the review exceed 30 minutes?: did not           Documentation / Disclaimer Cancer Tumor Board Note  Cancer tumor board recommendations do not override what is determined to be reasonable care and treatment, which is dependent on the circumstances of a patient's case; the patient's medical, social, and personal concerns; and the clinical judgment of the oncologist [physician].

## 2022-11-15 ENCOUNTER — OFFICE VISIT (OUTPATIENT)
Dept: OPHTHALMOLOGY | Facility: CLINIC | Age: 72
End: 2022-11-15
Attending: OPHTHALMOLOGY
Payer: MEDICARE

## 2022-11-15 ENCOUNTER — OFFICE VISIT (OUTPATIENT)
Dept: OPTOMETRY | Facility: CLINIC | Age: 72
End: 2022-11-15
Payer: MEDICARE

## 2022-11-15 DIAGNOSIS — H16.212 EXPOSURE KERATOPATHY, LEFT: ICD-10-CM

## 2022-11-15 DIAGNOSIS — H02.23B PARALYTIC LAGOPHTHALMOS OF UPPER AND LOWER EYELID OF LEFT EYE: ICD-10-CM

## 2022-11-15 DIAGNOSIS — H04.123 DRY EYES: Primary | ICD-10-CM

## 2022-11-15 DIAGNOSIS — H04.122 DRY EYE SYNDROME OF LEFT EYE: ICD-10-CM

## 2022-11-15 DIAGNOSIS — H02.539 EYELID RETRACTION OR LAG: Primary | ICD-10-CM

## 2022-11-15 PROCEDURE — G0463 HOSPITAL OUTPT CLINIC VISIT: HCPCS

## 2022-11-15 PROCEDURE — 99214 OFFICE O/P EST MOD 30 MIN: CPT | Mod: GC | Performed by: OPHTHALMOLOGY

## 2022-11-15 RX ORDER — ERYTHROMYCIN 5 MG/G
OINTMENT OPHTHALMIC AT BEDTIME
Qty: 7 G | Refills: 11 | Status: SHIPPED | OUTPATIENT
Start: 2022-11-15 | End: 2023-12-27

## 2022-11-15 ASSESSMENT — REFRACTION_CURRENTRX
OS_BRAND: ONEFIT OBLATE
OS_SPHERE: +1.00
OS_ADDL_SPECS: OPT EXTRA BLUE, HYDRAPEG
OS_DIAMETER: 14.9
OS_BASECURVE: 8.0

## 2022-11-15 ASSESSMENT — EXTERNAL EXAM - LEFT EYE: OS_EXAM: LEFT FACIAL PALSY

## 2022-11-15 ASSESSMENT — VISUAL ACUITY
OS_SC: 20/200
METHOD: SNELLEN - LINEAR
OS_PH_SC+: -1
OD_SC: 20/25
OS_PH_SC: 20/100

## 2022-11-15 ASSESSMENT — CONF VISUAL FIELD
OD_SUPERIOR_NASAL_RESTRICTION: 0
OS_INFERIOR_TEMPORAL_RESTRICTION: 0
METHOD: COUNTING FINGERS
OD_NORMAL: 1
OS_INFERIOR_NASAL_RESTRICTION: 0
OD_INFERIOR_NASAL_RESTRICTION: 0
OD_INFERIOR_TEMPORAL_RESTRICTION: 0
OS_SUPERIOR_NASAL_RESTRICTION: 0
OS_NORMAL: 1
OD_SUPERIOR_TEMPORAL_RESTRICTION: 0
OS_SUPERIOR_TEMPORAL_RESTRICTION: 0

## 2022-11-15 ASSESSMENT — TONOMETRY
OS_IOP_MMHG: 10
OD_IOP_MMHG: 10
IOP_METHOD: ICARE

## 2022-11-15 ASSESSMENT — EXTERNAL EXAM - RIGHT EYE: OD_EXAM: NORMAL

## 2022-11-15 NOTE — PROGRESS NOTES
No office visit. CL order only. Pt's lens broke, needs replacement. Order dupe and mail direct    Contact Lens Billing  V-Code:  Scleral Cover Shell  Final Contact Lens Rx       Brand Base Curve Diameter Sphere Lens Addl. Specs    Right          Left Onefit Oblate 8.0 14.9 +1.00 , 1 flat/2 steep edge Opt Extra blue, HydraPEG          # of units: 1  Price per Unit: $225 (dupe)    This patient requires contact lenses that are medically necessary for either improvement in vision over spectacles, support of the ocular surface, or other therapeutic benefit. These are not cosmetic contact lenses.     Encounter Diagnoses   Name Primary?     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

## 2022-11-15 NOTE — PROGRESS NOTES
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    Of note, patient has a history of L oropharynx P16+ squamous cell cancer first diagnosed in 2018. The patient underwent low dose weekly carboplatin 1.5 AUC with radiation. His PET/CT 2 months after initiation and underwent surveillance after that. In 08/2020, he developed numbness in chin and then forehead. He underwent PET/CT and IR-guided biopsy (1/2021), which demonstrated invasive squamous cell carcinoma.    Per oncology, patient has left-sided paralysis. The patient first noticed left eye redness and lower lid drooping in about January 2021. He notes that these symptoms progressed during through April 2021. He noted associated watery discharge. He was seen by oncology and started on erythromycin ilya then recommended to follow up with ophthalmology for further management. He has also been using AT BID during this time. He denies eye irritation, pain, purulent discharge, and flashes/floaters. He notes baseline left eye vision when not using erythromycin ointment.    Interval HPI: BRETT with me 06/2022.   He denies vision changes, eye pain, discharge since last visit. No other concerns. Saw Dr Pardo today for SCL change.     POH: refractive error (intermittent glasses use)    Surgery: LASIK (25-30 years ago)- monovision    GTTS:   -PFATs Q2hrs left eye  -Erythromycin ilya at bedtime left eye     PMH:  -L oropharyngeal squamous cell cancer (first diagnosed 2018), recently diagnosed invasive squamous cell cancer (on chemotherapy)  -HTN    FH:  -No AMD, glaucoma    Assessment & Plan     #Paralytic lagophthalmos with complete scleral show, likely 2/2 invasive squamous cell carcinoma with CN7  involvement -- Cancer visibly gone per patient  # Exposure keratopathy and history of large neurotrophic corneal ulcer, left eye  # 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 New disease in the left  space s/p PET/CT and IR-guided biopsy consistent with invasive squamous cell carcinoma s/p induction chemotherapy (1/28/21).  - s/p permanent tarsorrhaphy lateral 50% (2/10/21)- Dr. Pendleton  - s/p FLOWER platinum weight, LLL ectropion repair, and temporary tarso - Dr. Yoon (4/1/21)  7/8/21: misdirected trichiatic lashes epilated   10/1/21: left eye cataract increasing. left eye ocular surface dry but epi intact.  12/14/21: Lashes pulled.  Encouraged more PFAT use.  3/3/22: Encouraged more PFAT use bilaterally. Corneal surface has rare PK. Pt doing well with scleral lenses. Few left eye LL ltrichiasis.  11/15/22: significant dryness, no epi defect, no infiltrate. Disc motorcycle glasses. Awaiting CL.    Plan:  - Continue Emycin at night and when not using the scleral lens  - Continue Celluvisc to 6x daily, use in both eyes  - Continue with scleral lens wear; saw Dr. 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 If proceed, recommend during summer months.  - Refer to Dr Wooten for complex CE/IOL when ready   -Disc motorcycle glasses      RTC: 3-4 months [place], sooner PRN  Follow up with Dr. Yoon and Dr. Pardo as scheduled.    My privilege to be part of your care,  Chuy Che MD, MSc  Ophthalmology PGY-3 resident physician  Pager: [phone]      Attending  Physician Attestation:  Complete documentation of historical and exam elements from today's encounter can be found in the full encounter summary report (not reduplicated in this progress note).  I personally obtained the chief complaint(s) and history of present illness.  I confirmed and edited as necessary the review of systems, past medical/surgical history, family history, social history, and examination findings as documented by others; and I examined the patient myself.  I personally reviewed the relevant tests, images, and reports as documented above.  I formulated and edited as necessary the assessment and plan and discussed the findings and management plan with the patient and family. - Mert Barney MD

## 2022-11-15 NOTE — NURSING NOTE
Chief Complaints and History of Present Illnesses   Patient presents with     Follow Up     Chief Complaint(s) and History of Present Illness(es)     Follow Up            Laterality: both eyes    Treatments tried: artificial tears and ointment    Pain scale: 0/10          Comments    Follow up exposure keratopathy left eye.    The patient reports for the last couple of weeks his left eye was red with scleral lens wear.  He notes that his contact lens broke 4 days ago.  He uses EES ointment once or twice daily and artificial tears four or more times daily in the left eye.  Katerine Fuentes, COA, COA 12:31 PM 11/15/2022

## 2022-11-17 ENCOUNTER — ANCILLARY PROCEDURE (OUTPATIENT)
Dept: MRI IMAGING | Facility: CLINIC | Age: 72
End: 2022-11-17
Attending: OTOLARYNGOLOGY
Payer: MEDICARE

## 2022-11-17 DIAGNOSIS — C79.9 SECONDARY MALIGNANT NEOPLASM OF UNSPECIFIED SITE (CODE) (H): ICD-10-CM

## 2022-11-17 PROCEDURE — 255N000002 HC RX 255 OP 636: Performed by: OTOLARYNGOLOGY

## 2022-11-17 PROCEDURE — A9585 GADOBUTROL INJECTION: HCPCS | Performed by: OTOLARYNGOLOGY

## 2022-11-17 PROCEDURE — 70543 MRI ORBT/FAC/NCK W/O &W/DYE: CPT | Mod: MG

## 2022-11-17 PROCEDURE — G1010 CDSM STANSON: HCPCS

## 2022-11-17 RX ORDER — GADOBUTROL 604.72 MG/ML
8.5 INJECTION INTRAVENOUS ONCE
Status: COMPLETED | OUTPATIENT
Start: 2022-11-17 | End: 2022-11-17

## 2022-11-17 RX ADMIN — GADOBUTROL 8.5 ML: 604.72 INJECTION INTRAVENOUS at 10:56

## 2022-11-21 ENCOUNTER — TELEPHONE (OUTPATIENT)
Dept: OTOLARYNGOLOGY | Facility: CLINIC | Age: 72
End: 2022-11-21

## 2022-11-21 NOTE — TELEPHONE ENCOUNTER
[facility] Call Center    Phone Message    May a detailed message be left on voicemail: no     Reason for Call: Requesting Results   Name/type of test: mri  Date of test: 11/17/22  Was test done at a location other than [facility] (Please fill in the location if not [facility])?: Yes: fatmata chowdary [facility]  Order by dr tang      Action Taken: Other: routing to [facility] ent [facility]sc    Travel Screening: Not Applicable

## 2022-11-21 NOTE — TELEPHONE ENCOUNTER
Returned call to patient with the following MRI results:    IMPRESSION:   1.  Redemonstration of postoperative/posttreatment changes within the left neck. History of treated left-sided tonsillar squamous cell carcinoma.     2.  Previously identified heterogeneous marginally enhancing hypointense T1, hyperintense T2 signal fluid and gas signal at the left parapharyngeal space noted 8/23/2022 is no longer present, with gas signal pocket communicating/contiguous with the   oropharynx and hypopharynx series 9 image 23. This likely represents resolved posttreatment change/necrosis in this region. There is minimal nonenhancing nodular soft tissue at the superior aspect of this cavity series 9 image 23, this may be amenable to   direct visualization, however, the lack of enhancement suggests this represents residua of resolving inflammatory/posttreatment benign soft tissue/mucosa.     3.  No new or progressive signal/enhancement abnormality is evident in this region or elsewhere to suggest recurrent neoplasm. No pathologic adenopathy is noted.     4.  Persistent enhancing hyperintense T2 signal within the left mandibular posterior body and angle and ramus as before, may represent stable posttreatment/osteonecrosis.     5.  Stable occlusion left internal jugular vein.     6.  No other significant differences.     7.  Full description and details are provided above. Continued surveillance imaging recommended as clinically appropriate, suggest 10-14 weeks.      Reviewed with patient that we will review at tumor board next week and update him with recommendations. Patient agreeable to plan and will call with further questions or concerns.       Almaz Sorto, RN, BSN

## 2022-11-29 ENCOUNTER — LAB (OUTPATIENT)
Dept: URGENT CARE | Facility: URGENT CARE | Age: 72
End: 2022-11-29
Payer: MEDICARE

## 2022-11-29 DIAGNOSIS — Z11.59 ENCOUNTER FOR SCREENING FOR OTHER VIRAL DISEASES: ICD-10-CM

## 2022-11-29 PROCEDURE — U0005 INFEC AGEN DETEC AMPLI PROBE: HCPCS

## 2022-11-29 PROCEDURE — U0003 INFECTIOUS AGENT DETECTION BY NUCLEIC ACID (DNA OR RNA); SEVERE ACUTE RESPIRATORY SYNDROME CORONAVIRUS 2 (SARS-COV-2) (CORONAVIRUS DISEASE [COVID-19]), AMPLIFIED PROBE TECHNIQUE, MAKING USE OF HIGH THROUGHPUT TECHNOLOGIES AS DESCRIBED BY CMS-2020-01-R: HCPCS

## 2022-11-29 PROCEDURE — 99207 PR NO CHARGE LOS: CPT

## 2022-11-29 RX ORDER — TRANEXAMIC ACID 650 MG/1
1950 TABLET ORAL ONCE
Status: CANCELLED | OUTPATIENT
Start: 2022-11-29 | End: 2022-11-29

## 2022-11-30 ENCOUNTER — NURSE TRIAGE (OUTPATIENT)
Dept: NURSING | Facility: CLINIC | Age: 72
End: 2022-11-30

## 2022-11-30 LAB — SARS-COV-2 RNA RESP QL NAA+PROBE: NEGATIVE

## 2022-11-30 RX ORDER — CHLORHEXIDINE GLUCONATE ORAL RINSE 1.2 MG/ML
15 SOLUTION DENTAL 2 TIMES DAILY
COMMUNITY
End: 2022-12-01

## 2022-11-30 RX ORDER — SILDENAFIL 50 MG/1
50 TABLET, FILM COATED ORAL DAILY PRN
COMMUNITY
End: 2023-04-18

## 2022-11-30 NOTE — PROGRESS NOTES
Pre-op Total Joint Patient Screening    1. Do you have a ride available to come to the hospital the day after your surgery by 8am with anticipated discharge of 11am? Per pt, he will not be staying overnight. His friend will be his ride from the hospital.   2. What is the name of this person? N/A  3. Do you have a  set up after surgery? Y-another friend of Fortino who is a healthcare professional will be checking on him regularly.   4. Will your  be the same person that gives you a ride home after surgery? N  5. Have you received the Joint Replacement Guidebook? Not sure- emailed a pdf version  6. Do you have any questions about your guidebook? N  7. Have you signed up for Epic Care Companion? N  8. Have you signed up for MY Chart access? Y

## 2022-11-30 NOTE — TELEPHONE ENCOUNTER
Patient is calling for test results from yesterdays covid test.         Coronavirus (COVID-19) Notification     Reason for call  Patient requesting results     Lab Result    Lab test 2019-nCoV rRt-PCR in process        RN Recommendations/Instructions per [facility]  Continue to quarantine and follow the instructions given at your testing visit until you receive the results.     Please Contact your PCP clinic or return to the Emergency department if your:    Symptoms worsen or other concerning symptom's.     Patient informed that if test for COVID19 is POSITIVE,  you will receive a call typically within 48 hours from the test date (date lab collected).  If NEGATIVE result, you will receive a letter in the mail or [employer].      Rhea Lehman RN      Reason for Disposition    Information only question and nurse able to answer    Additional Information    Negative: Nursing judgment    Negative: Nursing judgment    Negative: Nursing judgment    Negative: Nursing judgment    Protocols used: INFORMATION ONLY CALL - NO TRIAGE-A-[place]

## 2022-12-01 ENCOUNTER — ANESTHESIA EVENT (OUTPATIENT)
Dept: SURGERY | Facility: CLINIC | Age: 72
End: 2022-12-01
Payer: MEDICARE

## 2022-12-01 ENCOUNTER — NURSE TRIAGE (OUTPATIENT)
Dept: NURSING | Facility: CLINIC | Age: 72
End: 2022-12-01

## 2022-12-01 RX ORDER — ACETAMINOPHEN 500 MG
500-1000 TABLET ORAL EVERY 6 HOURS PRN
Status: ON HOLD | COMMUNITY
End: 2022-12-03

## 2022-12-01 RX ORDER — CEVIMELINE HYDROCHLORIDE 30 MG/1
30 CAPSULE ORAL 2 TIMES DAILY PRN
COMMUNITY
End: 2023-07-20

## 2022-12-01 NOTE — PROGRESS NOTES
PTA medications updated by Medication Scribe prior to surgery via phone call with patient (last doses completed by Nurse)     Medication history sources: Patient, Surescripts and H&P  In the past week, patient estimated taking medication this percent of the time: Greater than 90%  Adherence assessment: N/A Not Observed    Significant changes made to the medication list:  Patient reports no longer taking the following meds (med scribe removed from PTA med list): Oxycodone, Vitamin E      Additional medication history information:   Patient was advised to bring: Refresh Eye Drops, Romycin Eye Ointment    Medication reconciliation completed by provider prior to medication history? No    Time spent in this activity: 40 minutes    The information provided in this note is only as accurate as the sources available at the time of update(s)    Prior to Admission medications    Medication Sig Last Dose Taking? Auth Provider Long Term End Date   acetaminophen (TYLENOL) 500 MG tablet Take 500-1,000 mg by mouth every 6 hours as needed for mild pain  at prn Yes Reported, Patient     amoxicillin-clavulanate (AUGMENTIN) 875-125 MG tablet Take 1 tablet by mouth 2 times daily 12/1/2022 at am Yes Sidney Rodgers MD     atorvastatin (LIPITOR) 10 MG tablet Take 10 mg by mouth every evening  12/1/2022 at pm Yes Reported, Patient Yes    carvedilol (COREG) 12.5 MG tablet 2 times daily   at am Yes Reported, Patient Yes    cevimeline (EVOXAC) 30 MG capsule Take 30 mg by mouth 2 times daily as needed  at prn Yes Reported, Patient     erythromycin (ROMYCIN) 5 MG/GM ophthalmic ointment Place Into the left eye At Bedtime 12/1/2022 at pm Yes Chuy Che MD     gabapentin (NEURONTIN) 300 MG capsule Take 1 capsule (300 mg) by mouth 3 times daily  at am Yes Nafisa Sanders APRN CNP Yes    levothyroxine (SYNTHROID/LEVOTHROID) 50 MCG tablet Take 1 tablet (50 mcg) by mouth daily  at am Yes Nafisa Sanders APRN CNP Yes    lisinopril  (ZESTRIL) 5 MG tablet Take 5 mg by mouth every evening 12/1/2022 at pm Yes Reported, Patient No    Polyvinyl Alcohol-Povidone (REFRESH OP) Place 1 drop into both eyes as needed  at prn Yes Reported, Patient     rivaroxaban ANTICOAGULANT (XARELTO) 20 MG TABS tablet Take 20 mg by mouth daily (with dinner) 11/28/2022 at pm Yes Reported, Patient Yes    sildenafil (VIAGRA) 50 MG tablet Take 50 mg by mouth daily as needed  at prn Yes Reported, Patient No    traZODone (DESYREL) 50 MG tablet Take 50 mg by mouth At Bedtime 12/1/2022 at pm Yes Reported, Patient No      Medication history completed by:    Raulito Wood CPhT  Medication [facility]

## 2022-12-01 NOTE — TELEPHONE ENCOUNTER
"Pt calls to confirm covid result, tested due procedure scheduled tomorrow (12/2/22).  Conveyed negative covid result per chart notes.  Last Resulted: 11/30/22 12:40 PM  No symptoms of illness at this time.  Pt requests negative result routed to surgeon for verification; states \"feeling nervous that the surgeon might not be able to see the result in chart.\"  Therefore routed thie encounter now to surgeon (Dr Aron Lemon).    Pt negative for covid.  Coronavirus (COVID-19) Notification    Lab Result   Lab test 2019-nCoV rRt-PCR OR SARS-COV-2 PCR    Nasopharyngeal AND/OR Oropharyngeal swab is NEGATIVE for 2019-nCoV RNA [OR] SARS-COV-2 RNA (COVID-19) RNA    Your result was negative. This means that we didn't find the virus that causes COVID-19 in your sample. A test may show negative when you do actually have the virus. This can happen when the virus is in the early stages of infection, before you feel illness symptoms.    If you have symptoms  Stay home and away from others (self-isolate) until you meet ALL of the guidelines below:    You've had no fever--and no medicine that reduces fever--for 1 full day (24 hours). And      Your other symptoms have gotten better. For example, your cough or breathing has improved. And   ? At least 10 days have passed since your symptoms started. (If you've been told by a doctor that you have a weak immune system, wait 20 days.)     During this time    Stay home. Don't go to work, school or anywhere else.     Stay in your own room, including for meals. Use your own bathroom if you can.    Stay away from others in your home. No hugging, kissing or shaking hands. No visitors.    Clean  high touch  surfaces often (doorknobs, counters, handles, etc.). Use a household cleaning spray or wipes. You can find a full list on the EPA website at www.epa.gov/pesticide-registration/list-n-disinfectants-use-against-sars-cov-2.    Cover your mouth and nose with a mask, tissue or other face covering to " avoid spreading germs.    Wash your hands and face often with soap and water.    Going back to work  Check with your employer for any guidelines to follow for going back to work.  You are sent a letter for your Employer which will serve as formal document notice that you, the employee, tested negative for COVID-19, as of the testing date shown above.    If your symptoms worsen or other concerning symptoms, contact PCP, oncare or consider returning to Emergency Dept.    Where can I get more information?    [facility]: [email or web address]    Coronavirus Basics: www.health.[facility].mn.us/diseases/coronavirus/basics.html    [facility] Hotline (271-855-6180)    Yasemin Dickinson RN    Reason for Disposition    Caller requesting lab results  (Exception: Routine or non-urgent lab result.)    Protocols used: PCP CALL - NO TRIAGE-A-

## 2022-12-02 ENCOUNTER — TUMOR CONFERENCE (OUTPATIENT)
Dept: ONCOLOGY | Facility: CLINIC | Age: 72
End: 2022-12-02
Payer: MEDICARE

## 2022-12-02 ENCOUNTER — ANESTHESIA (OUTPATIENT)
Dept: SURGERY | Facility: CLINIC | Age: 72
End: 2022-12-02
Payer: MEDICARE

## 2022-12-02 ENCOUNTER — HOSPITAL ENCOUNTER (OUTPATIENT)
Facility: CLINIC | Age: 72
Discharge: HOME | End: 2022-12-03
Attending: ORTHOPAEDIC SURGERY | Admitting: ORTHOPAEDIC SURGERY
Payer: MEDICARE

## 2022-12-02 ENCOUNTER — APPOINTMENT (OUTPATIENT)
Dept: GENERAL RADIOLOGY | Facility: CLINIC | Age: 72
End: 2022-12-02
Attending: ORTHOPAEDIC SURGERY
Payer: MEDICARE

## 2022-12-02 ENCOUNTER — APPOINTMENT (OUTPATIENT)
Dept: PHYSICAL THERAPY | Facility: CLINIC | Age: 72
End: 2022-12-02
Attending: ORTHOPAEDIC SURGERY
Payer: MEDICARE

## 2022-12-02 DIAGNOSIS — Z96.652 STATUS POST TOTAL KNEE REPLACEMENT, LEFT: Primary | ICD-10-CM
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
## 2022-12-02 DIAGNOSIS — M87.30 OSTEORADIONECROSIS (H): ICD-10-CM

## 2022-12-02 DIAGNOSIS — C09.9 SQUAMOUS CELL CARCINOMA OF TONSIL (H): Primary | ICD-10-CM

## 2022-12-02 LAB
CREAT SERPL-MCNC: 0.65 MG/DL (ref 0.66–1.25)
GFR SERPL CREATININE-BSD FRML MDRD: >90 ML/MIN/1.73M2

## 2022-12-02 PROCEDURE — 258N000003 HC RX IP 258 OP 636: Performed by: ORTHOPAEDIC SURGERY

## 2022-12-02 PROCEDURE — 250N000009 HC RX 250: Performed by: ANESTHESIOLOGY

## 2022-12-02 PROCEDURE — C1776 JOINT DEVICE (IMPLANTABLE): HCPCS | Performed by: ORTHOPAEDIC SURGERY

## 2022-12-02 PROCEDURE — 710N000009 HC RECOVERY PHASE 1, LEVEL 1, PER MIN: Performed by: ORTHOPAEDIC SURGERY

## 2022-12-02 PROCEDURE — 250N000011 HC RX IP 250 OP 636: Performed by: ORTHOPAEDIC SURGERY

## 2022-12-02 PROCEDURE — 97161 PT EVAL LOW COMPLEX 20 MIN: CPT | Mod: GP

## 2022-12-02 PROCEDURE — 258N000003 HC RX IP 258 OP 636: Performed by: ANESTHESIOLOGY

## 2022-12-02 PROCEDURE — 250N000013 HC RX MED GY IP 250 OP 250 PS 637: Performed by: STUDENT IN AN ORGANIZED HEALTH CARE EDUCATION/TRAINING PROGRAM

## 2022-12-02 PROCEDURE — 82565 ASSAY OF CREATININE: CPT | Performed by: ORTHOPAEDIC SURGERY

## 2022-12-02 PROCEDURE — 999N000141 HC STATISTIC PRE-PROCEDURE NURSING ASSESSMENT: Performed by: ORTHOPAEDIC SURGERY

## 2022-12-02 PROCEDURE — 370N000017 HC ANESTHESIA TECHNICAL FEE, PER MIN: Performed by: ORTHOPAEDIC SURGERY

## 2022-12-02 PROCEDURE — 97116 GAIT TRAINING THERAPY: CPT | Mod: GP

## 2022-12-02 PROCEDURE — 272N000001 HC OR GENERAL SUPPLY STERILE: Performed by: ORTHOPAEDIC SURGERY

## 2022-12-02 PROCEDURE — 250N000009 HC RX 250: Performed by: STUDENT IN AN ORGANIZED HEALTH CARE EDUCATION/TRAINING PROGRAM

## 2022-12-02 PROCEDURE — 999N000063 XR KNEE PORT LEFT 1/2 VIEWS: Mod: LT

## 2022-12-02 PROCEDURE — 99207 PR NO CHARGE LOS: CPT | Performed by: NURSE PRACTITIONER

## 2022-12-02 PROCEDURE — 258N000001 HC RX 258: Performed by: ORTHOPAEDIC SURGERY

## 2022-12-02 PROCEDURE — 250N000011 HC RX IP 250 OP 636: Performed by: STUDENT IN AN ORGANIZED HEALTH CARE EDUCATION/TRAINING PROGRAM

## 2022-12-02 PROCEDURE — 250N000011 HC RX IP 250 OP 636: Performed by: ANESTHESIOLOGY

## 2022-12-02 PROCEDURE — 258N000003 HC RX IP 258 OP 636: Performed by: STUDENT IN AN ORGANIZED HEALTH CARE EDUCATION/TRAINING PROGRAM

## 2022-12-02 PROCEDURE — 250N000013 HC RX MED GY IP 250 OP 250 PS 637: Performed by: ORTHOPAEDIC SURGERY

## 2022-12-02 PROCEDURE — 250N000013 HC RX MED GY IP 250 OP 250 PS 637: Performed by: NURSE PRACTITIONER

## 2022-12-02 PROCEDURE — 97110 THERAPEUTIC EXERCISES: CPT | Mod: GP

## 2022-12-02 PROCEDURE — C1713 ANCHOR/SCREW BN/BN,TIS/BN: HCPCS | Performed by: ORTHOPAEDIC SURGERY

## 2022-12-02 PROCEDURE — 250N000009 HC RX 250: Performed by: ORTHOPAEDIC SURGERY

## 2022-12-02 PROCEDURE — 36415 COLL VENOUS BLD VENIPUNCTURE: CPT | Performed by: ORTHOPAEDIC SURGERY

## 2022-12-02 PROCEDURE — 360N000077 HC SURGERY LEVEL 4, PER MIN: Performed by: ORTHOPAEDIC SURGERY

## 2022-12-02 DEVICE — ATTUNE KNEE SYSTEM TIBIAL INSERT FIXED BEARING MEDIAL STABILIZED LEFT AOX 8, 8MM
Type: IMPLANTABLE DEVICE | Site: KNEE | Status: FUNCTIONAL
Brand: ATTUNE

## 2022-12-02 DEVICE — ATTUNE PATELLA MEDIALIZED DOME 38MM CEMENTED AOX
Type: IMPLANTABLE DEVICE | Site: KNEE | Status: FUNCTIONAL
Brand: ATTUNE

## 2022-12-02 DEVICE — BONE CEMENT RADIOPAQUE SIMPLEX HV FULL DOSE 6194-1-001: Type: IMPLANTABLE DEVICE | Site: KNEE | Status: FUNCTIONAL

## 2022-12-02 DEVICE — ATTUNE KNEE SYSTEM FEMORAL CRUCIATE RETAINING SIZE 8 LEFT CEMENTED
Type: IMPLANTABLE DEVICE | Site: KNEE | Status: FUNCTIONAL
Brand: ATTUNE

## 2022-12-02 DEVICE — ATTUNE KNEE SYSTEM TIBIAL BASE FIXED BEARING SIZE 7 CEMENTED
Type: IMPLANTABLE DEVICE | Site: KNEE | Status: FUNCTIONAL
Brand: ATTUNE

## 2022-12-02 RX ORDER — NALOXONE HYDROCHLORIDE 0.4 MG/ML
0.4 INJECTION, SOLUTION INTRAMUSCULAR; INTRAVENOUS; SUBCUTANEOUS
Status: DISCONTINUED | OUTPATIENT
Start: 2022-12-02 | End: 2022-12-03 | Stop reason: HOSPADM

## 2022-12-02 RX ORDER — CALCIUM CARBONATE 500 MG/1
500 TABLET, CHEWABLE ORAL 4 TIMES DAILY PRN
Status: DISCONTINUED | OUTPATIENT
Start: 2022-12-02 | End: 2022-12-03 | Stop reason: HOSPADM

## 2022-12-02 RX ORDER — HYDROXYZINE HYDROCHLORIDE 10 MG/1
10 TABLET, FILM COATED ORAL EVERY 6 HOURS PRN
Status: DISCONTINUED | OUTPATIENT
Start: 2022-12-02 | End: 2022-12-03 | Stop reason: HOSPADM

## 2022-12-02 RX ORDER — MAGNESIUM HYDROXIDE 1200 MG/15ML
LIQUID ORAL PRN
Status: DISCONTINUED | OUTPATIENT
Start: 2022-12-02 | End: 2022-12-02 | Stop reason: HOSPADM

## 2022-12-02 RX ORDER — NALOXONE HYDROCHLORIDE 0.4 MG/ML
0.4 INJECTION, SOLUTION INTRAMUSCULAR; INTRAVENOUS; SUBCUTANEOUS
Status: DISCONTINUED | OUTPATIENT
Start: 2022-12-02 | End: 2022-12-02

## 2022-12-02 RX ORDER — VANCOMYCIN HYDROCHLORIDE 1 G/20ML
INJECTION, POWDER, LYOPHILIZED, FOR SOLUTION INTRAVENOUS PRN
Status: DISCONTINUED | OUTPATIENT
Start: 2022-12-02 | End: 2022-12-02 | Stop reason: HOSPADM

## 2022-12-02 RX ORDER — ENOXAPARIN SODIUM 100 MG/ML
40 INJECTION SUBCUTANEOUS EVERY 24 HOURS
Status: DISCONTINUED | OUTPATIENT
Start: 2022-12-03 | End: 2022-12-03 | Stop reason: HOSPADM

## 2022-12-02 RX ORDER — BISACODYL 10 MG
10 SUPPOSITORY, RECTAL RECTAL DAILY PRN
Status: DISCONTINUED | OUTPATIENT
Start: 2022-12-02 | End: 2022-12-03 | Stop reason: HOSPADM

## 2022-12-02 RX ORDER — POLYETHYLENE GLYCOL 3350 17 G/17G
17 POWDER, FOR SOLUTION ORAL DAILY
Status: DISCONTINUED | OUTPATIENT
Start: 2022-12-03 | End: 2022-12-03 | Stop reason: HOSPADM

## 2022-12-02 RX ORDER — ONDANSETRON 2 MG/ML
4 INJECTION INTRAMUSCULAR; INTRAVENOUS EVERY 6 HOURS PRN
Status: DISCONTINUED | OUTPATIENT
Start: 2022-12-02 | End: 2022-12-03 | Stop reason: HOSPADM

## 2022-12-02 RX ORDER — SODIUM CHLORIDE, SODIUM LACTATE, POTASSIUM CHLORIDE, CALCIUM CHLORIDE 600; 310; 30; 20 MG/100ML; MG/100ML; MG/100ML; MG/100ML
INJECTION, SOLUTION INTRAVENOUS CONTINUOUS
Status: DISCONTINUED | OUTPATIENT
Start: 2022-12-02 | End: 2022-12-02 | Stop reason: HOSPADM

## 2022-12-02 RX ORDER — LISINOPRIL 5 MG/1
5 TABLET ORAL EVERY EVENING
Status: DISCONTINUED | OUTPATIENT
Start: 2022-12-02 | End: 2022-12-03 | Stop reason: HOSPADM

## 2022-12-02 RX ORDER — TRANEXAMIC ACID 10 MG/ML
1 INJECTION, SOLUTION INTRAVENOUS ONCE
Status: COMPLETED | OUTPATIENT
Start: 2022-12-02 | End: 2022-12-02

## 2022-12-02 RX ORDER — ONDANSETRON 2 MG/ML
4 INJECTION INTRAMUSCULAR; INTRAVENOUS EVERY 30 MIN PRN
Status: DISCONTINUED | OUTPATIENT
Start: 2022-12-02 | End: 2022-12-02 | Stop reason: HOSPADM

## 2022-12-02 RX ORDER — PREGABALIN 150 MG/1
150 CAPSULE ORAL ONCE
Status: COMPLETED | OUTPATIENT
Start: 2022-12-02 | End: 2022-12-02

## 2022-12-02 RX ORDER — OXYCODONE HYDROCHLORIDE 5 MG/1
10 TABLET ORAL EVERY 4 HOURS PRN
Status: DISCONTINUED | OUTPATIENT
Start: 2022-12-02 | End: 2022-12-03 | Stop reason: HOSPADM

## 2022-12-02 RX ORDER — OXYCODONE HYDROCHLORIDE 5 MG/1
TABLET ORAL
Qty: 30 TABLET | Refills: 0 | Status: SHIPPED | OUTPATIENT
Start: 2022-12-02 | End: 2023-07-20

## 2022-12-02 RX ORDER — METHOCARBAMOL 500 MG/1
500 TABLET, FILM COATED ORAL EVERY 6 HOURS PRN
Status: DISCONTINUED | OUTPATIENT
Start: 2022-12-02 | End: 2022-12-03 | Stop reason: HOSPADM

## 2022-12-02 RX ORDER — KETOROLAC TROMETHAMINE 15 MG/ML
15 INJECTION, SOLUTION INTRAMUSCULAR; INTRAVENOUS EVERY 6 HOURS
Status: COMPLETED | OUTPATIENT
Start: 2022-12-02 | End: 2022-12-03

## 2022-12-02 RX ORDER — CEFAZOLIN SODIUM/WATER 2 G/20 ML
2 SYRINGE (ML) INTRAVENOUS SEE ADMIN INSTRUCTIONS
Status: DISCONTINUED | OUTPATIENT
Start: 2022-12-02 | End: 2022-12-02 | Stop reason: HOSPADM

## 2022-12-02 RX ORDER — FENTANYL CITRATE 0.05 MG/ML
25 INJECTION, SOLUTION INTRAMUSCULAR; INTRAVENOUS EVERY 5 MIN PRN
Status: DISCONTINUED | OUTPATIENT
Start: 2022-12-02 | End: 2022-12-02 | Stop reason: HOSPADM

## 2022-12-02 RX ORDER — ACETAMINOPHEN 325 MG/1
975 TABLET ORAL EVERY 6 HOURS PRN
Qty: 100 TABLET | Refills: 0 | Status: SHIPPED | OUTPATIENT
Start: 2022-12-02 | End: 2023-07-20

## 2022-12-02 RX ORDER — ACETAMINOPHEN 325 MG/1
975 TABLET ORAL ONCE
Status: COMPLETED | OUTPATIENT
Start: 2022-12-02 | End: 2022-12-02

## 2022-12-02 RX ORDER — LABETALOL HYDROCHLORIDE 5 MG/ML
10 INJECTION, SOLUTION INTRAVENOUS
Status: DISCONTINUED | OUTPATIENT
Start: 2022-12-02 | End: 2022-12-02 | Stop reason: HOSPADM

## 2022-12-02 RX ORDER — ONDANSETRON 4 MG/1
4 TABLET, ORALLY DISINTEGRATING ORAL EVERY 6 HOURS PRN
Status: DISCONTINUED | OUTPATIENT
Start: 2022-12-02 | End: 2022-12-03 | Stop reason: HOSPADM

## 2022-12-02 RX ORDER — DIPHENHYDRAMINE HCL 12.5MG/5ML
12.5 LIQUID (ML) ORAL EVERY 6 HOURS PRN
Status: DISCONTINUED | OUTPATIENT
Start: 2022-12-02 | End: 2022-12-03 | Stop reason: HOSPADM

## 2022-12-02 RX ORDER — ENOXAPARIN SODIUM 100 MG/ML
40 INJECTION SUBCUTANEOUS ONCE
Qty: 0.4 ML | Refills: 0 | Status: SHIPPED | OUTPATIENT
Start: 2022-12-02 | End: 2022-12-03

## 2022-12-02 RX ORDER — ACETAMINOPHEN 325 MG/1
650 TABLET ORAL EVERY 4 HOURS PRN
Status: DISCONTINUED | OUTPATIENT
Start: 2022-12-05 | End: 2022-12-03 | Stop reason: HOSPADM

## 2022-12-02 RX ORDER — CEFAZOLIN SODIUM 2 G/100ML
2 INJECTION, SOLUTION INTRAVENOUS EVERY 8 HOURS
Status: COMPLETED | OUTPATIENT
Start: 2022-12-02 | End: 2022-12-03

## 2022-12-02 RX ORDER — NALOXONE HYDROCHLORIDE 0.4 MG/ML
0.2 INJECTION, SOLUTION INTRAMUSCULAR; INTRAVENOUS; SUBCUTANEOUS
Status: DISCONTINUED | OUTPATIENT
Start: 2022-12-02 | End: 2022-12-03 | Stop reason: HOSPADM

## 2022-12-02 RX ORDER — HYDROMORPHONE HCL IN WATER/PF 6 MG/30 ML
0.2 PATIENT CONTROLLED ANALGESIA SYRINGE INTRAVENOUS EVERY 5 MIN PRN
Status: DISCONTINUED | OUTPATIENT
Start: 2022-12-02 | End: 2022-12-02 | Stop reason: HOSPADM

## 2022-12-02 RX ORDER — FENTANYL CITRATE 0.05 MG/ML
50 INJECTION, SOLUTION INTRAMUSCULAR; INTRAVENOUS EVERY 5 MIN PRN
Status: DISCONTINUED | OUTPATIENT
Start: 2022-12-02 | End: 2022-12-02 | Stop reason: HOSPADM

## 2022-12-02 RX ORDER — NALOXONE HYDROCHLORIDE 0.4 MG/ML
0.2 INJECTION, SOLUTION INTRAMUSCULAR; INTRAVENOUS; SUBCUTANEOUS
Status: DISCONTINUED | OUTPATIENT
Start: 2022-12-02 | End: 2022-12-02

## 2022-12-02 RX ORDER — ATORVASTATIN CALCIUM 10 MG/1
10 TABLET, FILM COATED ORAL EVERY EVENING
Status: DISCONTINUED | OUTPATIENT
Start: 2022-12-02 | End: 2022-12-03 | Stop reason: HOSPADM

## 2022-12-02 RX ORDER — PROCHLORPERAZINE MALEATE 5 MG
5 TABLET ORAL EVERY 6 HOURS PRN
Status: DISCONTINUED | OUTPATIENT
Start: 2022-12-02 | End: 2022-12-03 | Stop reason: HOSPADM

## 2022-12-02 RX ORDER — ACETAMINOPHEN 325 MG/1
975 TABLET ORAL EVERY 8 HOURS
Status: DISCONTINUED | OUTPATIENT
Start: 2022-12-02 | End: 2022-12-03 | Stop reason: HOSPADM

## 2022-12-02 RX ORDER — TRAZODONE HYDROCHLORIDE 50 MG/1
50 TABLET, FILM COATED ORAL AT BEDTIME
Status: DISCONTINUED | OUTPATIENT
Start: 2022-12-02 | End: 2022-12-03 | Stop reason: HOSPADM

## 2022-12-02 RX ORDER — ONDANSETRON 4 MG/1
4 TABLET, ORALLY DISINTEGRATING ORAL EVERY 30 MIN PRN
Status: DISCONTINUED | OUTPATIENT
Start: 2022-12-02 | End: 2022-12-02 | Stop reason: HOSPADM

## 2022-12-02 RX ORDER — AMOXICILLIN 250 MG
1 CAPSULE ORAL 2 TIMES DAILY
Status: DISCONTINUED | OUTPATIENT
Start: 2022-12-02 | End: 2022-12-03 | Stop reason: HOSPADM

## 2022-12-02 RX ORDER — ONDANSETRON 2 MG/ML
INJECTION INTRAMUSCULAR; INTRAVENOUS PRN
Status: DISCONTINUED | OUTPATIENT
Start: 2022-12-02 | End: 2022-12-02

## 2022-12-02 RX ORDER — CARVEDILOL 12.5 MG/1
12.5 TABLET ORAL 2 TIMES DAILY WITH MEALS
Status: DISCONTINUED | OUTPATIENT
Start: 2022-12-02 | End: 2022-12-03 | Stop reason: HOSPADM

## 2022-12-02 RX ORDER — FLUMAZENIL 0.1 MG/ML
0.2 INJECTION, SOLUTION INTRAVENOUS
Status: DISCONTINUED | OUTPATIENT
Start: 2022-12-02 | End: 2022-12-02 | Stop reason: HOSPADM

## 2022-12-02 RX ORDER — SODIUM CHLORIDE, SODIUM LACTATE, POTASSIUM CHLORIDE, CALCIUM CHLORIDE 600; 310; 30; 20 MG/100ML; MG/100ML; MG/100ML; MG/100ML
INJECTION, SOLUTION INTRAVENOUS CONTINUOUS
Status: DISCONTINUED | OUTPATIENT
Start: 2022-12-02 | End: 2022-12-03 | Stop reason: HOSPADM

## 2022-12-02 RX ORDER — LEVOTHYROXINE SODIUM 50 UG/1
50 TABLET ORAL
Status: DISCONTINUED | OUTPATIENT
Start: 2022-12-03 | End: 2022-12-03 | Stop reason: HOSPADM

## 2022-12-02 RX ORDER — LIDOCAINE 40 MG/G
CREAM TOPICAL
Status: DISCONTINUED | OUTPATIENT
Start: 2022-12-02 | End: 2022-12-03 | Stop reason: HOSPADM

## 2022-12-02 RX ORDER — DEXAMETHASONE SODIUM PHOSPHATE 4 MG/ML
INJECTION, SOLUTION INTRA-ARTICULAR; INTRALESIONAL; INTRAMUSCULAR; INTRAVENOUS; SOFT TISSUE PRN
Status: DISCONTINUED | OUTPATIENT
Start: 2022-12-02 | End: 2022-12-02

## 2022-12-02 RX ORDER — HYDROMORPHONE HCL IN WATER/PF 6 MG/30 ML
0.2 PATIENT CONTROLLED ANALGESIA SYRINGE INTRAVENOUS
Status: DISCONTINUED | OUTPATIENT
Start: 2022-12-02 | End: 2022-12-03 | Stop reason: HOSPADM

## 2022-12-02 RX ORDER — HYDROMORPHONE HCL IN WATER/PF 6 MG/30 ML
0.4 PATIENT CONTROLLED ANALGESIA SYRINGE INTRAVENOUS
Status: DISCONTINUED | OUTPATIENT
Start: 2022-12-02 | End: 2022-12-03 | Stop reason: HOSPADM

## 2022-12-02 RX ORDER — PROPOFOL 10 MG/ML
INJECTION, EMULSION INTRAVENOUS CONTINUOUS PRN
Status: DISCONTINUED | OUTPATIENT
Start: 2022-12-02 | End: 2022-12-02

## 2022-12-02 RX ORDER — GABAPENTIN 300 MG/1
300 CAPSULE ORAL 3 TIMES DAILY
Status: DISCONTINUED | OUTPATIENT
Start: 2022-12-02 | End: 2022-12-03 | Stop reason: HOSPADM

## 2022-12-02 RX ORDER — FENTANYL CITRATE 50 UG/ML
INJECTION, SOLUTION INTRAMUSCULAR; INTRAVENOUS PRN
Status: DISCONTINUED | OUTPATIENT
Start: 2022-12-02 | End: 2022-12-02

## 2022-12-02 RX ORDER — OXYCODONE HYDROCHLORIDE 5 MG/1
5 TABLET ORAL EVERY 4 HOURS PRN
Status: DISCONTINUED | OUTPATIENT
Start: 2022-12-02 | End: 2022-12-03 | Stop reason: HOSPADM

## 2022-12-02 RX ORDER — AMOXICILLIN 250 MG
1-2 CAPSULE ORAL 2 TIMES DAILY
Qty: 30 TABLET | Refills: 0 | Status: SHIPPED | OUTPATIENT
Start: 2022-12-02 | End: 2023-07-20

## 2022-12-02 RX ORDER — CEFAZOLIN SODIUM/WATER 2 G/20 ML
2 SYRINGE (ML) INTRAVENOUS
Status: COMPLETED | OUTPATIENT
Start: 2022-12-02 | End: 2022-12-02

## 2022-12-02 RX ORDER — HYDROMORPHONE HCL IN WATER/PF 6 MG/30 ML
0.4 PATIENT CONTROLLED ANALGESIA SYRINGE INTRAVENOUS EVERY 5 MIN PRN
Status: DISCONTINUED | OUTPATIENT
Start: 2022-12-02 | End: 2022-12-02 | Stop reason: HOSPADM

## 2022-12-02 RX ADMIN — ONDANSETRON 4 MG: 2 INJECTION INTRAMUSCULAR; INTRAVENOUS at 07:56

## 2022-12-02 RX ADMIN — MIDAZOLAM 1 MG: 1 INJECTION INTRAMUSCULAR; INTRAVENOUS at 07:43

## 2022-12-02 RX ADMIN — PHENYLEPHRINE HYDROCHLORIDE 100 MCG: 10 INJECTION INTRAVENOUS at 08:21

## 2022-12-02 RX ADMIN — Medication 2 G: at 07:46

## 2022-12-02 RX ADMIN — SENNOSIDES AND DOCUSATE SODIUM 1 TABLET: 50; 8.6 TABLET ORAL at 19:53

## 2022-12-02 RX ADMIN — SODIUM CHLORIDE, POTASSIUM CHLORIDE, SODIUM LACTATE AND CALCIUM CHLORIDE: 600; 310; 30; 20 INJECTION, SOLUTION INTRAVENOUS at 08:12

## 2022-12-02 RX ADMIN — ACETAMINOPHEN 975 MG: 325 TABLET, FILM COATED ORAL at 06:25

## 2022-12-02 RX ADMIN — KETOROLAC TROMETHAMINE 15 MG: 15 INJECTION, SOLUTION INTRAMUSCULAR; INTRAVENOUS at 18:49

## 2022-12-02 RX ADMIN — MIDAZOLAM 1 MG: 1 INJECTION INTRAMUSCULAR; INTRAVENOUS at 07:51

## 2022-12-02 RX ADMIN — OXYCODONE HYDROCHLORIDE 5 MG: 5 TABLET ORAL at 14:55

## 2022-12-02 RX ADMIN — PHENYLEPHRINE HYDROCHLORIDE 100 MCG: 10 INJECTION INTRAVENOUS at 08:09

## 2022-12-02 RX ADMIN — GABAPENTIN 300 MG: 300 CAPSULE ORAL at 19:53

## 2022-12-02 RX ADMIN — ACETAMINOPHEN 975 MG: 325 TABLET, FILM COATED ORAL at 19:53

## 2022-12-02 RX ADMIN — SODIUM CHLORIDE, POTASSIUM CHLORIDE, SODIUM LACTATE AND CALCIUM CHLORIDE: 600; 310; 30; 20 INJECTION, SOLUTION INTRAVENOUS at 06:18

## 2022-12-02 RX ADMIN — OXYCODONE HYDROCHLORIDE 5 MG: 5 TABLET ORAL at 19:52

## 2022-12-02 RX ADMIN — FENTANYL CITRATE 50 MCG: 50 INJECTION, SOLUTION INTRAMUSCULAR; INTRAVENOUS at 07:45

## 2022-12-02 RX ADMIN — PROPOFOL 75 MCG/KG/MIN: 10 INJECTION, EMULSION INTRAVENOUS at 07:50

## 2022-12-02 RX ADMIN — CARVEDILOL 12.5 MG: 12.5 TABLET, FILM COATED ORAL at 18:49

## 2022-12-02 RX ADMIN — CEFAZOLIN SODIUM 2 G: 2 INJECTION, SOLUTION INTRAVENOUS at 16:01

## 2022-12-02 RX ADMIN — LISINOPRIL 5 MG: 5 TABLET ORAL at 19:52

## 2022-12-02 RX ADMIN — TRAZODONE HYDROCHLORIDE 50 MG: 50 TABLET ORAL at 22:02

## 2022-12-02 RX ADMIN — PHENYLEPHRINE HYDROCHLORIDE 150 MCG: 10 INJECTION INTRAVENOUS at 08:18

## 2022-12-02 RX ADMIN — KETOROLAC TROMETHAMINE 15 MG: 15 INJECTION, SOLUTION INTRAMUSCULAR; INTRAVENOUS at 13:22

## 2022-12-02 RX ADMIN — PHENYLEPHRINE HYDROCHLORIDE 0.6 MCG/KG/MIN: 10 INJECTION INTRAVENOUS at 09:15

## 2022-12-02 RX ADMIN — DEXAMETHASONE SODIUM PHOSPHATE 10 MG: 4 INJECTION, SOLUTION INTRA-ARTICULAR; INTRALESIONAL; INTRAMUSCULAR; INTRAVENOUS; SOFT TISSUE at 07:56

## 2022-12-02 RX ADMIN — MEPIVACAINE HYDROCHLORIDE 3 ML: 20 INJECTION, SOLUTION EPIDURAL; INFILTRATION at 07:49

## 2022-12-02 RX ADMIN — PHENYLEPHRINE HYDROCHLORIDE 100 MCG: 10 INJECTION INTRAVENOUS at 10:22

## 2022-12-02 RX ADMIN — SODIUM CHLORIDE, POTASSIUM CHLORIDE, SODIUM LACTATE AND CALCIUM CHLORIDE: 600; 310; 30; 20 INJECTION, SOLUTION INTRAVENOUS at 22:02

## 2022-12-02 RX ADMIN — TRANEXAMIC ACID 1 G: 10 INJECTION, SOLUTION INTRAVENOUS at 07:49

## 2022-12-02 RX ADMIN — MIDAZOLAM HYDROCHLORIDE 1 MG: 1 INJECTION, SOLUTION INTRAMUSCULAR; INTRAVENOUS at 06:50

## 2022-12-02 RX ADMIN — PREGABALIN 150 MG: 150 CAPSULE ORAL at 06:26

## 2022-12-02 RX ADMIN — SODIUM CHLORIDE, POTASSIUM CHLORIDE, SODIUM LACTATE AND CALCIUM CHLORIDE: 600; 310; 30; 20 INJECTION, SOLUTION INTRAVENOUS at 12:27

## 2022-12-02 RX ADMIN — PHENYLEPHRINE HYDROCHLORIDE 100 MCG: 10 INJECTION INTRAVENOUS at 08:46

## 2022-12-02 RX ADMIN — BUPIVACAINE HYDROCHLORIDE 15 ML: 5 INJECTION, SOLUTION EPIDURAL; INTRACAUDAL at 06:48

## 2022-12-02 RX ADMIN — TRANEXAMIC ACID 1 G: 10 INJECTION, SOLUTION INTRAVENOUS at 09:48

## 2022-12-02 RX ADMIN — GABAPENTIN 300 MG: 300 CAPSULE ORAL at 16:00

## 2022-12-02 RX ADMIN — FENTANYL CITRATE 50 MCG: 50 INJECTION, SOLUTION INTRAMUSCULAR; INTRAVENOUS at 07:48

## 2022-12-02 RX ADMIN — OXYCODONE HYDROCHLORIDE 5 MG: 5 TABLET ORAL at 14:03

## 2022-12-02 RX ADMIN — ATORVASTATIN CALCIUM 10 MG: 10 TABLET, FILM COATED ORAL at 19:52

## 2022-12-02 RX ADMIN — PHENYLEPHRINE HYDROCHLORIDE 0.4 MCG/KG/MIN: 10 INJECTION INTRAVENOUS at 08:23

## 2022-12-02 ASSESSMENT — ACTIVITIES OF DAILY LIVING (ADL)
ADLS_ACUITY_SCORE: 18
CHANGE_IN_FUNCTIONAL_STATUS_SINCE_ONSET_OF_CURRENT_ILLNESS/INJURY: NO
FALL_HISTORY_WITHIN_LAST_SIX_MONTHS: NO
ADLS_ACUITY_SCORE: 18

## 2022-12-02 ASSESSMENT — ENCOUNTER SYMPTOMS
SEIZURES: 0
DYSRHYTHMIAS: 1

## 2022-12-02 ASSESSMENT — LIFESTYLE VARIABLES: TOBACCO_USE: 0

## 2022-12-02 NOTE — BRIEF OP NOTE
[facility]    Brief Operative Note    Pre-operative diagnosis: Left knee OA  Post-operative diagnosis same  Procedure: LEFT TOTAL KNEE ARTHROPLASTY  Surgeon(s) and Role:     * Aron Lemon MD - Primary     * Celina Munoz PA-C - Assisting     * BHARATHI Kemp, JAZZMINE-C - Assisting  Anesthesia: spinal   Estimated blood loss: 100 ml  Drains:  None  Specimens: None  Findings:  Advanced OA  Complications: None    Plan: DC home POD1 w/family assist.  DVT prophylaxis w/ Lovenox POD1, Xarelto 20 mg POD2 (PTA Dose) QD x6wks.    Implants:   Implant Name Type Inv. Item Serial No.  Lot No. LRB No. Used Action   BONE CEMENT RADIOPAQUE SIMPLEX HV FULL DOSE 6194-1-001 - [id number] Cement, Bone BONE CEMENT RADIOPAQUE SIMPLEX HV FULL DOSE 6194-1-001  [employer] [id number] Left 2 Implanted   IMP COMP FEM JJ ATTUNE CR LT LOUISE SZ8 [id number] - [id number] Total Joint Component/Insert IMP COMP FEM JJ ATTUNE CR LT LOUISE SZ8 [id number]  [employer]- [id number] Left 1 Implanted   ATTUNE FB TIB BASE SZ 7 LOUISE - [id number] Total Joint Component/Insert ATTUNE FB TIB BASE SZ 7 LOUISE  [employer]- [id number] Left 1 Implanted   IMP PATELLA JJ ATTUNE DOME 38MM [id number] - [id number] Total Joint Component/Insert IMP PATELLA JJ ATTUNE DOME 38MM [id number]  [employer]- [id number] Left 1 Implanted   Tibial Insert Fixed Bearing Medial Stabilized, Size 8, Left, AOX Total Joint Component/Insert   Depuy [id number] Left 1 Implanted

## 2022-12-02 NOTE — ANESTHESIA CARE TRANSFER NOTE
Patient: Fortino Moya    Procedure: Procedure(s):  LEFT TOTAL KNEE ARTHROPLASTY       Diagnosis: Primary osteoarthritis of left knee [M17.12]  Diagnosis Additional Information: No value filed.    Anesthesia Type:   Spinal     Note:    Oropharynx: oropharynx clear of all foreign objects  Level of Consciousness: awake  Oxygen Supplementation: face mask    Independent Airway: airway patency satisfactory and stable    Vital Signs Stable: post-procedure vital signs reviewed and stable  Report to RN Given: handoff report given  Patient transferred to: PACU    Handoff Report: Identifed the Patient, Identified the Reponsible Provider, Reviewed the pertinent medical history, Discussed the surgical course, Reviewed Intra-OP anesthesia mangement and issues during anesthesia, Set expectations for post-procedure period and Allowed opportunity for questions and acknowledgement of understanding      Vitals:  Vitals Value Taken Time   BP     Temp     Pulse 82 12/02/22 1030   Resp 10 12/02/22 1030   SpO2 97 % 12/02/22 1030   Vitals shown include unvalidated device data.    Electronically Signed By: SATNAM Zuniga CRNA  December 2, 2022  10:31 AM

## 2022-12-02 NOTE — ANESTHESIA POSTPROCEDURE EVALUATION
Patient: Fortino Moya    Procedure: Procedure(s):  LEFT TOTAL KNEE ARTHROPLASTY       Anesthesia Type:  Spinal    Note:  Disposition: Admission   Postop Pain Control: Uneventful            Sign Out: Well controlled pain   PONV: No   Neuro/Psych: Uneventful            Sign Out: spinal wearing off.   Airway/Respiratory: Uneventful            Sign Out: Acceptable/Baseline resp. status   CV/Hemodynamics: Uneventful            Sign Out: Acceptable CV status   Other NRE: NONE   DID A NON-ROUTINE EVENT OCCUR? No           Last vitals:  Vitals Value Taken Time   /79 12/02/22 1150   Temp 36.7  C (98.1  F) 12/02/22 1150   Pulse 79 12/02/22 1157   Resp 13 12/02/22 1157   SpO2 98 % 12/02/22 1158   Vitals shown include unvalidated device data.    Electronically Signed By: Clement Em MD  December 2, 2022  1:18 PM

## 2022-12-02 NOTE — CONSULTS
[facility]    Hospitalist Progress Note    Assessment & Plan   Fortino Moya is a 72 year old male who was admitted on 12/2/2022 for planned left total knee arthroplasty. He has a past medical history significant for atrial fibulation, CAD, GERD, SCC of tonsil, HTN, HLP, and hypothyroid    S/P Left total knee arthroplasty  - POD day 0  - PT/OT  - Plan per primary     HTN  HLD  A fib  CAD  - PTA medications: atorvastatin, carvedilol 12.5 mg BID, lisinopril 5 mg daily. Will continue all with hold parameters  - Also on rivaroxaban 20 mg daily, will have primary address    Hypothyroid   - PTA medications: Levothyroxine 50 mcg daily. Continue    In lieu of any medical issues, we will defer consult. Addressed home medications for admission and discharge. Will also have colleges preform a chart check.   Appreciate the consult.      Disposition: Expected discharge in 1 day

## 2022-12-02 NOTE — ANESTHESIA PROCEDURE NOTES
Adductor canal Procedure Note    Pre-Procedure   Staff -        Anesthesiologist:  Clement Em MD       Performed By: anesthesiologist       Location: pre-op       Procedure Start/Stop Times: 12/2/2022 6:48 AM and 12/2/2022 6:52 AM       Pre-Anesthestic Checklist: patient identified, IV checked, site marked, risks and benefits discussed, informed consent, monitors and equipment checked, pre-op evaluation, at physician/surgeon's request and post-op pain management  Timeout:       Correct Patient: Yes        Correct Procedure: Yes        Correct Site: Yes        Correct Position: Yes        Correct Laterality: Yes   Procedure Documentation  Procedure: Adductor canal       Laterality: left       Patient Position: supine       Patient Prep/Sterile Barriers: sterile gloves, mask       Skin prep: Chloraprep       Local skin infiltrated with mL of 1% lidocaine.        Needle Type: insulated and short bevel       Needle Gauge: 21.        Needle Length (millimeters): 100        Ultrasound guided       1. Ultrasound was used to identify targeted nerve, plexus, vascular marker, or fascial plane and place a needle adjacent to it in real-time.       2. Ultrasound was used to visualize the spread of anesthetic in close proximity to the above referenced structure.       3. A permanent image is entered into the patient's record.       4. The visualized anatomic structures appeared normal.       5. There were no apparent abnormal pathologic findings.    Assessment/Narrative         The placement was negative for: blood aspirated, painful injection and site bleeding       Paresthesias: No.       Bolus given via needle..        Secured via.        Insertion/Infusion Method: Single Shot       Complications: none       Injection made incrementally with aspirations every 5 mL.    Medication(s) Administered   Bupivacaine 0.5% w/ 1:400K Epi (Injection) - Injection   15 mL - 12/2/2022 6:48:00 AM  Medication Administration  "Time: 12/2/2022 6:48 AM     Comments:  Pt tolerated well.    No complications.      The surgeon has given a verbal order transferring care of this patient to me for the performance of a regional analgesia block for post-op pain control. It is requested of me because I am uniquely trained and qualified to perform this block and the surgeon is neither trained nor qualified to perform this procedure.    Clement mE MD   6:56 AM      FOR [facility] ([facility]) ONLY:   Pain Team Contact information: please page the Pain Team Via [employer]. Search \"Pain\". During daytime hours, please page the attending first. At night please page the resident first.    "

## 2022-12-02 NOTE — ANESTHESIA PREPROCEDURE EVALUATION
Anesthesia Pre-Procedure Evaluation    Patient: Fortino Moya   MRN: [id number] : 1950        Procedure : Procedure(s):  LEFT TOTAL KNEE ARTHROPLASTY          Past Medical History:   Diagnosis Date     Arrhythmia     A FIB     Atrial fibrillation (H)      Coronary artery disease 06/15/2021    A Fib     Dysphagia      Gastroesophageal reflux disease      Hearing problem      History of radiation therapy 2021     Hypercholesterolemia      Hypertension      Hypothyroidism      Other hyperlipidemia      Paralytic lagophthalmos      Primary squamous cell carcinoma of tonsil (H)       Past Surgical History:   Procedure Laterality Date     APPENDECTOMY       GI SURGERY       IMPLANT GOLD WEIGHT EYELID Left 2021    Procedure: Left upper eyelid gold or platinum weight insertion for lagophthalmos - 1.6 grams;  Surgeon: Vivien Yoon MD;  Location: [facility]     IR CHEST PORT PLACEMENT > 5 YRS OF AGE  2021     IR GASTROSTOMY TUBE PERCUTANEOUS PLCMNT  03/10/2021     IR PORT REMOVAL RIGHT  2021     JOINT REPLACEMENT       JOINT REPLACEMENT       LASIK Bilateral      ORTHOPEDIC SURGERY       REPAIR ECTROPION Left 2021    Procedure: Left lower eyelid ectropion repair;  Surgeon: Vivien Yoon MD;  Location: [facility] OR     REPAIR PTOSIS Left      VASCULAR SURGERY        No Known Allergies   Social History     Tobacco Use     Smoking status: Never     Smokeless tobacco: Never   Substance Use Topics     Alcohol use: Yes     Comment: 3 beers/week      Wt Readings from Last 1 Encounters:   22 83.1 kg (183 lb 1.6 oz)        Anesthesia Evaluation   Pt has had prior anesthetic. Type: General.    No history of anesthetic complications       ROS/MED HX  ENT/Pulmonary: Comment: Tonsillar cancer s/p radiation    (+) asthma  (-) tobacco use and sleep apnea   Neurologic: Comment: L facial paralysis    (+) no peripheral neuropathy  (-) no seizures and no CVA   Cardiovascular:     (+) Dyslipidemia  hypertension--CAD ---Taking blood thinners (last dose xarelto on monday) dysrhythmias, a-fib,     METS/Exercise Tolerance:     Hematologic:       Musculoskeletal:       GI/Hepatic:     (+) GERD,     Renal/Genitourinary:    (-) renal disease   Endo:     (+) thyroid problem,  (-) Type II DM   Psychiatric/Substance Use:     (+) psychiatric history anxiety     Infectious Disease:    (-) Recent Fever   Malignancy:       Other:            Physical Exam    Airway  airway exam normal      Mallampati: III   TM distance: > 3 FB   Neck ROM: full   Mouth opening: < 3 cm    Respiratory Devices and Support         Dental  no notable dental history         Cardiovascular   cardiovascular exam normal          Pulmonary   pulmonary exam normal                OUTSIDE LABS:  CBC:   Lab Results   Component Value Date    WBC 5.5 09/16/2022    WBC 7.8 01/11/2022    HGB 12.2 (L) 09/16/2022    HGB 13.0 (L) 01/11/2022    HCT 37.3 (L) 09/16/2022    HCT 38.4 (L) 01/11/2022     09/16/2022     01/11/2022     BMP:   Lab Results   Component Value Date     01/11/2022     11/12/2021    POTASSIUM 4.1 01/11/2022    POTASSIUM 4.2 11/12/2021    CHLORIDE 104 01/11/2022    CHLORIDE 106 11/12/2021    CO2 28 01/11/2022    CO2 28 11/12/2021    BUN 13 01/11/2022    BUN 13 11/12/2021    CR 0.8 07/20/2022    CR 1.1 02/16/2022     (H) 01/11/2022     (H) 11/12/2021     COAGS:   Lab Results   Component Value Date    INR 1.19 (H) 09/16/2022     POC: No results found for: BGM, HCG, HCGS  HEPATIC:   Lab Results   Component Value Date    ALBUMIN 3.8 01/11/2022    PROTTOTAL 6.8 01/11/2022    ALT 21 01/11/2022    AST 11 01/11/2022    ALKPHOS 82 01/11/2022    BILITOTAL 0.4 01/11/2022     OTHER:   Lab Results   Component Value Date    RIYA 9.1 01/11/2022    MAG 1.8 04/26/2021    TSH 2.07 07/20/2022    T4 0.89 11/12/2021       Anesthesia Plan    ASA Status:  3   NPO Status:  NPO Appropriate    Anesthesia Type: Spinal.               Consents    Anesthesia Plan(s) and associated risks, benefits, and realistic alternatives discussed. Questions answered and patient/representative(s) expressed understanding.    - Discussed:     - Discussed with:  Patient         Postoperative Care    Pain management: IV analgesics, Oral pain medications, Peripheral nerve block (Single Shot).   PONV prophylaxis: Ondansetron (or other 5HT-3)     Comments:    Other Comments: lacrilube and tape L eye    Discussed with patient that sedation would be somewhat lighter than usual due to airway, may have awareness during surgery.            Clement Em MD

## 2022-12-02 NOTE — INTERVAL H&P NOTE
"Goal Outcome Evaluation:  Plan of Care Reviewed With: patient  Patient Agreement with Plan of Care: agrees      Pt is alert, oriented, not in distress. Pt denies current SI, HI or A/V hallucinations. Pt rates his depression and anxiety 6/10. Pt states that he is feeling better and that he wasn't sleeping \"because I was stressed.\" Pt was observed in the dayroom interacting with peers appropriately and attending group. Will continue to monitor this patient and provide a safe environment. -- AS RN     " "I have reviewed the surgical (or preoperative) H&P that is linked to this encounter, and examined the patient. There are no significant changes    Clinical Conditions Present on Arrival:  Clinically Significant Risk Factors Present on Admission                  # Drug Induced Coagulation Defect: home medication list includes an anticoagulant medication   # Overweight: Estimated body mass index is 27.04 kg/m  as calculated from the following:    Height as of this encounter: 1.753 m (5' 9\").    Weight as of this encounter: 83.1 kg (183 lb 1.6 oz).       "

## 2022-12-02 NOTE — TUMOR CONFERENCE
Head & Neck Tumor Conference Note   Status: Return  Staff: Dr. David Russell     Tumor Site: Left tonsil   Tumor Pathology: p16+ SCC  Tumor Stage: rT4 N0 M0  Tumor Treatment:  - Chemoradiation [place]  -2/28/2021: Induction chemotherapy   -5/07/2021:Definitive chemoradiotherapy with weekly cisplatin and a total of 6600 cGy      Reason for Review: Review imaging, path, and POC     Brief History: Mr. 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  He initially started with a clinically staged T3 N1 p16 positive squamous cell carcinoma of the left tonsil treated in [place] with chemoradiotherapy.  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  He had symptoms of numbness in all three divisions of his fifth cranial nerve.  He then received two cycles of induction chemotherapy with TPF completed on 02/28/2021.  His post-induction imaging suggests a near complete response.  He then received definitive chemoradiotherapy with weekly cisplatin and a total of 6600 cGy finished on 05/07/2021.  Since Chemoradiation he has developed left mandibular ORN. He had scans performed recently that showed  ill-defined heterogeneously enhancing area in the left parapharyngeal area, with necrotic tissue in the parapharyngeal space extending all the way to the mucosa. He has undergone 30-dives of hyperbaric oxygen treatment. Repeat imaging performed 8/23 and FNA 8/24 in clinic that was non diagnostic; then an IR FNA on 9/16 that also returned non diagnostic. We are presenting repeat imaging.      Pertinent PMH:   Past Medical History        Past Medical History:   Diagnosis Date     Arrhythmia       A FIB     Atrial fibrillation (H)       Coronary artery disease 6/15/2021     A Fib     Dysphagia       Hearing problem 1995     History of radiation therapy  1/21     Hypercholesterolemia       Hypertension       Paralytic lagophthalmos       Primary squamous cell carcinoma of tonsil (H)           Smoking Hx:   Social History            Tobacco Use     Smoking status: Never Smoker     Smokeless tobacco: Never Used   Vaping Use     Vaping Use: Never used   Substance Use Topics     Alcohol use: Yes       Comment: 3 beers/week     Drug use: Not Currently         Imaging:   MRI 11/17                                                                   IMPRESSION:   1.  Redemonstration of postoperative/posttreatment changes within the left neck. History of treated left-sided tonsillar squamous cell carcinoma.     2.  Previously identified heterogeneous marginally enhancing hypointense T1, hyperintense T2 signal fluid and gas signal at the left parapharyngeal space noted 8/23/2022 is no longer present, with gas signal pocket communicating/contiguous with the   oropharynx and hypopharynx series 9 image 23. This likely represents resolved posttreatment change/necrosis in this region. There is minimal nonenhancing nodular soft tissue at the superior aspect of this cavity series 9 image 23, this may be amenable to   direct visualization, however, the lack of enhancement suggests this represents residua of resolving inflammatory/posttreatment benign soft tissue/mucosa.     3.  No new or progressive signal/enhancement abnormality is evident in this region or elsewhere to suggest recurrent neoplasm. No pathologic adenopathy is noted.     4.  Persistent enhancing hyperintense T2 signal within the left mandibular posterior body and angle and ramus as before, may represent stable posttreatment/osteonecrosis.     5.  Stable occlusion left internal jugular vein.     6.  No other significant differences.     7.  Full description and details are provided above. Continued surveillance imaging recommended as clinically appropriate, suggest 10-14 weeks.     Pathology:   9/16/22 IR FNA  Specimen  A              Interpretation:  - Non-diagnostic              Adequacy: Unsatisfactory for evaluation, scant cellularity     8/24/22 FNA  Specimen A              Interpretation: Nondiagnostic              Adequacy:Unsatisfactory for evaluation, Scant cellularity        Tumor Board Recommendation:   Discussion: Imaging was reviewed which shows that there is something extending into the ramus that is growing. There possible perineural involvement and these signals could be representative of postradiation changes but it has been progressing slowing. We see changes along the mastication muscles related to denervation. Enhancement along nerves and fullness. Despite multiple biopsies.   If we do operate, that would involve a segmental mandibulectomy with flap. His symptoms have been improved so this would not be ideal if he doesn't have tumor. Typically, we only do surgery for ORN if there are intolerable symptoms. His swallowing is okay and he refuses to ever have a feeding tube again, so surgery would impact his swallow.   Next follow up we can do a CT to look at the bone closely. Follow up with ENT.     Plan:   - CT on follow up and clinic visit for exam.         Gauri Avilez MD  Otolaryngology Head and Neck Surgery Resident, PGY-3     Documentation / Disclaimer Cancer Tumor Board Note: Cancer tumor board recommendations do not override what is determined to be reasonable care and treatment, which is dependent on the circumstances of a patient's case; the patient's medical, social, and personal concerns; and the clinical judgment of the oncologist [physician].

## 2022-12-02 NOTE — PROGRESS NOTES
12/02/22 8075   Appointment Info   Signing Clinician's Name / Credentials (PT) Xochitl Harris, PT, DPT   Rehab Comments (PT) WBAT, ROM 0 to flexion tolerance   Living Environment   People in Home alone   Current Living Arrangements apartment   Home Accessibility no concerns   Living Environment Comments Patient lives in an apartment with very supportive friends near by. He has a cane at home.   Self-Care   Usual Activity Tolerance good   Current Activity Tolerance moderate   Equipment Currently Used at Home none   Fall history within last six months no   Activity/Exercise/Self-Care Comment Patient reports he is independent with all mobility and ADLs at baseline.   General Information   Onset of Illness/Injury or Date of Surgery 12/02/22   Referring Physician Aron Lemon MD   Patient/Family Therapy Goals Statement (PT) to go home tomorrow   Pertinent History of Current Problem (include personal factors and/or comorbidities that impact the POC) Patient is 73 YO M POD 0 s/p L TKA.   Existing Precautions/Restrictions fall;weight bearing   Weight-Bearing Status - LLE weight-bearing as tolerated   Cognition   Affect/Mental Status (Cognition) WFL   Orientation Status (Cognition) oriented x 4   Follows Commands (Cognition) WFL   Pain Assessment   Patient Currently in Pain Yes, see Vital Sign flowsheet   Integumentary/Edema   Integumentary/Edema Comments [place] on L LE, no drainage from bandaging noted   Posture    Posture Forward head position;Protracted shoulders   Range of Motion (ROM)   ROM Comment L knee AAROM in supine 6 - 84 degrees   Strength (Manual Muscle Testing)   Strength (Manual Muscle Testing) Able to perform L SLR   Strength Comments L knee extension at least 3/5   Bed Mobility   Bed Mobility supine-sit;sit-supine   Supine-Sit [place] (Bed Mobility) independent   Sit-Supine [place] (Bed Mobility) independent   Transfers   Transfers sit-stand transfer   Sit-Stand Transfer   Sit-Stand  [place] (Transfers) supervision   Assistive Device (Sit-Stand Transfers) walker, front-wheeled   Comment, (Sit-Stand Transfer) Sit to stand from EOB.   Gait/Stairs (Locomotion)   [place] Level (Gait) contact guard   Assistive Device (Gait) walker, front-wheeled   Distance in Feet (Required for LE Total Joints) 15' in room   Distance in Feet (Gait) 20' + 180'   Pattern (Gait) step-through   Deviations/Abnormal Patterns (Gait) antalgic   Comment, (Gait/Stairs) Patient ambulated in room with FWW and gait belt donned, CGA for safety due to mild unsteadiness.   Balance   Balance Comments Independent sitting balance including with adjusting his socks; Needs support of walker in standing   Sensory Examination   Sensory Perception WNL   Coordination   Coordination no deficits were identified   Clinical Impression   Criteria for Skilled Therapeutic Intervention Yes, treatment indicated   PT Diagnosis (PT) Impaired mobility   Influenced by the following impairments Pain, Decreased L knee ROM, weakness, decreased activity tolerance   Functional limitations due to impairments Increased difficulty with transfers and ambulation   Clinical Presentation (PT Evaluation Complexity) Stable/Uncomplicated   Clinical Presentation Rationale Stable vital signs, clinical judgement, medical status   Clinical Decision Making (Complexity) low complexity   Planned Therapy Interventions (PT) gait training;home exercise program;patient/family education;ROM (range of motion);strengthening;transfer training;progressive activity/exercise;home program guidelines   Anticipated Equipment Needs at Discharge (PT) walker, rolling   Risk & Benefits of therapy have been explained evaluation/treatment results reviewed;care plan/treatment goals reviewed;risks/benefits reviewed;current/potential barriers reviewed;participants voiced agreement with care plan;participants included;patient   PT Total Evaluation Time   PT Eval, Low Complexity Minutes  (95983) 9   Physical Therapy Goals   PT Frequency Daily   PT Predicted Duration/Target Date for Goal Attainment 12/04/22   PT Goals Transfers;Gait;PT Goal 1   PT: Transfers Modified independent;Sit to/from stand;Assistive device   PT: Gait Modified independent;Rolling walker;100 feet   PT: Goal 1 Patient will demo independence with L TKA HEP to progress strength and ROM needed for mobility.   Interventions   Interventions Quick Adds Therapeutic Procedure;Gait Training;Therapeutic Activity   Therapeutic Procedure/Exercise   Ther. Procedure: strength, endurance, ROM, flexibillity Minutes (06781) 12   Treatment Detail/Skilled Intervention Education provided on L TKA exercises to increas circulation, improve ROM and strength. Patient performed the following exercises: B ankle pumps x 15 reps, L quad sets with 5 second hold x 10 reps, L SLR x 10 reps, and L heel slide x 10 reps. Education on peforming HEP 3x per day.   Therapeutic Activity   Therapeutic Activities: dynamic activities to improve functional performance Minutes (88410) 3   Symptoms Noted During/After Treatment None   Treatment Detail/Skilled Intervention Patient education on nothing under L knee, heel prop to promote full knee extension and use of ice packs for pain management after exercises. Sit <> stand transfers from EOB with cues for safe hand placements with good return demo and SBA. Patient in supine with alarm activated at end of session, ice pack on L knee.   Gait Training   Gait Training Minutes (12934) 12   Symptoms Noted During/After Treatment (Gait Training) increased pain   Treatment Detail/Skilled Intervention Patient ambulated ~ 20' with FWW and CGA, mild unsteadiness with FWW appearing too tall for patient. Lowered height of walker for improved fit for patient. Patient with immediate improvement in gait stability following walker adjustment, he ambulated ~ 180' with CGA progressing to SBA, verbal cues for posture and forward gaze.No losses  of balance, pain increased from 4/10 to 6/10.   Physical Assistance Level (Gait Training) 1 person assist;verbal cues   Weight Bearing (Gait Training) weight-bearing as tolerated   PT Discharge Planning   PT Plan Review HEP; gait with FWW   PT Rationale for DC Rec Patient lives alone but has supportive neighbors/friends that will be able to provide intermittent assist for IADLs at home. Anticipate patient will progress to Mod I with functional mobility to safely return home to his apartment. He has OP PT scheduled to begin on POD #3.   PT Brief overview of current status Independent bed mobility; SBA sit <> stand, CGA-SBA gait with FWW   Total Session Time   Timed Code Treatment Minutes 27   Total Session Time (sum of timed and untimed services) 36

## 2022-12-02 NOTE — TUMOR CONFERENCE
Called and spoke with patient regarding tumor board discussion and recommendations. Reviewed with patient that Dr. Russell would like to have him return for follow-up and exam. Patient does indicate that his pain has improved over the last month. He is however agreeable to return to clinic to further discuss.     Patient did have knee replacement today, so wishes to hold off on follow-up for a few weeks. Writer was able to schedule him on 12/21 with CT scan the same day. Patient agreeable and will return call with further questions or concerns.     Almaz Sorto, RN, BSN

## 2022-12-02 NOTE — ANESTHESIA PROCEDURE NOTES
"Intrathecal Procedure Note    Pre-Procedure   Staff -        Anesthesiologist:  Clement Em MD       Performed By: anesthesiologist       Location: OR       Pre-Anesthestic Checklist: patient identified, IV checked, site marked, risks and benefits discussed, informed consent, monitors and equipment checked and pre-op evaluation  Timeout:       Correct Patient: Yes        Correct Procedure: Yes        Correct Site: Yes        Correct Position: Yes   Procedure Documentation  Procedure: intrathecal       Patient Position: sitting       Patient Prep/Sterile Barriers: sterile gloves, mask, patient draped       Skin prep: Betadine       Insertion Site: L4-5. (midline approach).       Needle Gauge: 24.        Needle Length (Inches): 3.5        Spinal Needle Type: Maria Antonia-Tal       Introducer used       Introducer: 20 G       # of attempts: 1 and  # of redirects:     Assessment/Narrative         Paresthesias: No.       CSF fluid: clear.       Opening pressure was cmH2O while  Sitting.      Medication(s) Administered   2% Mepivacaine PF (Intrathecal) - Intrathecal   3 mL - 12/2/2022 7:49:00 AM    FOR [facility] ([facility] ONLY:   Pain Team Contact information: please page the Pain Team Via [employer]. Search \"Pain\". During daytime hours, please page the attending first. At night please page the resident first.    "

## 2022-12-03 ENCOUNTER — APPOINTMENT (OUTPATIENT)
Dept: OCCUPATIONAL THERAPY | Facility: CLINIC | Age: 72
End: 2022-12-03
Attending: ORTHOPAEDIC SURGERY
Payer: MEDICARE

## 2022-12-03 ENCOUNTER — APPOINTMENT (OUTPATIENT)
Dept: PHYSICAL THERAPY | Facility: CLINIC | Age: 72
End: 2022-12-03
Attending: ORTHOPAEDIC SURGERY
Payer: MEDICARE

## 2022-12-03 VITALS
SYSTOLIC BLOOD PRESSURE: 104 MMHG | HEIGHT: 69 IN | DIASTOLIC BLOOD PRESSURE: 78 MMHG | TEMPERATURE: 97.6 F | BODY MASS INDEX: 27.12 KG/M2 | HEART RATE: 67 BPM | RESPIRATION RATE: 16 BRPM | WEIGHT: 183.1 LBS | OXYGEN SATURATION: 96 %

## 2022-12-03 LAB
FASTING STATUS PATIENT QL REPORTED: NO
GLUCOSE BLD-MCNC: 182 MG/DL (ref 70–99)
HGB BLD-MCNC: 10.9 G/DL (ref 13.3–17.7)
PLATELET # BLD AUTO: 208 10E3/UL (ref 150–450)

## 2022-12-03 PROCEDURE — 97535 SELF CARE MNGMENT TRAINING: CPT | Mod: GO | Performed by: OCCUPATIONAL THERAPIST

## 2022-12-03 PROCEDURE — 97165 OT EVAL LOW COMPLEX 30 MIN: CPT | Mod: GO | Performed by: OCCUPATIONAL THERAPIST

## 2022-12-03 PROCEDURE — 97116 GAIT TRAINING THERAPY: CPT | Mod: GP,CQ | Performed by: PHYSICAL THERAPY ASSISTANT

## 2022-12-03 PROCEDURE — 85018 HEMOGLOBIN: CPT | Performed by: ORTHOPAEDIC SURGERY

## 2022-12-03 PROCEDURE — 82947 ASSAY GLUCOSE BLOOD QUANT: CPT | Performed by: ORTHOPAEDIC SURGERY

## 2022-12-03 PROCEDURE — 250N000013 HC RX MED GY IP 250 OP 250 PS 637: Performed by: ORTHOPAEDIC SURGERY

## 2022-12-03 PROCEDURE — 99207 PR NO CHARGE LOS: CPT

## 2022-12-03 PROCEDURE — 97530 THERAPEUTIC ACTIVITIES: CPT | Mod: GP,CQ | Performed by: PHYSICAL THERAPY ASSISTANT

## 2022-12-03 PROCEDURE — 250N000013 HC RX MED GY IP 250 OP 250 PS 637: Performed by: NURSE PRACTITIONER

## 2022-12-03 PROCEDURE — 96372 THER/PROPH/DIAG INJ SC/IM: CPT | Performed by: ORTHOPAEDIC SURGERY

## 2022-12-03 PROCEDURE — 85049 AUTOMATED PLATELET COUNT: CPT | Performed by: ORTHOPAEDIC SURGERY

## 2022-12-03 PROCEDURE — 97110 THERAPEUTIC EXERCISES: CPT | Mod: GP,CQ | Performed by: PHYSICAL THERAPY ASSISTANT

## 2022-12-03 PROCEDURE — 36415 COLL VENOUS BLD VENIPUNCTURE: CPT | Performed by: ORTHOPAEDIC SURGERY

## 2022-12-03 PROCEDURE — 250N000011 HC RX IP 250 OP 636: Performed by: ORTHOPAEDIC SURGERY

## 2022-12-03 RX ADMIN — ENOXAPARIN SODIUM 40 MG: 40 INJECTION SUBCUTANEOUS at 08:54

## 2022-12-03 RX ADMIN — KETOROLAC TROMETHAMINE 15 MG: 15 INJECTION, SOLUTION INTRAMUSCULAR; INTRAVENOUS at 00:16

## 2022-12-03 RX ADMIN — KETOROLAC TROMETHAMINE 15 MG: 15 INJECTION, SOLUTION INTRAMUSCULAR; INTRAVENOUS at 06:27

## 2022-12-03 RX ADMIN — CEFAZOLIN SODIUM 2 G: 2 INJECTION, SOLUTION INTRAVENOUS at 00:11

## 2022-12-03 RX ADMIN — GABAPENTIN 300 MG: 300 CAPSULE ORAL at 08:54

## 2022-12-03 RX ADMIN — POLYETHYLENE GLYCOL 3350 17 G: 17 POWDER, FOR SOLUTION ORAL at 08:54

## 2022-12-03 RX ADMIN — OXYCODONE HYDROCHLORIDE 5 MG: 5 TABLET ORAL at 00:11

## 2022-12-03 RX ADMIN — CARVEDILOL 12.5 MG: 12.5 TABLET, FILM COATED ORAL at 08:54

## 2022-12-03 RX ADMIN — LEVOTHYROXINE SODIUM 50 MCG: 50 TABLET ORAL at 06:27

## 2022-12-03 RX ADMIN — ACETAMINOPHEN 975 MG: 325 TABLET, FILM COATED ORAL at 06:27

## 2022-12-03 RX ADMIN — SENNOSIDES AND DOCUSATE SODIUM 1 TABLET: 50; 8.6 TABLET ORAL at 08:54

## 2022-12-03 ASSESSMENT — ACTIVITIES OF DAILY LIVING (ADL)
ADLS_ACUITY_SCORE: 18
PREVIOUS_RESPONSIBILITIES: MEAL PREP;HOUSEKEEPING;SHOPPING;LAUNDRY;MEDICATION MANAGEMENT;FINANCES;DRIVING

## 2022-12-03 NOTE — PLAN OF CARE
Goal Outcome Evaluation:  .Patient vital signs are at baseline: Yes  Patient able to ambulate as they were prior to admission or with assist devices provided by therapies during their stay:  Yes  Patient MUST void prior to discharge:  Yes  Patient able to tolerate oral intake:  Yes  Pain has adequate pain control using Oral analgesics:  Yes  Does patient have an identified :  Yes  Has goal D/C date and time been discussed with patient:  Yes

## 2022-12-03 NOTE — PROGRESS NOTES
Orthopedic Surgery  Fortino Moya  12/3/2022  Admit Date:  2022  POD 1 Day Post-Op  S/P Procedure(s):  LEFT TOTAL KNEE ARTHROPLASTY    Patient is feeling great, glad he stayed the night.  Pain controlled.  Tolerating oral intake.  No events overnight. Discussed lovenox prior to discharge and then resumption of xarelto as PTA tomorrow.    Alert and orient to person, place, and time.  Vital Sign Ranges  Temperature Temp  Av  F (36.7  C)  Min: 97.5  F (36.4  C)  Max: 99.2  F (37.3  C)   Blood pressure Systolic (24hrs), Av , Min:82 , Max:123        Diastolic (24hrs), Av, Min:56, Max:88      Pulse Pulse  Av.5  Min: 67  Max: 94   Respirations Resp  Av.8  Min: 10  Max: 16   Pulse oximetry SpO2  Av.9 %  Min: 91 %  Max: 100 %       Left knee edema wear is clean, dry, and intact. Minimal erythema of the surrounding skin.  Bilateral calves are soft, non-tender.  left lower extremity is NVI.    Labs:  Recent Labs   Lab Test 22  1023 21  0942 21  1018   POTASSIUM 4.1 4.2 3.9     Recent Labs   Lab Test 22  0810 22  1023 21  0942   HGB 12.2* 13.0* 13.5     Recent Labs   Lab Test 22  0810 21  0830   INR 1.19* 1.16*     Recent Labs   Lab Test 22  0810 22  1023 21  0942    229 202       A/P  1. S/p left TKA   Continue lovenox x 1 then PTA xarelto tomorrow for DVT prophylaxis.     Mobilize with PT/OT WBAT.     Continue current pain regimen.    2. Disposition   Anticipate d/c to home today with friend.    Kjerstin L Foss, PA-C     As above,     Aron Lemon MD

## 2022-12-03 NOTE — PLAN OF CARE
Occupational Therapy Discharge Summary    Reason for therapy discharge:    All goals and outcomes met, no further needs identified.    Progress towards therapy goal(s). See goals on Care Plan in [facility] electronic health record for goal details.  Goals met    Therapy recommendation(s):    Defer to ortho MD for further therapy recommendations

## 2022-12-03 NOTE — PROGRESS NOTES
"Hospitalist Chart Check    Assessment & Plan   Fortino Moya is a 72 year old male admitted on 12/2/2022.    Past medical history significant for atrial fibrillation, coronary artery disease, GERD, SCC of tonsil, HTN, HLP, hypothyroidism, and osteoarthritis who was admitted 12/2/2022 for elective left total knee arthroplasty    Vital signs and nursing notes reviewed. Vital signs. Temp: 97.6  F (36.4  C) Temp src: Oral BP: 104/78 Pulse: 67   Resp: 16 SpO2: 96 % O2 Device: None (Room air). No acute events overnight.  Labs are stable this morning.      OA with degenerative changes of the left knee s/p left total knee arthroplasty  Acute blood loss anemia 2/2 surgery  POD #1.   - Orthopedic Surgery is managing.   --Defer analgesic management, DVT prophylaxis, PT/OT.    - HGB 10.9 this morning, patient not symptomatic and hemodynamically stable.    - Encourage utilization of incentive spirometer.     HTN  HLD  A Fib  CAD  - May resume lisinopril today  - Continue PTA atorvastatin  - patient was on PTA Eliquis for Afib; defer anticoagulation plan to primary surgical team    Hypothyroidism  - Continue PTA levothyroxine     Clinically Significant Risk Factors Present on Admission               # Drug Induced Coagulation Defect: home medication list includes an anticoagulant medication    # Hypertension: home medication list includes antihypertensive(s)     # Overweight: Estimated body mass index is 27.04 kg/m  as calculated from the following:    Height as of this encounter: 1.753 m (5' 9\").    Weight as of this encounter: 83.1 kg (183 lb 1.6 oz).         Admission and Discharge PTA (Home) medication reconciliation has been completed.  Hospitalist will sign off.  Please call or reconsult if any questions or concerns arise. Thank you for involving the hospitalist service in this patient's care.      Diet: Advance Diet as Tolerated: Regular Diet Adult  Discharge Instruction - Regular Diet Adult     DVT Prophylaxis: Enoxaparin " (Lovenox) SQ   Galvan Catheter: Not present  Code Status: Full Code     Disposition Plan       Expected Discharge Date: 12/03/2022      Destination: home with family        Entered: Papo Thibodeaux NP 12/03/2022, 10:40 AM          Papo Thibodeaux NP  [facility]  Securely message with the Vocera Web Console (learn more here)  Text page via [employer] Paging/Directory    No charge

## 2022-12-03 NOTE — PROGRESS NOTES
12/03/22 0900   Appointment Info   Signing Clinician's Name / Credentials (OT) Tran Pacheco OTRL   Living Environment   People in Home alone   Current Living Arrangements apartment   Home Accessibility no concerns   Number of Stairs, Within Home, Primary none   Living Environment Comments Patient lives in an apartment with very supportive friends near by. He has a cane at home. Pt has tub shower with grab bars. High toilet, with counter top next to toilet   Self-Care   Usual Activity Tolerance good   Current Activity Tolerance moderate   Equipment Currently Used at Home none   Fall history within last six months no   Activity/Exercise/Self-Care Comment Patient reports he is independent with all mobility and ADLs at baseline.   Instrumental Activities of Daily Living (IADL)   Previous Responsibilities meal prep;housekeeping;shopping;laundry;medication management;finances;driving   IADL Comments Patients friends able to assist at discharge   General Information   Onset of Illness/Injury or Date of Surgery 12/01/22   Referring Physician Aron Lemon MD   Patient/Family Therapy Goal Statement (OT) Pt would like to return home   Additional Occupational Profile Info/Pertinent History of Current Problem LTKA on 12/3/22   Existing Precautions/Restrictions fall   Left Lower Extremity (Weight-bearing Status) weight-bearing as tolerated (WBAT)   Cognitive Status Examination   Orientation Status orientation to person, place and time   Affect/Mental Status (Cognitive) WFL   Follows Commands WFL   Cognitive Status Comments Pt appears cognitively intact with good recall of precautions   Visual Perception   Visual Impairment/Limitations WFL   Sensory   Sensory Comments Intact per pt report   Pain Assessment   Patient Currently in Pain Yes, see Vital Sign flowsheet   Posture   Posture not impaired   Range of Motion Comprehensive   Comment, General Range of Motion LLE impaired, RLE and BUE WFL   Strength Comprehensive  (MMT)   Comment, General Manual Muscle Testing (MMT) Assessment LLE impaired, RLE and BUE WFL   Coordination   Coordination Comments LE impaired   Bed Mobility   Comment (Bed Mobility) SBA   Transfers   Transfer Comments CGA   Balance   Balance Comments Intact with walker   Activities of Daily Living   BADL Assessment/Intervention bathing;lower body dressing;toileting   Bathing Assessment/Intervention   Comment, (Bathing) Min assist for transfer   Lower Body Dressing Assessment/Training   Comment, (Lower Body Dressing) Min assist   Toileting   Comment, (Toileting) CGA   Clinical Impression   Criteria for Skilled Therapeutic Interventions Met (OT) Yes, treatment indicated   OT Diagnosis Decline in aDL independence and safety   Influenced by the following impairments [facility]   OT Problem List-Impairments impacting ADL problems related to;pain;range of motion (ROM);post-surgical precautions   Assessment of Occupational Performance 1-3 Performance Deficits   Identified Performance Deficits Impaired shower transfer independence and LB dressing   Planned Therapy Interventions (OT) ADL retraining   Clinical Decision Making Complexity (OT) low complexity   Anticipated Equipment Needs Upon Discharge (OT)   (Pt owns all necessary AE)   Risk & Benefits of therapy have been explained care plan/treatment goals reviewed;evaluation/treatment results reviewed;risks/benefits reviewed;current/potential barriers reviewed;participants voiced agreement with care plan;participants included;patient   OT Total Evaluation Time   OT Eval, Low Complexity Minutes (78938) 9   OT Goals   Therapy Frequency (OT) One time eval and treatment   OT Predicted Duration/Target Date for Goal Attainment 12/03/22   OT Goals Lower Body Dressing;OT Goal 1   OT: Lower Body Dressing Modified independent;using adaptive equipment;including set-up/clothing retrieval;Goal Met   OT: Goal 1 Pt will verbalize understanding of tub transfer technique and fall prevention  techniques to improve ADL safety and independence   Interventions   Interventions Quick Adds Self-Care/Home Management   Self-Care/Home Management   Self-Care/Home Mgmt/ADL, Compensatory, Meal Prep Minutes (66273) 25   Symptoms Noted During/After Treatment (Meal Preparation/Planning Training) increased pain   Treatment Detail/Skilled Intervention OT: Pt educated on LE dressing techniques to improve dressing independence including AE. After education pt able to complete LB dressing with set up assist. Min assist for L sock donning, pt educated on sock aid to improve independence. Pt reports he will not wear socks at home. Pt educated on tub transfer technique to improve showering idnependence, pt verbalized understanding. During mobility, pt educated on walker use and fall prevention strategies to decrease fall risk, pt verbalized understanding. Pt seated in chair with call light upon OT departure, no further questions   OT Discharge Planning   OT Plan DC   OT Discharge Recommendation (DC Rec)   (Defer to ortho MD)   OT Rationale for DC Rec Pt appears safe and able to discharge home with intermittent assist for higher level IADLs. Pt owns all necessary ADL AE.   OT Brief overview of current status SBA mobility   Total Session Time   Timed Code Treatment Minutes 25   Total Session Time (sum of timed and untimed services) 34

## 2022-12-03 NOTE — PLAN OF CARE
Goal Outcome Evaluation  .Patient vital signs are at baseline: Yes  Patient able to ambulate as they were prior to admission or with assist devices provided by therapies during their stay:  Yes  Patient MUST void prior to discharge:  Yes  Patient able to tolerate oral intake:  Yes  Pain has adequate pain control using Oral analgesics:  Yes  Does patient have an identified :  Yes  Has goal D/C date and time been discussed with patient:  Yes  Pt. A&o, vss, up with sba and walker, taking oxycodone for pain, voiding adequate amount in b.r/urinal, dressing CDI, discharge instruction reviewed with pt. Discharge medications given to the pt., belongings returned and pt. Discharge to home with family at 1155 am.

## 2022-12-03 NOTE — PLAN OF CARE
Goal Outcome Evaluation:    Patient vital signs are at baseline: Yes  Patient able to ambulate as they were prior to admission or with assist devices provided by therapies during their stay:  Yes  Patient MUST void prior to discharge:  Yes  Patient able to tolerate oral intake:  Yes  Pain has adequate pain control using Oral analgesics:  Yes  Does patient have an identified :  Yes  Has goal D/C date and time been discussed with patient:  Yes    Pt. CMS intact. Pain well managed w/ oxycodone & scheduled toradol. Dressing CDI. Plan to discharge home today.

## 2022-12-04 NOTE — PLAN OF CARE
PT-  Pt discharged home today with assist of friends/family as needed and OP PT.  PT goals met.

## 2022-12-04 NOTE — OP NOTE
Operative Note    Fortino Moya MRN# [id number]   YOB: 1950 Age: 72 year old   Date of Procedure: 12/2/2022    1st Assistant:  Celina Munoz PA-C - Assisting  BHARATHI Kemp OPA-C - Assisting    PREOPERATIVE DIAGNOSIS: Left knee osteoarthritis, failure to respond to conservative management.     POSTOPERATIVE DIAGNOSIS: Left knee osteoarthritis, failure to respond to conservative management.     PROCEDURE: Left total knee arthroplasty, Depuy Attune components, CR/Medial stabilized.  Tourniquet-less technique.     DESCRIPTION OF PROCEDURE: Fortino Moya was brought to the operating room. After satisfactory anesthesia, the left lower extremity was prepped and draped in the usual sterile fashion. The patient received 1 gram of Ancef and tranexamic acid preoperatively.     Straight anterior incision was made. Dissection was carried down to the extensor mechanism. Medial arthrotomy was made, mid-vastus approach developed.    Advanced osteoarthritis was noted. Patella was exposed, measured and resected to accept a size 38mm, asymmetric patella. The knee was then flexed up. Intramedullary guide was placed at 5 degrees as per the preoperative plan. The distal femoral cut was made followed by anteroposterior cuts and chamfer cuts. Femur sized to be a size 8 CR. Attention was then directed to the tibia. Tibial cut was made perpendicular to the long axis of the tibia in both AP and lateral planes, 3 degree posterior slope.  PCL was recessed.   The tibia sized to be a size 7. Soft tissue balancing was performed. Trial reduction was performed and with a 8-mm MS insert, there was excellent soft tissue balancing, patellar tracking, alignment as well as motion. Trial components were removed. Tibial baseplate was prepared for size 7 baseplate. All 3 components were cemented in place without difficulty. Excess cement was removed.  A three minute betadine soak was performed.  The wound was thoroughly irrigated  and tissues were infiltrated with toradol/marcaine mixture.  The real 8-mm MS insert was impacted in place.  One gram of vancomycin powder was placed deep and superficial prior to closure. The extensor mechanism was closed in sequential layers including a running number 1 Stratafix, then augmented with interrupted 0 Vicryl and 0 ethibond suture. The skin was closed with interrupted 2-0 Vicryl subcutaneously, then a running 2-0 Stratafix, followed by a subcuticular 3-0 monocryl.  A mesh dressing with skin glue was applied. A sterile dressing was applied. The patient left the operating room in satisfactory condition.  A skilled first assistant was necessary for this procedure for assistance with patient positioning, prepping, draping, surgical visualization, performance of the repair, wound closure, and application of the dressing.    MD MIRTA Mckenzie MD

## 2023-02-07 DIAGNOSIS — E03.9 HYPOTHYROIDISM, UNSPECIFIED TYPE: Primary | ICD-10-CM

## 2023-02-08 ENCOUNTER — LAB (OUTPATIENT)
Dept: LAB | Facility: CLINIC | Age: 73
End: 2023-02-08
Payer: COMMERCIAL

## 2023-02-08 DIAGNOSIS — E03.9 HYPOTHYROIDISM, UNSPECIFIED TYPE: ICD-10-CM

## 2023-02-08 LAB — TSH SERPL DL<=0.005 MIU/L-ACNC: 2.02 UIU/ML (ref 0.3–4.2)

## 2023-02-08 PROCEDURE — 84443 ASSAY THYROID STIM HORMONE: CPT

## 2023-02-08 PROCEDURE — 36415 COLL VENOUS BLD VENIPUNCTURE: CPT

## 2023-03-03 ENCOUNTER — OFFICE VISIT (OUTPATIENT)
Dept: OPHTHALMOLOGY | Facility: CLINIC | Age: 73
End: 2023-03-03
Payer: COMMERCIAL

## 2023-03-03 DIAGNOSIS — H25.13 NUCLEAR AGE-RELATED CATARACT, BOTH EYES: ICD-10-CM

## 2023-03-03 DIAGNOSIS — H16.212 EXPOSURE KERATOPATHY, LEFT: ICD-10-CM

## 2023-03-03 DIAGNOSIS — H02.239 PARALYTIC LAGOPHTHALMOS, UNSPECIFIED LATERALITY: ICD-10-CM

## 2023-03-03 DIAGNOSIS — H16.232 NEUROTROPHIC KERATOPATHY OF LEFT EYE: Primary | ICD-10-CM

## 2023-03-03 DIAGNOSIS — H02.056 TRICHIASIS OF LEFT EYE WITHOUT ENTROPION: ICD-10-CM

## 2023-03-03 PROCEDURE — 99213 OFFICE O/P EST LOW 20 MIN: CPT | Performed by: OPTOMETRIST

## 2023-03-03 ASSESSMENT — REFRACTION_WEARINGRX
OD_ADD: +1.75
OD_AXIS: 166
SPECS_TYPE: PAL
OS_CYLINDER: +1.50
OD_CYLINDER: +1.25
OS_AXIS: 008
OS_ADD: +1.75
OS_SPHERE: -0.75
OD_SPHERE: +0.25

## 2023-03-03 ASSESSMENT — REFRACTION_CURRENTRX
OS_BASECURVE: 8.0
OS_BRAND: ONEFIT OBLATE
OS_SPHERE: +1.00
OS_ADDL_SPECS: OPT EXTRA BLUE, HYDRAPEG
OS_DIAMETER: 14.9

## 2023-03-03 ASSESSMENT — VISUAL ACUITY
METHOD: SNELLEN - LINEAR
CORRECTION_TYPE: CONTACTS
OD_SC+: -3
OS_CC: 20/100
OD_SC: 20/25

## 2023-03-03 ASSESSMENT — TONOMETRY
IOP_METHOD: ICARE
OD_IOP_MMHG: 11
OS_IOP_MMHG: CL

## 2023-03-03 ASSESSMENT — EXTERNAL EXAM - LEFT EYE: OS_EXAM: LEFT FACIAL PALSY

## 2023-03-03 ASSESSMENT — EXTERNAL EXAM - RIGHT EYE: OD_EXAM: NORMAL

## 2023-03-03 NOTE — NURSING NOTE
Chief Complaints and History of Present Illnesses   Patient presents with     Follow Up     Pt here to have left eye evaluated.      Chief Complaint(s) and History of Present Illness(es)     Follow Up            Laterality: both eyes    Comments: Pt here to have left eye evaluated.           Comments    Pt happy with new lens. No new concerns. Pt sees Dr Barney in 2 weeks.   JOHANN Alexander on 3/3/2023 at 10:40 AM

## 2023-03-03 NOTE — PROGRESS NOTES
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 No changes recommended. Eye is less red/irritated when lens is in  -He would like to see if temporal tarsorrhaphy can be removed - would likely require daily use of scleral lens to keep cornea supported    2.) Cataract left eye>>right eye  -Visually significant. BCVA down to 20/100 from 20/60 last time I saw him  -Complicated by posterior synechiae. Would likely benefit from CE/IOL but may defer pending other health issues     3.) Trichiasis left eye  -Longstanding, bothersome. Gets frequent epilation  -Consider lash hyfrecation if appropriate - can discuss at next plastics appt    Continue in current lens. F/u with me 2 years, sooner prn or if getting cataract surgery    I have confirmed the patient's CC, HPI and reviewed Past Medical History, Past Surgical History, Social History, Family History, Problem List, Medication List and agree with Tech note.     Antonella Pardo, DINA BOXO TADS

## 2023-03-20 NOTE — TELEPHONE ENCOUNTER
3/24/23    Favio NARVAEZ Dillant  1937    Chief Complaint   Patient presents with    Tremors     Pt presents for f/u tremors, pt state they have improved some        History of Present Illness  Favio Lion is a 81 y.o. male presenting today for follow-up of:   RLE tremor/myoclonus. Favio Lion is taking Ropinrole 0.25 mg at bedtime, Zonisamide 200 mg at bedtime and Gabapentin. His Gabapentin was increased to 400 mg three times daily at his last visit. Favio Lion has tried Botox for myoclonus which became less therapeutic over time unfortunately. He has also tried Primidone, carbidopa-levodopa, Klonopin without any improvement. Work-up for his myoclonus has included EMG of the bilateral lower extremities which showed peripheral neuropathy. He had a CT head to rule out stroke which was unremarkable. He is unable to complete his MRI of the lumbar spine due to pacemaker. He was unable to have an MRI of his spine to evaluate the myoclonus however it was incompatible with his pacemaker. Today, he tells me that his RLE still moves uncontrollably. It is worse when he is sitting. He does feel his lower leg \"locks up. \"    He has had the most improvement on Gabapentin. His kidney function on 12/22 was stable. Current Outpatient Medications   Medication Sig Dispense Refill    gabapentin (NEURONTIN) 400 MG capsule Take 1 capsule by mouth 4 times daily.  120 capsule 3    trihexyphenidyl (ARTANE) 2 MG tablet TAKE 1 TABLET BY MOUTH EVERY DAY 90 tablet 1    PRALUENT 75 MG/ML SOAJ injection pen INJECT 1 ML INTO THE SKIN EVERY 14 DAYS 6 Adjustable Dose Pre-filled Pen Syringe 1    zonisamide (ZONEGRAN) 100 MG capsule TAKE 2 CAPSULES BY MOUTH AT BEDTIME 180 capsule 1    warfarin (COUMADIN) 2 MG tablet TAKE 1 TABLET BY MOUTH EVERY DAY 90 tablet 1    amLODIPine (NORVASC) 10 MG tablet TAKE 1 TABLET BY MOUTH EVERY DAY 90 tablet 3    albuterol sulfate HFA (PROVENTIL;VENTOLIN;PROAIR) 108 (90 Base) MCG/ACT inhaler INHALE 2 PUFFS INTO THE LUNGS EVERY 6 Error.

## 2023-04-11 ENCOUNTER — OFFICE VISIT (OUTPATIENT)
Dept: OPHTHALMOLOGY | Facility: CLINIC | Age: 73
End: 2023-04-11
Payer: COMMERCIAL

## 2023-04-11 VITALS — BODY MASS INDEX: 25.77 KG/M2 | HEIGHT: 70 IN | WEIGHT: 180 LBS

## 2023-04-11 DIAGNOSIS — H02.056 TRICHIASIS OF LEFT EYE WITHOUT ENTROPION: ICD-10-CM

## 2023-04-11 DIAGNOSIS — H02.235 PARALYTIC LAGOPHTHALMOS OF LEFT LOWER EYELID: ICD-10-CM

## 2023-04-11 DIAGNOSIS — H16.232 NEUROTROPHIC KERATOPATHY OF LEFT EYE: Primary | ICD-10-CM

## 2023-04-11 DIAGNOSIS — H16.212 EXPOSURE KERATOPATHY, LEFT: ICD-10-CM

## 2023-04-11 DIAGNOSIS — G51.0 FACIAL PALSY: ICD-10-CM

## 2023-04-11 PROCEDURE — 67820 REVISE EYELASHES: CPT | Mod: E2 | Performed by: OPHTHALMOLOGY

## 2023-04-11 PROCEDURE — 99213 OFFICE O/P EST LOW 20 MIN: CPT | Mod: 25 | Performed by: OPHTHALMOLOGY

## 2023-04-11 ASSESSMENT — VISUAL ACUITY
CORRECTION_TYPE: GLASSES
OS_PH_CC: 20/100
OD_CC: 20/25
METHOD: SNELLEN - LINEAR
OS_CC+: -1
OS_CC: 20/125
OS_PH_CC+: -1

## 2023-04-11 ASSESSMENT — CONF VISUAL FIELD
OD_INFERIOR_TEMPORAL_RESTRICTION: 0
OD_INFERIOR_NASAL_RESTRICTION: 0
OS_INFERIOR_NASAL_RESTRICTION: 0
OS_NORMAL: 1
METHOD: COUNTING FINGERS
OD_SUPERIOR_NASAL_RESTRICTION: 0
OD_NORMAL: 1
OS_INFERIOR_TEMPORAL_RESTRICTION: 0
OD_SUPERIOR_TEMPORAL_RESTRICTION: 0
OS_SUPERIOR_TEMPORAL_RESTRICTION: 0
OS_SUPERIOR_NASAL_RESTRICTION: 0

## 2023-04-11 ASSESSMENT — EXTERNAL EXAM - RIGHT EYE: OD_EXAM: NORMAL

## 2023-04-11 ASSESSMENT — TONOMETRY
OD_IOP_MMHG: 11
OS_IOP_MMHG: 11
IOP_METHOD: TONOPEN

## 2023-04-11 ASSESSMENT — EXTERNAL EXAM - LEFT EYE: OS_EXAM: LEFT FACIAL PALSY

## 2023-04-11 NOTE — PROGRESS NOTES
Chief Complaint(s) and History of Present Illness(es)     Follow Up            Laterality: left eye    Associated symptoms: eye pain.  Negative for tearing and discharge    Treatments tried: artificial tears and ointment    Pain scale: 3/10    Comments: Follow up with history of Exposure keratopathy/Paralytic   Lagophthalmos left eye / 2' to previous cancer Tx, h/o upper lid weight   now removed./ Trichiasis left eye.           Comments    He would like to see if temporal tarsorrhaphy can be removed opening left   eye a little bit more if this is possible. There has been some irritation   with left eye the past 1+ month and appear red today.    EES ointment left eye.   Lubricants PRN each eye.     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  He had symptoms of numbness in all three divisions of his fifth cranial nerve.  He then received two cycles of induction chemotherapy with TPF completed on 02/28/2021.  His post-induction imaging suggests a near complete response.  He then received definitive chemoradiotherapy with weekly cisplatin and a total of 6600 cGy finished on 05/07/2021.      Assessment & Plan     Fortino Moya is a 73 year old male with the following diagnoses:   Encounter Diagnoses   Name Primary?     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 He has persistent diffuse punctate epitheliopathy. He wears a scleral lens ~5 days a week, and reports using preservative free  artificial tears 6 x daily.     I discussed I think it is quite risky to sever his tarsorrhaphy and my recommendation would be to leave it.    He had 3 lashes laterally rubbing on his conjunctiva, these were epilated at the slit lamp.    He does have rather significant cataract - seeing Dr. Barney next week.           Attending Physician Attestation: Complete documentation of historical and exam elements from today's encounter can be found in the full encounter summary report (not reduplicated in this progress note). I personally obtained the chief complaint(s) and history of present illness. I confirmed and edited as necessary the review of systems, past medical/surgical history, family history, social history, and examination findings as documented by others; and I examined the patient myself. I personally reviewed the relevant tests, images, and reports as documented above. I formulated and edited as necessary the assessment and plan and discussed the findings and management plan with the patient.  -Vivien Yoon MD

## 2023-04-18 ENCOUNTER — OFFICE VISIT (OUTPATIENT)
Dept: OPHTHALMOLOGY | Facility: CLINIC | Age: 73
End: 2023-04-18
Attending: OPHTHALMOLOGY
Payer: COMMERCIAL

## 2023-04-18 DIAGNOSIS — G51.0 FACIAL PALSY: ICD-10-CM

## 2023-04-18 DIAGNOSIS — H01.01B ULCERATIVE BLEPHARITIS OF UPPER AND LOWER EYELIDS OF BOTH EYES: ICD-10-CM

## 2023-04-18 DIAGNOSIS — H16.232 NEUROTROPHIC KERATOPATHY OF LEFT EYE: Primary | ICD-10-CM

## 2023-04-18 DIAGNOSIS — H01.01A ULCERATIVE BLEPHARITIS OF UPPER AND LOWER EYELIDS OF BOTH EYES: ICD-10-CM

## 2023-04-18 DIAGNOSIS — H16.212 EXPOSURE KERATOPATHY, LEFT: ICD-10-CM

## 2023-04-18 DIAGNOSIS — H04.122 DRY EYE SYNDROME OF LEFT EYE: ICD-10-CM

## 2023-04-18 DIAGNOSIS — H02.539 EYELID RETRACTION OR LAG: ICD-10-CM

## 2023-04-18 PROCEDURE — G0463 HOSPITAL OUTPT CLINIC VISIT: HCPCS | Performed by: OPHTHALMOLOGY

## 2023-04-18 PROCEDURE — 99214 OFFICE O/P EST MOD 30 MIN: CPT | Performed by: OPHTHALMOLOGY

## 2023-04-18 ASSESSMENT — EXTERNAL EXAM - LEFT EYE: OS_EXAM: LEFT FACIAL PALSY

## 2023-04-18 ASSESSMENT — REFRACTION_WEARINGRX
OD_AXIS: 166
OS_AXIS: 008
OD_ADD: +1.75
OD_SPHERE: +0.25
OS_CYLINDER: +1.50
OS_SPHERE: -0.75
SPECS_TYPE: PAL
OS_ADD: +1.75
OD_CYLINDER: +1.25

## 2023-04-18 ASSESSMENT — CONF VISUAL FIELD
OS_SUPERIOR_TEMPORAL_RESTRICTION: 0
OS_INFERIOR_TEMPORAL_RESTRICTION: 0
METHOD: COUNTING FINGERS
OD_INFERIOR_TEMPORAL_RESTRICTION: 0
OD_NORMAL: 1
OS_SUPERIOR_NASAL_RESTRICTION: 0
OD_SUPERIOR_TEMPORAL_RESTRICTION: 0
OS_NORMAL: 1
OD_INFERIOR_NASAL_RESTRICTION: 0
OS_INFERIOR_NASAL_RESTRICTION: 0
OD_SUPERIOR_NASAL_RESTRICTION: 0

## 2023-04-18 ASSESSMENT — TONOMETRY
OS_IOP_MMHG: 09
IOP_METHOD: ICARE
OD_IOP_MMHG: 12

## 2023-04-18 ASSESSMENT — VISUAL ACUITY
OS_PH_CC: 20/70
METHOD: SNELLEN - LINEAR
OD_PH_SC: 20/25
OS_CC: 20/100
OD_SC: 20/30
OS_PH_CC+: -1

## 2023-04-18 ASSESSMENT — EXTERNAL EXAM - RIGHT EYE: OD_EXAM: NORMAL

## 2023-04-18 NOTE — PROGRESS NOTES
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    Of note, patient has a history of L oropharynx P16+ squamous cell cancer first diagnosed in 2018. The patient underwent low dose weekly carboplatin 1.5 AUC with radiation. His PET/CT 2 months after initiation and underwent surveillance after that. In 08/2020, he developed numbness in chin and then forehead. He underwent PET/CT and IR-guided biopsy (1/2021), which demonstrated invasive squamous cell carcinoma.    Per oncology, patient has left-sided paralysis. The patient first noticed left eye redness and lower lid drooping in about January 2021. He notes that these symptoms progressed during through April 2021. He noted associated watery discharge. He was seen by oncology and started on erythromycin ilya then recommended to follow up with ophthalmology for further management. He has also been using AT BID during this time. He denies eye irritation, pain, purulent discharge, and flashes/floaters. He notes baseline left eye vision when not using erythromycin ointment.    Interval HPI: BRETT with me 06/2022.   He denies vision changes, eye pain, discharge since last visit. No other concerns. Saw Dr Pardo today for SCL change.     POH: refractive error (intermittent glasses use)    Surgery: LASIK (25-30 years ago)- monovision    GTTS:   -PFATs Q2hrs left eye  -Erythromycin ilya at bedtime left eye     PMH:  -L oropharyngeal squamous cell cancer (first diagnosed 2018), recently diagnosed invasive squamous cell cancer (on chemotherapy)  -HTN    FH:  -No AMD, glaucoma    Assessment & Plan     #Paralytic lagophthalmos with complete scleral show, likely 2/2 invasive squamous cell carcinoma with CN7  involvement -- Cancer visibly gone per patient  # Exposure keratopathy and history of large neurotrophic corneal ulcer, left eye  # 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 New disease in the left  space s/p PET/CT and IR-guided biopsy consistent with invasive squamous cell carcinoma s/p induction chemotherapy (1/28/21).  - s/p permanent tarsorrhaphy lateral 50% (2/10/21)- Dr. Pendleton  - s/p FLOWER platinum weight, LLL ectropion repair, and temporary tarso - Dr. Yoon (4/1/21)  7/8/21: misdirected trichiatic lashes epilated   10/1/21: left eye cataract increasing. left eye ocular surface dry but epi intact.  12/14/21: Lashes pulled.  Encouraged more PFAT use.  3/3/22: Encouraged more PFAT use bilaterally. Corneal surface has rare PK. Pt doing well with scleral lenses. Few left eye LL ltrichiasis.  11/15/22: significant dryness, no epi defect, no infiltrate. Disc motorcycle glasses. Awaiting CL.    Plan:  - Continue Emycin at night and when not using the scleral lens  - Continue Celluvisc to 6x daily, use in both eyes  - Continue with scleral lens wear; saw Dr. Pardo 11/15/2022  - Epilated lashes today 11/15/2022  - until new scleral lens arrives: use emaucin during day every 3 hours or use celluvisc every hour or two left eye.  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 If proceed, recommend during summer months.  - Refer to Dr Wooten for complex CE/IOL when ready   -Disc motorcycle glasses    # Blepharitis  Disc Lid Hygien      RTC: Sept. Cornea [place], sooner PRN  Follow up with Dr. Yoon and Dr. Pardo as scheduled.    Mert Barney MD      Attending Physician Attestation:   Complete documentation of historical and exam elements from today's encounter can be found in the full encounter summary report (not reduplicated in this progress note).  I personally obtained the chief complaint(s) and history of present illness.  I confirmed and edited as necessary the review of systems, past medical/surgical history, family history, social history, and examination findings as documented by others; and I examined the patient myself.  I personally reviewed the relevant tests, images, and reports as documented above.  I formulated and edited as necessary the assessment and plan and discussed the findings and management plan with the patient and family. - Mert Barney MD

## 2023-04-18 NOTE — NURSING NOTE
Chief Complaints and History of Present Illnesses   Patient presents with     Follow Up     5 month follow up Paralytic lagophthalmos      Chief Complaint(s) and History of Present Illness(es)     Follow Up            Comments: 5 month follow up Paralytic lagophthalmos           Comments    Pt states vision is about the same as last visit. Pt having more difficulty with glare and halos around lights at night in LE.  No new flashes or floaters. Still having redness and dryness, no changes. No DM.    ANTONIO Smith April 18, 2023 2:53 PM

## 2023-06-28 ENCOUNTER — MEDICAL CORRESPONDENCE (OUTPATIENT)
Dept: HEALTH INFORMATION MANAGEMENT | Facility: CLINIC | Age: 73
End: 2023-06-28

## 2023-07-04 DIAGNOSIS — C09.9 SQUAMOUS CELL CARCINOMA OF TONSIL (H): ICD-10-CM

## 2023-07-05 RX ORDER — GABAPENTIN 300 MG/1
CAPSULE ORAL
Qty: 270 CAPSULE | Refills: 3 | Status: SHIPPED | OUTPATIENT
Start: 2023-07-05

## 2023-07-12 LAB
CREATININE (EXTERNAL): 1 MG/DL (ref 0.73–1.18)
GFR ESTIMATED (EXTERNAL): >60 ML/MIN/1.73M2
HBA1C MFR BLD: 6.6 %
POTASSIUM (EXTERNAL): 5.3 MMOL/L (ref 3.5–5.1)

## 2023-07-20 RX ORDER — PENTOXIFYLLINE 400 MG/1
1 TABLET, EXTENDED RELEASE ORAL 2 TIMES DAILY
COMMUNITY

## 2023-07-20 RX ORDER — ACETAMINOPHEN 500 MG
1-2 TABLET ORAL EVERY 6 HOURS PRN
Status: ON HOLD | COMMUNITY
End: 2023-07-21

## 2023-07-20 NOTE — PROGRESS NOTES
PTA medications updated by Medication Scribe prior to surgery via phone call with patient (last doses completed by Nurse)     Medication history sources: Patient, Surescripts and H&P  In the past week, patient estimated taking medication this percent of the time: Greater than 90%      Significant changes made to the medication list:  Patient reports no longer taking the following meds (med scribe removed from PTA med list): Oxycodone, Senna-Docusate, Cevimeline      Additional medication history information:   Patient was advised to bring: Romycin Ointment, Refresh OP    Medication reconciliation completed by provider prior to medication history? No    Time spent in this activity: 30 minutes    The information provided in this note is only as accurate as the sources available at the time of update(s)    Prior to Admission medications    Medication Sig Last Dose Taking? Auth Provider Long Term End Date   acetaminophen (TYLENOL) 500 MG tablet Take 1-2 tablets by mouth every 6 hours as needed for mild pain Unknown at prn Yes Reported, Patient     atorvastatin (LIPITOR) 10 MG tablet Take 1 tablet by mouth every evening 7/20/2023 at pm Yes Reported, Patient Yes    carvedilol (COREG) 12.5 MG tablet Take 1 tablet by mouth 2 times daily (with meals)  at am Yes Reported, Patient Yes    erythromycin (ROMYCIN) 5 MG/GM ophthalmic ointment Place Into the left eye At Bedtime 7/20/2023 at pm Yes Chuy Che MD     gabapentin (NEURONTIN) 300 MG capsule TAKE 1 CAPSULE(300 MG) BY MOUTH THREE TIMES DAILY  Patient taking differently: Take 1 capsule by mouth 2 times daily  at am Yes Nafisa Sanders APRN CNP Yes    levothyroxine (SYNTHROID/LEVOTHROID) 50 MCG tablet Take 1 tablet (50 mcg) by mouth daily  at am Yes Nafisa Sanders APRN CNP Yes    lisinopril (ZESTRIL) 5 MG tablet Take 1 tablet by mouth every evening 7/20/2023 at pm Yes Reported, Patient No    pentoxifylline ER (TRENTAL) 400 MG CR tablet Take 1 tablet by mouth 2  times daily  at am Yes Reported, Patient Yes    Polyvinyl Alcohol-Povidone (REFRESH OP) Place 1 drop into both eyes as needed Unknown at prn Yes Reported, Patient     rivaroxaban ANTICOAGULANT (XARELTO) 20 MG TABS tablet Take 1 tablet by mouth daily (with dinner) 7/18/2023 at pm Yes Reported, Patient Yes    traZODone (DESYREL) 50 MG tablet Take 1 tablet by mouth At Bedtime 7/20/2023 at pm Yes Reported, Patient No      Medication history completed by:    Raulito oWod CPhT  [facility]

## 2023-07-21 ENCOUNTER — HOSPITAL ENCOUNTER (OUTPATIENT)
Facility: CLINIC | Age: 73
Discharge: HOME | End: 2023-07-21
Attending: ORTHOPAEDIC SURGERY | Admitting: ORTHOPAEDIC SURGERY
Payer: COMMERCIAL

## 2023-07-21 ENCOUNTER — APPOINTMENT (OUTPATIENT)
Dept: GENERAL RADIOLOGY | Facility: CLINIC | Age: 73
End: 2023-07-21
Attending: ORTHOPAEDIC SURGERY
Payer: COMMERCIAL

## 2023-07-21 ENCOUNTER — APPOINTMENT (OUTPATIENT)
Dept: PHYSICAL THERAPY | Facility: CLINIC | Age: 73
End: 2023-07-21
Attending: ORTHOPAEDIC SURGERY
Payer: COMMERCIAL

## 2023-07-21 ENCOUNTER — ANESTHESIA EVENT (OUTPATIENT)
Dept: SURGERY | Facility: CLINIC | Age: 73
End: 2023-07-21
Payer: COMMERCIAL

## 2023-07-21 ENCOUNTER — ANESTHESIA (OUTPATIENT)
Dept: SURGERY | Facility: CLINIC | Age: 73
End: 2023-07-21
Payer: COMMERCIAL

## 2023-07-21 VITALS
HEART RATE: 94 BPM | HEIGHT: 69 IN | TEMPERATURE: 97.3 F | BODY MASS INDEX: 26.81 KG/M2 | OXYGEN SATURATION: 94 % | SYSTOLIC BLOOD PRESSURE: 107 MMHG | DIASTOLIC BLOOD PRESSURE: 79 MMHG | RESPIRATION RATE: 15 BRPM | WEIGHT: 181 LBS

## 2023-07-21 DIAGNOSIS — Z96.642 STATUS POST TOTAL HIP REPLACEMENT, LEFT: Primary | ICD-10-CM

## 2023-07-21 PROBLEM — T88.4XXA HARD TO INTUBATE: Status: ACTIVE | Noted: 2023-07-21

## 2023-07-21 LAB
CREAT SERPL-MCNC: 0.88 MG/DL (ref 0.67–1.17)
CREAT SERPL-MCNC: 0.92 MG/DL (ref 0.67–1.17)
FASTING STATUS PATIENT QL REPORTED: YES
GFR SERPL CREATININE-BSD FRML MDRD: 88 ML/MIN/1.73M2
GFR SERPL CREATININE-BSD FRML MDRD: >90 ML/MIN/1.73M2
GLUCOSE SERPL-MCNC: 102 MG/DL (ref 70–99)
POTASSIUM SERPL-SCNC: 4 MMOL/L (ref 3.4–5.3)

## 2023-07-21 PROCEDURE — C1713 ANCHOR/SCREW BN/BN,TIS/BN: HCPCS | Performed by: ORTHOPAEDIC SURGERY

## 2023-07-21 PROCEDURE — 999N000065 XR PELVIS AND HIP PORTABLE LEFT 1 VIEW

## 2023-07-21 PROCEDURE — 99207 PR NO BILLABLE SERVICE THIS VISIT: CPT | Performed by: PHYSICIAN ASSISTANT

## 2023-07-21 PROCEDURE — 250N000009 HC RX 250: Performed by: ORTHOPAEDIC SURGERY

## 2023-07-21 PROCEDURE — 250N000011 HC RX IP 250 OP 636: Performed by: ANESTHESIOLOGY

## 2023-07-21 PROCEDURE — 250N000009 HC RX 250: Performed by: ANESTHESIOLOGY

## 2023-07-21 PROCEDURE — 258N000003 HC RX IP 258 OP 636: Performed by: NURSE ANESTHETIST, CERTIFIED REGISTERED

## 2023-07-21 PROCEDURE — 82565 ASSAY OF CREATININE: CPT | Performed by: ORTHOPAEDIC SURGERY

## 2023-07-21 PROCEDURE — 250N000011 HC RX IP 250 OP 636: Performed by: ORTHOPAEDIC SURGERY

## 2023-07-21 PROCEDURE — 258N000003 HC RX IP 258 OP 636: Performed by: ANESTHESIOLOGY

## 2023-07-21 PROCEDURE — 82947 ASSAY GLUCOSE BLOOD QUANT: CPT | Performed by: ANESTHESIOLOGY

## 2023-07-21 PROCEDURE — 82565 ASSAY OF CREATININE: CPT | Performed by: ANESTHESIOLOGY

## 2023-07-21 PROCEDURE — 36415 COLL VENOUS BLD VENIPUNCTURE: CPT | Performed by: ORTHOPAEDIC SURGERY

## 2023-07-21 PROCEDURE — C1776 JOINT DEVICE (IMPLANTABLE): HCPCS | Performed by: ORTHOPAEDIC SURGERY

## 2023-07-21 PROCEDURE — 250N000011 HC RX IP 250 OP 636: Performed by: NURSE ANESTHETIST, CERTIFIED REGISTERED

## 2023-07-21 PROCEDURE — 250N000011 HC RX IP 250 OP 636: Performed by: STUDENT IN AN ORGANIZED HEALTH CARE EDUCATION/TRAINING PROGRAM

## 2023-07-21 PROCEDURE — 710N000009 HC RECOVERY PHASE 1, LEVEL 1, PER MIN: Performed by: ORTHOPAEDIC SURGERY

## 2023-07-21 PROCEDURE — 97161 PT EVAL LOW COMPLEX 20 MIN: CPT | Mod: GP

## 2023-07-21 PROCEDURE — 250N000025 HC SEVOFLURANE, PER MIN: Performed by: ORTHOPAEDIC SURGERY

## 2023-07-21 PROCEDURE — 84132 ASSAY OF SERUM POTASSIUM: CPT | Performed by: ANESTHESIOLOGY

## 2023-07-21 PROCEDURE — 999N000179 XR SURGERY CARM FLUORO LESS THAN 5 MIN W STILLS

## 2023-07-21 PROCEDURE — 97530 THERAPEUTIC ACTIVITIES: CPT | Mod: GP

## 2023-07-21 PROCEDURE — 272N000001 HC OR GENERAL SUPPLY STERILE: Performed by: ORTHOPAEDIC SURGERY

## 2023-07-21 PROCEDURE — 258N000003 HC RX IP 258 OP 636: Performed by: STUDENT IN AN ORGANIZED HEALTH CARE EDUCATION/TRAINING PROGRAM

## 2023-07-21 PROCEDURE — 97116 GAIT TRAINING THERAPY: CPT | Mod: GP

## 2023-07-21 PROCEDURE — 250N000013 HC RX MED GY IP 250 OP 250 PS 637: Performed by: STUDENT IN AN ORGANIZED HEALTH CARE EDUCATION/TRAINING PROGRAM

## 2023-07-21 PROCEDURE — 370N000017 HC ANESTHESIA TECHNICAL FEE, PER MIN: Performed by: ORTHOPAEDIC SURGERY

## 2023-07-21 PROCEDURE — 360N000084 HC SURGERY LEVEL 4 W/ FLUORO, PER MIN: Performed by: ORTHOPAEDIC SURGERY

## 2023-07-21 PROCEDURE — 250N000009 HC RX 250: Performed by: STUDENT IN AN ORGANIZED HEALTH CARE EDUCATION/TRAINING PROGRAM

## 2023-07-21 PROCEDURE — 258N000001 HC RX 258: Performed by: ORTHOPAEDIC SURGERY

## 2023-07-21 PROCEDURE — 250N000009 HC RX 250: Performed by: NURSE ANESTHETIST, CERTIFIED REGISTERED

## 2023-07-21 PROCEDURE — 250N000013 HC RX MED GY IP 250 OP 250 PS 637: Performed by: ORTHOPAEDIC SURGERY

## 2023-07-21 PROCEDURE — 999N000141 HC STATISTIC PRE-PROCEDURE NURSING ASSESSMENT: Performed by: ORTHOPAEDIC SURGERY

## 2023-07-21 DEVICE — ACTIS DUOFIX HIP PROSTHESIS (FEMORAL STEM 12/14 TAPER CEMENTLESS SIZE 9 HIGH COLLAR)  CE
Type: IMPLANTABLE DEVICE | Site: HIP | Status: FUNCTIONAL
Brand: ACTIS

## 2023-07-21 DEVICE — IMP CUP ACE PINNACLE 62MM 1217-22-062: Type: IMPLANTABLE DEVICE | Site: HIP | Status: FUNCTIONAL

## 2023-07-21 DEVICE — BIOLOX DELTA CERAMIC FEMORAL HEAD +5.0 36MM DIA 12/14 TAPER
Type: IMPLANTABLE DEVICE | Site: HIP | Status: FUNCTIONAL
Brand: BIOLOX DELTA

## 2023-07-21 DEVICE — PINNACLE CANCELLOUS BONE SCREW 6.5MM X 35MM
Type: IMPLANTABLE DEVICE | Site: HIP | Status: FUNCTIONAL
Brand: PINNACLE

## 2023-07-21 DEVICE — PINNACLE CANCELLOUS BONE SCREW 6.5MM X 25MM
Type: IMPLANTABLE DEVICE | Site: HIP | Status: FUNCTIONAL
Brand: PINNACLE

## 2023-07-21 DEVICE — PINNACLE HIP SOLUTIONS ALTRX POLYETHYLENE ACETABULAR LINER NEUTRAL 36MM ID 62MM OD
Type: IMPLANTABLE DEVICE | Site: HIP | Status: FUNCTIONAL
Brand: PINNACLE ALTRX

## 2023-07-21 DEVICE — APEX HOLE ELIMINATOR - PS
Type: IMPLANTABLE DEVICE | Site: HIP | Status: FUNCTIONAL
Brand: APEX

## 2023-07-21 RX ORDER — HYDROMORPHONE HCL IN WATER/PF 6 MG/30 ML
0.4 PATIENT CONTROLLED ANALGESIA SYRINGE INTRAVENOUS
Status: DISCONTINUED | OUTPATIENT
Start: 2023-07-21 | End: 2023-07-21 | Stop reason: HOSPADM

## 2023-07-21 RX ORDER — DIPHENHYDRAMINE HCL 12.5MG/5ML
12.5 LIQUID (ML) ORAL EVERY 6 HOURS PRN
Status: DISCONTINUED | OUTPATIENT
Start: 2023-07-21 | End: 2023-07-21 | Stop reason: HOSPADM

## 2023-07-21 RX ORDER — ONDANSETRON 4 MG/1
4 TABLET, ORALLY DISINTEGRATING ORAL EVERY 30 MIN PRN
Status: DISCONTINUED | OUTPATIENT
Start: 2023-07-21 | End: 2023-07-21 | Stop reason: HOSPADM

## 2023-07-21 RX ORDER — TRANEXAMIC ACID 10 MG/ML
1 INJECTION, SOLUTION INTRAVENOUS ONCE
Status: COMPLETED | OUTPATIENT
Start: 2023-07-21 | End: 2023-07-21

## 2023-07-21 RX ORDER — KETOROLAC TROMETHAMINE 15 MG/ML
15 INJECTION, SOLUTION INTRAMUSCULAR; INTRAVENOUS EVERY 6 HOURS
Status: DISCONTINUED | OUTPATIENT
Start: 2023-07-21 | End: 2023-07-21 | Stop reason: HOSPADM

## 2023-07-21 RX ORDER — HYDROMORPHONE HCL IN WATER/PF 6 MG/30 ML
0.2 PATIENT CONTROLLED ANALGESIA SYRINGE INTRAVENOUS EVERY 5 MIN PRN
Status: DISCONTINUED | OUTPATIENT
Start: 2023-07-21 | End: 2023-07-21 | Stop reason: HOSPADM

## 2023-07-21 RX ORDER — ONDANSETRON 2 MG/ML
INJECTION INTRAMUSCULAR; INTRAVENOUS PRN
Status: DISCONTINUED | OUTPATIENT
Start: 2023-07-21 | End: 2023-07-21

## 2023-07-21 RX ORDER — CEFAZOLIN SODIUM 2 G/100ML
2 INJECTION, SOLUTION INTRAVENOUS EVERY 8 HOURS
Status: DISCONTINUED | OUTPATIENT
Start: 2023-07-21 | End: 2023-07-21 | Stop reason: HOSPADM

## 2023-07-21 RX ORDER — TRAZODONE HYDROCHLORIDE 50 MG/1
50 TABLET, FILM COATED ORAL AT BEDTIME
Status: DISCONTINUED | OUTPATIENT
Start: 2023-07-21 | End: 2023-07-21 | Stop reason: HOSPADM

## 2023-07-21 RX ORDER — HYDROMORPHONE HCL IN WATER/PF 6 MG/30 ML
0.2 PATIENT CONTROLLED ANALGESIA SYRINGE INTRAVENOUS
Status: DISCONTINUED | OUTPATIENT
Start: 2023-07-21 | End: 2023-07-21 | Stop reason: HOSPADM

## 2023-07-21 RX ORDER — NALOXONE HYDROCHLORIDE 0.4 MG/ML
0.4 INJECTION, SOLUTION INTRAMUSCULAR; INTRAVENOUS; SUBCUTANEOUS
Status: DISCONTINUED | OUTPATIENT
Start: 2023-07-21 | End: 2023-07-21 | Stop reason: HOSPADM

## 2023-07-21 RX ORDER — OXYCODONE HYDROCHLORIDE 5 MG/1
10 TABLET ORAL EVERY 4 HOURS PRN
Status: DISCONTINUED | OUTPATIENT
Start: 2023-07-21 | End: 2023-07-21 | Stop reason: HOSPADM

## 2023-07-21 RX ORDER — ACETAMINOPHEN 325 MG/1
975 TABLET ORAL ONCE
Status: COMPLETED | OUTPATIENT
Start: 2023-07-21 | End: 2023-07-21

## 2023-07-21 RX ORDER — NALOXONE HYDROCHLORIDE 0.4 MG/ML
0.2 INJECTION, SOLUTION INTRAMUSCULAR; INTRAVENOUS; SUBCUTANEOUS
Status: DISCONTINUED | OUTPATIENT
Start: 2023-07-21 | End: 2023-07-21 | Stop reason: HOSPADM

## 2023-07-21 RX ORDER — ACETAMINOPHEN 325 MG/1
975 TABLET ORAL EVERY 6 HOURS PRN
Qty: 100 TABLET | Refills: 0 | Status: SHIPPED | OUTPATIENT
Start: 2023-07-21

## 2023-07-21 RX ORDER — ACETAMINOPHEN 325 MG/1
650 TABLET ORAL EVERY 4 HOURS PRN
Status: DISCONTINUED | OUTPATIENT
Start: 2023-07-24 | End: 2023-07-21 | Stop reason: HOSPADM

## 2023-07-21 RX ORDER — ERYTHROMYCIN 5 MG/G
OINTMENT OPHTHALMIC AT BEDTIME
Status: DISCONTINUED | OUTPATIENT
Start: 2023-07-21 | End: 2023-07-21 | Stop reason: HOSPADM

## 2023-07-21 RX ORDER — PENTOXIFYLLINE 400 MG/1
400 TABLET, EXTENDED RELEASE ORAL 2 TIMES DAILY
Status: DISCONTINUED | OUTPATIENT
Start: 2023-07-21 | End: 2023-07-21 | Stop reason: HOSPADM

## 2023-07-21 RX ORDER — FENTANYL CITRATE 50 UG/ML
INJECTION, SOLUTION INTRAMUSCULAR; INTRAVENOUS PRN
Status: DISCONTINUED | OUTPATIENT
Start: 2023-07-21 | End: 2023-07-21

## 2023-07-21 RX ORDER — PROPOFOL 10 MG/ML
INJECTION, EMULSION INTRAVENOUS CONTINUOUS PRN
Status: DISCONTINUED | OUTPATIENT
Start: 2023-07-21 | End: 2023-07-21

## 2023-07-21 RX ORDER — LIDOCAINE HYDROCHLORIDE 20 MG/ML
INJECTION, SOLUTION INFILTRATION; PERINEURAL PRN
Status: DISCONTINUED | OUTPATIENT
Start: 2023-07-21 | End: 2023-07-21

## 2023-07-21 RX ORDER — ENOXAPARIN SODIUM 100 MG/ML
INJECTION SUBCUTANEOUS
Qty: 0.4 ML | Refills: 0 | Status: SHIPPED | OUTPATIENT
Start: 2023-07-21

## 2023-07-21 RX ORDER — SODIUM CHLORIDE, SODIUM LACTATE, POTASSIUM CHLORIDE, CALCIUM CHLORIDE 600; 310; 30; 20 MG/100ML; MG/100ML; MG/100ML; MG/100ML
INJECTION, SOLUTION INTRAVENOUS CONTINUOUS
Status: DISCONTINUED | OUTPATIENT
Start: 2023-07-21 | End: 2023-07-21 | Stop reason: HOSPADM

## 2023-07-21 RX ORDER — ONDANSETRON 2 MG/ML
4 INJECTION INTRAMUSCULAR; INTRAVENOUS EVERY 30 MIN PRN
Status: DISCONTINUED | OUTPATIENT
Start: 2023-07-21 | End: 2023-07-21 | Stop reason: HOSPADM

## 2023-07-21 RX ORDER — PROCHLORPERAZINE MALEATE 5 MG
5 TABLET ORAL EVERY 6 HOURS PRN
Status: DISCONTINUED | OUTPATIENT
Start: 2023-07-21 | End: 2023-07-21 | Stop reason: HOSPADM

## 2023-07-21 RX ORDER — LEVOTHYROXINE SODIUM 50 UG/1
50 TABLET ORAL
Status: DISCONTINUED | OUTPATIENT
Start: 2023-07-22 | End: 2023-07-21 | Stop reason: HOSPADM

## 2023-07-21 RX ORDER — VANCOMYCIN HYDROCHLORIDE 1 G/20ML
INJECTION, POWDER, LYOPHILIZED, FOR SOLUTION INTRAVENOUS PRN
Status: DISCONTINUED | OUTPATIENT
Start: 2023-07-21 | End: 2023-07-21 | Stop reason: HOSPADM

## 2023-07-21 RX ORDER — AMOXICILLIN 250 MG
1-2 CAPSULE ORAL 2 TIMES DAILY
Qty: 30 TABLET | Refills: 0 | Status: SHIPPED | OUTPATIENT
Start: 2023-07-21

## 2023-07-21 RX ORDER — METHOCARBAMOL 500 MG/1
500 TABLET, FILM COATED ORAL EVERY 6 HOURS PRN
Status: DISCONTINUED | OUTPATIENT
Start: 2023-07-21 | End: 2023-07-21 | Stop reason: HOSPADM

## 2023-07-21 RX ORDER — OXYCODONE HYDROCHLORIDE 5 MG/1
TABLET ORAL
Qty: 20 TABLET | Refills: 0 | Status: SHIPPED | OUTPATIENT
Start: 2023-07-21

## 2023-07-21 RX ORDER — BISACODYL 10 MG
10 SUPPOSITORY, RECTAL RECTAL DAILY PRN
Status: DISCONTINUED | OUTPATIENT
Start: 2023-07-21 | End: 2023-07-21 | Stop reason: HOSPADM

## 2023-07-21 RX ORDER — CEFAZOLIN SODIUM/WATER 2 G/20 ML
2 SYRINGE (ML) INTRAVENOUS SEE ADMIN INSTRUCTIONS
Status: DISCONTINUED | OUTPATIENT
Start: 2023-07-21 | End: 2023-07-21 | Stop reason: HOSPADM

## 2023-07-21 RX ORDER — DEXAMETHASONE SODIUM PHOSPHATE 4 MG/ML
INJECTION, SOLUTION INTRA-ARTICULAR; INTRALESIONAL; INTRAMUSCULAR; INTRAVENOUS; SOFT TISSUE PRN
Status: DISCONTINUED | OUTPATIENT
Start: 2023-07-21 | End: 2023-07-21

## 2023-07-21 RX ORDER — CEFAZOLIN SODIUM/WATER 2 G/20 ML
2 SYRINGE (ML) INTRAVENOUS
Status: COMPLETED | OUTPATIENT
Start: 2023-07-21 | End: 2023-07-21

## 2023-07-21 RX ORDER — ENOXAPARIN SODIUM 100 MG/ML
40 INJECTION SUBCUTANEOUS EVERY 24 HOURS
Status: DISCONTINUED | OUTPATIENT
Start: 2023-07-22 | End: 2023-07-21 | Stop reason: HOSPADM

## 2023-07-21 RX ORDER — HYDROXYZINE HYDROCHLORIDE 10 MG/1
10 TABLET, FILM COATED ORAL EVERY 6 HOURS PRN
Status: DISCONTINUED | OUTPATIENT
Start: 2023-07-21 | End: 2023-07-21 | Stop reason: HOSPADM

## 2023-07-21 RX ORDER — ONDANSETRON 2 MG/ML
4 INJECTION INTRAMUSCULAR; INTRAVENOUS EVERY 6 HOURS PRN
Status: DISCONTINUED | OUTPATIENT
Start: 2023-07-21 | End: 2023-07-21 | Stop reason: HOSPADM

## 2023-07-21 RX ORDER — CALCIUM CARBONATE 500 MG/1
500 TABLET, CHEWABLE ORAL 4 TIMES DAILY PRN
Status: DISCONTINUED | OUTPATIENT
Start: 2023-07-21 | End: 2023-07-21 | Stop reason: HOSPADM

## 2023-07-21 RX ORDER — PROPOFOL 10 MG/ML
INJECTION, EMULSION INTRAVENOUS PRN
Status: DISCONTINUED | OUTPATIENT
Start: 2023-07-21 | End: 2023-07-21

## 2023-07-21 RX ORDER — HYDROMORPHONE HCL IN WATER/PF 6 MG/30 ML
0.4 PATIENT CONTROLLED ANALGESIA SYRINGE INTRAVENOUS EVERY 5 MIN PRN
Status: DISCONTINUED | OUTPATIENT
Start: 2023-07-21 | End: 2023-07-21 | Stop reason: HOSPADM

## 2023-07-21 RX ORDER — PREGABALIN 150 MG/1
150 CAPSULE ORAL ONCE
Status: COMPLETED | OUTPATIENT
Start: 2023-07-21 | End: 2023-07-21

## 2023-07-21 RX ORDER — FENTANYL CITRATE 0.05 MG/ML
50 INJECTION, SOLUTION INTRAMUSCULAR; INTRAVENOUS EVERY 5 MIN PRN
Status: DISCONTINUED | OUTPATIENT
Start: 2023-07-21 | End: 2023-07-21 | Stop reason: HOSPADM

## 2023-07-21 RX ORDER — ONDANSETRON 4 MG/1
4 TABLET, ORALLY DISINTEGRATING ORAL EVERY 6 HOURS PRN
Status: DISCONTINUED | OUTPATIENT
Start: 2023-07-21 | End: 2023-07-21 | Stop reason: HOSPADM

## 2023-07-21 RX ORDER — ACETAMINOPHEN 325 MG/1
975 TABLET ORAL EVERY 8 HOURS
Status: DISCONTINUED | OUTPATIENT
Start: 2023-07-21 | End: 2023-07-21 | Stop reason: HOSPADM

## 2023-07-21 RX ORDER — OXYCODONE HYDROCHLORIDE 5 MG/1
5 TABLET ORAL EVERY 4 HOURS PRN
Status: DISCONTINUED | OUTPATIENT
Start: 2023-07-21 | End: 2023-07-21 | Stop reason: HOSPADM

## 2023-07-21 RX ORDER — POLYETHYLENE GLYCOL 3350 17 G/17G
17 POWDER, FOR SOLUTION ORAL DAILY
Status: DISCONTINUED | OUTPATIENT
Start: 2023-07-22 | End: 2023-07-21 | Stop reason: HOSPADM

## 2023-07-21 RX ORDER — GABAPENTIN 300 MG/1
300 CAPSULE ORAL 2 TIMES DAILY
Status: DISCONTINUED | OUTPATIENT
Start: 2023-07-21 | End: 2023-07-21 | Stop reason: HOSPADM

## 2023-07-21 RX ORDER — FENTANYL CITRATE 0.05 MG/ML
25 INJECTION, SOLUTION INTRAMUSCULAR; INTRAVENOUS EVERY 5 MIN PRN
Status: DISCONTINUED | OUTPATIENT
Start: 2023-07-21 | End: 2023-07-21 | Stop reason: HOSPADM

## 2023-07-21 RX ORDER — LIDOCAINE 40 MG/G
CREAM TOPICAL
Status: DISCONTINUED | OUTPATIENT
Start: 2023-07-21 | End: 2023-07-21 | Stop reason: HOSPADM

## 2023-07-21 RX ORDER — TRANEXAMIC ACID 650 MG/1
1950 TABLET ORAL ONCE
Status: COMPLETED | OUTPATIENT
Start: 2023-07-21 | End: 2023-07-21

## 2023-07-21 RX ORDER — AMOXICILLIN 250 MG
1 CAPSULE ORAL 2 TIMES DAILY
Status: DISCONTINUED | OUTPATIENT
Start: 2023-07-21 | End: 2023-07-21 | Stop reason: HOSPADM

## 2023-07-21 RX ORDER — ATORVASTATIN CALCIUM 10 MG/1
10 TABLET, FILM COATED ORAL EVERY EVENING
Status: DISCONTINUED | OUTPATIENT
Start: 2023-07-21 | End: 2023-07-21 | Stop reason: HOSPADM

## 2023-07-21 RX ORDER — CARVEDILOL 12.5 MG/1
12.5 TABLET ORAL 2 TIMES DAILY WITH MEALS
Status: DISCONTINUED | OUTPATIENT
Start: 2023-07-21 | End: 2023-07-21 | Stop reason: HOSPADM

## 2023-07-21 RX ADMIN — Medication 2 G: at 09:51

## 2023-07-21 RX ADMIN — FENTANYL CITRATE 25 MCG: 50 INJECTION, SOLUTION INTRAMUSCULAR; INTRAVENOUS at 09:26

## 2023-07-21 RX ADMIN — ROCURONIUM BROMIDE 50 MG: 50 INJECTION, SOLUTION INTRAVENOUS at 09:27

## 2023-07-21 RX ADMIN — FENTANYL CITRATE 50 MCG: 50 INJECTION, SOLUTION INTRAMUSCULAR; INTRAVENOUS at 10:26

## 2023-07-21 RX ADMIN — HYDROMORPHONE HYDROCHLORIDE 0.5 MG: 1 INJECTION, SOLUTION INTRAMUSCULAR; INTRAVENOUS; SUBCUTANEOUS at 12:02

## 2023-07-21 RX ADMIN — SODIUM CHLORIDE, POTASSIUM CHLORIDE, SODIUM LACTATE AND CALCIUM CHLORIDE: 600; 310; 30; 20 INJECTION, SOLUTION INTRAVENOUS at 10:00

## 2023-07-21 RX ADMIN — FENTANYL CITRATE 25 MCG: 50 INJECTION, SOLUTION INTRAMUSCULAR; INTRAVENOUS at 10:40

## 2023-07-21 RX ADMIN — PHENYLEPHRINE HYDROCHLORIDE 100 MCG: 10 INJECTION INTRAVENOUS at 09:47

## 2023-07-21 RX ADMIN — PROPOFOL 30 MCG/KG/MIN: 10 INJECTION, EMULSION INTRAVENOUS at 09:59

## 2023-07-21 RX ADMIN — PHENYLEPHRINE HYDROCHLORIDE 150 MCG: 10 INJECTION INTRAVENOUS at 10:00

## 2023-07-21 RX ADMIN — PHENYLEPHRINE HYDROCHLORIDE 0.3 MCG/KG/MIN: 10 INJECTION INTRAVENOUS at 09:58

## 2023-07-21 RX ADMIN — PHENYLEPHRINE HYDROCHLORIDE 100 MCG: 10 INJECTION INTRAVENOUS at 09:54

## 2023-07-21 RX ADMIN — PROPOFOL 200 MG: 10 INJECTION, EMULSION INTRAVENOUS at 09:26

## 2023-07-21 RX ADMIN — ACETAMINOPHEN 975 MG: 325 TABLET, FILM COATED ORAL at 15:32

## 2023-07-21 RX ADMIN — ACETAMINOPHEN 975 MG: 325 TABLET, FILM COATED ORAL at 07:58

## 2023-07-21 RX ADMIN — PHENYLEPHRINE HYDROCHLORIDE 100 MCG: 10 INJECTION INTRAVENOUS at 10:08

## 2023-07-21 RX ADMIN — ROCURONIUM BROMIDE 20 MG: 50 INJECTION, SOLUTION INTRAVENOUS at 10:05

## 2023-07-21 RX ADMIN — PHENYLEPHRINE HYDROCHLORIDE 100 MCG: 10 INJECTION INTRAVENOUS at 12:11

## 2023-07-21 RX ADMIN — PHENYLEPHRINE HYDROCHLORIDE 150 MCG: 10 INJECTION INTRAVENOUS at 09:50

## 2023-07-21 RX ADMIN — DEXAMETHASONE SODIUM PHOSPHATE 10 MG: 4 INJECTION, SOLUTION INTRA-ARTICULAR; INTRALESIONAL; INTRAMUSCULAR; INTRAVENOUS; SOFT TISSUE at 09:50

## 2023-07-21 RX ADMIN — PHENYLEPHRINE HYDROCHLORIDE 100 MCG: 10 INJECTION INTRAVENOUS at 11:41

## 2023-07-21 RX ADMIN — PHENYLEPHRINE HYDROCHLORIDE 150 MCG: 10 INJECTION INTRAVENOUS at 10:47

## 2023-07-21 RX ADMIN — LIDOCAINE HYDROCHLORIDE 80 MG: 20 INJECTION, SOLUTION INFILTRATION; PERINEURAL at 09:26

## 2023-07-21 RX ADMIN — PHENYLEPHRINE HYDROCHLORIDE 100 MCG: 10 INJECTION INTRAVENOUS at 09:43

## 2023-07-21 RX ADMIN — TRANEXAMIC ACID 1 G: 10 INJECTION, SOLUTION INTRAVENOUS at 11:51

## 2023-07-21 RX ADMIN — PROPOFOL 40 MG: 10 INJECTION, EMULSION INTRAVENOUS at 09:38

## 2023-07-21 RX ADMIN — ROCURONIUM BROMIDE 10 MG: 50 INJECTION, SOLUTION INTRAVENOUS at 11:18

## 2023-07-21 RX ADMIN — FENTANYL CITRATE 25 MCG: 50 INJECTION, SOLUTION INTRAMUSCULAR; INTRAVENOUS at 13:09

## 2023-07-21 RX ADMIN — ROCURONIUM BROMIDE 10 MG: 50 INJECTION, SOLUTION INTRAVENOUS at 10:38

## 2023-07-21 RX ADMIN — SUGAMMADEX 200 MG: 100 INJECTION, SOLUTION INTRAVENOUS at 12:10

## 2023-07-21 RX ADMIN — ONDANSETRON 4 MG: 2 INJECTION INTRAMUSCULAR; INTRAVENOUS at 11:49

## 2023-07-21 RX ADMIN — PREGABALIN 150 MG: 150 CAPSULE ORAL at 07:58

## 2023-07-21 RX ADMIN — PHENYLEPHRINE HYDROCHLORIDE 150 MCG: 10 INJECTION INTRAVENOUS at 11:11

## 2023-07-21 RX ADMIN — TRANEXAMIC ACID 1 G: 10 INJECTION, SOLUTION INTRAVENOUS at 09:51

## 2023-07-21 RX ADMIN — CEFAZOLIN SODIUM 2 G: 2 INJECTION, SOLUTION INTRAVENOUS at 17:19

## 2023-07-21 RX ADMIN — SODIUM CHLORIDE, POTASSIUM CHLORIDE, SODIUM LACTATE AND CALCIUM CHLORIDE: 600; 310; 30; 20 INJECTION, SOLUTION INTRAVENOUS at 08:41

## 2023-07-21 ASSESSMENT — ACTIVITIES OF DAILY LIVING (ADL)
ADLS_ACUITY_SCORE: 22
ADLS_ACUITY_SCORE: 33
ADLS_ACUITY_SCORE: 22
ADLS_ACUITY_SCORE: 22

## 2023-07-21 ASSESSMENT — ENCOUNTER SYMPTOMS: DYSRHYTHMIAS: 1

## 2023-07-21 NOTE — PROGRESS NOTES
07/21/23 1601   Appointment Info   Signing Clinician's Name / Credentials (PT) Toma Smith, PT, DPT   Quick Adds   Quick Adds Certification   Living Environment   People in Home alone   Current Living Arrangements apartment   Home Accessibility no concerns   Transportation Anticipated car, drives self   Self-Care   Usual Activity Tolerance good   Current Activity Tolerance moderate   Equipment Currently Used at Home walker, rolling   Fall history within last six months no   Activity/Exercise/Self-Care Comment Independent at baseline without device, owns a FWW.   General Information   Onset of Illness/Injury or Date of Surgery 07/21/23   Referring Physician Aron Lemon MD   Patient/Family Therapy Goals Statement (PT) to go home by 6pm   Pertinent History of Current Problem (include personal factors and/or comorbidities that impact the POC) L JOSÉ ANTONIO, direct anterior   Existing Precautions/Restrictions no known precautions/restrictions   Weight-Bearing Status - LLE weight-bearing as tolerated   Weight-Bearing Status - RLE full weight-bearing   General Observations no formal precautions   Cognition   Affect/Mental Status (Cognition) WNL   Pain Assessment   Patient Currently in Pain Yes, see Vital Sign flowsheet  (3-4/10 with mobility, tolerable)   Integumentary/Edema   Integumentary/Edema Comments incision not assessed; scars from previous knee surgeries and baseline skin discoloration. Otherwise intact.   Posture    Posture Forward head position   Range of Motion (ROM)   Range of Motion ROM deficits secondary to pain   ROM Comment somewhat limited L hip, functional   Strength (Manual Muscle Testing)   Strength Comments somewhat limited L LE d/t pain, generally against gravity   Bed Mobility   Bed Mobility no deficits identified   Comment, (Bed Mobility) independent in/out of bed   Transfers   Comment, (Transfers) SBA with FWW, steady and tolerated well   Gait/Stairs (Locomotion)   [place] Level (Gait)  supervision   Assistive Device (Gait) walker, front-wheeled   Distance in Feet 80   Distance in Feet (Gait) 400ft   Comment, (Gait/Stairs) steady w/ slightly antlagic pattern, can step though   Balance   Balance Comments steady with FWW   Sensory Examination   Sensory Perception patient reports no sensory changes   Clinical Impression   Criteria for Skilled Therapeutic Intervention Yes, treatment indicated   PT Diagnosis (PT) impaired functional mobility   Influenced by the following impairments decreased activity tolerance   Functional limitations due to impairments bed mob, transfers, ambulation   Clinical Presentation (PT Evaluation Complexity) Stable/Uncomplicated   Clinical Presentation Rationale clinical judgement   Clinical Decision Making (Complexity) low complexity   Planned Therapy Interventions (PT) balance training;bed mobility training;gait training;home exercise program;neuromuscular re-education;patient/family education;transfer training   Anticipated Equipment Needs at Discharge (PT) walker, rolling   Risk & Benefits of therapy have been explained evaluation/treatment results reviewed;care plan/treatment goals reviewed;risks/benefits reviewed;current/potential barriers reviewed;participants voiced agreement with care plan;participants included;patient   PT Total Evaluation Time   PT Eval, Low Complexity Minutes (29033) 12   Therapy Certification   Start of care date 07/21/23   Certification date from 07/21/23   Certification date to 07/28/23   Medical Diagnosis L JOSÉ ANTONIO   Physical Therapy Goals   PT Frequency One time eval and treatment only   PT Predicted Duration/Target Date for Goal Attainment 07/21/23   PT Goals Bed Mobility;Transfers;Gait   PT: Bed Mobility Independent   PT: Transfers Modified independent;Sit to/from stand;Bed to/from chair;Assistive device   PT: Gait Modified independent;Rolling walker;Greater than 200 feet   Interventions   Interventions Quick Adds Therapeutic Activity;Gait  Training   Therapeutic Activity   Therapeutic Activities: dynamic activities to improve functional performance Minutes (88749) 13   Symptoms Noted During/After Treatment Increased pain   Treatment Detail/Skilled Intervention BP 95/57 in supine, 118/60 in seated. Tolerated upright well, independent to sit up. Pt educated on activity progression and recommendations for safety with use of FWW.   Gait Training   Gait Training Minutes (26074) 11   Symptoms Noted During/After Treatment (Gait Training) increased pain   Treatment Detail/Skilled Intervention Ambulation w/ FWW where pt is able to step though, slightly antalgic with decreased L ROM. Steady throughout and tolerated well.   [place] Level (Gait Training) independent   Physical Assistance Level (Gait Training) verbal cues   Weight Bearing (Gait Training) weight-bearing as tolerated   Assistive Device (Gait Training) rolling walker   PT Discharge Planning   PT Plan d/c   PT Discharge Recommendation (DC Rec)   (defer to ortho)   PT Rationale for DC Rec PT goals met; pt mobilizing well with FWW. Has good support of family/friends at home.   PT Brief overview of current status independent w/  FWW x 400ft   Total Session Time   Timed Code Treatment Minutes 24   Total Session Time (sum of timed and untimed services) 36       [facility]  OUTPATIENT PHYSICAL THERAPY EVALUATION  PLAN OF TREATMENT FOR OUTPATIENT REHABILITATION  (COMPLETE FOR INITIAL CLAIMS ONLY)  Patient's Last Name, First Name, M.I.  YOB: 1950  Fortino Moya                        Provider's Name  [facility] Medical Record No.  [id number]                             Onset Date:  07/21/23   Start of Care Date:  07/21/23   Type:     _X_PT   ___OT   ___SLP Medical Diagnosis:  L JOSÉ ANTONIO              PT Diagnosis:  impaired functional mobility Visits from SOC:  1     See note for plan of treatment, functional goals and certification  details    I CERTIFY THE NEED FOR THESE SERVICES FURNISHED UNDER        THIS PLAN OF TREATMENT AND WHILE UNDER MY CARE     (Physician co-signature of this document indicates review and certification of the therapy plan).

## 2023-07-21 NOTE — ANESTHESIA CARE TRANSFER NOTE
Patient: Fortino Moya    Procedure: Procedure(s):  LEFT TOTAL HIP ARTHROPLASTY DIRECT ANTERIOR APPROACH       Diagnosis: Degenerative joint disease [M19.90]  Diagnosis Additional Information: No value filed.    Anesthesia Type:   General     Note:    Oropharynx: oropharynx clear of all foreign objects  Level of Consciousness: awake  Oxygen Supplementation: face mask  Level of Supplemental Oxygen (L/min / FiO2): 6  Independent Airway: airway patency satisfactory and stable  Dentition: dentition unchanged  Vital Signs Stable: post-procedure vital signs reviewed and stable  Report to RN Given: handoff report given  Patient transferred to: PACU    Handoff Report: Identifed the Patient, Identified the Reponsible Provider, Reviewed the pertinent medical history, Discussed the surgical course, Reviewed Intra-OP anesthesia mangement and issues during anesthesia, Set expectations for post-procedure period and Allowed opportunity for questions and acknowledgement of understanding      Vitals:  Vitals Value Taken Time   /74 07/21/23 1230   Temp     Pulse 81 07/21/23 1231   Resp 11 07/21/23 1231   SpO2 99 % 07/21/23 1231   Vitals shown include unvalidated device data.    Electronically Signed By: SATNAM Rubio CRNA  July 21, 2023  12:32 PM

## 2023-07-21 NOTE — ANESTHESIA PREPROCEDURE EVALUATION
Anesthesia Pre-Procedure Evaluation    Patient: Fortino Moya   MRN: [id number] : 1950        Procedure : Procedure(s):  LEFT TOTAL HIP ARTHROPLASTY DIRECT ANTERIOR APPROACH          Past Medical History:   Diagnosis Date     Anxiety      Arrhythmia     A FIB     Atrial fibrillation (H)      Coronary artery disease 06/15/2021    A Fib     Dilatation of aorta (H)      Dysphagia      Gastroesophageal reflux disease      Hearing problem      History of radiation therapy 2021     Hypercholesterolemia      Hypertension      Hypothyroidism      Osteoradionecrosis of mandible      Other hyperlipidemia      Paralytic lagophthalmos      Persistent insomnia      Primary squamous cell carcinoma of tonsil (H)      Vitiligo       Past Surgical History:   Procedure Laterality Date     APPENDECTOMY       ARTHROPLASTY KNEE Left 2022    Procedure: LEFT TOTAL KNEE ARTHROPLASTY;  Surgeon: Aron Lemon MD;  Location: [facility] OR     GI SURGERY       IMPLANT GOLD WEIGHT EYELID Left 2021    Procedure: Left upper eyelid gold or platinum weight insertion for lagophthalmos - 1.6 grams;  Surgeon: Vivien Yoon MD;  Location: [facility] OR     IR CHEST PORT PLACEMENT > 5 YRS OF AGE  2021     IR GASTROSTOMY TUBE PERCUTANEOUS PLCMNT  03/10/2021     IR PORT REMOVAL RIGHT  2021     JOINT REPLACEMENT Right     hip     JOINT REPLACEMENT       LASIK Bilateral      ORTHOPEDIC SURGERY       REPAIR ECTROPION Left 2021    Procedure: Left lower eyelid ectropion repair;  Surgeon: Vivien Yoon MD;  Location: [facility] OR     REPAIR PTOSIS Left      VASCULAR SURGERY        No Known Allergies   Social History     Tobacco Use     Smoking status: Never     Smokeless tobacco: Never   Substance Use Topics     Alcohol use: Yes     Comment: 3 beers/week      Wt Readings from Last 1 Encounters:   23 81.6 kg (180 lb)        Anesthesia Evaluation            ROS/MED HX  ENT/Pulmonary: Comment: Tongue squamous cell cancer  s/p radiation     (+) asthma     Neurologic:       Cardiovascular:     (+) Dyslipidemia hypertension-----dysrhythmias, a-fib,     METS/Exercise Tolerance:     Hematologic:       Musculoskeletal:       GI/Hepatic:     (+) GERD,     Renal/Genitourinary:       Endo:     (+) thyroid problem, hypothyroidism,     Psychiatric/Substance Use:     (+) psychiatric history anxiety     Infectious Disease:       Malignancy:       Other:            Physical Exam    Airway        Mallampati: III   TM distance: > 3 FB   Neck ROM: limited   Mouth opening: < 3 cm    Respiratory Devices and Support         Dental     Comment: Caps         Cardiovascular   cardiovascular exam normal          Pulmonary   pulmonary exam normal                OUTSIDE LABS:  CBC:   Lab Results   Component Value Date    WBC 5.5 09/16/2022    WBC 7.8 01/11/2022    HGB 10.9 (L) 12/03/2022    HGB 12.2 (L) 09/16/2022    HCT 37.3 (L) 09/16/2022    HCT 38.4 (L) 01/11/2022     12/03/2022     09/16/2022     BMP:   Lab Results   Component Value Date     01/11/2022     11/12/2021    POTASSIUM 4.1 01/11/2022    POTASSIUM 4.2 11/12/2021    CHLORIDE 104 01/11/2022    CHLORIDE 106 11/12/2021    CO2 28 01/11/2022    CO2 28 11/12/2021    BUN 13 01/11/2022    BUN 13 11/12/2021    CR 0.65 (L) 12/02/2022    CR 0.8 07/20/2022     (H) 12/03/2022     (H) 01/11/2022     COAGS:   Lab Results   Component Value Date    INR 1.19 (H) 09/16/2022     POC: No results found for: BGM, HCG, HCGS  HEPATIC:   Lab Results   Component Value Date    ALBUMIN 3.8 01/11/2022    PROTTOTAL 6.8 01/11/2022    ALT 21 01/11/2022    AST 11 01/11/2022    ALKPHOS 82 01/11/2022    BILITOTAL 0.4 01/11/2022     OTHER:   Lab Results   Component Value Date    RIYA 9.1 01/11/2022    MAG 1.8 04/26/2021    TSH 2.02 02/08/2023    T4 0.89 11/12/2021       Anesthesia Plan    ASA Status:  3      Anesthesia Type: General.     - Airway: ETT         Techniques and Equipment:     -  Airway: Video-Laryngoscope         Consents    Anesthesia Plan(s) and associated risks, benefits, and realistic alternatives discussed. Questions answered and patient/representative(s) expressed understanding.    - Discussed:     - Discussed with:  Patient         Postoperative Care       PONV prophylaxis: Ondansetron (or other 5HT-3), Dexamethasone or Solumedrol, Background Propofol Infusion     Comments:                Ramila Meng

## 2023-07-21 NOTE — BRIEF OP NOTE
[facility]    Brief Operative Note    Pre-operative diagnosis: Left hip OA  Post-operative diagnosis same  Procedure: LEFT DIRECT ANTERIOR TOTAL HIP ARTHROPLASTY  Surgeon(s) and Role:     * Aron Lemon MD - Primary     * Celina Munoz PA-C - Assisting     * BHARATHI Kemp, JAZZMINE-C - Assisting  Anesthesia: General   Estimated blood loss: 300 ml  Drains:  None  Specimens: None  Findings:  Advanced OA  Complications: None    Plan: DC home POD1 w/family assist.  DVT prophylaxis w/Lovenox POD1, Xarelto 20 mg QD starting POD2 (PTA dose) x6wks.    Implants:   Implant Name Type Inv. Item Serial No.  Lot No. LRB No. Used Action   IMP APEX HOLE ELIMINATOR HIP DEPUY DURALOC 1246- - [id number] Metallic Hardware/[place] IMP APEX HOLE ELIMINATOR HIP DEPUY DURALOC 1246-  [employer] [facility]- [id number] Left 1 Implanted   IMP CUP ACE PINNACLE 62MM 1217- - [id number] Total Joint Component/Insert IMP CUP ACE PINNACLE 62MM 1217-  [employer] [facility] [facility]- [id number] Left 1 Implanted   LINER ACETABULAR ALTRX NEUTRAL [id number] - [id number] Total Joint Component/Insert LINER ACETABULAR ALTRX NEUTRAL [id number]  [employer] [facility] CARE [facility]- [id number] Left 1 Implanted   IMP SCR BONE CAN ACE 6.5X35MM 1217- - [id number] Metallic Hardware/[place] IMP SCR BONE CAN ACE 6.5X35MM 1217-  [employer] [facility]ARE INC- [id number] Left 1 Implanted   IMP SCR BONE CAN ACE 6.5X25MM 1217- - [id number] Metallic Hardware/[place] IMP SCR BONE CAN ACE 6.5X25MM 1217-  [employer]- [id number] Left 1 Implanted   IMP HEAD FEMORAL DEPUY CERAMIC 36MM +5MM [id number] - [id number] Total Joint Component/Insert IMP HEAD FEMORAL DEPUY CERAMIC 36MM +5MM [id number]  [employer] [facility] [facility]- [id number] Left 1 Implanted   IMP STEM FEM DEPUY ACTIS COLLAR HI-OFFSET SZ 9MM 1010- - [id number] Total Joint Component/Insert IMP STEM FEM DEPUY ACTIS COLLAR HI-OFFSET SZ 9MM 1010-  [employer] [facility]- [id number]  Left 1 Implanted

## 2023-07-21 NOTE — ANESTHESIA PROCEDURE NOTES
Airway       Patient location during procedure: OR       Procedure Start/Stop Times: 7/21/2023 9:49 AM  Staff -        Anesthesiologist:  Ramila Meng       CRNA: Chey Askew APRN CRNA       Other Anesthesia Staff: Hector Mukherjee MD       Performed By: anesthesiologist  Consent for Airway        Urgency: elective  Indications and Patient Condition       Indications for airway management: jermaine-procedural       Induction type:intravenous       Mask difficulty assessment: 1 - vent by mask    Final Airway Details       Final airway type: endotracheal airway       Successful airway: ETT - single  Endotracheal Airway Details        ETT size (mm): 8.0       Cuffed: yes       Successful intubation technique: flexible bronchoscopy and video laryngoscopy       VL Blade Size: Glidescope 3       Grade View of Cords: 3       Position: Right       Measured from: gums/teeth       Secured at (cm): 23       Bite block used: None    Post intubation assessment        Placement verified by: capnometry, equal breath sounds and chest rise        Number of attempts at approach: 4 or more       Number of other approaches attempted: 3 or more       Secured with: silk tape       Ease of procedure: difficult       Dentition: Unchanged    Medication(s) Administered   Medication Administration Time: 7/21/2023 9:49 AM    Additional Comments       Patient has minimal mouth opening and rigidity due to neck radiation. Easy mask without oral airway. Attempted Glidescope 4, able to get in mouth but Grade 4 view. Unable to insert Guardado 4 fully due to limited opening. MDA attempted oral and nasal fiberoptic with inability to get a view of glottic opening due to inability to extend neck. Remained easy to mask in between attempts with SpO2 99%. Last attempt with Glidescope 3 and Fiberoptic, Grade 3 view and fiberoptic able to pass through glottic opening, ETT 8.0 passed over fiberoptic. BS equal bilaterally, VSS.

## 2023-07-21 NOTE — ANESTHESIA POSTPROCEDURE EVALUATION
Patient: Fortino Moya    Procedure: Procedure(s):  LEFT TOTAL HIP ARTHROPLASTY DIRECT ANTERIOR APPROACH       Anesthesia Type:  General    Note:  Disposition: Admission   Postop Pain Control: Uneventful            Sign Out: Well controlled pain   PONV: No   Neuro/Psych: Uneventful            Sign Out: Acceptable/Baseline neuro status   Airway/Respiratory: Uneventful            Sign Out: Acceptable/Baseline resp. status   CV/Hemodynamics: Uneventful            Sign Out: Acceptable CV status   Other NRE:    DID A NON-ROUTINE EVENT OCCUR? No           Last vitals:  Vitals Value Taken Time   /94 07/21/23 1315   Temp     Pulse 97 07/21/23 1329   Resp 8 07/21/23 1329   SpO2 94 % 07/21/23 1329   Vitals shown include unvalidated device data.    Electronically Signed By: Ramila Meng  July 21, 2023  1:31 PM

## 2023-07-21 NOTE — PLAN OF CARE
Goal Outcome Evaluation:    Patient vital signs are at baseline: Yes  Patient able to ambulate as they were prior to admission or with assist devices provided by therapies during their stay:  Yes  Patient MUST void prior to discharge:  Yes  Patient able to tolerate oral intake:  Yes  Pain has adequate pain control using Oral analgesics:  Yes  Does patient have an identified :  Yes  Has goal D/C date and time been discussed with patient:  Yes reviewed discharge instruction with the pt and pt discharged to home.

## 2023-07-21 NOTE — CONSULTS
[facility]  BRIEF HOSPITALIST CONSULT NOTE- Hospitalist Service     Date of Admission:  7/21/2023  Consult Requested by: Dr. Luna  Reason for Consult: Post-op med rec and medical management - a-fib, tonsillar cancer, HLD, HTN, hypothyroidism  PRIMARY CARE PROVIDER:    Clinic, [facility]    Assessment & Plan   Fortino Moya is a 73 year old male admitted on 7/21/2023.    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      EMR was reviewed that included pre-op H&P and PTA medications.  Vital signs reviewed.  Formal consult will be deferred.  Please see outlined plan below.  Admission PTA (Home) medication reconciliation has been completed.  Hospitalist will chart check in the morning.      OA with degenerative changes of the left hip s/p Left JOSÉ ANTONIO  POD #0.   - Orthopedic Surgery is managing.   --Analgesic/pain management, DVT prophylaxis, PT/OT consultation per Ortho.    - HGB check ordered for the morning.     - Encourage utilization of incentive spirometer.     HTN  - Resumed on PTA Coreg 12.5 mg BID with hold parameters.    - Hold PTA lisinopril 5 mg every evening.     --Pre-op H&P noted patient had hyperkalemia thought to be due to lisinopril.    - BMP ordered in the morning.      HLP  - Resumed on PTA atorvastatin 10 mg every evening.      Paroxysmal atrial fib  Chronic anticoagulation therapy  - Resumed on PTA Coreg 12.5 mg BID with hold parameters.    - Defer to Ortho regarding resumption of PTA Xarelto 20 mg/d with dinner.      Aortic dilatation   - Continue outpatient surveillance.      Hypothyroidism  - Resumed on PTA levothyroxine 50 mcg/d.      Insomnia  - Resumed on PTA Trazodone 50 mg at bedtime.      History of  "SCC of the tonsils  *Cancer free for ~ 2years.  Diagnosed in 2018 and treated with XRT and chemo.  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    - No interventions.      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    - Resumed on PTA Trental 400 mg BID.    Clinically Significant Risk Factors Present on Admission               # Drug Induced Coagulation Defect: home medication list includes an anticoagulant medication    # Hypertension: Home medication list includes antihypertensive(s)      # Overweight: Estimated body mass index is 26.73 kg/m  as calculated from the following:    Height as of this encounter: 1.753 m (5' 9\").    Weight as of this encounter: 82.1 kg (181 lb).              Diet: Advance Diet as Tolerated: Regular Diet Adult  Discharge Instruction - Regular Diet Adult     DVT Prophylaxis: Defer to primary service   Galvan Catheter: Not present  Code Status: Full Code; per Ortho.     Disposition Plan    Per Ortho.      Robert Marcos PA-C  [facility]  Securely message with the Vocera Web Console (learn more here)  Text page via [employer] Paging/Directory        "

## 2023-07-21 NOTE — PLAN OF CARE
Physical Therapy Discharge Summary    Reason for therapy discharge:    All goals and outcomes met, no further needs identified.    Progress towards therapy goal(s). See goals on Care Plan in [facility] electronic health record for goal details.  Goals met    Therapy recommendation(s):    Defer to ortho; pt is mobilizing well with FWW.

## 2023-07-22 NOTE — OP NOTE
DATE OF SERVICE:  7/21/2023    SURGEON  MIRTA CONTRERAS M.D.    ASSISTANT  Celina Munoz PA-C - Assisting  BHARATHI Kemp, JAZZMINE-C - Assisting    PREOPERATIVE DIAGNOSIS   Left hip osteoarthritis, failed to respond to conservative management.    POSTOPERATIVE DIAGNOSIS   Left hip osteoarthritis, failed to respond to conservative management.    TITLE OF PROCEDURE   Left total hip arthroplasty, Depuy uncemented components, direct anterior approach.    PROCEDURE  The patient was brought to the operating room and after satisfactory anesthesia was placed on the [place] table. The left  lower extremity was then prepped and draped in the usual sterile fashion. Image intensification was utilized during the case for component positioning as well as leg length assessment. An incision was made just lateral to the ASIS and coursing toward the proximal femur. Dissection was carried down to the TFL. The fascia overlying the TFL was incised and dissection was carried down to the interval between TFL and rectus femoris. This was identified. A lateral cobra retractor was placed followed by a medial cobra around the femoral neck. The leash vessels, anterior circumflex, was identified and was coagulated. The underlying fascia was released. Dissection was carried down to the hip capsule. Capsule was identified and a capsulotomy was performed in a T-type fashion first along the intertrochanteric line and then secondarily up along the femoral neck and head, finally completed by releasing along the saddle of the trochanter. The capsular edges were tagged for later repair. The hip was then externally rotated and medial capsular release was performed such that the lesser trochanter could be palpated. Following this, the femoral neck was osteotomized as per the preoperative plan. The femoral head was removed with a corkscrew without difficulty. The acetabulum was exposed and was reamed sequentially up to 62 mm. This was reamed under both  direct visualization as well as the aid of image intensification. A 62 mm Woodruff cup was impacted into place in approximately 40 to 45 degrees of abduction, and 15 degrees of anteversion. Two screws were placed and this gave excellent fixation. The 36 mm neutral liner was impacted into place.     Attention was then directed to the femur. The hook was placed underneath the proximal aspect of the femur between the trochanteric ridge and gluteus melanie. The hip was then brought into external rotation, adduction, and extension. The femur was then carefully elevated using the appropriate releases off the inside of the greater trochanter. Once the femur was elevated, a starter broach was placed followed by sequential broaches. The broaches were performed up to size 9 Actis, which gave excellent torsinal as well as axial stability. Trial reduction was performed with a high offset +5mm head. The hip was reduced and with hip reduction the combined anteversion looked excellent. The hip was brought into full extension and external rotation. There was no evidence of instability. As well, x-rays were printed and compared with the opposite side and found to have good length and restoration of offset.  It was felt that an additional stem size could not be placed.   The hip was then dislocated and then the proximal femur was then brought back up into the proximal aspect of the wound. The real size 9 actis stem, high offset was impacted into place. Again this gave excellent torsion as well as axial stability. The real +5mm ceramic biolox head was impacted into place and the hip was reduced again. The image intensification confirmed excellent position of the components.   A 3 minute dilute betadine soak was performed.  The wound was thoroughly irrigated. One gram of vancomycin powder was placed deep and superficial prior to closure.  The capsule was closed with interrupted 0 Vicryl suture and tissues infiltrated with  toradol/marcaine mixture. The tensor fascia was closed with a running 0 Stratafix suture. The subcutaneous layer was closed with interrupted 2-0 Vicryl, 2-0 Stratafix, and 3-0 subcuticular monocryl was placed followed by a mesh dressing with skin glue. Sterile dressing was applied. The patient left the operating room in satisfactory condition. Patient received 1 gm of tranexamic acid pre-op.    A skilled first assistant was necessary for this procedure for assistance with patient positioning, prepping, draping, surgical visualization, performance of the repair, wound closure, and application of the dressing.

## 2023-09-17 DIAGNOSIS — E03.9 HYPOTHYROIDISM, UNSPECIFIED TYPE: ICD-10-CM

## 2023-09-18 RX ORDER — LEVOTHYROXINE SODIUM 50 UG/1
50 TABLET ORAL DAILY
Qty: 90 TABLET | Refills: 3 | Status: SHIPPED | OUTPATIENT
Start: 2023-09-18

## 2023-09-20 ENCOUNTER — TRANSFERRED RECORDS (OUTPATIENT)
Dept: MULTI SPECIALTY CLINIC | Facility: CLINIC | Age: 73
End: 2023-09-20

## 2023-09-20 LAB — ALT SERPL-CCNC: <10 U/L

## 2023-09-26 ENCOUNTER — OFFICE VISIT (OUTPATIENT)
Dept: OPHTHALMOLOGY | Facility: CLINIC | Age: 73
End: 2023-09-26
Attending: OPHTHALMOLOGY
Payer: COMMERCIAL

## 2023-09-26 DIAGNOSIS — H02.239 PARALYTIC LAGOPHTHALMOS, UNSPECIFIED LATERALITY: ICD-10-CM

## 2023-09-26 DIAGNOSIS — H04.122 DRY EYE SYNDROME OF LEFT EYE: ICD-10-CM

## 2023-09-26 DIAGNOSIS — H02.235 PARALYTIC LAGOPHTHALMOS OF LEFT LOWER EYELID: ICD-10-CM

## 2023-09-26 DIAGNOSIS — H02.056 TRICHIASIS OF LEFT EYE WITHOUT ENTROPION: Primary | ICD-10-CM

## 2023-09-26 DIAGNOSIS — H16.232 NEUROTROPHIC KERATOPATHY OF LEFT EYE: ICD-10-CM

## 2023-09-26 DIAGNOSIS — H16.212 EXPOSURE KERATOPATHY, LEFT: ICD-10-CM

## 2023-09-26 PROCEDURE — 99214 OFFICE O/P EST MOD 30 MIN: CPT | Mod: 25 | Performed by: OPHTHALMOLOGY

## 2023-09-26 PROCEDURE — G0463 HOSPITAL OUTPT CLINIC VISIT: HCPCS | Performed by: OPHTHALMOLOGY

## 2023-09-26 PROCEDURE — 67820 REVISE EYELASHES: CPT | Mod: LT | Performed by: OPHTHALMOLOGY

## 2023-09-26 ASSESSMENT — EXTERNAL EXAM - LEFT EYE: OS_EXAM: LEFT FACIAL PALSY

## 2023-09-26 ASSESSMENT — CONF VISUAL FIELD
OS_NORMAL: 1
OS_SUPERIOR_NASAL_RESTRICTION: 0
OD_INFERIOR_TEMPORAL_RESTRICTION: 0
METHOD: COUNTING FINGERS
OS_SUPERIOR_TEMPORAL_RESTRICTION: 0
OD_SUPERIOR_TEMPORAL_RESTRICTION: 0
OS_INFERIOR_NASAL_RESTRICTION: 0
OD_SUPERIOR_NASAL_RESTRICTION: 0
OD_NORMAL: 1
OD_INFERIOR_NASAL_RESTRICTION: 0
OS_INFERIOR_TEMPORAL_RESTRICTION: 0

## 2023-09-26 ASSESSMENT — VISUAL ACUITY
METHOD: SNELLEN - LINEAR
OD_SC: 20/30
OS_SC: 20/100
OD_PH_SC: 20/25

## 2023-09-26 ASSESSMENT — EXTERNAL EXAM - RIGHT EYE: OD_EXAM: NORMAL

## 2023-09-26 ASSESSMENT — TONOMETRY
OS_IOP_MMHG: 09
OD_IOP_MMHG: 11
IOP_METHOD: ICARE

## 2023-09-26 NOTE — NURSING NOTE
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   The patient reports his vision seems the same as last visit.  He wears a scleral lens in the left eye but it is not in his eye today.  He uses artificial tears 8-10 times daily and he uses the EES ointment at bedtime.  Katerine Fuentes, COA, COA 1:31 PM 09/26/2023                     Patient

## 2023-09-26 NOTE — PROGRESS NOTES
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    Of note, patient has a history of L oropharynx P16+ squamous cell cancer first diagnosed in 2018. The patient underwent low dose weekly carboplatin 1.5 AUC with radiation. His PET/CT 2 months after initiation and underwent surveillance after that. In 08/2020, he developed numbness in chin and then forehead. He underwent PET/CT and IR-guided biopsy (1/2021), which demonstrated invasive squamous cell carcinoma.    Per oncology, patient has left-sided paralysis. The patient first noticed left eye redness and lower lid drooping in about January 2021. He notes that these symptoms progressed during through April 2021. He noted associated watery discharge. He was seen by oncology and started on erythromycin ilya then recommended to follow up with ophthalmology for further management. He has also been using AT BID during this time. He denies eye irritation, pain, purulent discharge, and flashes/floaters. He notes baseline left eye vision when not using erythromycin ointment.    Interval HPI: BRETT with me 4/2023.   No eye pain or redness. No problem with eye drops.     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 New disease in the left  space s/p PET/CT and IR-guided biopsy consistent with invasive squamous cell carcinoma s/p induction chemotherapy (1/28/21).  - s/p permanent tarsorrhaphy lateral 50% (2/10/21)- Dr. Pendleton  - s/p FLOWER platinum weight, LLL ectropion repair, and temporary tarso - Dr. Yoon (4/1/21)  7/8/21: misdirected trichiatic lashes epilated   10/1/21: left eye cataract increasing. left eye ocular surface dry but epi intact.  12/14/21: Lashes pulled.  Encouraged more PFAT use.  3/3/22: Encouraged more PFAT use bilaterally. Corneal surface has rare PK. Pt doing well with scleral lenses. Few left eye LL ltrichiasis.  11/15/22: significant dryness, no epi defect, no infiltrate. Disc motorcycle glasses. Awaiting CL.    Plan:  - Continue Emycin at night and when not using the scleral lens  - Continue Celluvisc to 6x daily, use in both eyes  - Continue with scleral lens wear; saw Dr. 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 If proceed, recommend during summer months.  - Refer to Dr Wooten for complex CE/IOL when ready   -Disc motorcycle glasses    History of LASIK ou      # Blepharitis  Disc Lid Hygien      RTC: Sept. Cornea [place], sooner PRN  Follow up with Dr. Yoon and Dr. Pardo as scheduled.    MD Glenda Penn MD    Attending Physician Attestation:  Complete documentation of historical and exam elements from today's encounter can be found in  the full encounter summary report (not reduplicated in this progress note).  I personally obtained the chief complaint(s) and history of present illness.  I confirmed and edited as necessary the review of systems, past medical/surgical history, family history, social history, and examination findings as documented by others; and I examined the patient myself.  I personally reviewed the relevant tests, images, and reports as documented above.  I formulated and edited as necessary the assessment and plan and discussed the findings and management plan with the patient and family. - Mert Barney MD

## 2023-11-11 ENCOUNTER — HEALTH MAINTENANCE LETTER (OUTPATIENT)
Age: 73
End: 2023-11-11

## 2023-12-26 DIAGNOSIS — H04.122 DRY EYE SYNDROME OF LEFT EYE: ICD-10-CM

## 2023-12-27 NOTE — TELEPHONE ENCOUNTER
erythromycin (ROMYCIN) 5 MG/GM ophthalmic ointment   Last Written Prescription Date:  11/15/2022  Last Fill Quantity: 7,   # refills: 11  Last Office Visit : 9/26/2023  Future Office visit:  3/26/2024    Routing refill request to provider for review/approval because:  In last not 9/26/2023  Would Provider like one eye, both eyes, for this med.   It just says continue using at night with not using scleral lens.  Please review and send updated order.     Lora Bradshaw RN  Central Triage Red Flags/Med Refills    Plan:9/26/2023  - Continue Emycin at night and when not using the scleral lens  - Continue Celluvisc to 6x daily, use in both eyes  - Continue with scleral lens wear; saw Dr. Pardo 11/15/2022  - Epilated lashes today 9/26/23 left lower eyelid  - Continue scleral lens left eye   - Disc left eye cataract surgery in the fall

## 2024-01-03 RX ORDER — ERYTHROMYCIN 5 MG/G
OINTMENT OPHTHALMIC AT BEDTIME
Qty: 7 G | Refills: 3 | Status: SHIPPED | OUTPATIENT
Start: 2024-01-03

## 2024-10-11 ENCOUNTER — TELEPHONE (OUTPATIENT)
Dept: OPTOMETRY | Facility: CLINIC | Age: 74
End: 2024-10-11

## 2024-10-11 ENCOUNTER — OFFICE VISIT (OUTPATIENT)
Dept: OPHTHALMOLOGY | Facility: CLINIC | Age: 74
End: 2024-10-11
Payer: COMMERCIAL

## 2024-10-11 DIAGNOSIS — H16.212 EXPOSURE KERATOPATHY, LEFT: ICD-10-CM

## 2024-10-11 DIAGNOSIS — H16.232 NEUROTROPHIC KERATOPATHY OF LEFT EYE: ICD-10-CM

## 2024-10-11 DIAGNOSIS — H18.30 CORNEAL EPITHELIAL DEFECT: Primary | ICD-10-CM

## 2024-10-11 DIAGNOSIS — H04.122 DRY EYE SYNDROME OF LEFT EYE: ICD-10-CM

## 2024-10-11 PROBLEM — H90.8 MIXED CONDUCTIVE AND SENSORINEURAL HEARING LOSS: Status: ACTIVE | Noted: 2023-10-06

## 2024-10-11 PROBLEM — G47.00 PERSISTENT INSOMNIA: Status: ACTIVE | Noted: 2018-01-02

## 2024-10-11 PROBLEM — M86.9 OSTEOMYELITIS (H): Status: ACTIVE | Noted: 2024-02-21

## 2024-10-11 PROBLEM — E03.9 HYPOTHYROIDISM: Status: ACTIVE | Noted: 2021-10-29

## 2024-10-11 PROBLEM — E78.5 HYPERLIPIDEMIA: Status: ACTIVE | Noted: 2018-01-02

## 2024-10-11 PROBLEM — F41.9 ANXIETY: Status: ACTIVE | Noted: 2018-01-02

## 2024-10-11 PROBLEM — I10 PRIMARY HYPERTENSION: Status: ACTIVE | Noted: 2018-01-02
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
## 2024-10-11 PROBLEM — E86.0 DEHYDRATION: Status: ACTIVE | Noted: 2024-03-01

## 2024-10-11 PROBLEM — M27.2 OSTEORADIONECROSIS OF MANDIBLE: Status: ACTIVE | Noted: 2022-04-21

## 2024-10-11 PROBLEM — Z86.79 HISTORY OF ATRIAL FIBRILLATION: Status: ACTIVE | Noted: 2018-01-02

## 2024-10-11 PROBLEM — T82.7XXA: Status: ACTIVE | Noted: 2024-04-04

## 2024-10-11 PROBLEM — I48.0 PAROXYSMAL ATRIAL FIBRILLATION (H): Status: ACTIVE | Noted: 2020-01-06

## 2024-10-11 PROBLEM — Z79.01 LONG TERM (CURRENT) USE OF ANTICOAGULANTS: Status: ACTIVE | Noted: 2022-12-14

## 2024-10-11 PROBLEM — K13.79: Status: ACTIVE | Noted: 2023-08-31

## 2024-10-11 PROBLEM — T82.7XXA: Status: ACTIVE | Noted: 2024-04-03

## 2024-10-11 PROCEDURE — 99213 OFFICE O/P EST LOW 20 MIN: CPT | Performed by: OPTOMETRIST

## 2024-10-11 RX ORDER — OFLOXACIN 3 MG/ML
1-2 SOLUTION/ DROPS OPHTHALMIC 4 TIMES DAILY
Qty: 5 ML | Refills: 1 | Status: SHIPPED | OUTPATIENT
Start: 2024-10-11

## 2024-10-11 ASSESSMENT — EXTERNAL EXAM - RIGHT EYE: OD_EXAM: NORMAL

## 2024-10-11 ASSESSMENT — VISUAL ACUITY
METHOD: SNELLEN - LINEAR
OD_SC: 20/40
CORRECTION_TYPE: CONTACTS
OS_SC: CF@1FT

## 2024-10-11 ASSESSMENT — TONOMETRY
OD_IOP_MMHG: 10
OS_IOP_MMHG: 06
IOP_METHOD: ICARE

## 2024-10-11 ASSESSMENT — EXTERNAL EXAM - LEFT EYE: OS_EXAM: LEFT FACIAL PALSY

## 2024-10-11 NOTE — NURSING NOTE
"Chief Complaints and History of Present Illnesses   Patient presents with    Annual Eye Exam     Pt here for annual eye exam with scleral lens left eye and new concerns.     Chief Complaint(s) and History of Present Illness(es)       Annual Eye Exam              Laterality: both eyes    Comments: Pt here for annual eye exam with scleral lens left eye and new concerns.              Comments    Pt notes left eye is always \"bloodshot\". Pt states redness started approx 2 months ago. Vision is cloudy left eye, right eye is unchanged.     ANA Alexander on 10/11/2024 at 8:13 AM                     "

## 2024-10-11 NOTE — TELEPHONE ENCOUNTER
----- Message from Jayda STRONG sent at 10/11/2024  8:57 AM CDT -----  Regarding: CORNEA  Appointment needed next week      Checkout note from :  NEXT WEEK - preference for cornea specialist but if not then please force on with  on Tuesday.        Patient has previously been seen with .    Scheduled patient for first available in November.    Please review for a sooner appointment with Cornea providers.    Patient CANNOT do Wednesdays as he has Chemo Therapy at [facility].    ---------------  Above per Eye .     Will reach out to cornea team if any availability next Monday.     Pam Painting RN 9:09 AM 10/11/24

## 2024-10-11 NOTE — TELEPHONE ENCOUNTER
Called total of four times and left a VM     Sent a [employer] message     Scheduled an appointment with Dr. Bonilla for Monday at 8 am     Li Fleming Communication Facilitator on 10/11/2024 at 2:36 PM

## 2024-10-11 NOTE — PATIENT INSTRUCTIONS
START ofloxacin antibiotic drops 4x/day    CONTINUE preservative free artificial tears every 1-2 hours while awake    PAUSE the erythromycin ointment while the contact lens is in    PROTECT the eye overnight with Glad Press N Seal - take a small square and 'tack' it around the skin around your eye to form a protective seal overnight    FOLLOW UP next week with either a cornea specialist or myself

## 2024-10-11 NOTE — PROGRESS NOTES
A/P  1.) Central corneal epithelial defect  -Unsure of onset, eye has been red x 1-2 months  -Previously in scleral lens but has not been wearing as much lately  -Central defect adversely affecting vision. Minimal discomfort due to atrophic cornea  -Start ofloxacin QID left eye, BCL placed today. Increase PFAT and hold ointment for now. Overnight protection    2.) Cataract left eye>>>right eye  -Needs cataract surgery, but not until cornea has stabilized  -Best case scenario will need chronic scleral lens wear for ocular surface support even after surgery    Follow-up with me next week. Re-establish cornea care as soon as possible    I have confirmed the patient's CC, HPI and reviewed Past Medical History, Past Surgical History, Social History, Family History, Problem List, Medication List and agree with Tech note.     Antonella Pardo, OD FAAO [facility]SL[facility]

## 2024-10-11 NOTE — TELEPHONE ENCOUNTER
Anytime on Monday with  works. Ok to overbook     --    Above per cornea team.    Note to patient communicator to assist in confirming appt with Dr. Bonilla on Monday/October 14th    Live Arthur RN 1:57 PM 10/11/24

## 2024-10-14 ENCOUNTER — OFFICE VISIT (OUTPATIENT)
Dept: OPHTHALMOLOGY | Facility: CLINIC | Age: 74
End: 2024-10-14
Attending: OPHTHALMOLOGY
Payer: COMMERCIAL

## 2024-10-14 DIAGNOSIS — H16.232 NEUROTROPHIC CORNEA OF LEFT EYE: Primary | ICD-10-CM

## 2024-10-14 DIAGNOSIS — H02.055 TRICHIASIS OF LEFT LOWER EYELID: ICD-10-CM

## 2024-10-14 DIAGNOSIS — H18.892 PERSISTENT EPITHELIAL DEFECT OF LEFT CORNEA: ICD-10-CM

## 2024-10-14 DIAGNOSIS — H02.235 PARALYTIC LAGOPHTHALMOS OF LEFT LOWER EYELID: ICD-10-CM

## 2024-10-14 DIAGNOSIS — H16.212 EXPOSURE KERATOPATHY, LEFT: ICD-10-CM

## 2024-10-14 PROCEDURE — 67820 REVISE EYELASHES: CPT | Performed by: OPHTHALMOLOGY

## 2024-10-14 PROCEDURE — G0463 HOSPITAL OUTPT CLINIC VISIT: HCPCS | Performed by: OPHTHALMOLOGY

## 2024-10-14 PROCEDURE — 68761 CLOSE TEAR DUCT OPENING: CPT | Performed by: OPHTHALMOLOGY

## 2024-10-14 PROCEDURE — 99214 OFFICE O/P EST MOD 30 MIN: CPT | Mod: 25 | Performed by: OPHTHALMOLOGY

## 2024-10-14 ASSESSMENT — VISUAL ACUITY
OS_SC: 20/250
OD_SC+: +1
OD_PH_SC+: -1+2
OD_PH_SC: 20/40
OD_SC: 20/70
METHOD: SNELLEN - LINEAR

## 2024-10-14 ASSESSMENT — TONOMETRY
OD_IOP_MMHG: 9
OS_IOP_MMHG: 6
IOP_METHOD: ICARE

## 2024-10-14 ASSESSMENT — EXTERNAL EXAM - RIGHT EYE: OD_EXAM: NORMAL

## 2024-10-14 ASSESSMENT — EXTERNAL EXAM - LEFT EYE: OS_EXAM: LEFT FACIAL PALSY

## 2024-10-14 NOTE — NURSING NOTE
Chief Complaints and History of Present Illnesses   Patient presents with    Corneal Evaluation     Central corneal epithelial defect.     Chief Complaint(s) and History of Present Illness(es)       Corneal Evaluation              Laterality: left eye    Associated symptoms: eye pain and photophobia.  Negative for dryness and tearing    Treatments tried: eye drops and artificial tears    Pain scale: 2/10    Comments: Central corneal epithelial defect.              Comments    Pt states LE is unchanged.  Pain LE 2/10.  Pt is being treated for cancer.  Left side of face and LE are numb.  Light sensitivity.  Ofloxacin LE 4x/day  PFAT's PRN  Pt stopped ilya 3 days ago.    ANA Stuart October 14, 2024 10:58 AM

## 2024-10-14 NOTE — PROGRESS NOTES
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    Of note, patient has a history of L oropharynx P16+ squamous cell cancer first diagnosed in 2018. The patient underwent low dose weekly carboplatin 1.5 AUC with radiation. His PET/CT 2 months after initiation and underwent surveillance after that. In 08/2020, he developed numbness in chin and then forehead. He underwent PET/CT and IR-guided biopsy (1/2021), which demonstrated invasive squamous cell carcinoma.    Per oncology, patient has left-sided paralysis. The patient first noticed left eye redness and lower lid drooping in about January 2021. He notes that these symptoms progressed during through April 2021. He noted associated watery discharge. He was seen by oncology and started on erythromycin ilya then recommended to follow up with ophthalmology for further management. He has also been using AT BID during this time. He denies eye irritation, pain, purulent discharge, and flashes/floaters. He notes baseline left eye vision when not using erythromycin ointment.    Interval hx 10/14/2024: Previously seen by Dr. Barney, last seen 09/23. Saw Dr. Pardo last week. Patient is wearing the scleral lens again.   Chief Complaint(s) and History of Present Illness(es)       Corneal Evaluation    In left eye.  Associated symptoms include eye pain and photophobia.  Negative for dryness and tearing.  Treatments tried include eye drops and artificial tears.  Pain was noted as 2/10. Additional comments: Central corneal epithelial defect.             Comments    Pt states LE is unchanged.  Pain LE 2/10.  Pt is being treated for cancer.  Left side of face and LE are numb.  Light sensitivity.  Ofloxacin  LE 4x/day  PFAT's PRN  Pt stopped ilya 3 days ago.    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 New disease in the left  space s/p PET/CT and IR-guided biopsy consistent with invasive squamous cell carcinoma s/p induction chemotherapy (1/28/21).  - s/p permanent tarsorrhaphy lateral 50% (2/10/21)- Dr. Pendleton  - s/p FLOWER platinum weight, LLL ectropion repair, and temporary tarso - Dr. Yoon (4/1/21)  7/8/21: misdirected trichiatic lashes epilated   10/1/21: left eye cataract increasing. left eye ocular surface dry but epi intact.  12/14/21: Lashes pulled.  Encouraged more PFAT use.  3/3/22: Encouraged more PFAT use bilaterally. Corneal surface has rare PK. Pt doing well with scleral lenses. Few left eye LL ltrichiasis.  11/15/22: significant dryness, no epi defect, no infiltrate. Disc motorcycle glasses. Awaiting CL.  10/14/24: large epi defect, exposure keratopathy, neurotrophic keratopathy - left     Plan:  - Continue Emycin at night and when not using the scleral lens  - Continue Celluvisc to 6x daily, use in both eyes  - Continue with scleral lens wear; saw Dr. 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 If proceed, recommend during summer months.  - Refer to Dr Wooten for complex CE/IOL when ready   -Disc motorcycle glasses    History of LASIK ou      # Blepharitis  Disc Lid Hygien      RTC: 1 week. Cornea [place], sooner PRN  Follow up with Dr. Yoon and Dr. Pardo as scheduled.    Attending Physician Attestation:  Complete documentation of historical and exam elements from today's encounter can be found in the full encounter summary report (not reduplicated in this progress note).  I personally obtained the chief complaint(s) and history of present illness.  I confirmed and edited as necessary the review of systems, past medical/surgical history, family history, social history, and examination findings as documented by others; and I examined the patient myself.  I personally reviewed the relevant tests, images, and reports as documented above.  I formulated and edited as necessary the assessment and plan and discussed the findings and management plan with the patient and family. I was present for the entire procedure.  - Ranjit Bonilla MD

## 2024-10-21 ENCOUNTER — OFFICE VISIT (OUTPATIENT)
Dept: OPHTHALMOLOGY | Facility: CLINIC | Age: 74
End: 2024-10-21
Attending: OPHTHALMOLOGY
Payer: COMMERCIAL

## 2024-10-21 DIAGNOSIS — H16.232 NEUROTROPHIC CORNEA OF LEFT EYE: Primary | ICD-10-CM

## 2024-10-21 DIAGNOSIS — H18.892 PERSISTENT EPITHELIAL DEFECT OF LEFT CORNEA: ICD-10-CM

## 2024-10-21 PROCEDURE — G0463 HOSPITAL OUTPT CLINIC VISIT: HCPCS | Performed by: OPHTHALMOLOGY

## 2024-10-21 PROCEDURE — 99214 OFFICE O/P EST MOD 30 MIN: CPT | Mod: 24 | Performed by: OPHTHALMOLOGY

## 2024-10-21 RX ORDER — CYCLOSPORINE 0.5 MG/ML
1 EMULSION OPHTHALMIC 2 TIMES DAILY
Qty: 30 EACH | Refills: 3 | Status: SHIPPED | OUTPATIENT
Start: 2024-10-21

## 2024-10-21 ASSESSMENT — SLIT LAMP EXAM - LIDS: COMMENTS: LATERAL TARSO

## 2024-10-21 ASSESSMENT — VISUAL ACUITY
OD_PH_SC: 20/30
OD_PH_SC+: -2
METHOD: SNELLEN - LINEAR
OS_PH_SC: 20/300
OD_SC+: +2
OS_SC: 20/300
OD_SC: 20/50

## 2024-10-21 ASSESSMENT — EXTERNAL EXAM - LEFT EYE: OS_EXAM: LEFT FACIAL PALSY

## 2024-10-21 ASSESSMENT — TONOMETRY
OD_IOP_MMHG: 10
OS_IOP_MMHG: 7
IOP_METHOD: ICARE

## 2024-10-21 ASSESSMENT — EXTERNAL EXAM - RIGHT EYE: OD_EXAM: NORMAL

## 2024-10-21 NOTE — PROGRESS NOTES
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    Of note, patient has a history of L oropharynx P16+ squamous cell cancer first diagnosed in 2018. The patient underwent low dose weekly carboplatin 1.5 AUC with radiation. His PET/CT 2 months after initiation and underwent surveillance after that. In 08/2020, he developed numbness in chin and then forehead. He underwent PET/CT and IR-guided biopsy (1/2021), which demonstrated invasive squamous cell carcinoma.    Per oncology, patient has left-sided paralysis. The patient first noticed left eye redness and lower lid drooping in about January 2021. He notes that these symptoms progressed during through April 2021. He noted associated watery discharge. He was seen by oncology and started on erythromycin ilya then recommended to follow up with ophthalmology for further management. He has also been using AT BID during this time. He denies eye irritation, pain, purulent discharge, and flashes/floaters. He notes baseline left eye vision when not using erythromycin ointment.    Interval hx 10/21/2024: Previously seen by Dr. Barney, last seen 09/23. Saw Dr. Pardo last week. Patient is wearing the scleral lens again.   Chief Complaint(s) and History of Present Illness(es)       Corneal Evaluation    In left eye.  Associated symptoms include eye pain and photophobia.  Negative for dryness and tearing.  Treatments tried include eye drops and artificial tears.  Pain was noted as 2/10. Additional comments: Central corneal epithelial defect.        Comments    Pt states LE is unchanged.  Pain LE 2/10.  Pt is being treated for cancer.  Left side of face and LE are numb.  Light sensitivity.  Ofloxacin LE  2x/day  PFAT's PRN  Pt stopped ilya 3 days ago.    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 New disease in the left  space s/p PET/CT and IR-guided biopsy consistent with invasive squamous cell carcinoma s/p induction chemotherapy (1/28/21).  - s/p permanent tarsorrhaphy lateral 50% (2/10/21)- Dr. Pendleton  - s/p FLOWER platinum weight, LLL ectropion repair, and temporary tarso - Dr. Yoon (4/1/21)  7/8/21: misdirected trichiatic lashes epilated   10/1/21: left eye cataract increasing. left eye ocular surface dry but epi intact.  12/14/21: Lashes pulled.  Encouraged more PFAT use.  3/3/22: Encouraged more PFAT use bilaterally. Corneal surface has rare PK. Pt doing well with scleral lenses. Few left eye LL ltrichiasis.  11/15/22: significant dryness, no epi defect, no infiltrate. Disc motorcycle glasses. Awaiting CL.  10/14/24: large epi defect, exposure keratopathy, neurotrophic keratopathy - left. Epilated LLL lashes and placed LLL punctal plug (collagen oasis x2)   10/21/24: bandage contact lens in place. Central epi defect 2.25x2.5 (improved compared to last visit)     Plan:  - Replace BCL 10/21/24   -  Continue Emycin at night and when not using the scleral lens  - Continue Celluvisc 6x daily, use in both eyes  - Continue with scleral lens wear; last saw Dr. Pardo 10/11/24  - Discussed left eye cataract surgery in the fall  - Patient asked Dr. Yoon about severing tarso at visit 4/11/23, and Dr. 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 If proceed, recommend during summer months.  - Refer to Dr Wooten for complex CE/IOL when ready   - Discussed motorcycle glasses    # History of LASIK ou    # Blepharitis  - Discussed Lid Hygiene      RTC: 1 week. Cornea VT, sooner PRN  Follow up with Dr. Yoon and Dr. Pardo as scheduled.    Shaun Rosario MD  Resident Physician, PGY-3  Department of Ophthalmology     Attending Physician Attestation:  Complete documentation of historical and exam elements from today's encounter can be found in the full encounter summary report (not reduplicated in this progress note).  I personally obtained the chief complaint(s) and history of present illness.  I confirmed and edited as necessary the review of systems, past medical/surgical history, family history, social history, and examination findings as documented by others; and I examined the patient myself.  I personally reviewed the relevant tests, images, and reports as documented above.  I formulated and edited as necessary the assessment and plan and discussed the findings and management plan with the patient and family. - Ranjit Bonilla MD

## 2024-10-21 NOTE — NURSING NOTE
Chief Complaints and History of Present Illnesses   Patient presents with    Follow Up     Neurotrophic cornea of left eye     Chief Complaint(s) and History of Present Illness(es)       Follow Up              Laterality: left eye    Associated symptoms: dryness, eye pain and photophobia.  Negative for tearing, flashes and floaters    Pain scale: 2/10    Comments: Neurotrophic cornea of left eye              Comments    Pt states vision is a little better.  Pain LE 2/10.  No flashes/floaters.  Some dryness and light sensitivity.  Pt is compliant with drops.    ANA Stuart October 21, 2024 9:54 AM

## 2024-10-24 ENCOUNTER — MYC MEDICAL ADVICE (OUTPATIENT)
Dept: OPHTHALMOLOGY | Facility: CLINIC | Age: 74
End: 2024-10-24
Payer: COMMERCIAL

## 2024-10-24 NOTE — TELEPHONE ENCOUNTER
Spoke to pt around 4 pm    Michaela able to replace lens tomorrow    Scheduled at 0900 and pt aware of appt    Live Arthur RN 5:57 PM 10/24/24

## 2024-10-25 ENCOUNTER — OFFICE VISIT (OUTPATIENT)
Dept: OPHTHALMOLOGY | Facility: CLINIC | Age: 74
End: 2024-10-25
Payer: COMMERCIAL

## 2024-10-25 DIAGNOSIS — H02.235 PARALYTIC LAGOPHTHALMOS OF LEFT LOWER EYELID: ICD-10-CM

## 2024-10-25 DIAGNOSIS — H18.892 PERSISTENT EPITHELIAL DEFECT OF LEFT CORNEA: ICD-10-CM

## 2024-10-25 DIAGNOSIS — H16.212 EXPOSURE KERATOPATHY, LEFT: ICD-10-CM

## 2024-10-25 DIAGNOSIS — H16.232 NEUROTROPHIC CORNEA OF LEFT EYE: Primary | ICD-10-CM

## 2024-10-25 PROCEDURE — V2627 SCLERAL COVER SHELL: HCPCS | Mod: GZ | Performed by: OPTOMETRIST

## 2024-10-25 PROCEDURE — 99213 OFFICE O/P EST LOW 20 MIN: CPT | Performed by: OPTOMETRIST

## 2024-10-25 ASSESSMENT — REFRACTION_CURRENTRX
OS_BASECURVE: 8.0
OS_ADDL_SPECS: OPT EXTRA BLUE, HYDRAPEG
OS_BRAND: ONEFIT OBLATE
OS_SPHERE: +1.00
OS_DIAMETER: 14.9

## 2024-10-25 ASSESSMENT — VISUAL ACUITY
OD_SC: 20/60
METHOD: SNELLEN - LINEAR
OS_SC: 20/200

## 2024-10-25 ASSESSMENT — SLIT LAMP EXAM - LIDS: COMMENTS: LATERAL TARSO

## 2024-10-25 ASSESSMENT — EXTERNAL EXAM - LEFT EYE: OS_EXAM: LEFT FACIAL PALSY

## 2024-10-25 ASSESSMENT — EXTERNAL EXAM - RIGHT EYE: OD_EXAM: NORMAL

## 2024-10-25 NOTE — PROGRESS NOTES
A/P  1.) Central corneal epithelial defect/neurotrophic keratitis  -Initially seen 10/11/24, then followed with cornea  -BCL's have not retained well so far  -He self-returned to scleral lens about 2 days (previously fit about 2 years ago for PED)  -Scleral lens has been staying in well, good comfort. Defect unchanged yet  -Failed glad press n seal at night (fell off), sleeps on this side  -Would rec full time scleral lens wear at this time given previous success. Start daily wear, remove for 30 minute window at night to disinfect and instill eye drops. Reinsert overnight for now. Similar 30 minute window in the morning  -Continue oflox QID left eye, can put 1 drop in bowl of the lens    He is seeing cornea on Monday - I can pop in to check on lens as well. We will reorder spare left lens to alternate 12/12 schedule    I have confirmed the patient's CC, HPI and reviewed Past Medical History, Past Surgical History, Social History, Family History, Problem List, Medication List and agree with Tech note.     Antonella Pardo, OD FAAO [facility]    Contact Lens Billing  V-Code:  - Scleral Cover Shell  Final Contact Lens Rx         Brand Base Curve Diameter Sphere Lens Addl. Specs    Right          Left Onefit Oblate 8.0 14.9 +1.00 , 1 flat/2 steep edge Opt Extreme blue, HydraPEG           # of units: 1 left lens  Price per Unit: $215    This patient requires contact lenses that are medically necessary for either improvement in vision over spectacles, support of the ocular surface, or other therapeutic benefit. These are not cosmetic contact lenses.     Encounter Diagnoses   Name Primary?    Neurotrophic cornea of left eye Yes    Persistent epithelial defect of left cornea     Exposure keratopathy, left     Paralytic lagophthalmos of left lower eyelid

## 2024-10-25 NOTE — PATIENT INSTRUCTIONS
START full time scleral lens wear    Wear the scleral lens full time during the day. Remove for about 30 minutes before bed and clean/disinfect. During this 30 minute window instill all your regular eyedrops.     Wear the scleral lens overnight while you are sleeping, then remove for 30 minutes and instill all the drops.     Put 1 drop of ofloxacin in the bowl of the lens and then fill the rest with either preservative free saline or Refresh Celluvisc    Any drops that are 3x/day or MORE, do 2 doses during those 30 minute periods when the lens is out and then you can do the additional doses OVER the scleral contact lens.     We will order and mail a spare lens to you so you can alternate a day lens and a night lens and let the other lens disinfect.

## 2024-11-04 ENCOUNTER — OFFICE VISIT (OUTPATIENT)
Dept: OPHTHALMOLOGY | Facility: CLINIC | Age: 74
End: 2024-11-04
Attending: OPHTHALMOLOGY
Payer: COMMERCIAL

## 2024-11-04 DIAGNOSIS — H02.055 TRICHIASIS OF LEFT LOWER EYELID: ICD-10-CM

## 2024-11-04 DIAGNOSIS — H16.212 EXPOSURE KERATOPATHY, LEFT: ICD-10-CM

## 2024-11-04 DIAGNOSIS — H16.232 NEUROTROPHIC CORNEA OF LEFT EYE: Primary | ICD-10-CM

## 2024-11-04 PROCEDURE — 67820 REVISE EYELASHES: CPT | Mod: E1 | Performed by: OPHTHALMOLOGY

## 2024-11-04 PROCEDURE — G0463 HOSPITAL OUTPT CLINIC VISIT: HCPCS | Performed by: OPHTHALMOLOGY

## 2024-11-04 PROCEDURE — 99213 OFFICE O/P EST LOW 20 MIN: CPT | Mod: 25 | Performed by: OPHTHALMOLOGY

## 2024-11-04 PROCEDURE — 67820 REVISE EYELASHES: CPT | Performed by: OPHTHALMOLOGY

## 2024-11-04 ASSESSMENT — SLIT LAMP EXAM - LIDS: COMMENTS: LATERAL TARSO

## 2024-11-04 ASSESSMENT — VISUAL ACUITY
OD_PH_SC+: +2
OD_SC: 20/40
METHOD: SNELLEN - LINEAR
CORRECTION_TYPE: CONTACTS
OS_CC: 20/250
OD_SC+: -2
OD_PH_SC: 20/30

## 2024-11-04 ASSESSMENT — TONOMETRY
IOP_METHOD: ICARE
OD_IOP_MMHG: 10
OS_IOP_MMHG: 99

## 2024-11-04 ASSESSMENT — EXTERNAL EXAM - RIGHT EYE: OD_EXAM: NORMAL

## 2024-11-04 ASSESSMENT — EXTERNAL EXAM - LEFT EYE: OS_EXAM: LEFT FACIAL PALSY

## 2024-11-04 NOTE — NURSING NOTE
Chief Complaints and History of Present Illnesses   Patient presents with    Follow Up     Chief Complaint(s) and History of Present Illness(es)       Follow Up              Associated symptoms: Negative for eye pain    Treatments tried: eye drops and ointment              Comments    Pt has not noticed changes in vision. He denies irritation.     - Continue Emycin at night and when not using the scleral lens (Pt uses erythromycin qhs)  - Continue Celluvisc 6x daily, use in both eyes (Pt notes he didn't know he was to use this 6x daily.)  - Continue Ofloxacin BID left eye    Amy PATEL 12:03 PM November 4, 2024

## 2024-11-04 NOTE — PROGRESS NOTES
CC: F/up exposure keratopathy OS    Interval hx 11/04/2024  Chief Complaint(s) and History of Present Illness(es)       Follow Up    Associated symptoms include Negative for eye pain.  Treatments tried include eye drops and ointment.             Comments    Pt has not noticed changes in vision. He denies irritation.     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    Of note, patient has a history of L oropharynx P16+ squamous cell cancer first diagnosed in 2018. The patient underwent low dose weekly carboplatin 1.5 AUC with radiation. His PET/CT 2 months after initiation and underwent surveillance after that. In 08/2020, he developed numbness in chin and then forehead. He underwent PET/CT and IR-guided biopsy (1/2021), which demonstrated invasive squamous cell carcinoma.    Per oncology, patient has left-sided paralysis. The patient first noticed left eye redness and lower lid drooping in about January 2021. He notes that these symptoms progressed during through April 2021. He noted associated watery discharge. He was seen by oncology and started on erythromycin ilya then recommended to follow up with ophthalmology for further management. He has also been using AT BID during this time. He denies eye irritation, pain, purulent discharge, and flashes/floaters. He notes baseline left eye vision when not using erythromycin ointment.    Interval hx 11/04/2024: Previously seen by  Dr. Barney, last seen 09/23. Saw Dr. Pardo last week. Patient is wearing the scleral lens again.     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 New disease in the left  space s/p PET/CT and IR-guided biopsy consistent with invasive squamous cell carcinoma s/p induction chemotherapy (1/28/21).  - s/p permanent tarsorrhaphy lateral 50% (2/10/21)- Dr. Pendleton  - s/p FLOWER platinum weight, LLL ectropion repair, and temporary tarso - Dr. Yoon (4/1/21)  7/8/21: misdirected trichiatic lashes epilated   10/1/21: left eye cataract increasing. left eye ocular surface dry but epi intact.  12/14/21: Lashes pulled.  Encouraged more PFAT use.  3/3/22: Encouraged more PFAT use bilaterally. Corneal surface has rare PK. Pt doing well with scleral lenses. Few left eye LL ltrichiasis.  11/15/22: significant dryness, no epi defect, no infiltrate. Disc motorcycle glasses. Awaiting CL.  10/14/24: large epi defect, exposure keratopathy, neurotrophic keratopathy - left. Epilated LLL lashes and placed LLL punctal plug (collagen oasis x2)   10/21/24: bandage contact lens in place. Central epi defect 2.25x2.5 (improved compared to last visit)   11/4/2024: [facility] slightly improved per patient. Central corneal thinning  [id number], central epi defect still present.    #trichiasis left upper lid  Removed in clinic today    - F/up 1 week  - Insert Kontour CL 11/4/2024   - Epilation of 6 lashes from FLOWER was done today.  - Continue Emycin at night and when not using the scleral lens  - Continue Celluvisc 6x daily, use in both eyes  - Patient asked Dr. Yoon about severing tarso at visit 4/11/23, and Dr. ARMENDARIZ advised against  - Press n' seal at night for nocturnal exposure  - Continue Ofloxacin BID OS  -start topical Insuline drops left eye  - Consider topical serum tears   - Patient may also benefit from overnight scleral lens wear  START INSULIN Drops left eye to help heal the neurotrophic keratitis since patient has a good eyelid closure now. 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 If proceed, recommend during summer months.  Waiting on ocular surface before proceeding with sx    # History of LASIK ou    # Blepharitis  - Discussed Lid Hygiene    Follow-up 4 weeks with Dr Irene Oscar MD  Cornea and External Disease Fellow  [facility]  Attending Physician Attestation: Complete documentation of historical and exam elements from today's encounter can be found in the full encounter summary report (not reduplicated in this progress note). I personally obtained the chief complaint(s) and history of present illness. I confirmed and edited as necessary the review of systems, past medical/surgical history, family history, social history, and examination findings as documented by others; and I examined the patient myself. I personally reviewed the relevant tests, images, and reports as documented above. I formulated and edited as necessary the assessment and plan and discussed the findings and management plan with the patient and  family.  I was present for the entire procedure(s). - Al Wooten M.D

## 2024-11-25 DIAGNOSIS — H18.30 CORNEAL EPITHELIAL DEFECT: ICD-10-CM

## 2024-11-26 RX ORDER — OFLOXACIN 3 MG/ML
1 SOLUTION/ DROPS OPHTHALMIC 2 TIMES DAILY
Qty: 5 ML | Refills: 5 | Status: SHIPPED | OUTPATIENT
Start: 2024-11-26

## 2024-11-26 NOTE — TELEPHONE ENCOUNTER
"  ofloxacin (OCUFLOX) 0.3 % ophthalmic solution     Place 1-2 drops Into the left eye 4 times daily. - Left Eye   Last Written Prescription Date:  10/11/24  Last Fill Quantity: 5 ml ,   # refills: 1  Last Office Visit : 11/4/24 Sugar  \"...Continue Celluvisc 6x daily, use in both eyes  - Patient asked Dr. Yoon about severing tarso at visit 4/11/23, and Dr. ARMENDARIZ advised against  - Press n' seal at night for nocturnal exposure  - Continue Ofloxacin BID OS  -start topical Insuline drops left eye  - Consider topical serum tears ...\"   Future Office visit:  12/2/24    Routing refill request to provider for review/approval because:  Not on [facility] refill protocol: ofloxacin (OCUFLOX) 0.3 % ophthalmic solution   Dosage per 11/4/24 note  Continue Ofloxacin BID OS  "

## 2024-12-02 ENCOUNTER — OFFICE VISIT (OUTPATIENT)
Dept: OPHTHALMOLOGY | Facility: CLINIC | Age: 74
End: 2024-12-02
Attending: OPHTHALMOLOGY
Payer: COMMERCIAL

## 2024-12-02 DIAGNOSIS — H02.054 TRICHIASIS OF LEFT UPPER EYELID: Primary | ICD-10-CM

## 2024-12-02 DIAGNOSIS — H16.232 NEUROTROPHIC CORNEA OF LEFT EYE: ICD-10-CM

## 2024-12-02 DIAGNOSIS — H02.235 PARALYTIC LAGOPHTHALMOS OF LEFT LOWER EYELID: ICD-10-CM

## 2024-12-02 PROCEDURE — 67820 REVISE EYELASHES: CPT | Performed by: OPHTHALMOLOGY

## 2024-12-02 PROCEDURE — G0463 HOSPITAL OUTPT CLINIC VISIT: HCPCS | Performed by: OPHTHALMOLOGY

## 2024-12-02 RX ORDER — MAGNESIUM CARBONATE
1 POWDER (GRAM) MISCELLANEOUS
COMMUNITY

## 2024-12-02 RX ORDER — MULTIVITAMIN WITH IRON
1 TABLET ORAL DAILY
COMMUNITY

## 2024-12-02 ASSESSMENT — VISUAL ACUITY
OD_SC: 20/40
METHOD: SNELLEN - LINEAR
OD_PH_SC+: -2
OD_PH_SC: 20/30
OD_SC+: -2
OS_CC: 20/150
CORRECTION_TYPE: CONTACTS

## 2024-12-02 ASSESSMENT — SLIT LAMP EXAM - LIDS: COMMENTS: LATERAL TARSO

## 2024-12-02 ASSESSMENT — EXTERNAL EXAM - RIGHT EYE: OD_EXAM: NORMAL

## 2024-12-02 ASSESSMENT — EXTERNAL EXAM - LEFT EYE: OS_EXAM: LEFT FACIAL PALSY

## 2024-12-02 ASSESSMENT — TONOMETRY
OS_IOP_MMHG: CL
OD_IOP_MMHG: 09
IOP_METHOD: ICARE

## 2024-12-02 NOTE — NURSING NOTE
Chief Complaints and History of Present Illnesses   Patient presents with    Follow Up     Neurotrophic cornea of left eye     Chief Complaint(s) and History of Present Illness(es)       Follow Up              Laterality: left eye    Associated symptoms: glare, dryness and eye pain.  Negative for headache    Treatments tried: eye drops, artificial tears and ointment    Pain scale: 4/10    Comments: Neurotrophic cornea of left eye              Comments    He states that his vision seems stable but is difficult to breanna.  He continues to have some left eye pain.    He is using:  -PFATs 6x/day left eye  -INSULIN twice a day in the left eye  (his bottle  today)  -Erythromycin ointment at bedtime left eye   -ofloxacin twice a day left eye    NAA Robins 12:41 PM  2024

## 2024-12-02 NOTE — PROGRESS NOTES
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    Of note, patient has a history of L oropharynx P16+ squamous cell cancer first diagnosed in 2018. The patient underwent low dose weekly carboplatin 1.5 AUC with radiation. His PET/CT 2 months after initiation and underwent surveillance after that. In 08/2020, he developed numbness in chin and then forehead. He underwent PET/CT and IR-guided biopsy (1/2021), which demonstrated invasive squamous cell carcinoma.    Per oncology, patient has left-sided paralysis. The patient first noticed left eye redness and lower lid drooping in about January 2021. He notes that these symptoms progressed during through April 2021. He noted associated watery discharge. He was seen by oncology and started on erythromycin ilya then recommended to follow up with ophthalmology for further management. He has also been using AT BID during this time. He denies eye irritation, pain, purulent discharge, and flashes/floaters. He notes baseline left eye vision when not using erythromycin ointment.      Interval hx 12/02/2024: Patient is in long-term scleral contact lens, erythromycin ointment at night  Chief Complaint(s) and History of Present Illness(es)       Follow Up    In left eye.  Associated symptoms include glare, dryness and eye pain.  Negative for headache.  Treatments tried include eye drops, artificial tears and ointment.  Pain was noted as 4/10. Additional comments: Neurotrophic cornea of left eye             Comments    He states that his vision seems stable but is difficult to breanna.  He continues to have some left eye pain.    He is using:  -PFATs 6x/day left eye  -INSULIN twice a day in the left eye   (his bottle  today)  -Erythromycin ointment at bedtime left eye   -ofloxacin twice a day left eye    Sofy Davis, COT 12:41 PM  2024                        POH: refractive error (intermittent glasses use)    Surgery: LASIK (25-30 years ago)- monovision    GTTS:   - PFATs 6x/day left eye  - Erythromycin ilya at bedtime left eye   - ofloxacin BID left eye  - insulin QID OS    PMH:  -L oropharyngeal squamous cell cancer (first diagnosed ), recently diagnosed invasive squamous cell cancer (on chemotherapy)  -HTN    FH:  -No AMD, glaucoma    Assessment & Plan     #Paralytic lagophthalmos with complete scleral show, 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 New disease in the left  space s/p PET/CT and IR-guided biopsy consistent with invasive squamous cell carcinoma s/p induction chemotherapy (21).  - s/p permanent tarsorrhaphy lateral 50% (2/10/21)- Dr. Pendleton  - s/p FLOWER platinum weight, LLL ectropion repair, and temporary tarso - Dr. Yoon (21)  21: misdirected trichiatic lashes epilated   10/1/21: left eye cataract increasing. left eye ocular surface dry but epi intact.  21: Lashes pulled.  Encouraged more PFAT use.  3/3/22: Encouraged more PFAT use bilaterally. Corneal surface has rare PK. Pt doing well with scleral lenses. Few left eye LL ltrichiasis.  11/15/22: significant dryness, no epi defect, no infiltrate. Disc motorcycle glasses. Awaiting CL.  10/14/24: large epi defect, exposure keratopathy, neurotrophic keratopathy - left. Epilated LLL lashes and placed LLL punctal plug (collagen oasis x2)   10/21/24: bandage contact lens in place. Central epi defect 2.25x2.5 (improved  compared to last visit)   11/4/2024: [facility] slightly improved per patient. Central corneal thinning [id number], central epi defect still present.  12/2/2024: feels better, but still is sore left eye; resolved epi defect OS.    #trichiasis left upper lid  - epilated at slit lamp 12/2/24    Plan 12/2/2024:  - consider temporary tarsoraphy  - Continue Emycin at night and when not using the scleral lens  - Continue Celluvisc 6x daily, use in both eyes  - Patient asked Dr. Yoon about severing tarso at visit 4/11/23, and Dr. ARMENDARIZ advised against  - consider see Dr. Yoon for raising lower lid  - Press n' seal at night for nocturnal exposure - patient unable to do it due to being a left-side sleeper so it doesn't stay on.   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 If proceed, recommend during summer months.  Waiting on ocular surface before proceeding with sx    # History of LASIK ou    # Blepharitis  - Discussed Lid Hygiene      Mario Oscar MD  Cornea and External Disease Fellow  [facility]    Attending Physician Attestation:  Complete documentation of historical and exam elements from today's encounter can be found in the full encounter summary report (not reduplicated in this progress note).  I personally obtained the chief complaint(s) and history of present illness.  I confirmed and edited as necessary the review of systems, past medical/surgical history, family history, social history, and examination findings as documented by others; and I examined the patient myself.  I personally reviewed the relevant tests, images, and reports as documented above.  I formulated and edited as necessary the assessment and plan and discussed the findings and  management plan with the patient and family. I was present for the entire procedure.  - Ranjit Bonilla MD

## 2024-12-22 ENCOUNTER — HEALTH MAINTENANCE LETTER (OUTPATIENT)
Age: 74
End: 2024-12-22

## 2025-02-07 DIAGNOSIS — H04.122 DRY EYE SYNDROME OF LEFT EYE: ICD-10-CM

## 2025-02-10 RX ORDER — ERYTHROMYCIN 5 MG/G
OINTMENT OPHTHALMIC AT BEDTIME
Qty: 7 G | Refills: 9 | Status: SHIPPED | OUTPATIENT
Start: 2025-02-10

## 2025-02-10 NOTE — TELEPHONE ENCOUNTER
Medication:   erythromycin (ROMYCIN) 5 MG/GM ophthalmic ointment   7 g 3 1/3/2024 -- No  Sig - Route: Place Into the left eye at bedtime - Left Eye      Last Office Visit: 12-2-24  Future Office visit: none    Attending Provider: Irene  Last Clinic Note: - Continue Emycin at night and when not using the scleral lens

## 2025-03-09 NOTE — PROGRESS NOTES
"Infusion Nursing Note:  Fortino Moya presents today for Fluorouracil pump disconnect, Neulasta Onbody, PICC removal.    Patient seen by provider today: Yes: MENDY Wong, after infusion today   present during visit today: Not Applicable.    Note: pt presents to infusion room today feeling \"good.\" Pt denies any fevers, chills, signs of infection, nausea, or any other specific complaints since start of chemotherapy. Pt does have a reddened left eye/lower eyelid that this RN encouraged pt to discuss with Nia today.     Pump not quite empty upon arrival to infusion. This RN allowed pump to run empty prior to disconnect. Upon disconnect, PICC with positive blood return.     Pain: \"3/10\" in mouth, throat; denies mouth sores, denies need for intervention    Intravenous Access:  PICC. CBC drawn as ordered from PICC. PICC line removed per protocol, catheter tip intact, pressure dressing applied.     Treatment Conditions:  Not Applicable.      Post Infusion Assessment:  Access discontinued per protocol per order.     Neulasta Onpro On-Body injector applied to right abdomen at 1530 with light facing down.  Writer discussed Neulasta injection would start tomorrow 2/3 at 1830, approximately 27 hours after application applied today.  Written and Verbal instruction reviewed with patient.  Pt instructed when the dose delivery starts, it will take about 45 minutes to complete.  Pt aware Neulasta Onpro On-Body should have green flashing light and to call triage or on-call MD if injector flashes red or appears to be leaking. Pt aware to keep Onpro On-Body Neulasta 4 inches away from electrical equipment and to avoid showering 4 hours prior to injection.   Neulasta Onpro Lot number: [id number]      Discharge Plan:   Patient declined prescription refills.  Copy of AVS reviewed with patient and/or family.  Patient will return 2/5 for follow up with provider and 2/18 for next chemotherapy (at [facility]).  Patient " discharged in stable condition accompanied by: self.  Departure Mode: Ambulatory.    NAYLA LUNA RN     68

## 2025-03-10 ENCOUNTER — APPOINTMENT (OUTPATIENT)
Dept: GENERAL RADIOLOGY | Facility: CLINIC | Age: 75
DRG: 094 | End: 2025-03-10
Attending: STUDENT IN AN ORGANIZED HEALTH CARE EDUCATION/TRAINING PROGRAM
Payer: COMMERCIAL

## 2025-03-10 ENCOUNTER — APPOINTMENT (OUTPATIENT)
Dept: CT IMAGING | Facility: CLINIC | Age: 75
DRG: 094 | End: 2025-03-10
Attending: STUDENT IN AN ORGANIZED HEALTH CARE EDUCATION/TRAINING PROGRAM
Payer: COMMERCIAL

## 2025-03-10 ENCOUNTER — HOSPITAL ENCOUNTER (INPATIENT)
Facility: CLINIC | Age: 75
DRG: 094 | End: 2025-03-10
Attending: STUDENT IN AN ORGANIZED HEALTH CARE EDUCATION/TRAINING PROGRAM | Admitting: STUDENT IN AN ORGANIZED HEALTH CARE EDUCATION/TRAINING PROGRAM
Payer: COMMERCIAL

## 2025-03-10 DIAGNOSIS — D72.829 LEUKOCYTOSIS, UNSPECIFIED TYPE: ICD-10-CM

## 2025-03-10 DIAGNOSIS — I48.91 ATRIAL FIBRILLATION WITH RVR (H): ICD-10-CM

## 2025-03-10 DIAGNOSIS — L03.311 CELLULITIS OF ABDOMINAL WALL: ICD-10-CM

## 2025-03-10 DIAGNOSIS — M86.9 OSTEOMYELITIS, UNSPECIFIED SITE, UNSPECIFIED TYPE (H): ICD-10-CM

## 2025-03-10 DIAGNOSIS — C09.9 TONSIL CANCER (H): ICD-10-CM

## 2025-03-10 DIAGNOSIS — Z93.1 GASTROSTOMY STATUS (H): ICD-10-CM

## 2025-03-10 DIAGNOSIS — G93.40 ACUTE ENCEPHALOPATHY: Primary | ICD-10-CM

## 2025-03-10 DIAGNOSIS — Z51.5 END OF LIFE CARE: ICD-10-CM

## 2025-03-10 DIAGNOSIS — Z86.79 HISTORY OF ATRIAL FIBRILLATION: ICD-10-CM

## 2025-03-10 PROBLEM — E86.0 DEHYDRATION: Status: RESOLVED | Noted: 2024-03-01 | Resolved: 2025-03-10

## 2025-03-10 PROBLEM — Z51.89 ENCOUNTER FOR OTHER SPECIFIED AFTERCARE: Status: RESOLVED | Noted: 2021-01-27 | Resolved: 2025-03-10

## 2025-03-10 PROBLEM — F41.9 ANXIETY: Status: RESOLVED | Noted: 2018-01-02 | Resolved: 2025-03-10

## 2025-03-10 PROBLEM — C77.9 MALIGNANT NEOPLASM METASTATIC TO LYMPH NODES (H): Status: ACTIVE | Noted: 2024-11-13

## 2025-03-10 PROBLEM — J45.909 ASTHMA: Status: RESOLVED | Noted: 2021-06-30 | Resolved: 2025-03-10

## 2025-03-10 LAB
ALBUMIN SERPL BCG-MCNC: 3.6 G/DL (ref 3.5–5.2)
ALBUMIN UR-MCNC: 10 MG/DL
ALP SERPL-CCNC: 107 U/L (ref 40–150)
ALT SERPL W P-5'-P-CCNC: 9 U/L (ref 0–70)
ANION GAP SERPL CALCULATED.3IONS-SCNC: 13 MMOL/L (ref 7–15)
APPEARANCE UR: CLEAR
AST SERPL W P-5'-P-CCNC: 15 U/L (ref 0–45)
ATRIAL RATE - MUSE: NORMAL BPM
BACTERIA SPT CULT: NORMAL
BASE EXCESS BLDV CALC-SCNC: 7 MMOL/L (ref -3–3)
BASOPHILS # BLD AUTO: 0 10E3/UL (ref 0–0.2)
BASOPHILS NFR BLD AUTO: 0 %
BILIRUB DIRECT SERPL-MCNC: 0.56 MG/DL (ref 0–0.3)
BILIRUB SERPL-MCNC: 1.4 MG/DL
BILIRUB UR QL STRIP: NEGATIVE
BUN SERPL-MCNC: 9.9 MG/DL (ref 8–23)
CALCIUM SERPL-MCNC: 8.5 MG/DL (ref 8.8–10.4)
CHLORIDE SERPL-SCNC: 94 MMOL/L (ref 98–107)
COLOR UR AUTO: ABNORMAL
CREAT SERPL-MCNC: 0.46 MG/DL (ref 0.67–1.17)
DIASTOLIC BLOOD PRESSURE - MUSE: NORMAL MMHG
EGFRCR SERPLBLD CKD-EPI 2021: >90 ML/MIN/1.73M2
ELLIPTOCYTES BLD QL SMEAR: SLIGHT
EOSINOPHIL # BLD AUTO: 0 10E3/UL (ref 0–0.7)
EOSINOPHIL NFR BLD AUTO: 0 %
ERYTHROCYTE [DISTWIDTH] IN BLOOD BY AUTOMATED COUNT: 21.4 % (ref 10–15)
FLUAV RNA SPEC QL NAA+PROBE: NEGATIVE
FLUBV RNA RESP QL NAA+PROBE: NEGATIVE
GLUCOSE SERPL-MCNC: 172 MG/DL (ref 70–99)
GLUCOSE UR STRIP-MCNC: NEGATIVE MG/DL
GRAM STAIN RESULT: NORMAL
HCO3 BLDV-SCNC: 31 MMOL/L (ref 21–28)
HCO3 SERPL-SCNC: 26 MMOL/L (ref 22–29)
HCT VFR BLD AUTO: 33.1 % (ref 40–53)
HGB BLD-MCNC: 11.5 G/DL (ref 13.3–17.7)
HGB UR QL STRIP: NEGATIVE
HOLD SPECIMEN: NORMAL
IMM GRANULOCYTES # BLD: 0.1 10E3/UL
IMM GRANULOCYTES NFR BLD: 1 %
INTERPRETATION ECG - MUSE: NORMAL
KETONES UR STRIP-MCNC: NEGATIVE MG/DL
LACTATE BLD-SCNC: 1.2 MMOL/L (ref 0.7–2)
LEUKOCYTE ESTERASE UR QL STRIP: NEGATIVE
LYMPHOCYTES # BLD AUTO: 0.3 10E3/UL (ref 0.8–5.3)
LYMPHOCYTES NFR BLD AUTO: 2 %
MAGNESIUM SERPL-MCNC: 1.9 MG/DL (ref 1.7–2.3)
MCH RBC QN AUTO: 31.3 PG (ref 26.5–33)
MCHC RBC AUTO-ENTMCNC: 34.7 G/DL (ref 31.5–36.5)
MCV RBC AUTO: 90 FL (ref 78–100)
MONOCYTES # BLD AUTO: 1.7 10E3/UL (ref 0–1.3)
MONOCYTES NFR BLD AUTO: 11 %
MRSA DNA SPEC QL NAA+PROBE: NEGATIVE
NEUTROPHILS # BLD AUTO: 12.8 10E3/UL (ref 1.6–8.3)
NEUTROPHILS NFR BLD AUTO: 86 %
NITRATE UR QL: NEGATIVE
NRBC # BLD AUTO: 0 10E3/UL
NRBC BLD AUTO-RTO: 0 /100
P AXIS - MUSE: NORMAL DEGREES
PCO2 BLDV: 43 MM HG (ref 40–50)
PH BLDV: 7.48 [PH] (ref 7.32–7.43)
PH UR STRIP: 7 [PH] (ref 5–7)
PHOSPHATE SERPL-MCNC: 2.8 MG/DL (ref 2.5–4.5)
PLAT MORPH BLD: ABNORMAL
PLATELET # BLD AUTO: 325 10E3/UL (ref 150–450)
PO2 BLDV: 47 MM HG (ref 25–47)
POTASSIUM SERPL-SCNC: 3.3 MMOL/L (ref 3.4–5.3)
POTASSIUM SERPL-SCNC: 3.5 MMOL/L (ref 3.4–5.3)
PR INTERVAL - MUSE: NORMAL MS
PROCALCITONIN SERPL IA-MCNC: 0.11 NG/ML
PROT SERPL-MCNC: 6.6 G/DL (ref 6.4–8.3)
QRS DURATION - MUSE: 94 MS
QT - MUSE: 338 MS
QTC - MUSE: 475 MS
R AXIS - MUSE: 80 DEGREES
RBC # BLD AUTO: 3.67 10E6/UL (ref 4.4–5.9)
RBC MORPH BLD: ABNORMAL
RBC URINE: 1 /HPF
RSV RNA SPEC NAA+PROBE: NEGATIVE
SA TARGET DNA: NEGATIVE
SAO2 % BLDV: 85 % (ref 70–75)
SARS-COV-2 RNA RESP QL NAA+PROBE: NEGATIVE
SODIUM SERPL-SCNC: 133 MMOL/L (ref 135–145)
SP GR UR STRIP: 1.01 (ref 1–1.03)
SYSTOLIC BLOOD PRESSURE - MUSE: NORMAL MMHG
T AXIS - MUSE: -40 DEGREES
UROBILINOGEN UR STRIP-MCNC: 2 MG/DL
VENTRICULAR RATE- MUSE: 119 BPM
WBC # BLD AUTO: 14.9 10E3/UL (ref 4–11)
WBC URINE: 1 /HPF

## 2025-03-10 PROCEDURE — 87640 STAPH A DNA AMP PROBE: CPT | Performed by: STUDENT IN AN ORGANIZED HEALTH CARE EDUCATION/TRAINING PROGRAM

## 2025-03-10 PROCEDURE — 250N000013 HC RX MED GY IP 250 OP 250 PS 637: Performed by: STUDENT IN AN ORGANIZED HEALTH CARE EDUCATION/TRAINING PROGRAM

## 2025-03-10 PROCEDURE — 250N000011 HC RX IP 250 OP 636: Performed by: STUDENT IN AN ORGANIZED HEALTH CARE EDUCATION/TRAINING PROGRAM

## 2025-03-10 PROCEDURE — 87070 CULTURE OTHR SPECIMN AEROBIC: CPT | Performed by: STUDENT IN AN ORGANIZED HEALTH CARE EDUCATION/TRAINING PROGRAM

## 2025-03-10 PROCEDURE — 120N000001 HC R&B MED SURG/OB

## 2025-03-10 PROCEDURE — 85025 COMPLETE CBC W/AUTO DIFF WBC: CPT | Performed by: STUDENT IN AN ORGANIZED HEALTH CARE EDUCATION/TRAINING PROGRAM

## 2025-03-10 PROCEDURE — 80053 COMPREHEN METABOLIC PANEL: CPT | Performed by: STUDENT IN AN ORGANIZED HEALTH CARE EDUCATION/TRAINING PROGRAM

## 2025-03-10 PROCEDURE — 84100 ASSAY OF PHOSPHORUS: CPT | Performed by: STUDENT IN AN ORGANIZED HEALTH CARE EDUCATION/TRAINING PROGRAM

## 2025-03-10 PROCEDURE — 87637 SARSCOV2&INF A&B&RSV AMP PRB: CPT | Performed by: STUDENT IN AN ORGANIZED HEALTH CARE EDUCATION/TRAINING PROGRAM

## 2025-03-10 PROCEDURE — 74177 CT ABD & PELVIS W/CONTRAST: CPT

## 2025-03-10 PROCEDURE — 87186 SC STD MICRODIL/AGAR DIL: CPT | Performed by: STUDENT IN AN ORGANIZED HEALTH CARE EDUCATION/TRAINING PROGRAM

## 2025-03-10 PROCEDURE — 82248 BILIRUBIN DIRECT: CPT | Performed by: STUDENT IN AN ORGANIZED HEALTH CARE EDUCATION/TRAINING PROGRAM

## 2025-03-10 PROCEDURE — 93005 ELECTROCARDIOGRAM TRACING: CPT

## 2025-03-10 PROCEDURE — 83735 ASSAY OF MAGNESIUM: CPT | Performed by: STUDENT IN AN ORGANIZED HEALTH CARE EDUCATION/TRAINING PROGRAM

## 2025-03-10 PROCEDURE — 36415 COLL VENOUS BLD VENIPUNCTURE: CPT | Performed by: STUDENT IN AN ORGANIZED HEALTH CARE EDUCATION/TRAINING PROGRAM

## 2025-03-10 PROCEDURE — 84132 ASSAY OF SERUM POTASSIUM: CPT | Performed by: STUDENT IN AN ORGANIZED HEALTH CARE EDUCATION/TRAINING PROGRAM

## 2025-03-10 PROCEDURE — 83605 ASSAY OF LACTIC ACID: CPT

## 2025-03-10 PROCEDURE — 87040 BLOOD CULTURE FOR BACTERIA: CPT | Performed by: STUDENT IN AN ORGANIZED HEALTH CARE EDUCATION/TRAINING PROGRAM

## 2025-03-10 PROCEDURE — 82803 BLOOD GASES ANY COMBINATION: CPT

## 2025-03-10 PROCEDURE — 99223 1ST HOSP IP/OBS HIGH 75: CPT | Performed by: STUDENT IN AN ORGANIZED HEALTH CARE EDUCATION/TRAINING PROGRAM

## 2025-03-10 PROCEDURE — 81001 URINALYSIS AUTO W/SCOPE: CPT | Performed by: STUDENT IN AN ORGANIZED HEALTH CARE EDUCATION/TRAINING PROGRAM

## 2025-03-10 PROCEDURE — 36415 COLL VENOUS BLD VENIPUNCTURE: CPT | Performed by: EMERGENCY MEDICINE

## 2025-03-10 PROCEDURE — 96367 TX/PROPH/DG ADDL SEQ IV INF: CPT

## 2025-03-10 PROCEDURE — 87205 SMEAR GRAM STAIN: CPT | Performed by: STUDENT IN AN ORGANIZED HEALTH CARE EDUCATION/TRAINING PROGRAM

## 2025-03-10 PROCEDURE — 84145 PROCALCITONIN (PCT): CPT | Performed by: STUDENT IN AN ORGANIZED HEALTH CARE EDUCATION/TRAINING PROGRAM

## 2025-03-10 PROCEDURE — 258N000003 HC RX IP 258 OP 636: Performed by: STUDENT IN AN ORGANIZED HEALTH CARE EDUCATION/TRAINING PROGRAM

## 2025-03-10 PROCEDURE — 85025 COMPLETE CBC W/AUTO DIFF WBC: CPT | Performed by: EMERGENCY MEDICINE

## 2025-03-10 PROCEDURE — 82040 ASSAY OF SERUM ALBUMIN: CPT | Performed by: EMERGENCY MEDICINE

## 2025-03-10 PROCEDURE — 82247 BILIRUBIN TOTAL: CPT | Performed by: EMERGENCY MEDICINE

## 2025-03-10 PROCEDURE — 85014 HEMATOCRIT: CPT | Performed by: EMERGENCY MEDICINE

## 2025-03-10 PROCEDURE — 87641 MR-STAPH DNA AMP PROBE: CPT | Performed by: STUDENT IN AN ORGANIZED HEALTH CARE EDUCATION/TRAINING PROGRAM

## 2025-03-10 PROCEDURE — 99285 EMERGENCY DEPT VISIT HI MDM: CPT | Mod: 25

## 2025-03-10 PROCEDURE — 71046 X-RAY EXAM CHEST 2 VIEWS: CPT

## 2025-03-10 PROCEDURE — 250N000009 HC RX 250: Performed by: STUDENT IN AN ORGANIZED HEALTH CARE EDUCATION/TRAINING PROGRAM

## 2025-03-10 PROCEDURE — 96365 THER/PROPH/DIAG IV INF INIT: CPT | Mod: 59

## 2025-03-10 RX ORDER — CAPECITABINE 500 MG/1
2000 TABLET, FILM COATED ORAL 2 TIMES DAILY
Status: ON HOLD | COMMUNITY
Start: 2025-02-07 | End: 2025-03-14

## 2025-03-10 RX ORDER — NALOXONE HYDROCHLORIDE 0.4 MG/ML
0.2 INJECTION, SOLUTION INTRAMUSCULAR; INTRAVENOUS; SUBCUTANEOUS
Status: DISCONTINUED | OUTPATIENT
Start: 2025-03-10 | End: 2025-03-13

## 2025-03-10 RX ORDER — PIPERACILLIN SODIUM, TAZOBACTAM SODIUM 4; .5 G/20ML; G/20ML
4.5 INJECTION, POWDER, LYOPHILIZED, FOR SOLUTION INTRAVENOUS EVERY 6 HOURS
Status: DISCONTINUED | OUTPATIENT
Start: 2025-03-10 | End: 2025-03-13

## 2025-03-10 RX ORDER — CARBOXYMETHYLCELLULOSE SODIUM 5 MG/ML
1 SOLUTION/ DROPS OPHTHALMIC 2 TIMES DAILY
Status: DISCONTINUED | OUTPATIENT
Start: 2025-03-11 | End: 2025-03-14 | Stop reason: HOSPADM

## 2025-03-10 RX ORDER — BUPRENORPHINE 5 UG/H
1 PATCH TRANSDERMAL WEEKLY
COMMUNITY
Start: 2025-03-07

## 2025-03-10 RX ORDER — ACETAMINOPHEN 325 MG/10.15ML
10 LIQUID ORAL ONCE
Status: COMPLETED | OUTPATIENT
Start: 2025-03-10 | End: 2025-03-10

## 2025-03-10 RX ORDER — CARVEDILOL 6.25 MG/1
6.25 TABLET ORAL 2 TIMES DAILY WITH MEALS
Status: ON HOLD | COMMUNITY
Start: 2024-08-22 | End: 2025-03-14

## 2025-03-10 RX ORDER — MORPHINE SULFATE 10 MG/5ML
7.5 SOLUTION ORAL EVERY 4 HOURS PRN
Status: ON HOLD | COMMUNITY
Start: 2025-03-07 | End: 2025-03-14

## 2025-03-10 RX ORDER — LIDOCAINE 40 MG/G
CREAM TOPICAL
Status: DISCONTINUED | OUTPATIENT
Start: 2025-03-10 | End: 2025-03-14 | Stop reason: HOSPADM

## 2025-03-10 RX ORDER — HYDROMORPHONE HYDROCHLORIDE 1 MG/ML
0.5 INJECTION, SOLUTION INTRAMUSCULAR; INTRAVENOUS; SUBCUTANEOUS
Status: DISCONTINUED | OUTPATIENT
Start: 2025-03-10 | End: 2025-03-14 | Stop reason: HOSPADM

## 2025-03-10 RX ORDER — IOPAMIDOL 755 MG/ML
84 INJECTION, SOLUTION INTRAVASCULAR ONCE
Status: COMPLETED | OUTPATIENT
Start: 2025-03-10 | End: 2025-03-10

## 2025-03-10 RX ORDER — LEVOTHYROXINE SODIUM 75 UG/1
75 TABLET ORAL
Status: ON HOLD | COMMUNITY
Start: 2024-09-10 | End: 2025-03-14

## 2025-03-10 RX ORDER — ONDANSETRON 8 MG/1
8 TABLET, FILM COATED ORAL EVERY 8 HOURS PRN
COMMUNITY
Start: 2024-11-13 | End: 2025-11-13

## 2025-03-10 RX ORDER — PIPERACILLIN SODIUM, TAZOBACTAM SODIUM 4; .5 G/20ML; G/20ML
4.5 INJECTION, POWDER, LYOPHILIZED, FOR SOLUTION INTRAVENOUS ONCE
Status: COMPLETED | OUTPATIENT
Start: 2025-03-10 | End: 2025-03-10

## 2025-03-10 RX ORDER — DEXAMETHASONE SODIUM PHOSPHATE 1 MG/ML
4 SOLUTION/ DROPS OPHTHALMIC 2 TIMES DAILY
Status: DISCONTINUED | OUTPATIENT
Start: 2025-03-10 | End: 2025-03-14 | Stop reason: HOSPADM

## 2025-03-10 RX ORDER — AMOXICILLIN 250 MG
2 CAPSULE ORAL 2 TIMES DAILY PRN
Status: DISCONTINUED | OUTPATIENT
Start: 2025-03-10 | End: 2025-03-13

## 2025-03-10 RX ORDER — NALOXONE HYDROCHLORIDE 0.4 MG/ML
0.4 INJECTION, SOLUTION INTRAMUSCULAR; INTRAVENOUS; SUBCUTANEOUS
Status: DISCONTINUED | OUTPATIENT
Start: 2025-03-10 | End: 2025-03-13

## 2025-03-10 RX ORDER — ATORVASTATIN CALCIUM 10 MG/1
10 TABLET, FILM COATED ORAL EVERY EVENING
Status: DISCONTINUED | OUTPATIENT
Start: 2025-03-10 | End: 2025-03-11

## 2025-03-10 RX ORDER — HYDROMORPHONE HYDROCHLORIDE 1 MG/ML
0.3 INJECTION, SOLUTION INTRAMUSCULAR; INTRAVENOUS; SUBCUTANEOUS
Status: DISCONTINUED | OUTPATIENT
Start: 2025-03-10 | End: 2025-03-14 | Stop reason: HOSPADM

## 2025-03-10 RX ORDER — PREGABALIN 150 MG/1
150 CAPSULE ORAL 2 TIMES DAILY
COMMUNITY
Start: 2025-02-26

## 2025-03-10 RX ORDER — LEVOTHYROXINE SODIUM 75 UG/1
75 TABLET ORAL
Status: DISCONTINUED | OUTPATIENT
Start: 2025-03-11 | End: 2025-03-14 | Stop reason: HOSPADM

## 2025-03-10 RX ORDER — CIPROFLOXACIN HYDROCHLORIDE 3.5 MG/ML
4 SOLUTION/ DROPS TOPICAL 2 TIMES DAILY
Status: DISCONTINUED | OUTPATIENT
Start: 2025-03-10 | End: 2025-03-14 | Stop reason: HOSPADM

## 2025-03-10 RX ORDER — CIPROFLOXACIN AND DEXAMETHASONE 3; 1 MG/ML; MG/ML
4 SUSPENSION/ DROPS AURICULAR (OTIC) 2 TIMES DAILY
COMMUNITY

## 2025-03-10 RX ORDER — OFLOXACIN 3 MG/ML
1 SOLUTION/ DROPS OPHTHALMIC 2 TIMES DAILY
Status: DISCONTINUED | OUTPATIENT
Start: 2025-03-10 | End: 2025-03-14 | Stop reason: HOSPADM

## 2025-03-10 RX ORDER — CALCIUM CARBONATE 500 MG/1
1000 TABLET, CHEWABLE ORAL 4 TIMES DAILY PRN
Status: DISCONTINUED | OUTPATIENT
Start: 2025-03-10 | End: 2025-03-14 | Stop reason: HOSPADM

## 2025-03-10 RX ORDER — SODIUM CHLORIDE, SODIUM LACTATE, POTASSIUM CHLORIDE, CALCIUM CHLORIDE 600; 310; 30; 20 MG/100ML; MG/100ML; MG/100ML; MG/100ML
INJECTION, SOLUTION INTRAVENOUS CONTINUOUS
Status: DISCONTINUED | OUTPATIENT
Start: 2025-03-10 | End: 2025-03-11

## 2025-03-10 RX ORDER — CARVEDILOL 6.25 MG/1
6.25 TABLET ORAL 2 TIMES DAILY WITH MEALS
Status: DISCONTINUED | OUTPATIENT
Start: 2025-03-10 | End: 2025-03-11

## 2025-03-10 RX ORDER — POTASSIUM CHLORIDE 20MEQ/15ML
40 LIQUID (ML) ORAL ONCE
Status: COMPLETED | OUTPATIENT
Start: 2025-03-10 | End: 2025-03-10

## 2025-03-10 RX ORDER — ERYTHROMYCIN 5 MG/G
OINTMENT OPHTHALMIC AT BEDTIME
Status: DISCONTINUED | OUTPATIENT
Start: 2025-03-10 | End: 2025-03-14 | Stop reason: HOSPADM

## 2025-03-10 RX ORDER — CIPROFLOXACIN AND DEXAMETHASONE 3; 1 MG/ML; MG/ML
4 SUSPENSION/ DROPS AURICULAR (OTIC) 2 TIMES DAILY
Status: DISCONTINUED | OUTPATIENT
Start: 2025-03-10 | End: 2025-03-10

## 2025-03-10 RX ORDER — ONDANSETRON 2 MG/ML
4 INJECTION INTRAMUSCULAR; INTRAVENOUS EVERY 6 HOURS PRN
Status: DISCONTINUED | OUTPATIENT
Start: 2025-03-10 | End: 2025-03-14 | Stop reason: HOSPADM

## 2025-03-10 RX ORDER — POLYETHYLENE GLYCOL 3350 17 G/17G
17 POWDER, FOR SOLUTION ORAL 2 TIMES DAILY PRN
Status: DISCONTINUED | OUTPATIENT
Start: 2025-03-10 | End: 2025-03-14 | Stop reason: HOSPADM

## 2025-03-10 RX ORDER — BUPRENORPHINE 5 UG/H
1 PATCH TRANSDERMAL WEEKLY
Status: DISCONTINUED | OUTPATIENT
Start: 2025-03-14 | End: 2025-03-14 | Stop reason: HOSPADM

## 2025-03-10 RX ORDER — AMOXICILLIN 250 MG
1 CAPSULE ORAL 2 TIMES DAILY PRN
Status: DISCONTINUED | OUTPATIENT
Start: 2025-03-10 | End: 2025-03-13

## 2025-03-10 RX ORDER — PROCHLORPERAZINE MALEATE 10 MG
10 TABLET ORAL EVERY 6 HOURS PRN
Status: ON HOLD | COMMUNITY
Start: 2024-11-13 | End: 2025-03-12

## 2025-03-10 RX ORDER — ONDANSETRON 4 MG/1
4 TABLET, ORALLY DISINTEGRATING ORAL EVERY 6 HOURS PRN
Status: DISCONTINUED | OUTPATIENT
Start: 2025-03-10 | End: 2025-03-14 | Stop reason: HOSPADM

## 2025-03-10 RX ADMIN — ATORVASTATIN CALCIUM 10 MG: 10 TABLET, FILM COATED ORAL at 22:06

## 2025-03-10 RX ADMIN — SODIUM CHLORIDE 64 ML: 9 INJECTION, SOLUTION INTRAVENOUS at 17:33

## 2025-03-10 RX ADMIN — VANCOMYCIN HYDROCHLORIDE 2000 MG: 10 INJECTION, POWDER, LYOPHILIZED, FOR SOLUTION INTRAVENOUS at 15:33

## 2025-03-10 RX ADMIN — ERYTHROMYCIN: 5 OINTMENT OPHTHALMIC at 23:02

## 2025-03-10 RX ADMIN — SODIUM CHLORIDE, SODIUM LACTATE, POTASSIUM CHLORIDE, AND CALCIUM CHLORIDE: .6; .31; .03; .02 INJECTION, SOLUTION INTRAVENOUS at 21:04

## 2025-03-10 RX ADMIN — PIPERACILLIN AND TAZOBACTAM 4.5 G: 4; .5 INJECTION, POWDER, FOR SOLUTION INTRAVENOUS at 20:08

## 2025-03-10 RX ADMIN — CIPROFLOXACIN HYDROCHLORIDE 4 DROP: 3 SOLUTION/ DROPS OPHTHALMIC at 23:06

## 2025-03-10 RX ADMIN — IOPAMIDOL 84 ML: 755 INJECTION, SOLUTION INTRAVENOUS at 17:32

## 2025-03-10 RX ADMIN — POTASSIUM CHLORIDE 40 MEQ: 20 SOLUTION ORAL at 20:12

## 2025-03-10 RX ADMIN — OFLOXACIN 1 DROP: 3 SOLUTION/ DROPS OPHTHALMIC at 23:02

## 2025-03-10 RX ADMIN — PIPERACILLIN AND TAZOBACTAM 4.5 G: 4; .5 INJECTION, POWDER, FOR SOLUTION INTRAVENOUS at 14:18

## 2025-03-10 RX ADMIN — SODIUM CHLORIDE 1000 ML: 0.9 INJECTION, SOLUTION INTRAVENOUS at 13:56

## 2025-03-10 RX ADMIN — CARVEDILOL 6.25 MG: 6.25 TABLET, FILM COATED ORAL at 17:47

## 2025-03-10 RX ADMIN — SODIUM CHLORIDE 1000 ML: 0.9 INJECTION, SOLUTION INTRAVENOUS at 16:41

## 2025-03-10 RX ADMIN — DEXAMETHASONE SODIUM PHOSPHATE 4 DROP: 1 SOLUTION/ DROPS OPHTHALMIC at 23:06

## 2025-03-10 RX ADMIN — ACETAMINOPHEN 768 MG: 325 SUSPENSION ORAL at 21:24

## 2025-03-10 ASSESSMENT — ACTIVITIES OF DAILY LIVING (ADL)
ADLS_ACUITY_SCORE: 46
ADLS_ACUITY_SCORE: 53
ADLS_ACUITY_SCORE: 46
ADLS_ACUITY_SCORE: 53
ADLS_ACUITY_SCORE: 46
ADLS_ACUITY_SCORE: 46

## 2025-03-10 ASSESSMENT — COLUMBIA-SUICIDE SEVERITY RATING SCALE - C-SSRS
2. HAVE YOU ACTUALLY HAD ANY THOUGHTS OF KILLING YOURSELF IN THE PAST MONTH?: NO
1. IN THE PAST MONTH, HAVE YOU WISHED YOU WERE DEAD OR WISHED YOU COULD GO TO SLEEP AND NOT WAKE UP?: NO
6. HAVE YOU EVER DONE ANYTHING, STARTED TO DO ANYTHING, OR PREPARED TO DO ANYTHING TO END YOUR LIFE?: NO

## 2025-03-10 NOTE — ED PROVIDER NOTES
Emergency Department Note      History of Present Illness     Chief Complaint   Altered Mental Status      HPI   Fortino Moya is a very pleasant 75 year old male, anticoagulated on Xarelto, with a history of atrial fibrillation, CAD, hypertension, and tonsil cancer on oral chemotherapy amongst others presenting with altered mental status. He is accompanied in the ED by his daughter and sister who explain that over the past 3-4 days the patient has displayed new onset fatigue, decreased appetite, and altered mental status to the point that at times he has been incomprehensible with respect to his speech and text messages to family. They think this is attributable to having recently started morphine. They note that his prescription bottle is nearly empty despite having been filled on 3/7. The patient lives independently and manages his own medications. In the ED the patient states he is having continued pain related to his cancer. Denies fever, vomiting, cough, shortness of breath, urinary symptoms, constipation, or diarrhea. Patient has a Gtube in place, he is taking the liquid morphine by mouth. He reportedly has a history of infections related to the Gtube and presents with some irritation around the site. He denies any current antibiotic use.    Independent Historian   Daughter and Sister as detailed above.    Review of External Notes   I personally reviewed notes from the patient's telephone encounter dated  2/26/25 . This provided me with information regarding patient's baseline medical problems.     Past Medical History     Medical History and Problem List   Anxiety  Arrhythmia  Atrial fibrillation  Coronary artery disease  Dilatation of aorta  Dysphagia  Gastroesophageal reflux disease  Hearing problem  Hypercholesterolemia  Hypertension  Hypothyroidism  Osteoradionecrosis of mandible  Hyperlipidemia  Paralytic lagophthalmos  Persistent insomnia  Primary squamous cell carcinoma of tonsil  "  Vitiligo  Malignant neoplasm of lymph node  Asthma  Chronic insomnia   Aortic ectasia  Osteomyelitis   Tonsil cancer  Paralytic lagophthalmos      Medications   Tylenol  Lipitor  Coreg  Restasis  Lovenox  Xarelto  Romycin  Neurontin  Synthroid  Zestril  Roxicodone   Trental  Senokot  Desyrel  Vitamin C    Surgical History   Past Surgical History:   Procedure Laterality Date    APPENDECTOMY      ARTHROPLASTY HIP ANTERIOR Left 7/21/2023    Procedure: LEFT TOTAL HIP ARTHROPLASTY DIRECT ANTERIOR APPROACH;  Surgeon: Aron Lemon MD;  Location:  OR    ARTHROPLASTY KNEE Left 12/02/2022    Procedure: LEFT TOTAL KNEE ARTHROPLASTY;  Surgeon: Aron Lemon MD;  Location:  OR    GI SURGERY      IMPLANT GOLD WEIGHT EYELID Left 04/02/2021    Procedure: Left upper eyelid gold or platinum weight insertion for lagophthalmos - 1.6 grams;  Surgeon: Vivien Yoon MD;  Location:  OR    IR CHEST PORT PLACEMENT > 5 YRS OF AGE  02/03/2021    IR GASTROSTOMY TUBE PERCUTANEOUS PLCMNT  03/10/2021    IR PORT REMOVAL RIGHT  09/02/2021    JOINT REPLACEMENT Right     hip    JOINT REPLACEMENT      LASIK Bilateral     ORTHOPEDIC SURGERY      REPAIR ECTROPION Left 04/02/2021    Procedure: Left lower eyelid ectropion repair;  Surgeon: Vivien Yoon MD;  Location:  OR    REPAIR PTOSIS Left     VASCULAR SURGERY         Physical Exam     Patient Vitals for the past 24 hrs:   BP Temp Pulse Resp SpO2 Height Weight   03/10/25 1453 -- -- 112 18 97 % -- --   03/10/25 1452 -- -- (!) 151 22 97 % -- --   03/10/25 1451 -- -- (!) 168 16 96 % -- --   03/10/25 1429 -- -- (!) 158 18 98 % -- --   03/10/25 1421 -- -- -- 18 -- -- --   03/10/25 1417 -- -- -- -- -- 1.778 m (5' 10\") 76.2 kg (168 lb)   03/10/25 1416 -- -- -- -- 100 % -- --   03/10/25 1415 (!) 165/119 -- 84 -- -- -- --   03/10/25 1406 -- -- -- 14 -- -- --   03/10/25 1405 (!) 169/104 -- 89 -- 93 % -- --   03/10/25 1400 (!) 160/115 -- 113 -- 96 % -- --   03/10/25 1357 -- -- -- 19 " -- -- --   03/10/25 1353 -- -- 98 17 96 % -- --   03/10/25 1334 -- -- (!) 165 13 95 % -- --   03/10/25 1146 (!) 164/107 98.3  F (36.8  C) 84 16 95 % -- --     Physical Exam  Vitals and nursing note reviewed.   Constitutional:       Appearance: He is ill-appearing. He is not diaphoretic.   HENT:      Head: Atraumatic.      Mouth/Throat:      Mouth: Mucous membranes are moist.   Eyes:      Conjunctiva/sclera: Conjunctivae normal.   Cardiovascular:      Rate and Rhythm: Tachycardia present. Rhythm irregular.      Heart sounds: Normal heart sounds.   Pulmonary:      Effort: Pulmonary effort is normal.      Comments: Diminished lung sounds bibasilarly.  No rales, wheeze, rhonchi  Abdominal:      General: There is no distension.      Palpations: Abdomen is soft.      Tenderness: There is no abdominal tenderness. There is no guarding.      Comments: Purulent drainage around G-tube site with surrounding erythema   Musculoskeletal:      Cervical back: Neck supple.   Skin:     General: Skin is warm and dry.      Coloration: Skin is not pale.   Neurological:      General: No focal deficit present.      Mental Status: He is alert. He is confused.   Psychiatric:      Comments: Patient has abnormal speech at baseline secondary to head and neck cancer         Diagnostics     Lab Results   Labs Ordered and Resulted from Time of ED Arrival to Time of ED Departure   COMPREHENSIVE METABOLIC PANEL - Abnormal       Result Value    Sodium 133 (*)     Potassium 3.3 (*)     Carbon Dioxide (CO2) 26      Anion Gap 13      Urea Nitrogen 9.9      Creatinine 0.46 (*)     GFR Estimate >90      Calcium 8.5 (*)     Chloride 94 (*)     Glucose 172 (*)     Alkaline Phosphatase 107      AST 15      ALT 9      Protein Total 6.6      Albumin 3.6      Bilirubin Total 1.4 (*)    CBC WITH PLATELETS AND DIFFERENTIAL - Abnormal    WBC Count 14.9 (*)     RBC Count 3.67 (*)     Hemoglobin 11.5 (*)     Hematocrit 33.1 (*)     MCV 90      MCH 31.3      MCHC  34.7      RDW 21.4 (*)     Platelet Count 325      % Neutrophils 86      % Lymphocytes 2      % Monocytes 11      % Eosinophils 0      % Basophils 0      % Immature Granulocytes 1      NRBCs per 100 WBC 0      Absolute Neutrophils 12.8 (*)     Absolute Lymphocytes 0.3 (*)     Absolute Monocytes 1.7 (*)     Absolute Eosinophils 0.0      Absolute Basophils 0.0      Absolute Immature Granulocytes 0.1      Absolute NRBCs 0.0     RBC AND PLATELET MORPHOLOGY - Abnormal    RBC Morphology Confirmed RBC Indices      Platelet Assessment        Value: Automated Count Confirmed. Platelet morphology is normal.    Elliptocytes Slight (*)    ROUTINE UA WITH MICROSCOPIC - Abnormal    Color Urine Light Yellow      Appearance Urine Clear      Glucose Urine Negative      Bilirubin Urine Negative      Ketones Urine Negative      Specific Gravity Urine 1.010      Blood Urine Negative      pH Urine 7.0      Protein Albumin Urine 10 (*)     Urobilinogen Urine 2.0      Nitrite Urine Negative      Leukocyte Esterase Urine Negative      RBC Urine 1      WBC Urine 1     ISTAT GASES LACTATE VENOUS POCT - Abnormal    Lactic Acid POCT 1.2      Bicarbonate Venous POCT 31 (*)     O2 Sat, Venous POCT 85 (*)     pCO2 Venous POCT 43      pH Venous POCT 7.48 (*)     pO2 Venous POCT 47      Base Excess/Deficit (+/-) POCT 7.0 (*)    BILIRUBIN DIRECT - Abnormal    Bilirubin Direct 0.56 (*)    MAGNESIUM - Normal    Magnesium 1.9     PROCALCITONIN - Normal    Procalcitonin 0.11     INFLUENZA A/B, RSV AND SARS-COV2 PCR - Normal    Influenza A PCR Negative      Influenza B PCR Negative      RSV PCR Negative      SARS CoV2 PCR Negative     PHOSPHORUS - Normal    Phosphorus 2.8     BLOOD CULTURE   BLOOD CULTURE   RESPIRATORY AEROBIC BACTERIAL CULTURE   MRSA MSSA PCR, NASAL SWAB   AEROBIC BACTERIAL CULTURE ROUTINE       Imaging   CT Abdomen Pelvis w Contrast   Final Result   IMPRESSION:    1.  G-tube appears within the stomach with no evidence of complication.  No acute findings in the abdomen and pelvis.      XR Chest 2 Views   Final Result   IMPRESSION: Some new patchy linear opacities at the lower lungs could be atelectasis or new airspace disease. Increased cardiomegaly. No effusions identified. No pneumothorax.          ECG  ECG taken at 1147, ECG read at 1500  Atrial fibrillation with rapid ventricular response with premature ventricular or aberrantly conducted complexes   Nonspecific ST and T wave abnormality  Abnormal ECG  Rate 119 bpm. NH interval * ms. QRS duration 94 ms. QT/QTc 338/475 ms. P-R-T axes * 80 -40.     Independent Interpretation   1419 XR Chest 2 Views  Independent interpretation: chest x-ray question bilateral lower infiltrates         ED Course      Medications Administered   Medications   sodium chloride (PF) 0.9% PF flush 3 mL (has no administration in time range)   sodium chloride (PF) 0.9% PF flush 3 mL (3 mLs Intracatheter $Given 3/10/25 1335)   carvedilol (COREG) tablet 6.25 mg (6.25 mg Oral $Given 3/10/25 1747)   ciprofloxacin-dexAMETHasone (CIPRODEX) 0.3-0.1 % otic suspension 4 drop (has no administration in time range)   cycloSPORINE (RESTASIS) 0.05 % ophthalmic emulsion 1 drop (has no administration in time range)   erythromycin (ROMYCIN) ophthalmic ointment (has no administration in time range)   ofloxacin (OCUFLOX) 0.3 % ophthalmic solution 1 drop (has no administration in time range)   polyvinyl alcohol-povidone PF (REFRESH) ophthalmic solution 1 drop (has no administration in time range)   rivaroxaban ANTICOAGULANT (XARELTO) tablet 20 mg (has no administration in time range)   piperacillin-tazobactam (ZOSYN) 4.5 g vial to attach to  mL bag (has no administration in time range)   lactated ringers infusion (has no administration in time range)   potassium chloride (KAYCIEL) solution 40 mEq (has no administration in time range)   sodium chloride 0.9% BOLUS 1,000 mL (0 mLs Intravenous Stopped 3/10/25 1443)   piperacillin-tazobactam  (ZOSYN) 4.5 g vial to attach to  mL bag (0 g Intravenous Stopped 3/10/25 1515)   vancomycin (VANCOCIN) 2,000 mg in 0.9% NaCl 520 mL intermittent infusion (0 mg Intravenous Stopped 3/10/25 1812)   sodium chloride 0.9% BOLUS 1,000 mL (0 mLs Intravenous Stopped 3/10/25 1812)   iopamidol (ISOVUE-370) solution 84 mL (84 mLs Intravenous $Given 3/10/25 1732)   SalineFlush (64 mLs Intravenous $Given 3/10/25 1733)       Procedures   Procedures   None performed    Discussion of Management   1551 Consultation: I discussed the patient's presentation, workup, assessment, plan and disposition with hospitalist Dr Lee.         ED Course   ED Course as of 03/10/25 1816   Mon Mar 10, 2025   1330 I obtained history and examined the patient as noted above.   1419 XR Chest 2 Views  Independent interpretation: chest x-ray question bilateral lower infiltrates     1551 Consultation: I discussed the patient's presentation, workup, assessment, plan and disposition with hospitalist Dr Lee.         Additional Documentation  None    Medical Decision Making / Diagnosis     CMS Diagnoses: IV Antibiotics given and/or elevated Lactate of 1.2 and no sepsis note found - Delete this reminder and enter the sepsis note or '.edcms' before signing chart.>>>The patient has signs of sepsis   Sepsis ED evaluation   The patient has signs of sepsis as evidenced by:  1. Presence of 2 SIRS criteria, suspected infection, AND  2. Organ dysfunction: Acute encephalopathy due to sepsis    Sepsis Care Initiation: Starting at  134 PM on 03/10/25, until specified. Prior to this documentation, sepsis, severe sepsis, or septic shock was NOT thought to be a significant cause of illness. This order represents the first time infection was seriously considered to be affecting the patient.    Lactic Acid Results:  Recent Labs   Lab Test 03/10/25  1355   LACT 1.2       3 Hour Bundle 6 Hour Bundle (Reassessment)   Blood Cultures before IV Antibiotics:  "Yes  Antibiotics given: see below  Prehospital fluid volume (mL):       Prehospital IV Intake: 500   Prehospital Fluid Start Time: 1115   Prehospital Fluid Stop Time: 1140     Total fluids given (ED +Pre-hospital):  The patient responded to a lesser volume of IV fluids. The initial volume ordered was 2000 mL.    Repeat Lactic Acid Level: Ordered by reflex for 2 hours after initial lactic acid collection.  Vasopressors: MAP>65 after initial IVF bolus, will continue to monitor fluid status and vital signs.  Repeat perfusion exam: I attest to having performed a repeat sepsis exam and assessment of perfusion at  1700 PM.   BMI Readings from Last 1 Encounters:   03/10/25 24.11 kg/m        Anti-infectives (From admission through now)      Start     Dose/Rate Route Frequency Ordered Stop    03/10/25 1415  vancomycin (VANCOCIN) 2,000 mg in 0.9% NaCl 520 mL intermittent infusion         25 mg/kg × 76.2 kg  over 2 Hours Intravenous ONCE 03/10/25 1410 03/10/25 1812    03/10/25 1405  piperacillin-tazobactam (ZOSYN) 4.5 g vial to attach to  mL bag        Note to Pharmacy: For [facility], [facility] and [facility]: For Zosyn-naive patients, use the \"Zosyn initial dose + extended infusion\" order panel.    4.5 g  over 30 Minutes Intravenous ONCE 03/10/25 1404 03/10/25 1515                MIPS       None    MDM   Patient presenting with confusion.  Vital signs on arrival notable for elevated blood pressure but otherwise reassuring.  Patient does have atrial fibrillation with RVR.  Patient presentation appears consistent with some degree of encephalopathy.  Considered differential including occult infection, metabolic abnormality, electrolyte derangement, among others.  I have low suspicion for stroke given no focal neurologic changes.  Workup notable for leukocytosis, and chest x-ray concerning for bibasilar infiltrates.  Question whether the this may be secondary to aspiration pneumonia given the patient is not supposed to eat and has been " confused.  I also noted a large amount of dried liquid morphine around the patient's mouth, suggesting he has been taking this orally rather than through his G-tube.  Urinalysis does not suggest UTI.  Patient does have purulent drainage around the G-tube site and some evidence of cellulitis.  CT does not show any abscess or other abnormality explain the patient's presentation.  A wound culture was obtained from the purulent drainage.  Blood cultures were obviously obtained out of concern for sepsis prior to broad-spectrum antibiotics.  Patient was admitted to the hospitalist for further evaluation and management.    Disposition   The patient was admitted to the hospital.     Diagnosis     ICD-10-CM    1. Acute encephalopathy  G93.40       2. Leukocytosis, unspecified type  D72.829       3. Cellulitis of abdominal wall  L03.311       4. Atrial fibrillation with RVR (H)  I48.91       5. Tonsil cancer (H)  C09.9       6. Osteomyelitis, unspecified site, unspecified type (H)  M86.9       7. History of atrial fibrillation  Z86.79       8. Gastrostomy status (H)  Z93.1            Discharge Medications   New Prescriptions    No medications on file          Eric Ramirez MD  03/10/25 [id number]

## 2025-03-10 NOTE — H&P
[facility]    History and Physical - Hospitalist Service       Date of Admission:  3/10/2025    Assessment & Plan      Fortino Moya is a 75 year old male with history of afib on DOAC, L tonsillar SCC, HTN, HLD, hypothyroid who was admitted on 3/10/2025 with acute encephalopathy.    Acute toxic metabolic encephalopathy, likely multifactorial  Sepsis w/out shock, suspect pulmonary vs SSTI source  Concern for aspiration PNA  Concern for G-tube site infection  Patient presenting with altered mental status over 2-3d PTA.  Initial workup notable for mild electrolyte disturbance, leukocytosis, bilateral lower lobe opacities.  In discussion with family, it seems patient has been using a significant amount of prescribed morphine at home and is typically independent at baseline.  Moreover, physical exam is significant for small but reactive right pupil and notable purulent drainage from G-tube site.  No focal neurologic deficits noted (known L lid lag, L facial droop, slurred speech). No respiratory symptoms, procal normal, although quite high aspiration risk. Patient does have history of G-tube site infection (complete minocycline course) and has remained on chronic suppressive antibiotics for chronic mandibular osteomyelitis.  At this time, suspect patient's acute encephalopathy is multifactorial in setting of significant morphine use, electrolyte disturbance with poor p.o. intake, suspected pulmonary versus G-tube site infection.  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  Underwent initial chemoradiation with recurrence noted in 2021.  Completed additional chemoradiation complicated by subsequent osteoradionecrosis, persistent left facial pain.  Recurrence again noted in late 2023 involving the parapharyngeal/left  space; has remained on chemotherapy since.  Follows closely with [facility] and continues on capecitabine given appropriate response.  -Holding capecitabine while admitted, patient would need to bring home supply  -Continue butrans patch  -IV dilaudid prn available for severe pain   +Recommend cautious use given mental status  -Holding morphine, pregabalin pending improved mental status  -Nutrition consulted for TF orders, holding now in setting of possible infection  -Pain Service consulted    Afib w/ RVR on DOAC, improving  History of paroxysmal atrial fibrillation and found to be in Afib with RVR on admission.  Suspect tachyarrhythmia is being driven by hypovolemia, electrolyte imbalance, presumed underlying infection.  No hemodynamic instability and rates improved with fluid resuscitation.  -Additional 1L IVF bolus now  -Continue carvedilol, DOAC  -Cardiac monitoring  -Treatment of suspected infection(s) above  -Daily BMP, mag, phos   +K, mag, phos replacement per protocol    Hyponatremia, mild  Hypokalemia, mild  Mild electrolyte disturbances noted in setting of suspected poor p.o. intake and questionable tube feed adherence over recent days PTA.  -Additional 1L IVF now  -Nutrition consult  -Daily BMP, mag, phos   +K, mag, phos replacement per protocol    Elevated bilirubin, mild  Mild total bilirubin elevation to 1.4 on admission, other LFTs normal.  No significant right upper quadrant pain, no  jaundice.  Hemoglobin, platelets at baseline and no concern for hemolytic process.  Unclear etiology and will opt to continue trending while treating other acute concerns.  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 Continue statin  HLD - Continue PTA atorvastatin  HTN - Continue PTA carvedilol  Hypothyroidism - TSH normal in 2/2025. Continue PTA LT4  Insomnia - Holding trazodone pending improved mental status  OA s/p L JOSÉ ANTONIO (7/2023)          Diet:  Full Liquid, TF per Nutrition and pending infection r/o  DVT Prophylaxis: DOAC  Galvan Catheter: Not present  Lines: None     Cardiac Monitoring: None  Code Status:  DNR/DNI    Clinically Significant Risk Factors Present on Admission        # Hypokalemia: Lowest K = 3.3 mmol/L in last 2 days, will replace as needed  # Hyponatremia: Lowest Na = 133 mmol/L in last 2 days, will monitor as appropriate  # Hypochloremia: Lowest Cl = 94 mmol/L in last 2 days, will monitor as appropriate  # Hypocalcemia: Lowest Ca = 8.5 mg/dL in last 2 days, will monitor and replace as appropriate        # Drug Induced Coagulation Defect: home medication list includes an anticoagulant medication    # Hypertension: Noted on problem list                    Disposition Plan     Medically Ready for Discharge: Anticipated in 2-4 Days     Jerry Lee MD  Hospitalist Service  [facility]  Securely message with [employer] (more info)  Text page via [employer] Paging/Directory     ______________________________________________________________________    Chief Complaint   AMS    History is obtained from the patient and family    History of Present Illness   Fortino Moya is a 75 year old male with history of afib on DOAC, L tonsillar SCC, HTN, HLD, hypothyroid who  presents from home with increased confusion. Per family, patient current lives alone and have noticed increased confusion with odd statements/texts over past 2-3 days. At baseline, patient is quite independent, ambulatory, alert, and oriented. Of note, family notes much of his recent morphine prescription being gone (refilled 3/7).     Initial ED workup notable for Na 133, K 3.3, Tbili 1.4, lactate 1.2, WBC 14.9, hgb 11.5 (baseline 11-12). UA noninfectious. Blood cultures pending. CXR w/ b/l lower patchy opacities. Patient received vanc, zosyn, 1L IVF. Noted to be in afib w/ RVR, otherwise HDS, afebrile.     In my discussion with patient/family, they confirmed the above information.  History primarily gathered from patient's daughter as patient remains quite confused.  Notes significant morphine use, over half bottle gone in past 3 days.  Questions whether the patient has had any p.o. intake or has been doing tube feeds.  Patient denies fever, chills, nausea, vomiting, abdominal pain, any concerns, although questionable historian.    Past Medical History    Past Medical History:   Diagnosis Date    Anxiety     Arrhythmia     A FIB    Atrial fibrillation (H)     Coronary artery disease 06/15/2021    A Fib    Dilatation of aorta     Dysphagia     Gastroesophageal reflux disease     Hard to intubate 7/21/2023    Hearing problem 1995    History of radiation therapy 01/2021    Hypercholesterolemia     Hypertension     Hypothyroidism     Osteoradionecrosis of mandible     Other hyperlipidemia     Paralytic lagophthalmos     Persistent insomnia     Primary squamous cell carcinoma of tonsil (H)     Vitiligo      Past Surgical History   Past Surgical History:   Procedure Laterality Date    APPENDECTOMY      ARTHROPLASTY HIP ANTERIOR Left 7/21/2023    Procedure: LEFT TOTAL HIP ARTHROPLASTY DIRECT ANTERIOR APPROACH;  Surgeon: Aron Lemon MD;  Location: [facility] OR    ARTHROPLASTY KNEE Left 12/02/2022    Procedure: LEFT TOTAL  KNEE ARTHROPLASTY;  Surgeon: Aron Lemon MD;  Location: [facility] OR    GI SURGERY      IMPLANT GOLD WEIGHT EYELID Left 04/02/2021    Procedure: Left upper eyelid gold or platinum weight insertion for lagophthalmos - 1.6 grams;  Surgeon: Vivien Yoon MD;  Location: [facility] OR    IR CHEST PORT PLACEMENT > 5 YRS OF AGE  02/03/2021    IR GASTROSTOMY TUBE PERCUTANEOUS PLCMNT  03/10/2021    IR PORT REMOVAL RIGHT  09/02/2021    JOINT REPLACEMENT Right     hip    JOINT REPLACEMENT      LASIK Bilateral     ORTHOPEDIC SURGERY      REPAIR ECTROPION Left 04/02/2021    Procedure: Left lower eyelid ectropion repair;  Surgeon: Vivien Yoon MD;  Location: [facility] OR    REPAIR PTOSIS Left     VASCULAR SURGERY       Prior to Admission Medications   Prior to Admission Medications   Prescriptions Last Dose Informant Patient Reported? Taking?   COMPOUNDED NON-CONTROLLED SUBSTANCE (CMPD RX) - PHARMACY TO MIX COMPOUNDED MEDICATION   No No   Sig: Insulin compounded ophthalmic drop 1U/mL 1 drop QID OS Dispense 1 bottle for 28day supply   NONFORMULARY   Yes No   Sig: Take by mouth 3 times daily. Magnesium   Polyvinyl Alcohol-Povidone (REFRESH OP)  Self Yes No   Sig: Place 1 drop into both eyes as needed   atorvastatin (LIPITOR) 10 MG tablet Past Week Self Yes Yes   Sig: Take 1 tablet by mouth every evening   buprenorphine (BUTRANS) 5 MCG/HR WK patch Past Week  Yes Yes   Sig: Place 1 patch onto the skin once a week.   capecitabine (XELODA) 500 MG tablet Unknown  Yes Yes   Sig: Take 2,000 mg by mouth 2 times daily. Take on days 1 to 14. Take within 30 minutes after a meal. Swallow whole with water. Do not crush or cut.   carvedilol (COREG) 6.25 MG tablet Past Week  Yes Yes   Sig: Take 6.25 mg by mouth 2 times daily (with meals).   ciprofloxacin-dexAMETHasone (CIPRODEX) 0.3-0.1 % otic suspension Past Week  Yes Yes   Sig: Place 4 drops Into the left ear 2 times daily. 3/4/25 SHAKE LIQUID AND INSTILL 4 DROPS TO LEFT EAR TWICE DAILY FOR 14 DAYS    cycloSPORINE (RESTASIS) 0.05 % ophthalmic emulsion   No No   Sig: Place 1 drop Into the left eye 2 times daily.   Patient not taking: Reported on 11/4/2024   erythromycin (ROMYCIN) 5 MG/GM ophthalmic ointment   No No   Sig: Place Into the left eye at bedtime.   levothyroxine (SYNTHROID/LEVOTHROID) 75 MCG tablet Past Week  Yes Yes   Sig: Take 75 mcg by mouth every morning (before breakfast).   morphine 10 MG/5ML solution Past Week  Yes Yes   Sig: Take 7.5 mg by mouth every 4 hours as needed for pain.   ofloxacin (OCUFLOX) 0.3 % ophthalmic solution   No No   Sig: Place 1 drop Into the left eye 2 times daily.   ondansetron (ZOFRAN) 8 MG tablet   Yes No   Sig: Take 8 mg by mouth every 8 hours as needed for nausea (vomiting). (unrelieved by prochlorperazine).   pregabalin (LYRICA) 150 MG capsule Past Week  Yes Yes   Sig: Take 150 mg by mouth 2 times daily. Take 1 capsule (150 mg total) by mouth 2 (two) times a day for 7 days. Take 1 capsule two times a day for three days. Then take 1 capsule in the morning and 2 capsules at bedtime for three days. Then take 2 capsules two times a day indefinitely.   prochlorperazine (COMPAZINE) 10 MG tablet   Yes No   Sig: Take 10 mg by mouth every 6 hours as needed for nausea or vomiting.   rivaroxaban ANTICOAGULANT (XARELTO) 20 MG TABS tablet Past Week Self Yes Yes   Sig: Take 1 tablet by mouth daily (with dinner)   senna-docusate (SENOKOT-S/PERICOLACE) 8.6-50 MG tablet   No No   Sig: Take 1-2 tablets by mouth 2 times daily Take while on oral narcotics to prevent or treat constipation.   traZODone (DESYREL) 50 MG tablet Past Week Self Yes Yes   Sig: Take  mg by mouth at bedtime.   vitamin C with B complex (B COMPLEX-C) tablet   Yes No   Sig: Take 1 tablet by mouth daily.      Facility-Administered Medications: None      Review of Systems    The 10 point Review of Systems is negative other than noted in the HPI or here.     Physical Exam   Vital Signs: Temp: 98.3  F (36.8  C)    BP: (!) 165/119 Pulse: 112   Resp: 18 SpO2: 97 %      Weight: 168 lbs 0 oz    General: Awake, alert, NAD, intermittently confused, sitting upright in bed  HEENT: Atraumatic, normocephalic, opacified L cornea, small but reactive R pupil, no scleral icterus  CV: Irregular rate, irregular rhythm, no murmurs, no BRETT, distal pulses intact  Pulm: Coarse breath sounds, breathing comfortably on RA, no wheezes, no crackles  Abd: Soft, non-tender, non-distended, purulent drainage from G-tube site with mild surrounding erythema  Skin: No other rashes, lesions, or wounds visualized  Neuro: AOx3, L facial droop (chronic), slurred speech (chronci), no focal deficits, moving all extremities spontaneously    Medical Decision Making       80 MINUTES SPENT BY ME on the date of service doing chart review, history, exam, documentation & further activities per the note.      Data   ------------------------- PAST 24 HR DATA REVIEWED -----------------------------------------------

## 2025-03-10 NOTE — ED TRIAGE NOTES
Pt has thoat CA and was prescribed liquid MS and family concerned pt has been taking to much MS because of amt missing from bottle. Pt is more confused for 2-3 days     Triage Assessment (Adult)       Row Name 03/10/25 1148          Triage Assessment    Airway WDL WDL        Respiratory WDL    Respiratory WDL WDL        Skin Circulation/Temperature WDL    Skin Circulation/Temperature WDL WDL        Cardiac WDL    Cardiac WDL WDL        Peripheral/Neurovascular WDL    Peripheral Neurovascular WDL WDL        Cognitive/Neuro/Behavioral WDL    Cognitive/Neuro/Behavioral WDL X     Level of Consciousness confused        [place] Coma Scale    Best Eye Response 4-->(E4) spontaneous     Best Motor Response 6-->(M6) obeys commands     Best Verbal Response 4-->(V4) confused     [place] Coma Scale Score 14

## 2025-03-10 NOTE — ED NOTES
[facility]  ED Nurse Handoff Report    ED Chief complaint: Altered Mental Status      ED Diagnosis:   Final diagnoses:   Acute encephalopathy   Leukocytosis, unspecified type   Cellulitis of abdominal wall   Atrial fibrillation with RVR (H)   Tonsil cancer (H)   Osteomyelitis, unspecified site, unspecified type (H)   History of atrial fibrillation   Gastrostomy status (H)       Code Status: to be addressed by MD    Allergies: No Known Allergies    Patient Story: Pt has thoat CA and was prescribed liquid MS and family concerned pt has been taking to much MS because of amt missing from bottle. Pt is more confused for 2-3 days     Focused Assessment:  Pt oriented to self and place only. Speech is hoarse / somewhat garbled but this is baseline per family, as the left facial droop is baseline as well per family. Tachycardiac in a-fib with RVR - afib is not new as pt takes Eliquis for afib. Pt coughing up green thick sputum.    CT Abdomen Pelvis w Contrast   Final Result   IMPRESSION:    1.  G-tube appears within the stomach with no evidence of complication. No acute findings in the abdomen and pelvis.      XR Chest 2 Views   Final Result   IMPRESSION: Some new patchy linear opacities at the lower lungs could be atelectasis or new airspace disease. Increased cardiomegaly. No effusions identified. No pneumothorax.        Labs Ordered and Resulted from Time of ED Arrival to Time of ED Departure   COMPREHENSIVE METABOLIC PANEL - Abnormal       Result Value    Sodium 133 (*)     Potassium 3.3 (*)     Carbon Dioxide (CO2) 26      Anion Gap 13      Urea Nitrogen 9.9      Creatinine 0.46 (*)     GFR Estimate >90      Calcium 8.5 (*)     Chloride 94 (*)     Glucose 172 (*)     Alkaline Phosphatase 107      AST 15      ALT 9      Protein Total 6.6      Albumin 3.6      Bilirubin Total 1.4 (*)    CBC WITH PLATELETS AND DIFFERENTIAL - Abnormal    WBC Count 14.9 (*)     RBC Count 3.67 (*)     Hemoglobin 11.5 (*)      Hematocrit 33.1 (*)     MCV 90      MCH 31.3      MCHC 34.7      RDW 21.4 (*)     Platelet Count 325      % Neutrophils 86      % Lymphocytes 2      % Monocytes 11      % Eosinophils 0      % Basophils 0      % Immature Granulocytes 1      NRBCs per 100 WBC 0      Absolute Neutrophils 12.8 (*)     Absolute Lymphocytes 0.3 (*)     Absolute Monocytes 1.7 (*)     Absolute Eosinophils 0.0      Absolute Basophils 0.0      Absolute Immature Granulocytes 0.1      Absolute NRBCs 0.0     RBC AND PLATELET MORPHOLOGY - Abnormal    RBC Morphology Confirmed RBC Indices      Platelet Assessment        Value: Automated Count Confirmed. Platelet morphology is normal.    Elliptocytes Slight (*)    ROUTINE UA WITH MICROSCOPIC - Abnormal    Color Urine Light Yellow      Appearance Urine Clear      Glucose Urine Negative      Bilirubin Urine Negative      Ketones Urine Negative      Specific Gravity Urine 1.010      Blood Urine Negative      pH Urine 7.0      Protein Albumin Urine 10 (*)     Urobilinogen Urine 2.0      Nitrite Urine Negative      Leukocyte Esterase Urine Negative      RBC Urine 1      WBC Urine 1     ISTAT GASES LACTATE VENOUS POCT - Abnormal    Lactic Acid POCT 1.2      Bicarbonate Venous POCT 31 (*)     O2 Sat, Venous POCT 85 (*)     pCO2 Venous POCT 43      pH Venous POCT 7.48 (*)     pO2 Venous POCT 47      Base Excess/Deficit (+/-) POCT 7.0 (*)    BILIRUBIN DIRECT - Abnormal    Bilirubin Direct 0.56 (*)    MAGNESIUM - Normal    Magnesium 1.9     PROCALCITONIN - Normal    Procalcitonin 0.11     INFLUENZA A/B, RSV AND SARS-COV2 PCR - Normal    Influenza A PCR Negative      Influenza B PCR Negative      RSV PCR Negative      SARS CoV2 PCR Negative     PHOSPHORUS - Normal    Phosphorus 2.8     BLOOD CULTURE   BLOOD CULTURE   RESPIRATORY AEROBIC BACTERIAL CULTURE   MRSA MSSA PCR, NASAL SWAB   AEROBIC BACTERIAL CULTURE ROUTINE       Treatments and/or interventions provided: Labs, imaging, IV fluids,  antibiotics    Patient's response to treatments and/or interventions: unchanged    To be done/followed up on inpatient unit:  follow admitting MD orders    Does this patient have any cognitive concerns?: Disoriented to time and Disoriented to situation    Activity level - Baseline/Home:  Independent  Activity Level - Current:   Unknown    Patient's Preferred language: English   Needed?: No    Isolation: None  Infection: Not Applicable  Patient tested for COVID 19 prior to admission: NO  Bariatric?: No    Vital Signs:   Vitals:    03/10/25 1452 03/10/25 1453 03/10/25 1655 03/10/25 1725   BP:   (!) 162/103    Pulse: (!) 151 112 101    Resp: 22 18 18    Temp:       SpO2: 97% 97%  98%   Weight:       Height:           Cardiac Rhythm:   ECG results from 03/10/25   EKG 12 lead     Value    Systolic Blood Pressure     Diastolic Blood Pressure     Ventricular Rate 119    Atrial Rate     CO Interval     QRS Duration 94        QTc 475    P Axis     R AXIS 80    T Axis -40    Interpretation ECG      Atrial fibrillation with rapid ventricular response with premature ventricular or aberrantly conducted complexes  Nonspecific ST and T wave abnormality  Abnormal ECG  When compared with ECG of 24-May-2021 09:14,  Atrial fibrillation has replaced Sinus rhythm  Confirmed by GENERATED REPORT, COMPUTER ([id number]),  Ailyn Bermudez ([id number]) on 3/10/2025 12:03:48 PM         Was the PSS-3 completed:   Yes  What interventions are required if any?               Family Comments: family supportive at bedside    For the majority of the shift this patient's behavior was Green.     ED NURSE PHONE NUMBER: ([phone])

## 2025-03-10 NOTE — ED TRIAGE NOTES
Triage Assessment (Adult)       Row Name 03/10/25 1148          Triage Assessment    Airway WDL WDL        Respiratory WDL    Respiratory WDL WDL        Skin Circulation/Temperature WDL    Skin Circulation/Temperature WDL WDL        Cardiac WDL    Cardiac WDL WDL        Peripheral/Neurovascular WDL    Peripheral Neurovascular WDL WDL        Cognitive/Neuro/Behavioral WDL    Cognitive/Neuro/Behavioral WDL X     Level of Consciousness confused        [place] Coma Scale    Best Eye Response 4-->(E4) spontaneous     Best Motor Response 6-->(M6) obeys commands     Best Verbal Response 4-->(V4) confused     [place] Coma Scale Score 14

## 2025-03-10 NOTE — ED TRIAGE NOTES
BIBA from home where he lives alone for 2 days of confusion. Pt family called him 2 days ago and noticed that he wasn't making sense. Pt is usually A&Ox4 and independent with ambulation. Hx of throat CA, receives treatment through [facility]. Pt is oral liquids only and everything else through [facility]. Pt filled liquid morphine on 3/7/25 and bottle is already half empty. Family is concerned this may be source of confusion. Hx of afib, on eliquis. Pt is in afib RVR with -170s. Normotensive.        Triage Assessment (Adult)       Row Name 03/10/25 1148          Triage Assessment    Airway WDL WDL        Respiratory WDL    Respiratory WDL WDL        Skin Circulation/Temperature WDL    Skin Circulation/Temperature WDL WDL        Cardiac WDL    Cardiac WDL WDL        Peripheral/Neurovascular WDL    Peripheral Neurovascular WDL WDL        Cognitive/Neuro/Behavioral WDL    Cognitive/Neuro/Behavioral WDL X     Level of Consciousness confused        [place] Coma Scale    Best Eye Response 4-->(E4) spontaneous     Best Motor Response 6-->(M6) obeys commands     Best Verbal Response 4-->(V4) confused     [place] Coma Scale Score 14

## 2025-03-10 NOTE — PHARMACY-ADMISSION MEDICATION HISTORY
Pharmacist Admission Medication History    Admission medication history is complete. The information provided in this note is only as accurate as the sources available at the time of the update.    Information Source(s): Family member, Clinic records, and CareEverywhere/SureScripts via in-person    Pertinent Information:  RX meds were checked as taking if I was able to confirm with family photos of pt's meds/family interview or pharmacy fill records.  Will confirm OTC and rest of meds with patient when able. Patient is currently confused and upset.    Changes made to PTA medication list:  Added: lyrica, buprenorphine, xeloda, morphine, ciprodex, compazine, zofran  Deleted: apap (stopped to decrease pill burden per clinic notes), gabapentin (switched to lyrica), lovenox, lisinopril, oxycodone, trental  Changed: coreg dose, synthroid dose, trazodone dose    Allergies reviewed with family and updates made in EHR: yes    Medication History Completed By: Debra Weaver RPH 3/10/2025 3:00 PM    PTA Med List   Medication Sig Note Last Dose/Taking    atorvastatin (LIPITOR) 10 MG tablet Take 1 tablet by mouth every evening 3/10/2025: Picked up at pharmacy 2/12/25 for 90 tablets Past Week    buprenorphine (BUTRANS) 5 MCG/HR WK patch Place 1 patch onto the skin once a week. 3/10/2025: Picked up at pharmacy 3/7/25 for 28 day supply/4 patches Past Week    capecitabine (XELODA) 500 MG tablet Take 2,000 mg by mouth 2 times daily. Take on days 1 to 14. Take within 30 minutes after a meal. Swallow whole with water. Do not crush or cut. 3/10/2025: Picked up at pharmacy 2/28/25 for 112 tablets Unknown    carvedilol (COREG) 6.25 MG tablet Take 6.25 mg by mouth 2 times daily (with meals). 3/10/2025: Picked up at pharmacy 2/24/25 for 180 tablets/90 day supply   Past Week    ciprofloxacin-dexAMETHasone (CIPRODEX) 0.3-0.1 % otic suspension Place 4 drops Into the left ear 2 times daily. 3/4/25 SHAKE LIQUID AND INSTILL 4 DROPS TO LEFT EAR  TWICE DAILY FOR 14 DAYS 3/10/2025: Picked up at pharmacy 3/4/25 Past Week    levothyroxine (SYNTHROID/LEVOTHROID) 75 MCG tablet Take 75 mcg by mouth every morning (before breakfast). 3/10/2025: Dispensed at pharmacy 12/12/24 for 90 tablets Past Week    morphine 10 MG/5ML solution Take 7.5 mg by mouth every 4 hours as needed for pain.  Past Week    pregabalin (LYRICA) 150 MG capsule Take 150 mg by mouth 2 times daily. Take 1 capsule (150 mg total) by mouth 2 (two) times a day for 7 days. Take 1 capsule two times a day for three days. Then take 1 capsule in the morning and 2 capsules at bedtime for three days. Then take 2 capsules two times a day indefinitely.  Past Week    rivaroxaban ANTICOAGULANT (XARELTO) 20 MG TABS tablet Take 1 tablet by mouth daily (with dinner) 3/10/2025: Picked up at pharmacy 1/19/25 for 90 day supply   Past Week    traZODone (DESYREL) 50 MG tablet Take  mg by mouth at bedtime. 3/10/2025: Picked up at pharmacy 1/27/25 for 180 tablets/90 day supply Past Week     Addendum:  3/12 Patient was less confused and able to interview patient and confirm the patient's over the counter products -> patient reported taking magnesium once in a while, but not very often, so did not judy as taking. Additionally confirmed that the patient was taking pregabalin 150 mg 1 capsule by mouth twice daily, was using an insulin eye drop PTA, started Ciprodex ear drop on 3/14/25 -> to take for 2 weeks. Patient reported placing buprenorphine patch last Friday. Patient also stated last taking Xeloda 2 weeks ago. Please see above edits from pharmacist colleague. Tyler Canchola [facility]

## 2025-03-11 ENCOUNTER — APPOINTMENT (OUTPATIENT)
Dept: PHYSICAL THERAPY | Facility: CLINIC | Age: 75
DRG: 094 | End: 2025-03-11
Attending: STUDENT IN AN ORGANIZED HEALTH CARE EDUCATION/TRAINING PROGRAM
Payer: COMMERCIAL

## 2025-03-11 ENCOUNTER — APPOINTMENT (OUTPATIENT)
Dept: MRI IMAGING | Facility: CLINIC | Age: 75
DRG: 094 | End: 2025-03-11
Attending: INTERNAL MEDICINE
Payer: COMMERCIAL

## 2025-03-11 LAB
ALBUMIN SERPL BCG-MCNC: 3.6 G/DL (ref 3.5–5.2)
ALP SERPL-CCNC: 102 U/L (ref 40–150)
ALT SERPL W P-5'-P-CCNC: 9 U/L (ref 0–70)
ANION GAP SERPL CALCULATED.3IONS-SCNC: 11 MMOL/L (ref 7–15)
AST SERPL W P-5'-P-CCNC: 15 U/L (ref 0–45)
BASOPHILS # BLD AUTO: 0 10E3/UL (ref 0–0.2)
BASOPHILS NFR BLD AUTO: 0 %
BILIRUB DIRECT SERPL-MCNC: 0.75 MG/DL (ref 0–0.3)
BILIRUB SERPL-MCNC: 1.7 MG/DL
BUN SERPL-MCNC: 7.8 MG/DL (ref 8–23)
CALCIUM SERPL-MCNC: 8.8 MG/DL (ref 8.8–10.4)
CHLORIDE SERPL-SCNC: 92 MMOL/L (ref 98–107)
CREAT SERPL-MCNC: 0.55 MG/DL (ref 0.67–1.17)
EGFRCR SERPLBLD CKD-EPI 2021: >90 ML/MIN/1.73M2
EOSINOPHIL # BLD AUTO: 0.1 10E3/UL (ref 0–0.7)
EOSINOPHIL NFR BLD AUTO: 1 %
ERYTHROCYTE [DISTWIDTH] IN BLOOD BY AUTOMATED COUNT: 21.2 % (ref 10–15)
GLUCOSE BLDC GLUCOMTR-MCNC: 146 MG/DL (ref 70–99)
GLUCOSE SERPL-MCNC: 140 MG/DL (ref 70–99)
HCO3 SERPL-SCNC: 32 MMOL/L (ref 22–29)
HCT VFR BLD AUTO: 33.5 % (ref 40–53)
HGB BLD-MCNC: 11.5 G/DL (ref 13.3–17.7)
IMM GRANULOCYTES # BLD: 0.1 10E3/UL
IMM GRANULOCYTES NFR BLD: 1 %
LYMPHOCYTES # BLD AUTO: 0.3 10E3/UL (ref 0.8–5.3)
LYMPHOCYTES NFR BLD AUTO: 2 %
MAGNESIUM SERPL-MCNC: 1.8 MG/DL (ref 1.7–2.3)
MCH RBC QN AUTO: 31.2 PG (ref 26.5–33)
MCHC RBC AUTO-ENTMCNC: 34.3 G/DL (ref 31.5–36.5)
MCV RBC AUTO: 91 FL (ref 78–100)
MONOCYTES # BLD AUTO: 1.5 10E3/UL (ref 0–1.3)
MONOCYTES NFR BLD AUTO: 12 %
NEUTROPHILS # BLD AUTO: 10.6 10E3/UL (ref 1.6–8.3)
NEUTROPHILS NFR BLD AUTO: 85 %
NRBC # BLD AUTO: 0 10E3/UL
NRBC BLD AUTO-RTO: 0 /100
PHOSPHATE SERPL-MCNC: 2.5 MG/DL (ref 2.5–4.5)
PLATELET # BLD AUTO: 293 10E3/UL (ref 150–450)
POTASSIUM SERPL-SCNC: 3.1 MMOL/L (ref 3.4–5.3)
POTASSIUM SERPL-SCNC: 3.7 MMOL/L (ref 3.4–5.3)
PROT SERPL-MCNC: 6.6 G/DL (ref 6.4–8.3)
RBC # BLD AUTO: 3.69 10E6/UL (ref 4.4–5.9)
SODIUM SERPL-SCNC: 135 MMOL/L (ref 135–145)
WBC # BLD AUTO: 12.5 10E3/UL (ref 4–11)

## 2025-03-11 PROCEDURE — 97530 THERAPEUTIC ACTIVITIES: CPT | Mod: GP

## 2025-03-11 PROCEDURE — 84132 ASSAY OF SERUM POTASSIUM: CPT | Performed by: INTERNAL MEDICINE

## 2025-03-11 PROCEDURE — 250N000011 HC RX IP 250 OP 636: Performed by: STUDENT IN AN ORGANIZED HEALTH CARE EDUCATION/TRAINING PROGRAM

## 2025-03-11 PROCEDURE — 80048 BASIC METABOLIC PNL TOTAL CA: CPT | Performed by: STUDENT IN AN ORGANIZED HEALTH CARE EDUCATION/TRAINING PROGRAM

## 2025-03-11 PROCEDURE — 999N000128 HC STATISTIC PERIPHERAL IV START W/O US GUIDANCE

## 2025-03-11 PROCEDURE — 84132 ASSAY OF SERUM POTASSIUM: CPT | Performed by: STUDENT IN AN ORGANIZED HEALTH CARE EDUCATION/TRAINING PROGRAM

## 2025-03-11 PROCEDURE — 250N000013 HC RX MED GY IP 250 OP 250 PS 637: Performed by: INTERNAL MEDICINE

## 2025-03-11 PROCEDURE — A9585 GADOBUTROL INJECTION: HCPCS | Performed by: INTERNAL MEDICINE

## 2025-03-11 PROCEDURE — 250N000013 HC RX MED GY IP 250 OP 250 PS 637: Performed by: STUDENT IN AN ORGANIZED HEALTH CARE EDUCATION/TRAINING PROGRAM

## 2025-03-11 PROCEDURE — 120N000001 HC R&B MED SURG/OB

## 2025-03-11 PROCEDURE — 83735 ASSAY OF MAGNESIUM: CPT | Performed by: STUDENT IN AN ORGANIZED HEALTH CARE EDUCATION/TRAINING PROGRAM

## 2025-03-11 PROCEDURE — 255N000002 HC RX 255 OP 636: Performed by: INTERNAL MEDICINE

## 2025-03-11 PROCEDURE — 258N000003 HC RX IP 258 OP 636: Performed by: STUDENT IN AN ORGANIZED HEALTH CARE EDUCATION/TRAINING PROGRAM

## 2025-03-11 PROCEDURE — 999N000040 HC STATISTIC CONSULT NO CHARGE VASC ACCESS

## 2025-03-11 PROCEDURE — 99233 SBSQ HOSP IP/OBS HIGH 50: CPT | Performed by: INTERNAL MEDICINE

## 2025-03-11 PROCEDURE — 85025 COMPLETE CBC W/AUTO DIFF WBC: CPT | Performed by: STUDENT IN AN ORGANIZED HEALTH CARE EDUCATION/TRAINING PROGRAM

## 2025-03-11 PROCEDURE — 84100 ASSAY OF PHOSPHORUS: CPT | Performed by: STUDENT IN AN ORGANIZED HEALTH CARE EDUCATION/TRAINING PROGRAM

## 2025-03-11 PROCEDURE — 250N000011 HC RX IP 250 OP 636: Mod: JZ | Performed by: INTERNAL MEDICINE

## 2025-03-11 PROCEDURE — 97162 PT EVAL MOD COMPLEX 30 MIN: CPT | Mod: GP

## 2025-03-11 PROCEDURE — 70543 MRI ORBT/FAC/NCK W/O &W/DYE: CPT

## 2025-03-11 PROCEDURE — 36415 COLL VENOUS BLD VENIPUNCTURE: CPT | Performed by: INTERNAL MEDICINE

## 2025-03-11 PROCEDURE — 36415 COLL VENOUS BLD VENIPUNCTURE: CPT | Performed by: STUDENT IN AN ORGANIZED HEALTH CARE EDUCATION/TRAINING PROGRAM

## 2025-03-11 PROCEDURE — 82248 BILIRUBIN DIRECT: CPT | Performed by: STUDENT IN AN ORGANIZED HEALTH CARE EDUCATION/TRAINING PROGRAM

## 2025-03-11 PROCEDURE — 87070 CULTURE OTHR SPECIMN AEROBIC: CPT | Performed by: STUDENT IN AN ORGANIZED HEALTH CARE EDUCATION/TRAINING PROGRAM

## 2025-03-11 RX ORDER — DEXTROSE MONOHYDRATE 100 MG/ML
INJECTION, SOLUTION INTRAVENOUS CONTINUOUS PRN
Status: DISCONTINUED | OUTPATIENT
Start: 2025-03-11 | End: 2025-03-14 | Stop reason: HOSPADM

## 2025-03-11 RX ORDER — TRAZODONE HYDROCHLORIDE 50 MG/1
50 TABLET ORAL AT BEDTIME
Status: DISCONTINUED | OUTPATIENT
Start: 2025-03-11 | End: 2025-03-14 | Stop reason: HOSPADM

## 2025-03-11 RX ORDER — MORPHINE SULFATE 10 MG/5ML
7.5 SOLUTION ORAL EVERY 4 HOURS PRN
Status: DISCONTINUED | OUTPATIENT
Start: 2025-03-11 | End: 2025-03-11

## 2025-03-11 RX ORDER — LEVETIRACETAM 5 MG/ML
500 INJECTION INTRAVASCULAR EVERY 12 HOURS
Status: DISCONTINUED | OUTPATIENT
Start: 2025-03-11 | End: 2025-03-11

## 2025-03-11 RX ORDER — PREGABALIN 100 MG/1
300 CAPSULE ORAL 2 TIMES DAILY
Status: DISCONTINUED | OUTPATIENT
Start: 2025-03-11 | End: 2025-03-14 | Stop reason: HOSPADM

## 2025-03-11 RX ORDER — ATORVASTATIN CALCIUM 10 MG/1
10 TABLET, FILM COATED ORAL EVERY EVENING
Status: DISCONTINUED | OUTPATIENT
Start: 2025-03-11 | End: 2025-03-13

## 2025-03-11 RX ORDER — POTASSIUM CHLORIDE 1.5 G/1.58G
40 POWDER, FOR SOLUTION ORAL ONCE
Status: COMPLETED | OUTPATIENT
Start: 2025-03-11 | End: 2025-03-11

## 2025-03-11 RX ORDER — HYDROMORPHONE HYDROCHLORIDE 1 MG/ML
0.5 INJECTION, SOLUTION INTRAMUSCULAR; INTRAVENOUS; SUBCUTANEOUS ONCE
Status: COMPLETED | OUTPATIENT
Start: 2025-03-11 | End: 2025-03-11

## 2025-03-11 RX ORDER — AMLODIPINE BESYLATE 5 MG/1
5 TABLET ORAL DAILY
Status: DISCONTINUED | OUTPATIENT
Start: 2025-03-11 | End: 2025-03-13

## 2025-03-11 RX ORDER — MORPHINE SULFATE 10 MG/5ML
7.5 SOLUTION ORAL EVERY 4 HOURS PRN
Status: DISCONTINUED | OUTPATIENT
Start: 2025-03-11 | End: 2025-03-14

## 2025-03-11 RX ORDER — GADOBUTROL 604.72 MG/ML
7 INJECTION INTRAVENOUS ONCE
Status: COMPLETED | OUTPATIENT
Start: 2025-03-11 | End: 2025-03-11

## 2025-03-11 RX ORDER — TRAZODONE HYDROCHLORIDE 50 MG/1
50 TABLET ORAL AT BEDTIME
Status: DISCONTINUED | OUTPATIENT
Start: 2025-03-11 | End: 2025-03-11

## 2025-03-11 RX ORDER — CARVEDILOL 6.25 MG/1
6.25 TABLET ORAL 2 TIMES DAILY WITH MEALS
Status: DISCONTINUED | OUTPATIENT
Start: 2025-03-11 | End: 2025-03-14 | Stop reason: HOSPADM

## 2025-03-11 RX ORDER — PREGABALIN 50 MG/1
300 CAPSULE ORAL 2 TIMES DAILY
Status: DISCONTINUED | OUTPATIENT
Start: 2025-03-11 | End: 2025-03-11

## 2025-03-11 RX ORDER — LEVETIRACETAM 500 MG/1
500 TABLET ORAL 2 TIMES DAILY
Status: DISCONTINUED | OUTPATIENT
Start: 2025-03-11 | End: 2025-03-11

## 2025-03-11 RX ORDER — LEVETIRACETAM 500 MG/1
500 TABLET ORAL 2 TIMES DAILY
Status: DISCONTINUED | OUTPATIENT
Start: 2025-03-11 | End: 2025-03-13

## 2025-03-11 RX ADMIN — HYDROMORPHONE HYDROCHLORIDE 0.5 MG: 1 INJECTION, SOLUTION INTRAMUSCULAR; INTRAVENOUS; SUBCUTANEOUS at 09:06

## 2025-03-11 RX ADMIN — TRAZODONE HYDROCHLORIDE 50 MG: 50 TABLET ORAL at 21:37

## 2025-03-11 RX ADMIN — ATORVASTATIN CALCIUM 10 MG: 10 TABLET, FILM COATED ORAL at 20:08

## 2025-03-11 RX ADMIN — CARBOXYMETHYLCELLULOSE SODIUM 1 DROP: 5 SOLUTION/ DROPS OPHTHALMIC at 09:06

## 2025-03-11 RX ADMIN — OFLOXACIN 1 DROP: 3 SOLUTION/ DROPS OPHTHALMIC at 20:07

## 2025-03-11 RX ADMIN — PREGABALIN 300 MG: 100 CAPSULE ORAL at 10:37

## 2025-03-11 RX ADMIN — PIPERACILLIN AND TAZOBACTAM 4.5 G: 4; .5 INJECTION, POWDER, FOR SOLUTION INTRAVENOUS at 20:13

## 2025-03-11 RX ADMIN — CIPROFLOXACIN HYDROCHLORIDE 4 DROP: 3 SOLUTION/ DROPS OPHTHALMIC at 08:30

## 2025-03-11 RX ADMIN — HYDROMORPHONE HYDROCHLORIDE 0.5 MG: 1 INJECTION, SOLUTION INTRAMUSCULAR; INTRAVENOUS; SUBCUTANEOUS at 01:05

## 2025-03-11 RX ADMIN — DEXAMETHASONE SODIUM PHOSPHATE 4 DROP: 1 SOLUTION/ DROPS OPHTHALMIC at 21:32

## 2025-03-11 RX ADMIN — VANCOMYCIN HYDROCHLORIDE 1500 MG: 10 INJECTION, POWDER, LYOPHILIZED, FOR SOLUTION INTRAVENOUS at 04:02

## 2025-03-11 RX ADMIN — PIPERACILLIN AND TAZOBACTAM 4.5 G: 4; .5 INJECTION, POWDER, FOR SOLUTION INTRAVENOUS at 02:07

## 2025-03-11 RX ADMIN — HYDROMORPHONE HYDROCHLORIDE 0.3 MG: 1 INJECTION, SOLUTION INTRAMUSCULAR; INTRAVENOUS; SUBCUTANEOUS at 00:05

## 2025-03-11 RX ADMIN — ERYTHROMYCIN: 5 OINTMENT OPHTHALMIC at 21:31

## 2025-03-11 RX ADMIN — DEXAMETHASONE SODIUM PHOSPHATE 4 DROP: 1 SOLUTION/ DROPS OPHTHALMIC at 08:30

## 2025-03-11 RX ADMIN — POTASSIUM CHLORIDE 40 MEQ: 1.5 POWDER, FOR SOLUTION ORAL at 08:11

## 2025-03-11 RX ADMIN — CIPROFLOXACIN HYDROCHLORIDE 4 DROP: 3 SOLUTION/ DROPS OPHTHALMIC at 21:32

## 2025-03-11 RX ADMIN — PIPERACILLIN AND TAZOBACTAM 4.5 G: 4; .5 INJECTION, POWDER, FOR SOLUTION INTRAVENOUS at 14:34

## 2025-03-11 RX ADMIN — RIVAROXABAN 20 MG: 20 TABLET, FILM COATED ORAL at 17:52

## 2025-03-11 RX ADMIN — PREGABALIN 300 MG: 100 CAPSULE ORAL at 21:37

## 2025-03-11 RX ADMIN — OFLOXACIN 1 DROP: 3 SOLUTION/ DROPS OPHTHALMIC at 08:30

## 2025-03-11 RX ADMIN — CARBOXYMETHYLCELLULOSE SODIUM 1 DROP: 5 SOLUTION/ DROPS OPHTHALMIC at 21:37

## 2025-03-11 RX ADMIN — LEVOTHYROXINE SODIUM 75 MCG: 75 TABLET ORAL at 08:11

## 2025-03-11 RX ADMIN — GADOBUTROL 7 ML: 604.72 INJECTION INTRAVENOUS at 21:21

## 2025-03-11 RX ADMIN — CARVEDILOL 6.25 MG: 6.25 TABLET, FILM COATED ORAL at 17:52

## 2025-03-11 RX ADMIN — PIPERACILLIN AND TAZOBACTAM 4.5 G: 4; .5 INJECTION, POWDER, FOR SOLUTION INTRAVENOUS at 08:11

## 2025-03-11 RX ADMIN — AMLODIPINE BESYLATE 5 MG: 5 TABLET ORAL at 15:15

## 2025-03-11 RX ADMIN — CARVEDILOL 6.25 MG: 6.25 TABLET, FILM COATED ORAL at 08:11

## 2025-03-11 ASSESSMENT — ACTIVITIES OF DAILY LIVING (ADL)
ADLS_ACUITY_SCORE: 57
ADLS_ACUITY_SCORE: 55
ADLS_ACUITY_SCORE: 57
ADLS_ACUITY_SCORE: 55
ADLS_ACUITY_SCORE: 55
ADLS_ACUITY_SCORE: 57
ADLS_ACUITY_SCORE: 55
ADLS_ACUITY_SCORE: 57
ADLS_ACUITY_SCORE: 57
ADLS_ACUITY_SCORE: 53
ADLS_ACUITY_SCORE: 55
ADLS_ACUITY_SCORE: 53
ADLS_ACUITY_SCORE: 57
ADLS_ACUITY_SCORE: 53

## 2025-03-11 NOTE — PLAN OF CARE
Orientation: A/Ox3, forgetful, L facial droop (baseline d/t chemo) and a hoarse voice   Aggression Stop Light: Green   Activity: SBA, help with IV pole   Diet/BS Checks: continuous TF started today at 1500. Running at 20 mL/hr increase by 20mL at 0300 as tolerated   Tele:  Normal Sinus Rhythm   IV Access/Drains: Gtube, R PIV S/L with intermittent abx   Pain Management: PRN IV dilaudid given x1  Abnormal VS/Results: VSS on RA except hypertensive   Bowel/Bladder: continent of b/b  Skin/Wounds: abrasion at g-tube site, scrape to LL shin  Consults: nutrition, SW, PT, OT   D/C Disposition: pending improvement     Other Info:   - K+, mag, phos protocols; replaced K this AM, pending recheck. Recheck Mag and Phos in AM   - meds crushed through G-tube   - Butrans patch to chest

## 2025-03-11 NOTE — PROGRESS NOTES
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      Assessment & Plan     Acute metabolic encephalopathy multifactorial: Resolved   SIRS on admission: Resolved  Hypokalemia  Patient presenting with altered mental status: Awake, but confused, not answering appropriately to the family  Patient was recently started on buprenorphine as well as morphine, he was recently switched from gabapentin to pregabalin as well.  As per family, the patient was using quite a bit of morphine, the patient is denying.  Patient was evaluated in the ED, chest x-ray was negative for any acute infiltrate congestive changes pneumothorax, does show some atelectasis.  CT abdomen pelvis with contrast showed bibasilar atelectasis, otherwise unremarkable.  G-tube in place.  Patient was afebrile, but was tachycardic, has some leukocytosis and was hypertensive on admission lactic acid was normal as well as procalcitonin.  Patient family does notice some cough and coughing up some phlegm.    Patient is G-tube, has some raw skin surrounding the G-tube port, no obvious erythema has some drainage, cultures obtained the ED from the site, Gram stain shows gram-positive cocci, cultures pending  Sputum obtain was contaminated.  At this time the patient is back to his baseline, with awake alert and oriented, he does not remember anything yesterday.  Leukocytosis is trending down, afebrile tachycardia resolved.  UA negative.  No clear-cut sign of any infection or source of infection at this time.  I think this is most likely secondary  to relatively new use of narcotics with buprenorphine as well as morphine use.  In addition to pregabalin.,  May be overusing his morphine because of the pain.  He was slightly hyponatremic hypokalemic on admission, not enough to cause altered mental status like this.    He was started on IV vancomycin and Zosyn on admission, MRSA screen negative, as no obvious source of infection, I will discontinue vancomycin for now.  Continue IV Zosyn at this time pending cultures from the G-tube site.  I will go ahead and do the MRI of the face with and without contrast to rule out any obvious worsening of underlying infection or as he has been using a lot of pain medication.  I will have PT and OT evaluate the patient will see how he does.  I will resume his pregabalin, and as needed morphine.  Avoid using IV Dilaudid at this time.  Pain is well-controlled at this time  I agree with consulting nutritionist to resume his tube feeding, he can drink water through the mouth and nutritional supplement through the G-tube.  Sodium is now normal to 135, potassium still low will replace potassium as per protocol.       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  Underwent initial chemoradiation with recurrence noted in 2021.  Completed additional chemoradiation complicated by subsequent osteoradionecrosis, persistent left facial pain.  Recurrence again noted in late 2023 involving the parapharyngeal/left  space; has remained on chemotherapy since.  Follows closely with [facility] and continues on capecitabine given appropriate response.  -Holding capecitabine while admitted, patient would need to bring home supply  -Continue butrans patch  -IV dilaudid prn available for severe  pain                -Resume morphine, pregabalin this morning.  -Nutrition consulted for TF orders, holding now in setting of possible infection  -Pain Service consulted     Afib w/ RVR on Xarelto, rate controlled now  History of paroxysmal atrial fibrillation and found to be in Afib with RVR on admission.  Suspect tachyarrhythmia is being driven by hypovolemia, electrolyte imbalance, presumed underlying infection.  No hemodynamic instability and rates improved with fluid resuscitation.    He received IV fluid bolus in the ED, Coreg resumed, and Xarelto resumed as well.  Heart rate is much better controlled at this time       Hyponatremia: Resolved  Hypokalemia, mild  Mild electrolyte disturbances noted in setting of suspected poor p.o. intake and questionable tube feed adherence over recent days PTA.  -Discontinue IV fluids, sodium low normal.  Started on potassium replacement protocol  -Nutrition consult, resume tube feeding     Elevated bilirubin, mild  Mild total bilirubin elevation to 1.4 on admission, other LFTs normal.  No significant right upper quadrant pain, no jaundice.  Hemoglobin, platelets at baseline and no concern for hemolytic process.  Unclear etiology and will opt to continue trending while treating other acute concerns.  LFTs remain stable.  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 Continue statin  HLD - Continue PTA atorvastatin  HTN - Continue PTA carvedilol, blood pressure uncontrolled, add low-dose amlodipine for better blood pressure control  Hypothyroidism - TSH normal in 2/2025. Continue PTA LT4  Insomnia -resume trazodone.   OA s/p L JOSÉ ANTONIO (7/2023)       Clinically Significant Risk Factors Present on Admission        # Hypokalemia: Lowest K =  3.1 mmol/L in last 2 days, will replace as needed  # Hyponatremia: Lowest Na = 133 mmol/L in last 2 days, will monitor as appropriate  # Hypochloremia: Lowest Cl = 92 mmol/L in last 2 days, will monitor as appropriate  # Hypocalcemia: Lowest Ca = 8.5 mg/dL in last 2 days, will monitor and replace as appropriate      # Drug Induced Coagulation Defect: home medication list includes an anticoagulant medication    # Hypertension: Noted on problem list                        DVT Prophylaxis: On Xarelto  Code Status: No CPR- Do NOT Intubate        Medically Ready for Discharge: Anticipated Tomorrow, if remains stable.    Discussed with the patient, patient daughter at bedside and the nursing staff think of the patient.        Herber Dickerson MD, MD  Text Page  (7am - 6pm)    Interval History   Patient seen and evaluated this morning, does not remember what happened yesterday, he told me that he is only using rarely morphine, the family think he may be using much more than what he is telling  He denies any chest pain fever chills nausea vomiting headache dizziness lightheadedness no dysuria materia constipation diarrhea.  He denies any pain at this time    No other significant event overnight    -Data reviewed today: I reviewed all new labs and imaging results over the last 24 hours. I personally reviewed no images or EKG's today.    Physical Exam   Temp: 98  F (36.7  C) Temp src: Oral BP: (!) 153/101 Pulse: 89   Resp: 17 SpO2: 96 % O2 Device: None (Room air)    Vitals:    03/10/25 1417 03/10/25 2137 03/11/25 0643   Weight: 76.2 kg (168 lb) 74.1 kg (163 lb 5.8 oz) 73.7 kg (162 lb 6.4 oz)     Vital Signs with Ranges  Temp:  [97.5  F (36.4  C)-98.7  F (37.1  C)] 98  F (36.7  C)  Pulse:  [] 89  Resp:  [13-22] 17  BP: (153-184)/(101-119) 153/101  SpO2:  [92 %-100 %] 96 %  I/O last 3 completed shifts:  In: 1500 [I.V.:500; IV Piggyback:1000]  Out: 450 [Urine:450]    Constitutional: awake, alert, cooperative, no apparent  distress, and appears stated age  Eyes: Lids and lashes normal, pupils equal, round and reactive to light, extra ocular muscles intact, sclera clear, conjunctiva normal  Respiratory: No increased work of breathing, good air exchange, clear to auscultation bilaterally, no crackles or wheezing  Cardiovascular: Normal apical impulse, regular rate and rhythm, normal S1 and S2, no S3 or S4, and no murmur noted  GI: No scars, normal bowel sounds, soft, non-distended, non-tender, no masses palpated, no hepatosplenomegally, G-tube in place  Musculoskeletal: no lower extremity pitting edema present  Neurologic: Awake, alert, moving all 4 limbs, has some dysarthria chronic, left facial droop which is chronic    Medications   Current Facility-Administered Medications   Medication Dose Route Frequency Provider Last Rate Last Admin     Current Facility-Administered Medications   Medication Dose Route Frequency Provider Last Rate Last Admin    atorvastatin (LIPITOR) tablet 10 mg  10 mg Oral QPM Jerry Lee MD   10 mg at 03/10/25 2206    [START ON 3/14/2025] buprenorphine (BUTRANS) 5 MCG/HR WK patch 1 patch  1 patch Transdermal Weekly Jerry Lee MD        And    buprenorphine (BUTRANS) Patch in Place 1 each  1 each Transdermal Q8H [facility] Jerry Lee MD        carboxymethylcellulose PF (REFRESH PLUS) 0.5 % ophthalmic solution 1 drop  1 drop Left Eye BID Jerry Lee MD   1 drop at 03/11/25 0906    carvedilol (COREG) tablet 6.25 mg  6.25 mg Oral BID w/meals Jerry Lee MD   6.25 mg at 03/11/25 0811    ciprofloxacin (CILOXAN) 0.3 % ophthalmic solution 4 drop  4 drop Left Ear BID Jerry Lee MD   4 drop at 03/11/25 0830    dexAMETHasone (DECADRON) 0.1 % ophthalmic solution 4 drop  4 drop Left Ear BID Jerry Lee MD   4 drop at 03/11/25 0830    erythromycin (ROMYCIN) ophthalmic ointment   Left Eye At Bedtime Jerry Lee MD   Given at 03/10/25 2302    levothyroxine (SYNTHROID/LEVOTHROID)  tablet 75 mcg  75 mcg Oral QAM AC Jerry Lee MD   75 mcg at 03/11/25 0811    ofloxacin (OCUFLOX) 0.3 % ophthalmic solution 1 drop  1 drop Left Eye BID Jerry Lee MD   1 drop at 03/11/25 0830    piperacillin-tazobactam (ZOSYN) 4.5 g vial to attach to  mL bag  4.5 g Intravenous Q6H Jerry Lee MD 0 mL/hr at 03/11/25 0812 Restarted at 03/11/25 0910    pregabalin (LYRICA) capsule 300 mg  300 mg Oral or Feeding Tube BID Jerry Lee MD   300 mg at 03/11/25 1037    rivaroxaban ANTICOAGULANT (XARELTO) tablet 20 mg  20 mg Oral Daily with supper Jerry Lee MD        sodium chloride (PF) 0.9% PF flush 3 mL  3 mL Intracatheter Q8H Eric Ramirez MD   3 mL at 03/10/25 1335    sodium chloride (PF) 0.9% PF flush 3 mL  3 mL Intracatheter Q8H Jerry Lee MD   3 mL at 03/11/25 0402       Data   Recent Labs   Lab 03/11/25  0602 03/10/25  2319 03/10/25  1202   WBC 12.5*  --  14.9*   HGB 11.5*  --  11.5*   MCV 91  --  90     --  325     --  133*   POTASSIUM 3.1* 3.5 3.3*   CHLORIDE 92*  --  94*   CO2 32*  --  26   BUN 7.8*  --  9.9   CR 0.55*  --  0.46*   ANIONGAP 11  --  13   RIYA 8.8  --  8.5*   *  --  172*   ALBUMIN 3.6  --  3.6   PROTTOTAL 6.6  --  6.6   BILITOTAL 1.7*  --  1.4*   ALKPHOS 102  --  107   ALT 9  --  9   AST 15  --  15       Recent Results (from the past 24 hours)   XR Chest 2 Views    Narrative    EXAM: XR CHEST 2 VIEWS  LOCATION: [facility]  DATE: 3/10/2025    INDICATION: Concern for aspiration PNA  COMPARISON: 1/26/2021.      Impression    IMPRESSION: Some new patchy linear opacities at the lower lungs could be atelectasis or new airspace disease. Increased cardiomegaly. No effusions identified. No pneumothorax.   CT Abdomen Pelvis w Contrast    Narrative    EXAM: CT ABDOMEN PELVIS W CONTRAST  LOCATION: [facility]  DATE: 3/10/2025    INDICATION: purulent drainage around g tube site  COMPARISON:  None.  TECHNIQUE: CT scan of the abdomen and pelvis was performed following injection of IV contrast. Multiplanar reformats were obtained. Dose reduction techniques were used.  CONTRAST: 84 mL Isovue 370    FINDINGS:   LOWER CHEST: Bibasilar atelectasis.    HEPATOBILIARY: Liver and gallbladder within normal limits.     PANCREAS: Normal.    SPLEEN: Normal.    ADRENAL GLANDS: Normal.    KIDNEYS/BLADDER: Bilateral renal cysts are noted which are benign and needs no further follow-up. Kidneys are otherwise unremarkable with no evidence of hydronephrosis.    BOWEL: Diverticulosis of the colon. No acute inflammatory change. No obstruction. Moderate stool noted throughout the colon. Stomach is decompressed. The G-tube appears to be within the stomach. G-tube site otherwise appears to within normal limits.    LYMPH NODES: Normal.    VASCULATURE: Moderate atherosclerotic disease of the abdominal aorta with no aneurysmal dilation.    PELVIC ORGANS: Bladder within normal limits.    MUSCULOSKELETAL: Total bilateral hip replacements with no evidence of hardware complication. Degenerative changes of the spine.      Impression    IMPRESSION:   1.  G-tube appears within the stomach with no evidence of complication. No acute findings in the abdomen and pelvis.

## 2025-03-11 NOTE — PHARMACY-VANCOMYCIN DOSING SERVICE
"Pharmacy Vancomycin Initial Note  Date of Service March 10, 2025  Patient's  1950  75 year old, male    Indication: Aspiration Pneumonia    Current estimated CrCl = Estimated Creatinine Clearance: 147.7 mL/min (A) (based on SCr of 0.46 mg/dL (L)).    Creatinine for last 3 days  3/10/2025: 12:02 PM Creatinine 0.46 mg/dL        Vancomycin IV Administrations (past 72 hours)                     vancomycin (VANCOCIN) 2,000 mg in 0.9% NaCl 520 mL intermittent infusion (mg) 2,000 mg New Bag 03/10/25 1533                    Nephrotoxins and other renal medications (From now, onward)      Start     Dose/Rate Route Frequency Ordered Stop    25 0400  vancomycin (VANCOCIN) 1,500 mg in 0.9% NaCl 265 mL intermittent infusion         1,500 mg  over 90 Minutes Intravenous EVERY 12 HOURS 03/10/25 2155      03/10/25 2000  piperacillin-tazobactam (ZOSYN) 4.5 g vial to attach to  mL bag        Note to Pharmacy: For [facility], [facility] and [facility]: For Zosyn-naive patients, use the \"Zosyn initial dose + extended infusion\" order panel.    4.5 g  over 30 Minutes Intravenous EVERY 6 HOURS 03/10/25 1641              Contrast Orders - past 72 hours (72h ago, onward)      Start     Dose/Rate Route Frequency Stop    03/10/25 1710  iopamidol (ISOVUE-370) solution 84 mL         84 mL Intravenous ONCE 03/10/25 1732            [employer] Prediction of Planned Initial Vancomycin Regimen  Regimen: 1500 mg IV every 12 hours.  Start time: 03:33 on 2025  Exposure target: AUC24 (range)400-600 mg/L.hr   AUC24,ss: 563 mg/L.hr  Probability of AUC24 > 400: 81 %  Ctrough,ss: 15.9 mg/L  Probability of Ctrough,ss > 20: 34 %  Probability of nephrotoxicity (Lodise EVERT ): 11 %          Plan:  Start vancomycin  1500 mg IV q12h.   Vancomycin monitoring method: AUC  Vancomycin therapeutic monitoring goal: 400-600 mg*h/L  Pharmacy will check vancomycin levels as appropriate in 1-3 Days.    Serum creatinine levels will be ordered every 48 hours.  "     Debra Weaver, [facility]

## 2025-03-11 NOTE — PLAN OF CARE
Orientation: A/Ox3, forgetful, L facial droop (baseline d/t chemo) and a hoarse voice. Pt sleeping majority of shift.   Aggression Stop Light: Green   Activity: SBA, help with IV pole   Diet/BS Checks: Full liquids, minimal oral intake. Continuous TF running at 20 ml/hr w/ 60 ml Q4hr FWF. If tolerating, increase feed rate to 40 ml @ 0300.   Tele: NSR  IV Access/Drains: Gtube, R PIV S/L w/ int abx  Pain Management: PRN IV dilaudid given x1  Abnormal VS/Results: VSS on RA except HTN  Bowel/Bladder: continent of b/b  Skin/Wounds: abrasion at g-tube site, scrape to LL shin. G-tube cares completed earlier today, dressing CDI  Consults: nutrition, SW, PT, OT   D/C Disposition: pending improvement     Other Info:   - K+, Mg, Phos protocols; rechecks in AM  - meds crushed through G-tube  - frequent productive cough  - Butrans patch to chest   - MRI ordered of soft tissue neck, checklist completed & faxed

## 2025-03-11 NOTE — ED NOTES
Pt alerted staff of pain at IV site. Pt's IV is in fact infiltrated with a large swollen lump at IV site. IV removed and coban and ice applied.

## 2025-03-11 NOTE — PLAN OF CARE
03/11/25 0945   Appointment Info   Signing Clinician's Name / Credentials (PT) Sara Jeffery DPT   Living Environment   People in Home alone   Current Living Arrangements condominium   Home Accessibility no concerns   Living Environment Comments pt lives alone in Condo elevator access   Self-Care   Usual Activity Tolerance good   Current Activity Tolerance fair   Fall history within last six months no   Activity/Exercise/Self-Care Comment pt IND at baseline, reports he does have walker at home if needed   General Information   Onset of Illness/Injury or Date of Surgery 03/10/25   Referring Physician Jerry Lee MD   Pertinent History of Current Problem (include personal factors and/or comorbidities that impact the POC) This is a 75-year-old male with history of atrial fibrillation on Xarelto, 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   Existing Precautions/Restrictions fall   Cognition   Cognitive Status Comments Forgetful, typically does own med management at home. Dtr provided hx information today. Admitted w/ AMS. Seems to be improving slightly however per dtr pt is not at baseline   Pain Assessment   Patient Currently in Pain Yes, see Vital Sign flowsheet   Posture    Posture Forward head position   Range of Motion (ROM)   Range of Motion ROM is WFL   Strength (Manual Muscle Testing)   Strength Comments Pt demonstrates gross functional weakness w/ OOB mobility   Bed Mobility   Comment, (Bed Mobility) Supine > sitting EOB SBA   Transfers   Comment, (Transfers) STS no AD, heavy use of UE push off, CGA   Gait/Stairs (Locomotion)   Comment, (Gait/Stairs) 5' eval, no AD, downward gaze, quick pace, CGA for safety   Balance   Balance  Comments impaired high level dynamic balance   Sensory Examination   Sensory Perception Comments intact to light touch   Clinical Impression   Criteria for Skilled Therapeutic Intervention Yes, treatment indicated   PT Diagnosis (PT) impaired IND w/ mobility from baseline   Influenced by the following impairments functional weakness, impaired activity tolerance, impaired balance   Functional limitations due to impairments see above   Clinical Presentation (PT Evaluation Complexity) evolving   Clinical Presentation Rationale lives alone, co morbidities, clinical judgement   Clinical Decision Making (Complexity) moderate complexity   Planned Therapy Interventions (PT) balance training;bed mobility training;gait training;patient/family education;strengthening;progressive activity/exercise   Risk & Benefits of therapy have been explained evaluation/treatment results reviewed;care plan/treatment goals reviewed;risks/benefits reviewed;patient   PT Total Evaluation Time   PT Eval, Moderate Complexity Minutes (36124) 12   Physical Therapy Goals   PT Frequency Daily   PT Predicted Duration/Target Date for Goal Attainment 03/18/25   PT Goals Bed Mobility;Transfers;Gait   Interventions   Interventions Quick Adds Therapeutic Activity;Cardiac Rehab   Therapeutic Activity   Therapeutic Activities: dynamic activities to improve functional performance Minutes (39512) 15   Symptoms Noted During/After Treatment Fatigue   Treatment Detail/Skilled Intervention Eval complete, tx indicated. Dtr present and supportive. Directed questions at pt however slow to respond and ultimately dtr answering questions during session. pt fatigued this AM w/ limited tolernace for OOB mobility. Once sitting EOB cues to scoot fwd for improved BLE ft position. Noted bedding wet, noted IV leaking from site, notified RN. RN present to address. RN restarted IV. STS x 2 with no AD, CGA, heavy reliance on UE support. Pt engaged in short distance functional  ambulation in rm, fast pace, fwd flexed posture, no overt LOB however somewhat unsteady w/ in rm ambulation. PT managing IV pole. Pt impulsively sat EOB after ambulating in rm stating too tired for further OOb mobility. Noted IV leaking again, paused and notified charge RN. Pt instructed in scooting towards HOB for improved positioning. Sit > supine IND. Pt left supine w/ charge RN present to address IV. Dtr and pt hopeful for DC to home   PT Discharge Planning   PT Plan Transfers and gait w/o AD, may need SEC or walker? limited session this AM. LE strength, repeated STS, gait in hallway   PT Discharge Recommendation (DC Rec) home with assist;home with home care physical therapy;Transitional Care Facility   PT Rationale for DC Rec Limited evaluation this AM, pt fatigued and declining hallway ambulation. Dtr present. Pt and dtr would like pt to return home if possible, per dtr pt does have good family support. Pt is IND w/ bed mobility this AM, CGA transfers and gait in rm, demonstrates impaired strength, balance, activity tolerance. Based on limited session this AM would recommend TCU as pt lives alone, demonstrates impaired IND w/ mobility, impaired activity tolerance, however pending LOS, continued therapy and med management predict pt will progress to be safe to DC to home with assist from family for household tasks, may need FWW for mobility, would benefit from HHPT to address above impairments   PT Brief overview of current status CGA FWW; Goals of therapy will be to address safe mobility and make recommendations for discharge to next level of care. Patient and RN will continue to follow all falls risk precautions as documented by RN staff while hospitalized   PT Total Distance Amb During Session (feet) 25   Physical Therapy Time and Intention   Timed Code Treatment Minutes 15   Total Session Time (sum of timed and untimed services) 27

## 2025-03-11 NOTE — PROVIDER NOTIFICATION
MD Notification    Notified Person: MD    Notified Person Name: Jet Maedra    Notification Date/Time: 03/10/25 10:00 PM    Notification Interaction: Vocmatias     Purpose of Notification: Abd wound culture collected today @ 2747 showing 1+ gram positive cocci and 4+ WBC seen. Pt is on IV zosyn. Any additional orders? Thanks.    Orders Received: No additional orders    Comments:

## 2025-03-11 NOTE — PROGRESS NOTES
RECEIVING UNIT ED HANDOFF REVIEW    ED Nurse Handoff Report was reviewed by: Chasidy Butler RN on March 10, 2025 at 9:09 PM

## 2025-03-11 NOTE — PROVIDER NOTIFICATION
MD Notification    Notified Person: MD    Notified Person Name: Rolando    Notification Date/Time: 03/11/25 12:52 AM    Notification Interaction: Vocera    Purpose of Notification: Pt c/o 6/10 L head pain (d/t cancer), gave PRN 0.3 IV dilaudid - pt still having pain. PRN only available q 2 hrs. Is there something else I can get for pain? Thanks.    Orders Received: One time additional PRN dilaudid    Comments:

## 2025-03-11 NOTE — PLAN OF CARE
0118 - 0250    Orientation: A&Ox2, disoriented to time, situation    Aggression Stop Light: Green    Activity: SBA w/IV pole    Diet/BS Checks: Full liquids    Tele: Afib CVR    IV Access/Drains: R PIV infusing LR @ 100 mL/hr with intermittent abx, G-tube    Pain Management: PRN dilaudid x2 given for L facial pain - effective    Abnormal VS/Results: VSS on RA ex HTN    Bowel/Bladder: Continent    Skin/Wounds: Scab to L shin, abrasion around g-tube site    Consults: Pain Service, PT, OT, SW, Nutrition    D/C Disposition: Pending improvement    Other Info:   - Meds crushed through g-tube  - Butrans patch to abd  - K+, Mg, Phos protocols  - Family at bedside

## 2025-03-11 NOTE — CONSULTS
"CLINICAL NUTRITION SERVICES - ASSESSMENT NOTE        Malnutrition Status:    % Intake: </= 50% for >/= 5 days (severe)  % Weight Loss: > 5% in 1 month (severe)   Subcutaneous Fat Loss: Fat overlying the ribs: Mild  Muscle Loss: Clavicles (pectoralis and deltoids): Moderate  Fluid Accumulation/Edema: None noted  Malnutrition Diagnosis: Severe malnutrition in the context of acute illness or injury  Malnutrition Present on Admission: Yes    Registered Dietitian Interventions:  Type of Feeding Tube: PEG  Enteral Frequency:  Continuous  Enteral Regimen: [employer] 1.4 @ 60 mL/hr x 22 hrs (hold TF for 1 hr before and 1 hr after Synthroid)  Total Enteral Provisions: 1848 cals (25 cals/kg), 82 gm pro (1.1 gm/kg), 12 gm fiber, 937 mL free water  Free Water Flush: 60 mL every 4 hrs    Resume TF at 20 mL/hr.  Increase by 20 mL every 12 hrs to goal rate of 60 mL/hr    Will modify TF regimen pending po intake - working toward daytime bolus feedings, as pt does this at home         REASON FOR ASSESSMENT  Provider order - Registered Dietitian to order TF per Medical Nutrition Therapy Guidelines    SUBJECTIVE INFORMATION  Assessed patient in room.    NUTRITION HISTORY  Chart reviewed  1/3/25: Clinic Visit -  4-5 [employer] 1.4 mixed with Clean Prerna Protein powder and water, blends this with a banana and/or some orange juice. Later on will add vanilla greek yogurt or ice cream. This is given as a combination or enteral and oral intake depending on his swallow on any given day.     Visited with pt's dtr this morning - pt very fatigued, and lying in bed with eyes closed  Dtr states that pt does bolus feedings with a large syringe during the day with [employer] formula  She is not sure how many cartons pt takes  Dtr notes that pt does drink water  She thinks that over the past week, pt hasn't taken his TF or taken any po - \"we can tell that he hasn't had nutrition for the past week\"  Dtr states that pt is typically very good about " "taking his TF - \"he has been working to maintain his wt\"  She wasn't sure what types of liquids pt usually consumes      CURRENT NUTRITION ORDERS  Diet: Full Liquid    CURRENT INTAKE/TOLERANCE  No po intake since admit  Pt very tired - not wanting to eat right now  He is agreeable to starting TF on the pump - \"so he can sleep\"    LABS  Nutrition-relevant labs: Reviewed  3/11: Na 135           K 3.1 (L)           Mg 1.8           Phos 2.5    MEDICATIONS  Nutrition-relevant medications: Reviewed  - Synthroid once daily (need to hold TF for 1 hr before and 1 hr after dose)      ANTHROPOMETRICS  Height: 177.8 cm (5' 10\")  Most Recent Weight: (3/11) 73.7 kg (162 lb 6.4 oz)  IBW: 75.4 kg  % IBW: 98%  BMI (kg/m ): Normal BMI  Weight History:   wt is down ~11# (6%) from 3 months ago, down ~8# (5%) in the past 2 weeks    03/11/25 : 73.7 kg (162 lb 6.4 oz)    07/21/23 : 82.1 kg (181 lb)   04/11/23 : 81.6 kg (180 lb)     Per CE:   02/28/25 : 77.5 kg (170 lb)   01/08/25 : 78.3 kg (172 lb)   12/03/24 : 78.8 kg (173 lb)     Dosing Weight: 73.7 kg, based on actual wt    ASSESSED NUTRITION NEEDS  Estimated Energy Needs: 5895-1686 kcals/day (25 - 30 kcals/kg)  Justification: Maintenance  Estimated Protein Needs:  grams protein/day (1.2 - 1.5 grams of pro/kg)  Justification: Maintenance  Estimated Fluid Needs: 2609-1318 mL/day (1 mL/kcal)  Justification: Maintenance    SYSTEM FINDINGS    3/10: abd CT - G-tube appears within the stomach with no evidence of complication. No acute findings in the abdomen and pelvis.     Skin/wounds: (3/11) Scab to L shin, abrasion around g-tube site   GI symptoms: no BM noted since admit    MALNUTRITION  % Intake: </= 50% for >/= 5 days (severe)  % Weight Loss: > 5% in 1 month (severe)   Subcutaneous Fat Loss: Fat overlying the ribs: Mild  Muscle Loss: Clavicles (pectoralis and deltoids): Moderate  Fluid Accumulation/Edema: None noted  Malnutrition Diagnosis: Severe malnutrition in the context of " acute illness or injury  Malnutrition Present on Admission: Yes    NUTRITION DIAGNOSIS  Inadequate energy intake related to TF hasn't resumed and pt too tired to eat as evidenced by pt not meeting nutrition needs    INTERVENTIONS    Type of Feeding Tube: PEG  Enteral Frequency:  Continuous  Enteral Regimen: [employer] 1.4 @ 60 mL/hr x 22 hrs (hold TF for 1 hr before and 1 hr after Synthroid)  Total Enteral Provisions: 1848 cals (25 cals/kg), 82 gm pro (1.1 gm/kg), 12 gm fiber, 937 mL free water  Free Water Flush: 60 mL every 4 hrs    Resume TF at 20 mL/hr.  Increase by 20 mL every 12 hrs to goal rate of 60 mL/hr    Will modify TF regimen pending po intake - working toward daytime bolus feedings, as pt does this at home      Goals  EN to meet % estimated needs while po intake is minimal     Monitoring/Evaluation  Progress toward goals will be monitored and evaluated per policy.

## 2025-03-12 ENCOUNTER — APPOINTMENT (OUTPATIENT)
Dept: MRI IMAGING | Facility: CLINIC | Age: 75
DRG: 094 | End: 2025-03-12
Payer: COMMERCIAL

## 2025-03-12 ENCOUNTER — APPOINTMENT (OUTPATIENT)
Dept: PHYSICAL THERAPY | Facility: CLINIC | Age: 75
DRG: 094 | End: 2025-03-12
Payer: COMMERCIAL

## 2025-03-12 ENCOUNTER — APPOINTMENT (OUTPATIENT)
Dept: OCCUPATIONAL THERAPY | Facility: CLINIC | Age: 75
DRG: 094 | End: 2025-03-12
Attending: STUDENT IN AN ORGANIZED HEALTH CARE EDUCATION/TRAINING PROGRAM
Payer: COMMERCIAL

## 2025-03-12 LAB
ANION GAP SERPL CALCULATED.3IONS-SCNC: 8 MMOL/L (ref 7–15)
BASOPHILS # BLD AUTO: 0 10E3/UL (ref 0–0.2)
BASOPHILS NFR BLD AUTO: 0 %
BUN SERPL-MCNC: 9.6 MG/DL (ref 8–23)
CALCIUM SERPL-MCNC: 8.6 MG/DL (ref 8.8–10.4)
CHLORIDE SERPL-SCNC: 93 MMOL/L (ref 98–107)
CREAT SERPL-MCNC: 0.52 MG/DL (ref 0.67–1.17)
EGFRCR SERPLBLD CKD-EPI 2021: >90 ML/MIN/1.73M2
ELLIPTOCYTES BLD QL SMEAR: SLIGHT
EOSINOPHIL # BLD AUTO: 0.1 10E3/UL (ref 0–0.7)
EOSINOPHIL NFR BLD AUTO: 0 %
ERYTHROCYTE [DISTWIDTH] IN BLOOD BY AUTOMATED COUNT: 20.9 % (ref 10–15)
GLUCOSE BLDC GLUCOMTR-MCNC: 157 MG/DL (ref 70–99)
GLUCOSE BLDC GLUCOMTR-MCNC: 168 MG/DL (ref 70–99)
GLUCOSE BLDC GLUCOMTR-MCNC: 184 MG/DL (ref 70–99)
GLUCOSE BLDC GLUCOMTR-MCNC: 188 MG/DL (ref 70–99)
GLUCOSE SERPL-MCNC: 183 MG/DL (ref 70–99)
HCO3 SERPL-SCNC: 30 MMOL/L (ref 22–29)
HCT VFR BLD AUTO: 33.5 % (ref 40–53)
HGB BLD-MCNC: 11.4 G/DL (ref 13.3–17.7)
IMM GRANULOCYTES # BLD: 0.1 10E3/UL
IMM GRANULOCYTES NFR BLD: 1 %
LYMPHOCYTES # BLD AUTO: 0.3 10E3/UL (ref 0.8–5.3)
LYMPHOCYTES NFR BLD AUTO: 2 %
MAGNESIUM SERPL-MCNC: 1.8 MG/DL (ref 1.7–2.3)
MCH RBC QN AUTO: 31.4 PG (ref 26.5–33)
MCHC RBC AUTO-ENTMCNC: 34 G/DL (ref 31.5–36.5)
MCV RBC AUTO: 92 FL (ref 78–100)
MONOCYTES # BLD AUTO: 1.7 10E3/UL (ref 0–1.3)
MONOCYTES NFR BLD AUTO: 13 %
NEUTROPHILS # BLD AUTO: 11.4 10E3/UL (ref 1.6–8.3)
NEUTROPHILS NFR BLD AUTO: 84 %
NRBC # BLD AUTO: 0 10E3/UL
NRBC BLD AUTO-RTO: 0 /100
PHOSPHATE SERPL-MCNC: 2.4 MG/DL (ref 2.5–4.5)
PLAT MORPH BLD: ABNORMAL
PLATELET # BLD AUTO: 266 10E3/UL (ref 150–450)
POTASSIUM SERPL-SCNC: 3.3 MMOL/L (ref 3.4–5.3)
POTASSIUM SERPL-SCNC: 3.7 MMOL/L (ref 3.4–5.3)
RBC # BLD AUTO: 3.63 10E6/UL (ref 4.4–5.9)
RBC MORPH BLD: ABNORMAL
SODIUM SERPL-SCNC: 131 MMOL/L (ref 135–145)
WBC # BLD AUTO: 13.6 10E3/UL (ref 4–11)

## 2025-03-12 PROCEDURE — 99222 1ST HOSP IP/OBS MODERATE 55: CPT | Mod: FS | Performed by: NEUROLOGICAL SURGERY

## 2025-03-12 PROCEDURE — 99223 1ST HOSP IP/OBS HIGH 75: CPT | Performed by: INTERNAL MEDICINE

## 2025-03-12 PROCEDURE — A9585 GADOBUTROL INJECTION: HCPCS

## 2025-03-12 PROCEDURE — 120N000001 HC R&B MED SURG/OB

## 2025-03-12 PROCEDURE — 250N000013 HC RX MED GY IP 250 OP 250 PS 637: Performed by: INTERNAL MEDICINE

## 2025-03-12 PROCEDURE — 250N000013 HC RX MED GY IP 250 OP 250 PS 637: Performed by: STUDENT IN AN ORGANIZED HEALTH CARE EDUCATION/TRAINING PROGRAM

## 2025-03-12 PROCEDURE — 97530 THERAPEUTIC ACTIVITIES: CPT | Mod: GP

## 2025-03-12 PROCEDURE — 97166 OT EVAL MOD COMPLEX 45 MIN: CPT | Mod: GO | Performed by: OCCUPATIONAL THERAPIST

## 2025-03-12 PROCEDURE — 99207 PR APP CREDIT; MD BILLING SHARED VISIT: CPT | Mod: FS

## 2025-03-12 PROCEDURE — 250N000011 HC RX IP 250 OP 636: Mod: JZ | Performed by: STUDENT IN AN ORGANIZED HEALTH CARE EDUCATION/TRAINING PROGRAM

## 2025-03-12 PROCEDURE — 99233 SBSQ HOSP IP/OBS HIGH 50: CPT | Performed by: INTERNAL MEDICINE

## 2025-03-12 PROCEDURE — 83735 ASSAY OF MAGNESIUM: CPT | Performed by: INTERNAL MEDICINE

## 2025-03-12 PROCEDURE — 97116 GAIT TRAINING THERAPY: CPT | Mod: GP

## 2025-03-12 PROCEDURE — 84100 ASSAY OF PHOSPHORUS: CPT | Performed by: INTERNAL MEDICINE

## 2025-03-12 PROCEDURE — 80048 BASIC METABOLIC PNL TOTAL CA: CPT | Performed by: STUDENT IN AN ORGANIZED HEALTH CARE EDUCATION/TRAINING PROGRAM

## 2025-03-12 PROCEDURE — 250N000011 HC RX IP 250 OP 636: Performed by: INTERNAL MEDICINE

## 2025-03-12 PROCEDURE — 250N000012 HC RX MED GY IP 250 OP 636 PS 637: Performed by: INTERNAL MEDICINE

## 2025-03-12 PROCEDURE — 84132 ASSAY OF SERUM POTASSIUM: CPT | Performed by: INTERNAL MEDICINE

## 2025-03-12 PROCEDURE — 85025 COMPLETE CBC W/AUTO DIFF WBC: CPT | Performed by: INTERNAL MEDICINE

## 2025-03-12 PROCEDURE — 36415 COLL VENOUS BLD VENIPUNCTURE: CPT | Performed by: INTERNAL MEDICINE

## 2025-03-12 PROCEDURE — 255N000002 HC RX 255 OP 636

## 2025-03-12 PROCEDURE — 82310 ASSAY OF CALCIUM: CPT | Performed by: STUDENT IN AN ORGANIZED HEALTH CARE EDUCATION/TRAINING PROGRAM

## 2025-03-12 PROCEDURE — 258N000003 HC RX IP 258 OP 636: Performed by: INTERNAL MEDICINE

## 2025-03-12 PROCEDURE — 70553 MRI BRAIN STEM W/O & W/DYE: CPT

## 2025-03-12 PROCEDURE — 97535 SELF CARE MNGMENT TRAINING: CPT | Mod: GO | Performed by: OCCUPATIONAL THERAPIST

## 2025-03-12 RX ORDER — NICOTINE POLACRILEX 4 MG
15-30 LOZENGE BUCCAL
Status: DISCONTINUED | OUTPATIENT
Start: 2025-03-12 | End: 2025-03-14 | Stop reason: HOSPADM

## 2025-03-12 RX ORDER — POTASSIUM CHLORIDE 1.5 G/1.58G
40 POWDER, FOR SOLUTION ORAL ONCE
Status: COMPLETED | OUTPATIENT
Start: 2025-03-12 | End: 2025-03-12

## 2025-03-12 RX ORDER — DEXTROSE MONOHYDRATE 25 G/50ML
25-50 INJECTION, SOLUTION INTRAVENOUS
Status: DISCONTINUED | OUTPATIENT
Start: 2025-03-12 | End: 2025-03-14 | Stop reason: HOSPADM

## 2025-03-12 RX ORDER — GADOBUTROL 604.72 MG/ML
7 INJECTION INTRAVENOUS ONCE
Status: COMPLETED | OUTPATIENT
Start: 2025-03-12 | End: 2025-03-12

## 2025-03-12 RX ADMIN — GADOBUTROL 7 ML: 604.72 INJECTION INTRAVENOUS at 20:33

## 2025-03-12 RX ADMIN — CARBOXYMETHYLCELLULOSE SODIUM 1 DROP: 5 SOLUTION/ DROPS OPHTHALMIC at 10:17

## 2025-03-12 RX ADMIN — INSULIN ASPART 1 UNITS: 100 INJECTION, SOLUTION INTRAVENOUS; SUBCUTANEOUS at 14:33

## 2025-03-12 RX ADMIN — POTASSIUM & SODIUM PHOSPHATES POWDER PACK 280-160-250 MG 1 PACKET: 280-160-250 PACK at 21:32

## 2025-03-12 RX ADMIN — PIPERACILLIN AND TAZOBACTAM 4.5 G: 4; .5 INJECTION, POWDER, FOR SOLUTION INTRAVENOUS at 09:34

## 2025-03-12 RX ADMIN — CIPROFLOXACIN HYDROCHLORIDE 4 DROP: 3 SOLUTION/ DROPS OPHTHALMIC at 09:29

## 2025-03-12 RX ADMIN — HYDROMORPHONE HYDROCHLORIDE 0.5 MG: 1 INJECTION, SOLUTION INTRAMUSCULAR; INTRAVENOUS; SUBCUTANEOUS at 21:57

## 2025-03-12 RX ADMIN — ERYTHROMYCIN: 5 OINTMENT OPHTHALMIC at 21:33

## 2025-03-12 RX ADMIN — DEXAMETHASONE SODIUM PHOSPHATE 4 DROP: 1 SOLUTION/ DROPS OPHTHALMIC at 21:35

## 2025-03-12 RX ADMIN — CARBOXYMETHYLCELLULOSE SODIUM 1 DROP: 5 SOLUTION/ DROPS OPHTHALMIC at 21:32

## 2025-03-12 RX ADMIN — LEVETIRACETAM 500 MG: 500 TABLET, FILM COATED ORAL at 21:32

## 2025-03-12 RX ADMIN — PIPERACILLIN AND TAZOBACTAM 4.5 G: 4; .5 INJECTION, POWDER, FOR SOLUTION INTRAVENOUS at 02:34

## 2025-03-12 RX ADMIN — CARVEDILOL 6.25 MG: 6.25 TABLET, FILM COATED ORAL at 18:07

## 2025-03-12 RX ADMIN — INSULIN ASPART 1 UNITS: 100 INJECTION, SOLUTION INTRAVENOUS; SUBCUTANEOUS at 21:46

## 2025-03-12 RX ADMIN — DEXAMETHASONE SODIUM PHOSPHATE 4 DROP: 1 SOLUTION/ DROPS OPHTHALMIC at 09:29

## 2025-03-12 RX ADMIN — OFLOXACIN 1 DROP: 3 SOLUTION/ DROPS OPHTHALMIC at 21:48

## 2025-03-12 RX ADMIN — PREGABALIN 300 MG: 100 CAPSULE ORAL at 21:56

## 2025-03-12 RX ADMIN — PIPERACILLIN AND TAZOBACTAM 4.5 G: 4; .5 INJECTION, POWDER, FOR SOLUTION INTRAVENOUS at 21:33

## 2025-03-12 RX ADMIN — PIPERACILLIN AND TAZOBACTAM 4.5 G: 4; .5 INJECTION, POWDER, FOR SOLUTION INTRAVENOUS at 13:40

## 2025-03-12 RX ADMIN — POTASSIUM & SODIUM PHOSPHATES POWDER PACK 280-160-250 MG 1 PACKET: 280-160-250 PACK at 18:07

## 2025-03-12 RX ADMIN — CIPROFLOXACIN HYDROCHLORIDE 4 DROP: 3 SOLUTION/ DROPS OPHTHALMIC at 21:35

## 2025-03-12 RX ADMIN — HYDROMORPHONE HYDROCHLORIDE 0.5 MG: 1 INJECTION, SOLUTION INTRAMUSCULAR; INTRAVENOUS; SUBCUTANEOUS at 09:30

## 2025-03-12 RX ADMIN — VANCOMYCIN HYDROCHLORIDE 1500 MG: 10 INJECTION, POWDER, LYOPHILIZED, FOR SOLUTION INTRAVENOUS at 10:17

## 2025-03-12 RX ADMIN — CARVEDILOL 6.25 MG: 6.25 TABLET, FILM COATED ORAL at 09:14

## 2025-03-12 RX ADMIN — OFLOXACIN 1 DROP: 3 SOLUTION/ DROPS OPHTHALMIC at 09:29

## 2025-03-12 RX ADMIN — VANCOMYCIN HYDROCHLORIDE 1500 MG: 10 INJECTION, POWDER, LYOPHILIZED, FOR SOLUTION INTRAVENOUS at 22:29

## 2025-03-12 RX ADMIN — TRAZODONE HYDROCHLORIDE 50 MG: 50 TABLET ORAL at 21:32

## 2025-03-12 RX ADMIN — RIVAROXABAN 20 MG: 20 TABLET, FILM COATED ORAL at 18:07

## 2025-03-12 RX ADMIN — INSULIN ASPART 1 UNITS: 100 INJECTION, SOLUTION INTRAVENOUS; SUBCUTANEOUS at 18:15

## 2025-03-12 RX ADMIN — LEVETIRACETAM 500 MG: 500 TABLET, FILM COATED ORAL at 09:14

## 2025-03-12 RX ADMIN — LEVETIRACETAM 500 MG: 500 TABLET, FILM COATED ORAL at 00:18

## 2025-03-12 RX ADMIN — AMLODIPINE BESYLATE 5 MG: 5 TABLET ORAL at 09:15

## 2025-03-12 RX ADMIN — POTASSIUM CHLORIDE 40 MEQ: 1.5 POWDER, FOR SOLUTION ORAL at 13:40

## 2025-03-12 RX ADMIN — LEVOTHYROXINE SODIUM 75 MCG: 75 TABLET ORAL at 07:38

## 2025-03-12 RX ADMIN — ATORVASTATIN CALCIUM 10 MG: 10 TABLET, FILM COATED ORAL at 21:32

## 2025-03-12 RX ADMIN — PREGABALIN 300 MG: 100 CAPSULE ORAL at 10:30

## 2025-03-12 RX ADMIN — POTASSIUM & SODIUM PHOSPHATES POWDER PACK 280-160-250 MG 1 PACKET: 280-160-250 PACK at 13:40

## 2025-03-12 ASSESSMENT — ACTIVITIES OF DAILY LIVING (ADL)
ADLS_ACUITY_SCORE: 57
ADLS_ACUITY_SCORE: 70
ADLS_ACUITY_SCORE: 70
ADLS_ACUITY_SCORE: 57
DEPENDENT_IADLS:: INDEPENDENT
ADLS_ACUITY_SCORE: 65
ADLS_ACUITY_SCORE: 70
ADLS_ACUITY_SCORE: 57
PREVIOUS_RESPONSIBILITIES: MEAL PREP;HOUSEKEEPING;LAUNDRY;SHOPPING;MEDICATION MANAGEMENT;FINANCES;DRIVING
ADLS_ACUITY_SCORE: 57
ADLS_ACUITY_SCORE: 65
ADLS_ACUITY_SCORE: 65
ADLS_ACUITY_SCORE: 57
ADLS_ACUITY_SCORE: 70
ADLS_ACUITY_SCORE: 57

## 2025-03-12 NOTE — CONSULTS
[facility]    Hematology / Oncology Consultation     Date of Admission:  3/10/2025  Date of Consult (When I saw the patient): 03/12/25    Assessment & Plan   Fortino Moya is a 75 year old male who was admitted on 3/10/2025. 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    Fortino was diagnosed with left tonsillar HPV-positive squamous cell cancer which was treated with definitive chemoradiation in 2018.  He had recurrent disease in 2021 and was treated with induction TPF (Taxotere, Platinum, 5Fluorouracil) followed by repeat chemoradiation. 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 He had ongoing left facial pain, and eventually had biopsy proven recurrence 11/28/2023 in the left parapharyngeal / left  space. He started pembrolizumab for 3 cycles then added carboplatin + paclitaxel on 2/26/2024. He received cycle 5 of carboplatin + paclitaxel 5/22/2024 then continued pembrolizumab for maintenance. He had further progression and cetuximab was added to pembrolizumab on 7/10/2024. He eventually again progressed and was switched to capecitabine on 11/20/2024. He has been on single agent capecitabine and his last restaging scans showed discordant results with PET showing mild decreased FDG uptake but MRI concerning for disease progression.     He was brought in by his sister and daughter with altered mental status, new onset marked fatigue for 3 -4 days and decreased appetite. His speech was incomprehensible. He had recent changes to his pain medication by palliative care team. He had MRI of face/neck which was significant for There is a 3.0 x 4.2 x 3.4 cm destructive/invasive centrally necrotic mass centered within the expected location of the left palatine tonsil completely encasing the left internal carotid artery and involving the skull base including the clivus,    left occipital condyle, and left mastoid temporal bone, as well as causing destruction of the left mandibular ramus and condyle. Additionally, this mass involved the left jugular foramen and likely the left hypoglossal canal. There was also involvement of the left pterygopalatine fossa, foramen ovale, and likely the foramen rotundum noted. The mass also extended into the left Meckel's cave with perineural spread along the cisternal portion of the left trigeminal nerve.  Most notably, there was erosion through the tegmen mastoideum with significant dural thickening and enhancement of the inferior aspect of the left temporal lobe. There was significant edema of the brain parenchyma as well as an adjacent 0.6 x 0.9 cm rim-enhancing lesion along the inferior margin of the temporal lobe.  Compared to MRI from 2/18/2025, the necrotic mass centered in the region of the left palatine tonsil/skull base is overall similar, with similar extension intracranially and similar bone destruction involving the skull base.  However, there has been interval worsening of left temporal lobe edema and there appears to be a new small inferior left temporal lobe abscess.     Patient has been started on broad spectrum antibiotics. He has made significant improvement in his mental status as per his daughter. He was awake and coherent for the first time in a few days.     We would hold his capecitabine during the hospitalization and until his evaluation in medical oncology with primary team post discharge. He still appears to be ill and needs significant progress before he can be started on therapy for his cancer.     His daughter did most of the talking and had appropriate questions which were answered. We will await input from neurosurgery team though it is unlikely that he is a surgical candidate.      Over 80 min spent on day of visit including review of tests, obtaining/reviewing separately obtained history/physical exam, counseling  patient, ordering medications/tests/procedures, communicating with PCP/consultants, and documenting in electronic medical record.    Fareed Elise  Hematologist and Medical Oncologist  [facility]       Fareed Elise MD, MD    Code Status    No CPR- Do NOT Intubate    Reason for Consult   Reason for consult: I was asked by Dr. Herber Dickerson to evaluate this patient for recurrent squamous cell cancer.    Primary Care Physician   [facility]    Chief Complaint   Altered mental status    Unable to obtain a history from the patient due to confusion and critical condition    History of Present Illness   Fortino Moya is a 75 year old male who presents with altered mental status brought in by his sister and daughter. He has been on single agent capecitabine for his recurrent squamous cell cancer. He had been declining and over the last week had progressive fatigue and poor appetite. He was confused and became incomprehensible.     Past Medical History   I have reviewed this patient's medical history and updated it with pertinent information if needed.   Past Medical History:   Diagnosis Date    Anxiety     Arrhythmia     A FIB    Atrial fibrillation (H)     Coronary artery disease 06/15/2021    A Fib    Dilatation of aorta     Dysphagia     Gastroesophageal reflux disease     Hard to intubate 7/21/2023    Hearing problem 1995    History of radiation therapy 01/2021    Hypercholesterolemia     Hypertension     Hypothyroidism     Osteoradionecrosis of mandible     Other hyperlipidemia     Paralytic lagophthalmos     Persistent insomnia     Primary squamous cell carcinoma of tonsil (H)     Vitiligo        Past Surgical History   I have reviewed this patient's surgical history and updated it with pertinent information if needed.  Past Surgical History:   Procedure Laterality Date    APPENDECTOMY      ARTHROPLASTY HIP ANTERIOR Left 7/21/2023    Procedure: LEFT TOTAL HIP ARTHROPLASTY DIRECT ANTERIOR  APPROACH;  Surgeon: Aron Lemon MD;  Location: [facility] OR    ARTHROPLASTY KNEE Left 12/02/2022    Procedure: LEFT TOTAL KNEE ARTHROPLASTY;  Surgeon: Aron Lemon MD;  Location:  OR    GI SURGERY      IMPLANT GOLD WEIGHT EYELID Left 04/02/2021    Procedure: Left upper eyelid gold or platinum weight insertion for lagophthalmos - 1.6 grams;  Surgeon: Vivien Yoon MD;  Location: [facility] OR    IR CHEST PORT PLACEMENT > 5 YRS OF AGE  02/03/2021    IR GASTROSTOMY TUBE PERCUTANEOUS PLCMNT  03/10/2021    IR PORT REMOVAL RIGHT  09/02/2021    JOINT REPLACEMENT Right     hip    JOINT REPLACEMENT      LASIK Bilateral     ORTHOPEDIC SURGERY      REPAIR ECTROPION Left 04/02/2021    Procedure: Left lower eyelid ectropion repair;  Surgeon: Vivien Yoon MD;  Location:  OR    REPAIR PTOSIS Left     VASCULAR SURGERY         Prior to Admission Medications   Prior to Admission Medications   Prescriptions Last Dose Informant Patient Reported? Taking?   COMPOUNDED NON-CONTROLLED SUBSTANCE (CMPD RX) - PHARMACY TO MIX COMPOUNDED MEDICATION   No Yes   Sig: Insulin compounded ophthalmic drop 1U/mL 1 drop QID OS Dispense 1 bottle for 28day supply   NONFORMULARY   Yes No   Sig: Take by mouth 3 times daily. Magnesium   Polyvinyl Alcohol-Povidone (REFRESH OP)  Self Yes Yes   Sig: Place 1 drop into both eyes as needed   atorvastatin (LIPITOR) 10 MG tablet Past Week Self Yes Yes   Sig: Take 1 tablet by mouth every evening   buprenorphine (BUTRANS) 5 MCG/HR WK patch Past Week  Yes Yes   Sig: Place 1 patch onto the skin once a week.   capecitabine (XELODA) 500 MG tablet Unknown  Yes Yes   Sig: Take 2,000 mg by mouth 2 times daily. Take on days 1 to 14. Take within 30 minutes after a meal. Swallow whole with water. Do not crush or cut.   carvedilol (COREG) 6.25 MG tablet Past Week  Yes Yes   Sig: Take 6.25 mg by mouth 2 times daily (with meals).   ciprofloxacin-dexAMETHasone (CIPRODEX) 0.3-0.1 % otic suspension Past Week  Yes Yes    Sig: Place 4 drops Into the left ear 2 times daily. 3/4/25 SHAKE LIQUID AND INSTILL 4 DROPS TO LEFT EAR TWICE DAILY FOR 14 DAYS   cycloSPORINE (RESTASIS) 0.05 % ophthalmic emulsion   No No   Sig: Place 1 drop Into the left eye 2 times daily.   Patient not taking: Reported on 11/4/2024   erythromycin (ROMYCIN) 5 MG/GM ophthalmic ointment   No Yes   Sig: Place Into the left eye at bedtime.   levothyroxine (SYNTHROID/LEVOTHROID) 75 MCG tablet Past Week  Yes Yes   Sig: Take 75 mcg by mouth every morning (before breakfast).   morphine 10 MG/5ML solution Past Week  Yes Yes   Sig: Take 7.5 mg by mouth every 4 hours as needed for pain.   ofloxacin (OCUFLOX) 0.3 % ophthalmic solution   No Yes   Sig: Place 1 drop Into the left eye 2 times daily.   ondansetron (ZOFRAN) 8 MG tablet   Yes Yes   Sig: Take 8 mg by mouth every 8 hours as needed for nausea (vomiting). (unrelieved by prochlorperazine).   pregabalin (LYRICA) 150 MG capsule Past Week  Yes Yes   Sig: Take 150 mg by mouth 2 times daily. Take 1 capsule (150 mg total) by mouth 2 (two) times a day for 7 days. Take 1 capsule two times a day for three days. Then take 1 capsule in the morning and 2 capsules at bedtime for three days. Then take 2 capsules two times a day indefinitely.   rivaroxaban ANTICOAGULANT (XARELTO) 20 MG TABS tablet Past Week Self Yes Yes   Sig: Take 1 tablet by mouth daily (with dinner)   traZODone (DESYREL) 50 MG tablet Past Week Self Yes Yes   Sig: Take  mg by mouth at bedtime.   vitamin C with B complex (B COMPLEX-C) tablet   Yes Yes   Sig: Take 1 tablet by mouth daily.      Facility-Administered Medications: None     Allergies   No Known Allergies    Social History   I have reviewed this patient's social history and updated it with pertinent information if needed. Fortino ANGELA Moya  reports that he has never smoked. He has never used smokeless tobacco. He reports current alcohol use. He reports that he does not currently use drugs.    Family  History   I have reviewed this patient's family history and updated it with pertinent information if needed.   Family History   Problem Relation Age of Onset    Hypertension Father     Diabetes Brother     Hypertension Brother     Substance Abuse Brother     Hypertension Sister     Cancer Sister     Hypertension Brother     Hypertension Brother     Glaucoma No family hx of     Macular Degeneration No family hx of        Review of Systems   The 10 point Review of Systems is negative other than noted in the HPI or here.      Physical Exam   Temp: 98.2  F (36.8  C) Temp src: Oral BP: (!) 141/100 Pulse: 90   Resp: 17 SpO2: 93 % O2 Device: None (Room air)    Vital Signs with Ranges  Temp:  [97.8  F (36.6  C)-98.2  F (36.8  C)] 98.2  F (36.8  C)  Pulse:  [85-91] 90  Resp:  [17-18] 17  BP: (132-155)/() 141/100  SpO2:  [93 %-94 %] 93 %  161 lbs 14.4 oz         Data   Recent Labs   Lab Test 03/11/25  1600 03/11/25  1454 03/11/25  0602 03/10/25  2319 03/10/25  1202 07/21/23  1604 07/21/23  0841 12/03/22  0902 12/02/22  1239 02/16/22  1225 01/11/22  1023 11/12/21  0942 08/16/21  1018   NA  --   --  135  --  133*  --   --   --   --   --  138 140 136   POTASSIUM  --  3.7 3.1* 3.5 3.3*  --  4.0  --   --   --  4.1 4.2 3.9   CHLORIDE  --   --  92*  --  94*  --   --   --   --   --  104 106 105   CO2  --   --  32*  --  26  --   --   --   --   --  28 28 29   ANIONGAP  --   --  11  --  13  --   --   --   --   --  6 6 2*   BUN  --   --  7.8*  --  9.9  --   --   --   --   --  13 13 18   CR  --   --  0.55*  --  0.46* 0.92 0.88  --  0.65*   < > 0.99 0.95 0.68   *  --  140*  --  172*  --  102* 182*  --   --  184* 102* 89   RIYA  --   --  8.8  --  8.5*  --   --   --   --   --  9.1 9.1 8.9    < > = values in this interval not displayed.     Recent Labs   Lab Test 03/11/25  0602 03/10/25  1202 04/26/21  0658 04/19/21  0657 04/12/21  0656   MAG 1.8 1.9 1.8 2.1 1.9   PHOS 2.5 2.8  --   --   --      Recent Labs   Lab Test  "03/11/25  0602 03/10/25  1202 12/03/22  0902 09/16/22  0810 01/11/22  1023 11/12/21  0942 08/16/21  1018   WBC 12.5* 14.9*  --  5.5 7.8 6.1 4.6   HGB 11.5* 11.5* 10.9* 12.2* 13.0* 13.5 11.2*    325 208 262 229 202 197   MCV 91 90  --  93 93 95 97   NEUTROPHIL 85 86  --   --  80 71 76     Recent Labs   Lab Test 03/11/25  0602 03/10/25  1202 01/11/22  1023   BILITOTAL 1.7* 1.4* 0.4   ALKPHOS 102 107 82   ALT 9 9 21   AST 15 15 11   ALBUMIN 3.6 3.6 3.8     TSH   Date Value Ref Range Status   02/08/2023 2.02 0.30 - 4.20 uIU/mL Final   07/20/2022 2.07 0.30 - 4.20 uIU/mL Final   01/11/2022 2.46 0.40 - 4.00 mU/L Final   11/12/2021 7.25 (H) 0.40 - 4.00 mU/L Final   08/16/2021 8.64 (H) 0.40 - 4.00 mU/L Final     No results for input(s): \"CEA\" in the last [id number] hours.  Results for orders placed or performed during the hospital encounter of 03/10/25   XR Chest 2 Views    Narrative    EXAM: XR CHEST 2 VIEWS  LOCATION: [facility]  DATE: 3/10/2025    INDICATION: Concern for aspiration PNA  COMPARISON: 1/26/2021.      Impression    IMPRESSION: Some new patchy linear opacities at the lower lungs could be atelectasis or new airspace disease. Increased cardiomegaly. No effusions identified. No pneumothorax.   CT Abdomen Pelvis w Contrast    Narrative    EXAM: CT ABDOMEN PELVIS W CONTRAST  LOCATION: [facility]  DATE: 3/10/2025    INDICATION: purulent drainage around g tube site  COMPARISON: None.  TECHNIQUE: CT scan of the abdomen and pelvis was performed following injection of IV contrast. Multiplanar reformats were obtained. Dose reduction techniques were used.  CONTRAST: 84 mL Isovue 370    FINDINGS:   LOWER CHEST: Bibasilar atelectasis.    HEPATOBILIARY: Liver and gallbladder within normal limits.     PANCREAS: Normal.    SPLEEN: Normal.    ADRENAL GLANDS: Normal.    KIDNEYS/BLADDER: Bilateral renal cysts are noted which are benign and needs no further follow-up. Kidneys " are otherwise unremarkable with no evidence of hydronephrosis.    BOWEL: Diverticulosis of the colon. No acute inflammatory change. No obstruction. Moderate stool noted throughout the colon. Stomach is decompressed. The G-tube appears to be within the stomach. G-tube site otherwise appears to within normal limits.    LYMPH NODES: Normal.    VASCULATURE: Moderate atherosclerotic disease of the abdominal aorta with no aneurysmal dilation.    PELVIC ORGANS: Bladder within normal limits.    MUSCULOSKELETAL: Total bilateral hip replacements with no evidence of hardware complication. Degenerative changes of the spine.      Impression    IMPRESSION:   1.  G-tube appears within the stomach with no evidence of complication. No acute findings in the abdomen and pelvis.   MR Soft Tissue Neck w/o & w Contrast    Narrative    EXAM: MR SOFT TISSUE NECK W/O and W CONTRAST  LOCATION: [facility]  DATE: 3/11/2025    INDICATION: history of tonsillar cancer and h o osteomyelitis of mandible, now with worsening pain  COMPARISON: MRI neck 11/17/2022  CONTRAST: 7 ml gadavist  TECHNIQUE: Multiplanar multisequence neck MRI performed without and with IV contrast.    FINDINGS:   -When compared to 11/17/2022, there has been marked worsening of diffuse edema and enhancement involving the left  space including the temporalis muscle, masseter, and adjacent parotid gland. As before, there is abnormal T1-weighted hypointense   marrow signal involving the left mandibular ramus with worsened cortical destruction (series 2, image 10).     -Centered within the expected location of the superior aspect of the left palatine tonsil is a  peripherally rim-enhancing centrally hypoenhancing necrotic mass measuring 3.0 x 4.2 x 3.4 cm (series 9, image 7 and series 10, image 25). This necrotic mass   completely encases the left internal carotid artery flow void (series 9, image 8) as well as involving the clivus and left  occipital condyle. The mass also involves the left mandibular condyle and obliterates the medial left pterygoid muscle. There is   also likely involvement of the anterior aspect of the mastoid temporal bone.     -The mass involves the left jugular foramen (series 9, image 8) and likely the left hypoglossal canal (series 9, image 8).     -Additionally, there is erosion through the tegmen mastoideum with significant dural thickening and enhancement of the inferior aspect of the left temporal lobe. There is significant edema of the parenchyma as well as a 0.6 x 0.9 cm rim-enhancing lesion   along the inferior margin of the temporal lobe (series 10, image 25).     -There is also involvement of the left pterygopalatine fossa and foramen ovale, as well as likely the foramen rotundum. There is involvement of the left Meckel's cave and abnormal enhancement involving the cisternal portion of the left trigeminal nerve   (series 10, image 8).     There is complete opacification of the left sphenoid sinus. There is complete fluid opacification of the left mastoid air cells.      Impression    IMPRESSION:   1.  There is a 3.0 x 4.2 x 3.4 cm destructive/invasive centrally necrotic mass centered within the expected location of the left palatine tonsil which completely encases the left internal carotid artery and involves the skull base including the clivus,   left occipital condyle, and left mastoid temporal bone, as well as destruction of the left mandibular ramus and condyle, detailed above. While these findings are new/markedly worse compared to 11/17/2022. The outside hospital study performed on 2/18/2025   is not available for comparison at this time.    2.  Additionally, this mass involves the left jugular foramen and likely the left hypoglossal canal. There is also involvement of the left pterygopalatine fossa, foramen ovale, and likely the foramen rotundum. The mass also extends into the left Meckel's   cave with  perineural spread along the cisternal portion of the left trigeminal nerve.    3.  Most notably, there is erosion through the tegmen mastoideum with significant dural thickening and enhancement of the inferior aspect of the left temporal lobe. There is significant edema of the brain parenchyma as well as an adjacent 0.6 x 0.9 cm   rim-enhancing lesion along the inferior margin of the temporal lobe.    4.  Overall, the above-described findings are concerning for both tumor recurrence and superimposed infection with intracranial spread of both. Of note, the above-described 0.6 x 0.9 cm collection underlying the left temporal lobe is compatible with   abscess.     Findings discussed with Dr. Madera on 3/11/2025 at 10:17 PM

## 2025-03-12 NOTE — PLAN OF CARE
Goal Outcome Evaluation:      Plan of Care Reviewed With: child          Outcome Evaluation: TCU

## 2025-03-12 NOTE — PLAN OF CARE
Orientation: Intermittent confusion, disoriented to date, lethargic   Aggression Stop Light: Green  Activity: SBA  Diet/BS Checks: Fulls  TF running continuously at 60mL/hr  BG checks q4h  Tele: Afib CVR  IV Access/Drains: R PIV SL  RLQ g-tube  Pain Management: Denies  Abnormal VS/Results: HTN  Na 131  K, mg, phos replacement protocol   Phos, mg AM rechecks ordered   K recheck pending  Bowel/Bladder: Cont b/b  Skin/Wounds: Moisture wound at g-tube site, dressing CDI  Consults: Hem/onc, ID, NSG, PT, OT, SW  D/C Disposition: TBD    Other Info:   -MRI to be completed, checklist completed and faxed   -Q4 neuros unchanged

## 2025-03-12 NOTE — PLAN OF CARE
Orientation: A/Ox3 disoriented to year  Aggression Stop Light: green  Activity: SBA, assistance with IV pole   Diet/BS Checks: Continuous TF running at 40mL during the day, tolerated well, increased to goal rate of 60mL/hr @ 1500  Tele:  A-fib   IV Access/Drains: G-tube, R PIV S/L with intermittent abx  Pain Management: PRN IV dilaudid given x1 this AM  Abnormal VS/Results: VSS on RA except HTN  Bowel/Bladder: Continent of b/b  Skin/Wounds: moisture wound at g-tube site. Site cleaned and dressing changed   Consults: SW, hem/onc, ID, Neuro, OT, PT, nutrition   D/C Disposition: pending     Other Info:     -K+, Mg, Phos protocols: Replaced phos and K+, recheck mag and phos in AM. Pending 4hr recheck for K+ at 1740   - q4h neuro checks intact; Left sided facial droop noted (baseline d/t chemo)   - Meds crushed through G-tube

## 2025-03-12 NOTE — PHARMACY-VANCOMYCIN DOSING SERVICE
"Pharmacy Vancomycin Initial Note  Date of Service 2025  Patient's  1950  75 year old, male    Indication: Abscess. Brain Abscess.    Current estimated CrCl = Estimated Creatinine Clearance: 122.3 mL/min (A) (based on SCr of 0.55 mg/dL (L)).    Creatinine for last 3 days  3/10/2025: 12:02 PM Creatinine 0.46 mg/dL  3/11/2025:  6:02 AM Creatinine 0.55 mg/dL    Recent Vancomycin Level(s) for last 3 days  No results found for requested labs within last 3 days.      Vancomycin IV Administrations (past 72 hours)                     vancomycin (VANCOCIN) 1,500 mg in 0.9% NaCl 265 mL intermittent infusion (mg) 1,500 mg New Bag 25 0402    vancomycin (VANCOCIN) 2,000 mg in 0.9% NaCl 520 mL intermittent infusion (mg) 2,000 mg New Bag 03/10/25 1533                    Nephrotoxins and other renal medications (From now, onward)      Start     Dose/Rate Route Frequency Ordered Stop    25 0930  vancomycin (VANCOCIN) 1,500 mg in 0.9% NaCl 265 mL intermittent infusion         1,500 mg  over 90 Minutes Intravenous EVERY 12 HOURS 25 0839      03/10/25 2000  piperacillin-tazobactam (ZOSYN) 4.5 g vial to attach to  mL bag        Note to Pharmacy: For [facility], [facility] and [facility]: For Zosyn-naive patients, use the \"Zosyn initial dose + extended infusion\" order panel.    4.5 g  over 30 Minutes Intravenous EVERY 6 HOURS 03/10/25 1641              Contrast Orders - past 72 hours (72h ago, onward)      Start     Dose/Rate Route Frequency Stop    25 2100  gadobutrol (GADAVIST) injection 7 mL         7 mL Intravenous ONCE 03/11/25 2121    03/10/25 1710  iopamidol (ISOVUE-370) solution 84 mL         84 mL Intravenous ONCE 03/10/25 1732               Plan:  Start vancomycin  1500 mg IV q12h.   Vancomycin monitoring method: Trough (Method 1 = dosing nomogram)  Vancomycin therapeutic monitoring goal: 15-20 mg/L  Pharmacy will check vancomycin levels as appropriate in 1-3 Days.    Serum creatinine levels will be " ordered daily for the first week of therapy and at least twice weekly for subsequent weeks.      Tyler Canchola, [facility]

## 2025-03-12 NOTE — PROGRESS NOTES
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      Assessment & Plan     Acute metabolic encephalopathy multifactorial: Resolved   Sepsis on admission: Resolved  Possible left temporal abscess versus disease progression    Patient presenting with altered mental status: Awake, but confused, not answering appropriately to the family  Patient was recently started on buprenorphine as well as morphine, he was recently switched from gabapentin to pregabalin as well.  As per family, the patient was using quite a bit of morphine, the patient is denying.  Patient was evaluated in the ED, chest x-ray was negative for any acute infiltrate congestive changes pneumothorax, does show some atelectasis.  CT abdomen pelvis with contrast showed bibasilar atelectasis, otherwise unremarkable.  G-tube in place.  Patient was afebrile, but was tachycardic, has some leukocytosis and was hypertensive on admission lactic acid was normal as well as procalcitonin.  Patient family does notice some cough and coughing up some phlegm.    Patient is G-tube, has some raw skin surrounding the G-tube port, no obvious erythema has some drainage, cultures obtained the ED from the site, Gram stain shows gram-positive cocci, cultures pending  Sputum obtain was contaminated.  At this time the patient is back to his baseline, with awake alert and oriented, he does not remember anything yesterday.  Leukocytosis is trending down, afebrile tachycardia resolved.  UA negative.  No clear-cut sign of any infection or source of infection at  this time.  I think this is most likely secondary to relatively new use of narcotics with buprenorphine as well as morphine use.  In addition to pregabalin.,  May be overusing his morphine because of the pain.  He was slightly hyponatremic hypokalemic on admission, not enough to cause altered mental status like this.    He was started on IV vancomycin and Zosyn on admission, MRSA screen negative, as no obvious source of infection, I did discontinue vancomycin, continue IV Zosyn at this lian.  MRI of the neck soft tissue with and without contrast reviewed, show progression of his malignancy, also suspicious for left temporal lobe abscess.  I will resume IV vancomycin, continue with Zosyn, consult infectious disease to evaluate the patient.  Neurosurgery was consulted overnight and agree with that as well.  Will do the MRI of the brain with and without contrast as well.    Continue PT and OT evaluation.  Continue his his pregabalin, and as needed morphine.  Avoid using IV Dilaudid at this time.  Pain is well-controlled at this time  Dietitian/nutritionist consulted, his tube feeding resumed.  Sodium is now normal to 135, potassium still low will replace potassium as per protocol.       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  Underwent initial chemoradiation with recurrence noted in 2021.  Completed additional chemoradiation complicated by subsequent osteoradionecrosis, persistent left facial pain.  Recurrence again noted in late 2023 involving the parapharyngeal/left  space; has remained on chemotherapy since.  Follows closely with [facility] and continues on capecitabine given appropriate response.  -Holding capecitabine while admitted, patient would need to  bring home supply  -Continue butrans patch  -IV dilaudid prn available for severe pain                -Resume morphine, and pregabalin.  -Nutrition consulted for TF orders, holding now in setting of possible infection  MRI of the neck and soft tissue reviewed, shows 3.0 x 4.2 x 3.4 cm destructive/invasive centrally necrotic mass centered within the expected location of the left palatine tonsil, encases the left internal carotid artery involved the skull base including the clivus, left occipital condyle, left mastoid temporal bone as well as destruction of the left mandibular ramus and condyle, this mass involves the left jugular foramen and likely left hypoglossal canal, this mass also extends to the left Meckel's cave with perineural spread along the cisternal portion of the left trigeminal nerve.  There is erosion through the tegmen mastoideum with significant dural thickening and enhancement of the inferior aspect of left temporal lobe.  There is significant edema of the brain parenchyma as well as 0.6 x 0.9 cm rim-enhancing lesion along the inferior margin of the left temporal lobe, compatible with abscess.    At this time, the radiologist do not have the images from the [facility], he had MRI done on 2/18/2025 and PET scan 2/18/2025, I will asked the mirror to push images over back system, and the radiologist to compare with the most recent MRI and do the addendum accordingly.   I did contact his [place] radiology, and to discuss with them, awaiting callback from the radiologist at this time.    Neurosurgery, as well as oncology is consulted, will wait for their evaluation as well.  In the meantime we will continue IV antibiotic vancomycin and Zosyn ID is consulted as above.    He likely need to follow-up with his oncologist at [facility], if this is all his disease progression.     Afib w/ RVR on Xarelto, rate controlled now  History of paroxysmal atrial fibrillation and found to be in Afib with RVR on  admission.  Suspect tachyarrhythmia is being driven by hypovolemia, electrolyte imbalance, presumed underlying infection.  No hemodynamic instability and rates improved with fluid resuscitation.    He received IV fluid bolus in the ED, Coreg resumed, and Xarelto resumed as well.  Heart rate is much better controlled at this time       Hyponatremia: Resolved  Hypokalemia, mild  Mild electrolyte disturbances noted in setting of suspected poor p.o. intake and questionable tube feed adherence over recent days PTA.  -Discontinue IV fluids, sodium low normal.  Started on potassium replacement protocol  -Nutrition consult, resume tube feeding  This morning labs are still pending at this time.     Elevated bilirubin, mild  Mild total bilirubin elevation to 1.4 on admission, other LFTs normal.  No significant right upper quadrant pain, no jaundice.  Hemoglobin, platelets at baseline and no concern for hemolytic process.  Unclear etiology and will opt to continue trending while treating other acute concerns.  LFTs remain stable.  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 Continue statin  HLD - Continue PTA atorvastatin  HTN - Continue PTA carvedilol, blood pressure uncontrolled, add low-dose amlodipine for better blood pressure control, blood pressure still on the higher side continue with amlodipine and carvedilol at this time.  We can increase the dose of amlodipine if blood pressure remains elevated.  Hypothyroidism - TSH normal in 2/2025. Continue PTA LT4  Insomnia -resume trazodone.   OA s/p L JOSÉ ANTONIO (7/2023)       Clinically Significant Risk Factors        # Hypokalemia: Lowest K = 3.1 mmol/L in last 2 days, will replace as needed  # Hyponatremia: Lowest Na = 133 mmol/L in  last 2 days, will monitor as appropriate  # Hypochloremia: Lowest Cl = 92 mmol/L in last 2 days, will monitor as appropriate  # Hypocalcemia: Lowest Ca = 8.5 mg/dL in last 2 days, will monitor and replace as appropriate           # Hypertension: Noted on problem list             # Severe Malnutrition: based on nutrition assessment and treatment provided per dietitian's recommendations., PRESENT ON ADMISSION            DVT Prophylaxis: On Xarelto  Code Status: No CPR- Do NOT Intubate        Medically Ready for Discharge: Anticipated Tomorrow, if remains stable.    Discussed with the patient, patient daughter at bedside and the nursing staff think of the patient.        Herber Dickerson MD, MD  Text Page  (7am - 6pm)    Interval History   Patient seen and evaluated in his room today, feeling well, denies any pain at this time.  No fever chills headache dizziness or lightheadedness.  Slept well overnight    No other significant event overnight        -Data reviewed today: I reviewed all new labs and imaging results over the last 24 hours. I personally reviewed no images or EKG's today.    Physical Exam   Temp: 98.2  F (36.8  C) Temp src: Oral BP: (!) 141/100 Pulse: 90   Resp: 17 SpO2: 93 % O2 Device: None (Room air)    Vitals:    03/10/25 2137 03/11/25 0643 03/12/25 0553   Weight: 74.1 kg (163 lb 5.8 oz) 73.7 kg (162 lb 6.4 oz) 73.4 kg (161 lb 14.4 oz)     Vital Signs with Ranges  Temp:  [97.8  F (36.6  C)-98.2  F (36.8  C)] 98.2  F (36.8  C)  Pulse:  [85-91] 90  Resp:  [17-18] 17  BP: (132-155)/() 141/100  SpO2:  [93 %-94 %] 93 %  I/O last 3 completed shifts:  In: 143 [NG/GT:143]  Out: -     Constitutional: awake, alert, cooperative, no apparent distress, and appears stated age  Eyes: Lids and lashes normal, pupils equal, round and reactive to light, extra ocular muscles intact, sclera clear, conjunctiva normal  Respiratory: No increased work of breathing, good air exchange, clear to auscultation bilaterally, no  crackles or wheezing  Cardiovascular: Normal apical impulse, regular rate and rhythm, normal S1 and S2, no S3 or S4, and no murmur noted  GI: No scars, normal bowel sounds, soft, non-distended, non-tender, no masses palpated, no hepatosplenomegally, G-tube in place  Musculoskeletal: no lower extremity pitting edema present  Neurologic: Awake, alert, moving all 4 limbs, has some dysarthria chronic, left facial droop which is chronic    Medications   Current Facility-Administered Medications   Medication Dose Route Frequency Provider Last Rate Last Admin    dextrose 10% infusion   Intravenous Continuous PRN Jerry Lee MD         Current Facility-Administered Medications   Medication Dose Route Frequency Provider Last Rate Last Admin    amLODIPine (NORVASC) tablet 5 mg  5 mg Oral or Feeding Tube Daily Herber Dickerson MD   5 mg at 03/12/25 0915    atorvastatin (LIPITOR) tablet 10 mg  10 mg Oral or Feeding Tube QPM Jerry Lee MD   10 mg at 03/11/25 2008    [START ON 3/14/2025] buprenorphine (BUTRANS) 5 MCG/HR WK patch 1 patch  1 patch Transdermal Weekly Jerry Lee MD        And    buprenorphine (BUTRANS) Patch in Place 1 each  1 each Transdermal Q8H [facility] Jerry Lee MD        carboxymethylcellulose PF (REFRESH PLUS) 0.5 % ophthalmic solution 1 drop  1 drop Left Eye BID Jerry Lee MD   1 drop at 03/12/25 1017    carvedilol (COREG) tablet 6.25 mg  6.25 mg Oral or Feeding Tube BID w/meals Jerry Lee MD   6.25 mg at 03/12/25 0914    ciprofloxacin (CILOXAN) 0.3 % ophthalmic solution 4 drop  4 drop Left Ear BID Jerry Lee MD   4 drop at 03/12/25 0929    dexAMETHasone (DECADRON) 0.1 % ophthalmic solution 4 drop  4 drop Left Ear BID Jerry Lee MD   4 drop at 03/12/25 0929    erythromycin (ROMYCIN) ophthalmic ointment   Left Eye At Bedtime Jerry Lee MD   Given at 03/11/25 2131    Insulin compounded ophthalmic drop  1 drop Left Eye 4x Daily Herber Dickerson MD         levETIRAcetam (KEPPRA) tablet 500 mg  500 mg Per G Tube BID Jet Madera MD   500 mg at 03/12/25 0914    levothyroxine (SYNTHROID/LEVOTHROID) tablet 75 mcg  75 mcg Oral QAM AC Jerry Lee MD   75 mcg at 03/12/25 0738    ofloxacin (OCUFLOX) 0.3 % ophthalmic solution 1 drop  1 drop Left Eye BID Jerry Lee MD   1 drop at 03/12/25 0929    piperacillin-tazobactam (ZOSYN) 4.5 g vial to attach to  mL bag  4.5 g Intravenous Q6H Jerry Lee  mL/hr at 03/11/25 1434 4.5 g at 03/12/25 0934    pregabalin (LYRICA) capsule 300 mg  300 mg Oral or Feeding Tube BID Jerry Lee MD   300 mg at 03/12/25 1030    rivaroxaban ANTICOAGULANT (XARELTO) tablet 20 mg  20 mg Oral or Feeding Tube Daily with supper Jerry Lee MD   20 mg at 03/11/25 1752    sodium chloride (PF) 0.9% PF flush 3 mL  3 mL Intracatheter Q8H Eric Ramirez MD   3 mL at 03/10/25 1335    sodium chloride (PF) 0.9% PF flush 3 mL  3 mL Intracatheter Q8H Jerry Lee MD   3 mL at 03/11/25 2132    traZODone (DESYREL) tablet 50 mg  50 mg Oral or Feeding Tube At Bedtime Jerry Lee MD   50 mg at 03/11/25 2137    vancomycin (VANCOCIN) 1,500 mg in 0.9% NaCl 265 mL intermittent infusion  1,500 mg Intravenous Q12H Herber Dickerson MD 1,000 mL/hr at 03/12/25 1017 1,500 mg at 03/12/25 1017       Data   Recent Labs   Lab 03/11/25  1600 03/11/25  1454 03/11/25  0602 03/10/25  2319 03/10/25  1202   WBC  --   --  12.5*  --  14.9*   HGB  --   --  11.5*  --  11.5*   MCV  --   --  91  --  90   PLT  --   --  293  --  325   NA  --   --  135  --  133*   POTASSIUM  --  3.7 3.1* 3.5 3.3*   CHLORIDE  --   --  92*  --  94*   CO2  --   --  32*  --  26   BUN  --   --  7.8*  --  9.9   CR  --   --  0.55*  --  0.46*   ANIONGAP  --   --  11  --  13   RIYA  --   --  8.8  --  8.5*   *  --  140*  --  172*   ALBUMIN  --   --  3.6  --  3.6   PROTTOTAL  --   --  6.6  --  6.6   BILITOTAL  --   --  1.7*  --  1.4*   ALKPHOS  --   --  102  --   107   ALT  --   --  9  --  9   AST  --   --  15  --  15       Recent Results (from the past 24 hours)   MR Soft Tissue Neck w/o & w Contrast    Narrative    EXAM: MR SOFT TISSUE NECK W/O and W CONTRAST  LOCATION: [facility]  DATE: 3/11/2025    INDICATION: history of tonsillar cancer and h o osteomyelitis of mandible, now with worsening pain  COMPARISON: MRI neck 11/17/2022  CONTRAST: 7 ml gadavist  TECHNIQUE: Multiplanar multisequence neck MRI performed without and with IV contrast.    FINDINGS:   -When compared to 11/17/2022, there has been marked worsening of diffuse edema and enhancement involving the left  space including the temporalis muscle, masseter, and adjacent parotid gland. As before, there is abnormal T1-weighted hypointense   marrow signal involving the left mandibular ramus with worsened cortical destruction (series 2, image 10).     -Centered within the expected location of the superior aspect of the left palatine tonsil is a  peripherally rim-enhancing centrally hypoenhancing necrotic mass measuring 3.0 x 4.2 x 3.4 cm (series 9, image 7 and series 10, image 25). This necrotic mass   completely encases the left internal carotid artery flow void (series 9, image 8) as well as involving the clivus and left occipital condyle. The mass also involves the left mandibular condyle and obliterates the medial left pterygoid muscle. There is   also likely involvement of the anterior aspect of the mastoid temporal bone.     -The mass involves the left jugular foramen (series 9, image 8) and likely the left hypoglossal canal (series 9, image 8).     -Additionally, there is erosion through the tegmen mastoideum with significant dural thickening and enhancement of the inferior aspect of the left temporal lobe. There is significant edema of the parenchyma as well as a 0.6 x 0.9 cm rim-enhancing lesion   along the inferior margin of the temporal lobe (series 10, image 25).      -There is also involvement of the left pterygopalatine fossa and foramen ovale, as well as likely the foramen rotundum. There is involvement of the left Meckel's cave and abnormal enhancement involving the cisternal portion of the left trigeminal nerve   (series 10, image 8).     There is complete opacification of the left sphenoid sinus. There is complete fluid opacification of the left mastoid air cells.      Impression    IMPRESSION:   1.  There is a 3.0 x 4.2 x 3.4 cm destructive/invasive centrally necrotic mass centered within the expected location of the left palatine tonsil which completely encases the left internal carotid artery and involves the skull base including the clivus,   left occipital condyle, and left mastoid temporal bone, as well as destruction of the left mandibular ramus and condyle, detailed above. While these findings are new/markedly worse compared to 11/17/2022. The outside hospital study performed on 2/18/2025   is not available for comparison at this time.    2.  Additionally, this mass involves the left jugular foramen and likely the left hypoglossal canal. There is also involvement of the left pterygopalatine fossa, foramen ovale, and likely the foramen rotundum. The mass also extends into the left Meckel's   cave with perineural spread along the cisternal portion of the left trigeminal nerve.    3.  Most notably, there is erosion through the tegmen mastoideum with significant dural thickening and enhancement of the inferior aspect of the left temporal lobe. There is significant edema of the brain parenchyma as well as an adjacent 0.6 x 0.9 cm   rim-enhancing lesion along the inferior margin of the temporal lobe.    4.  Overall, the above-described findings are concerning for both tumor recurrence and superimposed infection with intracranial spread of both. Of note, the above-described 0.6 x 0.9 cm collection underlying the left temporal lobe is compatible with   abscess.      Findings discussed with Dr. Madera on 3/11/2025 at 10:17 PM

## 2025-03-12 NOTE — CONSULTS
Care Management Initial Consult    General Information  Assessment completed with: Children, Family, Daughter Faith/Sister Pat  Type of CM/SW Visit: Initial Assessment    Primary Care Provider verified and updated as needed: Yes   Readmission within the last 30 days:        Reason for Consult: discharge planning  Advance Care Planning: Advance Care Planning Reviewed: no concerns identified          Communication Assessment  Patient's communication style: spoken language (English or Bilingual)             Cognitive  Cognitive/Neuro/Behavioral: .WDL except, orientation  Level of Consciousness: alert, intermittent confusion  Arousal Level: opens eyes spontaneously  Orientation: disoriented to, time, other (see comments) (year 2015)  Mood/Behavior: cooperative, calm  Best Language: 0 - No aphasia  Speech: hoarse (baseline; hx tonsil cancer)    Living Environment:   People in home: alone     Current living Arrangements: condominium      Able to return to prior arrangements: yes       Family/Social Support:  Care provided by: self  Provides care for: no one  Marital Status: Single  Support system: Children, Sibling(s)          Description of Support System: Supportive, Involved    Support Assessment: Adequate family and caregiver support, Adequate social supports    Current Resources:   Patient receiving home care services: No        Community Resources: None  Equipment currently used at home: none  Supplies currently used at home: None    Employment/Financial:  Employment Status: retired        Financial Concerns: none   Referral to Financial Worker: No       Does the patient's insurance plan have a 3 day qualifying hospital stay waiver?  No    Lifestyle & Psychosocial Needs:  Social Drivers of Health     Food Insecurity: No Food Insecurity (9/4/2024)    Received from [facility]    Hunger Vital Sign     Worried About Running Out of Food in the Last Year: Never true     Ran Out of Food in the Last Year: Never true    Depression: Not at risk (10/21/2024)    PHQ-2     PHQ-2 Score: 0   Housing Stability: Low Risk  (9/4/2024)    Received from [facility]    Housing Stability     What is your living situation today?: I have a steady place to live   Tobacco Use: Low Risk  (2/5/2025)    Received from [facility]    Patient History     Smoking Tobacco Use: Never     Smokeless Tobacco Use: Never     Passive Exposure: Not on file   Financial Resource Strain: Low Risk  (7/5/2023)    Received from [facility]    Overall Financial Resource Strain (CARDIA)     Difficulty of Paying Living Expenses: Not hard at all   Alcohol Use: Not At Risk (1/5/2023)    Received from [facility]    AUDIT-C     Frequency of Alcohol Consumption: 2-4 times a month     Average Number of Drinks: 1 or 2     Frequency of Binge Drinking: Never   Transportation Needs: No Transportation Needs (9/4/2024)    Received from [facility]    PRAPARE - Transportation     Lack of Transportation (Medical): No     Lack of Transportation (Non-Medical): No   Physical Activity: Patient Declined (1/17/2024)    Received from [facility]    Exercise Vital Sign     Days of Exercise per Week: Patient declined     Minutes of Exercise per Session: Patient declined   Interpersonal Safety: Not At Risk (9/4/2024)    Received from [facility]    Humiliation, Afraid, Rape, and Kick questionnaire     Fear of Current or Ex-Partner: No     Emotionally Abused: No     Physically Abused: No     Sexually Abused: No   Stress: No Stress Concern Present (1/5/2023)    Received from [facility]    [employer] - Occupational Stress Questionnaire     Feeling of Stress : Not at all   Social Connections: Moderately Integrated (1/5/2023)    [facility], [facility]    Social Connection and Isolation Panel [NHANES]     Frequency of Communication with Friends and Family: More than three times a week     Frequency of Social  Gatherings with Friends and Family: Twice a week     Attends Latter-day Services: More than 4 times per year     Active Member of Clubs or Organizations: Yes     Attends Club or Organization Meetings: More than 4 times per year     Marital Status:    Health Literacy: Not on file       Functional Status:  Prior to admission patient needed assistance:   Dependent ADLs:: Independent  Dependent IADLs:: Independent  Assesssment of Functional Status: Not at baseline with ADL Functioning    Mental Health Status:  Mental Health Status: No Current Concerns       Chemical Dependency Status:  Chemical Dependency Status: No Current Concerns             Values/Beliefs:  Spiritual, Cultural Beliefs, Latter-day Practices, Values that affect care: no               Discussed  Partnership in Safe Discharge Planning  document with patient/family: No    Additional Information:  Per care management/social work consult for discharge planning. Patient admitted on 03/10 due to acute encephalopathy. SW reviewed chart and spoke with patients daughter Faith and sister Pat.     Per daughters report patient resides in a condo with elevator access. At baseline he is independent with all adl's/iadl's and was driving himself. He does not have any equipment at home. SW discussed therapy recommendation of discharge to TCU once medically ready and family is in agreement with this and will review the list and provide additional choices to SW. SW informed at this time they would lie referrals to 1) [facility] 2) [facility]. MICHAEL sent referrals via [facility].     Next Steps: Secure TCU bed    NICKOLAS Dixon    [facility]

## 2025-03-12 NOTE — CONSULTS
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, history of HTN, HLD, hypothyroidism, CAD, GERD, dysphagia,'s s/p G-tube placement brought to the emergency department with confusion and altered mental status and admitted 3/10/2025.  Neurosurgery consulted for abnormal finding on MR soft tissue neck.    Per patient's daughter, patient has displayed new fatigue, decreased appetite, confusion over the past several days.  Patient lives independently and manages his own medications.  Patient currently denies headaches, nausea/vomiting, infectious symptoms.     Imaging reviewed and discussed with patient and family today.  Imaging demonstrates destructive/invasive necrotic mass within the left palate teen tonsil likely secondary to history of left tonsil squamous cell carcinoma.  Imaging demonstrates both tumor recurrence and superimposed infection with intracranial spread of both, concern for left temporal lobe abscess.    Recommend brain MRI with and without contrast for further evaluation of possible brain abscess versus metastasis.  Recommend oncology consult and consideration for possible palliative care consult given extensive disease.    Imaging:  Imaging Interpretation provided by radiologist.   2/18/2025 MRI images were made available for comparison. Compared to that study, the necrotic mass centered in the region of the left palatine tonsil/skull base is overall similar, with similar extension intracranially and similar bone destruction   involving the skull base.     However, there has been interval worsening of left temporal lobe edema and the small inferior left temporal lobe abscess is new.     Complete  opacification of the left sphenoid sinus is also noted.     Findings discussed with Dr. Chung on 3/12/2025 at approximately 1300   Addended by Sancho Mckeon MD on 3/12/2025  4:57 PM     Study Result    Narrative & Impression   EXAM: MR SOFT TISSUE NECK W/O and W CONTRAST  LOCATION: [facility]  DATE: 3/11/2025     INDICATION: history of tonsillar cancer and h o osteomyelitis of mandible, now with worsening pain  COMPARISON: MRI neck 11/17/2022  CONTRAST: 7 ml gadavist  TECHNIQUE: Multiplanar multisequence neck MRI performed without and with IV contrast.     FINDINGS:   -When compared to 11/17/2022, there has been marked worsening of diffuse edema and enhancement involving the left  space including the temporalis muscle, masseter, and adjacent parotid gland. As before, there is abnormal T1-weighted hypointense   marrow signal involving the left mandibular ramus with worsened cortical destruction (series 2, image 10).      -Centered within the expected location of the superior aspect of the left palatine tonsil is a  peripherally rim-enhancing centrally hypoenhancing necrotic mass measuring 3.0 x 4.2 x 3.4 cm (series 9, image 7 and series 10, image 25). This necrotic mass   completely encases the left internal carotid artery flow void (series 9, image 8) as well as involving the clivus and left occipital condyle. The mass also involves the left mandibular condyle and obliterates the medial left pterygoid muscle. There is   also likely involvement of the anterior aspect of the mastoid temporal bone.      -The mass involves the left jugular foramen (series 9, image 8) and likely the left hypoglossal canal (series 9, image 8).      -Additionally, there is erosion through the tegmen mastoideum with significant dural thickening and enhancement of the inferior aspect of the left temporal lobe. There is significant edema of the parenchyma as well as a 0.6 x 0.9 cm  rim-enhancing lesion   along the inferior margin of the temporal lobe (series 10, image 25).      -There is also involvement of the left pterygopalatine fossa and foramen ovale, as well as likely the foramen rotundum. There is involvement of the left Meckel's cave and abnormal enhancement involving the cisternal portion of the left trigeminal nerve   (series 10, image 8).      There is complete opacification of the left sphenoid sinus. There is complete fluid opacification of the left mastoid air cells.                                                                      IMPRESSION:   1.  There is a 3.0 x 4.2 x 3.4 cm destructive/invasive centrally necrotic mass centered within the expected location of the left palatine tonsil which completely encases the left internal carotid artery and involves the skull base including the clivus,   left occipital condyle, and left mastoid temporal bone, as well as destruction of the left mandibular ramus and condyle, detailed above. While these findings are new/markedly worse compared to 11/17/2022. The outside hospital study performed on 2/18/2025   is not available for comparison at this time.     2.  Additionally, this mass involves the left jugular foramen and likely the left hypoglossal canal. There is also involvement of the left pterygopalatine fossa, foramen ovale, and likely the foramen rotundum. The mass also extends into the left Meckel's   cave with perineural spread along the cisternal portion of the left trigeminal nerve.     3.  Most notably, there is erosion through the tegmen mastoideum with significant dural thickening and enhancement of the inferior aspect of the left temporal lobe. There is significant edema of the brain parenchyma as well as an adjacent 0.6 x 0.9 cm   rim-enhancing lesion along the inferior margin of the temporal lobe.     4.  Overall, the above-described findings are concerning for both tumor recurrence and superimposed infection with  intracranial spread of both. Of note, the above-described 0.6 x 0.9 cm collection underlying the left temporal lobe is compatible with   abscess.        NSGY Recommendations:  -No urgent/emergent neurosurgical intervention  -Brain MRI with and without contrast for further evaluation of possible brain mets versus abscess  -Oncology consult  -Consider palliative care consult    Please contact on call NSGY DEQUAN via [employer] system for questions or concerns.     I have discussed the following assessment and plan with Dr. Schultz who is in agreement with initial plan and will follow up with further consultation recommendations.    Radha Mane PA-C  [facility]  [address] [address]    Text page via [facility] Paging/Directory    Code Status    No CPR- Do NOT Intubate    Reason for Consult   Reason for consult: Necrotic mass secondary to tumor/infection     Primary Care Physician   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    Per patient's daughter, patient has displayed new fatigue, decreased appetite, confusion over the past several days.  Patient lives independently and manages his own medications.  Patient currently denies headaches, nausea/vomiting, infectious symptoms.     Past Medical History   I have reviewed this patient's medical history and updated it with pertinent information if needed.   Past Medical History:   Diagnosis Date    Anxiety     Arrhythmia     A FIB     Atrial fibrillation (H)     Coronary artery disease 06/15/2021    A Fib    Dilatation of aorta     Dysphagia     Gastroesophageal reflux disease     Hard to intubate 7/21/2023    Hearing problem 1995    History of radiation therapy 01/2021    Hypercholesterolemia     Hypertension     Hypothyroidism     Osteoradionecrosis of mandible     Other hyperlipidemia     Paralytic lagophthalmos     Persistent insomnia     Primary squamous cell carcinoma of tonsil (H)     Vitiligo        Past Surgical History   I have reviewed this patient's surgical history and updated it with pertinent information if needed.  Past Surgical History:   Procedure Laterality Date    APPENDECTOMY      ARTHROPLASTY HIP ANTERIOR Left 7/21/2023    Procedure: LEFT TOTAL HIP ARTHROPLASTY DIRECT ANTERIOR APPROACH;  Surgeon: Aron Lemon MD;  Location:  OR    ARTHROPLASTY KNEE Left 12/02/2022    Procedure: LEFT TOTAL KNEE ARTHROPLASTY;  Surgeon: Aron Lemon MD;  Location:  OR    GI SURGERY      IMPLANT GOLD WEIGHT EYELID Left 04/02/2021    Procedure: Left upper eyelid gold or platinum weight insertion for lagophthalmos - 1.6 grams;  Surgeon: Vivien Yoon MD;  Location:  OR    IR CHEST PORT PLACEMENT > 5 YRS OF AGE  02/03/2021    IR GASTROSTOMY TUBE PERCUTANEOUS PLCMNT  03/10/2021    IR PORT REMOVAL RIGHT  09/02/2021    JOINT REPLACEMENT Right     hip    JOINT REPLACEMENT      LASIK Bilateral     ORTHOPEDIC SURGERY      REPAIR ECTROPION Left 04/02/2021    Procedure: Left lower eyelid ectropion repair;  Surgeon: Vivien Yoon MD;  Location:  OR    REPAIR PTOSIS Left     VASCULAR SURGERY         Prior to Admission Medications   Prior to Admission Medications   Prescriptions Last Dose Informant Patient Reported? Taking?   COMPOUNDED NON-CONTROLLED SUBSTANCE (CMPD RX) - PHARMACY TO MIX COMPOUNDED MEDICATION   No Yes   Sig: Insulin compounded ophthalmic drop 1U/mL 1 drop QID OS Dispense 1 bottle for 28day supply   NONFORMULARY    Yes No   Sig: Take by mouth 3 times daily. Magnesium   Polyvinyl Alcohol-Povidone (REFRESH OP)  Self Yes Yes   Sig: Place 1 drop into both eyes as needed   atorvastatin (LIPITOR) 10 MG tablet Past Week Self Yes Yes   Sig: Take 1 tablet by mouth every evening   buprenorphine (BUTRANS) 5 MCG/HR WK patch Past Week  Yes Yes   Sig: Place 1 patch onto the skin once a week.   capecitabine (XELODA) 500 MG tablet Unknown  Yes Yes   Sig: Take 2,000 mg by mouth 2 times daily. Take on days 1 to 14. Take within 30 minutes after a meal. Swallow whole with water. Do not crush or cut.   carvedilol (COREG) 6.25 MG tablet Past Week  Yes Yes   Sig: Take 6.25 mg by mouth 2 times daily (with meals).   ciprofloxacin-dexAMETHasone (CIPRODEX) 0.3-0.1 % otic suspension Past Week  Yes Yes   Sig: Place 4 drops Into the left ear 2 times daily. 3/4/25 SHAKE LIQUID AND INSTILL 4 DROPS TO LEFT EAR TWICE DAILY FOR 14 DAYS   cycloSPORINE (RESTASIS) 0.05 % ophthalmic emulsion   No No   Sig: Place 1 drop Into the left eye 2 times daily.   Patient not taking: Reported on 11/4/2024   erythromycin (ROMYCIN) 5 MG/GM ophthalmic ointment   No Yes   Sig: Place Into the left eye at bedtime.   levothyroxine (SYNTHROID/LEVOTHROID) 75 MCG tablet Past Week  Yes Yes   Sig: Take 75 mcg by mouth every morning (before breakfast).   morphine 10 MG/5ML solution Past Week  Yes Yes   Sig: Take 7.5 mg by mouth every 4 hours as needed for pain.   ofloxacin (OCUFLOX) 0.3 % ophthalmic solution   No Yes   Sig: Place 1 drop Into the left eye 2 times daily.   ondansetron (ZOFRAN) 8 MG tablet   Yes Yes   Sig: Take 8 mg by mouth every 8 hours as needed for nausea (vomiting). (unrelieved by prochlorperazine).   pregabalin (LYRICA) 150 MG capsule Past Week  Yes Yes   Sig: Take 150 mg by mouth 2 times daily. Take 1 capsule (150 mg total) by mouth 2 (two) times a day for 7 days. Take 1 capsule two times a day for three days. Then take 1 capsule in the morning and 2 capsules at  "bedtime for three days. Then take 2 capsules two times a day indefinitely.   rivaroxaban ANTICOAGULANT (XARELTO) 20 MG TABS tablet Past Week Self Yes Yes   Sig: Take 1 tablet by mouth daily (with dinner)   traZODone (DESYREL) 50 MG tablet Past Week Self Yes Yes   Sig: Take  mg by mouth at bedtime.   vitamin C with B complex (B COMPLEX-C) tablet   Yes Yes   Sig: Take 1 tablet by mouth daily.      Facility-Administered Medications: None     Allergies   No Known Allergies    Social History   I have reviewed this patient's social history and updated it with pertinent information if needed. Fortino Moya  reports that he has never smoked. He has never used smokeless tobacco. He reports current alcohol use. He reports that he does not currently use drugs.    Family History   I have reviewed this patient's family history and updated it with pertinent information if needed.   Family History   Problem Relation Age of Onset    Hypertension Father     Diabetes Brother     Hypertension Brother     Substance Abuse Brother     Hypertension Sister     Cancer Sister     Hypertension Brother     Hypertension Brother     Glaucoma No family hx of     Macular Degeneration No family hx of        ROS: 10 point ROS negative other than the symptoms noted above in the HPI.    Physical Exam   Temp: 98  F (36.7  C) Temp src: Oral BP: 121/83 Pulse: 72   Resp: 17 SpO2: 92 % O2 Device: None (Room air)    Vital Signs with Ranges  Temp:  [98  F (36.7  C)-98.2  F (36.8  C)] 98  F (36.7  C)  Pulse:  [72-91] 72  Resp:  [17-18] 17  BP: (121-155)/() 121/83  SpO2:  [92 %-94 %] 92 %  161 lbs 14.4 oz     , Blood pressure 121/83, pulse 72, temperature 98  F (36.7  C), temperature source Oral, resp. rate 17, height 1.778 m (5' 10\"), weight 73.4 kg (161 lb 14.4 oz), SpO2 92%.  161 lbs 14.4 oz    HEENT:  Normocephalic, atraumatic.  Pupils equal.  Slight left ptosis of upper eyelid.  NEUROLOGICAL EXAMINATION:   Mental status:  Alert and Oriented x " 3, speech is partially garbled.   Slight left facial droop  Motor:   Shoulder Abduction:       Right:  5/5   Left:  5/5  Elbow Flexion:                Right:  5/5   Left:  5/5  Elbow Extension:            Right:  5/5   Left:  5/5  interosseus :                  Right:  5/5   Left:  5/5  Hip Flexion:                    Right: 5/5  Left:  5/5  Knee Flexion:                 Right:  5/5  Left:  5/5  Knee Extension:             Right:  5/5  Left:  5/5  Dorsiflexion:                   Right:  5/5  Left:  5/5  Plantar Flexion:             Right:  5/5  Left:  5/5  EHL:                              Right:  5/5  Left:  5/5  Sensation:  Intact to light touch throughout BUE/BLE    Coordination:  Smooth finger to nose testing.   Negative pronator drift.    Gait:  Not formally assessed.     Data     CBC RESULTS:   Recent Labs   Lab Test 03/12/25  1111   WBC 13.6*   RBC 3.63*   HGB 11.4*   HCT 33.5*   MCV 92   MCH 31.4   MCHC 34.0   RDW 20.9*        Basic Metabolic Panel:  Lab Results   Component Value Date     03/12/2025     06/18/2021      Lab Results   Component Value Date    POTASSIUM 3.3 03/12/2025    POTASSIUM 4.1 01/11/2022    POTASSIUM 3.8 06/18/2021     Lab Results   Component Value Date    CHLORIDE 93 03/12/2025    CHLORIDE 104 01/11/2022    CHLORIDE 104 06/18/2021     Lab Results   Component Value Date    RIYA 8.6 03/12/2025    RIYA 8.6 06/18/2021     Lab Results   Component Value Date    CO2 30 03/12/2025    CO2 28 01/11/2022    CO2 27 06/18/2021     Lab Results   Component Value Date    BUN 9.6 03/12/2025    BUN 13 01/11/2022    BUN 13 06/18/2021     Lab Results   Component Value Date    CR 0.52 03/12/2025    CR 0.74 06/18/2021     Lab Results   Component Value Date     03/12/2025     03/12/2025     12/03/2022     06/18/2021     INR:  Lab Results   Component Value Date    INR 1.19 09/16/2022    INR 1.16 01/08/2021

## 2025-03-12 NOTE — SIGNIFICANT EVENT
"Significant Event Note    Time of event: 10:15 PM March 11, 2025    Description of event:    MRI ordered of soft tissue neck  -large necrotic mass in left skull base  -Perineural spread  -Left Temporal lobe now with superimposed infection / tumor    Vitals stable  BP (!) 155/103 (BP Location: Right arm)   Pulse 85   Temp 97.8  F (36.6  C) (Oral)   Resp 18   Ht 1.778 m (5' 10\")   Wt 73.7 kg (162 lb 6.4 oz)   SpO2 93%   BMI 23.30 kg/m        Plan:    On IV Zosyn, will continue  Oncology . NSG consulted  PO Keppra started for seizure ppx    Jet Madera MD    "

## 2025-03-12 NOTE — PLAN OF CARE
Goal Outcome Evaluation:  Orientation: A/Ox3 disoriented to time; int confusion   Aggression Stop Light: Green  Activity: SBA   Diet/BS Checks: Thin liq; Cont TF  Tele: NSR   IV Access/Drains: PIV SL;int abx; G-tube-TF running at 40 ml/hr w/ 60 ml Q4hr FWF- increased TF and is tolerating    Pain Management: Denies pain   Abnormal VS/Results: VSS on RA  Bowel/Bladder: Cont B/B  Skin/Wounds: abrasion at g-tube site, scrape to LL shin  Consults:  nutrition, SW, PT, OT  D/C Disposition: Pending   Other Info:  - K+, Mg, Phos protocols; rechecks in AM  - meds crushed through G-tube   -MRI completed-see results   -q4h neuros intact

## 2025-03-12 NOTE — PROVIDER NOTIFICATION
"Provider Notification    Notified Person:  Jet Madera MD messaged via [employer] messaging at [phone]    Purpose of notification: \"regarding the MRI results specifically with the brain results \"significant edema of the brain parenchyma \". Do we need neuro checks ordered, seizure precautions, and neuro/neurosx consulted? \" pt also htn 155/103-do we need PRN BP parameters?    Orders received:  neuros q4h, neurosurgery consult, keppra ordered, no BP parameters or seizure precautions needed    Comments:       "

## 2025-03-13 ENCOUNTER — APPOINTMENT (OUTPATIENT)
Dept: PHYSICAL THERAPY | Facility: CLINIC | Age: 75
DRG: 094 | End: 2025-03-13
Payer: COMMERCIAL

## 2025-03-13 VITALS
RESPIRATION RATE: 16 BRPM | DIASTOLIC BLOOD PRESSURE: 92 MMHG | WEIGHT: 167.55 LBS | HEIGHT: 70 IN | TEMPERATURE: 98.1 F | OXYGEN SATURATION: 94 % | BODY MASS INDEX: 23.99 KG/M2 | HEART RATE: 85 BPM | SYSTOLIC BLOOD PRESSURE: 143 MMHG

## 2025-03-13 LAB
ALBUMIN SERPL BCG-MCNC: 3.1 G/DL (ref 3.5–5.2)
ANION GAP SERPL CALCULATED.3IONS-SCNC: 9 MMOL/L (ref 7–15)
BACTERIA BLD CULT: NORMAL
BACTERIA BLD CULT: NORMAL
BACTERIA SPT CULT: ABNORMAL
BACTERIA WND CULT: ABNORMAL
BASOPHILS # BLD AUTO: 0 10E3/UL (ref 0–0.2)
BASOPHILS NFR BLD AUTO: 0 %
BUN SERPL-MCNC: 11 MG/DL (ref 8–23)
CALCIUM SERPL-MCNC: 8.3 MG/DL (ref 8.8–10.4)
CHLORIDE SERPL-SCNC: 96 MMOL/L (ref 98–107)
CREAT SERPL-MCNC: 0.45 MG/DL (ref 0.67–1.17)
EGFRCR SERPLBLD CKD-EPI 2021: >90 ML/MIN/1.73M2
ELLIPTOCYTES BLD QL SMEAR: SLIGHT
EOSINOPHIL # BLD AUTO: 0.1 10E3/UL (ref 0–0.7)
EOSINOPHIL NFR BLD AUTO: 0 %
ERYTHROCYTE [DISTWIDTH] IN BLOOD BY AUTOMATED COUNT: 21 % (ref 10–15)
FRAGMENTS BLD QL SMEAR: ABNORMAL
GLUCOSE BLDC GLUCOMTR-MCNC: 170 MG/DL (ref 70–99)
GLUCOSE BLDC GLUCOMTR-MCNC: 180 MG/DL (ref 70–99)
GLUCOSE BLDC GLUCOMTR-MCNC: 204 MG/DL (ref 70–99)
GLUCOSE BLDC GLUCOMTR-MCNC: 220 MG/DL (ref 70–99)
GLUCOSE BLDC GLUCOMTR-MCNC: 234 MG/DL (ref 70–99)
GLUCOSE BLDC GLUCOMTR-MCNC: 237 MG/DL (ref 70–99)
GLUCOSE SERPL-MCNC: 190 MG/DL (ref 70–99)
GRAM STAIN RESULT: ABNORMAL
HCO3 SERPL-SCNC: 30 MMOL/L (ref 22–29)
HCT VFR BLD AUTO: 33.8 % (ref 40–53)
HGB BLD-MCNC: 11.3 G/DL (ref 13.3–17.7)
HOLD SPECIMEN: NORMAL
IMM GRANULOCYTES # BLD: 0.1 10E3/UL
IMM GRANULOCYTES NFR BLD: 1 %
LYMPHOCYTES # BLD AUTO: 0.4 10E3/UL (ref 0.8–5.3)
LYMPHOCYTES NFR BLD AUTO: 3 %
MAGNESIUM SERPL-MCNC: 1.8 MG/DL (ref 1.7–2.3)
MCH RBC QN AUTO: 31 PG (ref 26.5–33)
MCHC RBC AUTO-ENTMCNC: 33.4 G/DL (ref 31.5–36.5)
MCV RBC AUTO: 93 FL (ref 78–100)
MONOCYTES # BLD AUTO: 1.6 10E3/UL (ref 0–1.3)
MONOCYTES NFR BLD AUTO: 12 %
NEUTROPHILS # BLD AUTO: 11.3 10E3/UL (ref 1.6–8.3)
NEUTROPHILS NFR BLD AUTO: 84 %
NRBC # BLD AUTO: 0 10E3/UL
NRBC BLD AUTO-RTO: 0 /100
PHOSPHATE SERPL-MCNC: 2.2 MG/DL (ref 2.5–4.5)
PLAT MORPH BLD: ABNORMAL
PLATELET # BLD AUTO: 275 10E3/UL (ref 150–450)
POTASSIUM SERPL-SCNC: 3.6 MMOL/L (ref 3.4–5.3)
RBC # BLD AUTO: 3.64 10E6/UL (ref 4.4–5.9)
RBC MORPH BLD: ABNORMAL
SMUDGE CELLS BLD QL SMEAR: PRESENT
SODIUM SERPL-SCNC: 135 MMOL/L (ref 135–145)
WBC # BLD AUTO: 13.5 10E3/UL (ref 4–11)

## 2025-03-13 PROCEDURE — 250N000011 HC RX IP 250 OP 636: Mod: JW | Performed by: STUDENT IN AN ORGANIZED HEALTH CARE EDUCATION/TRAINING PROGRAM

## 2025-03-13 PROCEDURE — 99222 1ST HOSP IP/OBS MODERATE 55: CPT | Performed by: INTERNAL MEDICINE

## 2025-03-13 PROCEDURE — 80051 ELECTROLYTE PANEL: CPT | Performed by: INTERNAL MEDICINE

## 2025-03-13 PROCEDURE — 36415 COLL VENOUS BLD VENIPUNCTURE: CPT | Performed by: INTERNAL MEDICINE

## 2025-03-13 PROCEDURE — 85018 HEMOGLOBIN: CPT | Performed by: INTERNAL MEDICINE

## 2025-03-13 PROCEDURE — 97116 GAIT TRAINING THERAPY: CPT | Mod: GP | Performed by: PHYSICAL THERAPIST

## 2025-03-13 PROCEDURE — 99418 PROLNG IP/OBS E/M EA 15 MIN: CPT

## 2025-03-13 PROCEDURE — 97530 THERAPEUTIC ACTIVITIES: CPT | Mod: GP | Performed by: PHYSICAL THERAPIST

## 2025-03-13 PROCEDURE — 82040 ASSAY OF SERUM ALBUMIN: CPT | Performed by: INTERNAL MEDICINE

## 2025-03-13 PROCEDURE — 258N000003 HC RX IP 258 OP 636: Performed by: INTERNAL MEDICINE

## 2025-03-13 PROCEDURE — 85004 AUTOMATED DIFF WBC COUNT: CPT | Performed by: INTERNAL MEDICINE

## 2025-03-13 PROCEDURE — 250N000013 HC RX MED GY IP 250 OP 250 PS 637: Performed by: STUDENT IN AN ORGANIZED HEALTH CARE EDUCATION/TRAINING PROGRAM

## 2025-03-13 PROCEDURE — 250N000013 HC RX MED GY IP 250 OP 250 PS 637: Performed by: INTERNAL MEDICINE

## 2025-03-13 PROCEDURE — 250N000011 HC RX IP 250 OP 636: Performed by: INTERNAL MEDICINE

## 2025-03-13 PROCEDURE — 99233 SBSQ HOSP IP/OBS HIGH 50: CPT | Mod: FS

## 2025-03-13 PROCEDURE — 99232 SBSQ HOSP IP/OBS MODERATE 35: CPT | Performed by: INTERNAL MEDICINE

## 2025-03-13 PROCEDURE — 99223 1ST HOSP IP/OBS HIGH 75: CPT | Performed by: NURSE PRACTITIONER

## 2025-03-13 PROCEDURE — 120N000001 HC R&B MED SURG/OB

## 2025-03-13 PROCEDURE — 83735 ASSAY OF MAGNESIUM: CPT | Performed by: STUDENT IN AN ORGANIZED HEALTH CARE EDUCATION/TRAINING PROGRAM

## 2025-03-13 RX ORDER — SALIVA STIMULANT COMB. NO.3
1 SPRAY, NON-AEROSOL (ML) MUCOUS MEMBRANE
Status: DISCONTINUED | OUTPATIENT
Start: 2025-03-13 | End: 2025-03-14 | Stop reason: HOSPADM

## 2025-03-13 RX ORDER — OLANZAPINE 5 MG/1
5 TABLET, ORALLY DISINTEGRATING ORAL EVERY 6 HOURS PRN
Status: DISCONTINUED | OUTPATIENT
Start: 2025-03-13 | End: 2025-03-14 | Stop reason: HOSPADM

## 2025-03-13 RX ORDER — ACETAMINOPHEN 325 MG/1
650 TABLET ORAL EVERY 6 HOURS PRN
Status: DISCONTINUED | OUTPATIENT
Start: 2025-03-13 | End: 2025-03-14 | Stop reason: HOSPADM

## 2025-03-13 RX ORDER — SENNOSIDES 8.6 MG
1 TABLET ORAL 2 TIMES DAILY PRN
Status: DISCONTINUED | OUTPATIENT
Start: 2025-03-13 | End: 2025-03-14 | Stop reason: HOSPADM

## 2025-03-13 RX ORDER — CARBOXYMETHYLCELLULOSE SODIUM 5 MG/ML
1-2 SOLUTION/ DROPS OPHTHALMIC
Status: DISCONTINUED | OUTPATIENT
Start: 2025-03-13 | End: 2025-03-14 | Stop reason: HOSPADM

## 2025-03-13 RX ORDER — GLYCOPYRROLATE 0.2 MG/ML
0.2 INJECTION, SOLUTION INTRAMUSCULAR; INTRAVENOUS EVERY 4 HOURS PRN
Status: DISCONTINUED | OUTPATIENT
Start: 2025-03-13 | End: 2025-03-14 | Stop reason: HOSPADM

## 2025-03-13 RX ORDER — ATROPINE SULFATE 10 MG/ML
2 SOLUTION/ DROPS OPHTHALMIC EVERY 4 HOURS PRN
Status: DISCONTINUED | OUTPATIENT
Start: 2025-03-13 | End: 2025-03-14 | Stop reason: HOSPADM

## 2025-03-13 RX ORDER — LORAZEPAM 1 MG/1
1 TABLET ORAL
Status: DISCONTINUED | OUTPATIENT
Start: 2025-03-13 | End: 2025-03-14 | Stop reason: HOSPADM

## 2025-03-13 RX ORDER — BISACODYL 10 MG
10 SUPPOSITORY, RECTAL RECTAL
Status: DISCONTINUED | OUTPATIENT
Start: 2025-03-16 | End: 2025-03-14 | Stop reason: HOSPADM

## 2025-03-13 RX ORDER — NALOXONE HYDROCHLORIDE 0.4 MG/ML
0.1 INJECTION, SOLUTION INTRAMUSCULAR; INTRAVENOUS; SUBCUTANEOUS
Status: DISCONTINUED | OUTPATIENT
Start: 2025-03-13 | End: 2025-03-14 | Stop reason: HOSPADM

## 2025-03-13 RX ORDER — GLYCOPYRROLATE 1 MG/1
2 TABLET ORAL EVERY 4 HOURS PRN
Status: DISCONTINUED | OUTPATIENT
Start: 2025-03-13 | End: 2025-03-14 | Stop reason: HOSPADM

## 2025-03-13 RX ORDER — MINERAL OIL/HYDROPHIL PETROLAT
OINTMENT (GRAM) TOPICAL
Status: DISCONTINUED | OUTPATIENT
Start: 2025-03-13 | End: 2025-03-14 | Stop reason: HOSPADM

## 2025-03-13 RX ORDER — NALOXONE HYDROCHLORIDE 0.4 MG/ML
0.2 INJECTION, SOLUTION INTRAMUSCULAR; INTRAVENOUS; SUBCUTANEOUS
Status: DISCONTINUED | OUTPATIENT
Start: 2025-03-13 | End: 2025-03-14 | Stop reason: HOSPADM

## 2025-03-13 RX ORDER — LORAZEPAM 2 MG/ML
1 INJECTION INTRAMUSCULAR
Status: DISCONTINUED | OUTPATIENT
Start: 2025-03-13 | End: 2025-03-14 | Stop reason: HOSPADM

## 2025-03-13 RX ADMIN — OFLOXACIN 1 DROP: 3 SOLUTION/ DROPS OPHTHALMIC at 21:33

## 2025-03-13 RX ADMIN — OFLOXACIN 1 DROP: 3 SOLUTION/ DROPS OPHTHALMIC at 09:30

## 2025-03-13 RX ADMIN — CARBOXYMETHYLCELLULOSE SODIUM 1 DROP: 5 SOLUTION/ DROPS OPHTHALMIC at 09:30

## 2025-03-13 RX ADMIN — INSULIN ASPART 1 UNITS: 100 INJECTION, SOLUTION INTRAVENOUS; SUBCUTANEOUS at 14:14

## 2025-03-13 RX ADMIN — PREGABALIN 300 MG: 100 CAPSULE ORAL at 21:33

## 2025-03-13 RX ADMIN — DEXAMETHASONE SODIUM PHOSPHATE 4 DROP: 1 SOLUTION/ DROPS OPHTHALMIC at 21:33

## 2025-03-13 RX ADMIN — INSULIN ASPART 1 UNITS: 100 INJECTION, SOLUTION INTRAVENOUS; SUBCUTANEOUS at 03:02

## 2025-03-13 RX ADMIN — CARBOXYMETHYLCELLULOSE SODIUM 1 DROP: 5 SOLUTION/ DROPS OPHTHALMIC at 22:46

## 2025-03-13 RX ADMIN — HYDROMORPHONE HYDROCHLORIDE 0.3 MG: 1 INJECTION, SOLUTION INTRAMUSCULAR; INTRAVENOUS; SUBCUTANEOUS at 14:23

## 2025-03-13 RX ADMIN — CIPROFLOXACIN HYDROCHLORIDE 4 DROP: 3 SOLUTION/ DROPS OPHTHALMIC at 09:30

## 2025-03-13 RX ADMIN — INSULIN ASPART 2 UNITS: 100 INJECTION, SOLUTION INTRAVENOUS; SUBCUTANEOUS at 22:46

## 2025-03-13 RX ADMIN — DEXAMETHASONE SODIUM PHOSPHATE 4 DROP: 1 SOLUTION/ DROPS OPHTHALMIC at 09:30

## 2025-03-13 RX ADMIN — CARVEDILOL 6.25 MG: 6.25 TABLET, FILM COATED ORAL at 09:16

## 2025-03-13 RX ADMIN — PIPERACILLIN AND TAZOBACTAM 4.5 G: 4; .5 INJECTION, POWDER, FOR SOLUTION INTRAVENOUS at 09:16

## 2025-03-13 RX ADMIN — INSULIN ASPART 2 UNITS: 100 INJECTION, SOLUTION INTRAVENOUS; SUBCUTANEOUS at 06:43

## 2025-03-13 RX ADMIN — LEVETIRACETAM 500 MG: 500 TABLET, FILM COATED ORAL at 09:16

## 2025-03-13 RX ADMIN — HYDROMORPHONE HYDROCHLORIDE 0.3 MG: 1 INJECTION, SOLUTION INTRAMUSCULAR; INTRAVENOUS; SUBCUTANEOUS at 18:34

## 2025-03-13 RX ADMIN — HYDROMORPHONE HYDROCHLORIDE 0.5 MG: 1 INJECTION, SOLUTION INTRAMUSCULAR; INTRAVENOUS; SUBCUTANEOUS at 21:41

## 2025-03-13 RX ADMIN — ERYTHROMYCIN: 5 OINTMENT OPHTHALMIC at 21:33

## 2025-03-13 RX ADMIN — CARVEDILOL 6.25 MG: 6.25 TABLET, FILM COATED ORAL at 18:15

## 2025-03-13 RX ADMIN — PREGABALIN 300 MG: 100 CAPSULE ORAL at 09:16

## 2025-03-13 RX ADMIN — AMLODIPINE BESYLATE 5 MG: 5 TABLET ORAL at 09:16

## 2025-03-13 RX ADMIN — INSULIN ASPART 2 UNITS: 100 INJECTION, SOLUTION INTRAVENOUS; SUBCUTANEOUS at 18:15

## 2025-03-13 RX ADMIN — TRAZODONE HYDROCHLORIDE 50 MG: 50 TABLET ORAL at 22:46

## 2025-03-13 RX ADMIN — INSULIN ASPART 2 UNITS: 100 INJECTION, SOLUTION INTRAVENOUS; SUBCUTANEOUS at 09:31

## 2025-03-13 RX ADMIN — CIPROFLOXACIN HYDROCHLORIDE 4 DROP: 3 SOLUTION/ DROPS OPHTHALMIC at 21:33

## 2025-03-13 RX ADMIN — PIPERACILLIN AND TAZOBACTAM 4.5 G: 4; .5 INJECTION, POWDER, FOR SOLUTION INTRAVENOUS at 02:12

## 2025-03-13 RX ADMIN — VANCOMYCIN HYDROCHLORIDE 1500 MG: 10 INJECTION, POWDER, LYOPHILIZED, FOR SOLUTION INTRAVENOUS at 10:11

## 2025-03-13 RX ADMIN — RIVAROXABAN 20 MG: 20 TABLET, FILM COATED ORAL at 18:15

## 2025-03-13 RX ADMIN — LEVOTHYROXINE SODIUM 75 MCG: 75 TABLET ORAL at 06:14

## 2025-03-13 ASSESSMENT — ACTIVITIES OF DAILY LIVING (ADL)
ADLS_ACUITY_SCORE: 65

## 2025-03-13 NOTE — PLAN OF CARE
Orientation: A/Ox3; disoriented to time   Aggression Stop Light: green   Activity: SBA with IV pole assistance   Diet/BS Checks: Full liquid diet and continuous tube feed running at 60mL/hr  Tele:  discontinued today  IV Access/Drains: R PIV S/L   Pain Management: PRN dilaudid given x1  Abnormal VS/Results: deferred; comfort cares   Bowel/Bladder: continent of b/b  Skin/Wounds: moisture wound around G-tube site; dressing change done   Consults: SW    D/C Disposition: pending new SW consult   Other Info:   - initiated comfort cares this shift

## 2025-03-13 NOTE — PROGRESS NOTES
Hematology/Oncology Follow-up Note  [facility]    Date of Admission:  3/10/2025   Reason for Consult: tonsillar squamous cell carcinoma   Primary Oncologist: Dr. Jimenez, [facility]     ASSESSMENT : Fortino Moya is a 75 year old year old male who presented this hospitalization with altered mental status, new onset fatigue, decreased appetite, speech changes, and significant pain. We were consulted to see the patient due to his recurrent left tonsillar squamous cell carcinoma.   Left tonsillar squamous cell carcinoma  Failure to thrive  Acute on chronic pain  Encephalopathy  Left temporal abscess    PLAN:  Dr. Elise and I had a lengthy discussion with daughter and family this morning at the bedside. We dicussed the most recent jacobo MRI and overall clinical picture that is concerning for both an abscess/infection and progression of his cancer. We offered to trial antibiotics to see what quality can be obtained for the patient and also discussed hospice as an option. Family has good understanding of the situation and want to focus on his quality of life as a priority.   Per chart review, palliative saw patient after our visit and family has agreed to proceed with hospice care. This is appropriate.   Oncology will sign off     Tonsillar squamous cell carcinoma   Temporal Abscess   Diagnosed in 2018, HPV positive   Treated with radiation and carboplatin   Reoccurrence in 2021 biopsy proven in left  space with perineural invasion. Tumor extended within ipsilateral pterygoids into skull base and CN V brain stem   Completed chemo and re-irradiation  08/16/2021 PET showing no evidence of disease  Complications in 2022 with necrotic/ infections   11/28/2023 FNA left parapharyngeal /  space positive for SCC   12/21/2023 C1D1 pembrolizumab  02/09/2024 PET showing mixed response  02/26/2024 carboplatin + paclitaxel added to pembrolizumab   07/08/2024 restaging showing progression proceed with pembro  + cetuximab.   11/13/2024 switch to capecitabine due to progression   02/18/2025 Last seen with [place] oncologist. PET showing some metabolic response. MRI was concerning for progression. Decided to continue on capecitabine, patient was doing overall well.   03/11/2025 MR soft tissue neck on this admission compared to 02/18/2025 MRI the necrotic mass centered in the region of the left palatine tonsil/skull base is overall similar, with similar extension intracranially and similar bone destruction involving the skull base. However, there has been interval worsening of left temporal lobe edema and the small inferior left temporal lobe abscess is new. Complete opacification of the left sphenoid sinus is also noted  03/12/2025 MR brain showing Overall, findings are concerning for both tumor recurrence and superimposed infection with intracranial spread of both. Of note, the above-described 0.6 x 0.9 cm collection underlying the left temporal lobe is compatible with abscess.   Neurosurgery consulted to see patient 03/12/2025. Not surgically appropriate.     INTERVAL HISTORY:   Infectious disease consulted. Palliative care consulted.     EXAM:  Subjective  Unable to assess    Objective  GENERAL/CONSTITUTIONAL: No acute distress.      Labs Reviewed: CBC, CMP, labs as above   Imaging Reviewed: as above       40 minutes spent on the date of the encounter doing review of outside records, review of test results, interpretation of tests,  implementing/ creating plan, coordinating care, and documentation     Ligia HAY CNP  Hematology/Oncology  [facility]   Securely message with [employer]

## 2025-03-13 NOTE — CONSULTS
"  Palliative Care Consultation Note        Patient: Fortino Moya  Date of Admission:  3/10/2025    Requesting Clinician / Team: Dr. Herber Dickerson with Medicine   Reason for consult: Goals of care     Recommendations & Counseling     GOALS OF CARE & DISEASE UNDERSTANDING:   Patient, his daughter, Faith, and sister understand \"things don't look good\" but are not sure what exactly Fortino's treatment options are. They would like more information about Fortino's infection. If things are nearing end of life, they want comfort to be a priority. They are open to talking to Palliative Care, once they get more information.   Family would likely benefit from joint rounding or a care conference.   Addendum:   Per Dr. Herber Dickerson, after an update from Medicine, family wants to focus on comfort and Fortino will discharge with hospice. Palliative Care will be available if symptoms are difficult to manage or new needs arise - please reach out if closer involvement is wanted.     ADVANCE CARE PLANNING:  No health care directive on file. Per system policy, Surrogate Decision-makers for Patients With Diminished Decision-making Capacity offers guidance on possible decision-makers. Faith Rendon, daughter,  has been identified as a surrogate decision maker.   There is no POLST form on file, recommend to complete prior to DC.  Code status: No CPR- Do NOT Intubate    MEDICAL MANAGEMENT:   #Pain, cancer related    Follows with [facility] team. Pain well controlled on current regimen, so will not make any changes today.    Butrans 5 mcg/hr weekly patch  Morphine 7.5 mg solution q4h prn breakthrough pain   Pregabalin 300 mg BID     PSYCHOSOCIAL/SPIRITUAL SUPPORT:  Family : daughter Faith and sister at bedside; family seems to understand the severity of the situation and are supportive of Fortino [facility]     Palliative Care will continue to follow peripherally.     Thank you for the " opportunity to be involved in the care of this patient.     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     Mr. Moya presented on 3/10 with AMS, fatigue and decreased oral intake, and was found to have both tumor recurrence and superimposed infection with intracranial spread and concern for left temporal lobe abscess.      Palliative Care was consulted for goals of care.     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     Mr. Moya follows with Palliative Care at [place], so is familiar with our services. Fortino or his family will request we are paged if they want help processing disease progression, navigating goals, or coping. Symptoms currently well controlled with home regimen.           Coping, Meaning, & Spirituality:   Mood, coping, and/or meaning in the context of serious illness were addressed today: Yes    Social:   Important relationships/caregivers: daughter Faith and sister    Medications:  Reviewed this patient's medication profile and medications from this hospitalization.     Notable medications:  Butrans patch 5 mcg/hr  Pregabalin 300 mg BID  Xarelto  Trazodone  50 mg at bedtime   Dilaudid 0.3-0.5 mg IV q2h prn (x2 - 1mg total)  Morphine solution 7.5 mg q4h prn (0)  Zofran prn (0)  Senna prn (0)    [place] Board of Pharmacy Data Base Reviewed: Yes:   reviewed - controlled substances prescribed by other outside provider(s).    ROS:  Comprehensive ROS deferred per patient preference     Physical Exam   Vital Signs with Ranges  Temp:  [97.9  F (36.6  C)-98.1  F (36.7  C)] 98.1  F (36.7  C)  Pulse:  [68-89] 89  Resp:  [16-18] 16  BP: (121-138)/(74-87) 127/83  SpO2:  [92 %-94 %] 94 %  Wt Readings from Last 10 Encounters:   03/13/25 76 kg (167 lb 8.8 oz)   07/21/23 82.1 kg (181 lb)   04/11/23 81.6 kg (180 lb)   12/02/22 83.1 kg (183 lb 1.6 oz)   09/16/22 82.9 kg (182 lb 12.8 oz)   08/24/22 84.4 kg (186 lb)   07/20/22 84.2 kg (185 lb 9.6 oz)   07/13/22 83.5 kg (184 lb)   06/29/22 84.4 kg (186 lb)   05/11/22 84.4 kg (186 lb)     167 lbs 8.79 oz    PHYSICAL EXAM:  Constitutional: Pleasant male, seen lying in hospital bed. Ill appearing, but in NAD.  Neuro: Opens eyes to voice. Oriented to person, place and situation.   Psych: Voice hoarse. Speech appropriate. Calm and cooperative. Memory and insight intact.    Data reviewed:  CMP  Recent Labs   Lab 03/13/25  0929 03/13/25  0636 03/13/25  0201 03/12/25  2146 03/12/25  1817 03/12/25  1433 03/12/25  1111 03/11/25  1600 03/11/25  1454 03/11/25  0602 03/10/25  2319 03/10/25  1202   NA  --   --   --   --   --   --  131*  --   --  135  --  133*   POTASSIUM  --   --   --   --  3.7  --  3.3*  --  3.7 3.1*   < > 3.3*   CHLORIDE  --   --   --   --   --   --  93*  --   --  92*  --  94*   CO2  --   --   --   --   --   --  30*  --   --  32*  --  26   ANIONGAP  --   --   --   --   --   --  8  --   --  11  --  13   * 220* 170* 168*  --    < > 183*   < >  --  140*  --  172*   BUN  --   --   --   --   --   --  9.6  --   --  7.8*  --  9.9   CR  --   --   --   --   --   --  0.52*  --   --  0.55*  --  0.46*   GFRESTIMATED  --   --   --   --    --   --  >90  --   --  >90  --  >90   RIYA  --   --   --   --   --   --  8.6*  --   --  8.8  --  8.5*   MAG  --   --   --   --   --   --  1.8  --   --  1.8  --  1.9   PHOS  --   --   --   --   --   --  2.4*  --   --  2.5  --  2.8   PROTTOTAL  --   --   --   --   --   --   --   --   --  6.6  --  6.6   ALBUMIN  --   --   --   --   --   --   --   --   --  3.6  --  3.6   BILITOTAL  --   --   --   --   --   --   --   --   --  1.7*  --  1.4*   ALKPHOS  --   --   --   --   --   --   --   --   --  102  --  107   AST  --   --   --   --   --   --   --   --   --  15  --  15   ALT  --   --   --   --   --   --   --   --   --  9  --  9    < > = values in this interval not displayed.     CBC  Recent Labs   Lab 03/12/25  1111 03/11/25  0602 03/10/25  1202   WBC 13.6* 12.5* 14.9*   RBC 3.63* 3.69* 3.67*   HGB 11.4* 11.5* 11.5*   HCT 33.5* 33.5* 33.1*   MCV 92 91 90   MCH 31.4 31.2 31.3   MCHC 34.0 34.3 34.7   RDW 20.9* 21.2* 21.4*    293 325         75 minutes spent chart reviewing, coordinating care with medical team, discussing tonsillar SCC progression and intracranial infection with patient and family, providing education regarding resources for support, and documenting.

## 2025-03-13 NOTE — PROGRESS NOTES
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      Assessment & Plan     Acute metabolic encephalopathy multifactorial: Resolved   Sepsis on admission: Resolved  Left temporal lobe abscess  Possible G-tube site infection    Patient presenting with altered mental status: Awake, but confused, not answering appropriately to the family  Patient was recently started on buprenorphine as well as morphine, he was recently switched from gabapentin to pregabalin as well.  As per family, the patient was using quite a bit of morphine, the patient is denying.  Patient was evaluated in the ED, chest x-ray was negative for any acute infiltrate congestive changes pneumothorax, does show some atelectasis.  CT abdomen pelvis with contrast showed bibasilar atelectasis, otherwise unremarkable.  G-tube in place.  Patient was afebrile, but was tachycardic, has some leukocytosis and was hypertensive on admission lactic acid was normal as well as procalcitonin.  Patient family does notice some cough and coughing up some phlegm.    Patient is G-tube, has some raw skin surrounding the G-tube port, no obvious erythema, but has some drainage, cultures obtained the ED from the site, culture growing multiple organism including Pseudomonas, Klebsiella and Candida.  Sputum obtain was contaminated.  At this time the patient is back to his baseline, with awake alert and oriented, he does not remember anything yesterday.  Leukocytosis is trending down, afebrile tachycardia resolved.  UA negative.      Metabolic encephalopathy  was multifactorial likely secondary to left temporal lobe abscess, possible discharge infection, and use of narcotics.  He was slightly hyponatremic hypokalemic on admission, not enough to cause altered mental status like this.    He was started on IV vancomycin and Zosyn on admission  MRI of the neck soft tissue with and without contrast reviewed, show progression of his malignancy, also suspicious for left temporal lobe abscess.  MRI of the brain with and without contrast ordered, consistent with left inferior temporal lobe intracranial abscess measuring up to 1 cm with adjacent patchy meningitis/leptomeningitis and cerebritis.  No acute infarct or any additional abscesses.    At this time we will continue IV vancomycin and Zosyn, infectious disease consulted  Neurosurgery has evaluate the patient recommend palliative care, infectious disease recommending palliative care as well.  Will consult palliative care for goals of care evaluation    Continue PT and OT at this time.  Continue his his pregabalin, and as needed morphine.  Avoid using IV Dilaudid at this time.  Dietitian/nutritionist consulted, his tube feeding resumed.  Morning labs are still pending at this time.       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  Underwent initial chemoradiation with recurrence noted in 2021.  Completed additional chemoradiation complicated by subsequent osteoradionecrosis, persistent left facial pain.  Recurrence again noted in late 2023 involving the parapharyngeal/left  space; has remained on chemotherapy since.  Follows closely with [facility] and continues on capecitabine given appropriate response.  -Holding capecitabine while admitted, patient would need to bring  home supply  -Continue butrans patch  -IV dilaudid prn available for severe pain                -Resume morphine, and pregabalin.  -Nutrition consulted for TF orders, patient back on his tube feeding.  MRI of the neck and soft tissue reviewed, shows 3.0 x 4.2 x 3.4 cm destructive/invasive centrally necrotic mass centered within the expected location of the left palatine tonsil, encases the left internal carotid artery involved the skull base including the clivus, left occipital condyle, left mastoid temporal bone as well as destruction of the left mandibular ramus and condyle, this mass involves the left jugular foramen and likely left hypoglossal canal, this mass also extends to the left Meckel's cave with perineural spread along the cisternal portion of the left trigeminal nerve.  There is erosion through the tegmen mastoideum with significant dural thickening and enhancement of the inferior aspect of left temporal lobe.  There is significant edema of the brain parenchyma as well as 0.6 x 0.9 cm rim-enhancing lesion along the inferior margin of the left temporal lobe, compatible with abscess.    At this time, the radiologist do not have the images from the [facility], he had MRI done on 2/18/2025 and PET scan 2/18/2025, I will asked the mirror to push images over back system, and the radiologist to compare with the most recent MRI and do the addendum accordingly.   The discussed with [facility], and they have addended the MRI of the neck soft tissue.    Appreciate input from the oncology and neurosurgery.  Will hold his Xeloda at this time.    He likely need to follow-up with his oncologist at [facility],     Afib w/ RVR on Xarelto, rate controlled now  History of paroxysmal atrial fibrillation and found to be in Afib with RVR on admission.  Suspect tachyarrhythmia is being driven by hypovolemia, electrolyte imbalance, presumed underlying infection.  No hemodynamic instability and rates improved with  fluid resuscitation.    He received IV fluid bolus in the ED, Coreg resumed, and Xarelto resumed as well.  Heart rate is much better controlled at this time       Hyponatremia: Resolved  Hypokalemia, mild  Mild electrolyte disturbances noted in setting of suspected poor p.o. intake and questionable tube feed adherence over recent days PTA.  -Discontinue IV fluids, sodium low normal.  Started on potassium replacement protocol  -Nutrition consult, resume tube feeding  This morning labs are still pending at this time.     Elevated bilirubin, mild  Mild total bilirubin elevation to 1.4 on admission, other LFTs normal.  No significant right upper quadrant pain, no jaundice.  Hemoglobin, platelets at baseline and no concern for hemolytic process.  Unclear etiology and will opt to continue trending while treating other acute concerns.  LFTs remain stable.  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 Continue statin  HLD - Continue PTA atorvastatin  HTN - Continue PTA carvedilol, blood pressure uncontrolled, add low-dose amlodipine for better blood pressure control, blood pressure still on the higher side continue with amlodipine and carvedilol at this time.  We can increase the dose of amlodipine if blood pressure remains elevated.  Hypothyroidism - TSH normal in 2/2025. Continue PTA LT4  Insomnia -resume trazodone.   OA s/p L JOSÉ ANTONIO (7/2023)      Goals of care discussion:  Patient family met with all the subspecialist including neurosurgery, and oncology, infectious disease, after discussing with them they decided to go with comfort care and agreed to stopping all unnecessary medication including antibiotics.  Just kept on comfort care medications.  The patient would like to go  home with home hospice, will consult  for arrangement for home hospice  Once home hospice is arranged will be able to discharge him home.  I will discontinue all unnecessary medication including antibiotic at this time.    Discussed with Dr. Cherry of infectious disease, palliative care, patient daughter at the family member and the nursing staff to give the patient.       Clinically Significant Risk Factors        # Hypokalemia: Lowest K = 3.3 mmol/L in last 2 days, will replace as needed  # Hyponatremia: Lowest Na = 131 mmol/L in last 2 days, will monitor as appropriate  # Hypochloremia: Lowest Cl = 93 mmol/L in last 2 days, will monitor as appropriate            # Hypertension: Noted on problem list             # Severe Malnutrition: based on nutrition assessment and treatment provided per dietitian's recommendations., PRESENT ON ADMISSION   # Financial/Environmental Concerns: none           DVT Prophylaxis: On Xarelto  Code Status: No CPR- Do NOT Intubate        Medically Ready for Discharge: Anticipated in 2-4 Days, will need plan for antibiotics by the infectious disease.    Discussed with the patient, patient and sister at bedside and the nursing staff taking care of the patient        Herber Dickerson MD, MD  Text Page  (7am - 6pm)    Interval History   Patient seen and evaluated in his room today, offers no complaint overall feeling well.    No other significant event overnight        -Data reviewed today: I reviewed all new labs and imaging results over the last 24 hours. I personally reviewed no images or EKG's today.    Physical Exam   Temp: 98.1  F (36.7  C) Temp src: Oral BP: 127/83 Pulse: 89   Resp: 16 SpO2: 94 % O2 Device: None (Room air)    Vitals:    03/11/25 0643 03/12/25 0553 03/13/25 0639   Weight: 73.7 kg (162 lb 6.4 oz) 73.4 kg (161 lb 14.4 oz) 76 kg (167 lb 8.8 oz)     Vital Signs with Ranges  Temp:  [97.9  F (36.6  C)-98.1  F (36.7  C)] 98.1  F (36.7  C)  Pulse:  [68-89] 89  Resp:   [16-18] 16  BP: (121-138)/(74-87) 127/83  SpO2:  [92 %-94 %] 94 %  I/O last 3 completed shifts:  In: 1321 [NG/GT:1321]  Out: -     Constitutional: awake, alert, cooperative, no apparent distress, and appears stated age  Eyes: Lids and lashes normal, pupils equal, round and reactive to light, extra ocular muscles intact, sclera clear, conjunctiva normal  Respiratory: No increased work of breathing, good air exchange, clear to auscultation bilaterally, no crackles or wheezing  Cardiovascular: Normal apical impulse, regular rate and rhythm, normal S1 and S2, no S3 or S4, and no murmur noted  GI: No scars, normal bowel sounds, soft, non-distended, non-tender, no masses palpated, no hepatosplenomegally, G-tube in place  Musculoskeletal: no lower extremity pitting edema present  Neurologic: Awake, alert, moving all 4 limbs, has some dysarthria chronic, left facial droop which is chronic    Medications   Current Facility-Administered Medications   Medication Dose Route Frequency Provider Last Rate Last Admin    dextrose 10% infusion   Intravenous Continuous PRN Jerry Lee MD         Current Facility-Administered Medications   Medication Dose Route Frequency Provider Last Rate Last Admin    amLODIPine (NORVASC) tablet 5 mg  5 mg Oral or Feeding Tube Daily Herber Dickerson MD   5 mg at 03/13/25 0916    atorvastatin (LIPITOR) tablet 10 mg  10 mg Oral or Feeding Tube QPM Jerry Lee MD   10 mg at 03/12/25 2132    [START ON 3/14/2025] buprenorphine (BUTRANS) 5 MCG/HR WK patch 1 patch  1 patch Transdermal Weekly Jerry Lee MD        And    buprenorphine (BUTRANS) Patch in Place 1 each  1 each Transdermal Q8H [facility] Jerry Lee MD        carboxymethylcellulose PF (REFRESH PLUS) 0.5 % ophthalmic solution 1 drop  1 drop Left Eye BID Jerry Lee MD   1 drop at 03/13/25 0930    carvedilol (COREG) tablet 6.25 mg  6.25 mg Oral or Feeding Tube BID w/meals Jerry Lee MD   6.25 mg at 03/13/25 0916     ciprofloxacin (CILOXAN) 0.3 % ophthalmic solution 4 drop  4 drop Left Ear BID Jerry Lee MD   4 drop at 03/13/25 0930    dexAMETHasone (DECADRON) 0.1 % ophthalmic solution 4 drop  4 drop Left Ear BID Jerry Lee MD   4 drop at 03/13/25 0930    erythromycin (ROMYCIN) ophthalmic ointment   Left Eye At Bedtime Jerry Lee MD   Given at 03/12/25 2133    insulin aspart (NovoLOG) injection (RAPID ACTING)  1-7 Units Subcutaneous Q4H Herber Dickerson MD   2 Units at 03/13/25 0931    Insulin compounded ophthalmic drop  1 drop Left Eye 4x Daily Herber Dickerson MD        levETIRAcetam (KEPPRA) tablet 500 mg  500 mg Per G Tube BID Jet Madera MD   500 mg at 03/13/25 0916    levothyroxine (SYNTHROID/LEVOTHROID) tablet 75 mcg  75 mcg Oral QAM AC Jerry Lee MD   75 mcg at 03/13/25 0614    ofloxacin (OCUFLOX) 0.3 % ophthalmic solution 1 drop  1 drop Left Eye BID Jerry Lee MD   1 drop at 03/13/25 0930    piperacillin-tazobactam (ZOSYN) 4.5 g vial to attach to  mL bag  4.5 g Intravenous Q6H Jerry Lee  mL/hr at 03/12/25 1340 4.5 g at 03/13/25 0916    pregabalin (LYRICA) capsule 300 mg  300 mg Oral or Feeding Tube BID Jerry Lee MD   300 mg at 03/13/25 0916    rivaroxaban ANTICOAGULANT (XARELTO) tablet 20 mg  20 mg Oral or Feeding Tube Daily with supper Jerry Lee MD   20 mg at 03/12/25 1807    sodium chloride (PF) 0.9% PF flush 3 mL  3 mL Intracatheter Q8H Eric Ramirez MD   3 mL at 03/13/25 0622    sodium chloride (PF) 0.9% PF flush 3 mL  3 mL Intracatheter Q8H Jerry Lee MD   3 mL at 03/12/25 2148    traZODone (DESYREL) tablet 50 mg  50 mg Oral or Feeding Tube At Bedtime Jerry Lee MD   50 mg at 03/12/25 2132    vancomycin (VANCOCIN) 1,500 mg in 0.9% NaCl 265 mL intermittent infusion  1,500 mg Intravenous Q12H Herber Dickerson MD 1,000 mL/hr at 03/12/25 1017 1,500 mg at 03/12/25 2220       Data   Recent Labs   Lab 03/13/25  5155  03/13/25  0636 03/13/25  0201 03/12/25  2146 03/12/25  1817 03/12/25  1433 03/12/25  1111 03/11/25  1600 03/11/25  1454 03/11/25  0602 03/10/25  2319 03/10/25  1202   WBC  --   --   --   --   --   --  13.6*  --   --  12.5*  --  14.9*   HGB  --   --   --   --   --   --  11.4*  --   --  11.5*  --  11.5*   MCV  --   --   --   --   --   --  92  --   --  91  --  90   PLT  --   --   --   --   --   --  266  --   --  293  --  325   NA  --   --   --   --   --   --  131*  --   --  135  --  133*   POTASSIUM  --   --   --   --  3.7  --  3.3*  --  3.7 3.1*   < > 3.3*   CHLORIDE  --   --   --   --   --   --  93*  --   --  92*  --  94*   CO2  --   --   --   --   --   --  30*  --   --  32*  --  26   BUN  --   --   --   --   --   --  9.6  --   --  7.8*  --  9.9   CR  --   --   --   --   --   --  0.52*  --   --  0.55*  --  0.46*   ANIONGAP  --   --   --   --   --   --  8  --   --  11  --  13   RIYA  --   --   --   --   --   --  8.6*  --   --  8.8  --  8.5*   * 220* 170*   < >  --    < > 183*   < >  --  140*  --  172*   ALBUMIN  --   --   --   --   --   --   --   --   --  3.6  --  3.6   PROTTOTAL  --   --   --   --   --   --   --   --   --  6.6  --  6.6   BILITOTAL  --   --   --   --   --   --   --   --   --  1.7*  --  1.4*   ALKPHOS  --   --   --   --   --   --   --   --   --  102  --  107   ALT  --   --   --   --   --   --   --   --   --  9  --  9   AST  --   --   --   --   --   --   --   --   --  15  --  15    < > = values in this interval not displayed.       Recent Results (from the past 24 hours)   MR Brain w/o & w Contrast    Narrative    EXAM: MR BRAIN W/O and W CONTRAST  LOCATION: [facility]  DATE: 3/12/2025    INDICATION: Evaluate for intracranial abscess/infection. Mass.  COMPARISON: MRI soft tissue neck dated 3/11/2025.  CONTRAST: 7 mL Gadavist  TECHNIQUE: Routine multiplanar multisequence head MRI without and with intravenous contrast.    FINDINGS:  INTRACRANIAL CONTENTS: No acute or  subacute infarct identified.    Within the left inferior temporal lobe there is a small rim-enhancing lesion demonstrating central diffusion restriction measuring 9 mm in AP dimensions, 1 cm in transverse dimensions and 1 cm in craniocaudal dimensions. There is a surrounding T2/FLAIR   signal hyperintensity/edema and mild local mass effect. There is slight medialization of the left uncus, however there is no midline shift. There is associated mild effacement of the temporal horn of the left lateral ventricle. Additionally, there is   extensive underlying dural thickening/enhancement with mild adjacent leptomeningeal enhancement. Enhancing soft tissue is noted extending to the left foramen ovale. Please see dedicated CT soft tissue neck for assessment of the extracranial findings   which extend from the left neck and erode through the left tegmen mastoideum.     No acute intra- hemorrhage. No definite abnormal extra-axial fluid collection. Scattered nonspecific T2/FLAIR hyperintensities within the cerebral white matter most consistent with mild chronic microvascular ischemic change. Mild generalized cerebral   atrophy. No hydrocephalus. Normal position of the cerebellar tonsils.     SELLA: No abnormality accounting for technique.    OSSEOUS STRUCTURES/SOFT TISSUES:  The major intracranial flow voids appear maintained. Please refer to dedicated MRI of the neck for evaluation of the extracranial findings within the neck, skull base and face.     ORBITS: No abnormality accounting for technique.     SINUSES/MASTOIDS: Mild to moderate scattered paranasal sinus mucosal thickening. There is complete opacification of the left sphenoid sinus which demonstrates diffusion restriction. Partial right mastoid effusion. Complete opacification of the left   mastoid air cells and left middle ear cavity.       Impression    IMPRESSION:  1.  Left inferior temporal lobe intracranial abscess measuring up to 1 cm with adjacent  pachymeningitis/leptomeningitis and cerebritis.   2.  Please refer to dedicated soft tissue MRI of the neck from 03/11/2025 for assessment of the extracranial findings within the left neck which erodes through the left tegmen mastoideum and involve the skull base/face.   3.  No acute or subacute infarct.  4.  Chronic senescent and presumed microvascular ischemic change  5.  Paranasal sinus disease, as above. Correlation for acute sinusitis is recommended.

## 2025-03-13 NOTE — PLAN OF CARE
Goal Outcome Evaluation:  Orientation: A/Ox3 disoriented to time; int confusion   Aggression Stop Light: Green  Activity: SBA   Diet/BS Checks: Thin liq; Cont TF  Tele: Afib CVR    IV Access/Drains: PIV SL;int abx; G-tube-TF running at 40 ml/hr w/ 60 ml Q4hr FWF   Pain Management: Facial pain given IV dilaudid x1  Abnormal VS/Results: VSS on RA  Bowel/Bladder: Cont B/B  Skin/Wounds: abrasion at g-tube site, scrape to LL shin  Consults:  nutrition, SW, PT, OT  D/C Disposition: Pending   Other Info:  - K+, Mg, Phos protocols; rechecks in AM  - meds crushed through G-tube   -MRI of brain completed yesterday-see results   -q4h neuros intact   -having blood streak secretions   -L facial drooping at baseline

## 2025-03-13 NOTE — PROGRESS NOTES
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  Neurosurgery consulted for abnormal finding on MR soft tissue neck.     Plan   - Per hospitalist note addendum 3/13, \"Patient family met with all the subspecialist including neurosurgery, and oncology, infectious disease, after discussing with them they decided to go with comfort care and agreed to stopping all unnecessary medication including antibiotics.  Just kept on comfort care medications.  The patient would like to go home with home hospice, will consult  for arrangement for home hospice  Once home hospice is arranged will be able to discharge him home.  I will discontinue all unnecessary medication including antibiotic at this time.     Discussed with Dr. Cherry of infectious disease, palliative care, patient daughter at the family member and the nursing staff to give the patient.\"  - NSGY will sign off at this time     Discussed with Dr. Marion Mesa, CNP  [facility]  [address] [facility]  [address] [place] [id number]  Tel [phone]  Fax [phone]         "

## 2025-03-13 NOTE — CONSULTS
[facility]    Infectious Disease Consultation     Date of Admission:  3/10/2025  Date of Consult (When I saw the patient): 03/13/25    Assessment & Plan   Fortino Moya is a 75 year old male who was admitted on 3/10/2025.     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 Imaging raising concern for both tumor recurrence and possible superimposed infection with intracranial spread of both, concern for left temporal lobe abscess.   Patient on broad spectrum antibiotics with vancomycin and zosyn   Resp culture: pseudomonas   G tube site also cultured and is polymicrobial pseudomonas, klebsiella, candida   Seen by NS and oncology     Recommendations   On broad spectrum antibiotics   Recommend palliative consult   Difficult situation with progression of the malignancy, unclear if superimposed infection, if yes,  what microbiology, at risk for bacteria, fungal other oppurtunistic infection ? need for intervention atleast for diagnosis and ? need for hold on chemo   Discussed with daughter present bedside who is requesting comfort measures given the prognosis and progression, current presentation this was discussed with MARCO Cherry MD    Reason for Consult   Reason for consult: I was asked to evaluate this patient for brain abscesses     Primary Care Physician   [facility]    Chief Complaint   Confusion     History is obtained from the patient and medical records    History of Present Illness   Fortino Moya is a 75 year old male who presents with confusion     Past  Medical History   I have reviewed this patient's medical history and updated it with pertinent information if needed.   Past Medical History:   Diagnosis Date    Anxiety     Arrhythmia     A FIB    Atrial fibrillation (H)     Coronary artery disease 06/15/2021    A Fib    Dilatation of aorta     Dysphagia     Gastroesophageal reflux disease     Hard to intubate 7/21/2023    Hearing problem 1995    History of radiation therapy 01/2021    Hypercholesterolemia     Hypertension     Hypothyroidism     Osteoradionecrosis of mandible     Other hyperlipidemia     Paralytic lagophthalmos     Persistent insomnia     Primary squamous cell carcinoma of tonsil (H)     Vitiligo        Past Surgical History   I have reviewed this patient's surgical history and updated it with pertinent information if needed.  Past Surgical History:   Procedure Laterality Date    APPENDECTOMY      ARTHROPLASTY HIP ANTERIOR Left 7/21/2023    Procedure: LEFT TOTAL HIP ARTHROPLASTY DIRECT ANTERIOR APPROACH;  Surgeon: Aron Lemon MD;  Location: [facility] OR    ARTHROPLASTY KNEE Left 12/02/2022    Procedure: LEFT TOTAL KNEE ARTHROPLASTY;  Surgeon: Aron Lemon MD;  Location:  OR    GI SURGERY      IMPLANT GOLD WEIGHT EYELID Left 04/02/2021    Procedure: Left upper eyelid gold or platinum weight insertion for lagophthalmos - 1.6 grams;  Surgeon: Vivien Yoon MD;  Location:  OR    IR CHEST PORT PLACEMENT > 5 YRS OF AGE  02/03/2021    IR GASTROSTOMY TUBE PERCUTANEOUS PLCMNT  03/10/2021    IR PORT REMOVAL RIGHT  09/02/2021    JOINT REPLACEMENT Right     hip    JOINT REPLACEMENT      LASIK Bilateral     ORTHOPEDIC SURGERY      REPAIR ECTROPION Left 04/02/2021    Procedure: Left lower eyelid ectropion repair;  Surgeon: Vivien Yoon MD;  Location:  OR    REPAIR PTOSIS Left     VASCULAR SURGERY         Prior to Admission Medications   Prior to Admission Medications   Prescriptions Last Dose Informant Patient Reported? Taking?   COMPOUNDED  NON-CONTROLLED SUBSTANCE (CMPD RX) - PHARMACY TO MIX COMPOUNDED MEDICATION   No Yes   Sig: Insulin compounded ophthalmic drop 1U/mL 1 drop QID OS Dispense 1 bottle for 28day supply   NONFORMULARY   Yes No   Sig: Take by mouth 3 times daily. Magnesium   Polyvinyl Alcohol-Povidone (REFRESH OP)  Self Yes Yes   Sig: Place 1 drop into both eyes as needed   atorvastatin (LIPITOR) 10 MG tablet Past Week Self Yes Yes   Sig: Take 1 tablet by mouth every evening   buprenorphine (BUTRANS) 5 MCG/HR WK patch Past Week  Yes Yes   Sig: Place 1 patch onto the skin once a week.   capecitabine (XELODA) 500 MG tablet Unknown  Yes Yes   Sig: Take 2,000 mg by mouth 2 times daily. Take on days 1 to 14. Take within 30 minutes after a meal. Swallow whole with water. Do not crush or cut.   carvedilol (COREG) 6.25 MG tablet Past Week  Yes Yes   Sig: Take 6.25 mg by mouth 2 times daily (with meals).   ciprofloxacin-dexAMETHasone (CIPRODEX) 0.3-0.1 % otic suspension Past Week  Yes Yes   Sig: Place 4 drops Into the left ear 2 times daily. 3/4/25 SHAKE LIQUID AND INSTILL 4 DROPS TO LEFT EAR TWICE DAILY FOR 14 DAYS   cycloSPORINE (RESTASIS) 0.05 % ophthalmic emulsion   No No   Sig: Place 1 drop Into the left eye 2 times daily.   Patient not taking: Reported on 11/4/2024   erythromycin (ROMYCIN) 5 MG/GM ophthalmic ointment   No Yes   Sig: Place Into the left eye at bedtime.   levothyroxine (SYNTHROID/LEVOTHROID) 75 MCG tablet Past Week  Yes Yes   Sig: Take 75 mcg by mouth every morning (before breakfast).   morphine 10 MG/5ML solution Past Week  Yes Yes   Sig: Take 7.5 mg by mouth every 4 hours as needed for pain.   ofloxacin (OCUFLOX) 0.3 % ophthalmic solution   No Yes   Sig: Place 1 drop Into the left eye 2 times daily.   ondansetron (ZOFRAN) 8 MG tablet   Yes Yes   Sig: Take 8 mg by mouth every 8 hours as needed for nausea (vomiting). (unrelieved by prochlorperazine).   pregabalin (LYRICA) 150 MG capsule Past Week  Yes Yes   Sig: Take 150 mg by  mouth 2 times daily. Take 1 capsule (150 mg total) by mouth 2 (two) times a day for 7 days. Take 1 capsule two times a day for three days. Then take 1 capsule in the morning and 2 capsules at bedtime for three days. Then take 2 capsules two times a day indefinitely.   rivaroxaban ANTICOAGULANT (XARELTO) 20 MG TABS tablet Past Week Self Yes Yes   Sig: Take 1 tablet by mouth daily (with dinner)   traZODone (DESYREL) 50 MG tablet Past Week Self Yes Yes   Sig: Take  mg by mouth at bedtime.   vitamin C with B complex (B COMPLEX-C) tablet   Yes Yes   Sig: Take 1 tablet by mouth daily.      Facility-Administered Medications: None     Allergies   No Known Allergies    Immunization History   Immunization History   Administered Date(s) Administered    COVID-19 Bivalent 12+ (Pfizer) 10/06/2022    COVID-19 MONOVALENT 12+ (Pfizer) 03/15/2021, 04/05/2021, 09/07/2021    COVID-19 Monovalent 12+ (Pfizer 2022) 07/19/2022       Social History   I have reviewed this patient's social history and updated it with pertinent information if needed. Fortino Moya  reports that he has never smoked. He has never used smokeless tobacco. He reports current alcohol use. He reports that he does not currently use drugs.    Family History   I have reviewed this patient's family history and updated it with pertinent information if needed.   Family History   Problem Relation Age of Onset    Hypertension Father     Diabetes Brother     Hypertension Brother     Substance Abuse Brother     Hypertension Sister     Cancer Sister     Hypertension Brother     Hypertension Brother     Glaucoma No family hx of     Macular Degeneration No family hx of        Review of Systems   The 10 point Review of Systems is negative    Physical Exam   Temp: 98.1  F (36.7  C) Temp src: Oral BP: 138/87 Pulse: 83   Resp: 16 SpO2: 94 % O2 Device: None (Room air)    Vital Signs with Ranges  Temp:  [97.9  F (36.6  C)-98.1  F (36.7  C)] 98.1  F (36.7  C)  Pulse:  [68-86]  "83  Resp:  [16-18] 16  BP: (121-138)/(74-87) 138/87  SpO2:  [92 %-94 %] 94 %  167 lbs 8.79 oz  Body mass index is 24.04 kg/m .    GENERAL APPEARANCE:  awake  EYES: Eyes grossly normal to inspection  NECK: no adenopathy  RESP: lungs clear   CV: regular rates and rhythm  LYMPHATICS: normal ant/post cervical and supraclavicular nodes  ABDOMEN: soft, nontender  MS: extremities normal  SKIN: no suspicious lesions or rashes        Data   All laboratory and imaging data in the past 24 hours reviewed  No results for input(s): \"CULT\" in the last 168 hours.  No lab results found.    Invalid input(s): \"UC\"       All cultures:  Recent Labs   Lab 03/11/25  1514 03/10/25  1642 03/10/25  1531 03/10/25  1353   CULTURE Culture in progress  1+ Pseudomonas aeruginosa* 2+ Klebsiella pneumoniae*  2+ Klebsiella pneumoniae*  1+ Candida albicans*  1+ Pseudomonas aeruginosa*  1+ Normal sangita >10 Squamous epithelial cells/low power field indicates oral contamination. Please recollect. No growth after 2 days  No growth after 2 days      Blood culture:  Results for orders placed or performed during the hospital encounter of 03/10/25   Blood Culture Peripheral Blood    Collection Time: 03/10/25  1:53 PM    Specimen: Peripheral Blood   Result Value Ref Range    Culture No growth after 2 days    Blood Culture Peripheral Blood    Collection Time: 03/10/25  1:53 PM    Specimen: Peripheral Blood   Result Value Ref Range    Culture No growth after 2 days       Urine culture:  No results found for this or any previous visit.          "

## 2025-03-14 LAB
BACTERIA SPT CULT: ABNORMAL
GLUCOSE BLDC GLUCOMTR-MCNC: 195 MG/DL (ref 70–99)
GLUCOSE BLDC GLUCOMTR-MCNC: 211 MG/DL (ref 70–99)
GLUCOSE BLDC GLUCOMTR-MCNC: 212 MG/DL (ref 70–99)
GRAM STAIN RESULT: ABNORMAL

## 2025-03-14 PROCEDURE — 99239 HOSP IP/OBS DSCHRG MGMT >30: CPT | Performed by: INTERNAL MEDICINE

## 2025-03-14 PROCEDURE — 250N000013 HC RX MED GY IP 250 OP 250 PS 637: Performed by: STUDENT IN AN ORGANIZED HEALTH CARE EDUCATION/TRAINING PROGRAM

## 2025-03-14 PROCEDURE — 250N000011 HC RX IP 250 OP 636: Mod: JZ | Performed by: STUDENT IN AN ORGANIZED HEALTH CARE EDUCATION/TRAINING PROGRAM

## 2025-03-14 PROCEDURE — 250N000013 HC RX MED GY IP 250 OP 250 PS 637: Performed by: INTERNAL MEDICINE

## 2025-03-14 RX ORDER — MORPHINE SULFATE 20 MG/ML
5-10 SOLUTION ORAL
Qty: 30 ML | Refills: 0 | Status: SHIPPED | OUTPATIENT
Start: 2025-03-14

## 2025-03-14 RX ORDER — LORAZEPAM 1 MG/1
1 TABLET ORAL
Qty: 10 TABLET | Refills: 0 | Status: SHIPPED | OUTPATIENT
Start: 2025-03-14

## 2025-03-14 RX ORDER — OLANZAPINE 5 MG/1
5 TABLET, ORALLY DISINTEGRATING ORAL EVERY 6 HOURS PRN
Qty: 20 TABLET | Refills: 0 | Status: SHIPPED | OUTPATIENT
Start: 2025-03-14

## 2025-03-14 RX ORDER — SALIVA STIMULANT COMB. NO.3
1 SPRAY, NON-AEROSOL (ML) MUCOUS MEMBRANE
Qty: 44.3 ML | Refills: 0 | Status: SHIPPED | OUTPATIENT
Start: 2025-03-14

## 2025-03-14 RX ORDER — ATROPINE SULFATE 10 MG/ML
2 SOLUTION/ DROPS OPHTHALMIC EVERY 4 HOURS PRN
Qty: 5 ML | Refills: 0 | Status: SHIPPED | OUTPATIENT
Start: 2025-03-14

## 2025-03-14 RX ORDER — MORPHINE SULFATE 20 MG/ML
10 SOLUTION ORAL
Status: DISCONTINUED | OUTPATIENT
Start: 2025-03-14 | End: 2025-03-14 | Stop reason: HOSPADM

## 2025-03-14 RX ORDER — MORPHINE SULFATE 20 MG/ML
5 SOLUTION ORAL
Status: DISCONTINUED | OUTPATIENT
Start: 2025-03-14 | End: 2025-03-14 | Stop reason: HOSPADM

## 2025-03-14 RX ADMIN — MORPHINE SULFATE 10 MG: 10 SOLUTION ORAL at 08:49

## 2025-03-14 RX ADMIN — CIPROFLOXACIN HYDROCHLORIDE 4 DROP: 3 SOLUTION/ DROPS OPHTHALMIC at 09:19

## 2025-03-14 RX ADMIN — INSULIN ASPART 2 UNITS: 100 INJECTION, SOLUTION INTRAVENOUS; SUBCUTANEOUS at 10:18

## 2025-03-14 RX ADMIN — PREGABALIN 300 MG: 100 CAPSULE ORAL at 08:52

## 2025-03-14 RX ADMIN — CARVEDILOL 6.25 MG: 6.25 TABLET, FILM COATED ORAL at 08:52

## 2025-03-14 RX ADMIN — MORPHINE SULFATE 10 MG: 10 SOLUTION ORAL at 14:07

## 2025-03-14 RX ADMIN — HYDROMORPHONE HYDROCHLORIDE 0.5 MG: 1 INJECTION, SOLUTION INTRAMUSCULAR; INTRAVENOUS; SUBCUTANEOUS at 02:10

## 2025-03-14 RX ADMIN — LEVOTHYROXINE SODIUM 75 MCG: 75 TABLET ORAL at 06:48

## 2025-03-14 RX ADMIN — CARBOXYMETHYLCELLULOSE SODIUM 1 DROP: 5 SOLUTION/ DROPS OPHTHALMIC at 10:18

## 2025-03-14 RX ADMIN — MORPHINE SULFATE 10 MG: 10 SOLUTION ORAL at 10:14

## 2025-03-14 RX ADMIN — HYDROMORPHONE HYDROCHLORIDE 0.5 MG: 1 INJECTION, SOLUTION INTRAMUSCULAR; INTRAVENOUS; SUBCUTANEOUS at 06:12

## 2025-03-14 RX ADMIN — MORPHINE SULFATE 10 MG: 10 SOLUTION ORAL at 16:07

## 2025-03-14 RX ADMIN — INSULIN ASPART 2 UNITS: 100 INJECTION, SOLUTION INTRAVENOUS; SUBCUTANEOUS at 06:15

## 2025-03-14 RX ADMIN — INSULIN ASPART 2 UNITS: 100 INJECTION, SOLUTION INTRAVENOUS; SUBCUTANEOUS at 02:15

## 2025-03-14 RX ADMIN — OFLOXACIN 1 DROP: 3 SOLUTION/ DROPS OPHTHALMIC at 09:19

## 2025-03-14 RX ADMIN — DEXAMETHASONE SODIUM PHOSPHATE 4 DROP: 1 SOLUTION/ DROPS OPHTHALMIC at 09:19

## 2025-03-14 RX ADMIN — BUPRENORPHINE 1 PATCH: 5 PATCH, EXTENDED RELEASE TRANSDERMAL at 10:14

## 2025-03-14 ASSESSMENT — ACTIVITIES OF DAILY LIVING (ADL)
ADLS_ACUITY_SCORE: 65

## 2025-03-14 NOTE — PLAN OF CARE
Physical Therapy Discharge Summary    Reason for therapy discharge:    Change in goals of care.    Progress towards therapy goal(s). See goals on Care Plan in Epic electronic health record for goal details.  Goals not met.  Barriers to achieving goals:   Transitioned to comfort-focused cares with plan to discharge home on hospice.    Therapy recommendation(s):    No further therapy is recommended.

## 2025-03-14 NOTE — CONSULTS
Care Management Follow Up    Length of Stay (days): 4    Expected Discharge Date: 03/15/2025     Concerns to be Addressed: discharge planning     Patient plan of care discussed at interdisciplinary rounds: Yes    Anticipated Discharge Disposition: Hospice     Anticipated Discharge Services: None  Anticipated Discharge DME: None    Patient/family educated on Medicare website which has current facility and service quality ratings: yes  Education Provided on the Discharge Plan:    Patient/Family in Agreement with the Plan: yes    Referrals Placed by CM/SW:    Private pay costs discussed: Not applicable    Discussed  Partnership in Safe Discharge Planning  document with patient/family: No     Handoff Completed: No, handoff not indicated or clinically appropriate    Additional Information:  SW called patients daughter Faith and left a message asking for a return call to discuss hospice options for patient at discharge.     Next Steps: Discharge home on hospice when plan established.     NICKOLAS Robin

## 2025-03-14 NOTE — PLAN OF CARE
Orientation: Ox3, int confusion, lethargic   Aggression Stop Light: Green  Activity: A1 gb/w   Diet/BS Checks: Fulls. Cont TF running at 60mL/hr  BG checks q4h  Tele: N/A  IV Access/Drains: R PIV SL  Pain Management: Dilaudid given x1 for L-sided face pain   Abnormal VS/Results: Deferred   Bowel/Bladder: Cont b/b  Skin/Wounds: Moisture wound at g-tube site, dressing CDI  Consults: Palliative, CM/SW  D/C Disposition: TBD    Other info:   -Comfort cares maintained

## 2025-03-14 NOTE — PROGRESS NOTES
Care Management Discharge Note    Discharge Date: 03/15/2025       Discharge Disposition: Hospice    Discharge Services: None    Discharge DME: None    Discharge Transportation: health plan transportation    Private pay costs discussed: Not applicable    Does the patient's insurance plan have a 3 day qualifying hospital stay waiver?  No    PAS Confirmation Code:    Patient/family educated on Medicare website which has current facility and service quality ratings: yes    Education Provided on the Discharge Plan: Yes  Persons Notified of Discharge Plans: Patients sarbjit Cuevas   Patient/Family in Agreement with the Plan: yes    Handoff Referral Completed: No, handoff not indicated or clinically appropriate    Additional Information:  Writer spoke with sarbjit Cuevas. Plan for patient to go home today via transport at 1430. They would like to use [facility] at discharge. Faith asked for a list of private duty agencies that she can contact so when things get harder down the road she can hire extra help. MICHAEL emailed her a list. Faith would like transport set up and is aware of the out of pocket costs for a wheelchair ride. MICHAEL called Carlos Eduardo with [facility] who states they can admit today. Writer called [facility] and spoke with Love. BLS-W (Stretcher Ride but charged as a wheelchair ride) will be coming around 1430 to get patient to bring him home. Per Love, PCS is not completed for this as it is charged as a wheelchair ride. Writer paged MD to get orders and 3 days of comfort meds sent to the pharmacy for discharge. Orders faxed to [facility]. Bedside RN aware of discharge and the need for 3 days of meds. Family aware and in agreement with discharge.     NICKOLAS Robin

## 2025-03-14 NOTE — PLAN OF CARE
[id number]    Orientation: A&Ox3, disoriented to time  Aggression Stop Light: Green  Activity: SBA w/GB+W  Diet/BS Checks: Full liquids, TF running @ 60 mL/hr w/FWF  Tele: N/A  IV Access/Drains: R PIV SL, g-tube  Pain Management: PRN dilaudid x3 and ice packs given for L facial pain  Abnormal VS/Results: Deferred d/t comfort cares  Bowel/Bladder: Continent  Skin/Wounds: Abrasion around g-tube site, scab to L shin  Consults: Palliative, SW  D/C Disposition: Pending    Other Info:   - BG checks q 4 hrs  - Yaunker at bedside for secretions

## 2025-03-14 NOTE — DISCHARGE SUMMARY
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     Final discharge diagnosis Hospital course    Goals of care discussion:  Patient family met with all the subspecialist including neurosurgery, and oncology, infectious disease, after discussing with them they decided to go with comfort care and agreed to stopping all unnecessary medication including antibiotics.  Just kept on comfort care medications.  The patient would like to go home with home hospice, will consult  for arrangement for home hospice  Once home hospice is arranged will be able to discharge him home.  I will discontinue all unnecessary medication including antibiotic at this time.  He is started on comfort care medication including oral morphine, dose changed to 5 to 10 mg every 1 hour as needed for moderate to severe pain.  Zyprexa as needed for agitation, lorazepam available for an anxiety.  Atropine for any secretions.    At this time the patient will be discharged home with home hospice.       Acute metabolic encephalopathy multifactorial: Resolved   Sepsis on admission: Resolved  Left temporal lobe abscess  Possible G-tube site infection     Patient presenting with altered mental status: Awake,  but confused, not answering appropriately to the family  Patient was recently started on buprenorphine as well as morphine, he was recently switched from gabapentin to pregabalin as well.  As per family, the patient was using quite a bit of morphine, the patient is denying.  Patient was evaluated in the ED, chest x-ray was negative for any acute infiltrate congestive changes pneumothorax, does show some atelectasis.  CT abdomen pelvis with contrast showed bibasilar atelectasis, otherwise unremarkable.  G-tube in place.  Patient was afebrile, but was tachycardic, has some leukocytosis and was hypertensive on admission lactic acid was normal as well as procalcitonin.  Patient family does notice some cough and coughing up some phlegm.     Patient is G-tube, has some raw skin surrounding the G-tube port, no obvious erythema, but has some drainage, cultures obtained the ED from the site, culture growing multiple organism including Pseudomonas, Klebsiella and Candida.  Sputum obtain was contaminated.  At this time the patient is back to his baseline, with awake alert and oriented, he does not remember anything yesterday.  Leukocytosis is trending down, afebrile tachycardia resolved.  UA negative.       Metabolic encephalopathy was multifactorial likely secondary to left temporal lobe abscess, possible discharge infection, and use of narcotics.  He was slightly hyponatremic hypokalemic on admission, not enough to cause altered mental status like this.     He was started on IV vancomycin and Zosyn on admission  MRI of the neck soft tissue with and without contrast reviewed, show progression of his malignancy, also suspicious for left temporal lobe abscess.  MRI of the brain with and without contrast ordered, consistent with left inferior temporal lobe intracranial abscess measuring up to 1 cm with adjacent patchy meningitis/leptomeningitis and cerebritis.  No acute infarct or any additional abscesses.     Neurosurgery has  evaluate the patient recommend palliative care, infectious disease recommending palliative care as well.  Will consult palliative care for goals of care evaluation     After discussion with infectious disease, the patient family decided to keep him comfort care.  IV antibiotics now discontinued.  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  Underwent initial chemoradiation with recurrence noted in 2021.  Completed additional chemoradiation complicated by subsequent osteoradionecrosis, persistent left facial pain.  Recurrence again noted in late 2023 involving the parapharyngeal/left  space; has remained on chemotherapy since.  Follows closely with [facility] and continues on capecitabine given appropriate response.  -Holding capecitabine while admitted, patient would need to bring home supply  -Continue butrans patch  -IV dilaudid prn available for severe pain                -Resume morphine, and pregabalin.  -Nutrition consulted for TF orders, patient back on his tube feeding.  MRI of the neck and soft tissue reviewed, shows 3.0 x 4.2 x 3.4 cm destructive/invasive centrally necrotic mass centered within the expected location of the left palatine tonsil, encases the left internal carotid artery involved the skull base including the clivus, left occipital condyle, left mastoid temporal bone as well as destruction of the left mandibular ramus and condyle, this mass involves the left jugular foramen and likely left hypoglossal canal, this mass also extends to the left Meckel's cave with perineural spread along the cisternal portion of the left trigeminal nerve.  There is erosion through the tegmen mastoideum  with significant dural thickening and enhancement of the inferior aspect of left temporal lobe.  There is significant edema of the brain parenchyma as well as 0.6 x 0.9 cm rim-enhancing lesion along the inferior margin of the left temporal lobe, compatible with abscess.     At this time, the radiologist do not have the images from the [facility], he had MRI done on 2/18/2025 and PET scan 2/18/2025, I will asked the mirror to push images over back system, and the radiologist to compare with the most recent MRI and do the addendum accordingly.   The discussed with [facility], and they have addended the MRI of the neck soft tissue.     Appreciate input from the oncology and neurosurgery.  Discontinue Xeloda on discharge          Afib w/ RVR on Xarelto, rate controlled now  History of paroxysmal atrial fibrillation and found to be in Afib with RVR on admission.  Suspect tachyarrhythmia is being driven by hypovolemia, electrolyte imbalance, presumed underlying infection.  No hemodynamic instability and rates improved with fluid resuscitation.     Discontinue Coreg and Xarelto on discharge.        Hyponatremia: Resolved  Hypokalemia, mild  Mild electrolyte disturbances noted in setting of suspected poor p.o. intake and questionable tube feed adherence over recent days PTA.  -Discontinue IV fluids, sodium low normal.  Started on potassium replacement protocol  -Nutrition consult, resume tube feeding  This morning labs are still pending at this time.     Elevated bilirubin, mild  Mild total bilirubin elevation to 1.4 on admission, other LFTs normal.  No significant right upper quadrant pain, no jaundice.  Hemoglobin, platelets at baseline and no concern for hemolytic process.  Unclear etiology and will opt to continue trending while treating other acute concerns.  LFTs remain stable.  CT scan abdomen pelvis no obvious abnormality noted continue monitor as outpatient     Neurotrophic cornea of L eye  Exposure  keratopathy of L eye  Paralytic lagophthalmos of L lower eyelid  -Continue PTA eye drops     CAD - Moderate to severe atherosclerotic disease noted on prior CT chest. Continue statin  HLD - Continue PTA atorvastatin  HTN - Continue PTA carvedilol,   Hypothyroidism - TSH normal in 2/2025. Continue PTA LT4  Insomnia -resume trazodone.   OA s/p L JOSÉ ANTONIO (7/2023)             Clinically Significant Risk Factors        # Hypokalemia: Lowest K = 3.3 mmol/L in last 2 days, will replace as needed  # Hyponatremia: Lowest Na = 131 mmol/L in last 2 days, will monitor as appropriate  # Hypochloremia: Lowest Cl = 93 mmol/L in last 2 days, will monitor as appropriate      # Hypoalbuminemia: Lowest albumin = 3.1 g/dL at 3/13/2025 12:17 PM, will monitor as appropriate     # Hypertension: Noted on problem list             # Severe Malnutrition: based on nutrition assessment and treatment provided per dietitian's recommendations., PRESENT ON ADMISSION   # Financial/Environmental Concerns: none           Herber Dickerson MD, MD    Significant Results and Procedures       Pending Results   These results will be followed up by   Unresulted Labs Ordered in the Past 30 Days of this Admission       Date and Time Order Name Status Description    3/10/2025  8:56 PM Respiratory Aerobic Bacterial Culture with Gram Stain Preliminary     3/10/2025  1:27 PM Blood Culture Peripheral Blood Preliminary     3/10/2025  1:27 PM Blood Culture Peripheral Blood Preliminary             Code Status   Comfort Care       Primary Care Physician   [facility]    Physical Exam       BP: (!) 143/92 Pulse: 85            Vitals:    03/11/25 0643 03/12/25 0553 03/13/25 0639   Weight: 73.7 kg (162 lb 6.4 oz) 73.4 kg (161 lb 14.4 oz) 76 kg (167 lb 8.8 oz)     Vital Signs with Ranges  Pulse:  [85] 85  BP: (143)/(92) 143/92  I/O last 3 completed shifts:  In: 1938 [NG/GT:1608]  Out: -     Constitutional: fatigued  Respiratory: No increased work of  breathing, good air exchange, clear to auscultation bilaterally, no crackles or wheezing  Cardiovascular: Normal apical impulse, regular rate and rhythm, normal S1 and S2, no S3 or S4, and no murmur noted  GI: No scars, normal bowel sounds, soft, non-distended, non-tender, no masses palpated, no hepatosplenomegally    Discharge Disposition   Discharged to home with home hospice  Condition at discharge: Stable    Consultations This Hospital Stay   PHARMACY TO DOSE VANCO  PHARMACY TO DOSE VANCO  NUTRITION SERVICES ADULT IP CONSULT  PAIN MANAGEMENT ADULT IP CONSULT  PHYSICAL THERAPY ADULT IP CONSULT  OCCUPATIONAL THERAPY ADULT IP CONSULT  CARE MANAGEMENT / SOCIAL WORK IP CONSULT  CARE MANAGEMENT / SOCIAL WORK IP CONSULT  NURSING TO CONSULT FOR VASCULAR ACCESS CARE IP CONSULT  PHARMACY IP CONSULT  HEMATOLOGY & ONCOLOGY IP CONSULT  NEUROSURGERY IP CONSULT  PHARMACY TO DOSE VANCO  INFECTIOUS DISEASES IP CONSULT  PALLIATIVE CARE ADULT IP CONSULT  SOCIAL WORK IP CONSULT  CARE MANAGEMENT / SOCIAL WORK IP CONSULT    Time Spent on this Encounter   Herber WHITMORE MD, personally saw the patient today and spent greater than 30 minutes discharging this patient.    Discharge Orders      Reason for your hospital stay    Acute encephalopathy     Activity    Your activity upon discharge: activity as tolerated     Tubes and Drains    Current Tubes and Drains:     Drain  Duration           GI Enteral Access Device RLQ Gastrostomy 4 days              Discharge Instructions    Discharged with home hospice.  Ok for hospice to eval and treat     No CPR- Do NOT Intubate    Comfort care/hospice     Diet    Follow this diet upon discharge: Current Diet:Orders Placed This Encounter      Adult Formula Drip Feeding: Continuous [employer] Standard 1.4; Gastrostomy; Goal Rate: 60 mL/hr x 22 hrs (hold TF for 1 hr before and 1 hr after Synthroid); mL/hr; Resume TF at 20 mL/hr.  Increase by 20 mL every 12 hrs to goal rate of 60 mL/hr.  ...       Combination Diet Full Liquid    Free water order:  Free water route: Gastric   Free water - Feeding Tube Flush [facility]     Hospital Follow-up with Existing Primary Care Provider (PCP)    Please see details below          Discharge Medications   Current Discharge Medication List        START taking these medications    Details   artificial saliva (BIOTENE MT) SOLN solution Take 1 spray by mouth every hour as needed for dry mouth.  Qty: 44.3 mL, Refills: 0    Associated Diagnoses: End of life care      atropine 1 % ophthalmic solution Place 2 drops under the tongue every 4 hours as needed for secretions.  Qty: 5 mL, Refills: 0    Associated Diagnoses: End of life care      LORazepam (ATIVAN) 1 MG tablet Place 1 tablet (1 mg) under the tongue every 3 hours as needed for anxiety.  Qty: 10 tablet, Refills: 0    Associated Diagnoses: End of life care      morphine sulfate (ROXANOL) 20 mg/mL (HIGH CONC) soln Place 0.25-0.5 mLs (5-10 mg) under the tongue every hour as needed for shortness of breath or moderate to severe pain (or prevention of severe pain/dyspnea).  Qty: 30 mL, Refills: 0    Associated Diagnoses: End of life care      OLANZapine zydis (ZYPREXA) 5 MG ODT Take 1 tablet (5 mg) by mouth every 6 hours as needed for other or agitation (delirium).  Qty: 20 tablet, Refills: 0    Associated Diagnoses: End of life care           CONTINUE these medications which have NOT CHANGED    Details   buprenorphine (BUTRANS) 5 MCG/HR WK patch Place 1 patch onto the skin once a week.      ciprofloxacin-dexAMETHasone (CIPRODEX) 0.3-0.1 % otic suspension Place 4 drops Into the left ear 2 times daily. 3/4/25 SHAKE LIQUID AND INSTILL 4 DROPS TO LEFT EAR TWICE DAILY FOR 14 DAYS      erythromycin (ROMYCIN) 5 MG/GM ophthalmic ointment Place Into the left eye at bedtime.  Qty: 7 g, Refills: 9    Associated Diagnoses: Dry eye syndrome of left eye      ofloxacin (OCUFLOX) 0.3 % ophthalmic solution Place 1 drop Into the left eye 2 times  daily.  Qty: 5 mL, Refills: 5    Associated Diagnoses: Corneal epithelial defect      ondansetron (ZOFRAN) 8 MG tablet Take 8 mg by mouth every 8 hours as needed for nausea (vomiting). (unrelieved by prochlorperazine).      Polyvinyl Alcohol-Povidone (REFRESH OP) Place 1 drop into both eyes as needed      pregabalin (LYRICA) 150 MG capsule Take 150 mg by mouth 2 times daily. Take 1 capsule (150 mg total) by mouth 2 (two) times a day for 7 days. Take 1 capsule two times a day for three days. Then take 1 capsule in the morning and 2 capsules at bedtime for three days. Then take 2 capsules two times a day indefinitely.      traZODone (DESYREL) 50 MG tablet Take  mg by mouth at bedtime.      vitamin C with B complex (B COMPLEX-C) tablet Take 1 tablet by mouth daily.           STOP taking these medications       atorvastatin (LIPITOR) 10 MG tablet Comments:   Reason for Stopping:         capecitabine (XELODA) 500 MG tablet Comments:   Reason for Stopping:         carvedilol (COREG) 6.25 MG tablet Comments:   Reason for Stopping:         COMPOUNDED NON-CONTROLLED SUBSTANCE (CMPD RX) - PHARMACY TO MIX COMPOUNDED MEDICATION Comments:   Reason for Stopping:         cycloSPORINE (RESTASIS) 0.05 % ophthalmic emulsion Comments:   Reason for Stopping:         levothyroxine (SYNTHROID/LEVOTHROID) 75 MCG tablet Comments:   Reason for Stopping:         morphine 10 MG/5ML solution Comments:   Reason for Stopping:         NONFORMULARY Comments:   Reason for Stopping:         rivaroxaban ANTICOAGULANT (XARELTO) 20 MG TABS tablet Comments:   Reason for Stopping:             Allergies   No Known Allergies  Data   Most Recent 3 CBC's:  Recent Labs   Lab Test 03/13/25  1217 03/12/25  1111 03/11/25  0602   WBC 13.5* 13.6* 12.5*   HGB 11.3* 11.4* 11.5*   MCV 93 92 91    266 293      Most Recent 3 BMP's:  Recent Labs   Lab Test 03/14/25  1007 03/14/25  0606 03/14/25  0154 03/13/25  1404 03/13/25  1217 03/12/25  2146  03/12/25  1817 03/12/25  1433 03/12/25  1111 03/11/25  1454 03/11/25  0602   NA  --   --   --   --  135  --   --   --  131*  --  135   POTASSIUM  --   --   --   --  3.6  --  3.7  --  3.3*   < > 3.1*   CHLORIDE  --   --   --   --  96*  --   --   --  93*  --  92*   CO2  --   --   --   --  30*  --   --   --  30*  --  32*   BUN  --   --   --   --  11.0  --   --   --  9.6  --  7.8*   CR  --   --   --   --  0.45*  --   --   --  0.52*  --  0.55*   ANIONGAP  --   --   --   --  9  --   --   --  8  --  11   RIYA  --   --   --   --  8.3*  --   --   --  8.6*  --  8.8   * 211* 212*   < > 190*   < >  --    < > 183*   < > 140*    < > = values in this interval not displayed.     Most Recent 2 LFT's:  Recent Labs   Lab Test 03/11/25  0602 03/10/25  1202   AST 15 15   ALT 9 9   ALKPHOS 102 107   BILITOTAL 1.7* 1.4*     Most Recent INR's and Anticoagulation Dosing History:  Anticoagulation Dose History          Latest Ref Rng & Units 1/8/2021 3/10/2021 9/16/2022   Recent Dosing and Labs   INR 0.85 - 1.15 1.16  - 1.19    ISTAT INR 0.86 - 1.14 - 1.2        This test is intended for monitoring Coumadin therapy.  Results are not   accurate in patients with prolonged INR due to factor deficiency.   -     Most Recent 3 Troponin's:No lab results found.  Most Recent Cholesterol Panel:No lab results found.  Most Recent 6 Bacteria Isolates From Any Culture (See EPIC Reports for Culture Details):No lab results found.  Most Recent TSH, T4 and A1c Labs:  Recent Labs   Lab Test 02/08/23  1535 01/11/22  1023 11/12/21  0942   TSH 2.02   < > 7.25*   T4  --   --  0.89    < > = values in this interval not displayed.     Results for orders placed or performed during the hospital encounter of 03/10/25   XR Chest 2 Views    Narrative    EXAM: XR CHEST 2 VIEWS  LOCATION: [facility]  DATE: 3/10/2025    INDICATION: Concern for aspiration PNA  COMPARISON: 1/26/2021.      Impression    IMPRESSION: Some new patchy linear opacities  at the lower lungs could be atelectasis or new airspace disease. Increased cardiomegaly. No effusions identified. No pneumothorax.   CT Abdomen Pelvis w Contrast    Narrative    EXAM: CT ABDOMEN PELVIS W CONTRAST  LOCATION: [facility]  DATE: 3/10/2025    INDICATION: purulent drainage around g tube site  COMPARISON: None.  TECHNIQUE: CT scan of the abdomen and pelvis was performed following injection of IV contrast. Multiplanar reformats were obtained. Dose reduction techniques were used.  CONTRAST: 84 mL Isovue 370    FINDINGS:   LOWER CHEST: Bibasilar atelectasis.    HEPATOBILIARY: Liver and gallbladder within normal limits.     PANCREAS: Normal.    SPLEEN: Normal.    ADRENAL GLANDS: Normal.    KIDNEYS/BLADDER: Bilateral renal cysts are noted which are benign and needs no further follow-up. Kidneys are otherwise unremarkable with no evidence of hydronephrosis.    BOWEL: Diverticulosis of the colon. No acute inflammatory change. No obstruction. Moderate stool noted throughout the colon. Stomach is decompressed. The G-tube appears to be within the stomach. G-tube site otherwise appears to within normal limits.    LYMPH NODES: Normal.    VASCULATURE: Moderate atherosclerotic disease of the abdominal aorta with no aneurysmal dilation.    PELVIC ORGANS: Bladder within normal limits.    MUSCULOSKELETAL: Total bilateral hip replacements with no evidence of hardware complication. Degenerative changes of the spine.      Impression    IMPRESSION:   1.  G-tube appears within the stomach with no evidence of complication. No acute findings in the abdomen and pelvis.   MR Soft Tissue Neck w/o & w Contrast    Addendum: 3/12/2025    2/18/2025 MRI images were made available for comparison. Compared to that study, the necrotic mass centered in the region of the left palatine tonsil/skull base is overall similar, with similar extension intracranially and similar bone destruction   involving the skull  base.    However, there has been interval worsening of left temporal lobe edema and the small inferior left temporal lobe abscess is new.    Complete opacification of the left sphenoid sinus is also noted.    Findings discussed with Dr. Chung on 3/12/2025 at approximately 1300      Narrative    EXAM: MR SOFT TISSUE NECK W/O and W CONTRAST  LOCATION: [facility]  DATE: 3/11/2025    INDICATION: history of tonsillar cancer and h o osteomyelitis of mandible, now with worsening pain  COMPARISON: MRI neck 11/17/2022  CONTRAST: 7 ml gadavist  TECHNIQUE: Multiplanar multisequence neck MRI performed without and with IV contrast.    FINDINGS:   -When compared to 11/17/2022, there has been marked worsening of diffuse edema and enhancement involving the left  space including the temporalis muscle, masseter, and adjacent parotid gland. As before, there is abnormal T1-weighted hypointense   marrow signal involving the left mandibular ramus with worsened cortical destruction (series 2, image 10).     -Centered within the expected location of the superior aspect of the left palatine tonsil is a  peripherally rim-enhancing centrally hypoenhancing necrotic mass measuring 3.0 x 4.2 x 3.4 cm (series 9, image 7 and series 10, image 25). This necrotic mass   completely encases the left internal carotid artery flow void (series 9, image 8) as well as involving the clivus and left occipital condyle. The mass also involves the left mandibular condyle and obliterates the medial left pterygoid muscle. There is   also likely involvement of the anterior aspect of the mastoid temporal bone.     -The mass involves the left jugular foramen (series 9, image 8) and likely the left hypoglossal canal (series 9, image 8).     -Additionally, there is erosion through the tegmen mastoideum with significant dural thickening and enhancement of the inferior aspect of the left temporal lobe. There is significant edema of the  parenchyma as well as a 0.6 x 0.9 cm rim-enhancing lesion   along the inferior margin of the temporal lobe (series 10, image 25).     -There is also involvement of the left pterygopalatine fossa and foramen ovale, as well as likely the foramen rotundum. There is involvement of the left Meckel's cave and abnormal enhancement involving the cisternal portion of the left trigeminal nerve   (series 10, image 8).     There is complete opacification of the left sphenoid sinus. There is complete fluid opacification of the left mastoid air cells.      Impression    IMPRESSION:   1.  There is a 3.0 x 4.2 x 3.4 cm destructive/invasive centrally necrotic mass centered within the expected location of the left palatine tonsil which completely encases the left internal carotid artery and involves the skull base including the clivus,   left occipital condyle, and left mastoid temporal bone, as well as destruction of the left mandibular ramus and condyle, detailed above. While these findings are new/markedly worse compared to 11/17/2022. The outside hospital study performed on 2/18/2025   is not available for comparison at this time.    2.  Additionally, this mass involves the left jugular foramen and likely the left hypoglossal canal. There is also involvement of the left pterygopalatine fossa, foramen ovale, and likely the foramen rotundum. The mass also extends into the left Meckel's   cave with perineural spread along the cisternal portion of the left trigeminal nerve.    3.  Most notably, there is erosion through the tegmen mastoideum with significant dural thickening and enhancement of the inferior aspect of the left temporal lobe. There is significant edema of the brain parenchyma as well as an adjacent 0.6 x 0.9 cm   rim-enhancing lesion along the inferior margin of the temporal lobe.    4.  Overall, the above-described findings are concerning for both tumor recurrence and superimposed infection with intracranial spread of  both. Of note, the above-described 0.6 x 0.9 cm collection underlying the left temporal lobe is compatible with   abscess.     Findings discussed with Dr. Madera on 3/11/2025 at 10:17 PM   MR Brain w/o & w Contrast    Narrative    EXAM: MR BRAIN W/O and W CONTRAST  LOCATION: [facility]  DATE: 3/12/2025    INDICATION: Evaluate for intracranial abscess/infection. Mass.  COMPARISON: MRI soft tissue neck dated 3/11/2025.  CONTRAST: 7 mL Gadavist  TECHNIQUE: Routine multiplanar multisequence head MRI without and with intravenous contrast.    FINDINGS:  INTRACRANIAL CONTENTS: No acute or subacute infarct identified.    Within the left inferior temporal lobe there is a small rim-enhancing lesion demonstrating central diffusion restriction measuring 9 mm in AP dimensions, 1 cm in transverse dimensions and 1 cm in craniocaudal dimensions. There is a surrounding T2/FLAIR   signal hyperintensity/edema and mild local mass effect. There is slight medialization of the left uncus, however there is no midline shift. There is associated mild effacement of the temporal horn of the left lateral ventricle. Additionally, there is   extensive underlying dural thickening/enhancement with mild adjacent leptomeningeal enhancement. Enhancing soft tissue is noted extending to the left foramen ovale. Please see dedicated CT soft tissue neck for assessment of the extracranial findings   which extend from the left neck and erode through the left tegmen mastoideum.     No acute intra- hemorrhage. No definite abnormal extra-axial fluid collection. Scattered nonspecific T2/FLAIR hyperintensities within the cerebral white matter most consistent with mild chronic microvascular ischemic change. Mild generalized cerebral   atrophy. No hydrocephalus. Normal position of the cerebellar tonsils.     SELLA: No abnormality accounting for technique.    OSSEOUS STRUCTURES/SOFT TISSUES:  The major intracranial flow voids appear  maintained. Please refer to dedicated MRI of the neck for evaluation of the extracranial findings within the neck, skull base and face.     ORBITS: No abnormality accounting for technique.     SINUSES/MASTOIDS: Mild to moderate scattered paranasal sinus mucosal thickening. There is complete opacification of the left sphenoid sinus which demonstrates diffusion restriction. Partial right mastoid effusion. Complete opacification of the left   mastoid air cells and left middle ear cavity.       Impression    IMPRESSION:  1.  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   2.  Please refer to dedicated soft tissue MRI of the neck from 03/11/2025 for assessment of the extracranial findings within the left neck which erodes through the left tegmen mastoideum and involve the skull base/face.   3.  No acute or subacute infarct.  4.  Chronic senescent and presumed microvascular ischemic change  5.  Paranasal sinus disease, as above. Correlation for acute sinusitis is recommended.      Most Recent 3 CBC's:  Recent Labs   Lab Test 03/13/25  1217 03/12/25  1111 03/11/25  0602   WBC 13.5* 13.6* 12.5*   HGB 11.3* 11.4* 11.5*   MCV 93 92 91    266 293     Most Recent 3 BMP's:  Recent Labs   Lab Test 03/14/25  1007 03/14/25  0606 03/14/25  0154 03/13/25  1404 03/13/25  1217 03/12/25  2146 03/12/25  1817 03/12/25  1433 03/12/25  1111 03/11/25  1454 03/11/25  0602   NA  --   --   --   --  135  --   --   --  131*  --  135   POTASSIUM  --   --   --   --  3.6  --  3.7  --  3.3*   < > 3.1*   CHLORIDE  --   --   --   --  96*  --   --   --  93*  --  92*   CO2  --   --   --   --  30*  --   --   --  30*  --  32*   BUN  --   --   --   --  11.0  --   --   --  9.6  --  7.8*   CR  --   --   --   --  0.45*  --   --   --  0.52*  --  0.55*   ANIONGAP  --   --   --   --  9  --   --   --  8  --  11   RIYA  --   --   --   --  8.3*  --   --   --  8.6*  --  8.8    * 211* 212*   < > 190*   < >  --    < > 183*   < > 140*    < > = values in this interval not displayed.     Most Recent 2 LFT's:  Recent Labs   Lab Test 03/11/25  0602 03/10/25  1202   AST 15 15   ALT 9 9   ALKPHOS 102 107   BILITOTAL 1.7* 1.4*

## 2025-03-14 NOTE — PLAN OF CARE
Occupational Therapy Discharge Summary    Reason for therapy discharge:    No further expectations of functional progress.    Progress towards therapy goal(s). See goals on Care Plan in [facility] electronic health record for goal details.  Transition to comfort cares.    Therapy recommendation(s):    No further therapy is recommended. Transition to comfort cares.

## 2025-03-14 NOTE — PROGRESS NOTES
Writer called [facility] [phone] opt 2 they supply patient with nutrition for G-tube [place] and line supplies for tube feeds.  Last shipment was 2/11/25 72 cartons. Per Customer service usually ships every 30 days,however, no new request since the last shipment of 02/11/25. Updated Unit .     Kerry Ulloa RN, BSN, ACM   Care Transitions Specialist  [facility]  Care Transitions Specialist  [address] [place]. [address]  [email or web address]  Office: [phone] Fax: [phone]  [facility]

## 2025-03-14 NOTE — PROGRESS NOTES
Discharge Note    Patient discharged to home via EMS/BLS accompanied by sister and daughter.  IV: Discontinued  Prescriptions filled and given to patient/family.   Belongings reviewed and sent with family.   Home medications returned to patient: NA  Equipment sent with: N/A.   family verbalizes understanding of discharge instructions. AVS given to family.

## 2025-03-15 LAB
BACTERIA BLD CULT: NO GROWTH
BACTERIA BLD CULT: NO GROWTH

## (undated) DEVICE — SU STRATAFIX PDS PLUS 1 CT-1 18" SXPP1A404

## (undated) DEVICE — PACK OCULOPLATIC SEN15OCFSD

## (undated) DEVICE — LINEN TOWEL PACK X5 5464

## (undated) DEVICE — DRAPE IOBAN ISOLATION VERTICAL 320X21CM 6617

## (undated) DEVICE — HOOD SURG T7PLUS PEEL AWAY FACE SHIELD STRL LF 0416-801-100

## (undated) DEVICE — ADH REMOVER PAD UNI-SOLVE 402300

## (undated) DEVICE — SU PROLENE 6-0 P-1 18" 8697G

## (undated) DEVICE — SU MONOCRYL 3-0 PS-2 27" Y427H

## (undated) DEVICE — SU ETHIBOND 0 CTX CR  8X18" CX31D

## (undated) DEVICE — GLOVE BIOGEL PI MICRO INDICATOR UNDERGLOVE SZ 8.5 48985

## (undated) DEVICE — SU STRATAFIX PDS PLUS 2-0 SPIRAL CT-1 30CM SXPP1B410

## (undated) DEVICE — WRAP EZY KNEE

## (undated) DEVICE — DRAPE SHEET REV FOLD 3/4 9349

## (undated) DEVICE — GLOVE PROTEXIS W/NEU-THERA 7.5  2D73TE75

## (undated) DEVICE — NDL 19GA 1.5"

## (undated) DEVICE — ESU PENCIL W/SMOKE EVAC CVPLP2000

## (undated) DEVICE — SOL WATER IRRIG 1000ML BOTTLE 2F7114

## (undated) DEVICE — CLOSURE SYS SKIN PREMIERPRO EXOFIN FUSION 4X22CM STRL 3472

## (undated) DEVICE — ESU EYE HIGH TEMP 65410-183

## (undated) DEVICE — SU STRATAFIX PDS PLUS 0 CT 45CM SXPP1A406

## (undated) DEVICE — BONE CEMENT MIXEVAC III HI VAC KIT  0206-015-000

## (undated) DEVICE — ESU BIPOLAR SEALER AQUAMANTYS 6MM 23-112-1

## (undated) DEVICE — SU PLAIN 6-0 G-1DA 18" 770G

## (undated) DEVICE — SU VICRYL 2-0 CP-1 27" UND J266H

## (undated) DEVICE — SU ETHIBOND 2 V-37 4X30" MX69G

## (undated) DEVICE — SU VICRYL 0 CTX CR 8X18" J764D

## (undated) DEVICE — DRAPE STERI U 1015

## (undated) DEVICE — SYR 50ML LL W/O NDL 309653

## (undated) DEVICE — SOLUTION WOUND CLEANSING 3/4OZ 10% PVP EA-L3011FB-50

## (undated) DEVICE — ESU GROUND PAD UNIVERSAL W/O CORD

## (undated) DEVICE — EYE PREP BETADINE 5% SOLUTION 30ML 0065-0411-30

## (undated) DEVICE — BLADE SAW RECIP STRK 70X12.5X1.2MM 0277-096-281

## (undated) DEVICE — SOL NACL 0.9% INJ 250ML BAG 2B1322Q

## (undated) DEVICE — DRAPE IOBAN INCISE 23X17" 6650EZ

## (undated) DEVICE — SOL NACL 0.9% IRRIG 1000ML BOTTLE 2F7124

## (undated) DEVICE — HOOD FLYTE W/PEELAWAY 408-800-100

## (undated) DEVICE — MANIFOLD NEPTUNE 4 PORT 700-20

## (undated) DEVICE — SUCTION TIP YANKAUER STR K87

## (undated) DEVICE — BLADE SAW SAGITTAL STRK 18X90X1.27MM HD SYS 6 6118-127-090

## (undated) DEVICE — SU VICRYL 6-0 P-3 18" UND J492H

## (undated) DEVICE — PACK TOTAL KNEE SOP15TKFSD

## (undated) DEVICE — SU PDS II 5-0 RB-2DA 30" Z148H

## (undated) DEVICE — ESU NDL COLORADO MICRO 3CM STR N103A

## (undated) DEVICE — BONE CLEANING TIP INTERPULSE  0210-010-000

## (undated) DEVICE — SOL NACL 0.9% INJ 1000ML BAG 2B1324X

## (undated) DEVICE — DRAPE CONVERTORS U-DRAPE 60X72" 8476

## (undated) DEVICE — CAST PADDING 6" UNSTERILE 9046

## (undated) DEVICE — DRSG ABDOMINAL 07 1/2X8" 7197D

## (undated) DEVICE — Device

## (undated) DEVICE — ESU PENCIL W/HOLSTER

## (undated) DEVICE — BLADE CLIPPER 4412A

## (undated) DEVICE — DRAPE C-ARM 60X42" 1013

## (undated) DEVICE — KIT PATIENT CARE HANA TABLE PROFX SUPINE 6855

## (undated) DEVICE — DECANTER BAG 2002S

## (undated) DEVICE — GLOVE PROTEXIS MICRO 6.0  2D73PM60

## (undated) DEVICE — SUIT TOGA T7PLUS XL ZIPPER PEEL AWAY 0416-831-100

## (undated) DEVICE — GLOVE BIOGEL PI SZ 8.5 40885

## (undated) DEVICE — SUCTION IRR SYSTEM W/O TIP INTERPULSE HANDPIECE 0210-100-000

## (undated) DEVICE — PACK TOTAL HIP W/U DRAPE SOP15HUFSC

## (undated) RX ORDER — HEPARIN SODIUM (PORCINE) LOCK FLUSH IV SOLN 100 UNIT/ML 100 UNIT/ML
SOLUTION INTRAVENOUS
Status: DISPENSED
Start: 2021-02-03

## (undated) RX ORDER — HEPARIN SODIUM (PORCINE) LOCK FLUSH IV SOLN 100 UNIT/ML 100 UNIT/ML
SOLUTION INTRAVENOUS
Status: DISPENSED
Start: 2021-03-10

## (undated) RX ORDER — FENTANYL CITRATE 50 UG/ML
INJECTION, SOLUTION INTRAMUSCULAR; INTRAVENOUS
Status: DISPENSED
Start: 2022-09-16

## (undated) RX ORDER — ACETAMINOPHEN 325 MG/1
TABLET ORAL
Status: DISPENSED
Start: 2023-07-21

## (undated) RX ORDER — VANCOMYCIN HYDROCHLORIDE 1 G/20ML
INJECTION, POWDER, LYOPHILIZED, FOR SOLUTION INTRAVENOUS
Status: DISPENSED
Start: 2022-12-02

## (undated) RX ORDER — VANCOMYCIN HYDROCHLORIDE 1 G/20ML
INJECTION, POWDER, LYOPHILIZED, FOR SOLUTION INTRAVENOUS
Status: DISPENSED
Start: 2023-07-21

## (undated) RX ORDER — CEFAZOLIN SODIUM 2 G/100ML
INJECTION, SOLUTION INTRAVENOUS
Status: DISPENSED
Start: 2021-03-10

## (undated) RX ORDER — LIDOCAINE HYDROCHLORIDE 20 MG/ML
JELLY TOPICAL
Status: DISPENSED
Start: 2021-03-10

## (undated) RX ORDER — PREGABALIN 150 MG/1
CAPSULE ORAL
Status: DISPENSED
Start: 2022-12-02

## (undated) RX ORDER — PROPOFOL 10 MG/ML
INJECTION, EMULSION INTRAVENOUS
Status: DISPENSED
Start: 2022-12-02

## (undated) RX ORDER — FENTANYL CITRATE 50 UG/ML
INJECTION, SOLUTION INTRAMUSCULAR; INTRAVENOUS
Status: DISPENSED
Start: 2021-01-08

## (undated) RX ORDER — SODIUM CHLORIDE 9 MG/ML
INJECTION, SOLUTION INTRAVENOUS
Status: DISPENSED
Start: 2021-03-10

## (undated) RX ORDER — FENTANYL CITRATE 0.05 MG/ML
INJECTION, SOLUTION INTRAMUSCULAR; INTRAVENOUS
Status: DISPENSED
Start: 2023-07-21

## (undated) RX ORDER — CEFAZOLIN SODIUM 2 G/100ML
INJECTION, SOLUTION INTRAVENOUS
Status: DISPENSED
Start: 2021-02-03

## (undated) RX ORDER — DEXAMETHASONE SODIUM PHOSPHATE 4 MG/ML
INJECTION, SOLUTION INTRA-ARTICULAR; INTRALESIONAL; INTRAMUSCULAR; INTRAVENOUS; SOFT TISSUE
Status: DISPENSED
Start: 2022-12-02

## (undated) RX ORDER — DEXAMETHASONE SODIUM PHOSPHATE 4 MG/ML
INJECTION, SOLUTION INTRA-ARTICULAR; INTRALESIONAL; INTRAMUSCULAR; INTRAVENOUS; SOFT TISSUE
Status: DISPENSED
Start: 2023-07-21

## (undated) RX ORDER — LIDOCAINE HYDROCHLORIDE 20 MG/ML
SOLUTION OROPHARYNGEAL
Status: DISPENSED
Start: 2021-02-12

## (undated) RX ORDER — ERYTHROMYCIN 5 MG/G
OINTMENT OPHTHALMIC
Status: DISPENSED
Start: 2021-02-10

## (undated) RX ORDER — PREGABALIN 150 MG/1
CAPSULE ORAL
Status: DISPENSED
Start: 2023-07-21

## (undated) RX ORDER — FENTANYL CITRATE 50 UG/ML
INJECTION, SOLUTION INTRAMUSCULAR; INTRAVENOUS
Status: DISPENSED
Start: 2021-03-10

## (undated) RX ORDER — LIDOCAINE HYDROCHLORIDE 10 MG/ML
INJECTION, SOLUTION EPIDURAL; INFILTRATION; INTRACAUDAL; PERINEURAL
Status: DISPENSED
Start: 2021-03-10

## (undated) RX ORDER — CEFAZOLIN SODIUM/WATER 2 G/20 ML
SYRINGE (ML) INTRAVENOUS
Status: DISPENSED
Start: 2022-12-02

## (undated) RX ORDER — LIDOCAINE HYDROCHLORIDE 10 MG/ML
INJECTION, SOLUTION EPIDURAL; INFILTRATION; INTRACAUDAL; PERINEURAL
Status: DISPENSED
Start: 2022-09-16

## (undated) RX ORDER — PROPOFOL 10 MG/ML
INJECTION, EMULSION INTRAVENOUS
Status: DISPENSED
Start: 2023-07-21

## (undated) RX ORDER — TRANEXAMIC ACID 10 MG/ML
INJECTION, SOLUTION INTRAVENOUS
Status: DISPENSED
Start: 2023-07-21

## (undated) RX ORDER — ONDANSETRON 2 MG/ML
INJECTION INTRAMUSCULAR; INTRAVENOUS
Status: DISPENSED
Start: 2023-07-21

## (undated) RX ORDER — FENTANYL CITRATE 50 UG/ML
INJECTION, SOLUTION INTRAMUSCULAR; INTRAVENOUS
Status: DISPENSED
Start: 2023-07-21

## (undated) RX ORDER — ERYTHROMYCIN 5 MG/G
OINTMENT OPHTHALMIC
Status: DISPENSED
Start: 2022-01-11

## (undated) RX ORDER — HEPARIN SODIUM,PORCINE 10 UNIT/ML
VIAL (ML) INTRAVENOUS
Status: DISPENSED
Start: 2021-01-26

## (undated) RX ORDER — FENTANYL CITRATE 50 UG/ML
INJECTION, SOLUTION INTRAMUSCULAR; INTRAVENOUS
Status: DISPENSED
Start: 2022-12-02

## (undated) RX ORDER — LIDOCAINE HYDROCHLORIDE AND EPINEPHRINE 10; 10 MG/ML; UG/ML
INJECTION, SOLUTION INFILTRATION; PERINEURAL
Status: DISPENSED
Start: 2022-01-11

## (undated) RX ORDER — SODIUM CHLORIDE 9 MG/ML
INJECTION, SOLUTION INTRAVENOUS
Status: DISPENSED
Start: 2021-01-08

## (undated) RX ORDER — LIDOCAINE HYDROCHLORIDE 10 MG/ML
INJECTION, SOLUTION INFILTRATION; PERINEURAL
Status: DISPENSED
Start: 2021-02-03

## (undated) RX ORDER — LIDOCAINE HYDROCHLORIDE 10 MG/ML
INJECTION, SOLUTION INFILTRATION; PERINEURAL
Status: DISPENSED
Start: 2021-09-02

## (undated) RX ORDER — FENTANYL CITRATE 50 UG/ML
INJECTION, SOLUTION INTRAMUSCULAR; INTRAVENOUS
Status: DISPENSED
Start: 2021-02-03

## (undated) RX ORDER — SODIUM CHLORIDE 9 MG/ML
INJECTION, SOLUTION INTRAVENOUS
Status: DISPENSED
Start: 2022-09-16

## (undated) RX ORDER — TRANEXAMIC ACID 650 MG/1
TABLET ORAL
Status: DISPENSED
Start: 2023-07-21

## (undated) RX ORDER — CEFAZOLIN SODIUM/WATER 2 G/20 ML
SYRINGE (ML) INTRAVENOUS
Status: DISPENSED
Start: 2023-07-21

## (undated) RX ORDER — GLYCOPYRROLATE 0.2 MG/ML
INJECTION, SOLUTION INTRAMUSCULAR; INTRAVENOUS
Status: DISPENSED
Start: 2023-07-21

## (undated) RX ORDER — HYDROMORPHONE HYDROCHLORIDE 1 MG/ML
INJECTION, SOLUTION INTRAMUSCULAR; INTRAVENOUS; SUBCUTANEOUS
Status: DISPENSED
Start: 2023-07-21

## (undated) RX ORDER — LIDOCAINE HYDROCHLORIDE 10 MG/ML
INJECTION, SOLUTION EPIDURAL; INFILTRATION; INTRACAUDAL; PERINEURAL
Status: DISPENSED
Start: 2021-01-08

## (undated) RX ORDER — ACETAMINOPHEN 325 MG/1
TABLET ORAL
Status: DISPENSED
Start: 2022-12-02